# Patient Record
Sex: MALE | Race: WHITE | NOT HISPANIC OR LATINO | Employment: OTHER
[De-identification: names, ages, dates, MRNs, and addresses within clinical notes are randomized per-mention and may not be internally consistent; named-entity substitution may affect disease eponyms.]

---

## 2017-01-31 ENCOUNTER — PRIOR ORIGINAL RECORDS (OUTPATIENT)
Dept: OTHER | Age: 77
End: 2017-01-31

## 2017-05-01 LAB
ALKALINE PHOSPHATATE(ALK PHOS): 61 IU/L
ALT (SGPT): 30 U/L
AST (SGOT): 18 U/L
BUN: 23 MG/DL
CALCIUM: 9.3 MG/DL
CHLORIDE: 103 MEQ/L
CHOLESTEROL, TOTAL: 168 MG/DL
CREATININE, SERUM: 1.29 MG/DL
GLUCOSE: 229 MG/DL
GLUCOSE: 229 MG/DL
HDL CHOLESTEROL: 52 MG/DL
LDL CHOLESTEROL: 71 MG/DL
POTASSIUM, SERUM: 4.7 MEQ/L
SGOT (AST): 18 IU/L
SGPT (ALT): 30 IU/L
SODIUM: 138 MEQ/L
THYROID STIMULATING HORMONE: 1.1 MLU/L
TRIGLYCERIDES: 227 MG/DL

## 2017-05-16 ENCOUNTER — PRIOR ORIGINAL RECORDS (OUTPATIENT)
Dept: OTHER | Age: 77
End: 2017-05-16

## 2017-05-17 ENCOUNTER — PRIOR ORIGINAL RECORDS (OUTPATIENT)
Dept: OTHER | Age: 77
End: 2017-05-17

## 2017-05-17 ENCOUNTER — MYAURORA ACCOUNT LINK (OUTPATIENT)
Dept: OTHER | Age: 77
End: 2017-05-17

## 2017-05-18 ENCOUNTER — PRIOR ORIGINAL RECORDS (OUTPATIENT)
Dept: OTHER | Age: 77
End: 2017-05-18

## 2017-05-19 ENCOUNTER — HOSPITAL (OUTPATIENT)
Dept: OTHER | Age: 77
End: 2017-05-19
Attending: INTERNAL MEDICINE

## 2017-05-19 ENCOUNTER — PRIOR ORIGINAL RECORDS (OUTPATIENT)
Dept: OTHER | Age: 77
End: 2017-05-19

## 2017-05-19 LAB
ANALYZER ANC (IANC): NORMAL
ANION GAP SERPL CALC-SCNC: 10 MMOL/L (ref 10–20)
BUN SERPL-MCNC: 20 MG/DL (ref 6–20)
BUN/CREAT SERPL: 18 (ref 7–25)
CALCIUM SERPL-MCNC: 9.1 MG/DL (ref 8.4–10.2)
CHLORIDE: 106 MMOL/L (ref 98–107)
CO2 SERPL-SCNC: 26 MMOL/L (ref 21–32)
CREAT SERPL-MCNC: 1.1 MG/DL (ref 0.67–1.17)
ERYTHROCYTE [DISTWIDTH] IN BLOOD: 12.5 % (ref 11–15)
GLUCOSE SERPL-MCNC: 169 MG/DL (ref 65–99)
HEMATOCRIT: 42.3 % (ref 39–51)
HGB BLD-MCNC: 14.4 GM/DL (ref 13–17)
LENGTH OF FAST TIME PATIENT: ABNORMAL HR
MCH RBC QN AUTO: 31.2 PG (ref 26–34)
MCHC RBC AUTO-ENTMCNC: 34 GM/DL (ref 32–36.5)
MCV RBC AUTO: 91.8 FL (ref 78–100)
PLATELET # BLD: 217 THOUSAND/MCL (ref 140–450)
POTASSIUM SERPL-SCNC: 4.3 MMOL/L (ref 3.4–5.1)
RBC # BLD: 4.61 MILLION/MCL (ref 4.5–5.9)
SODIUM SERPL-SCNC: 138 MMOL/L (ref 135–145)
WBC # BLD: 5 THOUSAND/MCL (ref 4.2–11)

## 2017-05-22 ENCOUNTER — HOSPITAL (OUTPATIENT)
Dept: OTHER | Age: 77
End: 2017-05-22
Attending: INTERNAL MEDICINE

## 2017-05-22 LAB
BUN: 20 MG/DL
CALCIUM: 9.1 MG/DL
CHLORIDE: 106 MEQ/L
CREATININE, SERUM: 1.1 MG/DL
GLUCOSE BLDC GLUCOMTR-MCNC: 113 MG/DL (ref 65–99)
GLUCOSE BLDC GLUCOMTR-MCNC: 127 MG/DL (ref 65–99)
GLUCOSE BLDC GLUCOMTR-MCNC: 76 MG/DL (ref 65–99)
GLUCOSE: 169 MG/DL
HEMATOCRIT: 42.3 %
HEMOGLOBIN: 14.4 G/DL
PLATELETS: 217 K/UL
POTASSIUM, SERUM: 4.3 MEQ/L
RED BLOOD COUNT: 4.61 X 10-6/U
SODIUM: 138 MEQ/L
WHITE BLOOD COUNT: 5 X 10-3/U

## 2017-05-23 ENCOUNTER — DIAGNOSTIC TRANS (OUTPATIENT)
Dept: OTHER | Age: 77
End: 2017-05-23

## 2017-05-23 LAB — GLUCOSE BLDC GLUCOMTR-MCNC: 110 MG/DL (ref 65–99)

## 2017-06-02 ENCOUNTER — PRIOR ORIGINAL RECORDS (OUTPATIENT)
Dept: OTHER | Age: 77
End: 2017-06-02

## 2017-06-19 ENCOUNTER — PRIOR ORIGINAL RECORDS (OUTPATIENT)
Dept: OTHER | Age: 77
End: 2017-06-19

## 2017-06-22 ENCOUNTER — HOSPITAL (OUTPATIENT)
Dept: OTHER | Age: 77
End: 2017-06-22
Attending: INTERNAL MEDICINE

## 2017-06-22 ENCOUNTER — DIAGNOSTIC TRANS (OUTPATIENT)
Dept: OTHER | Age: 77
End: 2017-06-22

## 2017-06-22 LAB
GLUCOSE BLDC GLUCOMTR-MCNC: 101 MG/DL (ref 65–99)
GLUCOSE BLDC GLUCOMTR-MCNC: 107 MG/DL (ref 65–99)
GLUCOSE BLDC GLUCOMTR-MCNC: 129 MG/DL (ref 65–99)
GLUCOSE BLDC GLUCOMTR-MCNC: 143 MG/DL (ref 65–99)
GLUCOSE BLDC GLUCOMTR-MCNC: 53 MG/DL (ref 65–99)

## 2017-06-23 LAB — GLUCOSE BLDC GLUCOMTR-MCNC: 101 MG/DL (ref 65–99)

## 2017-06-26 ENCOUNTER — PRIOR ORIGINAL RECORDS (OUTPATIENT)
Dept: OTHER | Age: 77
End: 2017-06-26

## 2017-06-29 ENCOUNTER — PRIOR ORIGINAL RECORDS (OUTPATIENT)
Dept: OTHER | Age: 77
End: 2017-06-29

## 2017-07-03 ENCOUNTER — PRIOR ORIGINAL RECORDS (OUTPATIENT)
Dept: OTHER | Age: 77
End: 2017-07-03

## 2017-07-13 ENCOUNTER — PRIOR ORIGINAL RECORDS (OUTPATIENT)
Dept: OTHER | Age: 77
End: 2017-07-13

## 2017-07-14 PROBLEM — E55.9 VITAMIN D DEFICIENCY: Status: ACTIVE | Noted: 2017-07-14

## 2017-07-18 ENCOUNTER — HOSPITAL (OUTPATIENT)
Dept: OTHER | Age: 77
End: 2017-07-18
Attending: INTERNAL MEDICINE

## 2017-07-31 ENCOUNTER — PRIOR ORIGINAL RECORDS (OUTPATIENT)
Dept: OTHER | Age: 77
End: 2017-07-31

## 2017-08-01 ENCOUNTER — HOSPITAL (OUTPATIENT)
Dept: OTHER | Age: 77
End: 2017-08-01
Attending: INTERNAL MEDICINE

## 2017-09-01 ENCOUNTER — HOSPITAL (OUTPATIENT)
Dept: OTHER | Age: 77
End: 2017-09-01
Attending: INTERNAL MEDICINE

## 2017-09-20 ENCOUNTER — PRIOR ORIGINAL RECORDS (OUTPATIENT)
Dept: OTHER | Age: 77
End: 2017-09-20

## 2017-09-26 ENCOUNTER — MYAURORA ACCOUNT LINK (OUTPATIENT)
Dept: OTHER | Age: 77
End: 2017-09-26

## 2017-09-28 ENCOUNTER — PRIOR ORIGINAL RECORDS (OUTPATIENT)
Dept: OTHER | Age: 77
End: 2017-09-28

## 2017-10-01 ENCOUNTER — HOSPITAL (OUTPATIENT)
Dept: OTHER | Age: 77
End: 2017-10-01
Attending: INTERNAL MEDICINE

## 2017-11-01 ENCOUNTER — HOSPITAL (OUTPATIENT)
Dept: OTHER | Age: 77
End: 2017-11-01
Attending: INTERNAL MEDICINE

## 2017-11-14 PROBLEM — I25.10 CORONARY ARTERY DISEASE INVOLVING NATIVE CORONARY ARTERY OF NATIVE HEART WITHOUT ANGINA PECTORIS: Status: ACTIVE | Noted: 2017-11-14

## 2017-11-14 PROBLEM — I73.9 PERIPHERAL VASCULAR DISEASE OF EXTREMITY (HCC): Status: ACTIVE | Noted: 2017-11-14

## 2017-12-07 ENCOUNTER — PRIOR ORIGINAL RECORDS (OUTPATIENT)
Dept: OTHER | Age: 77
End: 2017-12-07

## 2017-12-07 ENCOUNTER — MYAURORA ACCOUNT LINK (OUTPATIENT)
Dept: OTHER | Age: 77
End: 2017-12-07

## 2018-01-31 ENCOUNTER — PRIOR ORIGINAL RECORDS (OUTPATIENT)
Dept: OTHER | Age: 78
End: 2018-01-31

## 2018-01-31 ENCOUNTER — HOSPITAL (OUTPATIENT)
Dept: OTHER | Age: 78
End: 2018-01-31
Attending: INTERNAL MEDICINE

## 2018-01-31 LAB
ALBUMIN SERPL-MCNC: 3.5 GM/DL (ref 3.6–5.1)
ALBUMIN/GLOB SERPL: 1 {RATIO} (ref 1–2.4)
ALP SERPL-CCNC: 48 UNIT/L (ref 45–117)
ALT SERPL-CCNC: 20 UNIT/L
ANALYZER ANC (IANC): ABNORMAL
ANION GAP SERPL CALC-SCNC: 13 MMOL/L (ref 10–20)
AST SERPL-CCNC: 16 UNIT/L
BILIRUB SERPL-MCNC: 0.4 MG/DL (ref 0.2–1)
BUN SERPL-MCNC: 20 MG/DL (ref 6–20)
BUN/CREAT SERPL: 16 (ref 7–25)
CALCIUM SERPL-MCNC: 8.7 MG/DL (ref 8.4–10.2)
CHLORIDE: 104 MMOL/L (ref 98–107)
CHOLEST SERPL-MCNC: 179 MG/DL
CHOLEST/HDLC SERPL: 3.8 {RATIO}
CK SERPL-CCNC: 36 UNIT/L (ref 39–308)
CO2 SERPL-SCNC: 28 MMOL/L (ref 21–32)
CREAT SERPL-MCNC: 1.23 MG/DL (ref 0.67–1.17)
ERYTHROCYTE [DISTWIDTH] IN BLOOD: 15.2 % (ref 11–15)
GLOBULIN SER-MCNC: 3.5 GM/DL (ref 2–4)
GLUCOSE SERPL-MCNC: 163 MG/DL (ref 65–99)
GLYCOHEMOGLOBIN: 8.3 % (ref 4.5–5.6)
HDLC SERPL-MCNC: 47 MG/DL
HEMATOCRIT: 42.1 % (ref 39–51)
HGB BLD-MCNC: 14.6 GM/DL (ref 13–17)
LDLC SERPL CALC-MCNC: 84 MG/DL
LENGTH OF FAST TIME PATIENT: 12 HR
LENGTH OF FAST TIME PATIENT: 12 HR
MCH RBC QN AUTO: 31.2 PG (ref 26–34)
MCHC RBC AUTO-ENTMCNC: 34.7 GM/DL (ref 32–36.5)
MCV RBC AUTO: 90 FL (ref 78–100)
NONHDLC SERPL-MCNC: 132 MG/DL
PLATELET # BLD: 219 THOUSAND/MCL (ref 140–450)
POTASSIUM SERPL-SCNC: 4.5 MMOL/L (ref 3.4–5.1)
PROT SERPL-MCNC: 7 GM/DL (ref 6.4–8.2)
RBC # BLD: 4.68 MILLION/MCL (ref 4.5–5.9)
SODIUM SERPL-SCNC: 140 MMOL/L (ref 135–145)
TRIGLYCERIDE (TRIGP): 238 MG/DL
WBC # BLD: 6.1 THOUSAND/MCL (ref 4.2–11)

## 2018-02-01 LAB
ALBUMIN: 3.5 G/DL
ALKALINE PHOSPHATATE(ALK PHOS): 48 IU/L
BILIRUBIN TOTAL: 0.4 MG/DL
BUN: 20 MG/DL
CALCIUM: 8.7 MG/DL
CHLORIDE: 104 MEQ/L
CHOLESTEROL, TOTAL: 179 MG/DL
CREATININE KINASE: 36 U/L
CREATININE, SERUM: 1.23 MG/DL
GLOBULIN: 3.5 G/DL
GLUCOSE: 163 MG/DL
GLYCOHEMOGLOBIN: 8.3 %
HDL CHOLESTEROL: 47 MG/DL
HEMATOCRIT: 42.1 %
HEMOGLOBIN: 14.6 G/DL
LDL CHOLESTEROL: 84 MG/DL
MCH: 31.2 PG
MCHC: 34.7 G/DL
MCV: 90 FL
PLATELETS: 219 K/UL
POTASSIUM, SERUM: 4.5 MEQ/L
PROTEIN, TOTAL: 7 G/DL
RED BLOOD COUNT: 4.68 X 10-6/U
SGOT (AST): 16 IU/L
SGPT (ALT): 20 IU/L
SODIUM: 140 MEQ/L
TRIGLYCERIDES: 238 MG/DL
WHITE BLOOD COUNT: 6.1 X 10-3/U

## 2018-02-05 ENCOUNTER — PRIOR ORIGINAL RECORDS (OUTPATIENT)
Dept: OTHER | Age: 78
End: 2018-02-05

## 2018-08-06 ENCOUNTER — PRIOR ORIGINAL RECORDS (OUTPATIENT)
Dept: OTHER | Age: 78
End: 2018-08-06

## 2018-10-08 PROCEDURE — 80053 COMPREHEN METABOLIC PANEL: CPT

## 2018-10-08 PROCEDURE — 85025 COMPLETE CBC W/AUTO DIFF WBC: CPT

## 2018-10-08 PROCEDURE — 80061 LIPID PANEL: CPT

## 2018-10-08 PROCEDURE — 83036 HEMOGLOBIN GLYCOSYLATED A1C: CPT

## 2018-10-09 ENCOUNTER — PRIOR ORIGINAL RECORDS (OUTPATIENT)
Dept: OTHER | Age: 78
End: 2018-10-09

## 2018-10-09 ENCOUNTER — LAB ENCOUNTER (OUTPATIENT)
Dept: LAB | Age: 78
End: 2018-10-09
Attending: INTERNAL MEDICINE
Payer: MEDICARE

## 2018-10-09 DIAGNOSIS — E78.2 MIXED HYPERLIPIDEMIA: ICD-10-CM

## 2018-10-09 DIAGNOSIS — R94.39 ABNORMAL STRESS TEST: ICD-10-CM

## 2018-10-09 DIAGNOSIS — I10 ESSENTIAL HYPERTENSION, BENIGN: ICD-10-CM

## 2018-10-09 DIAGNOSIS — E03.1 CHT (CONGENITAL HYPOTHYROIDISM): ICD-10-CM

## 2018-10-09 DIAGNOSIS — I73.9 PERIPHERAL VASCULAR DISEASE, UNSPECIFIED (HCC): ICD-10-CM

## 2018-10-09 DIAGNOSIS — I25.10 CORONARY ATHEROSCLEROSIS OF NATIVE CORONARY ARTERY: Primary | ICD-10-CM

## 2018-10-09 LAB
ALBUMIN: 3.6 G/DL
ALKALINE PHOSPHATATE(ALK PHOS): 53 IU/L
BILIRUBIN TOTAL: 0.5 MG/DL
BUN: 17 MG/DL
CALCIUM: 9 MG/DL
CHLORIDE: 106 MEQ/L
CREATININE, SERUM: 1.35 MG/DL
GLOBULIN: 3.5 G/DL
GLUCOSE: 190 MG/DL
HEMATOCRIT: 46.9 %
HEMOGLOBIN: 14.6 G/DL
MCH: 30.7 PG
MCHC: 31.1 G/DL
MCV: 98.7 FL
PLATELETS: 246 K/UL
POTASSIUM, SERUM: 4.9 MEQ/L
PROTEIN, TOTAL: 7.1 G/DL
RED BLOOD COUNT: 4.75 X 10-6/U
SGOT (AST): 16 IU/L
SGPT (ALT): 26 IU/L
SODIUM: 139 MEQ/L
WHITE BLOOD COUNT: 5.6 X 10-3/U

## 2018-10-10 ENCOUNTER — PRIOR ORIGINAL RECORDS (OUTPATIENT)
Dept: OTHER | Age: 78
End: 2018-10-10

## 2018-10-10 ENCOUNTER — MYAURORA ACCOUNT LINK (OUTPATIENT)
Dept: OTHER | Age: 78
End: 2018-10-10

## 2018-10-10 LAB
ALT (SGPT): 26 U/L
AST (SGOT): 16 U/L
CHOLESTEROL, TOTAL: 177 MG/DL
GLUCOSE: 190 MG/DL
HDL CHOLESTEROL: 42 MG/DL
HEMOGLOBIN A1C: 8.1 %
LDL CHOLESTEROL: 79 MG/DL
TRIGLYCERIDES: 278 MG/DL

## 2019-02-28 VITALS
WEIGHT: 315 LBS | HEART RATE: 64 BPM | BODY MASS INDEX: 46.65 KG/M2 | DIASTOLIC BLOOD PRESSURE: 80 MMHG | SYSTOLIC BLOOD PRESSURE: 138 MMHG | HEIGHT: 69 IN

## 2019-02-28 VITALS
HEART RATE: 64 BPM | DIASTOLIC BLOOD PRESSURE: 60 MMHG | WEIGHT: 206 LBS | BODY MASS INDEX: 30.42 KG/M2 | SYSTOLIC BLOOD PRESSURE: 130 MMHG

## 2019-02-28 VITALS
DIASTOLIC BLOOD PRESSURE: 72 MMHG | SYSTOLIC BLOOD PRESSURE: 134 MMHG | HEIGHT: 69 IN | BODY MASS INDEX: 31.1 KG/M2 | HEART RATE: 72 BPM | WEIGHT: 210 LBS

## 2019-02-28 VITALS
BODY MASS INDEX: 30.51 KG/M2 | HEART RATE: 68 BPM | SYSTOLIC BLOOD PRESSURE: 146 MMHG | WEIGHT: 206 LBS | DIASTOLIC BLOOD PRESSURE: 60 MMHG | HEIGHT: 69 IN

## 2019-02-28 VITALS
DIASTOLIC BLOOD PRESSURE: 62 MMHG | WEIGHT: 203 LBS | HEIGHT: 69 IN | HEART RATE: 68 BPM | SYSTOLIC BLOOD PRESSURE: 136 MMHG | BODY MASS INDEX: 30.07 KG/M2

## 2019-02-28 VITALS
HEIGHT: 69 IN | SYSTOLIC BLOOD PRESSURE: 138 MMHG | HEART RATE: 70 BPM | DIASTOLIC BLOOD PRESSURE: 58 MMHG | BODY MASS INDEX: 31.25 KG/M2 | RESPIRATION RATE: 16 BRPM | WEIGHT: 211 LBS

## 2019-03-01 VITALS
HEART RATE: 68 BPM | HEIGHT: 69 IN | SYSTOLIC BLOOD PRESSURE: 126 MMHG | DIASTOLIC BLOOD PRESSURE: 56 MMHG | RESPIRATION RATE: 16 BRPM | WEIGHT: 200 LBS | BODY MASS INDEX: 29.62 KG/M2

## 2019-03-01 VITALS
DIASTOLIC BLOOD PRESSURE: 62 MMHG | BODY MASS INDEX: 28.5 KG/M2 | WEIGHT: 193 LBS | SYSTOLIC BLOOD PRESSURE: 138 MMHG | HEART RATE: 70 BPM

## 2019-03-19 RX ORDER — LISINOPRIL 20 MG/1
1 TABLET ORAL DAILY
Status: ON HOLD | COMMUNITY
Start: 2018-01-31 | End: 2021-09-11 | Stop reason: HOSPADM

## 2019-03-19 RX ORDER — SIMVASTATIN 20 MG
1 TABLET ORAL AT BEDTIME
COMMUNITY
Start: 2018-01-31 | End: 2019-04-18 | Stop reason: ALTCHOICE

## 2019-03-19 RX ORDER — ASPIRIN 325 MG
1 TABLET ORAL DAILY
COMMUNITY
Start: 2017-05-01 | End: 2020-05-06 | Stop reason: CLARIF

## 2019-03-19 RX ORDER — CLOPIDOGREL BISULFATE 75 MG/1
1 TABLET ORAL DAILY
COMMUNITY
Start: 2018-01-31 | End: 2019-04-18 | Stop reason: SDUPTHER

## 2019-04-11 ENCOUNTER — ANCILLARY PROCEDURE (OUTPATIENT)
Dept: CARDIOLOGY | Age: 79
End: 2019-04-11
Attending: INTERNAL MEDICINE

## 2019-04-11 VITALS — BODY MASS INDEX: 30.51 KG/M2 | WEIGHT: 206 LBS | HEIGHT: 69 IN

## 2019-04-11 DIAGNOSIS — I25.10 CAD (CORONARY ARTERY DISEASE): ICD-10-CM

## 2019-04-11 LAB
HEART RATE RESERVE PREDICTED: 34.04 BPM
LV EF: 50 %
PEAK HR ACHIEVED: 93 BPM
RESTING HR ACHIEVED: 67 BPM
STRESS BASELINE BP: NORMAL MMHG
STRESS PERCENT HR: 66 %
STRESS POST ESTIMATED WORKLOAD: 4 METS
STRESS POST EXERCISE DUR MIN: 3 MIN
STRESS POST EXERCISE DUR SEC: 31 SEC
STRESS POST PEAK BP: NORMAL MMHG
STRESS TARGET HR: 141 BPM

## 2019-04-11 PROCEDURE — 78452 HT MUSCLE IMAGE SPECT MULT: CPT | Performed by: INTERNAL MEDICINE

## 2019-04-11 PROCEDURE — A9502 TC99M TETROFOSMIN: HCPCS | Performed by: INTERNAL MEDICINE

## 2019-04-11 PROCEDURE — 93015 CV STRESS TEST SUPVJ I&R: CPT | Performed by: INTERNAL MEDICINE

## 2019-04-11 RX ORDER — REGADENOSON 0.08 MG/ML
0.4 INJECTION, SOLUTION INTRAVENOUS ONCE
Status: COMPLETED | OUTPATIENT
Start: 2019-04-11 | End: 2019-04-11

## 2019-04-11 RX ADMIN — REGADENOSON 0.4 MG: 0.08 INJECTION, SOLUTION INTRAVENOUS at 09:15

## 2019-04-11 ASSESSMENT — EXERCISE STRESS TEST
PEAK_BP: 148/70
STAGE_CATEGORIES: RECOVERY 1
GRADE: 10
PEAK_RPP: 15840
PEAK_HR: 93
MPH: 1.9
STAGE_CATEGORIES: RECOVERY 2
PEAK_RPP: 13024
STAGE_CATEGORIES: RECOVERY 0
STAGE_CATEGORIES: RECOVERY 3
PEAK_HR: 93
STAGE_CATEGORIES: 1
PEAK_RPP: 10720
PEAK_BP: 160/62
PEAK_BP: 174/60
PEAK_HR: 67
PEAK_BP: 178/60
PEAK_BP: 178/60
PEAK_HR: 88
GRADE: 11
PEAK_RPP: 16554
STAGE_CATEGORIES: 2
STAGE_CATEGORIES: RESTING
PEAK_HR: 103
PEAK_BP: 180/60
PEAK_RPP: 16554
STOPPAGE_REASON: LEG FATIGUE
PEAK_BP: 150/60
MPH: 1.7
PEAK_HR: 88
PEAK_RPP: 17922

## 2019-04-15 ENCOUNTER — TELEPHONE (OUTPATIENT)
Dept: CARDIOLOGY | Age: 79
End: 2019-04-15

## 2019-04-15 ENCOUNTER — HOSPITAL (OUTPATIENT)
Dept: OTHER | Age: 79
End: 2019-04-15
Attending: INTERNAL MEDICINE

## 2019-04-15 PROBLEM — I10 HYPERTENSION, BENIGN: Status: ACTIVE | Noted: 2017-05-16

## 2019-04-15 PROBLEM — I73.9 PERIPHERAL VASCULAR DISEASE OF EXTREMITY (CMD): Status: ACTIVE | Noted: 2017-11-14

## 2019-04-15 PROBLEM — E55.9 VITAMIN D DEFICIENCY: Status: ACTIVE | Noted: 2017-07-14

## 2019-04-15 PROBLEM — E78.2 HYPERLIPIDEMIA, MIXED: Status: ACTIVE | Noted: 2017-05-16

## 2019-04-15 PROBLEM — I25.10 CAD (CORONARY ARTERY DISEASE), NATIVE CORONARY ARTERY: Status: ACTIVE | Noted: 2017-06-02

## 2019-04-15 PROBLEM — I25.10 CORONARY ARTERY DISEASE INVOLVING NATIVE CORONARY ARTERY OF NATIVE HEART WITHOUT ANGINA PECTORIS: Status: ACTIVE | Noted: 2017-11-14

## 2019-04-15 LAB
ALBUMIN SERPL-MCNC: 3.5 G/DL (ref 3.6–5.1)
ALBUMIN SERPL-MCNC: 3.5 GM/DL (ref 3.6–5.1)
ALBUMIN/GLOB SERPL: 1.1 {RATIO} (ref 1–2.4)
ALBUMIN/GLOB SERPL: 1.1 {RATIO} (ref 1–2.4)
ALP SERPL-CCNC: 60 UNIT/L (ref 45–117)
ALP SERPL-CCNC: 60 UNITS/L (ref 45–117)
ALT SERPL-CCNC: 17 UNIT/L
ALT SERPL-CCNC: 17 UNITS/L
ANALYZER ANC (IANC): ABNORMAL
ANALYZER ANC (IANC): ABNORMAL
ANION GAP SERPL CALC-SCNC: 15 MMOL/L (ref 10–20)
ANION GAP SERPL CALC-SCNC: 15 MMOL/L (ref 10–20)
AST SERPL-CCNC: 17 UNIT/L
AST SERPL-CCNC: 17 UNITS/L
BILIRUB SERPL-MCNC: 0.4 MG/DL (ref 0.2–1)
BILIRUB SERPL-MCNC: 0.4 MG/DL (ref 0.2–1)
BUN SERPL-MCNC: 18 MG/DL (ref 6–20)
BUN SERPL-MCNC: 18 MG/DL (ref 6–20)
BUN/CREAT SERPL: 14 (ref 7–25)
BUN/CREAT SERPL: 14 (ref 7–25)
CALCIUM SERPL-MCNC: 9 MG/DL (ref 8.4–10.2)
CALCIUM SERPL-MCNC: 9 MG/DL (ref 8.4–10.2)
CHLORIDE SERPL-SCNC: 104 MMOL/L (ref 98–107)
CHLORIDE: 104 MMOL/L (ref 98–107)
CHOLEST SERPL-MCNC: 211 MG/DL
CHOLEST SERPL-MCNC: 211 MG/DL
CHOLEST SERPL-MCNC: ABNORMAL MG/DL
CHOLEST/HDLC SERPL: 4.6 {RATIO}
CHOLEST/HDLC SERPL: 4.6 {RATIO}
CO2 SERPL-SCNC: 25 MMOL/L (ref 21–32)
CO2 SERPL-SCNC: 25 MMOL/L (ref 21–32)
CREAT SERPL-MCNC: 1.25 MG/DL (ref 0.67–1.17)
CREAT SERPL-MCNC: 1.25 MG/DL (ref 0.67–1.17)
ERYTHROCYTE [DISTWIDTH] IN BLOOD: 15.7 % (ref 11–15)
ERYTHROCYTE [DISTWIDTH] IN BLOOD: 15.7 % (ref 11–15)
GLOBULIN SER-MCNC: 3.3 G/DL (ref 2–4)
GLOBULIN SER-MCNC: 3.3 GM/DL (ref 2–4)
GLUCOSE SERPL-MCNC: 134 MG/DL (ref 65–99)
GLUCOSE SERPL-MCNC: 134 MG/DL (ref 65–99)
GLYCOHEMOGLOBIN: 8.2 % (ref 4.5–5.6)
HBA1C MFR BLD: 10.0           24 %
HBA1C MFR BLD: 6.0            126 %
HBA1C MFR BLD: 6.5            14 %
HBA1C MFR BLD: 7.0            154 %
HBA1C MFR BLD: 7.5            169 %
HBA1C MFR BLD: 8.0            183 %
HBA1C MFR BLD: 8.2 % (ref 4.5–5.6)
HBA1C MFR BLD: 8.5            197 %
HBA1C MFR BLD: 9.0            212 %
HBA1C MFR BLD: 9.5            226 %
HBA1C MFR BLD: ABNORMAL %
HCT VFR BLD CALC: 40.4 % (ref 39–51)
HDLC SERPL-MCNC: 46 MG/DL
HDLC SERPL-MCNC: 46 MG/DL
HDLC SERPL-MCNC: ABNORMAL MG/DL
HEMATOCRIT: 40.4 % (ref 39–51)
HGB BLD-MCNC: 12.5 G/DL (ref 13–17)
HGB BLD-MCNC: 12.5 GM/DL (ref 13–17)
LDLC SERPL CALC-MCNC: 123 MG/DL
LDLC SERPL CALC-MCNC: 123 MG/DL
LDLC SERPL CALC-MCNC: ABNORMAL MG/DL
LENGTH OF FAST TIME PATIENT: 12 HR
LENGTH OF FAST TIME PATIENT: 12 HR
LENGTH OF FAST TIME PATIENT: 12 HRS
LENGTH OF FAST TIME PATIENT: 12 HRS
MCH RBC QN AUTO: 25.7 PG (ref 26–34)
MCH RBC QN AUTO: 25.7 PG (ref 26–34)
MCHC RBC AUTO-ENTMCNC: 30.9 G/DL (ref 32–36.5)
MCHC RBC AUTO-ENTMCNC: 30.9 GM/DL (ref 32–36.5)
MCV RBC AUTO: 83.1 FL (ref 78–100)
MCV RBC AUTO: 83.1 FL (ref 78–100)
NONHDLC SERPL-MCNC: 165 MG/DL
NONHDLC SERPL-MCNC: 165 MG/DL
NONHDLC SERPL-MCNC: ABNORMAL MG/DL
NRBC (NRBCRE): 0 /100 WBC
NRBC (NRBCRE): 0 /100 WBC
PLATELET # BLD: 265 K/MCL (ref 140–450)
PLATELET # BLD: 265 THOUSAND/MCL (ref 140–450)
POTASSIUM SERPL-SCNC: 4.6 MMOL/L (ref 3.4–5.1)
POTASSIUM SERPL-SCNC: 4.6 MMOL/L (ref 3.4–5.1)
PROT SERPL-MCNC: 6.8 G/DL (ref 6.4–8.2)
PROT SERPL-MCNC: 6.8 GM/DL (ref 6.4–8.2)
RBC # BLD: 4.86 MIL/MCL (ref 4.5–5.9)
RBC # BLD: 4.86 MILLION/MCL (ref 4.5–5.9)
SODIUM SERPL-SCNC: 139 MMOL/L (ref 135–145)
SODIUM SERPL-SCNC: 139 MMOL/L (ref 135–145)
TRIGL SERPL-MCNC: 212 MG/DL
TRIGL SERPL-MCNC: ABNORMAL MG/DL
TRIGLYCERIDE (TRIGP): 212 MG/DL
WBC # BLD: 6.1 K/MCL (ref 4.2–11)
WBC # BLD: 6.1 THOUSAND/MCL (ref 4.2–11)

## 2019-04-18 ENCOUNTER — OFFICE VISIT (OUTPATIENT)
Dept: CARDIOLOGY | Age: 79
End: 2019-04-18

## 2019-04-18 VITALS
DIASTOLIC BLOOD PRESSURE: 58 MMHG | SYSTOLIC BLOOD PRESSURE: 150 MMHG | HEIGHT: 69 IN | BODY MASS INDEX: 31.1 KG/M2 | HEART RATE: 68 BPM | WEIGHT: 210 LBS | RESPIRATION RATE: 16 BRPM

## 2019-04-18 DIAGNOSIS — I10 HYPERTENSION, BENIGN: ICD-10-CM

## 2019-04-18 DIAGNOSIS — I73.9 PERIPHERAL VASCULAR DISEASE OF EXTREMITY (CMD): ICD-10-CM

## 2019-04-18 DIAGNOSIS — E78.2 HYPERLIPIDEMIA, MIXED: ICD-10-CM

## 2019-04-18 DIAGNOSIS — E55.9 VITAMIN D DEFICIENCY: ICD-10-CM

## 2019-04-18 DIAGNOSIS — I25.10 CORONARY ARTERY DISEASE INVOLVING NATIVE CORONARY ARTERY OF NATIVE HEART WITHOUT ANGINA PECTORIS: Primary | ICD-10-CM

## 2019-04-18 PROCEDURE — 3078F DIAST BP <80 MM HG: CPT | Performed by: INTERNAL MEDICINE

## 2019-04-18 PROCEDURE — 99214 OFFICE O/P EST MOD 30 MIN: CPT | Performed by: INTERNAL MEDICINE

## 2019-04-18 PROCEDURE — 3077F SYST BP >= 140 MM HG: CPT | Performed by: INTERNAL MEDICINE

## 2019-04-18 RX ORDER — INSULIN ASPART 100 [IU]/ML
INJECTION, SOLUTION INTRAVENOUS; SUBCUTANEOUS
COMMUNITY
End: 2021-09-04

## 2019-04-18 RX ORDER — INSULIN LISPRO 100 [IU]/ML
10 INJECTION, SOLUTION INTRAVENOUS; SUBCUTANEOUS
Status: ON HOLD | COMMUNITY
Start: 2018-04-11 | End: 2021-09-11 | Stop reason: SDUPTHER

## 2019-04-18 RX ORDER — ATORVASTATIN CALCIUM 40 MG/1
40 TABLET, FILM COATED ORAL DAILY
Qty: 90 TABLET | Refills: 1 | Status: SHIPPED | OUTPATIENT
Start: 2019-04-18 | End: 2019-04-19 | Stop reason: SDUPTHER

## 2019-04-18 RX ORDER — CLOPIDOGREL BISULFATE 75 MG/1
75 TABLET ORAL DAILY
Qty: 90 TABLET | Refills: 3 | Status: SHIPPED | OUTPATIENT
Start: 2019-04-18 | End: 2019-04-19 | Stop reason: SDUPTHER

## 2019-04-18 RX ORDER — LANCETS
EACH MISCELLANEOUS
COMMUNITY
Start: 2011-07-07

## 2019-04-18 SDOH — HEALTH STABILITY: PHYSICAL HEALTH: ON AVERAGE, HOW MANY DAYS PER WEEK DO YOU ENGAGE IN MODERATE TO STRENUOUS EXERCISE (LIKE A BRISK WALK)?: 0 DAYS

## 2019-04-18 SDOH — HEALTH STABILITY: PHYSICAL HEALTH: ON AVERAGE, HOW MANY MINUTES DO YOU ENGAGE IN EXERCISE AT THIS LEVEL?: 0 MIN

## 2019-04-18 ASSESSMENT — ENCOUNTER SYMPTOMS
FEVER: 0
CHILLS: 0
BRUISES/BLEEDS EASILY: 0
WEIGHT GAIN: 0
SUSPICIOUS LESIONS: 0
WEIGHT LOSS: 0
ALLERGIC/IMMUNOLOGIC COMMENTS: NO NEW FOOD ALLERGIES
HEMATOCHEZIA: 0
HEMOPTYSIS: 0
COUGH: 0

## 2019-04-19 ENCOUNTER — TELEPHONE (OUTPATIENT)
Dept: CARDIOLOGY | Age: 79
End: 2019-04-19

## 2019-04-19 RX ORDER — ATORVASTATIN CALCIUM 40 MG/1
40 TABLET, FILM COATED ORAL DAILY
Qty: 90 TABLET | Refills: 1 | Status: SHIPPED | OUTPATIENT
Start: 2019-04-19

## 2019-04-19 RX ORDER — CLOPIDOGREL BISULFATE 75 MG/1
75 TABLET ORAL DAILY
Qty: 90 TABLET | Refills: 3 | Status: SHIPPED | OUTPATIENT
Start: 2019-04-19 | End: 2020-05-06 | Stop reason: SDUPTHER

## 2019-04-23 RX ORDER — CLOPIDOGREL BISULFATE 75 MG/1
75 TABLET ORAL DAILY
Qty: 90 TABLET | Refills: 3 | OUTPATIENT
Start: 2019-04-23

## 2019-10-18 ENCOUNTER — LAB SERVICES (OUTPATIENT)
Dept: LAB | Age: 79
End: 2019-10-18

## 2019-10-18 ENCOUNTER — TELEPHONE (OUTPATIENT)
Dept: CARDIOLOGY | Age: 79
End: 2019-10-18

## 2019-10-18 DIAGNOSIS — I10 HYPERTENSION, BENIGN: ICD-10-CM

## 2019-10-18 DIAGNOSIS — I25.10 CORONARY ARTERY DISEASE INVOLVING NATIVE CORONARY ARTERY OF NATIVE HEART WITHOUT ANGINA PECTORIS: ICD-10-CM

## 2019-10-18 DIAGNOSIS — E55.9 VITAMIN D DEFICIENCY: ICD-10-CM

## 2019-10-18 DIAGNOSIS — I73.9 PERIPHERAL VASCULAR DISEASE OF EXTREMITY (CMD): ICD-10-CM

## 2019-10-18 DIAGNOSIS — E78.2 HYPERLIPIDEMIA, MIXED: ICD-10-CM

## 2019-10-18 LAB
ALBUMIN SERPL-MCNC: 3.5 G/DL (ref 3.6–5.1)
ALBUMIN/GLOB SERPL: 1.2 {RATIO} (ref 1–2.4)
ALP SERPL-CCNC: 53 UNITS/L (ref 45–117)
ALT SERPL-CCNC: 20 UNITS/L
ANION GAP SERPL CALC-SCNC: 10 MMOL/L (ref 10–20)
AST SERPL-CCNC: 20 UNITS/L
BASOPHILS # BLD AUTO: 0 K/MCL (ref 0–0.3)
BASOPHILS NFR BLD AUTO: 1 %
BILIRUB SERPL-MCNC: 0.4 MG/DL (ref 0.2–1)
BUN SERPL-MCNC: 22 MG/DL (ref 6–20)
BUN/CREAT SERPL: 16 (ref 7–25)
CALCIUM SERPL-MCNC: 9.3 MG/DL (ref 8.4–10.2)
CHLORIDE SERPL-SCNC: 109 MMOL/L (ref 98–107)
CHOLEST SERPL-MCNC: 126 MG/DL
CHOLEST/HDLC SERPL: 2.9 {RATIO}
CO2 SERPL-SCNC: 26 MMOL/L (ref 21–32)
CREAT SERPL-MCNC: 1.38 MG/DL (ref 0.67–1.17)
DIFFERENTIAL METHOD BLD: ABNORMAL
EOSINOPHIL # BLD AUTO: 0.2 K/MCL (ref 0.1–0.5)
EOSINOPHIL NFR SPEC: 3 %
ERYTHROCYTE [DISTWIDTH] IN BLOOD: 14.1 % (ref 11–15)
FASTING STATUS PATIENT QL REPORTED: 12 HRS
GLOBULIN SER-MCNC: 3 G/DL (ref 2–4)
GLUCOSE SERPL-MCNC: 115 MG/DL (ref 65–99)
HBA1C MFR BLD: 7.2 % (ref 4.5–5.6)
HCT VFR BLD CALC: 34.8 % (ref 39–51)
HDLC SERPL-MCNC: 44 MG/DL
HGB BLD-MCNC: 10.8 G/DL (ref 13–17)
IMM GRANULOCYTES # BLD AUTO: 0 K/MCL (ref 0–0.2)
IMM GRANULOCYTES NFR BLD: 1 %
LDLC SERPL-MCNC: 55 MG/DL
LENGTH OF FAST TIME PATIENT: 12 HRS
LYMPHOCYTES # BLD MANUAL: 1.4 K/MCL (ref 1–4)
LYMPHOCYTES NFR BLD MANUAL: 23 %
MCH RBC QN AUTO: 26.7 PG (ref 26–34)
MCHC RBC AUTO-ENTMCNC: 31 G/DL (ref 32–36.5)
MCV RBC AUTO: 86.1 FL (ref 78–100)
MONOCYTES # BLD MANUAL: 0.6 K/MCL (ref 0.3–0.9)
MONOCYTES NFR BLD MANUAL: 10 %
NEUTROPHILS # BLD: 3.7 K/MCL (ref 1.8–7.7)
NEUTROPHILS NFR BLD AUTO: 62 %
NONHDLC SERPL-MCNC: 82 MG/DL
NRBC BLD MANUAL-RTO: 0 /100 WBC
PLATELET # BLD: 279 K/MCL (ref 140–450)
POTASSIUM SERPL-SCNC: 5.1 MMOL/L (ref 3.4–5.1)
PROT SERPL-MCNC: 6.5 G/DL (ref 6.4–8.2)
RBC # BLD: 4.04 MIL/MCL (ref 4.5–5.9)
SODIUM SERPL-SCNC: 140 MMOL/L (ref 135–145)
TRIGL SERPL-MCNC: 135 MG/DL
WBC # BLD: 5.9 K/MCL (ref 4.2–11)

## 2019-10-18 PROCEDURE — 85025 COMPLETE CBC W/AUTO DIFF WBC: CPT | Performed by: INTERNAL MEDICINE

## 2019-10-18 PROCEDURE — 80061 LIPID PANEL: CPT | Performed by: INTERNAL MEDICINE

## 2019-10-18 PROCEDURE — 80053 COMPREHEN METABOLIC PANEL: CPT | Performed by: INTERNAL MEDICINE

## 2019-10-18 PROCEDURE — 83036 HEMOGLOBIN GLYCOSYLATED A1C: CPT | Performed by: INTERNAL MEDICINE

## 2019-10-18 PROCEDURE — 36415 COLL VENOUS BLD VENIPUNCTURE: CPT | Performed by: INTERNAL MEDICINE

## 2019-10-31 ENCOUNTER — OFFICE VISIT (OUTPATIENT)
Dept: CARDIOLOGY | Age: 79
End: 2019-10-31

## 2019-10-31 VITALS
HEIGHT: 69 IN | SYSTOLIC BLOOD PRESSURE: 138 MMHG | BODY MASS INDEX: 29.77 KG/M2 | RESPIRATION RATE: 16 BRPM | WEIGHT: 201 LBS | DIASTOLIC BLOOD PRESSURE: 58 MMHG | HEART RATE: 64 BPM

## 2019-10-31 DIAGNOSIS — E78.2 HYPERLIPIDEMIA, MIXED: ICD-10-CM

## 2019-10-31 DIAGNOSIS — I10 HYPERTENSION, BENIGN: ICD-10-CM

## 2019-10-31 DIAGNOSIS — E55.9 VITAMIN D DEFICIENCY: ICD-10-CM

## 2019-10-31 DIAGNOSIS — I73.9 PERIPHERAL VASCULAR DISEASE OF EXTREMITY (CMD): ICD-10-CM

## 2019-10-31 DIAGNOSIS — I25.10 CORONARY ARTERY DISEASE INVOLVING NATIVE CORONARY ARTERY OF NATIVE HEART WITHOUT ANGINA PECTORIS: Primary | ICD-10-CM

## 2019-10-31 PROCEDURE — 3075F SYST BP GE 130 - 139MM HG: CPT | Performed by: INTERNAL MEDICINE

## 2019-10-31 PROCEDURE — 3078F DIAST BP <80 MM HG: CPT | Performed by: INTERNAL MEDICINE

## 2019-10-31 PROCEDURE — 99214 OFFICE O/P EST MOD 30 MIN: CPT | Performed by: INTERNAL MEDICINE

## 2019-10-31 SDOH — HEALTH STABILITY: PHYSICAL HEALTH: ON AVERAGE, HOW MANY DAYS PER WEEK DO YOU ENGAGE IN MODERATE TO STRENUOUS EXERCISE (LIKE A BRISK WALK)?: 0 DAYS

## 2019-10-31 SDOH — HEALTH STABILITY: PHYSICAL HEALTH: ON AVERAGE, HOW MANY MINUTES DO YOU ENGAGE IN EXERCISE AT THIS LEVEL?: 0 MIN

## 2019-10-31 ASSESSMENT — ENCOUNTER SYMPTOMS
SUSPICIOUS LESIONS: 0
PSYCHIATRIC NEGATIVE: 1
HEMATOCHEZIA: 0
LIGHT-HEADEDNESS: 0
BRUISES/BLEEDS EASILY: 0
WEIGHT GAIN: 0
CHILLS: 0
ABDOMINAL PAIN: 0
WEIGHT LOSS: 0
SHORTNESS OF BREATH: 0
COUGH: 0
HEMOPTYSIS: 0
ENDOCRINE NEGATIVE: 1
SYNCOPE: 0
DIZZINESS: 0
FEVER: 0

## 2019-10-31 ASSESSMENT — PATIENT HEALTH QUESTIONNAIRE - PHQ9
SUM OF ALL RESPONSES TO PHQ9 QUESTIONS 1 AND 2: 0
2. FEELING DOWN, DEPRESSED OR HOPELESS: NOT AT ALL
1. LITTLE INTEREST OR PLEASURE IN DOING THINGS: NOT AT ALL
SUM OF ALL RESPONSES TO PHQ9 QUESTIONS 1 AND 2: 0

## 2020-01-07 PROBLEM — D64.9 ANEMIA, UNSPECIFIED TYPE: Status: ACTIVE | Noted: 2020-01-07

## 2020-04-28 PROBLEM — G45.9 TIA (TRANSIENT ISCHEMIC ATTACK): Status: ACTIVE | Noted: 2020-04-28

## 2020-05-06 ENCOUNTER — OFFICE VISIT (OUTPATIENT)
Dept: CARDIOLOGY | Age: 80
End: 2020-05-06

## 2020-05-06 VITALS
HEART RATE: 68 BPM | SYSTOLIC BLOOD PRESSURE: 111 MMHG | BODY MASS INDEX: 29.68 KG/M2 | WEIGHT: 201 LBS | DIASTOLIC BLOOD PRESSURE: 48 MMHG

## 2020-05-06 DIAGNOSIS — E78.2 HYPERLIPIDEMIA, MIXED: ICD-10-CM

## 2020-05-06 DIAGNOSIS — E55.9 VITAMIN D DEFICIENCY: ICD-10-CM

## 2020-05-06 DIAGNOSIS — G45.9 TIA (TRANSIENT ISCHEMIC ATTACK): ICD-10-CM

## 2020-05-06 DIAGNOSIS — I73.9 PERIPHERAL VASCULAR DISEASE OF EXTREMITY (CMD): ICD-10-CM

## 2020-05-06 DIAGNOSIS — I25.10 CORONARY ARTERY DISEASE INVOLVING NATIVE CORONARY ARTERY OF NATIVE HEART WITHOUT ANGINA PECTORIS: Primary | ICD-10-CM

## 2020-05-06 DIAGNOSIS — I10 HYPERTENSION, BENIGN: ICD-10-CM

## 2020-05-06 PROCEDURE — 99442 TELEPHONE E&M BY PHYSICIAN EST PT NOT ORIG PREV 7 DAYS 11-20 MIN: CPT | Performed by: INTERNAL MEDICINE

## 2020-05-06 RX ORDER — CLOPIDOGREL BISULFATE 75 MG/1
75 TABLET ORAL DAILY
Qty: 90 TABLET | Refills: 3 | Status: SHIPPED | OUTPATIENT
Start: 2020-05-06 | End: 2021-11-26 | Stop reason: ALTCHOICE

## 2020-05-06 ASSESSMENT — ENCOUNTER SYMPTOMS
ABDOMINAL PAIN: 0
SHORTNESS OF BREATH: 0
WEIGHT GAIN: 0
LIGHT-HEADEDNESS: 0
ENDOCRINE NEGATIVE: 1
DIZZINESS: 0
BRUISES/BLEEDS EASILY: 0
WEIGHT LOSS: 0
HEMATOCHEZIA: 0
PSYCHIATRIC NEGATIVE: 1
HEMOPTYSIS: 0
FEVER: 0
SYNCOPE: 0
SUSPICIOUS LESIONS: 0
COUGH: 0
CHILLS: 0

## 2020-05-07 PROBLEM — I25.10 CAD (CORONARY ARTERY DISEASE), NATIVE CORONARY ARTERY: Status: ACTIVE | Noted: 2017-06-02

## 2020-05-07 PROBLEM — G45.9 TIA (TRANSIENT ISCHEMIC ATTACK): Status: ACTIVE | Noted: 2020-04-28

## 2020-09-10 PROBLEM — I10 ESSENTIAL HYPERTENSION: Status: ACTIVE | Noted: 2020-09-10

## 2020-11-03 ENCOUNTER — LAB SERVICES (OUTPATIENT)
Dept: LAB | Age: 80
End: 2020-11-03

## 2020-11-03 DIAGNOSIS — I25.10 CORONARY ARTERY DISEASE INVOLVING NATIVE CORONARY ARTERY OF NATIVE HEART WITHOUT ANGINA PECTORIS: ICD-10-CM

## 2020-11-03 DIAGNOSIS — E55.9 VITAMIN D DEFICIENCY: ICD-10-CM

## 2020-11-03 DIAGNOSIS — E78.2 HYPERLIPIDEMIA, MIXED: ICD-10-CM

## 2020-11-03 DIAGNOSIS — I73.9 PERIPHERAL VASCULAR DISEASE OF EXTREMITY (CMD): ICD-10-CM

## 2020-11-03 DIAGNOSIS — I10 HYPERTENSION, BENIGN: ICD-10-CM

## 2020-11-03 DIAGNOSIS — G45.9 TIA (TRANSIENT ISCHEMIC ATTACK): ICD-10-CM

## 2020-11-03 LAB
ALBUMIN SERPL-MCNC: 3.4 G/DL (ref 3.6–5.1)
ALBUMIN/GLOB SERPL: 1.1 {RATIO} (ref 1–2.4)
ALP SERPL-CCNC: 59 UNITS/L (ref 45–117)
ALT SERPL-CCNC: 23 UNITS/L
ANION GAP SERPL CALC-SCNC: 10 MMOL/L (ref 10–20)
AST SERPL-CCNC: 16 UNITS/L
BASOPHILS # BLD: 0 K/MCL (ref 0–0.3)
BASOPHILS NFR BLD: 1 %
BILIRUB SERPL-MCNC: 0.6 MG/DL (ref 0.2–1)
BUN SERPL-MCNC: 22 MG/DL (ref 6–20)
BUN/CREAT SERPL: 15 (ref 7–25)
CALCIUM SERPL-MCNC: 8.9 MG/DL (ref 8.4–10.2)
CHLORIDE SERPL-SCNC: 104 MMOL/L (ref 98–107)
CHOLEST SERPL-MCNC: 173 MG/DL
CHOLEST/HDLC SERPL: 3.6 {RATIO}
CO2 SERPL-SCNC: 29 MMOL/L (ref 21–32)
CREAT SERPL-MCNC: 1.46 MG/DL (ref 0.67–1.17)
DIFFERENTIAL METHOD BLD: NORMAL
EOSINOPHIL # BLD: 0.1 K/MCL (ref 0.1–0.5)
EOSINOPHIL NFR BLD: 2 %
ERYTHROCYTE [DISTWIDTH] IN BLOOD: 13.5 % (ref 11–15)
GLOBULIN SER-MCNC: 3.2 G/DL (ref 2–4)
GLUCOSE SERPL-MCNC: 143 MG/DL (ref 65–99)
HBA1C MFR BLD: 8 % (ref 4.5–5.6)
HCT VFR BLD CALC: 46.7 % (ref 39–51)
HDLC SERPL-MCNC: 48 MG/DL
HGB BLD-MCNC: 15 G/DL (ref 13–17)
IMM GRANULOCYTES # BLD AUTO: 0 K/MCL (ref 0–0.2)
IMM GRANULOCYTES NFR BLD: 1 %
LDLC SERPL CALC-MCNC: 79 MG/DL
LENGTH OF FAST TIME PATIENT: 12 HRS
LENGTH OF FAST TIME PATIENT: 12 HRS
LYMPHOCYTES # BLD: 1.1 K/MCL (ref 1–4)
LYMPHOCYTES NFR BLD: 19 %
MCH RBC QN AUTO: 29.8 PG (ref 26–34)
MCHC RBC AUTO-ENTMCNC: 32.1 G/DL (ref 32–36.5)
MCV RBC AUTO: 92.8 FL (ref 78–100)
MONOCYTES # BLD: 0.7 K/MCL (ref 0.3–0.9)
MONOCYTES NFR BLD: 11 %
NEUTROPHILS # BLD: 3.9 K/MCL (ref 1.8–7.7)
NEUTROPHILS NFR BLD: 66 %
NONHDLC SERPL-MCNC: 125 MG/DL
NRBC BLD MANUAL-RTO: 0 /100 WBC
PLATELET # BLD: 239 K/MCL (ref 140–450)
POTASSIUM SERPL-SCNC: 5.1 MMOL/L (ref 3.4–5.1)
PROT SERPL-MCNC: 6.6 G/DL (ref 6.4–8.2)
RBC # BLD: 5.03 MIL/MCL (ref 4.5–5.9)
SODIUM SERPL-SCNC: 138 MMOL/L (ref 135–145)
TRIGL SERPL-MCNC: 228 MG/DL
WBC # BLD: 5.8 K/MCL (ref 4.2–11)

## 2020-11-03 PROCEDURE — 80061 LIPID PANEL: CPT | Performed by: INTERNAL MEDICINE

## 2020-11-03 PROCEDURE — 83036 HEMOGLOBIN GLYCOSYLATED A1C: CPT | Performed by: INTERNAL MEDICINE

## 2020-11-03 PROCEDURE — 36415 COLL VENOUS BLD VENIPUNCTURE: CPT | Performed by: INTERNAL MEDICINE

## 2020-11-03 PROCEDURE — 80053 COMPREHEN METABOLIC PANEL: CPT | Performed by: INTERNAL MEDICINE

## 2020-11-03 PROCEDURE — 85025 COMPLETE CBC W/AUTO DIFF WBC: CPT | Performed by: INTERNAL MEDICINE

## 2020-11-05 ENCOUNTER — OFFICE VISIT (OUTPATIENT)
Dept: CARDIOLOGY | Age: 80
End: 2020-11-05

## 2020-11-05 VITALS
HEART RATE: 68 BPM | SYSTOLIC BLOOD PRESSURE: 138 MMHG | HEIGHT: 69 IN | BODY MASS INDEX: 29.33 KG/M2 | WEIGHT: 198 LBS | DIASTOLIC BLOOD PRESSURE: 68 MMHG

## 2020-11-05 DIAGNOSIS — I73.9 PERIPHERAL VASCULAR DISEASE OF EXTREMITY (CMD): ICD-10-CM

## 2020-11-05 DIAGNOSIS — G45.9 TIA (TRANSIENT ISCHEMIC ATTACK): Primary | ICD-10-CM

## 2020-11-05 DIAGNOSIS — I10 HYPERTENSION, BENIGN: ICD-10-CM

## 2020-11-05 DIAGNOSIS — I25.10 CORONARY ARTERY DISEASE INVOLVING NATIVE CORONARY ARTERY OF NATIVE HEART WITHOUT ANGINA PECTORIS: ICD-10-CM

## 2020-11-05 DIAGNOSIS — E78.2 HYPERLIPIDEMIA, MIXED: ICD-10-CM

## 2020-11-05 DIAGNOSIS — E55.9 VITAMIN D DEFICIENCY: ICD-10-CM

## 2020-11-05 PROCEDURE — 3078F DIAST BP <80 MM HG: CPT | Performed by: INTERNAL MEDICINE

## 2020-11-05 PROCEDURE — 3075F SYST BP GE 130 - 139MM HG: CPT | Performed by: INTERNAL MEDICINE

## 2020-11-05 PROCEDURE — 99214 OFFICE O/P EST MOD 30 MIN: CPT | Performed by: INTERNAL MEDICINE

## 2020-11-05 SDOH — HEALTH STABILITY: PHYSICAL HEALTH: ON AVERAGE, HOW MANY DAYS PER WEEK DO YOU ENGAGE IN MODERATE TO STRENUOUS EXERCISE (LIKE A BRISK WALK)?: 0 DAYS

## 2020-11-05 SDOH — HEALTH STABILITY: PHYSICAL HEALTH: ON AVERAGE, HOW MANY MINUTES DO YOU ENGAGE IN EXERCISE AT THIS LEVEL?: 0 MIN

## 2020-11-05 ASSESSMENT — ENCOUNTER SYMPTOMS
DIZZINESS: 0
BRUISES/BLEEDS EASILY: 0
ABDOMINAL PAIN: 0
SUSPICIOUS LESIONS: 0
HEMATOCHEZIA: 0
WEIGHT LOSS: 0
WEIGHT GAIN: 0
ENDOCRINE NEGATIVE: 1
PSYCHIATRIC NEGATIVE: 1
LIGHT-HEADEDNESS: 0
HEMOPTYSIS: 0
SHORTNESS OF BREATH: 0
SYNCOPE: 0
CHILLS: 0
FEVER: 0
COUGH: 0

## 2020-11-05 ASSESSMENT — PATIENT HEALTH QUESTIONNAIRE - PHQ9
1. LITTLE INTEREST OR PLEASURE IN DOING THINGS: NOT AT ALL
CLINICAL INTERPRETATION OF PHQ2 SCORE: NO FURTHER SCREENING NEEDED
SUM OF ALL RESPONSES TO PHQ9 QUESTIONS 1 AND 2: 0
SUM OF ALL RESPONSES TO PHQ9 QUESTIONS 1 AND 2: 0
CLINICAL INTERPRETATION OF PHQ9 SCORE: NO FURTHER SCREENING NEEDED
2. FEELING DOWN, DEPRESSED OR HOPELESS: NOT AT ALL

## 2021-02-10 ENCOUNTER — TELEPHONE (OUTPATIENT)
Dept: SCHEDULING | Age: 81
End: 2021-02-10

## 2021-02-23 ENCOUNTER — IMMUNIZATION (OUTPATIENT)
Dept: LAB | Age: 81
End: 2021-02-23

## 2021-02-23 DIAGNOSIS — Z23 NEED FOR VACCINATION: Primary | ICD-10-CM

## 2021-02-23 PROCEDURE — 0011A COVID-19 MODERNA VACCINE: CPT | Performed by: NURSE PRACTITIONER

## 2021-02-23 PROCEDURE — 91301 COVID-19 MODERNA VACCINE: CPT | Performed by: NURSE PRACTITIONER

## 2021-03-23 ENCOUNTER — APPOINTMENT (OUTPATIENT)
Dept: LAB | Age: 81
End: 2021-03-23
Attending: NURSE PRACTITIONER

## 2021-03-26 ENCOUNTER — IMMUNIZATION (OUTPATIENT)
Dept: LAB | Age: 81
End: 2021-03-26
Attending: NURSE PRACTITIONER

## 2021-03-26 DIAGNOSIS — Z23 NEED FOR VACCINATION: Primary | ICD-10-CM

## 2021-03-26 PROCEDURE — 0012A COVID-19 MODERNA VACCINE: CPT

## 2021-03-26 PROCEDURE — 91301 COVID-19 MODERNA VACCINE: CPT

## 2021-05-11 ENCOUNTER — LAB SERVICES (OUTPATIENT)
Dept: LAB | Age: 81
End: 2021-05-11

## 2021-05-11 DIAGNOSIS — I73.9 PERIPHERAL VASCULAR DISEASE OF EXTREMITY (CMD): ICD-10-CM

## 2021-05-11 DIAGNOSIS — G45.9 TIA (TRANSIENT ISCHEMIC ATTACK): ICD-10-CM

## 2021-05-11 DIAGNOSIS — I10 HYPERTENSION, BENIGN: ICD-10-CM

## 2021-05-11 DIAGNOSIS — E78.2 HYPERLIPIDEMIA, MIXED: ICD-10-CM

## 2021-05-11 DIAGNOSIS — I25.10 CORONARY ARTERY DISEASE INVOLVING NATIVE CORONARY ARTERY OF NATIVE HEART WITHOUT ANGINA PECTORIS: ICD-10-CM

## 2021-05-11 DIAGNOSIS — E55.9 VITAMIN D DEFICIENCY: ICD-10-CM

## 2021-05-11 LAB
ALBUMIN SERPL-MCNC: 3.5 G/DL (ref 3.6–5.1)
ALBUMIN/GLOB SERPL: 1 {RATIO} (ref 1–2.4)
ALP SERPL-CCNC: 60 UNITS/L (ref 45–117)
ALT SERPL-CCNC: 19 UNITS/L
ANION GAP SERPL CALC-SCNC: 5 MMOL/L (ref 10–20)
AST SERPL-CCNC: 12 UNITS/L
BASOPHILS # BLD: 0 K/MCL (ref 0–0.3)
BASOPHILS NFR BLD: 1 %
BILIRUB SERPL-MCNC: 0.6 MG/DL (ref 0.2–1)
BUN SERPL-MCNC: 24 MG/DL (ref 6–20)
BUN/CREAT SERPL: 17 (ref 7–25)
CALCIUM SERPL-MCNC: 9.2 MG/DL (ref 8.4–10.2)
CHLORIDE SERPL-SCNC: 108 MMOL/L (ref 98–107)
CHOLEST SERPL-MCNC: 163 MG/DL
CHOLEST/HDLC SERPL: 3.2 {RATIO}
CO2 SERPL-SCNC: 29 MMOL/L (ref 21–32)
CREAT SERPL-MCNC: 1.41 MG/DL (ref 0.67–1.17)
DEPRECATED RDW RBC: 44.2 FL (ref 39–50)
EOSINOPHIL # BLD: 0.1 K/MCL (ref 0–0.5)
EOSINOPHIL NFR BLD: 2 %
ERYTHROCYTE [DISTWIDTH] IN BLOOD: 12.8 % (ref 11–15)
FASTING DURATION TIME PATIENT: 12 HOURS
FASTING DURATION TIME PATIENT: 12 HOURS
GFR SERPLBLD BASED ON 1.73 SQ M-ARVRAT: 46 ML/MIN/1.73M2
GLOBULIN SER-MCNC: 3.5 G/DL (ref 2–4)
GLUCOSE SERPL-MCNC: 183 MG/DL (ref 65–99)
HBA1C MFR BLD: 8.7 % (ref 4.5–5.6)
HCT VFR BLD CALC: 44.1 % (ref 39–51)
HDLC SERPL-MCNC: 51 MG/DL
HGB BLD-MCNC: 14.5 G/DL (ref 13–17)
IMM GRANULOCYTES # BLD AUTO: 0 K/MCL (ref 0–0.2)
IMM GRANULOCYTES # BLD: 1 %
LDLC SERPL CALC-MCNC: 70 MG/DL
LYMPHOCYTES # BLD: 1.1 K/MCL (ref 1–4)
LYMPHOCYTES NFR BLD: 18 %
MCH RBC QN AUTO: 31 PG (ref 26–34)
MCHC RBC AUTO-ENTMCNC: 32.9 G/DL (ref 32–36.5)
MCV RBC AUTO: 94.4 FL (ref 78–100)
MONOCYTES # BLD: 0.6 K/MCL (ref 0.3–0.9)
MONOCYTES NFR BLD: 10 %
NEUTROPHILS # BLD: 4.1 K/MCL (ref 1.8–7.7)
NEUTROPHILS NFR BLD: 68 %
NONHDLC SERPL-MCNC: 112 MG/DL
NRBC BLD MANUAL-RTO: 0 /100 WBC
PLATELET # BLD AUTO: 258 K/MCL (ref 140–450)
POTASSIUM SERPL-SCNC: 4.4 MMOL/L (ref 3.4–5.1)
PROT SERPL-MCNC: 7 G/DL (ref 6.4–8.2)
RAINBOW EXTRA TUBES HOLD SPECIMEN: NORMAL
RBC # BLD: 4.67 MIL/MCL (ref 4.5–5.9)
SODIUM SERPL-SCNC: 138 MMOL/L (ref 135–145)
TRIGL SERPL-MCNC: 210 MG/DL
WBC # BLD: 5.9 K/MCL (ref 4.2–11)

## 2021-05-11 PROCEDURE — 36415 COLL VENOUS BLD VENIPUNCTURE: CPT | Performed by: INTERNAL MEDICINE

## 2021-05-11 PROCEDURE — 80061 LIPID PANEL: CPT | Performed by: INTERNAL MEDICINE

## 2021-05-11 PROCEDURE — 83036 HEMOGLOBIN GLYCOSYLATED A1C: CPT | Performed by: INTERNAL MEDICINE

## 2021-05-11 PROCEDURE — 80053 COMPREHEN METABOLIC PANEL: CPT | Performed by: INTERNAL MEDICINE

## 2021-05-11 PROCEDURE — 85025 COMPLETE CBC W/AUTO DIFF WBC: CPT | Performed by: INTERNAL MEDICINE

## 2021-05-13 ENCOUNTER — OFFICE VISIT (OUTPATIENT)
Dept: CARDIOLOGY | Age: 81
End: 2021-05-13

## 2021-05-13 VITALS
BODY MASS INDEX: 28.97 KG/M2 | WEIGHT: 195.6 LBS | DIASTOLIC BLOOD PRESSURE: 66 MMHG | HEART RATE: 75 BPM | HEIGHT: 69 IN | SYSTOLIC BLOOD PRESSURE: 128 MMHG

## 2021-05-13 DIAGNOSIS — G45.9 TIA (TRANSIENT ISCHEMIC ATTACK): Primary | ICD-10-CM

## 2021-05-13 DIAGNOSIS — I10 HYPERTENSION, BENIGN: ICD-10-CM

## 2021-05-13 DIAGNOSIS — I73.9 PERIPHERAL VASCULAR DISEASE OF EXTREMITY (CMD): ICD-10-CM

## 2021-05-13 DIAGNOSIS — I25.10 CORONARY ARTERY DISEASE INVOLVING NATIVE CORONARY ARTERY OF NATIVE HEART WITHOUT ANGINA PECTORIS: ICD-10-CM

## 2021-05-13 DIAGNOSIS — E55.9 VITAMIN D DEFICIENCY: ICD-10-CM

## 2021-05-13 DIAGNOSIS — E78.2 HYPERLIPIDEMIA, MIXED: ICD-10-CM

## 2021-05-13 PROCEDURE — 99214 OFFICE O/P EST MOD 30 MIN: CPT | Performed by: INTERNAL MEDICINE

## 2021-05-13 PROCEDURE — 3078F DIAST BP <80 MM HG: CPT | Performed by: INTERNAL MEDICINE

## 2021-05-13 PROCEDURE — 3074F SYST BP LT 130 MM HG: CPT | Performed by: INTERNAL MEDICINE

## 2021-05-13 ASSESSMENT — PATIENT HEALTH QUESTIONNAIRE - PHQ9
1. LITTLE INTEREST OR PLEASURE IN DOING THINGS: NOT AT ALL
SUM OF ALL RESPONSES TO PHQ9 QUESTIONS 1 AND 2: 0
CLINICAL INTERPRETATION OF PHQ9 SCORE: NO FURTHER SCREENING NEEDED
SUM OF ALL RESPONSES TO PHQ9 QUESTIONS 1 AND 2: 0
CLINICAL INTERPRETATION OF PHQ2 SCORE: NO FURTHER SCREENING NEEDED
2. FEELING DOWN, DEPRESSED OR HOPELESS: NOT AT ALL

## 2021-09-04 ENCOUNTER — APPOINTMENT (OUTPATIENT)
Dept: CT IMAGING | Age: 81
DRG: 025 | End: 2021-09-04
Attending: EMERGENCY MEDICINE

## 2021-09-04 ENCOUNTER — APPOINTMENT (OUTPATIENT)
Dept: MRI IMAGING | Age: 81
DRG: 025 | End: 2021-09-04
Attending: EMERGENCY MEDICINE

## 2021-09-04 ENCOUNTER — APPOINTMENT (OUTPATIENT)
Dept: CT IMAGING | Age: 81
DRG: 025 | End: 2021-09-04
Attending: PHYSICIAN ASSISTANT

## 2021-09-04 ENCOUNTER — APPOINTMENT (OUTPATIENT)
Dept: GENERAL RADIOLOGY | Age: 81
DRG: 025 | End: 2021-09-04
Attending: EMERGENCY MEDICINE

## 2021-09-04 ENCOUNTER — HOSPITAL ENCOUNTER (INPATIENT)
Age: 81
LOS: 10 days | Discharge: HOME HEALTH | DRG: 025 | End: 2021-09-14
Attending: EMERGENCY MEDICINE | Admitting: HOSPITALIST

## 2021-09-04 DIAGNOSIS — S06.5XAA SDH (SUBDURAL HEMATOMA) (CMD): ICD-10-CM

## 2021-09-04 DIAGNOSIS — I73.9 PERIPHERAL VASCULAR DISEASE OF EXTREMITY (CMD): ICD-10-CM

## 2021-09-04 DIAGNOSIS — Z95.818 PRESENCE OF CARDIAC DEVICE: ICD-10-CM

## 2021-09-04 DIAGNOSIS — I25.10 CORONARY ARTERY DISEASE INVOLVING NATIVE CORONARY ARTERY OF NATIVE HEART WITHOUT ANGINA PECTORIS: ICD-10-CM

## 2021-09-04 DIAGNOSIS — G45.9 TIA (TRANSIENT ISCHEMIC ATTACK): Primary | ICD-10-CM

## 2021-09-04 LAB
ALBUMIN SERPL-MCNC: 3.6 G/DL (ref 3.6–5.1)
ALBUMIN/GLOB SERPL: 1.1 {RATIO} (ref 1–2.4)
ALP SERPL-CCNC: 59 UNITS/L (ref 45–117)
ALT SERPL-CCNC: 21 UNITS/L
ANION GAP SERPL CALC-SCNC: 7 MMOL/L (ref 10–20)
APTT PPP: 24 SEC (ref 22–30)
AST SERPL-CCNC: 13 UNITS/L
BASOPHILS # BLD: 0 K/MCL (ref 0–0.3)
BASOPHILS NFR BLD: 1 %
BILIRUB SERPL-MCNC: 0.5 MG/DL (ref 0.2–1)
BUN SERPL-MCNC: 44 MG/DL (ref 6–20)
BUN/CREAT SERPL: 23 (ref 7–25)
CALCIUM SERPL-MCNC: 9 MG/DL (ref 8.4–10.2)
CHLORIDE SERPL-SCNC: 110 MMOL/L (ref 98–107)
CLOSURE TME BLD-IMP: NORMAL
CLOSURE TME COLL+EPINEP BLD: 122 SEC (ref 70–160)
CO2 SERPL-SCNC: 25 MMOL/L (ref 21–32)
CREAT SERPL-MCNC: 1.9 MG/DL (ref 0.67–1.17)
DEPRECATED RDW RBC: 44.8 FL (ref 39–50)
EOSINOPHIL # BLD: 0.1 K/MCL (ref 0–0.5)
EOSINOPHIL NFR BLD: 1 %
ERYTHROCYTE [DISTWIDTH] IN BLOOD: 12.9 % (ref 11–15)
FASTING DURATION TIME PATIENT: ABNORMAL H
GFR SERPLBLD BASED ON 1.73 SQ M-ARVRAT: 32 ML/MIN
GLOBULIN SER-MCNC: 3.2 G/DL (ref 2–4)
GLUCOSE BLDC GLUCOMTR-MCNC: 111 MG/DL (ref 70–99)
GLUCOSE SERPL-MCNC: 140 MG/DL (ref 65–99)
HBA1C MFR BLD: 9.2 % (ref 4.5–5.6)
HCT VFR BLD CALC: 40 % (ref 39–51)
HGB BLD-MCNC: 13.3 G/DL (ref 13–17)
IMM GRANULOCYTES # BLD AUTO: 0 K/MCL (ref 0–0.2)
IMM GRANULOCYTES # BLD: 1 %
INR PPP: 1.1
LYMPHOCYTES # BLD: 1.5 K/MCL (ref 1–4)
LYMPHOCYTES NFR BLD: 18 %
MCH RBC QN AUTO: 31.6 PG (ref 26–34)
MCHC RBC AUTO-ENTMCNC: 33.3 G/DL (ref 32–36.5)
MCV RBC AUTO: 95 FL (ref 78–100)
MONOCYTES # BLD: 0.7 K/MCL (ref 0.3–0.9)
MONOCYTES NFR BLD: 9 %
NEUTROPHILS # BLD: 5.7 K/MCL (ref 1.8–7.7)
NEUTROPHILS NFR BLD: 70 %
NRBC BLD MANUAL-RTO: 0 /100 WBC
PLATELET # BLD AUTO: 241 K/MCL (ref 140–450)
POTASSIUM SERPL-SCNC: 4.3 MMOL/L (ref 3.4–5.1)
PROT SERPL-MCNC: 6.8 G/DL (ref 6.4–8.2)
PROTHROMBIN TIME: 11.2 SEC (ref 9.7–11.8)
RBC # BLD: 4.21 MIL/MCL (ref 4.5–5.9)
SARS-COV-2 RNA RESP QL NAA+PROBE: NOT DETECTED
SERVICE CMNT-IMP: NORMAL
SERVICE CMNT-IMP: NORMAL
SODIUM SERPL-SCNC: 138 MMOL/L (ref 135–145)
TROPONIN I SERPL HS-MCNC: <0.02 NG/ML
TSH SERPL-ACNC: 1.92 MCUNITS/ML (ref 0.35–5)
WBC # BLD: 8.1 K/MCL (ref 4.2–11)

## 2021-09-04 PROCEDURE — 96374 THER/PROPH/DIAG INJ IV PUSH: CPT

## 2021-09-04 PROCEDURE — G1004 CDSM NDSC: HCPCS

## 2021-09-04 PROCEDURE — 84484 ASSAY OF TROPONIN QUANT: CPT | Performed by: EMERGENCY MEDICINE

## 2021-09-04 PROCEDURE — 36415 COLL VENOUS BLD VENIPUNCTURE: CPT | Performed by: PHYSICIAN ASSISTANT

## 2021-09-04 PROCEDURE — 10004651 HB RX, NO CHARGE ITEM: Performed by: SURGERY

## 2021-09-04 PROCEDURE — 99285 EMERGENCY DEPT VISIT HI MDM: CPT

## 2021-09-04 PROCEDURE — 10002800 HB RX 250 W HCPCS: Performed by: INTERNAL MEDICINE

## 2021-09-04 PROCEDURE — 10002807 HB RX 258: Performed by: SURGERY

## 2021-09-04 PROCEDURE — 10000008 HB ROOM CHARGE ICU OR CCU

## 2021-09-04 PROCEDURE — 70450 CT HEAD/BRAIN W/O DYE: CPT

## 2021-09-04 PROCEDURE — 85610 PROTHROMBIN TIME: CPT | Performed by: EMERGENCY MEDICINE

## 2021-09-04 PROCEDURE — 80053 COMPREHEN METABOLIC PANEL: CPT | Performed by: EMERGENCY MEDICINE

## 2021-09-04 PROCEDURE — 85025 COMPLETE CBC W/AUTO DIFF WBC: CPT | Performed by: EMERGENCY MEDICINE

## 2021-09-04 PROCEDURE — 85576 BLOOD PLATELET AGGREGATION: CPT | Performed by: PHYSICIAN ASSISTANT

## 2021-09-04 PROCEDURE — 87635 SARS-COV-2 COVID-19 AMP PRB: CPT | Performed by: EMERGENCY MEDICINE

## 2021-09-04 PROCEDURE — 93005 ELECTROCARDIOGRAM TRACING: CPT | Performed by: EMERGENCY MEDICINE

## 2021-09-04 PROCEDURE — 10002803 HB RX 637: Performed by: INTERNAL MEDICINE

## 2021-09-04 PROCEDURE — 70496 CT ANGIOGRAPHY HEAD: CPT

## 2021-09-04 PROCEDURE — 10002805 HB CONTRAST AGENT: Performed by: EMERGENCY MEDICINE

## 2021-09-04 PROCEDURE — 70551 MRI BRAIN STEM W/O DYE: CPT

## 2021-09-04 PROCEDURE — 99221 1ST HOSP IP/OBS SF/LOW 40: CPT | Performed by: PHYSICIAN ASSISTANT

## 2021-09-04 PROCEDURE — 84443 ASSAY THYROID STIM HORMONE: CPT | Performed by: EMERGENCY MEDICINE

## 2021-09-04 PROCEDURE — 83036 HEMOGLOBIN GLYCOSYLATED A1C: CPT | Performed by: SURGERY

## 2021-09-04 PROCEDURE — 10002800 HB RX 250 W HCPCS: Performed by: PHYSICIAN ASSISTANT

## 2021-09-04 PROCEDURE — 99291 CRITICAL CARE FIRST HOUR: CPT | Performed by: SURGERY

## 2021-09-04 PROCEDURE — 71045 X-RAY EXAM CHEST 1 VIEW: CPT

## 2021-09-04 PROCEDURE — 85730 THROMBOPLASTIN TIME PARTIAL: CPT | Performed by: EMERGENCY MEDICINE

## 2021-09-04 RX ORDER — 0.9 % SODIUM CHLORIDE 0.9 %
2 VIAL (ML) INJECTION EVERY 12 HOURS SCHEDULED
Status: DISCONTINUED | OUTPATIENT
Start: 2021-09-04 | End: 2021-09-14 | Stop reason: HOSPADM

## 2021-09-04 RX ORDER — NICOTINE POLACRILEX 4 MG
15 LOZENGE BUCCAL PRN
Status: DISCONTINUED | OUTPATIENT
Start: 2021-09-04 | End: 2021-09-14 | Stop reason: HOSPADM

## 2021-09-04 RX ORDER — DEXTROSE MONOHYDRATE 25 G/50ML
25 INJECTION, SOLUTION INTRAVENOUS PRN
Status: DISCONTINUED | OUTPATIENT
Start: 2021-09-04 | End: 2021-09-14 | Stop reason: HOSPADM

## 2021-09-04 RX ORDER — ONDANSETRON 2 MG/ML
4 INJECTION INTRAMUSCULAR; INTRAVENOUS EVERY 12 HOURS PRN
Status: DISCONTINUED | OUTPATIENT
Start: 2021-09-04 | End: 2021-09-14 | Stop reason: HOSPADM

## 2021-09-04 RX ORDER — DOPAMINE HYDROCHLORIDE 160 MG/100ML
5 INJECTION, SOLUTION INTRAVENOUS CONTINUOUS
Status: DISCONTINUED | OUTPATIENT
Start: 2021-09-04 | End: 2021-09-07 | Stop reason: CLARIF

## 2021-09-04 RX ORDER — SODIUM CHLORIDE 9 MG/ML
INJECTION, SOLUTION INTRAVENOUS CONTINUOUS PRN
Status: DISCONTINUED | OUTPATIENT
Start: 2021-09-04 | End: 2021-09-05

## 2021-09-04 RX ORDER — HYDRALAZINE HYDROCHLORIDE 20 MG/ML
10 INJECTION INTRAMUSCULAR; INTRAVENOUS EVERY 6 HOURS PRN
Status: DISCONTINUED | OUTPATIENT
Start: 2021-09-04 | End: 2021-09-14 | Stop reason: HOSPADM

## 2021-09-04 RX ORDER — DEXTROSE MONOHYDRATE 25 G/50ML
12.5 INJECTION, SOLUTION INTRAVENOUS PRN
Status: DISCONTINUED | OUTPATIENT
Start: 2021-09-04 | End: 2021-09-14 | Stop reason: HOSPADM

## 2021-09-04 RX ORDER — HUMAN INSULIN 100 [IU]/ML
INJECTION, SOLUTION SUBCUTANEOUS EVERY 6 HOURS SCHEDULED
Status: DISCONTINUED | OUTPATIENT
Start: 2021-09-04 | End: 2021-09-05

## 2021-09-04 RX ORDER — NICOTINE POLACRILEX 4 MG
30 LOZENGE BUCCAL PRN
Status: DISCONTINUED | OUTPATIENT
Start: 2021-09-04 | End: 2021-09-14 | Stop reason: HOSPADM

## 2021-09-04 RX ORDER — SODIUM CHLORIDE 9 MG/ML
INJECTION, SOLUTION INTRAVENOUS CONTINUOUS PRN
Status: DISCONTINUED | OUTPATIENT
Start: 2021-09-04 | End: 2021-09-14 | Stop reason: HOSPADM

## 2021-09-04 RX ORDER — LEVETIRACETAM 500 MG/5ML
500 INJECTION, SOLUTION, CONCENTRATE INTRAVENOUS EVERY 12 HOURS SCHEDULED
Status: DISCONTINUED | OUTPATIENT
Start: 2021-09-04 | End: 2021-09-05

## 2021-09-04 RX ORDER — SODIUM CHLORIDE 9 MG/ML
INJECTION, SOLUTION INTRAVENOUS CONTINUOUS
Status: DISCONTINUED | OUTPATIENT
Start: 2021-09-04 | End: 2021-09-05

## 2021-09-04 RX ORDER — ONDANSETRON 4 MG/1
4 TABLET, ORALLY DISINTEGRATING ORAL EVERY 12 HOURS PRN
Status: DISCONTINUED | OUTPATIENT
Start: 2021-09-04 | End: 2021-09-14 | Stop reason: HOSPADM

## 2021-09-04 RX ORDER — AMLODIPINE BESYLATE 5 MG/1
5 TABLET ORAL AT BEDTIME
Status: DISCONTINUED | OUTPATIENT
Start: 2021-09-04 | End: 2021-09-05

## 2021-09-04 RX ADMIN — DOPAMINE HYDROCHLORIDE 5 MCG/KG/MIN: 160 INJECTION, SOLUTION INTRAVENOUS at 22:41

## 2021-09-04 RX ADMIN — LEVETIRACETAM 500 MG: 100 INJECTION, SOLUTION INTRAVENOUS at 18:02

## 2021-09-04 RX ADMIN — IOHEXOL 100 ML: 350 INJECTION, SOLUTION INTRAVENOUS at 17:26

## 2021-09-04 RX ADMIN — HYDRALAZINE HYDROCHLORIDE 10 MG: 20 INJECTION INTRAMUSCULAR; INTRAVENOUS at 21:48

## 2021-09-04 RX ADMIN — SODIUM CHLORIDE: 9 INJECTION, SOLUTION INTRAVENOUS at 20:27

## 2021-09-04 RX ADMIN — SODIUM CHLORIDE: 9 INJECTION, SOLUTION INTRAVENOUS at 22:03

## 2021-09-04 RX ADMIN — SODIUM CHLORIDE, PRESERVATIVE FREE 2 ML: 5 INJECTION INTRAVENOUS at 21:14

## 2021-09-04 RX ADMIN — AMLODIPINE BESYLATE 5 MG: 5 TABLET ORAL at 22:35

## 2021-09-04 ASSESSMENT — ENCOUNTER SYMPTOMS
SORE THROAT: 0
DIZZINESS: 0
ABDOMINAL PAIN: 0
WEAKNESS: 1
CONFUSION: 0
ADENOPATHY: 0
CHILLS: 0
SHORTNESS OF BREATH: 0
HEADACHES: 0
VOMITING: 0
SEIZURES: 0
FEVER: 0
FACIAL ASYMMETRY: 1
LIGHT-HEADEDNESS: 1
TREMORS: 0
NUMBNESS: 0
BACK PAIN: 0
NAUSEA: 0
DIARRHEA: 0
COUGH: 0
SPEECH DIFFICULTY: 0

## 2021-09-04 ASSESSMENT — ACTIVITIES OF DAILY LIVING (ADL)
ADL_SHORT_OF_BREATH: NO
ADL_BEFORE_ADMISSION: INDEPENDENT
CHRONIC_PAIN_PRESENT: NO
RECENT_DECLINE_ADL: NO
ADL_SCORE: 12

## 2021-09-04 ASSESSMENT — LIFESTYLE VARIABLES
HOW OFTEN DO YOU HAVE A DRINK CONTAINING ALCOHOL: 2 TO 3 TIMES PER WEEK
ALCOHOL_USE_STATUS: NO OR LOW RISK WITH VALIDATED TOOL
HOW MANY STANDARD DRINKS CONTAINING ALCOHOL DO YOU HAVE ON A TYPICAL DAY: 3 OR 4
AUDIT-C TOTAL SCORE: 4
HOW OFTEN DO YOU HAVE 6 OR MORE DRINKS ON ONE OCCASION: NEVER

## 2021-09-04 ASSESSMENT — ORIENTATION MEMORY CONCENTRATION TEST (OMCT)
OMCT SCORE: 0
WHAT YEAR IS IT NOW (MUST BE EXACT): CORRECT
SAY THE MONTHS IN REVERSE ORDER STARTING WITH LAST MONTH: CORRECT
REPEAT THE NAME AND ADDRESS I ASKED YOU TO REMEMBER: CORRECT
WHAT TIME IS IT (NO WATCH OR CLOCK): CORRECT
COUNT BACKWARDS FROM 20 TO 1: CORRECT
WHAT MONTH IS IT NOW: CORRECT

## 2021-09-04 ASSESSMENT — COLUMBIA-SUICIDE SEVERITY RATING SCALE - C-SSRS
1. WITHIN THE PAST MONTH, HAVE YOU WISHED YOU WERE DEAD OR WISHED YOU COULD GO TO SLEEP AND NOT WAKE UP?: NO
6. HAVE YOU EVER DONE ANYTHING, STARTED TO DO ANYTHING, OR PREPARED TO DO ANYTHING TO END YOUR LIFE?: NO
2. HAVE YOU ACTUALLY HAD ANY THOUGHTS OF KILLING YOURSELF?: NO
IS THE PATIENT ABLE TO COMPLETE C-SSRS: YES

## 2021-09-04 ASSESSMENT — PATIENT HEALTH QUESTIONNAIRE - PHQ9
2. FEELING DOWN, DEPRESSED OR HOPELESS: NOT AT ALL
CLINICAL INTERPRETATION OF PHQ9 SCORE: NO FURTHER SCREENING NEEDED
SUM OF ALL RESPONSES TO PHQ9 QUESTIONS 1 AND 2: 0
CLINICAL INTERPRETATION OF PHQ2 SCORE: NO FURTHER SCREENING NEEDED
IS PATIENT ABLE TO COMPLETE PHQ2 OR PHQ9: YES
1. LITTLE INTEREST OR PLEASURE IN DOING THINGS: NOT AT ALL
SUM OF ALL RESPONSES TO PHQ9 QUESTIONS 1 AND 2: 0

## 2021-09-04 ASSESSMENT — COGNITIVE AND FUNCTIONAL STATUS - GENERAL
DO YOU HAVE DIFFICULTY DRESSING OR BATHING: NO
DO YOU HAVE SERIOUS DIFFICULTY WALKING OR CLIMBING STAIRS: NO
BECAUSE OF A PHYSICAL, MENTAL, OR EMOTIONAL CONDITION, DO YOU HAVE DIFFICULTY DOING ERRANDS ALONE: NO
ARE YOU DEAF OR DO YOU HAVE SERIOUS DIFFICULTY  HEARING: NO
BECAUSE OF A PHYSICAL, MENTAL, OR EMOTIONAL CONDITION, DO YOU HAVE SERIOUS DIFFICULTY CONCENTRATING, REMEMBERING OR MAKING DECISIONS: NO
ARE YOU BLIND OR DO YOU HAVE SERIOUS DIFFICULTY SEEING, EVEN WHEN WEARING GLASSES: NO

## 2021-09-04 ASSESSMENT — MOVEMENT AND STRENGTH ASSESSMENTS
HEAD_NECK: FULL RANGE OF MOTION
FACE_JAW: FACE SYMMETRICAL
ALL_EXTREMITIES: EQUAL STRENGTH/TONE/MOVEMENT
ALL_EXTREMITIES: EQUAL STRENGTH/TONE/MOVEMENT

## 2021-09-04 ASSESSMENT — PAIN SCALES - GENERAL
PAINLEVEL_OUTOF10: 0
PAINLEVEL_OUTOF10: 0

## 2021-09-05 ENCOUNTER — APPOINTMENT (OUTPATIENT)
Dept: CT IMAGING | Age: 81
DRG: 025 | End: 2021-09-05
Attending: PHYSICIAN ASSISTANT

## 2021-09-05 ENCOUNTER — APPOINTMENT (OUTPATIENT)
Dept: CARDIOLOGY | Age: 81
DRG: 025 | End: 2021-09-05
Attending: INTERNAL MEDICINE

## 2021-09-05 ENCOUNTER — APPOINTMENT (OUTPATIENT)
Dept: GENERAL RADIOLOGY | Age: 81
DRG: 025 | End: 2021-09-05

## 2021-09-05 LAB
ANION GAP SERPL CALC-SCNC: 11 MMOL/L (ref 10–20)
ATRIAL RATE (BPM): 300
BASOPHILS # BLD: 0.1 K/MCL (ref 0–0.3)
BASOPHILS NFR BLD: 0 %
BUN SERPL-MCNC: 42 MG/DL (ref 6–20)
BUN/CREAT SERPL: 26 (ref 7–25)
CALCIUM SERPL-MCNC: 8.8 MG/DL (ref 8.4–10.2)
CHLORIDE SERPL-SCNC: 112 MMOL/L (ref 98–107)
CO2 SERPL-SCNC: 19 MMOL/L (ref 21–32)
CREAT SERPL-MCNC: 1.62 MG/DL (ref 0.67–1.17)
DEPRECATED RDW RBC: 44 FL (ref 39–50)
EOSINOPHIL # BLD: 0 K/MCL (ref 0–0.5)
EOSINOPHIL NFR BLD: 0 %
ERYTHROCYTE [DISTWIDTH] IN BLOOD: 12.8 % (ref 11–15)
FASTING DURATION TIME PATIENT: ABNORMAL H
GFR SERPLBLD BASED ON 1.73 SQ M-ARVRAT: 39 ML/MIN
GLUCOSE BLDC GLUCOMTR-MCNC: 176 MG/DL (ref 70–99)
GLUCOSE BLDC GLUCOMTR-MCNC: 193 MG/DL (ref 70–99)
GLUCOSE BLDC GLUCOMTR-MCNC: 198 MG/DL (ref 70–99)
GLUCOSE BLDC GLUCOMTR-MCNC: 221 MG/DL (ref 70–99)
GLUCOSE BLDC GLUCOMTR-MCNC: 251 MG/DL (ref 70–99)
GLUCOSE SERPL-MCNC: 189 MG/DL (ref 65–99)
HCT VFR BLD CALC: 42.4 % (ref 39–51)
HGB BLD-MCNC: 14 G/DL (ref 13–17)
IMM GRANULOCYTES # BLD AUTO: 0.1 K/MCL (ref 0–0.2)
IMM GRANULOCYTES # BLD: 1 %
LYMPHOCYTES # BLD: 0.8 K/MCL (ref 1–4)
LYMPHOCYTES NFR BLD: 5 %
MCH RBC QN AUTO: 31.3 PG (ref 26–34)
MCHC RBC AUTO-ENTMCNC: 33 G/DL (ref 32–36.5)
MCV RBC AUTO: 94.6 FL (ref 78–100)
MONOCYTES # BLD: 1.3 K/MCL (ref 0.3–0.9)
MONOCYTES NFR BLD: 8 %
NEUTROPHILS # BLD: 14.3 K/MCL (ref 1.8–7.7)
NEUTROPHILS NFR BLD: 86 %
NRBC BLD MANUAL-RTO: 0 /100 WBC
PLATELET # BLD AUTO: 226 K/MCL (ref 140–450)
POTASSIUM SERPL-SCNC: 4.5 MMOL/L (ref 3.4–5.1)
QRS-INTERVAL (MSEC): 156
QT-INTERVAL (MSEC): 494
QTC: 446
R AXIS (DEGREES): -48
RAINBOW EXTRA TUBES HOLD SPECIMEN: NORMAL
RAINBOW EXTRA TUBES HOLD SPECIMEN: NORMAL
RBC # BLD: 4.48 MIL/MCL (ref 4.5–5.9)
REPORT TEXT: NORMAL
SODIUM SERPL-SCNC: 137 MMOL/L (ref 135–145)
T AXIS (DEGREES): 50
VENTRICULAR RATE EKG/MIN (BPM): 49
WBC # BLD: 16.6 K/MCL (ref 4.2–11)

## 2021-09-05 PROCEDURE — 10002803 HB RX 637

## 2021-09-05 PROCEDURE — 70450 CT HEAD/BRAIN W/O DYE: CPT

## 2021-09-05 PROCEDURE — 99223 1ST HOSP IP/OBS HIGH 75: CPT | Performed by: INTERNAL MEDICINE

## 2021-09-05 PROCEDURE — 85025 COMPLETE CBC W/AUTO DIFF WBC: CPT | Performed by: SURGERY

## 2021-09-05 PROCEDURE — 10002803 HB RX 637: Performed by: INTERNAL MEDICINE

## 2021-09-05 PROCEDURE — 10002800 HB RX 250 W HCPCS

## 2021-09-05 PROCEDURE — 80048 BASIC METABOLIC PNL TOTAL CA: CPT | Performed by: SURGERY

## 2021-09-05 PROCEDURE — 93306 TTE W/DOPPLER COMPLETE: CPT

## 2021-09-05 PROCEDURE — 36415 COLL VENOUS BLD VENIPUNCTURE: CPT | Performed by: SURGERY

## 2021-09-05 PROCEDURE — G1004 CDSM NDSC: HCPCS

## 2021-09-05 PROCEDURE — 99291 CRITICAL CARE FIRST HOUR: CPT

## 2021-09-05 PROCEDURE — 10002800 HB RX 250 W HCPCS: Performed by: SURGERY

## 2021-09-05 PROCEDURE — 10004651 HB RX, NO CHARGE ITEM: Performed by: SURGERY

## 2021-09-05 PROCEDURE — 10002800 HB RX 250 W HCPCS: Performed by: INTERNAL MEDICINE

## 2021-09-05 PROCEDURE — 10000008 HB ROOM CHARGE ICU OR CCU

## 2021-09-05 PROCEDURE — 93306 TTE W/DOPPLER COMPLETE: CPT | Performed by: INTERNAL MEDICINE

## 2021-09-05 PROCEDURE — 10004281 HB COUNTER-STAFF TIME PER 15 MIN

## 2021-09-05 PROCEDURE — 10002016 HB COUNTER INCENTIVE SPIROMETRY

## 2021-09-05 PROCEDURE — 71045 X-RAY EXAM CHEST 1 VIEW: CPT

## 2021-09-05 PROCEDURE — 10002807 HB RX 258: Performed by: SURGERY

## 2021-09-05 PROCEDURE — 99233 SBSQ HOSP IP/OBS HIGH 50: CPT | Performed by: NURSE PRACTITIONER

## 2021-09-05 RX ORDER — HUMAN INSULIN 100 [IU]/ML
INJECTION, SOLUTION SUBCUTANEOUS
Status: DISCONTINUED | OUTPATIENT
Start: 2021-09-05 | End: 2021-09-05

## 2021-09-05 RX ORDER — LEVETIRACETAM 500 MG/1
500 TABLET ORAL EVERY 12 HOURS SCHEDULED
Status: COMPLETED | OUTPATIENT
Start: 2021-09-05 | End: 2021-09-11

## 2021-09-05 RX ORDER — AMLODIPINE BESYLATE 5 MG/1
5 TABLET ORAL DAILY
Status: DISCONTINUED | OUTPATIENT
Start: 2021-09-05 | End: 2021-09-10

## 2021-09-05 RX ADMIN — DOPAMINE HYDROCHLORIDE 5 MCG/KG/MIN: 160 INJECTION, SOLUTION INTRAVENOUS at 12:55

## 2021-09-05 RX ADMIN — SODIUM CHLORIDE, PRESERVATIVE FREE 2 ML: 5 INJECTION INTRAVENOUS at 09:15

## 2021-09-05 RX ADMIN — SODIUM CHLORIDE, PRESERVATIVE FREE 2 ML: 5 INJECTION INTRAVENOUS at 20:16

## 2021-09-05 RX ADMIN — SODIUM CHLORIDE: 9 INJECTION, SOLUTION INTRAVENOUS at 06:41

## 2021-09-05 RX ADMIN — INSULIN HUMAN 2 UNITS: 100 INJECTION, SOLUTION PARENTERAL at 01:54

## 2021-09-05 RX ADMIN — INSULIN LISPRO 4 UNITS: 100 INJECTION, SOLUTION INTRAVENOUS; SUBCUTANEOUS at 17:37

## 2021-09-05 RX ADMIN — INSULIN LISPRO 6 UNITS: 100 INJECTION, SOLUTION INTRAVENOUS; SUBCUTANEOUS at 20:15

## 2021-09-05 RX ADMIN — AMLODIPINE BESYLATE 5 MG: 5 TABLET ORAL at 09:14

## 2021-09-05 RX ADMIN — LEVETIRACETAM 500 MG: 500 TABLET ORAL at 09:14

## 2021-09-05 RX ADMIN — LEVETIRACETAM 500 MG: 500 TABLET ORAL at 20:15

## 2021-09-05 RX ADMIN — ONDANSETRON 4 MG: 2 INJECTION INTRAMUSCULAR; INTRAVENOUS at 01:42

## 2021-09-05 RX ADMIN — INSULIN LISPRO 2 UNITS: 100 INJECTION, SOLUTION INTRAVENOUS; SUBCUTANEOUS at 12:48

## 2021-09-05 ASSESSMENT — PAIN SCALES - GENERAL
PAINLEVEL_OUTOF10: 0

## 2021-09-06 LAB
ANION GAP SERPL CALC-SCNC: 8 MMOL/L (ref 10–20)
APPEARANCE UR: CLEAR
BACTERIA #/AREA URNS HPF: ABNORMAL /HPF
BILIRUB UR QL STRIP: NEGATIVE
BUN SERPL-MCNC: 39 MG/DL (ref 6–20)
BUN/CREAT SERPL: 25 (ref 7–25)
CALCIUM SERPL-MCNC: 8.4 MG/DL (ref 8.4–10.2)
CHLORIDE SERPL-SCNC: 111 MMOL/L (ref 98–107)
CO2 SERPL-SCNC: 23 MMOL/L (ref 21–32)
COLOR UR: YELLOW
CREAT SERPL-MCNC: 1.55 MG/DL (ref 0.67–1.17)
DEPRECATED RDW RBC: 43.8 FL (ref 39–50)
ERYTHROCYTE [DISTWIDTH] IN BLOOD: 12.7 % (ref 11–15)
FASTING DURATION TIME PATIENT: ABNORMAL H
GFR SERPLBLD BASED ON 1.73 SQ M-ARVRAT: 41 ML/MIN
GLUCOSE BLDC GLUCOMTR-MCNC: 174 MG/DL (ref 70–99)
GLUCOSE BLDC GLUCOMTR-MCNC: 203 MG/DL (ref 70–99)
GLUCOSE BLDC GLUCOMTR-MCNC: 213 MG/DL (ref 70–99)
GLUCOSE BLDC GLUCOMTR-MCNC: 215 MG/DL (ref 70–99)
GLUCOSE BLDC GLUCOMTR-MCNC: 218 MG/DL (ref 70–99)
GLUCOSE SERPL-MCNC: 197 MG/DL (ref 65–99)
GLUCOSE UR STRIP-MCNC: ABNORMAL MG/DL
HCT VFR BLD CALC: 36.2 % (ref 39–51)
HGB BLD-MCNC: 12.2 G/DL (ref 13–17)
HGB UR QL STRIP: ABNORMAL
HYALINE CASTS #/AREA URNS LPF: ABNORMAL /LPF
KETONES UR STRIP-MCNC: NEGATIVE MG/DL
LEUKOCYTE ESTERASE UR QL STRIP: NEGATIVE
MAGNESIUM SERPL-MCNC: 2.1 MG/DL (ref 1.7–2.4)
MCH RBC QN AUTO: 31.7 PG (ref 26–34)
MCHC RBC AUTO-ENTMCNC: 33.7 G/DL (ref 32–36.5)
MCV RBC AUTO: 94 FL (ref 78–100)
MUCOUS THREADS URNS QL MICRO: PRESENT
NITRITE UR QL STRIP: NEGATIVE
NRBC BLD MANUAL-RTO: 0 /100 WBC
PH UR STRIP: 5 [PH] (ref 5–7)
PHOSPHATE SERPL-MCNC: 2.9 MG/DL (ref 2.4–4.7)
PLATELET # BLD AUTO: 222 K/MCL (ref 140–450)
POTASSIUM SERPL-SCNC: 4.2 MMOL/L (ref 3.4–5.1)
PROT UR STRIP-MCNC: 30 MG/DL
RBC # BLD: 3.85 MIL/MCL (ref 4.5–5.9)
RBC #/AREA URNS HPF: ABNORMAL /HPF
SODIUM SERPL-SCNC: 138 MMOL/L (ref 135–145)
SP GR UR STRIP: 1.02 (ref 1–1.03)
SQUAMOUS #/AREA URNS HPF: ABNORMAL /HPF
UROBILINOGEN UR STRIP-MCNC: 0.2 MG/DL
WBC # BLD: 11.4 K/MCL (ref 4.2–11)
WBC #/AREA URNS HPF: ABNORMAL /HPF

## 2021-09-06 PROCEDURE — 99233 SBSQ HOSP IP/OBS HIGH 50: CPT | Performed by: NURSE PRACTITIONER

## 2021-09-06 PROCEDURE — 99232 SBSQ HOSP IP/OBS MODERATE 35: CPT | Performed by: INTERNAL MEDICINE

## 2021-09-06 PROCEDURE — 99291 CRITICAL CARE FIRST HOUR: CPT | Performed by: SURGERY

## 2021-09-06 PROCEDURE — 84100 ASSAY OF PHOSPHORUS: CPT

## 2021-09-06 PROCEDURE — 10002803 HB RX 637

## 2021-09-06 PROCEDURE — 81001 URINALYSIS AUTO W/SCOPE: CPT

## 2021-09-06 PROCEDURE — 10002800 HB RX 250 W HCPCS: Performed by: HOSPITALIST

## 2021-09-06 PROCEDURE — 10002800 HB RX 250 W HCPCS: Performed by: INTERNAL MEDICINE

## 2021-09-06 PROCEDURE — 10004651 HB RX, NO CHARGE ITEM: Performed by: SURGERY

## 2021-09-06 PROCEDURE — 10002803 HB RX 637: Performed by: INTERNAL MEDICINE

## 2021-09-06 PROCEDURE — 10000008 HB ROOM CHARGE ICU OR CCU

## 2021-09-06 PROCEDURE — 83735 ASSAY OF MAGNESIUM: CPT

## 2021-09-06 PROCEDURE — 10002800 HB RX 250 W HCPCS

## 2021-09-06 PROCEDURE — 80048 BASIC METABOLIC PNL TOTAL CA: CPT

## 2021-09-06 PROCEDURE — 85027 COMPLETE CBC AUTOMATED: CPT

## 2021-09-06 PROCEDURE — 36415 COLL VENOUS BLD VENIPUNCTURE: CPT

## 2021-09-06 RX ORDER — INSULIN GLARGINE 100 [IU]/ML
10 INJECTION, SOLUTION SUBCUTANEOUS DAILY
Status: DISCONTINUED | OUTPATIENT
Start: 2021-09-06 | End: 2021-09-10

## 2021-09-06 RX ADMIN — SODIUM CHLORIDE, PRESERVATIVE FREE 2 ML: 5 INJECTION INTRAVENOUS at 20:17

## 2021-09-06 RX ADMIN — LEVETIRACETAM 500 MG: 500 TABLET ORAL at 20:16

## 2021-09-06 RX ADMIN — INSULIN LISPRO 4 UNITS: 100 INJECTION, SOLUTION INTRAVENOUS; SUBCUTANEOUS at 09:10

## 2021-09-06 RX ADMIN — INSULIN LISPRO 4 UNITS: 100 INJECTION, SOLUTION INTRAVENOUS; SUBCUTANEOUS at 16:56

## 2021-09-06 RX ADMIN — LEVETIRACETAM 500 MG: 500 TABLET ORAL at 09:08

## 2021-09-06 RX ADMIN — INSULIN LISPRO 2 UNITS: 100 INJECTION, SOLUTION INTRAVENOUS; SUBCUTANEOUS at 20:17

## 2021-09-06 RX ADMIN — DOPAMINE HYDROCHLORIDE 5 MCG/KG/MIN: 160 INJECTION, SOLUTION INTRAVENOUS at 04:55

## 2021-09-06 RX ADMIN — HYDRALAZINE HYDROCHLORIDE 10 MG: 20 INJECTION INTRAMUSCULAR; INTRAVENOUS at 10:06

## 2021-09-06 RX ADMIN — AMLODIPINE BESYLATE 5 MG: 5 TABLET ORAL at 09:08

## 2021-09-06 RX ADMIN — SODIUM CHLORIDE, PRESERVATIVE FREE 2 ML: 5 INJECTION INTRAVENOUS at 09:09

## 2021-09-06 RX ADMIN — DOPAMINE HYDROCHLORIDE 5 MCG/KG/MIN: 160 INJECTION, SOLUTION INTRAVENOUS at 20:18

## 2021-09-06 RX ADMIN — INSULIN GLARGINE 10 UNITS: 100 INJECTION, SOLUTION SUBCUTANEOUS at 16:55

## 2021-09-06 RX ADMIN — INSULIN LISPRO 4 UNITS: 100 INJECTION, SOLUTION INTRAVENOUS; SUBCUTANEOUS at 13:20

## 2021-09-06 ASSESSMENT — PAIN SCALES - GENERAL
PAINLEVEL_OUTOF10: 0

## 2021-09-07 LAB
ANION GAP SERPL CALC-SCNC: 6 MMOL/L (ref 10–20)
BUN SERPL-MCNC: 35 MG/DL (ref 6–20)
BUN/CREAT SERPL: 25 (ref 7–25)
CALCIUM SERPL-MCNC: 9.2 MG/DL (ref 8.4–10.2)
CHLORIDE SERPL-SCNC: 112 MMOL/L (ref 98–107)
CO2 SERPL-SCNC: 23 MMOL/L (ref 21–32)
CREAT SERPL-MCNC: 1.4 MG/DL (ref 0.67–1.17)
DEPRECATED RDW RBC: 44.4 FL (ref 39–50)
ERYTHROCYTE [DISTWIDTH] IN BLOOD: 12.9 % (ref 11–15)
FASTING DURATION TIME PATIENT: ABNORMAL H
GFR SERPLBLD BASED ON 1.73 SQ M-ARVRAT: 47 ML/MIN
GLUCOSE BLDC GLUCOMTR-MCNC: 144 MG/DL (ref 70–99)
GLUCOSE BLDC GLUCOMTR-MCNC: 155 MG/DL (ref 70–99)
GLUCOSE BLDC GLUCOMTR-MCNC: 191 MG/DL (ref 70–99)
GLUCOSE BLDC GLUCOMTR-MCNC: 199 MG/DL (ref 70–99)
GLUCOSE SERPL-MCNC: 178 MG/DL (ref 65–99)
HCT VFR BLD CALC: 37 % (ref 39–51)
HGB BLD-MCNC: 12.4 G/DL (ref 13–17)
MAGNESIUM SERPL-MCNC: 2.3 MG/DL (ref 1.7–2.4)
MCH RBC QN AUTO: 31.5 PG (ref 26–34)
MCHC RBC AUTO-ENTMCNC: 33.5 G/DL (ref 32–36.5)
MCV RBC AUTO: 93.9 FL (ref 78–100)
NRBC BLD MANUAL-RTO: 0 /100 WBC
PHOSPHATE SERPL-MCNC: 2.4 MG/DL (ref 2.4–4.7)
PLATELET # BLD AUTO: 203 K/MCL (ref 140–450)
POTASSIUM SERPL-SCNC: 4.4 MMOL/L (ref 3.4–5.1)
RBC # BLD: 3.94 MIL/MCL (ref 4.5–5.9)
SODIUM SERPL-SCNC: 137 MMOL/L (ref 135–145)
WBC # BLD: 11.5 K/MCL (ref 4.2–11)

## 2021-09-07 PROCEDURE — 10002801 HB RX 250 W/O HCPCS: Performed by: INTERNAL MEDICINE

## 2021-09-07 PROCEDURE — 80048 BASIC METABOLIC PNL TOTAL CA: CPT | Performed by: SURGERY

## 2021-09-07 PROCEDURE — 83735 ASSAY OF MAGNESIUM: CPT | Performed by: SURGERY

## 2021-09-07 PROCEDURE — 10002800 HB RX 250 W HCPCS: Performed by: INTERNAL MEDICINE

## 2021-09-07 PROCEDURE — 10002803 HB RX 637

## 2021-09-07 PROCEDURE — 10002803 HB RX 637: Performed by: HOSPITALIST

## 2021-09-07 PROCEDURE — 99233 SBSQ HOSP IP/OBS HIGH 50: CPT | Performed by: NURSE PRACTITIONER

## 2021-09-07 PROCEDURE — 10002800 HB RX 250 W HCPCS

## 2021-09-07 PROCEDURE — C1898 LEAD, PMKR, OTHER THAN TRANS: HCPCS | Performed by: INTERNAL MEDICINE

## 2021-09-07 PROCEDURE — 82962 GLUCOSE BLOOD TEST: CPT

## 2021-09-07 PROCEDURE — 10006023 HB SUPPLY 272: Performed by: INTERNAL MEDICINE

## 2021-09-07 PROCEDURE — 10004651 HB RX, NO CHARGE ITEM: Performed by: SURGERY

## 2021-09-07 PROCEDURE — 85027 COMPLETE CBC AUTOMATED: CPT | Performed by: SURGERY

## 2021-09-07 PROCEDURE — 99291 CRITICAL CARE FIRST HOUR: CPT

## 2021-09-07 PROCEDURE — C1894 INTRO/SHEATH, NON-LASER: HCPCS | Performed by: INTERNAL MEDICINE

## 2021-09-07 PROCEDURE — 0JH606Z INSERTION OF PACEMAKER, DUAL CHAMBER INTO CHEST SUBCUTANEOUS TISSUE AND FASCIA, OPEN APPROACH: ICD-10-PCS | Performed by: INTERNAL MEDICINE

## 2021-09-07 PROCEDURE — 02H63JZ INSERTION OF PACEMAKER LEAD INTO RIGHT ATRIUM, PERCUTANEOUS APPROACH: ICD-10-PCS | Performed by: INTERNAL MEDICINE

## 2021-09-07 PROCEDURE — 10002803 HB RX 637: Performed by: INTERNAL MEDICINE

## 2021-09-07 PROCEDURE — 02HK3JZ INSERTION OF PACEMAKER LEAD INTO RIGHT VENTRICLE, PERCUTANEOUS APPROACH: ICD-10-PCS | Performed by: INTERNAL MEDICINE

## 2021-09-07 PROCEDURE — 33208 INSRT HEART PM ATRIAL & VENT: CPT | Performed by: INTERNAL MEDICINE

## 2021-09-07 PROCEDURE — 10002807 HB RX 258: Performed by: SURGERY

## 2021-09-07 PROCEDURE — 10003585 HB ROOM CHARGE INTERMEDIATE CARE

## 2021-09-07 PROCEDURE — 84100 ASSAY OF PHOSPHORUS: CPT | Performed by: SURGERY

## 2021-09-07 PROCEDURE — C1785 PMKR, DUAL, RATE-RESP: HCPCS | Performed by: INTERNAL MEDICINE

## 2021-09-07 PROCEDURE — 10006025 HB SUPPLY 275: Performed by: INTERNAL MEDICINE

## 2021-09-07 PROCEDURE — B51N1ZZ FLUOROSCOPY OF LEFT UPPER EXTREMITY VEINS USING LOW OSMOLAR CONTRAST: ICD-10-PCS | Performed by: INTERNAL MEDICINE

## 2021-09-07 PROCEDURE — 36415 COLL VENOUS BLD VENIPUNCTURE: CPT | Performed by: SURGERY

## 2021-09-07 PROCEDURE — 99152 MOD SED SAME PHYS/QHP 5/>YRS: CPT | Performed by: INTERNAL MEDICINE

## 2021-09-07 PROCEDURE — 10002800 HB RX 250 W HCPCS: Performed by: HOSPITALIST

## 2021-09-07 PROCEDURE — 99153 MOD SED SAME PHYS/QHP EA: CPT | Performed by: INTERNAL MEDICINE

## 2021-09-07 DEVICE — LEAD 5076-58 MRI US RCMCRD
Type: IMPLANTABLE DEVICE | Site: VENTRICLE | Status: FUNCTIONAL
Brand: CAPSUREFIX NOVUS MRI™ SURESCAN®

## 2021-09-07 DEVICE — IPG W1DR01 AZURE XT DR MRI USA
Type: IMPLANTABLE DEVICE | Site: CHEST | Status: FUNCTIONAL
Brand: AZURE™ XT DR MRI SURESCAN™

## 2021-09-07 DEVICE — LEAD 5076-52 MRI US RCMCRD
Type: IMPLANTABLE DEVICE | Site: ATRIUM | Status: FUNCTIONAL
Brand: CAPSUREFIX NOVUS MRI™ SURESCAN®

## 2021-09-07 RX ORDER — LIDOCAINE HYDROCHLORIDE 20 MG/ML
INJECTION, SOLUTION EPIDURAL; INFILTRATION; INTRACAUDAL; PERINEURAL PRN
Status: DISCONTINUED | OUTPATIENT
Start: 2021-09-07 | End: 2021-09-07 | Stop reason: HOSPADM

## 2021-09-07 RX ORDER — CEFAZOLIN SODIUM 1 G/3ML
INJECTION, POWDER, FOR SOLUTION INTRAMUSCULAR; INTRAVENOUS PRN
Status: DISCONTINUED | OUTPATIENT
Start: 2021-09-07 | End: 2021-09-07 | Stop reason: HOSPADM

## 2021-09-07 RX ORDER — MIDAZOLAM HYDROCHLORIDE 1 MG/ML
INJECTION, SOLUTION INTRAMUSCULAR; INTRAVENOUS PRN
Status: DISCONTINUED | OUTPATIENT
Start: 2021-09-07 | End: 2021-09-07 | Stop reason: HOSPADM

## 2021-09-07 RX ADMIN — INSULIN GLARGINE 10 UNITS: 100 INJECTION, SOLUTION SUBCUTANEOUS at 08:02

## 2021-09-07 RX ADMIN — DIBASIC SODIUM PHOSPHATE, MONOBASIC POTASSIUM PHOSPHATE AND MONOBASIC SODIUM PHOSPHATE 250 MG: 852; 155; 130 TABLET ORAL at 08:01

## 2021-09-07 RX ADMIN — SODIUM CHLORIDE, PRESERVATIVE FREE 2 ML: 5 INJECTION INTRAVENOUS at 08:01

## 2021-09-07 RX ADMIN — LEVETIRACETAM 500 MG: 500 TABLET ORAL at 20:03

## 2021-09-07 RX ADMIN — INSULIN LISPRO 2 UNITS: 100 INJECTION, SOLUTION INTRAVENOUS; SUBCUTANEOUS at 20:02

## 2021-09-07 RX ADMIN — DIBASIC SODIUM PHOSPHATE, MONOBASIC POTASSIUM PHOSPHATE AND MONOBASIC SODIUM PHOSPHATE 250 MG: 852; 155; 130 TABLET ORAL at 12:57

## 2021-09-07 RX ADMIN — INSULIN LISPRO 2 UNITS: 100 INJECTION, SOLUTION INTRAVENOUS; SUBCUTANEOUS at 08:01

## 2021-09-07 RX ADMIN — LEVETIRACETAM 500 MG: 500 TABLET ORAL at 08:01

## 2021-09-07 RX ADMIN — SODIUM CHLORIDE, PRESERVATIVE FREE 2 ML: 5 INJECTION INTRAVENOUS at 20:04

## 2021-09-07 RX ADMIN — SODIUM CHLORIDE 25 ML: 0.9 INJECTION, SOLUTION INTRAVENOUS at 09:55

## 2021-09-07 RX ADMIN — AMLODIPINE BESYLATE 5 MG: 5 TABLET ORAL at 08:01

## 2021-09-07 RX ADMIN — INSULIN LISPRO 2 UNITS: 100 INJECTION, SOLUTION INTRAVENOUS; SUBCUTANEOUS at 16:31

## 2021-09-07 ASSESSMENT — PAIN SCALES - GENERAL
PAINLEVEL_OUTOF10: 0

## 2021-09-07 ASSESSMENT — COGNITIVE AND FUNCTIONAL STATUS - GENERAL
BECAUSE OF A PHYSICAL, MENTAL, OR EMOTIONAL CONDITION, DO YOU HAVE DIFFICULTY DOING ERRANDS ALONE: NO
DO YOU HAVE SERIOUS DIFFICULTY WALKING OR CLIMBING STAIRS: NO
DO YOU HAVE DIFFICULTY DRESSING OR BATHING: NO
BECAUSE OF A PHYSICAL, MENTAL, OR EMOTIONAL CONDITION, DO YOU HAVE SERIOUS DIFFICULTY CONCENTRATING, REMEMBERING OR MAKING DECISIONS: NO

## 2021-09-07 ASSESSMENT — LIFESTYLE VARIABLES: SMOKING_HISTORY: NO

## 2021-09-08 ENCOUNTER — HOSPITAL ENCOUNTER (INPATIENT)
Dept: CARDIOLOGY | Age: 81
Discharge: HOME | DRG: 025 | End: 2021-09-08
Attending: SURGERY

## 2021-09-08 ENCOUNTER — APPOINTMENT (OUTPATIENT)
Dept: GENERAL RADIOLOGY | Age: 81
DRG: 025 | End: 2021-09-08
Attending: INTERNAL MEDICINE

## 2021-09-08 DIAGNOSIS — R07.9 CHEST PAIN, UNSPECIFIED TYPE: Primary | ICD-10-CM

## 2021-09-08 DIAGNOSIS — R07.9 CHEST PAIN, UNSPECIFIED TYPE: ICD-10-CM

## 2021-09-08 LAB
GLUCOSE BLDC GLUCOMTR-MCNC: 129 MG/DL (ref 70–99)
GLUCOSE BLDC GLUCOMTR-MCNC: 139 MG/DL (ref 70–99)
GLUCOSE BLDC GLUCOMTR-MCNC: 142 MG/DL (ref 70–99)
GLUCOSE BLDC GLUCOMTR-MCNC: 206 MG/DL (ref 70–99)
PA ADP PRP-ACNC: 268 PRU (ref 194–418)
PHOSPHATE SERPL-MCNC: 3.2 MG/DL (ref 2.4–4.7)
RAINBOW EXTRA TUBES HOLD SPECIMEN: NORMAL
SARS-COV-2 RNA RESP QL NAA+PROBE: NOT DETECTED
SERVICE CMNT-IMP: NORMAL
SERVICE CMNT-IMP: NORMAL

## 2021-09-08 PROCEDURE — 85576 BLOOD PLATELET AGGREGATION: CPT | Performed by: NURSE PRACTITIONER

## 2021-09-08 PROCEDURE — 36415 COLL VENOUS BLD VENIPUNCTURE: CPT | Performed by: HOSPITALIST

## 2021-09-08 PROCEDURE — 10004651 HB RX, NO CHARGE ITEM: Performed by: NURSE PRACTITIONER

## 2021-09-08 PROCEDURE — 10002800 HB RX 250 W HCPCS: Performed by: HOSPITALIST

## 2021-09-08 PROCEDURE — 10004651 HB RX, NO CHARGE ITEM: Performed by: SURGERY

## 2021-09-08 PROCEDURE — 10002800 HB RX 250 W HCPCS: Performed by: INTERNAL MEDICINE

## 2021-09-08 PROCEDURE — 10002803 HB RX 637: Performed by: INTERNAL MEDICINE

## 2021-09-08 PROCEDURE — 87635 SARS-COV-2 COVID-19 AMP PRB: CPT | Performed by: NURSE PRACTITIONER

## 2021-09-08 PROCEDURE — 10002800 HB RX 250 W HCPCS

## 2021-09-08 PROCEDURE — 84100 ASSAY OF PHOSPHORUS: CPT | Performed by: HOSPITALIST

## 2021-09-08 PROCEDURE — 10002803 HB RX 637: Performed by: NURSE PRACTITIONER

## 2021-09-08 PROCEDURE — 93005 ELECTROCARDIOGRAM TRACING: CPT

## 2021-09-08 PROCEDURE — 10002803 HB RX 637

## 2021-09-08 PROCEDURE — 71046 X-RAY EXAM CHEST 2 VIEWS: CPT

## 2021-09-08 PROCEDURE — 10003585 HB ROOM CHARGE INTERMEDIATE CARE

## 2021-09-08 PROCEDURE — 99024 POSTOP FOLLOW-UP VISIT: CPT | Performed by: INTERNAL MEDICINE

## 2021-09-08 RX ORDER — ASPIRIN 325 MG
325 TABLET ORAL DAILY
Status: DISCONTINUED | OUTPATIENT
Start: 2021-09-08 | End: 2021-09-14 | Stop reason: HOSPADM

## 2021-09-08 RX ORDER — CLOPIDOGREL BISULFATE 75 MG/1
300 TABLET ORAL ONCE
Status: COMPLETED | OUTPATIENT
Start: 2021-09-08 | End: 2021-09-08

## 2021-09-08 RX ORDER — CLOPIDOGREL BISULFATE 75 MG/1
75 TABLET ORAL DAILY
Status: DISCONTINUED | OUTPATIENT
Start: 2021-09-09 | End: 2021-09-14 | Stop reason: HOSPADM

## 2021-09-08 RX ADMIN — CLOPIDOGREL BISULFATE 300 MG: 75 TABLET, FILM COATED ORAL at 10:19

## 2021-09-08 RX ADMIN — LEVETIRACETAM 500 MG: 500 TABLET ORAL at 10:19

## 2021-09-08 RX ADMIN — INSULIN LISPRO 4 UNITS: 100 INJECTION, SOLUTION INTRAVENOUS; SUBCUTANEOUS at 13:59

## 2021-09-08 RX ADMIN — ASPIRIN 325 MG ORAL TABLET 325 MG: 325 PILL ORAL at 10:19

## 2021-09-08 RX ADMIN — Medication 1000 MG: at 13:59

## 2021-09-08 RX ADMIN — LEVETIRACETAM 500 MG: 500 TABLET ORAL at 21:15

## 2021-09-08 RX ADMIN — INSULIN GLARGINE 10 UNITS: 100 INJECTION, SOLUTION SUBCUTANEOUS at 10:19

## 2021-09-08 RX ADMIN — SODIUM CHLORIDE, PRESERVATIVE FREE 2 ML: 5 INJECTION INTRAVENOUS at 21:17

## 2021-09-08 RX ADMIN — AMLODIPINE BESYLATE 5 MG: 5 TABLET ORAL at 10:19

## 2021-09-08 RX ADMIN — SODIUM CHLORIDE, PRESERVATIVE FREE 2 ML: 5 INJECTION INTRAVENOUS at 10:21

## 2021-09-08 RX ADMIN — Medication 1000 MG: at 21:15

## 2021-09-08 ASSESSMENT — PAIN SCALES - GENERAL
PAINLEVEL_OUTOF10: 0
PAINLEVEL_OUTOF10: 0

## 2021-09-09 ENCOUNTER — APPOINTMENT (OUTPATIENT)
Dept: CT IMAGING | Age: 81
DRG: 025 | End: 2021-09-09
Attending: NURSE PRACTITIONER

## 2021-09-09 ENCOUNTER — ANESTHESIA EVENT (OUTPATIENT)
Dept: INTERVENTIONAL RADIOLOGY/VASCULAR | Age: 81
DRG: 025 | End: 2021-09-09

## 2021-09-09 ENCOUNTER — APPOINTMENT (OUTPATIENT)
Dept: INTERVENTIONAL RADIOLOGY/VASCULAR | Age: 81
DRG: 025 | End: 2021-09-09
Attending: PHYSICIAN ASSISTANT

## 2021-09-09 ENCOUNTER — ANESTHESIA (OUTPATIENT)
Dept: INTERVENTIONAL RADIOLOGY/VASCULAR | Age: 81
DRG: 025 | End: 2021-09-09

## 2021-09-09 LAB
ANION GAP SERPL CALC-SCNC: 11 MMOL/L (ref 10–20)
ATRIAL RATE (BPM): 326
BASOPHILS # BLD: 0 K/MCL (ref 0–0.3)
BASOPHILS NFR BLD: 0 %
BUN SERPL-MCNC: 40 MG/DL (ref 6–20)
BUN/CREAT SERPL: 32 (ref 7–25)
CALCIUM SERPL-MCNC: 8.5 MG/DL (ref 8.4–10.2)
CHLORIDE SERPL-SCNC: 111 MMOL/L (ref 98–107)
CO2 SERPL-SCNC: 21 MMOL/L (ref 21–32)
CREAT SERPL-MCNC: 1.24 MG/DL (ref 0.67–1.17)
DEPRECATED RDW RBC: 45.7 FL (ref 39–50)
EOSINOPHIL # BLD: 0.3 K/MCL (ref 0–0.5)
EOSINOPHIL NFR BLD: 5 %
ERYTHROCYTE [DISTWIDTH] IN BLOOD: 13 % (ref 11–15)
FASTING DURATION TIME PATIENT: ABNORMAL H
GFR SERPLBLD BASED ON 1.73 SQ M-ARVRAT: 54 ML/MIN
GLUCOSE BLDC GLUCOMTR-MCNC: 122 MG/DL (ref 70–99)
GLUCOSE BLDC GLUCOMTR-MCNC: 183 MG/DL (ref 70–99)
GLUCOSE BLDC GLUCOMTR-MCNC: 202 MG/DL (ref 70–99)
GLUCOSE SERPL-MCNC: 122 MG/DL (ref 65–99)
HCT VFR BLD CALC: 34.5 % (ref 39–51)
HGB BLD-MCNC: 11.4 G/DL (ref 13–17)
IMM GRANULOCYTES # BLD AUTO: 0 K/MCL (ref 0–0.2)
IMM GRANULOCYTES # BLD: 1 %
LYMPHOCYTES # BLD: 0.7 K/MCL (ref 1–4)
LYMPHOCYTES NFR BLD: 13 %
MCH RBC QN AUTO: 31.6 PG (ref 26–34)
MCHC RBC AUTO-ENTMCNC: 33 G/DL (ref 32–36.5)
MCV RBC AUTO: 95.6 FL (ref 78–100)
MONOCYTES # BLD: 0.6 K/MCL (ref 0.3–0.9)
MONOCYTES NFR BLD: 11 %
NEUTROPHILS # BLD: 3.9 K/MCL (ref 1.8–7.7)
NEUTROPHILS NFR BLD: 70 %
NRBC BLD MANUAL-RTO: 0 /100 WBC
PLATELET # BLD AUTO: 189 K/MCL (ref 140–450)
POTASSIUM SERPL-SCNC: 3.6 MMOL/L (ref 3.4–5.1)
QRS-INTERVAL (MSEC): 190
QT-INTERVAL (MSEC): 516
QTC: 516
R AXIS (DEGREES): -65
RBC # BLD: 3.61 MIL/MCL (ref 4.5–5.9)
REPORT TEXT: NORMAL
SODIUM SERPL-SCNC: 139 MMOL/L (ref 135–145)
T AXIS (DEGREES): 107
VENTRICULAR RATE EKG/MIN (BPM): 60
WBC # BLD: 5.6 K/MCL (ref 4.2–11)

## 2021-09-09 PROCEDURE — 80048 BASIC METABOLIC PNL TOTAL CA: CPT | Performed by: HOSPITALIST

## 2021-09-09 PROCEDURE — 10002800 HB RX 250 W HCPCS: Performed by: NEUROLOGICAL SURGERY

## 2021-09-09 PROCEDURE — 10006023 HB SUPPLY 272

## 2021-09-09 PROCEDURE — 03HL3DZ INSERTION OF INTRALUMINAL DEVICE INTO LEFT INTERNAL CAROTID ARTERY, PERCUTANEOUS APPROACH: ICD-10-PCS | Performed by: NEUROLOGICAL SURGERY

## 2021-09-09 PROCEDURE — 10002803 HB RX 637: Performed by: INTERNAL MEDICINE

## 2021-09-09 PROCEDURE — G1004 CDSM NDSC: HCPCS

## 2021-09-09 PROCEDURE — B31R1ZZ FLUOROSCOPY OF INTRACRANIAL ARTERIES USING LOW OSMOLAR CONTRAST: ICD-10-PCS | Performed by: NEUROLOGICAL SURGERY

## 2021-09-09 PROCEDURE — 70450 CT HEAD/BRAIN W/O DYE: CPT

## 2021-09-09 PROCEDURE — 10004452 HB PACU ADDL 30 MINUTES

## 2021-09-09 PROCEDURE — 10002801 HB RX 250 W/O HCPCS

## 2021-09-09 PROCEDURE — C1894 INTRO/SHEATH, NON-LASER: HCPCS

## 2021-09-09 PROCEDURE — C1876 STENT, NON-COA/NON-COV W/DEL: HCPCS

## 2021-09-09 PROCEDURE — 10002807 HB RX 258: Performed by: NURSE PRACTITIONER

## 2021-09-09 PROCEDURE — 13000004 HB  ANESTHESIA  GENERAL OUTSIDE OR

## 2021-09-09 PROCEDURE — 10004651 HB RX, NO CHARGE ITEM: Performed by: SURGERY

## 2021-09-09 PROCEDURE — C1887 CATHETER, GUIDING: HCPCS

## 2021-09-09 PROCEDURE — 36226 PLACE CATH VERTEBRAL ART: CPT

## 2021-09-09 PROCEDURE — B3141ZZ FLUOROSCOPY OF LEFT COMMON CAROTID ARTERY USING LOW OSMOLAR CONTRAST: ICD-10-PCS | Performed by: NEUROLOGICAL SURGERY

## 2021-09-09 PROCEDURE — 10002805 HB CONTRAST AGENT: Performed by: PHYSICIAN ASSISTANT

## 2021-09-09 PROCEDURE — 36228 PLACE CATH INTRACRANIAL ART: CPT

## 2021-09-09 PROCEDURE — 99024 POSTOP FOLLOW-UP VISIT: CPT | Performed by: NURSE PRACTITIONER

## 2021-09-09 PROCEDURE — 037J3DZ DILATION OF LEFT COMMON CAROTID ARTERY WITH INTRALUMINAL DEVICE, PERCUTANEOUS APPROACH: ICD-10-PCS | Performed by: NEUROLOGICAL SURGERY

## 2021-09-09 PROCEDURE — 10002803 HB RX 637

## 2021-09-09 PROCEDURE — 10000008 HB ROOM CHARGE ICU OR CCU

## 2021-09-09 PROCEDURE — 10004451 HB PACU RECOVERY 1ST 30 MINUTES

## 2021-09-09 PROCEDURE — 85025 COMPLETE CBC W/AUTO DIFF WBC: CPT | Performed by: HOSPITALIST

## 2021-09-09 PROCEDURE — 61645 PERQ ART M-THROMBECT &/NFS: CPT

## 2021-09-09 PROCEDURE — 10002801 HB RX 250 W/O HCPCS: Performed by: NURSE PRACTITIONER

## 2021-09-09 PROCEDURE — 36415 COLL VENOUS BLD VENIPUNCTURE: CPT | Performed by: HOSPITALIST

## 2021-09-09 PROCEDURE — 10002803 HB RX 637: Performed by: HOSPITALIST

## 2021-09-09 PROCEDURE — 37215 TRANSCATH STENT CCA W/EPS: CPT

## 2021-09-09 PROCEDURE — 10002800 HB RX 250 W HCPCS

## 2021-09-09 PROCEDURE — 10002803 HB RX 637: Performed by: NURSE PRACTITIONER

## 2021-09-09 PROCEDURE — 10002807 HB RX 258

## 2021-09-09 PROCEDURE — 37215 TRANSCATH STENT CCA W/EPS: CPT | Performed by: NEUROLOGICAL SURGERY

## 2021-09-09 PROCEDURE — 36223 PLACE CATH CAROTID/INOM ART: CPT

## 2021-09-09 PROCEDURE — C1769 GUIDE WIRE: HCPCS

## 2021-09-09 PROCEDURE — 61645 PERQ ART M-THROMBECT &/NFS: CPT | Performed by: NEUROLOGICAL SURGERY

## 2021-09-09 PROCEDURE — 99291 CRITICAL CARE FIRST HOUR: CPT | Performed by: SURGERY

## 2021-09-09 PROCEDURE — 10004651 HB RX, NO CHARGE ITEM: Performed by: NURSE PRACTITIONER

## 2021-09-09 PROCEDURE — 99232 SBSQ HOSP IP/OBS MODERATE 35: CPT | Performed by: NURSE PRACTITIONER

## 2021-09-09 PROCEDURE — 10006027 HB SUPPLY 278

## 2021-09-09 PROCEDURE — 03CG3Z7 EXTIRPATION OF MATTER FROM INTRACRANIAL ARTERY USING STENT RETRIEVER, PERCUTANEOUS APPROACH: ICD-10-PCS | Performed by: NEUROLOGICAL SURGERY

## 2021-09-09 PROCEDURE — C1884 EMBOLIZATION PROTECT SYST: HCPCS

## 2021-09-09 PROCEDURE — C1757 CATH, THROMBECTOMY/EMBOLECT: HCPCS

## 2021-09-09 PROCEDURE — 10002801 HB RX 250 W/O HCPCS: Performed by: NEUROLOGICAL SURGERY

## 2021-09-09 RX ORDER — ROCURONIUM BROMIDE 10 MG/ML
INJECTION, SOLUTION INTRAVENOUS PRN
Status: DISCONTINUED | OUTPATIENT
Start: 2021-09-09 | End: 2021-09-09

## 2021-09-09 RX ORDER — POTASSIUM CHLORIDE 20 MEQ/1
40 TABLET, EXTENDED RELEASE ORAL ONCE
Status: COMPLETED | OUTPATIENT
Start: 2021-09-09 | End: 2021-09-09

## 2021-09-09 RX ORDER — IODIXANOL 320 MG/ML
200 INJECTION, SOLUTION INTRAVASCULAR ONCE
Status: COMPLETED | OUTPATIENT
Start: 2021-09-09 | End: 2021-09-09

## 2021-09-09 RX ORDER — HEPARIN SODIUM 1000 [USP'U]/ML
INJECTION, SOLUTION INTRAVENOUS; SUBCUTANEOUS PRN
Status: DISCONTINUED | OUTPATIENT
Start: 2021-09-09 | End: 2021-09-09

## 2021-09-09 RX ORDER — SODIUM CHLORIDE 9 MG/ML
INJECTION, SOLUTION INTRAVENOUS CONTINUOUS
Status: DISCONTINUED | OUTPATIENT
Start: 2021-09-09 | End: 2021-09-12

## 2021-09-09 RX ORDER — EPTIFIBATIDE 2 MG/ML
180 INJECTION, SOLUTION INTRAVENOUS ONCE
Status: COMPLETED | OUTPATIENT
Start: 2021-09-09 | End: 2021-09-09

## 2021-09-09 RX ORDER — LIDOCAINE HYDROCHLORIDE 20 MG/ML
INJECTION, SOLUTION INFILTRATION; PERINEURAL PRN
Status: DISCONTINUED | OUTPATIENT
Start: 2021-09-09 | End: 2021-09-09

## 2021-09-09 RX ORDER — VERAPAMIL HYDROCHLORIDE 2.5 MG/ML
INJECTION, SOLUTION INTRAVENOUS PRN
Status: COMPLETED | OUTPATIENT
Start: 2021-09-09 | End: 2021-09-09

## 2021-09-09 RX ORDER — DEXAMETHASONE SODIUM PHOSPHATE 4 MG/ML
INJECTION, SOLUTION INTRA-ARTICULAR; INTRALESIONAL; INTRAMUSCULAR; INTRAVENOUS; SOFT TISSUE PRN
Status: DISCONTINUED | OUTPATIENT
Start: 2021-09-09 | End: 2021-09-09

## 2021-09-09 RX ORDER — ONDANSETRON 2 MG/ML
INJECTION INTRAMUSCULAR; INTRAVENOUS PRN
Status: DISCONTINUED | OUTPATIENT
Start: 2021-09-09 | End: 2021-09-09

## 2021-09-09 RX ORDER — NITROGLYCERIN 5 MG/ML
INJECTION, SOLUTION INTRAVENOUS PRN
Status: COMPLETED | OUTPATIENT
Start: 2021-09-09 | End: 2021-09-09

## 2021-09-09 RX ORDER — PROPOFOL 10 MG/ML
INJECTION, EMULSION INTRAVENOUS PRN
Status: DISCONTINUED | OUTPATIENT
Start: 2021-09-09 | End: 2021-09-09

## 2021-09-09 RX ORDER — SODIUM CHLORIDE, SODIUM LACTATE, POTASSIUM CHLORIDE, CALCIUM CHLORIDE 600; 310; 30; 20 MG/100ML; MG/100ML; MG/100ML; MG/100ML
INJECTION, SOLUTION INTRAVENOUS CONTINUOUS PRN
Status: DISCONTINUED | OUTPATIENT
Start: 2021-09-09 | End: 2021-09-09

## 2021-09-09 RX ORDER — ONDANSETRON 2 MG/ML
4 INJECTION INTRAMUSCULAR; INTRAVENOUS 2 TIMES DAILY PRN
Status: DISCONTINUED | OUTPATIENT
Start: 2021-09-09 | End: 2021-09-09 | Stop reason: HOSPADM

## 2021-09-09 RX ORDER — HYDRALAZINE HYDROCHLORIDE 20 MG/ML
5 INJECTION INTRAMUSCULAR; INTRAVENOUS EVERY 10 MIN PRN
Status: DISCONTINUED | OUTPATIENT
Start: 2021-09-09 | End: 2021-09-09 | Stop reason: HOSPADM

## 2021-09-09 RX ORDER — NICARDIPINE HYDROCHLORIDE 2.5 MG/ML
INJECTION INTRAVENOUS PRN
Status: DISCONTINUED | OUTPATIENT
Start: 2021-09-09 | End: 2021-09-09

## 2021-09-09 RX ADMIN — PROPOFOL 100 MG: 10 INJECTION, EMULSION INTRAVENOUS at 11:11

## 2021-09-09 RX ADMIN — HEPARIN SODIUM 2000 UNITS: 1000 INJECTION, SOLUTION INTRAVENOUS; SUBCUTANEOUS at 12:00

## 2021-09-09 RX ADMIN — SODIUM CHLORIDE, PRESERVATIVE FREE 2 ML: 5 INJECTION INTRAVENOUS at 08:25

## 2021-09-09 RX ADMIN — VERAPAMIL HYDROCHLORIDE 2.5 MG: 2.5 INJECTION INTRAVENOUS at 11:41

## 2021-09-09 RX ADMIN — POTASSIUM CHLORIDE 40 MEQ: 1500 TABLET, EXTENDED RELEASE ORAL at 08:22

## 2021-09-09 RX ADMIN — EPTIFIBATIDE 5 MG: 2 INJECTION INTRAVENOUS at 13:12

## 2021-09-09 RX ADMIN — NICARDIPINE HYDROCHLORIDE 0.25 MCG: 25 INJECTION INTRAVENOUS at 13:27

## 2021-09-09 RX ADMIN — AMLODIPINE BESYLATE 5 MG: 5 TABLET ORAL at 08:22

## 2021-09-09 RX ADMIN — NICARDIPINE HYDROCHLORIDE 5 MG/HR: 0.2 INJECTION, SOLUTION INTRAVENOUS at 13:40

## 2021-09-09 RX ADMIN — ASPIRIN 325 MG ORAL TABLET 325 MG: 325 PILL ORAL at 08:22

## 2021-09-09 RX ADMIN — LIDOCAINE HYDROCHLORIDE 2.5 ML: 20 INJECTION, SOLUTION INFILTRATION; PERINEURAL at 11:11

## 2021-09-09 RX ADMIN — DEXAMETHASONE SODIUM PHOSPHATE 4 MG: 4 INJECTION, SOLUTION INTRAMUSCULAR; INTRAVENOUS at 11:31

## 2021-09-09 RX ADMIN — PHENYLEPHRINE HYDROCHLORIDE 25 MCG/MIN: 10 INJECTION, SOLUTION INTRAMUSCULAR; INTRAVENOUS; SUBCUTANEOUS at 12:34

## 2021-09-09 RX ADMIN — SODIUM CHLORIDE: 0.9 INJECTION, SOLUTION INTRAVENOUS at 18:54

## 2021-09-09 RX ADMIN — INSULIN LISPRO 2 UNITS: 100 INJECTION, SOLUTION INTRAVENOUS; SUBCUTANEOUS at 20:52

## 2021-09-09 RX ADMIN — FENTANYL CITRATE 100 MCG: 50 INJECTION, SOLUTION INTRAMUSCULAR; INTRAVENOUS at 10:57

## 2021-09-09 RX ADMIN — SODIUM CHLORIDE, PRESERVATIVE FREE 2 ML: 5 INJECTION INTRAVENOUS at 20:53

## 2021-09-09 RX ADMIN — NITROGLYCERIN 200 MCG: 5 INJECTION, SOLUTION INTRAVENOUS at 11:41

## 2021-09-09 RX ADMIN — NICARDIPINE HYDROCHLORIDE 0.25 MCG: 25 INJECTION INTRAVENOUS at 13:22

## 2021-09-09 RX ADMIN — ONDANSETRON 4 MG: 2 INJECTION INTRAMUSCULAR; INTRAVENOUS at 13:10

## 2021-09-09 RX ADMIN — NICARDIPINE HYDROCHLORIDE 8 MG/HR: 0.2 INJECTION, SOLUTION INTRAVENOUS at 20:58

## 2021-09-09 RX ADMIN — LEVETIRACETAM 500 MG: 500 TABLET ORAL at 08:22

## 2021-09-09 RX ADMIN — SODIUM CHLORIDE, POTASSIUM CHLORIDE, SODIUM LACTATE AND CALCIUM CHLORIDE: 600; 310; 30; 20 INJECTION, SOLUTION INTRAVENOUS at 11:11

## 2021-09-09 RX ADMIN — HEPARIN SODIUM 5000 UNITS: 1000 INJECTION, SOLUTION INTRAVENOUS; SUBCUTANEOUS at 11:38

## 2021-09-09 RX ADMIN — INSULIN LISPRO 4 UNITS: 100 INJECTION, SOLUTION INTRAVENOUS; SUBCUTANEOUS at 18:57

## 2021-09-09 RX ADMIN — CLOPIDOGREL BISULFATE 75 MG: 75 TABLET, FILM COATED ORAL at 08:22

## 2021-09-09 RX ADMIN — LEVETIRACETAM 500 MG: 500 TABLET ORAL at 20:52

## 2021-09-09 RX ADMIN — ROCURONIUM BROMIDE 40 MG: 50 INJECTION, SOLUTION INTRAVENOUS at 11:11

## 2021-09-09 RX ADMIN — IODIXANOL 100 ML: 320 INJECTION, SOLUTION INTRAVASCULAR at 13:31

## 2021-09-09 ASSESSMENT — PAIN SCALES - GENERAL
PAINLEVEL_OUTOF10: 0

## 2021-09-10 ENCOUNTER — APPOINTMENT (OUTPATIENT)
Dept: CT IMAGING | Age: 81
DRG: 025 | End: 2021-09-10
Attending: NURSE PRACTITIONER

## 2021-09-10 LAB
ANION GAP SERPL CALC-SCNC: 12 MMOL/L (ref 10–20)
BASOPHILS # BLD: 0 K/MCL (ref 0–0.3)
BASOPHILS NFR BLD: 0 %
BUN SERPL-MCNC: 48 MG/DL (ref 6–20)
BUN/CREAT SERPL: 33 (ref 7–25)
CALCIUM SERPL-MCNC: 7.8 MG/DL (ref 8.4–10.2)
CHLORIDE SERPL-SCNC: 112 MMOL/L (ref 98–107)
CO2 SERPL-SCNC: 20 MMOL/L (ref 21–32)
CREAT SERPL-MCNC: 1.45 MG/DL (ref 0.67–1.17)
DEPRECATED RDW RBC: 44.3 FL (ref 39–50)
EOSINOPHIL # BLD: 0 K/MCL (ref 0–0.5)
EOSINOPHIL NFR BLD: 0 %
ERYTHROCYTE [DISTWIDTH] IN BLOOD: 12.8 % (ref 11–15)
FASTING DURATION TIME PATIENT: ABNORMAL H
GFR SERPLBLD BASED ON 1.73 SQ M-ARVRAT: 45 ML/MIN
GLUCOSE BLDC GLUCOMTR-MCNC: 144 MG/DL (ref 70–99)
GLUCOSE BLDC GLUCOMTR-MCNC: 188 MG/DL (ref 70–99)
GLUCOSE BLDC GLUCOMTR-MCNC: 210 MG/DL (ref 70–99)
GLUCOSE BLDC GLUCOMTR-MCNC: 262 MG/DL (ref 70–99)
GLUCOSE SERPL-MCNC: 220 MG/DL (ref 65–99)
HCT VFR BLD CALC: 31.4 % (ref 39–51)
HGB BLD-MCNC: 10.4 G/DL (ref 13–17)
IMM GRANULOCYTES # BLD AUTO: 0.1 K/MCL (ref 0–0.2)
IMM GRANULOCYTES # BLD: 1 %
LYMPHOCYTES # BLD: 0.3 K/MCL (ref 1–4)
LYMPHOCYTES NFR BLD: 5 %
MCH RBC QN AUTO: 31.4 PG (ref 26–34)
MCHC RBC AUTO-ENTMCNC: 33.1 G/DL (ref 32–36.5)
MCV RBC AUTO: 94.9 FL (ref 78–100)
MONOCYTES # BLD: 0.5 K/MCL (ref 0.3–0.9)
MONOCYTES NFR BLD: 8 %
NEUTROPHILS # BLD: 5.6 K/MCL (ref 1.8–7.7)
NEUTROPHILS NFR BLD: 86 %
NRBC BLD MANUAL-RTO: 0 /100 WBC
PA ADP PRP-ACNC: 251 PRU (ref 194–418)
PLATELET # BLD AUTO: 220 K/MCL (ref 140–450)
POTASSIUM SERPL-SCNC: 4.5 MMOL/L (ref 3.4–5.1)
RBC # BLD: 3.31 MIL/MCL (ref 4.5–5.9)
SODIUM SERPL-SCNC: 139 MMOL/L (ref 135–145)
WBC # BLD: 6.5 K/MCL (ref 4.2–11)

## 2021-09-10 PROCEDURE — 36415 COLL VENOUS BLD VENIPUNCTURE: CPT | Performed by: NURSE PRACTITIONER

## 2021-09-10 PROCEDURE — 80048 BASIC METABOLIC PNL TOTAL CA: CPT | Performed by: HOSPITALIST

## 2021-09-10 PROCEDURE — 99291 CRITICAL CARE FIRST HOUR: CPT

## 2021-09-10 PROCEDURE — 10002803 HB RX 637

## 2021-09-10 PROCEDURE — 10002803 HB RX 637: Performed by: NURSE PRACTITIONER

## 2021-09-10 PROCEDURE — 99232 SBSQ HOSP IP/OBS MODERATE 35: CPT | Performed by: NURSE PRACTITIONER

## 2021-09-10 PROCEDURE — 10004651 HB RX, NO CHARGE ITEM: Performed by: NURSE PRACTITIONER

## 2021-09-10 PROCEDURE — 10000008 HB ROOM CHARGE ICU OR CCU

## 2021-09-10 PROCEDURE — 85576 BLOOD PLATELET AGGREGATION: CPT | Performed by: NURSE PRACTITIONER

## 2021-09-10 PROCEDURE — 70450 CT HEAD/BRAIN W/O DYE: CPT

## 2021-09-10 PROCEDURE — 99233 SBSQ HOSP IP/OBS HIGH 50: CPT | Performed by: INTERNAL MEDICINE

## 2021-09-10 PROCEDURE — G1004 CDSM NDSC: HCPCS

## 2021-09-10 PROCEDURE — 10004281 HB COUNTER-STAFF TIME PER 15 MIN

## 2021-09-10 PROCEDURE — 10002803 HB RX 637: Performed by: INTERNAL MEDICINE

## 2021-09-10 PROCEDURE — 10004651 HB RX, NO CHARGE ITEM: Performed by: SURGERY

## 2021-09-10 PROCEDURE — 10002016 HB COUNTER INCENTIVE SPIROMETRY

## 2021-09-10 PROCEDURE — 10002800 HB RX 250 W HCPCS

## 2021-09-10 PROCEDURE — 10002807 HB RX 258: Performed by: NURSE PRACTITIONER

## 2021-09-10 PROCEDURE — 85025 COMPLETE CBC W/AUTO DIFF WBC: CPT | Performed by: HOSPITALIST

## 2021-09-10 PROCEDURE — 10002801 HB RX 250 W/O HCPCS: Performed by: NURSE PRACTITIONER

## 2021-09-10 RX ORDER — INSULIN GLARGINE 100 [IU]/ML
15 INJECTION, SOLUTION SUBCUTANEOUS DAILY
Status: DISCONTINUED | OUTPATIENT
Start: 2021-09-10 | End: 2021-09-14 | Stop reason: HOSPADM

## 2021-09-10 RX ORDER — AMLODIPINE BESYLATE 10 MG/1
10 TABLET ORAL DAILY
Status: DISCONTINUED | OUTPATIENT
Start: 2021-09-11 | End: 2021-09-14 | Stop reason: HOSPADM

## 2021-09-10 RX ADMIN — INSULIN LISPRO 4 UNITS: 100 INJECTION, SOLUTION INTRAVENOUS; SUBCUTANEOUS at 12:15

## 2021-09-10 RX ADMIN — SODIUM CHLORIDE, PRESERVATIVE FREE 2 ML: 5 INJECTION INTRAVENOUS at 08:56

## 2021-09-10 RX ADMIN — SODIUM CHLORIDE, PRESERVATIVE FREE 2 ML: 5 INJECTION INTRAVENOUS at 20:09

## 2021-09-10 RX ADMIN — INSULIN LISPRO 6 UNITS: 100 INJECTION, SOLUTION INTRAVENOUS; SUBCUTANEOUS at 08:50

## 2021-09-10 RX ADMIN — NICARDIPINE HYDROCHLORIDE 10 MG/HR: 0.2 INJECTION, SOLUTION INTRAVENOUS at 08:51

## 2021-09-10 RX ADMIN — INSULIN GLARGINE 15 UNITS: 100 INJECTION, SOLUTION SUBCUTANEOUS at 10:55

## 2021-09-10 RX ADMIN — AMLODIPINE BESYLATE 5 MG: 5 TABLET ORAL at 08:50

## 2021-09-10 RX ADMIN — CLOPIDOGREL BISULFATE 75 MG: 75 TABLET, FILM COATED ORAL at 08:50

## 2021-09-10 RX ADMIN — METOPROLOL TARTRATE 25 MG: 25 TABLET, FILM COATED ORAL at 20:07

## 2021-09-10 RX ADMIN — LEVETIRACETAM 500 MG: 500 TABLET ORAL at 08:50

## 2021-09-10 RX ADMIN — LEVETIRACETAM 500 MG: 500 TABLET ORAL at 20:07

## 2021-09-10 RX ADMIN — NICARDIPINE HYDROCHLORIDE 8 MG/HR: 0.2 INJECTION, SOLUTION INTRAVENOUS at 03:30

## 2021-09-10 RX ADMIN — SODIUM CHLORIDE: 0.9 INJECTION, SOLUTION INTRAVENOUS at 08:55

## 2021-09-10 RX ADMIN — INSULIN LISPRO 2 UNITS: 100 INJECTION, SOLUTION INTRAVENOUS; SUBCUTANEOUS at 16:55

## 2021-09-10 RX ADMIN — ASPIRIN 325 MG ORAL TABLET 325 MG: 325 PILL ORAL at 08:50

## 2021-09-10 RX ADMIN — METOPROLOL TARTRATE 25 MG: 25 TABLET, FILM COATED ORAL at 10:55

## 2021-09-10 ASSESSMENT — PAIN SCALES - GENERAL
PAINLEVEL_OUTOF10: 0
PAINLEVEL_OUTOF10: 0

## 2021-09-11 LAB
ANION GAP SERPL CALC-SCNC: 11 MMOL/L (ref 10–20)
BUN SERPL-MCNC: 50 MG/DL (ref 6–20)
BUN/CREAT SERPL: 35 (ref 7–25)
CALCIUM SERPL-MCNC: 8.1 MG/DL (ref 8.4–10.2)
CHLORIDE SERPL-SCNC: 111 MMOL/L (ref 98–107)
CO2 SERPL-SCNC: 22 MMOL/L (ref 21–32)
CREAT SERPL-MCNC: 1.43 MG/DL (ref 0.67–1.17)
DEPRECATED RDW RBC: 46.1 FL (ref 39–50)
ERYTHROCYTE [DISTWIDTH] IN BLOOD: 12.9 % (ref 11–15)
FASTING DURATION TIME PATIENT: ABNORMAL H
GFR SERPLBLD BASED ON 1.73 SQ M-ARVRAT: 46 ML/MIN
GLUCOSE BLDC GLUCOMTR-MCNC: 123 MG/DL (ref 70–99)
GLUCOSE BLDC GLUCOMTR-MCNC: 158 MG/DL (ref 70–99)
GLUCOSE BLDC GLUCOMTR-MCNC: 159 MG/DL (ref 70–99)
GLUCOSE BLDC GLUCOMTR-MCNC: 162 MG/DL (ref 70–99)
GLUCOSE SERPL-MCNC: 110 MG/DL (ref 65–99)
HCT VFR BLD CALC: 32.4 % (ref 39–51)
HGB BLD-MCNC: 10.7 G/DL (ref 13–17)
MAGNESIUM SERPL-MCNC: 2.5 MG/DL (ref 1.7–2.4)
MCH RBC QN AUTO: 32 PG (ref 26–34)
MCHC RBC AUTO-ENTMCNC: 33 G/DL (ref 32–36.5)
MCV RBC AUTO: 97 FL (ref 78–100)
NRBC BLD MANUAL-RTO: 0 /100 WBC
PHOSPHATE SERPL-MCNC: 3.5 MG/DL (ref 2.4–4.7)
PLATELET # BLD AUTO: 232 K/MCL (ref 140–450)
POTASSIUM SERPL-SCNC: 4.4 MMOL/L (ref 3.4–5.1)
RBC # BLD: 3.34 MIL/MCL (ref 4.5–5.9)
SODIUM SERPL-SCNC: 140 MMOL/L (ref 135–145)
WBC # BLD: 9.2 K/MCL (ref 4.2–11)

## 2021-09-11 PROCEDURE — 83735 ASSAY OF MAGNESIUM: CPT

## 2021-09-11 PROCEDURE — 10002803 HB RX 637

## 2021-09-11 PROCEDURE — 97162 PT EVAL MOD COMPLEX 30 MIN: CPT

## 2021-09-11 PROCEDURE — 10004651 HB RX, NO CHARGE ITEM: Performed by: SURGERY

## 2021-09-11 PROCEDURE — 36415 COLL VENOUS BLD VENIPUNCTURE: CPT

## 2021-09-11 PROCEDURE — 84100 ASSAY OF PHOSPHORUS: CPT

## 2021-09-11 PROCEDURE — 99232 SBSQ HOSP IP/OBS MODERATE 35: CPT | Performed by: PHYSICIAN ASSISTANT

## 2021-09-11 PROCEDURE — 97530 THERAPEUTIC ACTIVITIES: CPT

## 2021-09-11 PROCEDURE — 10004651 HB RX, NO CHARGE ITEM: Performed by: NURSE PRACTITIONER

## 2021-09-11 PROCEDURE — 99233 SBSQ HOSP IP/OBS HIGH 50: CPT | Performed by: INTERNAL MEDICINE

## 2021-09-11 PROCEDURE — 10002803 HB RX 637: Performed by: NURSE PRACTITIONER

## 2021-09-11 PROCEDURE — 97166 OT EVAL MOD COMPLEX 45 MIN: CPT

## 2021-09-11 PROCEDURE — 10002803 HB RX 637: Performed by: INTERNAL MEDICINE

## 2021-09-11 PROCEDURE — 85027 COMPLETE CBC AUTOMATED: CPT

## 2021-09-11 PROCEDURE — 80048 BASIC METABOLIC PNL TOTAL CA: CPT

## 2021-09-11 PROCEDURE — 10002800 HB RX 250 W HCPCS

## 2021-09-11 PROCEDURE — 99291 CRITICAL CARE FIRST HOUR: CPT

## 2021-09-11 PROCEDURE — 10006031 HB ROOM CHARGE TELEMETRY

## 2021-09-11 RX ORDER — INSULIN LISPRO 100 [IU]/ML
5 INJECTION, SOLUTION INTRAVENOUS; SUBCUTANEOUS
Qty: 10 ML | Refills: 12 | Status: SHIPPED | OUTPATIENT
Start: 2021-09-11 | End: 2022-11-16

## 2021-09-11 RX ORDER — AMLODIPINE BESYLATE 10 MG/1
10 TABLET ORAL DAILY
Qty: 30 TABLET | Refills: 3 | Status: SHIPPED | OUTPATIENT
Start: 2021-09-12 | End: 2022-01-10 | Stop reason: SDUPTHER

## 2021-09-11 RX ORDER — INSULIN GLARGINE 100 [IU]/ML
15 INJECTION, SOLUTION SUBCUTANEOUS DAILY
Qty: 15 ML | Refills: 12 | Status: SHIPPED | OUTPATIENT
Start: 2021-09-11

## 2021-09-11 RX ADMIN — CLOPIDOGREL BISULFATE 75 MG: 75 TABLET, FILM COATED ORAL at 08:20

## 2021-09-11 RX ADMIN — INSULIN LISPRO 2 UNITS: 100 INJECTION, SOLUTION INTRAVENOUS; SUBCUTANEOUS at 12:31

## 2021-09-11 RX ADMIN — INSULIN LISPRO 2 UNITS: 100 INJECTION, SOLUTION INTRAVENOUS; SUBCUTANEOUS at 20:41

## 2021-09-11 RX ADMIN — LEVETIRACETAM 500 MG: 500 TABLET ORAL at 08:20

## 2021-09-11 RX ADMIN — ASPIRIN 325 MG ORAL TABLET 325 MG: 325 PILL ORAL at 08:20

## 2021-09-11 RX ADMIN — METOPROLOL TARTRATE 25 MG: 25 TABLET, FILM COATED ORAL at 20:35

## 2021-09-11 RX ADMIN — INSULIN LISPRO 2 UNITS: 100 INJECTION, SOLUTION INTRAVENOUS; SUBCUTANEOUS at 18:02

## 2021-09-11 RX ADMIN — SODIUM CHLORIDE, PRESERVATIVE FREE 2 ML: 5 INJECTION INTRAVENOUS at 20:42

## 2021-09-11 RX ADMIN — INSULIN GLARGINE 15 UNITS: 100 INJECTION, SOLUTION SUBCUTANEOUS at 09:00

## 2021-09-11 RX ADMIN — AMLODIPINE BESYLATE 10 MG: 10 TABLET ORAL at 08:20

## 2021-09-11 RX ADMIN — SODIUM CHLORIDE, PRESERVATIVE FREE 2 ML: 5 INJECTION INTRAVENOUS at 08:21

## 2021-09-11 RX ADMIN — METOPROLOL TARTRATE 25 MG: 25 TABLET, FILM COATED ORAL at 08:20

## 2021-09-11 ASSESSMENT — PAIN SCALES - GENERAL
PAINLEVEL_OUTOF10: 0
PAINLEVEL_OUTOF10: 0

## 2021-09-11 ASSESSMENT — COGNITIVE AND FUNCTIONAL STATUS - GENERAL
DAILY_ACTIVITY_RAW_SCORE: 17
BASIC_MOBILITY_CONVERTED_SCORE: 39.67
HELP NEEDED DRESSING REGULAR UPPER BODY CLOTHING: A LITTLE
APPLIED_COGNITIVE_RAW_SCORE: 24
HELP NEEDED FOR BATHING: A LOT
HELP NEEDED FOR TOILETING: A LOT
APPLIED_COGNITIVE_CONVERTED_SCORE: 62.21
BASIC_MOBILITY_RAW_SCORE: 17
HELP NEEDED DRESSING REGULAR LOWER BODY CLOTHING: A LOT
DAILY_ACTIVITY_CONVERTED_SCORE: 37.26

## 2021-09-11 ASSESSMENT — ACTIVITIES OF DAILY LIVING (ADL)
PRIOR_ADL: INDEPENDENT
PRIOR_ADL_TOILETING: INDEPENDENT
EATING: INDEPENDENT
PRIOR_ADL_BATHING: INDEPENDENT
GROOMING: INDEPENDENT

## 2021-09-12 LAB
GLUCOSE BLDC GLUCOMTR-MCNC: 185 MG/DL (ref 70–99)
GLUCOSE BLDC GLUCOMTR-MCNC: 204 MG/DL (ref 70–99)
GLUCOSE BLDC GLUCOMTR-MCNC: 95 MG/DL (ref 70–99)
GLUCOSE BLDC GLUCOMTR-MCNC: 98 MG/DL (ref 70–99)

## 2021-09-12 PROCEDURE — 10002800 HB RX 250 W HCPCS

## 2021-09-12 PROCEDURE — 10004651 HB RX, NO CHARGE ITEM: Performed by: SURGERY

## 2021-09-12 PROCEDURE — 10006031 HB ROOM CHARGE TELEMETRY

## 2021-09-12 PROCEDURE — 10002803 HB RX 637: Performed by: NURSE PRACTITIONER

## 2021-09-12 PROCEDURE — 10002803 HB RX 637: Performed by: INTERNAL MEDICINE

## 2021-09-12 PROCEDURE — 10004651 HB RX, NO CHARGE ITEM: Performed by: NURSE PRACTITIONER

## 2021-09-12 RX ADMIN — METOPROLOL TARTRATE 25 MG: 25 TABLET, FILM COATED ORAL at 21:03

## 2021-09-12 RX ADMIN — CLOPIDOGREL BISULFATE 75 MG: 75 TABLET, FILM COATED ORAL at 08:18

## 2021-09-12 RX ADMIN — INSULIN GLARGINE 15 UNITS: 100 INJECTION, SOLUTION SUBCUTANEOUS at 08:18

## 2021-09-12 RX ADMIN — ASPIRIN 325 MG ORAL TABLET 325 MG: 325 PILL ORAL at 08:18

## 2021-09-12 RX ADMIN — SODIUM CHLORIDE, PRESERVATIVE FREE 2 ML: 5 INJECTION INTRAVENOUS at 21:03

## 2021-09-12 RX ADMIN — INSULIN LISPRO 4 UNITS: 100 INJECTION, SOLUTION INTRAVENOUS; SUBCUTANEOUS at 12:12

## 2021-09-12 RX ADMIN — SODIUM CHLORIDE, PRESERVATIVE FREE 2 ML: 5 INJECTION INTRAVENOUS at 08:19

## 2021-09-12 RX ADMIN — AMLODIPINE BESYLATE 10 MG: 10 TABLET ORAL at 08:18

## 2021-09-12 RX ADMIN — INSULIN LISPRO 2 UNITS: 100 INJECTION, SOLUTION INTRAVENOUS; SUBCUTANEOUS at 21:03

## 2021-09-12 RX ADMIN — METOPROLOL TARTRATE 25 MG: 25 TABLET, FILM COATED ORAL at 08:18

## 2021-09-12 ASSESSMENT — PAIN SCALES - GENERAL
PAINLEVEL_OUTOF10: 0

## 2021-09-13 ENCOUNTER — TELEPHONE (OUTPATIENT)
Dept: CARDIOLOGY | Age: 81
End: 2021-09-13

## 2021-09-13 LAB
GLUCOSE BLDC GLUCOMTR-MCNC: 111 MG/DL (ref 70–99)
GLUCOSE BLDC GLUCOMTR-MCNC: 152 MG/DL (ref 70–99)
GLUCOSE BLDC GLUCOMTR-MCNC: 171 MG/DL (ref 70–99)
GLUCOSE BLDC GLUCOMTR-MCNC: 259 MG/DL (ref 70–99)

## 2021-09-13 PROCEDURE — 10002800 HB RX 250 W HCPCS: Performed by: INTERNAL MEDICINE

## 2021-09-13 PROCEDURE — 10006031 HB ROOM CHARGE TELEMETRY

## 2021-09-13 PROCEDURE — 10004651 HB RX, NO CHARGE ITEM: Performed by: SURGERY

## 2021-09-13 PROCEDURE — 10002800 HB RX 250 W HCPCS

## 2021-09-13 PROCEDURE — 97116 GAIT TRAINING THERAPY: CPT

## 2021-09-13 PROCEDURE — 10004651 HB RX, NO CHARGE ITEM: Performed by: NURSE PRACTITIONER

## 2021-09-13 PROCEDURE — 10002803 HB RX 637: Performed by: INTERNAL MEDICINE

## 2021-09-13 PROCEDURE — 10002803 HB RX 637: Performed by: NURSE PRACTITIONER

## 2021-09-13 PROCEDURE — 97530 THERAPEUTIC ACTIVITIES: CPT

## 2021-09-13 RX ADMIN — CLOPIDOGREL BISULFATE 75 MG: 75 TABLET, FILM COATED ORAL at 08:46

## 2021-09-13 RX ADMIN — SODIUM CHLORIDE, PRESERVATIVE FREE 2 ML: 5 INJECTION INTRAVENOUS at 08:32

## 2021-09-13 RX ADMIN — METOPROLOL TARTRATE 25 MG: 25 TABLET, FILM COATED ORAL at 21:03

## 2021-09-13 RX ADMIN — ASPIRIN 325 MG ORAL TABLET 325 MG: 325 PILL ORAL at 08:45

## 2021-09-13 RX ADMIN — AMLODIPINE BESYLATE 10 MG: 10 TABLET ORAL at 08:45

## 2021-09-13 RX ADMIN — INSULIN LISPRO 2 UNITS: 100 INJECTION, SOLUTION INTRAVENOUS; SUBCUTANEOUS at 21:02

## 2021-09-13 RX ADMIN — METOPROLOL TARTRATE 25 MG: 25 TABLET, FILM COATED ORAL at 08:45

## 2021-09-13 RX ADMIN — INSULIN LISPRO 1 UNITS: 100 INJECTION, SOLUTION INTRAVENOUS; SUBCUTANEOUS at 14:34

## 2021-09-13 RX ADMIN — INSULIN GLARGINE 15 UNITS: 100 INJECTION, SOLUTION SUBCUTANEOUS at 08:45

## 2021-09-13 RX ADMIN — SODIUM CHLORIDE, PRESERVATIVE FREE 2 ML: 5 INJECTION INTRAVENOUS at 21:04

## 2021-09-13 ASSESSMENT — PAIN SCALES - GENERAL
PAINLEVEL_OUTOF10: 0
PAINLEVEL_OUTOF10: 0

## 2021-09-13 ASSESSMENT — ENCOUNTER SYMPTOMS: PAIN SEVERITY NOW: 0

## 2021-09-13 ASSESSMENT — COGNITIVE AND FUNCTIONAL STATUS - GENERAL
BASIC_MOBILITY_RAW_SCORE: 20
BASIC_MOBILITY_CONVERTED_SCORE: 43.99

## 2021-09-13 ASSESSMENT — PAIN SCALES - WONG BAKER: WONGBAKER_NUMERICALRESPONSE: 0

## 2021-09-14 VITALS
SYSTOLIC BLOOD PRESSURE: 142 MMHG | BODY MASS INDEX: 28.41 KG/M2 | HEIGHT: 70 IN | WEIGHT: 198.41 LBS | HEART RATE: 59 BPM | TEMPERATURE: 97.9 F | OXYGEN SATURATION: 94 % | DIASTOLIC BLOOD PRESSURE: 56 MMHG | RESPIRATION RATE: 16 BRPM

## 2021-09-14 LAB
GLUCOSE BLDC GLUCOMTR-MCNC: 133 MG/DL (ref 70–99)
GLUCOSE BLDC GLUCOMTR-MCNC: 187 MG/DL (ref 70–99)

## 2021-09-14 PROCEDURE — 10002800 HB RX 250 W HCPCS

## 2021-09-14 PROCEDURE — 10004651 HB RX, NO CHARGE ITEM: Performed by: SURGERY

## 2021-09-14 PROCEDURE — 97116 GAIT TRAINING THERAPY: CPT

## 2021-09-14 PROCEDURE — 97530 THERAPEUTIC ACTIVITIES: CPT

## 2021-09-14 PROCEDURE — 10004651 HB RX, NO CHARGE ITEM: Performed by: NURSE PRACTITIONER

## 2021-09-14 PROCEDURE — 10002803 HB RX 637: Performed by: INTERNAL MEDICINE

## 2021-09-14 PROCEDURE — 10002800 HB RX 250 W HCPCS: Performed by: INTERNAL MEDICINE

## 2021-09-14 PROCEDURE — 10002803 HB RX 637: Performed by: NURSE PRACTITIONER

## 2021-09-14 RX ADMIN — CLOPIDOGREL BISULFATE 75 MG: 75 TABLET, FILM COATED ORAL at 09:02

## 2021-09-14 RX ADMIN — SODIUM CHLORIDE, PRESERVATIVE FREE 2 ML: 5 INJECTION INTRAVENOUS at 09:04

## 2021-09-14 RX ADMIN — METOPROLOL TARTRATE 25 MG: 25 TABLET, FILM COATED ORAL at 09:02

## 2021-09-14 RX ADMIN — INSULIN GLARGINE 15 UNITS: 100 INJECTION, SOLUTION SUBCUTANEOUS at 09:02

## 2021-09-14 RX ADMIN — INSULIN LISPRO 1 UNITS: 100 INJECTION, SOLUTION INTRAVENOUS; SUBCUTANEOUS at 13:00

## 2021-09-14 RX ADMIN — ASPIRIN 325 MG ORAL TABLET 325 MG: 325 PILL ORAL at 09:02

## 2021-09-14 RX ADMIN — AMLODIPINE BESYLATE 10 MG: 10 TABLET ORAL at 09:02

## 2021-09-14 ASSESSMENT — ENCOUNTER SYMPTOMS: PAIN SEVERITY NOW: 0

## 2021-09-14 ASSESSMENT — PAIN SCALES - GENERAL
PAINLEVEL_OUTOF10: 0
PAINLEVEL_OUTOF10: 0

## 2021-09-14 ASSESSMENT — COGNITIVE AND FUNCTIONAL STATUS - GENERAL
BASIC_MOBILITY_RAW_SCORE: 20
BASIC_MOBILITY_CONVERTED_SCORE: 43.99

## 2021-09-17 ENCOUNTER — TELEPHONE (OUTPATIENT)
Dept: NEUROSURGERY | Age: 81
End: 2021-09-17

## 2021-09-20 ENCOUNTER — TELEPHONE (OUTPATIENT)
Dept: NEUROSURGERY | Age: 81
End: 2021-09-20

## 2021-09-21 ENCOUNTER — ANCILLARY PROCEDURE (OUTPATIENT)
Dept: CARDIOLOGY | Age: 81
End: 2021-09-21
Attending: INTERNAL MEDICINE

## 2021-09-21 VITALS — SYSTOLIC BLOOD PRESSURE: 114 MMHG | DIASTOLIC BLOOD PRESSURE: 62 MMHG

## 2021-09-21 DIAGNOSIS — Z95.0 PRESENCE OF CARDIAC PACEMAKER: Primary | ICD-10-CM

## 2021-09-21 PROCEDURE — 93280 PM DEVICE PROGR EVAL DUAL: CPT | Performed by: INTERNAL MEDICINE

## 2021-09-27 ENCOUNTER — OFFICE VISIT (OUTPATIENT)
Dept: CARDIOLOGY | Age: 81
End: 2021-09-27

## 2021-09-27 VITALS
HEART RATE: 82 BPM | DIASTOLIC BLOOD PRESSURE: 60 MMHG | RESPIRATION RATE: 20 BRPM | HEIGHT: 69 IN | BODY MASS INDEX: 27.7 KG/M2 | WEIGHT: 187 LBS | SYSTOLIC BLOOD PRESSURE: 120 MMHG

## 2021-09-27 DIAGNOSIS — E78.2 HYPERLIPIDEMIA, MIXED: ICD-10-CM

## 2021-09-27 DIAGNOSIS — G45.9 TIA (TRANSIENT ISCHEMIC ATTACK): ICD-10-CM

## 2021-09-27 DIAGNOSIS — I73.9 PERIPHERAL VASCULAR DISEASE OF EXTREMITY (CMD): Primary | ICD-10-CM

## 2021-09-27 DIAGNOSIS — E55.9 VITAMIN D DEFICIENCY: ICD-10-CM

## 2021-09-27 DIAGNOSIS — I10 HYPERTENSION, BENIGN: ICD-10-CM

## 2021-09-27 DIAGNOSIS — I25.10 CORONARY ARTERY DISEASE INVOLVING NATIVE CORONARY ARTERY OF NATIVE HEART WITHOUT ANGINA PECTORIS: ICD-10-CM

## 2021-09-27 PROCEDURE — 99215 OFFICE O/P EST HI 40 MIN: CPT | Performed by: INTERNAL MEDICINE

## 2021-09-27 ASSESSMENT — PATIENT HEALTH QUESTIONNAIRE - PHQ9
1. LITTLE INTEREST OR PLEASURE IN DOING THINGS: NOT AT ALL
2. FEELING DOWN, DEPRESSED OR HOPELESS: NOT AT ALL
CLINICAL INTERPRETATION OF PHQ2 SCORE: NO FURTHER SCREENING NEEDED
SUM OF ALL RESPONSES TO PHQ9 QUESTIONS 1 AND 2: 0
SUM OF ALL RESPONSES TO PHQ9 QUESTIONS 1 AND 2: 0
CLINICAL INTERPRETATION OF PHQ9 SCORE: NO FURTHER SCREENING NEEDED

## 2021-10-04 ENCOUNTER — IMAGING SERVICES (OUTPATIENT)
Dept: OTHER | Age: 81
End: 2021-10-04

## 2021-10-04 ENCOUNTER — V-VISIT (OUTPATIENT)
Dept: NEUROSURGERY | Age: 81
End: 2021-10-04

## 2021-10-04 DIAGNOSIS — I65.22 STENOSIS OF LEFT CAROTID ARTERY: Primary | ICD-10-CM

## 2021-10-04 PROCEDURE — 99214 OFFICE O/P EST MOD 30 MIN: CPT | Performed by: NURSE PRACTITIONER

## 2021-10-05 ENCOUNTER — TELEPHONE (OUTPATIENT)
Dept: NEUROSURGERY | Age: 81
End: 2021-10-05

## 2021-10-05 DIAGNOSIS — I65.22 STENOSIS OF LEFT CAROTID ARTERY: Primary | ICD-10-CM

## 2021-10-05 ASSESSMENT — ENCOUNTER SYMPTOMS
HEADACHES: 0
FEVER: 0
PHOTOPHOBIA: 0
FATIGUE: 1
BRUISES/BLEEDS EASILY: 0
DIZZINESS: 0

## 2021-10-06 PROBLEM — S06.5X9A SUBDURAL HEMATOMA (HCC): Status: ACTIVE | Noted: 2021-10-06

## 2021-10-06 PROBLEM — S06.5XAA SUBDURAL HEMATOMA (HCC): Status: ACTIVE | Noted: 2021-10-06

## 2021-10-20 DIAGNOSIS — S06.5XAA SDH (SUBDURAL HEMATOMA) (CMD): Primary | ICD-10-CM

## 2021-10-21 ENCOUNTER — TELEPHONE (OUTPATIENT)
Dept: SURGERY | Age: 81
End: 2021-10-21

## 2021-10-21 ENCOUNTER — HOSPITAL ENCOUNTER (OUTPATIENT)
Dept: CT IMAGING | Age: 81
Discharge: HOME | End: 2021-10-21
Attending: NURSE PRACTITIONER

## 2021-10-21 DIAGNOSIS — S06.5XAA SDH (SUBDURAL HEMATOMA) (CMD): ICD-10-CM

## 2021-10-21 PROCEDURE — G1004 CDSM NDSC: HCPCS

## 2021-10-21 PROCEDURE — 70450 CT HEAD/BRAIN W/O DYE: CPT

## 2021-10-22 ENCOUNTER — TELEPHONE (OUTPATIENT)
Dept: NEUROSURGERY | Age: 81
End: 2021-10-22

## 2021-10-22 ENCOUNTER — APPOINTMENT (OUTPATIENT)
Dept: CT IMAGING | Age: 81
End: 2021-10-22
Attending: NURSE PRACTITIONER

## 2021-11-09 ENCOUNTER — OFFICE VISIT (OUTPATIENT)
Dept: CARDIOLOGY | Age: 81
End: 2021-11-09

## 2021-11-09 VITALS
HEART RATE: 84 BPM | SYSTOLIC BLOOD PRESSURE: 120 MMHG | DIASTOLIC BLOOD PRESSURE: 66 MMHG | BODY MASS INDEX: 28.06 KG/M2 | WEIGHT: 190 LBS

## 2021-11-09 DIAGNOSIS — Z45.018 ADJUSTMENT AND MANAGEMENT OF CARDIAC PACEMAKER: ICD-10-CM

## 2021-11-09 DIAGNOSIS — I48.0 PAROXYSMAL ATRIAL FIBRILLATION (CMD): Primary | ICD-10-CM

## 2021-11-09 PROCEDURE — 3074F SYST BP LT 130 MM HG: CPT | Performed by: INTERNAL MEDICINE

## 2021-11-09 PROCEDURE — 3078F DIAST BP <80 MM HG: CPT | Performed by: INTERNAL MEDICINE

## 2021-11-09 PROCEDURE — 99215 OFFICE O/P EST HI 40 MIN: CPT | Performed by: INTERNAL MEDICINE

## 2021-11-15 SDOH — HEALTH STABILITY: PHYSICAL HEALTH: ON AVERAGE, HOW MANY DAYS PER WEEK DO YOU ENGAGE IN MODERATE TO STRENUOUS EXERCISE (LIKE A BRISK WALK)?: 3 DAYS

## 2021-11-15 SDOH — HEALTH STABILITY: PHYSICAL HEALTH: ON AVERAGE, HOW MANY MINUTES DO YOU ENGAGE IN EXERCISE AT THIS LEVEL?: 20 MIN

## 2021-11-15 ASSESSMENT — PATIENT HEALTH QUESTIONNAIRE - PHQ9
2. FEELING DOWN, DEPRESSED OR HOPELESS: NOT AT ALL
CLINICAL INTERPRETATION OF PHQ2 SCORE: NO FURTHER SCREENING NEEDED
SUM OF ALL RESPONSES TO PHQ9 QUESTIONS 1 AND 2: 0
1. LITTLE INTEREST OR PLEASURE IN DOING THINGS: NOT AT ALL
CLINICAL INTERPRETATION OF PHQ9 SCORE: NO FURTHER SCREENING NEEDED

## 2021-11-16 ENCOUNTER — OFFICE VISIT (OUTPATIENT)
Dept: CARDIOLOGY | Age: 81
End: 2021-11-16

## 2021-11-16 VITALS
DIASTOLIC BLOOD PRESSURE: 64 MMHG | BODY MASS INDEX: 28.2 KG/M2 | HEART RATE: 74 BPM | WEIGHT: 190.4 LBS | HEIGHT: 69 IN | SYSTOLIC BLOOD PRESSURE: 132 MMHG

## 2021-11-16 DIAGNOSIS — G45.9 TIA (TRANSIENT ISCHEMIC ATTACK): ICD-10-CM

## 2021-11-16 DIAGNOSIS — I73.9 PERIPHERAL VASCULAR DISEASE OF EXTREMITY (CMD): ICD-10-CM

## 2021-11-16 DIAGNOSIS — I10 HYPERTENSION, BENIGN: Primary | ICD-10-CM

## 2021-11-16 DIAGNOSIS — I25.10 CORONARY ARTERY DISEASE INVOLVING NATIVE CORONARY ARTERY OF NATIVE HEART WITHOUT ANGINA PECTORIS: ICD-10-CM

## 2021-11-16 DIAGNOSIS — E78.2 HYPERLIPIDEMIA, MIXED: ICD-10-CM

## 2021-11-16 DIAGNOSIS — E55.9 VITAMIN D DEFICIENCY: ICD-10-CM

## 2021-11-16 PROCEDURE — 99214 OFFICE O/P EST MOD 30 MIN: CPT | Performed by: INTERNAL MEDICINE

## 2021-11-16 PROCEDURE — 3075F SYST BP GE 130 - 139MM HG: CPT | Performed by: INTERNAL MEDICINE

## 2021-11-16 PROCEDURE — 3078F DIAST BP <80 MM HG: CPT | Performed by: INTERNAL MEDICINE

## 2021-11-18 ENCOUNTER — HOSPITAL ENCOUNTER (OUTPATIENT)
Dept: CT IMAGING | Age: 81
Discharge: HOME | End: 2021-11-18
Attending: NEUROLOGICAL SURGERY

## 2021-11-18 DIAGNOSIS — I65.22 STENOSIS OF LEFT CAROTID ARTERY: ICD-10-CM

## 2021-11-18 DIAGNOSIS — Z09 FOLLOW UP: ICD-10-CM

## 2021-11-18 PROCEDURE — G1004 CDSM NDSC: HCPCS

## 2021-11-18 PROCEDURE — 70450 CT HEAD/BRAIN W/O DYE: CPT

## 2021-11-23 PROBLEM — Z00.00 ANNUAL PHYSICAL EXAM: Status: ACTIVE | Noted: 2021-11-23

## 2021-11-24 ENCOUNTER — TELEPHONE (OUTPATIENT)
Dept: NEUROSURGERY | Age: 81
End: 2021-11-24

## 2021-12-15 PROCEDURE — 93294 REM INTERROG EVL PM/LDLS PM: CPT | Performed by: INTERNAL MEDICINE

## 2021-12-15 PROCEDURE — 93296 REM INTERROG EVL PM/IDS: CPT | Performed by: INTERNAL MEDICINE

## 2021-12-16 ENCOUNTER — ANCILLARY ORDERS (OUTPATIENT)
Dept: CARDIOLOGY | Age: 81
End: 2021-12-16

## 2021-12-16 ENCOUNTER — ANCILLARY PROCEDURE (OUTPATIENT)
Dept: CARDIOLOGY | Age: 81
End: 2021-12-16
Attending: INTERNAL MEDICINE

## 2021-12-16 ENCOUNTER — TELEPHONE (OUTPATIENT)
Dept: CARDIOLOGY | Age: 81
End: 2021-12-16

## 2021-12-16 DIAGNOSIS — Z95.0 CARDIAC PACEMAKER IN SITU: ICD-10-CM

## 2021-12-16 PROCEDURE — X1114 CARDIAC DEVICE HOME CHECK - REMOTE UNSCHEDULED: HCPCS | Performed by: INTERNAL MEDICINE

## 2021-12-29 ENCOUNTER — ANCILLARY PROCEDURE (OUTPATIENT)
Dept: CARDIOLOGY | Age: 81
End: 2021-12-29
Attending: INTERNAL MEDICINE

## 2021-12-29 VITALS — SYSTOLIC BLOOD PRESSURE: 130 MMHG | DIASTOLIC BLOOD PRESSURE: 62 MMHG | HEART RATE: 75 BPM

## 2021-12-29 DIAGNOSIS — Z95.0 PRESENCE OF CARDIAC PACEMAKER: ICD-10-CM

## 2021-12-29 PROCEDURE — 93280 PM DEVICE PROGR EVAL DUAL: CPT | Performed by: INTERNAL MEDICINE

## 2022-01-10 RX ORDER — AMLODIPINE BESYLATE 10 MG/1
10 TABLET ORAL DAILY
Qty: 90 TABLET | Refills: 1 | Status: SHIPPED | OUTPATIENT
Start: 2022-01-10

## 2022-01-19 ENCOUNTER — TELEPHONE (OUTPATIENT)
Dept: NEUROSURGERY | Age: 82
End: 2022-01-19

## 2022-03-11 ENCOUNTER — TELEPHONE (OUTPATIENT)
Dept: NEUROSURGERY | Age: 82
End: 2022-03-11

## 2022-03-11 ENCOUNTER — LAB SERVICES (OUTPATIENT)
Dept: LAB | Age: 82
End: 2022-03-11

## 2022-03-11 DIAGNOSIS — I65.22 OCCLUSION OF LEFT CAROTID ARTERY: Primary | ICD-10-CM

## 2022-03-11 DIAGNOSIS — I65.22 OCCLUSION OF LEFT CAROTID ARTERY: ICD-10-CM

## 2022-03-11 LAB
BUN SERPL-MCNC: 22 MG/DL (ref 6–20)
BUN/CREAT SERPL: 18 (ref 7–25)
CREAT SERPL-MCNC: 1.21 MG/DL (ref 0.67–1.17)
GFR SERPLBLD BASED ON 1.73 SQ M-ARVRAT: 55 ML/MIN

## 2022-03-11 PROCEDURE — 36415 COLL VENOUS BLD VENIPUNCTURE: CPT | Performed by: PSYCHIATRY & NEUROLOGY

## 2022-03-11 PROCEDURE — 84520 ASSAY OF UREA NITROGEN: CPT | Performed by: PSYCHIATRY & NEUROLOGY

## 2022-03-11 PROCEDURE — 82565 ASSAY OF CREATININE: CPT | Performed by: PSYCHIATRY & NEUROLOGY

## 2022-03-14 ENCOUNTER — TELEPHONIC VISIT (OUTPATIENT)
Dept: NEUROSURGERY | Age: 82
End: 2022-03-14

## 2022-03-14 ENCOUNTER — HOSPITAL ENCOUNTER (OUTPATIENT)
Dept: CT IMAGING | Age: 82
Discharge: HOME | End: 2022-03-14
Attending: NURSE PRACTITIONER

## 2022-03-14 DIAGNOSIS — I65.22 STENOSIS OF LEFT CAROTID ARTERY: ICD-10-CM

## 2022-03-14 DIAGNOSIS — I65.22 STENOSIS OF LEFT CAROTID ARTERY: Primary | ICD-10-CM

## 2022-03-14 PROCEDURE — 70498 CT ANGIOGRAPHY NECK: CPT

## 2022-03-14 PROCEDURE — 99213 OFFICE O/P EST LOW 20 MIN: CPT | Performed by: NURSE PRACTITIONER

## 2022-03-14 PROCEDURE — 70496 CT ANGIOGRAPHY HEAD: CPT

## 2022-03-14 PROCEDURE — G1004 CDSM NDSC: HCPCS

## 2022-03-14 PROCEDURE — 10002805 HB CONTRAST AGENT: Performed by: NURSE PRACTITIONER

## 2022-03-14 RX ADMIN — IOHEXOL 80 ML: 350 INJECTION, SOLUTION INTRAVENOUS at 10:40

## 2022-03-16 ASSESSMENT — ENCOUNTER SYMPTOMS
FATIGUE: 0
FEVER: 0
BRUISES/BLEEDS EASILY: 0
WEAKNESS: 0
HEADACHES: 0

## 2022-03-23 PROCEDURE — 93296 REM INTERROG EVL PM/IDS: CPT | Performed by: INTERNAL MEDICINE

## 2022-03-23 PROCEDURE — 93294 REM INTERROG EVL PM/LDLS PM: CPT | Performed by: INTERNAL MEDICINE

## 2022-03-24 ENCOUNTER — ANCILLARY PROCEDURE (OUTPATIENT)
Dept: CARDIOLOGY | Age: 82
End: 2022-03-24
Attending: INTERNAL MEDICINE

## 2022-03-24 DIAGNOSIS — Z95.0 CARDIAC PACEMAKER: ICD-10-CM

## 2022-03-24 RX ORDER — LISINOPRIL 20 MG/1
20 TABLET ORAL DAILY
COMMUNITY
Start: 2022-03-14

## 2022-03-28 ENCOUNTER — APPOINTMENT (OUTPATIENT)
Dept: CARDIOLOGY | Age: 82
End: 2022-03-28

## 2022-04-01 SDOH — HEALTH STABILITY: PHYSICAL HEALTH: ON AVERAGE, HOW MANY DAYS PER WEEK DO YOU ENGAGE IN MODERATE TO STRENUOUS EXERCISE (LIKE A BRISK WALK)?: 3 DAYS

## 2022-04-01 SDOH — HEALTH STABILITY: PHYSICAL HEALTH: ON AVERAGE, HOW MANY MINUTES DO YOU ENGAGE IN EXERCISE AT THIS LEVEL?: 60 MIN

## 2022-04-01 ASSESSMENT — PATIENT HEALTH QUESTIONNAIRE - PHQ9
1. LITTLE INTEREST OR PLEASURE IN DOING THINGS: NOT AT ALL
SUM OF ALL RESPONSES TO PHQ9 QUESTIONS 1 AND 2: 0
SUM OF ALL RESPONSES TO PHQ9 QUESTIONS 1 AND 2: 0
CLINICAL INTERPRETATION OF PHQ2 SCORE: NO FURTHER SCREENING NEEDED
2. FEELING DOWN, DEPRESSED OR HOPELESS: NOT AT ALL

## 2022-04-04 ENCOUNTER — OFFICE VISIT (OUTPATIENT)
Dept: CARDIOLOGY | Age: 82
End: 2022-04-04

## 2022-04-04 VITALS
BODY MASS INDEX: 28.29 KG/M2 | WEIGHT: 191 LBS | HEART RATE: 78 BPM | HEIGHT: 69 IN | DIASTOLIC BLOOD PRESSURE: 62 MMHG | SYSTOLIC BLOOD PRESSURE: 128 MMHG

## 2022-04-04 DIAGNOSIS — I10 HYPERTENSION, BENIGN: ICD-10-CM

## 2022-04-04 DIAGNOSIS — G45.9 TIA (TRANSIENT ISCHEMIC ATTACK): ICD-10-CM

## 2022-04-04 DIAGNOSIS — I73.9 PERIPHERAL VASCULAR DISEASE OF EXTREMITY (CMD): Primary | ICD-10-CM

## 2022-04-04 DIAGNOSIS — E55.9 VITAMIN D DEFICIENCY: ICD-10-CM

## 2022-04-04 DIAGNOSIS — I25.10 CORONARY ARTERY DISEASE INVOLVING NATIVE CORONARY ARTERY OF NATIVE HEART WITHOUT ANGINA PECTORIS: ICD-10-CM

## 2022-04-04 DIAGNOSIS — E78.2 HYPERLIPIDEMIA, MIXED: ICD-10-CM

## 2022-04-04 PROCEDURE — 3074F SYST BP LT 130 MM HG: CPT | Performed by: INTERNAL MEDICINE

## 2022-04-04 PROCEDURE — 99214 OFFICE O/P EST MOD 30 MIN: CPT | Performed by: INTERNAL MEDICINE

## 2022-04-04 PROCEDURE — 3078F DIAST BP <80 MM HG: CPT | Performed by: INTERNAL MEDICINE

## 2022-05-16 RX ORDER — APIXABAN 5 MG/1
TABLET, FILM COATED ORAL
Qty: 180 TABLET | Refills: 3 | Status: SHIPPED | OUTPATIENT
Start: 2022-05-16 | End: 2023-06-19

## 2022-06-28 PROCEDURE — 93294 REM INTERROG EVL PM/LDLS PM: CPT | Performed by: INTERNAL MEDICINE

## 2022-06-28 PROCEDURE — 93296 REM INTERROG EVL PM/IDS: CPT | Performed by: INTERNAL MEDICINE

## 2022-06-29 ENCOUNTER — ANCILLARY PROCEDURE (OUTPATIENT)
Dept: CARDIOLOGY | Age: 82
End: 2022-06-29
Attending: INTERNAL MEDICINE

## 2022-06-29 DIAGNOSIS — Z95.0 PACEMAKER: ICD-10-CM

## 2022-10-04 PROCEDURE — 93296 REM INTERROG EVL PM/IDS: CPT | Performed by: INTERNAL MEDICINE

## 2022-10-04 PROCEDURE — 93294 REM INTERROG EVL PM/LDLS PM: CPT | Performed by: INTERNAL MEDICINE

## 2022-10-05 ENCOUNTER — TELEPHONE (OUTPATIENT)
Dept: CARDIOLOGY | Age: 82
End: 2022-10-05

## 2022-10-05 ENCOUNTER — ANCILLARY PROCEDURE (OUTPATIENT)
Dept: CARDIOLOGY | Age: 82
End: 2022-10-05
Attending: INTERNAL MEDICINE

## 2022-10-05 DIAGNOSIS — Z95.0 CARDIAC PACEMAKER IN SITU: ICD-10-CM

## 2022-10-05 DIAGNOSIS — I25.10 CORONARY ARTERY DISEASE INVOLVING NATIVE CORONARY ARTERY OF NATIVE HEART WITHOUT ANGINA PECTORIS: Primary | ICD-10-CM

## 2022-10-05 DIAGNOSIS — I47.20 VT (VENTRICULAR TACHYCARDIA) (CMD): ICD-10-CM

## 2022-11-01 ENCOUNTER — ANCILLARY PROCEDURE (OUTPATIENT)
Dept: CARDIOLOGY | Age: 82
End: 2022-11-01
Attending: INTERNAL MEDICINE

## 2022-11-01 DIAGNOSIS — I47.20 VT (VENTRICULAR TACHYCARDIA) (CMD): ICD-10-CM

## 2022-11-01 DIAGNOSIS — I25.10 CORONARY ARTERY DISEASE INVOLVING NATIVE CORONARY ARTERY OF NATIVE HEART WITHOUT ANGINA PECTORIS: ICD-10-CM

## 2022-11-01 LAB
AORTIC VALVE AREA (AVA): 0.62
ASCENDING AORTA (AAD): 3
AV PEAK GRADIENT (AVPG): 10
AV PEAK VELOCITY (AVPV): 1.56
AV STENOSIS SEVERITY TEXT: NORMAL
AVI LVOT PEAK GRADIENT (LVOTMG): 1
E WAVE DECELARATION TIME (MDT): 9.64
INTERVENTRICULAR SEPTUM IN END DIASTOLE (IVSD): 2.04
LEFT INTERNAL DIMENSION IN SYSTOLE (LVSD): 0.9
LEFT VENTRICULAR INTERNAL DIMENSION IN DIASTOLE (LVDD): 3.5
LEFT VENTRICULAR POSTERIOR WALL IN END DIASTOLE (LVPW): 5
LV EF: NORMAL %
LVOT VTI (LVOTVTI): 1.46
MV E TISSUE VEL LAT (MELV): 0.79
MV E TISSUE VEL MED (MESV): 9.12
MV E WAVE VEL/E TISSUE VEL MED(MSR): 6.47
MV PEAK A VELOCITY (MVPAV): 296
MV PEAK E VELOCITY (MVPEV): 0.96
RV END SYSTOLIC LONGITUDINAL STRAIN FREE WALL (RVGS): 2
TRICUSPID VALVE ANNULAR PEAK VELOCITY (TVAPV): 28
TRICUSPID VALVE PEAK REGURGITATION VELOCITY (TRPV): 3.8
TV ESTIMATED RIGHT ARTERIAL PRESSURE (RAP): 13.2

## 2022-11-01 PROCEDURE — 93306 TTE W/DOPPLER COMPLETE: CPT | Performed by: INTERNAL MEDICINE

## 2022-11-15 ENCOUNTER — APPOINTMENT (OUTPATIENT)
Dept: CARDIOLOGY | Age: 82
End: 2022-11-15

## 2022-11-16 ENCOUNTER — OFFICE VISIT (OUTPATIENT)
Dept: CARDIOLOGY | Age: 82
End: 2022-11-16

## 2022-11-16 ENCOUNTER — ANCILLARY PROCEDURE (OUTPATIENT)
Dept: CARDIOLOGY | Age: 82
End: 2022-11-16
Attending: INTERNAL MEDICINE

## 2022-11-16 VITALS
DIASTOLIC BLOOD PRESSURE: 50 MMHG | SYSTOLIC BLOOD PRESSURE: 106 MMHG | BODY MASS INDEX: 27.23 KG/M2 | HEART RATE: 64 BPM | WEIGHT: 183.86 LBS | HEIGHT: 69 IN

## 2022-11-16 VITALS — SYSTOLIC BLOOD PRESSURE: 106 MMHG | DIASTOLIC BLOOD PRESSURE: 50 MMHG | HEART RATE: 64 BPM

## 2022-11-16 DIAGNOSIS — Z45.018 ADJUSTMENT AND MANAGEMENT OF CARDIAC PACEMAKER: Primary | ICD-10-CM

## 2022-11-16 DIAGNOSIS — Z95.0 PRESENCE OF CARDIAC PACEMAKER: ICD-10-CM

## 2022-11-16 PROCEDURE — 3074F SYST BP LT 130 MM HG: CPT | Performed by: INTERNAL MEDICINE

## 2022-11-16 PROCEDURE — 99215 OFFICE O/P EST HI 40 MIN: CPT | Performed by: INTERNAL MEDICINE

## 2022-11-16 PROCEDURE — 3078F DIAST BP <80 MM HG: CPT | Performed by: INTERNAL MEDICINE

## 2022-11-16 PROCEDURE — 93280 PM DEVICE PROGR EVAL DUAL: CPT | Performed by: INTERNAL MEDICINE

## 2022-11-16 ASSESSMENT — PATIENT HEALTH QUESTIONNAIRE - PHQ9
SUM OF ALL RESPONSES TO PHQ9 QUESTIONS 1 AND 2: 0
CLINICAL INTERPRETATION OF PHQ2 SCORE: NO FURTHER SCREENING NEEDED
2. FEELING DOWN, DEPRESSED OR HOPELESS: NOT AT ALL
SUM OF ALL RESPONSES TO PHQ9 QUESTIONS 1 AND 2: 0
1. LITTLE INTEREST OR PLEASURE IN DOING THINGS: NOT AT ALL

## 2023-01-10 PROCEDURE — 93294 REM INTERROG EVL PM/LDLS PM: CPT | Performed by: INTERNAL MEDICINE

## 2023-01-10 PROCEDURE — 93296 REM INTERROG EVL PM/IDS: CPT | Performed by: INTERNAL MEDICINE

## 2023-01-11 ENCOUNTER — ANCILLARY PROCEDURE (OUTPATIENT)
Dept: CARDIOLOGY | Age: 83
End: 2023-01-11
Attending: INTERNAL MEDICINE

## 2023-01-11 DIAGNOSIS — Z95.0 CARDIAC PACEMAKER: ICD-10-CM

## 2023-01-11 LAB
MDC_IDC_LEAD_IMPLANT_DT: NORMAL
MDC_IDC_LEAD_IMPLANT_DT: NORMAL
MDC_IDC_LEAD_LOCATION: NORMAL
MDC_IDC_LEAD_LOCATION: NORMAL
MDC_IDC_LEAD_LOCATION_DETAIL_1: NORMAL
MDC_IDC_LEAD_LOCATION_DETAIL_1: NORMAL
MDC_IDC_LEAD_MFG: NORMAL
MDC_IDC_LEAD_MFG: NORMAL
MDC_IDC_LEAD_MODEL: NORMAL
MDC_IDC_LEAD_MODEL: NORMAL
MDC_IDC_LEAD_POLARITY_TYPE: NORMAL
MDC_IDC_LEAD_POLARITY_TYPE: NORMAL
MDC_IDC_LEAD_SERIAL: NORMAL
MDC_IDC_LEAD_SERIAL: NORMAL
MDC_IDC_PG_IMPLANT_DTM: NORMAL
MDC_IDC_PG_MFG: NORMAL
MDC_IDC_PG_MODEL: NORMAL
MDC_IDC_PG_SERIAL: NORMAL
MDC_IDC_PG_TYPE: NORMAL
MDC_IDC_SESS_CLINIC_NAME: NORMAL
MDC_IDC_SESS_TYPE: NORMAL

## 2023-04-19 ENCOUNTER — ANCILLARY PROCEDURE (OUTPATIENT)
Dept: CARDIOLOGY | Age: 83
End: 2023-04-19
Attending: INTERNAL MEDICINE

## 2023-04-19 DIAGNOSIS — Z95.0 CARDIAC PACEMAKER: ICD-10-CM

## 2023-04-19 PROCEDURE — 93295 DEV INTERROG REMOTE 1/2/MLT: CPT | Performed by: INTERNAL MEDICINE

## 2023-04-19 PROCEDURE — 93296 REM INTERROG EVL PM/IDS: CPT | Performed by: INTERNAL MEDICINE

## 2023-06-19 RX ORDER — APIXABAN 5 MG/1
TABLET, FILM COATED ORAL
Qty: 180 TABLET | Refills: 1 | Status: SHIPPED | OUTPATIENT
Start: 2023-06-19

## 2023-07-26 ENCOUNTER — ANCILLARY PROCEDURE (OUTPATIENT)
Dept: CARDIOLOGY | Age: 83
End: 2023-07-26
Attending: INTERNAL MEDICINE

## 2023-07-26 ENCOUNTER — ANCILLARY ORDERS (OUTPATIENT)
Dept: CARDIOLOGY | Age: 83
End: 2023-07-26

## 2023-07-26 DIAGNOSIS — Z95.0 PACEMAKER: ICD-10-CM

## 2023-07-26 PROCEDURE — 93294 REM INTERROG EVL PM/LDLS PM: CPT | Performed by: INTERNAL MEDICINE

## 2023-07-26 PROCEDURE — 93296 REM INTERROG EVL PM/IDS: CPT | Performed by: INTERNAL MEDICINE

## 2023-10-02 ENCOUNTER — TELEPHONE (OUTPATIENT)
Dept: CARDIOLOGY | Age: 83
End: 2023-10-02

## 2023-10-05 ENCOUNTER — OFFICE VISIT (OUTPATIENT)
Dept: CARDIOLOGY | Age: 83
End: 2023-10-05

## 2023-10-05 VITALS
HEIGHT: 69 IN | WEIGHT: 178 LBS | SYSTOLIC BLOOD PRESSURE: 100 MMHG | BODY MASS INDEX: 26.36 KG/M2 | HEART RATE: 73 BPM | DIASTOLIC BLOOD PRESSURE: 62 MMHG | OXYGEN SATURATION: 98 %

## 2023-10-05 DIAGNOSIS — I73.9 PERIPHERAL ARTERIAL DISEASE (CMD): ICD-10-CM

## 2023-10-05 DIAGNOSIS — E55.9 VITAMIN D DEFICIENCY: ICD-10-CM

## 2023-10-05 DIAGNOSIS — I10 HYPERTENSION, BENIGN: Primary | ICD-10-CM

## 2023-10-05 DIAGNOSIS — G45.9 TIA (TRANSIENT ISCHEMIC ATTACK): ICD-10-CM

## 2023-10-05 DIAGNOSIS — E78.2 HYPERLIPIDEMIA, MIXED: ICD-10-CM

## 2023-10-05 DIAGNOSIS — I25.10 CORONARY ARTERY DISEASE INVOLVING NATIVE CORONARY ARTERY OF NATIVE HEART WITHOUT ANGINA PECTORIS: ICD-10-CM

## 2023-10-05 PROCEDURE — 3078F DIAST BP <80 MM HG: CPT | Performed by: INTERNAL MEDICINE

## 2023-10-05 PROCEDURE — 3074F SYST BP LT 130 MM HG: CPT | Performed by: INTERNAL MEDICINE

## 2023-10-05 PROCEDURE — 99215 OFFICE O/P EST HI 40 MIN: CPT | Performed by: INTERNAL MEDICINE

## 2023-10-05 SDOH — HEALTH STABILITY: MENTAL HEALTH: PHQ2 INTERPRETATION: NO FURTHER SCREENING NEEDED

## 2023-10-05 SDOH — HEALTH STABILITY: MENTAL HEALTH: DEPRESSION SCREENING SCORE: 0

## 2023-10-05 SDOH — HEALTH STABILITY: PHYSICAL HEALTH: ON AVERAGE, HOW MANY MINUTES DO YOU ENGAGE IN EXERCISE AT THIS LEVEL?: 0 MIN

## 2023-10-05 SDOH — HEALTH STABILITY: MENTAL HEALTH: LITTLE INTEREST OR PLEASURE IN ACTIVITY?: NOT AT ALL

## 2023-10-05 SDOH — HEALTH STABILITY: MENTAL HEALTH: FEELING DOWN, DEPRESSED OR HOPELESS?: NOT AT ALL

## 2023-10-05 SDOH — HEALTH STABILITY: PHYSICAL HEALTH: ON AVERAGE, HOW MANY DAYS PER WEEK DO YOU ENGAGE IN MODERATE TO STRENUOUS EXERCISE (LIKE A BRISK WALK)?: 0 DAYS

## 2023-10-05 ASSESSMENT — PATIENT HEALTH QUESTIONNAIRE - PHQ9: SUM OF ALL RESPONSES TO PHQ9 QUESTIONS 1 AND 2: 0

## 2023-11-01 ENCOUNTER — ANCILLARY PROCEDURE (OUTPATIENT)
Dept: CARDIOLOGY | Age: 83
End: 2023-11-01
Attending: INTERNAL MEDICINE

## 2023-11-01 ENCOUNTER — TELEPHONE (OUTPATIENT)
Dept: CARDIOLOGY | Age: 83
End: 2023-11-01

## 2023-11-01 DIAGNOSIS — Z95.0 CARDIAC PACEMAKER: ICD-10-CM

## 2023-11-08 ENCOUNTER — ANCILLARY PROCEDURE (OUTPATIENT)
Dept: CARDIOLOGY | Age: 83
End: 2023-11-08
Attending: INTERNAL MEDICINE

## 2023-11-08 DIAGNOSIS — G45.9 TIA (TRANSIENT ISCHEMIC ATTACK): ICD-10-CM

## 2023-11-08 DIAGNOSIS — E78.2 HYPERLIPIDEMIA, MIXED: ICD-10-CM

## 2023-11-08 DIAGNOSIS — I10 HYPERTENSION, BENIGN: ICD-10-CM

## 2023-11-08 DIAGNOSIS — E55.9 VITAMIN D DEFICIENCY: ICD-10-CM

## 2023-11-08 DIAGNOSIS — I25.10 CORONARY ARTERY DISEASE INVOLVING NATIVE CORONARY ARTERY OF NATIVE HEART WITHOUT ANGINA PECTORIS: ICD-10-CM

## 2023-11-08 DIAGNOSIS — I73.9 PERIPHERAL ARTERIAL DISEASE (CMD): ICD-10-CM

## 2023-11-08 PROCEDURE — 93925 LOWER EXTREMITY STUDY: CPT | Performed by: INTERNAL MEDICINE

## 2023-11-09 ENCOUNTER — TELEPHONE (OUTPATIENT)
Dept: CARDIOLOGY | Age: 83
End: 2023-11-09

## 2023-11-27 RX ORDER — APIXABAN 5 MG/1
TABLET, FILM COATED ORAL
Qty: 180 TABLET | Refills: 0 | Status: SHIPPED | OUTPATIENT
Start: 2023-11-27

## 2023-11-29 ENCOUNTER — OFFICE VISIT (OUTPATIENT)
Dept: CARDIOLOGY | Age: 83
End: 2023-11-29

## 2023-11-29 ENCOUNTER — APPOINTMENT (OUTPATIENT)
Dept: CARDIOLOGY | Age: 83
End: 2023-11-29
Attending: INTERNAL MEDICINE

## 2023-11-29 VITALS
HEART RATE: 74 BPM | DIASTOLIC BLOOD PRESSURE: 64 MMHG | OXYGEN SATURATION: 96 % | SYSTOLIC BLOOD PRESSURE: 102 MMHG | WEIGHT: 181.22 LBS | HEIGHT: 69 IN | BODY MASS INDEX: 26.84 KG/M2

## 2023-11-29 VITALS — HEART RATE: 62 BPM

## 2023-11-29 DIAGNOSIS — Z95.0 PRESENCE OF CARDIAC PACEMAKER: ICD-10-CM

## 2023-11-29 DIAGNOSIS — I48.0 PAROXYSMAL ATRIAL FIBRILLATION (CMD): Primary | ICD-10-CM

## 2023-11-29 DIAGNOSIS — Z45.018 ADJUSTMENT AND MANAGEMENT OF CARDIAC PACEMAKER: ICD-10-CM

## 2023-11-29 LAB
MDC_IDC_LEAD_IMPLANT_DT: NORMAL
MDC_IDC_LEAD_IMPLANT_DT: NORMAL
MDC_IDC_LEAD_LOCATION: NORMAL
MDC_IDC_LEAD_LOCATION: NORMAL
MDC_IDC_LEAD_LOCATION_DETAIL_1: NORMAL
MDC_IDC_LEAD_LOCATION_DETAIL_1: NORMAL
MDC_IDC_LEAD_MFG: NORMAL
MDC_IDC_LEAD_MFG: NORMAL
MDC_IDC_LEAD_MODEL: NORMAL
MDC_IDC_LEAD_MODEL: NORMAL
MDC_IDC_LEAD_POLARITY_TYPE: NORMAL
MDC_IDC_LEAD_POLARITY_TYPE: NORMAL
MDC_IDC_LEAD_SERIAL: NORMAL
MDC_IDC_LEAD_SERIAL: NORMAL
MDC_IDC_MSMT_BATTERY_DTM: NORMAL
MDC_IDC_MSMT_BATTERY_REMAINING_LONGEVITY: 112 MO
MDC_IDC_MSMT_BATTERY_RRT_TRIGGER: 2.62
MDC_IDC_MSMT_BATTERY_STATUS: NORMAL
MDC_IDC_MSMT_BATTERY_VOLTAGE: 3.01 V
MDC_IDC_MSMT_LEADCHNL_RA_IMPEDANCE_VALUE: 285 OHM
MDC_IDC_MSMT_LEADCHNL_RA_IMPEDANCE_VALUE: 437 OHM
MDC_IDC_MSMT_LEADCHNL_RA_PACING_THRESHOLD_AMPLITUDE: 0.88 V
MDC_IDC_MSMT_LEADCHNL_RA_PACING_THRESHOLD_AMPLITUDE: 1 V
MDC_IDC_MSMT_LEADCHNL_RA_PACING_THRESHOLD_PULSEWIDTH: 0.4 MS
MDC_IDC_MSMT_LEADCHNL_RA_PACING_THRESHOLD_PULSEWIDTH: 0.4 MS
MDC_IDC_MSMT_LEADCHNL_RA_SENSING_INTR_AMPL: 1.88 MV
MDC_IDC_MSMT_LEADCHNL_RA_SENSING_INTR_AMPL: 2.38 MV
MDC_IDC_MSMT_LEADCHNL_RV_IMPEDANCE_VALUE: 361 OHM
MDC_IDC_MSMT_LEADCHNL_RV_IMPEDANCE_VALUE: 418 OHM
MDC_IDC_MSMT_LEADCHNL_RV_PACING_THRESHOLD_AMPLITUDE: 0.5 V
MDC_IDC_MSMT_LEADCHNL_RV_PACING_THRESHOLD_AMPLITUDE: 0.5 V
MDC_IDC_MSMT_LEADCHNL_RV_PACING_THRESHOLD_PULSEWIDTH: 0.4 MS
MDC_IDC_MSMT_LEADCHNL_RV_PACING_THRESHOLD_PULSEWIDTH: 0.4 MS
MDC_IDC_MSMT_LEADCHNL_RV_SENSING_INTR_AMPL: 7 MV
MDC_IDC_MSMT_LEADCHNL_RV_SENSING_INTR_AMPL: 7 MV
MDC_IDC_PG_IMPLANT_DTM: NORMAL
MDC_IDC_PG_MFG: NORMAL
MDC_IDC_PG_MODEL: NORMAL
MDC_IDC_PG_SERIAL: NORMAL
MDC_IDC_PG_TYPE: NORMAL
MDC_IDC_SESS_CLINIC_NAME: NORMAL
MDC_IDC_SESS_DTM: NORMAL
MDC_IDC_SESS_TYPE: NORMAL
MDC_IDC_SET_BRADY_AT_MODE_SWITCH_RATE: 171 {BEATS}/MIN
MDC_IDC_SET_BRADY_HYSTRATE: NORMAL
MDC_IDC_SET_BRADY_LOWRATE: 60 {BEATS}/MIN
MDC_IDC_SET_BRADY_MAX_SENSOR_RATE: 120 {BEATS}/MIN
MDC_IDC_SET_BRADY_MAX_TRACKING_RATE: 120 {BEATS}/MIN
MDC_IDC_SET_BRADY_MODE: NORMAL
MDC_IDC_SET_BRADY_PAV_DELAY_LOW: 180 MS
MDC_IDC_SET_BRADY_SAV_DELAY_LOW: 150 MS
MDC_IDC_SET_LEADCHNL_RA_PACING_AMPLITUDE: 1.75 V
MDC_IDC_SET_LEADCHNL_RA_PACING_ANODE_ELECTRODE_1: NORMAL
MDC_IDC_SET_LEADCHNL_RA_PACING_ANODE_LOCATION_1: NORMAL
MDC_IDC_SET_LEADCHNL_RA_PACING_CAPTURE_MODE: NORMAL
MDC_IDC_SET_LEADCHNL_RA_PACING_CATHODE_ELECTRODE_1: NORMAL
MDC_IDC_SET_LEADCHNL_RA_PACING_CATHODE_LOCATION_1: NORMAL
MDC_IDC_SET_LEADCHNL_RA_PACING_POLARITY: NORMAL
MDC_IDC_SET_LEADCHNL_RA_PACING_PULSEWIDTH: 0.4 MS
MDC_IDC_SET_LEADCHNL_RA_SENSING_ANODE_ELECTRODE_1: NORMAL
MDC_IDC_SET_LEADCHNL_RA_SENSING_ANODE_LOCATION_1: NORMAL
MDC_IDC_SET_LEADCHNL_RA_SENSING_CATHODE_ELECTRODE_1: NORMAL
MDC_IDC_SET_LEADCHNL_RA_SENSING_CATHODE_LOCATION_1: NORMAL
MDC_IDC_SET_LEADCHNL_RA_SENSING_POLARITY: NORMAL
MDC_IDC_SET_LEADCHNL_RA_SENSING_SENSITIVITY: 0.3 MV
MDC_IDC_SET_LEADCHNL_RV_PACING_AMPLITUDE: 2 V
MDC_IDC_SET_LEADCHNL_RV_PACING_ANODE_ELECTRODE_1: NORMAL
MDC_IDC_SET_LEADCHNL_RV_PACING_ANODE_LOCATION_1: NORMAL
MDC_IDC_SET_LEADCHNL_RV_PACING_CAPTURE_MODE: NORMAL
MDC_IDC_SET_LEADCHNL_RV_PACING_CATHODE_ELECTRODE_1: NORMAL
MDC_IDC_SET_LEADCHNL_RV_PACING_CATHODE_LOCATION_1: NORMAL
MDC_IDC_SET_LEADCHNL_RV_PACING_POLARITY: NORMAL
MDC_IDC_SET_LEADCHNL_RV_PACING_PULSEWIDTH: 0.4 MS
MDC_IDC_SET_LEADCHNL_RV_SENSING_ANODE_ELECTRODE_1: NORMAL
MDC_IDC_SET_LEADCHNL_RV_SENSING_ANODE_LOCATION_1: NORMAL
MDC_IDC_SET_LEADCHNL_RV_SENSING_CATHODE_ELECTRODE_1: NORMAL
MDC_IDC_SET_LEADCHNL_RV_SENSING_CATHODE_LOCATION_1: NORMAL
MDC_IDC_SET_LEADCHNL_RV_SENSING_POLARITY: NORMAL
MDC_IDC_SET_LEADCHNL_RV_SENSING_SENSITIVITY: 0.9 MV
MDC_IDC_SET_ZONE_DETECTION_INTERVAL: 350 MS
MDC_IDC_SET_ZONE_DETECTION_INTERVAL: 400 MS
MDC_IDC_SET_ZONE_TYPE: NORMAL
MDC_IDC_SET_ZONE_VENDOR_TYPE: NORMAL
MDC_IDC_STAT_AT_BURDEN_PERCENT: 0 %
MDC_IDC_STAT_AT_DTM_END: NORMAL
MDC_IDC_STAT_AT_DTM_START: NORMAL
MDC_IDC_STAT_BRADY_AP_VP_PERCENT: 68.23 %
MDC_IDC_STAT_BRADY_AP_VS_PERCENT: 0.04 %
MDC_IDC_STAT_BRADY_AS_VP_PERCENT: 31.6 %
MDC_IDC_STAT_BRADY_AS_VS_PERCENT: 0.13 %
MDC_IDC_STAT_BRADY_DTM_END: NORMAL
MDC_IDC_STAT_BRADY_DTM_START: NORMAL
MDC_IDC_STAT_BRADY_RA_PERCENT_PACED: 68.29 %
MDC_IDC_STAT_BRADY_RV_PERCENT_PACED: 99.84 %
MDC_IDC_STAT_EPISODE_RECENT_COUNT: 0
MDC_IDC_STAT_EPISODE_RECENT_COUNT: 1
MDC_IDC_STAT_EPISODE_RECENT_COUNT_DTM_END: NORMAL
MDC_IDC_STAT_EPISODE_RECENT_COUNT_DTM_START: NORMAL
MDC_IDC_STAT_EPISODE_TOTAL_COUNT: 0
MDC_IDC_STAT_EPISODE_TOTAL_COUNT: 256
MDC_IDC_STAT_EPISODE_TOTAL_COUNT: 3
MDC_IDC_STAT_EPISODE_TOTAL_COUNT_DTM_END: NORMAL
MDC_IDC_STAT_EPISODE_TOTAL_COUNT_DTM_START: NORMAL
MDC_IDC_STAT_EPISODE_TYPE: NORMAL

## 2023-11-29 PROCEDURE — 3074F SYST BP LT 130 MM HG: CPT | Performed by: INTERNAL MEDICINE

## 2023-11-29 PROCEDURE — 93280 PM DEVICE PROGR EVAL DUAL: CPT | Performed by: INTERNAL MEDICINE

## 2023-11-29 PROCEDURE — 3078F DIAST BP <80 MM HG: CPT | Performed by: INTERNAL MEDICINE

## 2023-11-29 PROCEDURE — 99214 OFFICE O/P EST MOD 30 MIN: CPT | Performed by: INTERNAL MEDICINE

## 2023-12-14 ENCOUNTER — TELEPHONE (OUTPATIENT)
Dept: CARDIOLOGY | Age: 83
End: 2023-12-14

## 2023-12-14 ENCOUNTER — APPOINTMENT (OUTPATIENT)
Dept: CARDIOLOGY | Age: 83
End: 2023-12-14

## 2023-12-14 VITALS
DIASTOLIC BLOOD PRESSURE: 65 MMHG | HEART RATE: 80 BPM | BODY MASS INDEX: 26.82 KG/M2 | SYSTOLIC BLOOD PRESSURE: 130 MMHG | OXYGEN SATURATION: 97 % | WEIGHT: 181.11 LBS | HEIGHT: 69 IN

## 2023-12-14 SDOH — HEALTH STABILITY: PHYSICAL HEALTH: ON AVERAGE, HOW MANY DAYS PER WEEK DO YOU ENGAGE IN MODERATE TO STRENUOUS EXERCISE (LIKE A BRISK WALK)?: 0 DAYS

## 2023-12-14 SDOH — HEALTH STABILITY: PHYSICAL HEALTH: ON AVERAGE, HOW MANY MINUTES DO YOU ENGAGE IN EXERCISE AT THIS LEVEL?: 0 MIN

## 2023-12-14 ASSESSMENT — PATIENT HEALTH QUESTIONNAIRE - PHQ9
1. LITTLE INTEREST OR PLEASURE IN DOING THINGS: NOT AT ALL
IS THE PATIENT ABLE TO COGNITIVELY COMPLETE THE PHQ9: NO
CLINICAL INTERPRETATION OF PHQ2 SCORE: NO FURTHER SCREENING NEEDED
2. FEELING DOWN, DEPRESSED OR HOPELESS: NOT AT ALL
SUM OF ALL RESPONSES TO PHQ9 QUESTIONS 1 AND 2: 0
SUM OF ALL RESPONSES TO PHQ9 QUESTIONS 1 AND 2: 0

## 2023-12-28 ENCOUNTER — OFFICE VISIT (OUTPATIENT)
Dept: CARDIOLOGY | Age: 83
End: 2023-12-28

## 2023-12-28 VITALS
SYSTOLIC BLOOD PRESSURE: 126 MMHG | DIASTOLIC BLOOD PRESSURE: 66 MMHG | BODY MASS INDEX: 26.6 KG/M2 | WEIGHT: 179.56 LBS | HEART RATE: 82 BPM | HEIGHT: 69 IN

## 2023-12-28 DIAGNOSIS — R09.89 RIGHT CAROTID BRUIT: ICD-10-CM

## 2023-12-28 DIAGNOSIS — Z98.890 HISTORY OF LEFT COMMON CAROTID ARTERY STENT PLACEMENT: Primary | ICD-10-CM

## 2023-12-28 DIAGNOSIS — Z95.828 HISTORY OF LEFT COMMON CAROTID ARTERY STENT PLACEMENT: Primary | ICD-10-CM

## 2023-12-28 PROCEDURE — 99214 OFFICE O/P EST MOD 30 MIN: CPT | Performed by: INTERNAL MEDICINE

## 2023-12-28 PROCEDURE — 3078F DIAST BP <80 MM HG: CPT | Performed by: INTERNAL MEDICINE

## 2023-12-28 PROCEDURE — 3074F SYST BP LT 130 MM HG: CPT | Performed by: INTERNAL MEDICINE

## 2023-12-28 SDOH — HEALTH STABILITY: PHYSICAL HEALTH: ON AVERAGE, HOW MANY MINUTES DO YOU ENGAGE IN EXERCISE AT THIS LEVEL?: 0 MIN

## 2023-12-28 SDOH — HEALTH STABILITY: MENTAL HEALTH: DEPRESSION SCREENING SCORE: 0

## 2023-12-28 SDOH — HEALTH STABILITY: MENTAL HEALTH: PHQ2 INTERPRETATION: NO FURTHER SCREENING NEEDED

## 2023-12-28 SDOH — HEALTH STABILITY: PHYSICAL HEALTH: ON AVERAGE, HOW MANY DAYS PER WEEK DO YOU ENGAGE IN MODERATE TO STRENUOUS EXERCISE (LIKE A BRISK WALK)?: 0 DAYS

## 2023-12-28 SDOH — HEALTH STABILITY: MENTAL HEALTH: LITTLE INTEREST OR PLEASURE IN ACTIVITY?: NOT AT ALL

## 2023-12-28 SDOH — HEALTH STABILITY: MENTAL HEALTH: FEELING DOWN, DEPRESSED OR HOPELESS?: NOT AT ALL

## 2023-12-28 ASSESSMENT — PATIENT HEALTH QUESTIONNAIRE - PHQ9: SUM OF ALL RESPONSES TO PHQ9 QUESTIONS 1 AND 2: 0

## 2024-01-01 ENCOUNTER — EXTERNAL RECORD (OUTPATIENT)
Dept: HEALTH INFORMATION MANAGEMENT | Facility: OTHER | Age: 84
End: 2024-01-01

## 2024-01-01 ENCOUNTER — HOSPITAL ENCOUNTER (INPATIENT)
Age: 84
LOS: 5 days | DRG: 951 | End: 2024-10-09
Attending: INTERNAL MEDICINE | Admitting: INTERNAL MEDICINE

## 2024-01-01 ENCOUNTER — TELEPHONE (OUTPATIENT)
Dept: CARDIOLOGY | Age: 84
End: 2024-01-01

## 2024-01-01 VITALS
OXYGEN SATURATION: 80 % | HEART RATE: 51 BPM | RESPIRATION RATE: 3 BRPM | SYSTOLIC BLOOD PRESSURE: 69 MMHG | TEMPERATURE: 99 F | DIASTOLIC BLOOD PRESSURE: 29 MMHG

## 2024-01-01 PROCEDURE — 10004651 HB RX, NO CHARGE ITEM: Performed by: INTERNAL MEDICINE

## 2024-01-01 PROCEDURE — 10002800 HB RX 250 W HCPCS: Performed by: INTERNAL MEDICINE

## 2024-01-01 PROCEDURE — 10000002 HB ROOM CHARGE MED SURG

## 2024-01-01 PROCEDURE — 10002803 HB RX 637: Performed by: INTERNAL MEDICINE

## 2024-01-01 RX ORDER — ACETAMINOPHEN 650 MG/1
650 SUPPOSITORY RECTAL EVERY 4 HOURS PRN
Status: DISCONTINUED | OUTPATIENT
Start: 2024-01-01 | End: 2024-01-01 | Stop reason: HOSPADM

## 2024-01-01 RX ORDER — SCOLOPAMINE TRANSDERMAL SYSTEM 1 MG/1
1 PATCH, EXTENDED RELEASE TRANSDERMAL
Status: DISCONTINUED | OUTPATIENT
Start: 2024-01-01 | End: 2024-01-01 | Stop reason: HOSPADM

## 2024-01-01 RX ORDER — 0.9 % SODIUM CHLORIDE 0.9 %
2 VIAL (ML) INJECTION EVERY 12 HOURS SCHEDULED
Status: DISCONTINUED | OUTPATIENT
Start: 2024-01-01 | End: 2024-01-01 | Stop reason: HOSPADM

## 2024-01-01 RX ORDER — LORAZEPAM 2 MG/ML
0.5 INJECTION INTRAMUSCULAR EVERY 4 HOURS
Status: DISCONTINUED | OUTPATIENT
Start: 2024-01-01 | End: 2024-01-01 | Stop reason: HOSPADM

## 2024-01-01 RX ORDER — GLYCOPYRROLATE 0.2 MG/ML
0.4 INJECTION, SOLUTION INTRAMUSCULAR; INTRAVENOUS EVERY 6 HOURS PRN
Status: DISCONTINUED | OUTPATIENT
Start: 2024-01-01 | End: 2024-01-01 | Stop reason: HOSPADM

## 2024-01-01 RX ORDER — GLYCOPYRROLATE 0.2 MG/ML
0.4 INJECTION, SOLUTION INTRAMUSCULAR; INTRAVENOUS EVERY 6 HOURS SCHEDULED
Status: DISCONTINUED | OUTPATIENT
Start: 2024-01-01 | End: 2024-01-01 | Stop reason: HOSPADM

## 2024-01-01 RX ORDER — LORAZEPAM 2 MG/ML
0.5 INJECTION INTRAMUSCULAR
Status: DISCONTINUED | OUTPATIENT
Start: 2024-01-01 | End: 2024-01-01 | Stop reason: HOSPADM

## 2024-01-01 RX ORDER — BISACODYL 10 MG
10 SUPPOSITORY, RECTAL RECTAL DAILY PRN
Status: DISCONTINUED | OUTPATIENT
Start: 2024-01-01 | End: 2024-01-01 | Stop reason: HOSPADM

## 2024-01-01 RX ORDER — HALOPERIDOL 5 MG/ML
0.5 INJECTION INTRAMUSCULAR EVERY 4 HOURS PRN
Status: DISCONTINUED | OUTPATIENT
Start: 2024-01-01 | End: 2024-01-01 | Stop reason: HOSPADM

## 2024-01-01 RX ORDER — ONDANSETRON 2 MG/ML
4 INJECTION INTRAMUSCULAR; INTRAVENOUS EVERY 6 HOURS PRN
Status: DISCONTINUED | OUTPATIENT
Start: 2024-01-01 | End: 2024-01-01 | Stop reason: HOSPADM

## 2024-01-01 RX ORDER — CARBOXYMETHYLCELLULOSE SODIUM 5 MG/ML
1 SOLUTION/ DROPS OPHTHALMIC PRN
Status: DISCONTINUED | OUTPATIENT
Start: 2024-01-01 | End: 2024-01-01 | Stop reason: HOSPADM

## 2024-01-01 RX ADMIN — MORPHINE SULFATE 2 MG: 2 INJECTION, SOLUTION INTRAMUSCULAR; INTRAVENOUS at 14:33

## 2024-01-01 RX ADMIN — SODIUM CHLORIDE, PRESERVATIVE FREE 2 ML: 5 INJECTION INTRAVENOUS at 08:12

## 2024-01-01 RX ADMIN — MORPHINE SULFATE 2 MG: 2 INJECTION, SOLUTION INTRAMUSCULAR; INTRAVENOUS at 21:17

## 2024-01-01 RX ADMIN — GLYCOPYRROLATE 0.4 MG: 0.2 INJECTION, SOLUTION INTRAMUSCULAR; INTRAVENOUS at 23:32

## 2024-01-01 RX ADMIN — MORPHINE SULFATE 2 MG: 2 INJECTION, SOLUTION INTRAMUSCULAR; INTRAVENOUS at 10:09

## 2024-01-01 RX ADMIN — MORPHINE SULFATE 2 MG: 2 INJECTION, SOLUTION INTRAMUSCULAR; INTRAVENOUS at 15:46

## 2024-01-01 RX ADMIN — LORAZEPAM 0.5 MG: 2 INJECTION INTRAMUSCULAR; INTRAVENOUS at 22:45

## 2024-01-01 RX ADMIN — LORAZEPAM 0.5 MG: 2 INJECTION INTRAMUSCULAR; INTRAVENOUS at 13:00

## 2024-01-01 RX ADMIN — SODIUM CHLORIDE, PRESERVATIVE FREE 2 ML: 5 INJECTION INTRAVENOUS at 15:47

## 2024-01-01 RX ADMIN — SODIUM CHLORIDE, PRESERVATIVE FREE 2 ML: 5 INJECTION INTRAVENOUS at 20:19

## 2024-01-01 RX ADMIN — LORAZEPAM 0.5 MG: 2 INJECTION INTRAMUSCULAR; INTRAVENOUS at 18:44

## 2024-01-01 RX ADMIN — GLYCOPYRROLATE 0.4 MG: 0.2 INJECTION, SOLUTION INTRAMUSCULAR; INTRAVENOUS at 14:16

## 2024-01-01 RX ADMIN — LORAZEPAM 0.5 MG: 2 INJECTION INTRAMUSCULAR; INTRAVENOUS at 10:43

## 2024-01-01 RX ADMIN — MORPHINE SULFATE 2 MG: 2 INJECTION, SOLUTION INTRAMUSCULAR; INTRAVENOUS at 17:49

## 2024-01-01 RX ADMIN — MORPHINE SULFATE 2 MG: 2 INJECTION, SOLUTION INTRAMUSCULAR; INTRAVENOUS at 14:16

## 2024-01-01 RX ADMIN — LORAZEPAM 0.5 MG: 2 INJECTION INTRAMUSCULAR; INTRAVENOUS at 14:32

## 2024-01-01 RX ADMIN — LORAZEPAM 0.5 MG: 2 INJECTION INTRAMUSCULAR; INTRAVENOUS at 02:01

## 2024-01-01 RX ADMIN — LORAZEPAM 0.5 MG: 2 INJECTION INTRAMUSCULAR; INTRAVENOUS at 03:07

## 2024-01-01 RX ADMIN — MORPHINE SULFATE 2 MG: 2 INJECTION, SOLUTION INTRAMUSCULAR; INTRAVENOUS at 02:01

## 2024-01-01 RX ADMIN — MORPHINE SULFATE 2 MG: 2 INJECTION, SOLUTION INTRAMUSCULAR; INTRAVENOUS at 13:40

## 2024-01-01 RX ADMIN — LORAZEPAM 0.5 MG: 2 INJECTION INTRAMUSCULAR; INTRAVENOUS at 06:31

## 2024-01-01 RX ADMIN — LORAZEPAM 0.5 MG: 2 INJECTION INTRAMUSCULAR; INTRAVENOUS at 11:46

## 2024-01-01 RX ADMIN — LORAZEPAM 0.5 MG: 2 INJECTION INTRAMUSCULAR; INTRAVENOUS at 15:45

## 2024-01-01 RX ADMIN — SODIUM CHLORIDE, PRESERVATIVE FREE 2 ML: 5 INJECTION INTRAVENOUS at 08:20

## 2024-01-01 RX ADMIN — SODIUM CHLORIDE, PRESERVATIVE FREE 2 ML: 5 INJECTION INTRAVENOUS at 21:00

## 2024-01-01 RX ADMIN — LORAZEPAM 0.5 MG: 2 INJECTION INTRAMUSCULAR; INTRAVENOUS at 23:08

## 2024-01-01 RX ADMIN — LORAZEPAM 0.5 MG: 2 INJECTION INTRAMUSCULAR; INTRAVENOUS at 06:12

## 2024-01-01 RX ADMIN — LORAZEPAM 0.5 MG: 2 INJECTION INTRAMUSCULAR; INTRAVENOUS at 08:18

## 2024-01-01 RX ADMIN — LORAZEPAM 0.5 MG: 2 INJECTION INTRAMUSCULAR; INTRAVENOUS at 06:10

## 2024-01-01 RX ADMIN — MORPHINE SULFATE 2 MG: 2 INJECTION, SOLUTION INTRAMUSCULAR; INTRAVENOUS at 21:33

## 2024-01-01 RX ADMIN — LORAZEPAM 0.5 MG: 2 INJECTION INTRAMUSCULAR; INTRAVENOUS at 02:24

## 2024-01-01 RX ADMIN — LORAZEPAM 0.5 MG: 2 INJECTION INTRAMUSCULAR; INTRAVENOUS at 02:30

## 2024-01-01 RX ADMIN — LORAZEPAM 0.5 MG: 2 INJECTION INTRAMUSCULAR; INTRAVENOUS at 15:43

## 2024-01-01 RX ADMIN — MORPHINE SULFATE 2 MG: 2 INJECTION, SOLUTION INTRAMUSCULAR; INTRAVENOUS at 21:26

## 2024-01-01 RX ADMIN — MORPHINE SULFATE 2 MG: 2 INJECTION, SOLUTION INTRAMUSCULAR; INTRAVENOUS at 09:06

## 2024-01-01 RX ADMIN — LORAZEPAM 0.5 MG: 2 INJECTION INTRAMUSCULAR; INTRAVENOUS at 15:11

## 2024-01-01 RX ADMIN — GLYCOPYRROLATE 0.4 MG: 0.2 INJECTION, SOLUTION INTRAMUSCULAR; INTRAVENOUS at 23:29

## 2024-01-01 RX ADMIN — LORAZEPAM 0.5 MG: 2 INJECTION INTRAMUSCULAR; INTRAVENOUS at 17:45

## 2024-01-01 RX ADMIN — SODIUM CHLORIDE, PRESERVATIVE FREE 2 ML: 5 INJECTION INTRAVENOUS at 10:49

## 2024-01-01 RX ADMIN — MORPHINE SULFATE 2 MG: 2 INJECTION, SOLUTION INTRAMUSCULAR; INTRAVENOUS at 04:15

## 2024-01-01 RX ADMIN — SCOPOLAMINE 1 PATCH: 1 SYSTEM TRANSDERMAL at 12:47

## 2024-01-01 RX ADMIN — SODIUM CHLORIDE, PRESERVATIVE FREE 2 ML: 5 INJECTION INTRAVENOUS at 10:09

## 2024-01-01 RX ADMIN — MORPHINE SULFATE 2 MG: 2 INJECTION, SOLUTION INTRAMUSCULAR; INTRAVENOUS at 05:54

## 2024-01-01 RX ADMIN — SODIUM CHLORIDE, PRESERVATIVE FREE 2 ML: 5 INJECTION INTRAVENOUS at 20:50

## 2024-01-01 RX ADMIN — LORAZEPAM 0.5 MG: 2 INJECTION INTRAMUSCULAR; INTRAVENOUS at 23:33

## 2024-01-01 RX ADMIN — GLYCOPYRROLATE 0.4 MG: 0.2 INJECTION, SOLUTION INTRAMUSCULAR; INTRAVENOUS at 05:31

## 2024-01-01 RX ADMIN — LORAZEPAM 0.5 MG: 2 INJECTION INTRAMUSCULAR; INTRAVENOUS at 22:44

## 2024-01-01 RX ADMIN — LORAZEPAM 0.5 MG: 2 INJECTION INTRAMUSCULAR; INTRAVENOUS at 18:28

## 2024-01-01 RX ADMIN — MORPHINE SULFATE 2 MG: 2 INJECTION, SOLUTION INTRAMUSCULAR; INTRAVENOUS at 05:31

## 2024-01-01 RX ADMIN — MORPHINE SULFATE 2 MG: 2 INJECTION, SOLUTION INTRAMUSCULAR; INTRAVENOUS at 20:07

## 2024-01-01 RX ADMIN — SODIUM CHLORIDE, PRESERVATIVE FREE 2 ML: 5 INJECTION INTRAVENOUS at 09:04

## 2024-01-01 RX ADMIN — MORPHINE SULFATE 2 MG: 2 INJECTION, SOLUTION INTRAMUSCULAR; INTRAVENOUS at 08:12

## 2024-01-01 RX ADMIN — MORPHINE SULFATE 2 MG: 2 INJECTION, SOLUTION INTRAMUSCULAR; INTRAVENOUS at 01:32

## 2024-01-01 RX ADMIN — LORAZEPAM 0.5 MG: 2 INJECTION INTRAMUSCULAR; INTRAVENOUS at 18:52

## 2024-01-01 RX ADMIN — GLYCOPYRROLATE 0.4 MG: 0.2 INJECTION, SOLUTION INTRAMUSCULAR; INTRAVENOUS at 11:47

## 2024-01-01 RX ADMIN — MORPHINE SULFATE 2 MG: 2 INJECTION, SOLUTION INTRAMUSCULAR; INTRAVENOUS at 09:04

## 2024-01-01 RX ADMIN — MORPHINE SULFATE 2 MG: 2 INJECTION, SOLUTION INTRAMUSCULAR; INTRAVENOUS at 02:24

## 2024-01-01 RX ADMIN — LORAZEPAM 0.5 MG: 2 INJECTION INTRAMUSCULAR; INTRAVENOUS at 10:48

## 2024-01-01 RX ADMIN — MORPHINE SULFATE 2 MG: 2 INJECTION, SOLUTION INTRAMUSCULAR; INTRAVENOUS at 01:49

## 2024-01-01 RX ADMIN — LORAZEPAM 0.5 MG: 2 INJECTION INTRAMUSCULAR; INTRAVENOUS at 06:07

## 2024-01-01 RX ADMIN — LORAZEPAM 0.5 MG: 2 INJECTION INTRAMUSCULAR; INTRAVENOUS at 02:59

## 2024-01-01 RX ADMIN — MORPHINE SULFATE 2 MG: 2 INJECTION, SOLUTION INTRAMUSCULAR; INTRAVENOUS at 20:49

## 2024-01-01 RX ADMIN — LORAZEPAM 0.5 MG: 2 INJECTION INTRAMUSCULAR; INTRAVENOUS at 17:26

## 2024-01-01 RX ADMIN — MORPHINE SULFATE 2 MG: 2 INJECTION, SOLUTION INTRAMUSCULAR; INTRAVENOUS at 17:33

## 2024-01-01 RX ADMIN — LORAZEPAM 0.5 MG: 2 INJECTION INTRAMUSCULAR; INTRAVENOUS at 20:07

## 2024-01-01 RX ADMIN — MORPHINE SULFATE 2 MG: 2 INJECTION, SOLUTION INTRAMUSCULAR; INTRAVENOUS at 03:07

## 2024-01-01 RX ADMIN — MORPHINE SULFATE 2 MG: 2 INJECTION, SOLUTION INTRAMUSCULAR; INTRAVENOUS at 10:48

## 2024-01-01 RX ADMIN — LORAZEPAM 0.5 MG: 2 INJECTION INTRAMUSCULAR; INTRAVENOUS at 00:10

## 2024-01-01 RX ADMIN — SODIUM CHLORIDE, PRESERVATIVE FREE 2 ML: 5 INJECTION INTRAVENOUS at 20:17

## 2024-01-01 RX ADMIN — MORPHINE SULFATE 2 MG: 2 INJECTION, SOLUTION INTRAMUSCULAR; INTRAVENOUS at 17:26

## 2024-01-01 RX ADMIN — GLYCOPYRROLATE 0.4 MG: 0.2 INJECTION, SOLUTION INTRAMUSCULAR; INTRAVENOUS at 05:54

## 2024-01-01 RX ADMIN — GLYCOPYRROLATE 0.4 MG: 0.2 INJECTION, SOLUTION INTRAMUSCULAR; INTRAVENOUS at 17:33

## 2024-01-01 RX ADMIN — SODIUM CHLORIDE, PRESERVATIVE FREE 2 ML: 5 INJECTION INTRAVENOUS at 21:05

## 2024-01-01 RX ADMIN — GLYCOPYRROLATE 0.4 MG: 0.2 INJECTION, SOLUTION INTRAMUSCULAR; INTRAVENOUS at 17:49

## 2024-01-01 ASSESSMENT — PAIN SCALES - PAIN ASSESSMENT IN ADVANCED DEMENTIA (PAINAD)
TOTALSCORE: 1
BREATHING: OCCASIONAL LABORED BREATHING, SHORT PERIOD OF HYPERVENTILATION
BODYLANGUAGE: RELAXED
TOTALSCORE: 1
TOTALSCORE: 1
TOTALSCORE: 0
FACIALEXPRESSION: SMILING OR INEXPRESSIVE
CONSOLABILITY: NO NEED TO CONSOLE
BODYLANGUAGE: RELAXED
BODYLANGUAGE: TENSE, DISTRESSED, FIDGETING
BODYLANGUAGE: RELAXED
BREATHING: NORMAL
TOTALSCORE: 1
BODYLANGUAGE: RELAXED
FACIALEXPRESSION: SMILING OR INEXPRESSIVE
TOTALSCORE: 1
TOTALSCORE: 1
FACIALEXPRESSION: SMILING OR INEXPRESSIVE
BODYLANGUAGE: RELAXED
BREATHING: NORMAL
FACIALEXPRESSION: SMILING OR INEXPRESSIVE
TOTALSCORE: 0
CONSOLABILITY: UNABLE TO CONSOLE, DISTRACT OR REASSURE
BREATHING: NORMAL
BODYLANGUAGE: TENSE, DISTRESSED, FIDGETING
FACIALEXPRESSION: SMILING OR INEXPRESSIVE
BODYLANGUAGE: RELAXED
FACIALEXPRESSION: SAD. FRIGHTENED. FROWNING
FACIALEXPRESSION: SMILING OR INEXPRESSIVE
CONSOLABILITY: NO NEED TO CONSOLE
BREATHING: OCCASIONAL LABORED BREATHING, SHORT PERIOD OF HYPERVENTILATION
BREATHING: OCCASIONAL LABORED BREATHING, SHORT PERIOD OF HYPERVENTILATION
FACIALEXPRESSION: SMILING OR INEXPRESSIVE
CONSOLABILITY: NO NEED TO CONSOLE
TOTALSCORE: 6
BODYLANGUAGE: RELAXED
CONSOLABILITY: NO NEED TO CONSOLE
NEGVOCALIZATION: OCCASIONAL MOAN OR GROAN, LOW LEVELS OF SPEECH WITH A NEGATIVE OR DISAPPROVING QUALITY
CONSOLABILITY: NO NEED TO CONSOLE
FACIALEXPRESSION: SMILING OR INEXPRESSIVE
BREATHING: NORMAL
BREATHING: OCCASIONAL LABORED BREATHING, SHORT PERIOD OF HYPERVENTILATION
BREATHING: OCCASIONAL LABORED BREATHING, SHORT PERIOD OF HYPERVENTILATION
CONSOLABILITY: NO NEED TO CONSOLE
CONSOLABILITY: NO NEED TO CONSOLE
BREATHING: OCCASIONAL LABORED BREATHING, SHORT PERIOD OF HYPERVENTILATION
BODYLANGUAGE: RELAXED
BODYLANGUAGE: RELAXED
TOTALSCORE: 0
CONSOLABILITY: NO NEED TO CONSOLE
TOTALSCORE: 1
FACIALEXPRESSION: SMILING OR INEXPRESSIVE
CONSOLABILITY: NO NEED TO CONSOLE
BREATHING: OCCASIONAL LABORED BREATHING, SHORT PERIOD OF HYPERVENTILATION
CONSOLABILITY: NO NEED TO CONSOLE
BODYLANGUAGE: RELAXED
BREATHING: NORMAL
BODYLANGUAGE: RELAXED
CONSOLABILITY: NO NEED TO CONSOLE
TOTALSCORE: 1
BREATHING: NORMAL
BODYLANGUAGE: RELAXED
BREATHING: OCCASIONAL LABORED BREATHING, SHORT PERIOD OF HYPERVENTILATION
CONSOLABILITY: NO NEED TO CONSOLE
FACIALEXPRESSION: SMILING OR INEXPRESSIVE
FACIALEXPRESSION: SMILING OR INEXPRESSIVE
CONSOLABILITY: NO NEED TO CONSOLE

## 2024-01-01 ASSESSMENT — PAIN SCALES - GENERAL: PAINLEVEL_OUTOF10: 0

## 2024-01-02 ENCOUNTER — TELEPHONE (OUTPATIENT)
Dept: NEUROSURGERY | Age: 84
End: 2024-01-02

## 2024-01-16 ENCOUNTER — APPOINTMENT (OUTPATIENT)
Dept: CARDIOLOGY | Age: 84
End: 2024-01-16
Attending: INTERNAL MEDICINE

## 2024-01-16 DIAGNOSIS — Z98.890 HISTORY OF LEFT COMMON CAROTID ARTERY STENT PLACEMENT: ICD-10-CM

## 2024-01-16 DIAGNOSIS — R09.89 RIGHT CAROTID BRUIT: ICD-10-CM

## 2024-01-16 DIAGNOSIS — Z95.828 HISTORY OF LEFT COMMON CAROTID ARTERY STENT PLACEMENT: ICD-10-CM

## 2024-01-16 PROCEDURE — 93880 EXTRACRANIAL BILAT STUDY: CPT | Performed by: INTERNAL MEDICINE

## 2024-01-25 ENCOUNTER — OFFICE VISIT (OUTPATIENT)
Dept: CARDIOLOGY | Age: 84
End: 2024-01-25

## 2024-01-25 VITALS
BODY MASS INDEX: 26.69 KG/M2 | HEART RATE: 87 BPM | SYSTOLIC BLOOD PRESSURE: 111 MMHG | HEIGHT: 69 IN | WEIGHT: 180.23 LBS | DIASTOLIC BLOOD PRESSURE: 77 MMHG

## 2024-01-25 DIAGNOSIS — I73.9 PERIPHERAL VASCULAR DISEASE OF EXTREMITY (CMD): Primary | ICD-10-CM

## 2024-01-29 RX ORDER — APIXABAN 5 MG/1
TABLET, FILM COATED ORAL
Qty: 180 TABLET | Refills: 3 | Status: SHIPPED | OUTPATIENT
Start: 2024-01-29

## 2024-02-07 ENCOUNTER — ANCILLARY ORDERS (OUTPATIENT)
Dept: CARDIOLOGY | Age: 84
End: 2024-02-07

## 2024-02-07 ENCOUNTER — ANCILLARY PROCEDURE (OUTPATIENT)
Dept: CARDIOLOGY | Age: 84
End: 2024-02-07
Attending: INTERNAL MEDICINE

## 2024-02-07 DIAGNOSIS — Z95.0 PRESENCE OF CARDIAC PACEMAKER: Primary | ICD-10-CM

## 2024-02-07 DIAGNOSIS — Z95.0 PRESENCE OF CARDIAC PACEMAKER: ICD-10-CM

## 2024-02-07 PROCEDURE — 93294 REM INTERROG EVL PM/LDLS PM: CPT | Performed by: INTERNAL MEDICINE

## 2024-02-07 PROCEDURE — 93296 REM INTERROG EVL PM/IDS: CPT | Performed by: INTERNAL MEDICINE

## 2024-02-26 ENCOUNTER — APPOINTMENT (OUTPATIENT)
Dept: NEUROSURGERY | Age: 84
End: 2024-02-26

## 2024-02-26 VITALS
SYSTOLIC BLOOD PRESSURE: 135 MMHG | HEIGHT: 69 IN | DIASTOLIC BLOOD PRESSURE: 55 MMHG | HEART RATE: 80 BPM | TEMPERATURE: 98.3 F | WEIGHT: 180 LBS | BODY MASS INDEX: 26.66 KG/M2

## 2024-02-26 DIAGNOSIS — I65.22 STENOSIS OF LEFT CAROTID ARTERY: Primary | ICD-10-CM

## 2024-02-26 PROCEDURE — 99212 OFFICE O/P EST SF 10 MIN: CPT | Performed by: NURSE PRACTITIONER

## 2024-02-26 PROCEDURE — 3075F SYST BP GE 130 - 139MM HG: CPT | Performed by: NURSE PRACTITIONER

## 2024-02-26 PROCEDURE — 3078F DIAST BP <80 MM HG: CPT | Performed by: NURSE PRACTITIONER

## 2024-03-23 ENCOUNTER — HOSPITAL ENCOUNTER (INPATIENT)
Age: 84
DRG: 871 | End: 2024-03-23
Attending: STUDENT IN AN ORGANIZED HEALTH CARE EDUCATION/TRAINING PROGRAM | Admitting: INTERNAL MEDICINE

## 2024-03-23 ENCOUNTER — APPOINTMENT (OUTPATIENT)
Dept: CT IMAGING | Age: 84
DRG: 871 | End: 2024-03-23
Attending: STUDENT IN AN ORGANIZED HEALTH CARE EDUCATION/TRAINING PROGRAM

## 2024-03-23 ENCOUNTER — APPOINTMENT (OUTPATIENT)
Dept: ULTRASOUND IMAGING | Age: 84
DRG: 871 | End: 2024-03-23
Attending: INTERNAL MEDICINE

## 2024-03-23 ENCOUNTER — APPOINTMENT (OUTPATIENT)
Dept: ULTRASOUND IMAGING | Age: 84
DRG: 871 | End: 2024-03-23
Attending: STUDENT IN AN ORGANIZED HEALTH CARE EDUCATION/TRAINING PROGRAM

## 2024-03-23 DIAGNOSIS — E87.20 LACTIC ACIDOSIS: ICD-10-CM

## 2024-03-23 DIAGNOSIS — R57.8 HEMORRHAGIC SHOCK (CMD): Primary | ICD-10-CM

## 2024-03-23 DIAGNOSIS — N17.9 ACUTE KIDNEY INJURY (CMD): ICD-10-CM

## 2024-03-23 DIAGNOSIS — I50.20 HFREF (HEART FAILURE WITH REDUCED EJECTION FRACTION) (CMD): ICD-10-CM

## 2024-03-23 DIAGNOSIS — R31.0 GROSS HEMATURIA: ICD-10-CM

## 2024-03-23 LAB
ABO + RH BLD: NORMAL
ALBUMIN SERPL-MCNC: 3 G/DL (ref 3.6–5.1)
ALBUMIN/GLOB SERPL: 1 {RATIO} (ref 1–2.4)
ALP SERPL-CCNC: 42 UNITS/L (ref 45–117)
ALT SERPL-CCNC: 14 UNITS/L
ANION GAP SERPL CALC-SCNC: 22 MMOL/L (ref 7–19)
APPEARANCE UR: ABNORMAL
APTT PPP: 24 SEC (ref 22–32)
AST SERPL-CCNC: 17 UNITS/L
ATRIAL RATE (BPM): 104
B-OH-BUTYR SERPL-SCNC: 0.9 MMOL/L (ref 0–0.3)
BACTERIA #/AREA URNS HPF: ABNORMAL /HPF
BACTERIA BLD CULT: NORMAL
BACTERIA BLD CULT: NORMAL
BASE EXCESS / DEFICIT, VENOUS - RESPIRATORY: -11 MMOL/L (ref -2–2)
BASOPHILS # BLD: 0 K/MCL (ref 0–0.3)
BASOPHILS # BLD: 0 K/MCL (ref 0–0.3)
BASOPHILS NFR BLD: 0 %
BASOPHILS NFR BLD: 0 %
BDY SITE: ABNORMAL
BILIRUB SERPL-MCNC: 0.5 MG/DL (ref 0.2–1)
BILIRUB UR QL STRIP: NEGATIVE
BLD GP AB SCN SERPL QL GEL: NEGATIVE
BLOOD EXPIRATION DATE: NORMAL
BODY TEMPERATURE: 37 DEGREES C
BUN SERPL-MCNC: 66 MG/DL (ref 6–20)
BUN/CREAT SERPL: 27 (ref 7–25)
CALCIUM SERPL-MCNC: 8.7 MG/DL (ref 8.4–10.2)
CHLORIDE SERPL-SCNC: 111 MMOL/L (ref 97–110)
CK SERPL-CCNC: 115 UNITS/L (ref 39–308)
CO2 SERPL-SCNC: 12 MMOL/L (ref 21–32)
COLOR UR: ABNORMAL
CREAT SERPL-MCNC: 2.45 MG/DL (ref 0.67–1.17)
CROSSMATCH RESULT: NORMAL
CROSSMATCH RESULT: NORMAL
DEPRECATED RDW RBC: 47.2 FL (ref 39–50)
DEPRECATED RDW RBC: 50.5 FL (ref 39–50)
DISPENSE STATUS: NORMAL
EGFRCR SERPLBLD CKD-EPI 2021: 25 ML/MIN/{1.73_M2}
EOSINOPHIL # BLD: 0 K/MCL (ref 0–0.5)
EOSINOPHIL # BLD: 0 K/MCL (ref 0–0.5)
EOSINOPHIL NFR BLD: 0 %
EOSINOPHIL NFR BLD: 0 %
ERYTHROCYTE [DISTWIDTH] IN BLOOD: 14.8 % (ref 11–15)
ERYTHROCYTE [DISTWIDTH] IN BLOOD: 15.8 % (ref 11–15)
FASTING DURATION TIME PATIENT: ABNORMAL H
GLOBULIN SER-MCNC: 2.9 G/DL (ref 2–4)
GLUCOSE BLDC GLUCOMTR-MCNC: 219 MG/DL (ref 70–99)
GLUCOSE BLDC GLUCOMTR-MCNC: 220 MG/DL (ref 70–99)
GLUCOSE BLDC GLUCOMTR-MCNC: 316 MG/DL (ref 70–99)
GLUCOSE SERPL-MCNC: 213 MG/DL (ref 70–99)
GLUCOSE UR STRIP-MCNC: NEGATIVE MG/DL
HCO3 BLDV-SCNC: 16.4 MMOL/L (ref 22–28)
HCT VFR BLD CALC: 20.6 % (ref 39–51)
HCT VFR BLD CALC: 33.9 % (ref 39–51)
HCT VFR BLD CALC: 34.2 % (ref 39–51)
HGB BLD-MCNC: 10.2 G/DL (ref 13–17)
HGB BLD-MCNC: 10.9 G/DL (ref 13–17)
HGB BLD-MCNC: 6 G/DL (ref 13–17)
HGB UR QL STRIP: ABNORMAL
HYALINE CASTS #/AREA URNS LPF: ABNORMAL /LPF
IMM GRANULOCYTES # BLD AUTO: 0 K/MCL (ref 0–0.2)
IMM GRANULOCYTES # BLD AUTO: 0.1 K/MCL (ref 0–0.2)
IMM GRANULOCYTES # BLD: 0 %
IMM GRANULOCYTES # BLD: 1 %
INR PPP: 1.2
ISBT BLOOD TYPE: 5100
ISSUE DATE/TIME: NORMAL
KETONES UR STRIP-MCNC: NEGATIVE MG/DL
LACTATE BLDV-SCNC: 3.8 MMOL/L (ref 0–2)
LACTATE BLDV-SCNC: 4.1 MMOL/L (ref 0–2)
LACTATE BLDV-SCNC: 6.3 MMOL/L (ref 0–2)
LEUKOCYTE ESTERASE UR QL STRIP: ABNORMAL
LYMPHOCYTES # BLD: 0.6 K/MCL (ref 1–4)
LYMPHOCYTES # BLD: 0.9 K/MCL (ref 1–4)
LYMPHOCYTES NFR BLD: 10 %
LYMPHOCYTES NFR BLD: 6 %
MCH RBC QN AUTO: 25 PG (ref 26–34)
MCH RBC QN AUTO: 26.8 PG (ref 26–34)
MCHC RBC AUTO-ENTMCNC: 29.1 G/DL (ref 32–36.5)
MCHC RBC AUTO-ENTMCNC: 30.4 G/DL (ref 32–36.5)
MCV RBC AUTO: 85.8 FL (ref 78–100)
MCV RBC AUTO: 88.3 FL (ref 78–100)
MONOCYTES # BLD: 0.6 K/MCL (ref 0.3–0.9)
MONOCYTES # BLD: 0.7 K/MCL (ref 0.3–0.9)
MONOCYTES NFR BLD: 6 %
MONOCYTES NFR BLD: 7 %
MRSA DNA SPEC QL NAA+PROBE: NOT DETECTED
NEUTROPHILS # BLD: 10.1 K/MCL (ref 1.8–7.7)
NEUTROPHILS # BLD: 7.2 K/MCL (ref 1.8–7.7)
NEUTROPHILS NFR BLD: 82 %
NEUTROPHILS NFR BLD: 88 %
NITRITE UR QL STRIP: NEGATIVE
NRBC BLD MANUAL-RTO: 0 /100 WBC
NRBC BLD MANUAL-RTO: 0 /100 WBC
P AXIS (DEGREES): 87
PCO2 BLDV: 40 MM HG (ref 41–54)
PH BLDV: 7.22 UNITS (ref 7.35–7.45)
PH UR STRIP: 5 [PH] (ref 5–7)
PLATELET # BLD AUTO: 320 K/MCL (ref 140–450)
PLATELET # BLD AUTO: 347 K/MCL (ref 140–450)
PO2 BLDV: 20 MM HG (ref 35–42)
POTASSIUM SERPL-SCNC: 4.9 MMOL/L (ref 3.4–5.1)
PR-INTERVAL (MSEC): 194
PROCALCITONIN SERPL IA-MCNC: <0.05 NG/ML
PRODUCT CODE: NORMAL
PRODUCT DESCRIPTION: NORMAL
PRODUCT ID: NORMAL
PROT SERPL-MCNC: 5.9 G/DL (ref 6.4–8.2)
PROT UR STRIP-MCNC: 100 MG/DL
PROTHROMBIN TIME: 12.3 SEC (ref 9.7–11.8)
QRS-INTERVAL (MSEC): 196
QT-INTERVAL (MSEC): 466
QTC: 613
R AXIS (DEGREES): -56
RAINBOW EXTRA TUBES HOLD SPECIMEN: NORMAL
RBC # BLD: 2.4 MIL/MCL (ref 4.5–5.9)
RBC # BLD: 3.84 MIL/MCL (ref 4.5–5.9)
RBC #/AREA URNS HPF: >100 /HPF
REPORT TEXT: NORMAL
SAO2 DF BLDV: 21 % (ref 60–80)
SODIUM SERPL-SCNC: 140 MMOL/L (ref 135–145)
SP GR UR STRIP: 1.01 (ref 1–1.03)
SQUAMOUS #/AREA URNS HPF: ABNORMAL /HPF
T AXIS (DEGREES): 133
TYPE AND SCREEN EXPIRATION DATE: NORMAL
UNIT BLOOD TYPE: NORMAL
UNIT NUMBER: NORMAL
UROBILINOGEN UR STRIP-MCNC: 0.2 MG/DL
VENTRICULAR RATE EKG/MIN (BPM): 104
WBC # BLD: 11.5 K/MCL (ref 4.2–11)
WBC # BLD: 8.7 K/MCL (ref 4.2–11)
WBC #/AREA URNS HPF: ABNORMAL /HPF

## 2024-03-23 PROCEDURE — 82010 KETONE BODYS QUAN: CPT | Performed by: INTERNAL MEDICINE

## 2024-03-23 PROCEDURE — 93010 ELECTROCARDIOGRAM REPORT: CPT | Performed by: INTERNAL MEDICINE

## 2024-03-23 PROCEDURE — 83605 ASSAY OF LACTIC ACID: CPT | Performed by: INTERNAL MEDICINE

## 2024-03-23 PROCEDURE — 85025 COMPLETE CBC W/AUTO DIFF WBC: CPT | Performed by: STUDENT IN AN ORGANIZED HEALTH CARE EDUCATION/TRAINING PROGRAM

## 2024-03-23 PROCEDURE — 99291 CRITICAL CARE FIRST HOUR: CPT

## 2024-03-23 PROCEDURE — 80053 COMPREHEN METABOLIC PANEL: CPT | Performed by: STUDENT IN AN ORGANIZED HEALTH CARE EDUCATION/TRAINING PROGRAM

## 2024-03-23 PROCEDURE — 74176 CT ABD & PELVIS W/O CONTRAST: CPT

## 2024-03-23 PROCEDURE — 96372 THER/PROPH/DIAG INJ SC/IM: CPT | Performed by: INTERNAL MEDICINE

## 2024-03-23 PROCEDURE — 86923 COMPATIBILITY TEST ELECTRIC: CPT

## 2024-03-23 PROCEDURE — 87899 AGENT NOS ASSAY W/OPTIC: CPT | Performed by: INTERNAL MEDICINE

## 2024-03-23 PROCEDURE — 36415 COLL VENOUS BLD VENIPUNCTURE: CPT | Performed by: INTERNAL MEDICINE

## 2024-03-23 PROCEDURE — 96365 THER/PROPH/DIAG IV INF INIT: CPT

## 2024-03-23 PROCEDURE — 87086 URINE CULTURE/COLONY COUNT: CPT | Performed by: STUDENT IN AN ORGANIZED HEALTH CARE EDUCATION/TRAINING PROGRAM

## 2024-03-23 PROCEDURE — 93005 ELECTROCARDIOGRAM TRACING: CPT | Performed by: STUDENT IN AN ORGANIZED HEALTH CARE EDUCATION/TRAINING PROGRAM

## 2024-03-23 PROCEDURE — 87040 BLOOD CULTURE FOR BACTERIA: CPT | Performed by: STUDENT IN AN ORGANIZED HEALTH CARE EDUCATION/TRAINING PROGRAM

## 2024-03-23 PROCEDURE — 82550 ASSAY OF CK (CPK): CPT | Performed by: INTERNAL MEDICINE

## 2024-03-23 PROCEDURE — 10002800 HB RX 250 W HCPCS: Performed by: INTERNAL MEDICINE

## 2024-03-23 PROCEDURE — 93925 LOWER EXTREMITY STUDY: CPT

## 2024-03-23 PROCEDURE — 10002800 HB RX 250 W HCPCS: Performed by: STUDENT IN AN ORGANIZED HEALTH CARE EDUCATION/TRAINING PROGRAM

## 2024-03-23 PROCEDURE — 10002807 HB RX 258: Performed by: INTERNAL MEDICINE

## 2024-03-23 PROCEDURE — P9016 RBC LEUKOCYTES REDUCED: HCPCS

## 2024-03-23 PROCEDURE — 10004180 HB COUNTER-TRANSPORT

## 2024-03-23 PROCEDURE — 10002800 HB RX 250 W HCPCS

## 2024-03-23 PROCEDURE — 10002807 HB RX 258: Performed by: STUDENT IN AN ORGANIZED HEALTH CARE EDUCATION/TRAINING PROGRAM

## 2024-03-23 PROCEDURE — 84443 ASSAY THYROID STIM HORMONE: CPT | Performed by: INTERNAL MEDICINE

## 2024-03-23 PROCEDURE — 86850 RBC ANTIBODY SCREEN: CPT | Performed by: STUDENT IN AN ORGANIZED HEALTH CARE EDUCATION/TRAINING PROGRAM

## 2024-03-23 PROCEDURE — 82962 GLUCOSE BLOOD TEST: CPT

## 2024-03-23 PROCEDURE — 85610 PROTHROMBIN TIME: CPT | Performed by: STUDENT IN AN ORGANIZED HEALTH CARE EDUCATION/TRAINING PROGRAM

## 2024-03-23 PROCEDURE — 71250 CT THORAX DX C-: CPT

## 2024-03-23 PROCEDURE — 10000008 HB ROOM CHARGE ICU OR CCU

## 2024-03-23 PROCEDURE — 85014 HEMATOCRIT: CPT | Performed by: INTERNAL MEDICINE

## 2024-03-23 PROCEDURE — 96375 TX/PRO/DX INJ NEW DRUG ADDON: CPT

## 2024-03-23 PROCEDURE — 36430 TRANSFUSION BLD/BLD COMPNT: CPT

## 2024-03-23 PROCEDURE — 10002801 HB RX 250 W/O HCPCS: Performed by: STUDENT IN AN ORGANIZED HEALTH CARE EDUCATION/TRAINING PROGRAM

## 2024-03-23 PROCEDURE — 87641 MR-STAPH DNA AMP PROBE: CPT | Performed by: INTERNAL MEDICINE

## 2024-03-23 PROCEDURE — 93971 EXTREMITY STUDY: CPT

## 2024-03-23 PROCEDURE — 82803 BLOOD GASES ANY COMBINATION: CPT

## 2024-03-23 PROCEDURE — 51700 IRRIGATION OF BLADDER: CPT

## 2024-03-23 PROCEDURE — 85730 THROMBOPLASTIN TIME PARTIAL: CPT | Performed by: STUDENT IN AN ORGANIZED HEALTH CARE EDUCATION/TRAINING PROGRAM

## 2024-03-23 PROCEDURE — 84145 PROCALCITONIN (PCT): CPT | Performed by: INTERNAL MEDICINE

## 2024-03-23 PROCEDURE — 81001 URINALYSIS AUTO W/SCOPE: CPT | Performed by: STUDENT IN AN ORGANIZED HEALTH CARE EDUCATION/TRAINING PROGRAM

## 2024-03-23 RX ORDER — DEXTROSE MONOHYDRATE 25 G/50ML
25 INJECTION, SOLUTION INTRAVENOUS PRN
Status: DISCONTINUED | OUTPATIENT
Start: 2024-03-23 | End: 2024-03-29 | Stop reason: HOSPADM

## 2024-03-23 RX ORDER — CALCIUM GLUCONATE 20 MG/ML
1 INJECTION, SOLUTION INTRAVENOUS ONCE
Status: COMPLETED | OUTPATIENT
Start: 2024-03-23 | End: 2024-03-23

## 2024-03-23 RX ORDER — INSULIN GLARGINE 100 [IU]/ML
10 INJECTION, SOLUTION SUBCUTANEOUS DAILY
Status: DISCONTINUED | OUTPATIENT
Start: 2024-03-23 | End: 2024-03-23

## 2024-03-23 RX ORDER — FUROSEMIDE 10 MG/ML
40 INJECTION INTRAMUSCULAR; INTRAVENOUS ONCE
Status: COMPLETED | OUTPATIENT
Start: 2024-03-23 | End: 2024-03-23

## 2024-03-23 RX ORDER — INSULIN GLARGINE 100 [IU]/ML
15 INJECTION, SOLUTION SUBCUTANEOUS DAILY
Status: DISCONTINUED | OUTPATIENT
Start: 2024-03-24 | End: 2024-03-27

## 2024-03-23 RX ORDER — NICOTINE POLACRILEX 4 MG
30 LOZENGE BUCCAL PRN
Status: DISCONTINUED | OUTPATIENT
Start: 2024-03-23 | End: 2024-03-29 | Stop reason: HOSPADM

## 2024-03-23 RX ORDER — CIPROFLOXACIN 500 MG/1
500 TABLET, FILM COATED ORAL 2 TIMES DAILY
Status: ON HOLD | COMMUNITY
End: 2024-03-28 | Stop reason: HOSPADM

## 2024-03-23 RX ORDER — DEXTROSE MONOHYDRATE 25 G/50ML
12.5 INJECTION, SOLUTION INTRAVENOUS PRN
Status: DISCONTINUED | OUTPATIENT
Start: 2024-03-23 | End: 2024-03-29 | Stop reason: HOSPADM

## 2024-03-23 RX ORDER — LIDOCAINE HYDROCHLORIDE 20 MG/ML
10 JELLY TOPICAL
Status: DISCONTINUED | OUTPATIENT
Start: 2024-03-23 | End: 2024-03-29 | Stop reason: HOSPADM

## 2024-03-23 RX ORDER — SODIUM CHLORIDE 9 MG/ML
INJECTION, SOLUTION INTRAVENOUS CONTINUOUS PRN
Status: COMPLETED | OUTPATIENT
Start: 2024-03-23 | End: 2024-03-26

## 2024-03-23 RX ORDER — NOREPINEPHRINE BITARTRATE/D5W 8 MG/250ML
0-30 PLASTIC BAG, INJECTION (ML) INTRAVENOUS CONTINUOUS
Status: DISCONTINUED | OUTPATIENT
Start: 2024-03-23 | End: 2024-03-24

## 2024-03-23 RX ORDER — FUROSEMIDE 10 MG/ML
20 INJECTION INTRAMUSCULAR; INTRAVENOUS ONCE
Status: COMPLETED | OUTPATIENT
Start: 2024-03-23 | End: 2024-03-23

## 2024-03-23 RX ORDER — NICOTINE POLACRILEX 4 MG
15 LOZENGE BUCCAL PRN
Status: DISCONTINUED | OUTPATIENT
Start: 2024-03-23 | End: 2024-03-29 | Stop reason: HOSPADM

## 2024-03-23 RX ORDER — SODIUM CHLORIDE 9 MG/ML
INJECTION, SOLUTION INTRAVENOUS CONTINUOUS PRN
Status: DISCONTINUED | OUTPATIENT
Start: 2024-03-23 | End: 2024-03-29 | Stop reason: HOSPADM

## 2024-03-23 RX ADMIN — FUROSEMIDE 40 MG: 10 INJECTION, SOLUTION INTRAMUSCULAR; INTRAVENOUS at 17:51

## 2024-03-23 RX ADMIN — INSULIN GLARGINE 10 UNITS: 100 INJECTION, SOLUTION SUBCUTANEOUS at 16:15

## 2024-03-23 RX ADMIN — PIPERACILLIN SODIUM AND TAZOBACTAM SODIUM 3.38 G: 3; .375 INJECTION, POWDER, FOR SOLUTION INTRAVENOUS at 22:39

## 2024-03-23 RX ADMIN — PIPERACILLIN AND TAZOBACTAM 3.38 G: 3; .375 INJECTION, POWDER, FOR SOLUTION INTRAVENOUS at 15:40

## 2024-03-23 RX ADMIN — FUROSEMIDE 10 MG/HR: 10 INJECTION, SOLUTION INTRAMUSCULAR; INTRAVENOUS at 15:45

## 2024-03-23 RX ADMIN — FUROSEMIDE 20 MG: 10 INJECTION, SOLUTION INTRAMUSCULAR; INTRAVENOUS at 14:14

## 2024-03-23 RX ADMIN — MORPHINE SULFATE 4 MG: 2 INJECTION, SOLUTION INTRAMUSCULAR; INTRAVENOUS at 12:54

## 2024-03-23 RX ADMIN — SODIUM CHLORIDE 500 ML: 0.9 INJECTION, SOLUTION INTRAVENOUS at 11:45

## 2024-03-23 RX ADMIN — INSULIN LISPRO 4 UNITS: 100 INJECTION, SOLUTION INTRAVENOUS; SUBCUTANEOUS at 18:49

## 2024-03-23 RX ADMIN — DESMOPRESSIN ACETATE 24 MCG: 4 SOLUTION INTRAVENOUS at 14:14

## 2024-03-23 RX ADMIN — CALCIUM GLUCONATE 1 G: 20 INJECTION, SOLUTION INTRAVENOUS at 14:17

## 2024-03-23 RX ADMIN — INSULIN LISPRO 3 UNITS: 100 INJECTION, SOLUTION INTRAVENOUS; SUBCUTANEOUS at 23:06

## 2024-03-23 SDOH — HEALTH STABILITY: PHYSICAL HEALTH: DO YOU HAVE DIFFICULTY DRESSING OR BATHING?: NO

## 2024-03-23 SDOH — ECONOMIC STABILITY: INCOME INSECURITY: IN THE PAST 12 MONTHS, HAS THE ELECTRIC, GAS, OIL, OR WATER COMPANY THREATENED TO SHUT OFF SERVICE IN YOUR HOME?: NO

## 2024-03-23 SDOH — ECONOMIC STABILITY: FOOD INSECURITY: WITHIN THE PAST 12 MONTHS, THE FOOD YOU BOUGHT JUST DIDN'T LAST AND YOU DIDN'T HAVE MONEY TO GET MORE.: NEVER TRUE

## 2024-03-23 SDOH — SOCIAL STABILITY: SOCIAL NETWORK: SUPPORT SYSTEMS: CHILDREN;FAMILY MEMBERS;SPOUSE

## 2024-03-23 SDOH — HEALTH STABILITY: GENERAL: BECAUSE OF A PHYSICAL, MENTAL, OR EMOTIONAL CONDITION, DO YOU HAVE DIFFICULTY DOING ERRANDS ALONE?: NO

## 2024-03-23 SDOH — ECONOMIC STABILITY: HOUSING INSECURITY: WHAT IS YOUR LIVING SITUATION TODAY?: I HAVE A STEADY PLACE TO LIVE

## 2024-03-23 SDOH — SOCIAL STABILITY: SOCIAL INSECURITY: HOW OFTEN DOES ANYONE, INCLUDING FAMILY AND FRIENDS, PHYSICALLY HURT YOU?: NEVER

## 2024-03-23 SDOH — ECONOMIC STABILITY: HOUSING INSECURITY: WHAT IS YOUR LIVING SITUATION TODAY?: SPOUSE

## 2024-03-23 SDOH — SOCIAL STABILITY: SOCIAL INSECURITY: HOW OFTEN DOES ANYONE, INCLUDING FAMILY AND FRIENDS, THREATEN YOU WITH HARM?: NEVER

## 2024-03-23 SDOH — HEALTH STABILITY: PHYSICAL HEALTH: DO YOU HAVE SERIOUS DIFFICULTY WALKING OR CLIMBING STAIRS?: NO

## 2024-03-23 SDOH — HEALTH STABILITY: GENERAL
BECAUSE OF A PHYSICAL, MENTAL, OR EMOTIONAL CONDITION, DO YOU HAVE SERIOUS DIFFICULTY CONCENTRATING, REMEMBERING OR MAKING DECISIONS?: NO

## 2024-03-23 SDOH — SOCIAL STABILITY: SOCIAL INSECURITY: HOW OFTEN DOES ANYONE, INCLUDING FAMILY AND FRIENDS, SCREAM OR CURSE AT YOU?: NEVER

## 2024-03-23 SDOH — ECONOMIC STABILITY: HOUSING INSECURITY: WHAT IS YOUR LIVING SITUATION TODAY?: HOUSE

## 2024-03-23 SDOH — ECONOMIC STABILITY: GENERAL: WOULD YOU LIKE HELP WITH ANY OF THE FOLLOWING NEEDS?: I DON'T WANT HELP WITH ANY OF THESE

## 2024-03-23 SDOH — SOCIAL STABILITY: SOCIAL NETWORK
HOW OFTEN DO YOU SEE OR TALK TO PEOPLE THAT YOU CARE ABOUT AND FEEL CLOSE TO? (FOR EXAMPLE: TALKING TO FRIENDS ON THE PHONE, VISITING FRIENDS OR FAMILY, GOING TO CHURCH OR CLUB MEETINGS): LESS THAN ONCE A WEEK

## 2024-03-23 SDOH — ECONOMIC STABILITY: HOUSING INSECURITY: DO YOU HAVE PROBLEMS WITH ANY OF THE FOLLOWING?: NONE OF THE ABOVE

## 2024-03-23 SDOH — ECONOMIC STABILITY: TRANSPORTATION INSECURITY
IN THE PAST 12 MONTHS, HAS LACK OF RELIABLE TRANSPORTATION KEPT YOU FROM MEDICAL APPOINTMENTS, MEETINGS, WORK OR FROM GETTING THINGS NEEDED FOR DAILY LIVING?: NO

## 2024-03-23 SDOH — SOCIAL STABILITY: SOCIAL INSECURITY: HOW OFTEN DOES ANYONE, INCLUDING FAMILY AND FRIENDS, INSULT OR TALK DOWN TO YOU?: NEVER

## 2024-03-23 SDOH — ECONOMIC STABILITY: GENERAL

## 2024-03-23 ASSESSMENT — PAIN SCALES - GENERAL
PAINLEVEL_OUTOF10: 4
PAINLEVEL_OUTOF10: 0
PAINLEVEL_OUTOF10: 0

## 2024-03-23 ASSESSMENT — ENCOUNTER SYMPTOMS
ABDOMINAL PAIN: 0
FEVER: 0
COUGH: 0
FATIGUE: 1
BACK PAIN: 0
VOMITING: 0
NUMBNESS: 0
NAUSEA: 0
WEAKNESS: 1
PHOTOPHOBIA: 0
WOUND: 0
VOICE CHANGE: 0
SHORTNESS OF BREATH: 0
DIZZINESS: 0
NERVOUS/ANXIOUS: 0
LIGHT-HEADEDNESS: 1
CHILLS: 0
CONFUSION: 0

## 2024-03-23 ASSESSMENT — PATIENT HEALTH QUESTIONNAIRE - PHQ9
CLINICAL INTERPRETATION OF PHQ2 SCORE: NO FURTHER SCREENING NEEDED
SUM OF ALL RESPONSES TO PHQ9 QUESTIONS 1 AND 2: 0
1. LITTLE INTEREST OR PLEASURE IN DOING THINGS: NOT AT ALL
2. FEELING DOWN, DEPRESSED OR HOPELESS: NOT AT ALL
IS PATIENT ABLE TO COMPLETE PHQ2 OR PHQ9: YES
SUM OF ALL RESPONSES TO PHQ9 QUESTIONS 1 AND 2: 0

## 2024-03-23 ASSESSMENT — MOVEMENT AND STRENGTH ASSESSMENTS
LUE_ASSESSMENT: NORMAL/COMPLETE ROM AGAINST GRAVITY WITH FULL RESISTANCE
RLE_ASSESSMENT: NORMAL/COMPLETE ROM AGAINST GRAVITY WITH FULL RESISTANCE
RUE_ASSESSMENT: NORMAL/COMPLETE ROM AGAINST GRAVITY WITH FULL RESISTANCE
RUE_ASSESSMENT: NORMAL/COMPLETE ROM AGAINST GRAVITY WITH FULL RESISTANCE
LUE_ASSESSMENT: NORMAL/COMPLETE ROM AGAINST GRAVITY WITH FULL RESISTANCE
LLE_ASSESSMENT: NORMAL/COMPLETE ROM AGAINST GRAVITY WITH FULL RESISTANCE
LLE_ASSESSMENT: NORMAL/COMPLETE ROM AGAINST GRAVITY WITH FULL RESISTANCE
RLE_ASSESSMENT: NORMAL/COMPLETE ROM AGAINST GRAVITY WITH FULL RESISTANCE

## 2024-03-23 ASSESSMENT — LIFESTYLE VARIABLES
ALCOHOL_USE_STATUS: NO OR LOW RISK WITH VALIDATED TOOL
HOW OFTEN DO YOU HAVE A DRINK CONTAINING ALCOHOL: 4 OR MORE TIMES PER WEEK
AUDIT-C TOTAL SCORE: 5
HOW MANY STANDARD DRINKS CONTAINING ALCOHOL DO YOU HAVE ON A TYPICAL DAY: 3 OR 4
HOW OFTEN DO YOU HAVE 6 OR MORE DRINKS ON ONE OCCASION: NEVER

## 2024-03-23 ASSESSMENT — COLUMBIA-SUICIDE SEVERITY RATING SCALE - C-SSRS
2. HAVE YOU ACTUALLY HAD ANY THOUGHTS OF KILLING YOURSELF?: NO
6. HAVE YOU EVER DONE ANYTHING, STARTED TO DO ANYTHING, OR PREPARED TO DO ANYTHING TO END YOUR LIFE?: NO
IS THE PATIENT ABLE TO COMPLETE C-SSRS: YES
1. WITHIN THE PAST MONTH, HAVE YOU WISHED YOU WERE DEAD OR WISHED YOU COULD GO TO SLEEP AND NOT WAKE UP?: NO

## 2024-03-23 ASSESSMENT — ACTIVITIES OF DAILY LIVING (ADL)
RECENT_DECLINE_ADL: NO
ADL_SCORE: 12
ADL_BEFORE_ADMISSION: INDEPENDENT
ADL_SHORT_OF_BREATH: NO

## 2024-03-24 ENCOUNTER — APPOINTMENT (OUTPATIENT)
Dept: CARDIOLOGY | Age: 84
DRG: 871 | End: 2024-03-24
Attending: INTERNAL MEDICINE

## 2024-03-24 ENCOUNTER — APPOINTMENT (OUTPATIENT)
Dept: GENERAL RADIOLOGY | Age: 84
DRG: 871 | End: 2024-03-24
Attending: INTERNAL MEDICINE

## 2024-03-24 VITALS
RESPIRATION RATE: 17 BRPM | SYSTOLIC BLOOD PRESSURE: 111 MMHG | OXYGEN SATURATION: 98 % | TEMPERATURE: 96.8 F | WEIGHT: 172.18 LBS | HEIGHT: 69 IN | DIASTOLIC BLOOD PRESSURE: 47 MMHG | HEART RATE: 80 BPM | BODY MASS INDEX: 25.5 KG/M2

## 2024-03-24 LAB
ALBUMIN SERPL-MCNC: 2.9 G/DL (ref 3.6–5.1)
ALBUMIN/GLOB SERPL: 1 {RATIO} (ref 1–2.4)
ALP SERPL-CCNC: 42 UNITS/L (ref 45–117)
ALT SERPL-CCNC: 22 UNITS/L
ANION GAP SERPL CALC-SCNC: 11 MMOL/L (ref 7–19)
AORTIC VALVE AREA (AVA): 0.96
ASCENDING AORTA (AAD): 8
AST SERPL-CCNC: 118 UNITS/L
AV STENOSIS SEVERITY TEXT: NORMAL
AVI LVOT PEAK GRADIENT (LVOTMG): 0.9
BACTERIA UR CULT: NORMAL
BILIRUB SERPL-MCNC: 0.9 MG/DL (ref 0.2–1)
BUN SERPL-MCNC: 69 MG/DL (ref 6–20)
BUN/CREAT SERPL: 30 (ref 7–25)
CALCIUM SERPL-MCNC: 8.4 MG/DL (ref 8.4–10.2)
CHLORIDE SERPL-SCNC: 111 MMOL/L (ref 97–110)
CO2 SERPL-SCNC: 23 MMOL/L (ref 21–32)
CREAT SERPL-MCNC: 2.31 MG/DL (ref 0.67–1.17)
DEPRECATED RDW RBC: 46 FL (ref 39–50)
EGFRCR SERPLBLD CKD-EPI 2021: 27 ML/MIN/{1.73_M2}
ERYTHROCYTE [DISTWIDTH] IN BLOOD: 15.1 % (ref 11–15)
FASTING DURATION TIME PATIENT: ABNORMAL H
GLOBULIN SER-MCNC: 3 G/DL (ref 2–4)
GLUCOSE BLDC GLUCOMTR-MCNC: 155 MG/DL (ref 70–99)
GLUCOSE BLDC GLUCOMTR-MCNC: 209 MG/DL (ref 70–99)
GLUCOSE BLDC GLUCOMTR-MCNC: 216 MG/DL (ref 70–99)
GLUCOSE SERPL-MCNC: 257 MG/DL (ref 70–99)
HCT VFR BLD CALC: 30.8 % (ref 39–51)
HGB BLD-MCNC: 10 G/DL (ref 13–17)
L PNEUMO1 AG UR QL IA.RAPID: NORMAL
LACTATE BLDV-SCNC: 2 MMOL/L (ref 0–2)
LEFT INTERNAL DIMENSION IN SYSTOLE (LVSD): 0.9
LEFT VENTRICULAR INTERNAL DIMENSION IN DIASTOLE (LVDD): 4.2
LEFT VENTRICULAR POSTERIOR WALL IN END DIASTOLE (LVPW): 5.1
LV EF: NORMAL %
LVOT 2D (LVOTD): 20.7
LVOT VTI (LVOTVTI): 0.77
MAGNESIUM SERPL-MCNC: 2.6 MG/DL (ref 1.7–2.4)
MCH RBC QN AUTO: 26.8 PG (ref 26–34)
MCHC RBC AUTO-ENTMCNC: 32.5 G/DL (ref 32–36.5)
MCV RBC AUTO: 82.6 FL (ref 78–100)
MV PEAK A VELOCITY (MVPAV): 219
MV PEAK E VELOCITY (MVPEV): 0.88
NRBC BLD MANUAL-RTO: 0 /100 WBC
PHOSPHATE SERPL-MCNC: 4.6 MG/DL (ref 2.4–4.7)
PLATELET # BLD AUTO: 266 K/MCL (ref 140–450)
POTASSIUM SERPL-SCNC: 3.7 MMOL/L (ref 3.4–5.1)
PROT SERPL-MCNC: 5.9 G/DL (ref 6.4–8.2)
RBC # BLD: 3.73 MIL/MCL (ref 4.5–5.9)
RV END SYSTOLIC LONGITUDINAL STRAIN FREE WALL (RVGS): 2.4
S PNEUM AG UR QL IA.RAPID: NORMAL
SODIUM SERPL-SCNC: 141 MMOL/L (ref 135–145)
TRICUSPID VALVE PEAK REGURGITATION VELOCITY (TRPV): 3.2
WBC # BLD: 16.1 K/MCL (ref 4.2–11)

## 2024-03-24 PROCEDURE — 10002805 HB CONTRAST AGENT: Performed by: INTERNAL MEDICINE

## 2024-03-24 PROCEDURE — 97162 PT EVAL MOD COMPLEX 30 MIN: CPT

## 2024-03-24 PROCEDURE — 10002801 HB RX 250 W/O HCPCS: Performed by: INTERNAL MEDICINE

## 2024-03-24 PROCEDURE — 10002803 HB RX 637: Performed by: HOSPITALIST

## 2024-03-24 PROCEDURE — 10003585 HB ROOM CHARGE INTERMEDIATE CARE

## 2024-03-24 PROCEDURE — 10002807 HB RX 258: Performed by: INTERNAL MEDICINE

## 2024-03-24 PROCEDURE — 84100 ASSAY OF PHOSPHORUS: CPT | Performed by: INTERNAL MEDICINE

## 2024-03-24 PROCEDURE — 36415 COLL VENOUS BLD VENIPUNCTURE: CPT | Performed by: INTERNAL MEDICINE

## 2024-03-24 PROCEDURE — 80053 COMPREHEN METABOLIC PANEL: CPT | Performed by: INTERNAL MEDICINE

## 2024-03-24 PROCEDURE — 93325 DOPPLER ECHO COLOR FLOW MAPG: CPT | Performed by: INTERNAL MEDICINE

## 2024-03-24 PROCEDURE — 10002800 HB RX 250 W HCPCS: Performed by: INTERNAL MEDICINE

## 2024-03-24 PROCEDURE — 93325 DOPPLER ECHO COLOR FLOW MAPG: CPT

## 2024-03-24 PROCEDURE — 83605 ASSAY OF LACTIC ACID: CPT | Performed by: INTERNAL MEDICINE

## 2024-03-24 PROCEDURE — 85027 COMPLETE CBC AUTOMATED: CPT | Performed by: INTERNAL MEDICINE

## 2024-03-24 PROCEDURE — 97535 SELF CARE MNGMENT TRAINING: CPT

## 2024-03-24 PROCEDURE — 10002803 HB RX 637: Performed by: INTERNAL MEDICINE

## 2024-03-24 PROCEDURE — 96372 THER/PROPH/DIAG INJ SC/IM: CPT | Performed by: INTERNAL MEDICINE

## 2024-03-24 PROCEDURE — 93308 TTE F-UP OR LMTD: CPT | Performed by: INTERNAL MEDICINE

## 2024-03-24 PROCEDURE — 71045 X-RAY EXAM CHEST 1 VIEW: CPT

## 2024-03-24 PROCEDURE — 97530 THERAPEUTIC ACTIVITIES: CPT

## 2024-03-24 PROCEDURE — 93321 DOPPLER ECHO F-UP/LMTD STD: CPT | Performed by: INTERNAL MEDICINE

## 2024-03-24 PROCEDURE — 10002801 HB RX 250 W/O HCPCS

## 2024-03-24 PROCEDURE — 83735 ASSAY OF MAGNESIUM: CPT | Performed by: INTERNAL MEDICINE

## 2024-03-24 PROCEDURE — 97166 OT EVAL MOD COMPLEX 45 MIN: CPT

## 2024-03-24 RX ORDER — TRAMADOL HYDROCHLORIDE 50 MG/1
25 TABLET ORAL EVERY 6 HOURS PRN
Status: DISCONTINUED | OUTPATIENT
Start: 2024-03-24 | End: 2024-03-29 | Stop reason: HOSPADM

## 2024-03-24 RX ORDER — ATORVASTATIN CALCIUM 40 MG/1
40 TABLET, FILM COATED ORAL DAILY
Status: DISCONTINUED | OUTPATIENT
Start: 2024-03-24 | End: 2024-03-24

## 2024-03-24 RX ORDER — MIDODRINE HYDROCHLORIDE 5 MG/1
5 TABLET ORAL EVERY 8 HOURS SCHEDULED
Status: DISCONTINUED | OUTPATIENT
Start: 2024-03-24 | End: 2024-03-27

## 2024-03-24 RX ORDER — LANOLIN ALCOHOL/MO/W.PET/CERES
6 CREAM (GRAM) TOPICAL NIGHTLY
Status: DISCONTINUED | OUTPATIENT
Start: 2024-03-24 | End: 2024-03-29 | Stop reason: HOSPADM

## 2024-03-24 RX ORDER — TRAZODONE HYDROCHLORIDE 100 MG/1
50 TABLET ORAL NIGHTLY PRN
Status: DISCONTINUED | OUTPATIENT
Start: 2024-03-24 | End: 2024-03-29 | Stop reason: HOSPADM

## 2024-03-24 RX ORDER — LIDOCAINE HYDROCHLORIDE 20 MG/ML
10 JELLY TOPICAL ONCE
Status: COMPLETED | OUTPATIENT
Start: 2024-03-24 | End: 2024-03-24

## 2024-03-24 RX ORDER — DEXMEDETOMIDINE HYDROCHLORIDE 4 UG/ML
.2-1 INJECTION, SOLUTION INTRAVENOUS CONTINUOUS
Status: DISCONTINUED | OUTPATIENT
Start: 2024-03-24 | End: 2024-03-24

## 2024-03-24 RX ORDER — ACETAMINOPHEN 325 MG/1
650 TABLET ORAL EVERY 6 HOURS PRN
Status: DISCONTINUED | OUTPATIENT
Start: 2024-03-24 | End: 2024-03-29 | Stop reason: HOSPADM

## 2024-03-24 RX ORDER — LIDOCAINE HYDROCHLORIDE 20 MG/ML
5 JELLY TOPICAL 3 TIMES DAILY PRN
Status: DISCONTINUED | OUTPATIENT
Start: 2024-03-24 | End: 2024-03-29 | Stop reason: HOSPADM

## 2024-03-24 RX ORDER — ATORVASTATIN CALCIUM 40 MG/1
40 TABLET, FILM COATED ORAL NIGHTLY
Status: DISCONTINUED | OUTPATIENT
Start: 2024-03-24 | End: 2024-03-29 | Stop reason: HOSPADM

## 2024-03-24 RX ORDER — ATORVASTATIN CALCIUM 40 MG/1
40 TABLET, FILM COATED ORAL DAILY
Status: DISCONTINUED | OUTPATIENT
Start: 2024-03-25 | End: 2024-03-24

## 2024-03-24 RX ORDER — QUETIAPINE FUMARATE 25 MG/1
25 TABLET, FILM COATED ORAL NIGHTLY PRN
Status: DISCONTINUED | OUTPATIENT
Start: 2024-03-24 | End: 2024-03-29 | Stop reason: HOSPADM

## 2024-03-24 RX ORDER — DEXMEDETOMIDINE HYDROCHLORIDE 4 UG/ML
INJECTION, SOLUTION INTRAVENOUS
Status: COMPLETED
Start: 2024-03-24 | End: 2024-03-24

## 2024-03-24 RX ADMIN — DEXMEDETOMIDINE HYDROCHLORIDE 0.4 MCG/KG/HR: 400 INJECTION INTRAVENOUS at 02:05

## 2024-03-24 RX ADMIN — INSULIN LISPRO 4 UNITS: 100 INJECTION, SOLUTION INTRAVENOUS; SUBCUTANEOUS at 10:54

## 2024-03-24 RX ADMIN — PIPERACILLIN SODIUM AND TAZOBACTAM SODIUM 3.38 G: 3; .375 INJECTION, POWDER, FOR SOLUTION INTRAVENOUS at 05:30

## 2024-03-24 RX ADMIN — INSULIN LISPRO 4 UNITS: 100 INJECTION, SOLUTION INTRAVENOUS; SUBCUTANEOUS at 17:11

## 2024-03-24 RX ADMIN — ATORVASTATIN CALCIUM 40 MG: 40 TABLET, FILM COATED ORAL at 20:12

## 2024-03-24 RX ADMIN — DEXMEDETOMIDINE HYDROCHLORIDE 0.4 MCG/KG/HR: 4 INJECTION, SOLUTION INTRAVENOUS at 02:05

## 2024-03-24 RX ADMIN — PIPERACILLIN SODIUM AND TAZOBACTAM SODIUM 3.38 G: 3; .375 INJECTION, POWDER, FOR SOLUTION INTRAVENOUS at 13:19

## 2024-03-24 RX ADMIN — PERFLUTREN 2 ML: 6.52 INJECTION, SUSPENSION INTRAVENOUS at 11:46

## 2024-03-24 RX ADMIN — LIDOCAINE HYDROCHLORIDE 10 ML: 20 JELLY TOPICAL at 00:33

## 2024-03-24 RX ADMIN — MIDODRINE HYDROCHLORIDE 5 MG: 5 TABLET ORAL at 21:49

## 2024-03-24 RX ADMIN — TRAZODONE HYDROCHLORIDE 50 MG: 50 TABLET ORAL at 20:12

## 2024-03-24 RX ADMIN — INSULIN GLARGINE 15 UNITS: 100 INJECTION, SOLUTION SUBCUTANEOUS at 10:09

## 2024-03-24 RX ADMIN — MIDODRINE HYDROCHLORIDE 5 MG: 5 TABLET ORAL at 13:16

## 2024-03-24 RX ADMIN — Medication 6 MG: at 20:12

## 2024-03-24 RX ADMIN — TRAMADOL HYDROCHLORIDE 25 MG: 50 TABLET, COATED ORAL at 21:51

## 2024-03-24 RX ADMIN — FUROSEMIDE 10 MG/HR: 10 INJECTION, SOLUTION INTRAMUSCULAR; INTRAVENOUS at 10:11

## 2024-03-24 RX ADMIN — PIPERACILLIN SODIUM AND TAZOBACTAM SODIUM 3.38 G: 3; .375 INJECTION, POWDER, FOR SOLUTION INTRAVENOUS at 21:47

## 2024-03-24 ASSESSMENT — ORIENTATION MEMORY CONCENTRATION TEST (OMCT)
SAY THE MONTHS IN REVERSE ORDER STARTING WITH LAST MONTH: 1 ERROR
WHAT YEAR IS IT NOW (MUST BE EXACT): CORRECT
WHAT MONTH IS IT NOW: CORRECT
REPEAT THE NAME AND ADDRESS I ASKED YOU TO REMEMBER: 2 ERRORS
OMCT SCORE: 9
WHAT TIME IS IT (NO WATCH OR CLOCK): INCORRECT
COUNT BACKWARDS FROM 20 TO 1: CORRECT
OMCT INTERPRETATION: 7-10: MILD COGNITIVE IMPAIRMENT

## 2024-03-24 ASSESSMENT — COGNITIVE AND FUNCTIONAL STATUS - GENERAL
DAILY_ACTIVITY_CONVERTED_SCORE: 38.66
BASIC_MOBILITY_RAW_SCORE: 16
BASIC_MOBILITY_CONVERTED_SCORE: 38.32
DAILY_ACTIVITY_RAW_SCORE: 18
HELP NEEDED FOR BATHING: A LOT
HELP NEEDED DRESSING REGULAR LOWER BODY CLOTHING: A LOT
HELP NEEDED FOR TOILETING: A LOT

## 2024-03-24 ASSESSMENT — MOVEMENT AND STRENGTH ASSESSMENTS
LUE_ASSESSMENT: NORMAL/COMPLETE ROM AGAINST GRAVITY WITH FULL RESISTANCE
LUE_ASSESSMENT: NORMAL/COMPLETE ROM AGAINST GRAVITY WITH FULL RESISTANCE
RLE_ASSESSMENT: NORMAL/COMPLETE ROM AGAINST GRAVITY WITH FULL RESISTANCE
LUE_ASSESSMENT: NORMAL/COMPLETE ROM AGAINST GRAVITY WITH FULL RESISTANCE
RUE_ASSESSMENT: NORMAL/COMPLETE ROM AGAINST GRAVITY WITH FULL RESISTANCE
RUE_ASSESSMENT: NORMAL/COMPLETE ROM AGAINST GRAVITY WITH FULL RESISTANCE
LLE_ASSESSMENT: NORMAL/COMPLETE ROM AGAINST GRAVITY WITH FULL RESISTANCE
LLE_ASSESSMENT: NORMAL/COMPLETE ROM AGAINST GRAVITY WITH FULL RESISTANCE
RLE_ASSESSMENT: NORMAL/COMPLETE ROM AGAINST GRAVITY WITH FULL RESISTANCE
RLE_ASSESSMENT: NORMAL/COMPLETE ROM AGAINST GRAVITY WITH FULL RESISTANCE
RUE_ASSESSMENT: NORMAL/COMPLETE ROM AGAINST GRAVITY WITH FULL RESISTANCE
LLE_ASSESSMENT: NORMAL/COMPLETE ROM AGAINST GRAVITY WITH FULL RESISTANCE

## 2024-03-24 ASSESSMENT — PAIN SCALES - GENERAL
PAINLEVEL_OUTOF10: 0
PAINLEVEL_OUTOF10: 2
PAINLEVEL_OUTOF10: 0
PAINLEVEL_OUTOF10: 0
PAINLEVEL_OUTOF10: 2

## 2024-03-24 ASSESSMENT — ACTIVITIES OF DAILY LIVING (ADL)
PRIOR_ADL: INDEPENDENT
GROOMING: INDEPENDENT
PRIOR_ADL_BATHING: MODIFIED INDEPENDENT
PRIOR_ADL_TOILETING: INDEPENDENT
HOME_MANAGEMENT_TIME_ENTRY: 8
EATING: INDEPENDENT

## 2024-03-25 ENCOUNTER — APPOINTMENT (OUTPATIENT)
Dept: ULTRASOUND IMAGING | Age: 84
DRG: 871 | End: 2024-03-25
Attending: INTERNAL MEDICINE

## 2024-03-25 LAB
ALBUMIN SERPL-MCNC: 2.6 G/DL (ref 3.6–5.1)
ALBUMIN/GLOB SERPL: 0.8 {RATIO} (ref 1–2.4)
ALP SERPL-CCNC: 42 UNITS/L (ref 45–117)
ALT SERPL-CCNC: 22 UNITS/L
ANION GAP SERPL CALC-SCNC: 10 MMOL/L (ref 7–19)
ANION GAP SERPL CALC-SCNC: 14 MMOL/L (ref 7–19)
APPEARANCE UR: ABNORMAL
AST SERPL-CCNC: 101 UNITS/L
BACTERIA #/AREA URNS HPF: ABNORMAL /HPF
BILIRUB SERPL-MCNC: 0.9 MG/DL (ref 0.2–1)
BILIRUB UR QL STRIP: NEGATIVE
BUN SERPL-MCNC: 70 MG/DL (ref 6–20)
BUN SERPL-MCNC: 76 MG/DL (ref 6–20)
BUN/CREAT SERPL: 27 (ref 7–25)
BUN/CREAT SERPL: 28 (ref 7–25)
CALCIUM SERPL-MCNC: 8.5 MG/DL (ref 8.4–10.2)
CALCIUM SERPL-MCNC: 8.9 MG/DL (ref 8.4–10.2)
CHLORIDE SERPL-SCNC: 104 MMOL/L (ref 97–110)
CHLORIDE SERPL-SCNC: 109 MMOL/L (ref 97–110)
CO2 SERPL-SCNC: 20 MMOL/L (ref 21–32)
CO2 SERPL-SCNC: 27 MMOL/L (ref 21–32)
COLOR UR: ABNORMAL
CREAT SERPL-MCNC: 2.6 MG/DL (ref 0.67–1.17)
CREAT SERPL-MCNC: 2.74 MG/DL (ref 0.67–1.17)
CREAT UR-MCNC: 61.5 MG/DL
CREAT UR-MCNC: 62.6 MG/DL
DEPRECATED RDW RBC: 47.8 FL (ref 39–50)
EGFRCR SERPLBLD CKD-EPI 2021: 22 ML/MIN/{1.73_M2}
EGFRCR SERPLBLD CKD-EPI 2021: 24 ML/MIN/{1.73_M2}
ERYTHROCYTE [DISTWIDTH] IN BLOOD: 15.5 % (ref 11–15)
FASTING DURATION TIME PATIENT: ABNORMAL H
FASTING DURATION TIME PATIENT: ABNORMAL H
GLOBULIN SER-MCNC: 3.3 G/DL (ref 2–4)
GLUCOSE BLDC GLUCOMTR-MCNC: 167 MG/DL (ref 70–99)
GLUCOSE BLDC GLUCOMTR-MCNC: 172 MG/DL (ref 70–99)
GLUCOSE BLDC GLUCOMTR-MCNC: 195 MG/DL (ref 70–99)
GLUCOSE BLDC GLUCOMTR-MCNC: 284 MG/DL (ref 70–99)
GLUCOSE SERPL-MCNC: 171 MG/DL (ref 70–99)
GLUCOSE SERPL-MCNC: 175 MG/DL (ref 70–99)
GLUCOSE UR STRIP-MCNC: NEGATIVE MG/DL
HCT VFR BLD CALC: 32.1 % (ref 39–51)
HGB BLD-MCNC: 10.4 G/DL (ref 13–17)
HGB UR QL STRIP: ABNORMAL
HYALINE CASTS #/AREA URNS LPF: ABNORMAL /LPF
KETONES UR STRIP-MCNC: NEGATIVE MG/DL
LEUKOCYTE ESTERASE UR QL STRIP: ABNORMAL
MAGNESIUM SERPL-MCNC: 2.5 MG/DL (ref 1.7–2.4)
MCH RBC QN AUTO: 27.4 PG (ref 26–34)
MCHC RBC AUTO-ENTMCNC: 32.4 G/DL (ref 32–36.5)
MCV RBC AUTO: 84.5 FL (ref 78–100)
NITRITE UR QL STRIP: NEGATIVE
NRBC BLD MANUAL-RTO: 0 /100 WBC
NT-PROBNP SERPL-MCNC: ABNORMAL PG/ML
PH UR STRIP: 5 [PH] (ref 5–7)
PHOSPHATE SERPL-MCNC: 5.2 MG/DL (ref 2.4–4.7)
PLATELET # BLD AUTO: 255 K/MCL (ref 140–450)
POTASSIUM SERPL-SCNC: 3.2 MMOL/L (ref 3.4–5.1)
POTASSIUM SERPL-SCNC: 3.7 MMOL/L (ref 3.4–5.1)
PROCALCITONIN SERPL IA-MCNC: 0.26 NG/ML
PROT SERPL-MCNC: 5.9 G/DL (ref 6.4–8.2)
PROT UR STRIP-MCNC: 30 MG/DL
PROT UR-MCNC: 74 MG/DL
PROT/CREAT UR: 1203 MGPR/GCR
RBC # BLD: 3.8 MIL/MCL (ref 4.5–5.9)
RBC #/AREA URNS HPF: >100 /HPF
SODIUM SERPL-SCNC: 138 MMOL/L (ref 135–145)
SODIUM SERPL-SCNC: 139 MMOL/L (ref 135–145)
SODIUM UR-SCNC: 60 MMOL/L
SP GR UR STRIP: 1.01 (ref 1–1.03)
SQUAMOUS #/AREA URNS HPF: ABNORMAL /HPF
TSH SERPL-ACNC: 1.44 MCUNITS/ML (ref 0.35–5)
UROBILINOGEN UR STRIP-MCNC: 0.2 MG/DL
WBC # BLD: 15.6 K/MCL (ref 4.2–11)
WBC #/AREA URNS HPF: >100 /HPF

## 2024-03-25 PROCEDURE — 10002803 HB RX 637: Performed by: INTERNAL MEDICINE

## 2024-03-25 PROCEDURE — 85027 COMPLETE CBC AUTOMATED: CPT | Performed by: INTERNAL MEDICINE

## 2024-03-25 PROCEDURE — 83516 IMMUNOASSAY NONANTIBODY: CPT | Performed by: INTERNAL MEDICINE

## 2024-03-25 PROCEDURE — 86038 ANTINUCLEAR ANTIBODIES: CPT | Performed by: INTERNAL MEDICINE

## 2024-03-25 PROCEDURE — 96372 THER/PROPH/DIAG INJ SC/IM: CPT | Performed by: INTERNAL MEDICINE

## 2024-03-25 PROCEDURE — 80053 COMPREHEN METABOLIC PANEL: CPT | Performed by: INTERNAL MEDICINE

## 2024-03-25 PROCEDURE — 80048 BASIC METABOLIC PNL TOTAL CA: CPT | Performed by: INTERNAL MEDICINE

## 2024-03-25 PROCEDURE — 99223 1ST HOSP IP/OBS HIGH 75: CPT | Performed by: INTERNAL MEDICINE

## 2024-03-25 PROCEDURE — 36415 COLL VENOUS BLD VENIPUNCTURE: CPT | Performed by: INTERNAL MEDICINE

## 2024-03-25 PROCEDURE — 10004651 HB RX, NO CHARGE ITEM: Performed by: HOSPITALIST

## 2024-03-25 PROCEDURE — 82595 ASSAY OF CRYOGLOBULIN: CPT | Performed by: INTERNAL MEDICINE

## 2024-03-25 PROCEDURE — 83880 ASSAY OF NATRIURETIC PEPTIDE: CPT | Performed by: INTERNAL MEDICINE

## 2024-03-25 PROCEDURE — 10002800 HB RX 250 W HCPCS: Performed by: INTERNAL MEDICINE

## 2024-03-25 PROCEDURE — 10002807 HB RX 258: Performed by: INTERNAL MEDICINE

## 2024-03-25 PROCEDURE — 97535 SELF CARE MNGMENT TRAINING: CPT

## 2024-03-25 PROCEDURE — 86036 ANCA SCREEN EACH ANTIBODY: CPT | Performed by: INTERNAL MEDICINE

## 2024-03-25 PROCEDURE — 10002801 HB RX 250 W/O HCPCS: Performed by: HOSPITALIST

## 2024-03-25 PROCEDURE — 97530 THERAPEUTIC ACTIVITIES: CPT

## 2024-03-25 PROCEDURE — 10002803 HB RX 637: Performed by: HOSPITALIST

## 2024-03-25 PROCEDURE — 84100 ASSAY OF PHOSPHORUS: CPT | Performed by: INTERNAL MEDICINE

## 2024-03-25 PROCEDURE — 84145 PROCALCITONIN (PCT): CPT | Performed by: INTERNAL MEDICINE

## 2024-03-25 PROCEDURE — 86160 COMPLEMENT ANTIGEN: CPT | Performed by: INTERNAL MEDICINE

## 2024-03-25 PROCEDURE — 81001 URINALYSIS AUTO W/SCOPE: CPT | Performed by: INTERNAL MEDICINE

## 2024-03-25 PROCEDURE — 76775 US EXAM ABDO BACK WALL LIM: CPT

## 2024-03-25 PROCEDURE — 10006031 HB ROOM CHARGE TELEMETRY

## 2024-03-25 PROCEDURE — 82570 ASSAY OF URINE CREATININE: CPT | Performed by: INTERNAL MEDICINE

## 2024-03-25 PROCEDURE — 83735 ASSAY OF MAGNESIUM: CPT | Performed by: INTERNAL MEDICINE

## 2024-03-25 PROCEDURE — 84300 ASSAY OF URINE SODIUM: CPT | Performed by: INTERNAL MEDICINE

## 2024-03-25 RX ORDER — POTASSIUM CHLORIDE 20 MEQ/1
20 TABLET, EXTENDED RELEASE ORAL ONCE
Status: COMPLETED | OUTPATIENT
Start: 2024-03-25 | End: 2024-03-25

## 2024-03-25 RX ADMIN — INSULIN LISPRO 2 UNITS: 100 INJECTION, SOLUTION INTRAVENOUS; SUBCUTANEOUS at 08:59

## 2024-03-25 RX ADMIN — LIDOCAINE HYDROCHLORIDE 5 ML: 20 JELLY TOPICAL at 09:00

## 2024-03-25 RX ADMIN — MIDODRINE HYDROCHLORIDE 5 MG: 5 TABLET ORAL at 06:09

## 2024-03-25 RX ADMIN — Medication 6 MG: at 21:53

## 2024-03-25 RX ADMIN — MIDODRINE HYDROCHLORIDE 5 MG: 5 TABLET ORAL at 15:49

## 2024-03-25 RX ADMIN — POTASSIUM CHLORIDE 20 MEQ: 1500 TABLET, EXTENDED RELEASE ORAL at 21:53

## 2024-03-25 RX ADMIN — INSULIN LISPRO 6 UNITS: 100 INJECTION, SOLUTION INTRAVENOUS; SUBCUTANEOUS at 12:10

## 2024-03-25 RX ADMIN — PIPERACILLIN SODIUM AND TAZOBACTAM SODIUM 3.38 G: 3; .375 INJECTION, POWDER, FOR SOLUTION INTRAVENOUS at 15:48

## 2024-03-25 RX ADMIN — ATORVASTATIN CALCIUM 40 MG: 40 TABLET, FILM COATED ORAL at 21:53

## 2024-03-25 RX ADMIN — ACETAMINOPHEN 650 MG: 325 TABLET ORAL at 06:09

## 2024-03-25 RX ADMIN — INSULIN LISPRO 2 UNITS: 100 INJECTION, SOLUTION INTRAVENOUS; SUBCUTANEOUS at 18:06

## 2024-03-25 RX ADMIN — PIPERACILLIN SODIUM AND TAZOBACTAM SODIUM 3.38 G: 3; .375 INJECTION, POWDER, FOR SOLUTION INTRAVENOUS at 06:11

## 2024-03-25 RX ADMIN — PIPERACILLIN SODIUM AND TAZOBACTAM SODIUM 3.38 G: 3; .375 INJECTION, POWDER, FOR SOLUTION INTRAVENOUS at 21:58

## 2024-03-25 RX ADMIN — INSULIN GLARGINE 15 UNITS: 100 INJECTION, SOLUTION SUBCUTANEOUS at 08:59

## 2024-03-25 RX ADMIN — FUROSEMIDE 5 MG/HR: 10 INJECTION, SOLUTION INTRAMUSCULAR; INTRAVENOUS at 17:26

## 2024-03-25 RX ADMIN — MIDODRINE HYDROCHLORIDE 5 MG: 5 TABLET ORAL at 21:53

## 2024-03-25 ASSESSMENT — COGNITIVE AND FUNCTIONAL STATUS - GENERAL
DAILY_ACTIVITY_CONVERTED_SCORE: 44.27
HELP NEEDED FOR BATHING: A LITTLE
HELP NEEDED DRESSING REGULAR LOWER BODY CLOTHING: A LITTLE
DAILY_ACTIVITY_RAW_SCORE: 21
HELP NEEDED FOR TOILETING: A LITTLE

## 2024-03-25 ASSESSMENT — PAIN SCALES - GENERAL
PAINLEVEL_OUTOF10: 0
PAINLEVEL_OUTOF10: 3
PAINLEVEL_OUTOF10: 1
PAINLEVEL_OUTOF10: 0
PAINLEVEL_OUTOF10: 1
PAINLEVEL_OUTOF10: 0
PAINLEVEL_OUTOF10: 1
PAINLEVEL_OUTOF10: 0

## 2024-03-25 ASSESSMENT — ACTIVITIES OF DAILY LIVING (ADL): HOME_MANAGEMENT_TIME_ENTRY: 14

## 2024-03-26 LAB
ALBUMIN SERPL-MCNC: 2.7 G/DL (ref 3.6–5.1)
ALBUMIN/GLOB SERPL: 0.7 {RATIO} (ref 1–2.4)
ALP SERPL-CCNC: 45 UNITS/L (ref 45–117)
ALT SERPL-CCNC: 24 UNITS/L
ANA SER QL IA: NEGATIVE
ANION GAP SERPL CALC-SCNC: 11 MMOL/L (ref 7–19)
AST SERPL-CCNC: 64 UNITS/L
BILIRUB SERPL-MCNC: 1 MG/DL (ref 0.2–1)
BUN SERPL-MCNC: 68 MG/DL (ref 6–20)
BUN/CREAT SERPL: 29 (ref 7–25)
C3 SERPL-MCNC: 103 MG/DL (ref 90–180)
C4 SERPL-MCNC: 22.4 MG/DL (ref 10–40)
CALCIUM SERPL-MCNC: 8.6 MG/DL (ref 8.4–10.2)
CHLORIDE SERPL-SCNC: 106 MMOL/L (ref 97–110)
CO2 SERPL-SCNC: 26 MMOL/L (ref 21–32)
CREAT SERPL-MCNC: 2.35 MG/DL (ref 0.67–1.17)
DEPRECATED RDW RBC: 46.6 FL (ref 39–50)
EGFRCR SERPLBLD CKD-EPI 2021: 27 ML/MIN/{1.73_M2}
ERYTHROCYTE [DISTWIDTH] IN BLOOD: 15.5 % (ref 11–15)
FASTING DURATION TIME PATIENT: ABNORMAL H
GLOBULIN SER-MCNC: 3.7 G/DL (ref 2–4)
GLUCOSE BLDC GLUCOMTR-MCNC: 126 MG/DL (ref 70–99)
GLUCOSE BLDC GLUCOMTR-MCNC: 155 MG/DL (ref 70–99)
GLUCOSE BLDC GLUCOMTR-MCNC: 172 MG/DL (ref 70–99)
GLUCOSE BLDC GLUCOMTR-MCNC: 241 MG/DL (ref 70–99)
GLUCOSE SERPL-MCNC: 164 MG/DL (ref 70–99)
HCT VFR BLD CALC: 32.7 % (ref 39–51)
HGB BLD-MCNC: 10.7 G/DL (ref 13–17)
MAGNESIUM SERPL-MCNC: 2.5 MG/DL (ref 1.7–2.4)
MCH RBC QN AUTO: 26.8 PG (ref 26–34)
MCHC RBC AUTO-ENTMCNC: 32.7 G/DL (ref 32–36.5)
MCV RBC AUTO: 82 FL (ref 78–100)
MYELOPEROXIDASE AB SER-ACNC: <0.2 AI
NRBC BLD MANUAL-RTO: 0 /100 WBC
PHOSPHATE SERPL-MCNC: 3.7 MG/DL (ref 2.4–4.7)
PLATELET # BLD AUTO: 310 K/MCL (ref 140–450)
POTASSIUM SERPL-SCNC: 2.7 MMOL/L (ref 3.4–5.1)
POTASSIUM SERPL-SCNC: 3 MMOL/L (ref 3.4–5.1)
PROT SERPL-MCNC: 6.4 G/DL (ref 6.4–8.2)
PROTEINASE3 AB SER-ACNC: <0.2 AI
RAINBOW EXTRA TUBES HOLD SPECIMEN: NORMAL
RBC # BLD: 3.99 MIL/MCL (ref 4.5–5.9)
SODIUM SERPL-SCNC: 140 MMOL/L (ref 135–145)
UUN UR-MCNC: 328 MG/DL
WBC # BLD: 12.1 K/MCL (ref 4.2–11)

## 2024-03-26 PROCEDURE — 84132 ASSAY OF SERUM POTASSIUM: CPT | Performed by: HOSPITALIST

## 2024-03-26 PROCEDURE — 10002807 HB RX 258: Performed by: STUDENT IN AN ORGANIZED HEALTH CARE EDUCATION/TRAINING PROGRAM

## 2024-03-26 PROCEDURE — 99233 SBSQ HOSP IP/OBS HIGH 50: CPT | Performed by: INTERNAL MEDICINE

## 2024-03-26 PROCEDURE — 84540 ASSAY OF URINE/UREA-N: CPT | Performed by: INTERNAL MEDICINE

## 2024-03-26 PROCEDURE — 80053 COMPREHEN METABOLIC PANEL: CPT | Performed by: INTERNAL MEDICINE

## 2024-03-26 PROCEDURE — 10002803 HB RX 637: Performed by: HOSPITALIST

## 2024-03-26 PROCEDURE — 96372 THER/PROPH/DIAG INJ SC/IM: CPT | Performed by: INTERNAL MEDICINE

## 2024-03-26 PROCEDURE — 10002800 HB RX 250 W HCPCS: Performed by: INTERNAL MEDICINE

## 2024-03-26 PROCEDURE — 83735 ASSAY OF MAGNESIUM: CPT | Performed by: INTERNAL MEDICINE

## 2024-03-26 PROCEDURE — 10002800 HB RX 250 W HCPCS: Performed by: HOSPITALIST

## 2024-03-26 PROCEDURE — 36415 COLL VENOUS BLD VENIPUNCTURE: CPT | Performed by: INTERNAL MEDICINE

## 2024-03-26 PROCEDURE — 10002807 HB RX 258: Performed by: INTERNAL MEDICINE

## 2024-03-26 PROCEDURE — 10006031 HB ROOM CHARGE TELEMETRY

## 2024-03-26 PROCEDURE — 10002803 HB RX 637: Performed by: INTERNAL MEDICINE

## 2024-03-26 PROCEDURE — 84100 ASSAY OF PHOSPHORUS: CPT | Performed by: INTERNAL MEDICINE

## 2024-03-26 PROCEDURE — 97530 THERAPEUTIC ACTIVITIES: CPT

## 2024-03-26 PROCEDURE — 85027 COMPLETE CBC AUTOMATED: CPT | Performed by: INTERNAL MEDICINE

## 2024-03-26 RX ORDER — POTASSIUM CHLORIDE 14.9 MG/ML
20 INJECTION INTRAVENOUS ONCE
Status: COMPLETED | OUTPATIENT
Start: 2024-03-26 | End: 2024-03-26

## 2024-03-26 RX ORDER — FUROSEMIDE 10 MG/ML
40 INJECTION INTRAMUSCULAR; INTRAVENOUS
Status: DISCONTINUED | OUTPATIENT
Start: 2024-03-26 | End: 2024-03-27

## 2024-03-26 RX ORDER — POTASSIUM CHLORIDE 14.9 MG/ML
20 INJECTION INTRAVENOUS ONCE
Status: DISCONTINUED | OUTPATIENT
Start: 2024-03-26 | End: 2024-03-26

## 2024-03-26 RX ORDER — POTASSIUM CHLORIDE 20 MEQ/1
40 TABLET, EXTENDED RELEASE ORAL ONCE
Status: COMPLETED | OUTPATIENT
Start: 2024-03-26 | End: 2024-03-26

## 2024-03-26 RX ADMIN — MIDODRINE HYDROCHLORIDE 5 MG: 5 TABLET ORAL at 05:09

## 2024-03-26 RX ADMIN — INSULIN LISPRO 2 UNITS: 100 INJECTION, SOLUTION INTRAVENOUS; SUBCUTANEOUS at 13:50

## 2024-03-26 RX ADMIN — INSULIN LISPRO 2 UNITS: 100 INJECTION, SOLUTION INTRAVENOUS; SUBCUTANEOUS at 08:49

## 2024-03-26 RX ADMIN — Medication 6 MG: at 20:27

## 2024-03-26 RX ADMIN — POTASSIUM CHLORIDE 20 MEQ: 14.9 INJECTION, SOLUTION INTRAVENOUS at 09:17

## 2024-03-26 RX ADMIN — PIPERACILLIN SODIUM AND TAZOBACTAM SODIUM 3.38 G: 3; .375 INJECTION, POWDER, FOR SOLUTION INTRAVENOUS at 21:28

## 2024-03-26 RX ADMIN — MIDODRINE HYDROCHLORIDE 5 MG: 5 TABLET ORAL at 21:21

## 2024-03-26 RX ADMIN — SODIUM CHLORIDE: 9 INJECTION, SOLUTION INTRAVENOUS at 21:28

## 2024-03-26 RX ADMIN — INSULIN LISPRO 4 UNITS: 100 INJECTION, SOLUTION INTRAVENOUS; SUBCUTANEOUS at 17:29

## 2024-03-26 RX ADMIN — ATORVASTATIN CALCIUM 40 MG: 40 TABLET, FILM COATED ORAL at 20:27

## 2024-03-26 RX ADMIN — PIPERACILLIN SODIUM AND TAZOBACTAM SODIUM 3.38 G: 3; .375 INJECTION, POWDER, FOR SOLUTION INTRAVENOUS at 05:12

## 2024-03-26 RX ADMIN — PIPERACILLIN SODIUM AND TAZOBACTAM SODIUM 3.38 G: 3; .375 INJECTION, POWDER, FOR SOLUTION INTRAVENOUS at 13:49

## 2024-03-26 RX ADMIN — FUROSEMIDE 40 MG: 10 INJECTION, SOLUTION INTRAMUSCULAR; INTRAVENOUS at 17:29

## 2024-03-26 RX ADMIN — MIDODRINE HYDROCHLORIDE 5 MG: 5 TABLET ORAL at 13:49

## 2024-03-26 RX ADMIN — POTASSIUM CHLORIDE 20 MEQ: 14.9 INJECTION, SOLUTION INTRAVENOUS at 13:44

## 2024-03-26 RX ADMIN — FUROSEMIDE 40 MG: 10 INJECTION, SOLUTION INTRAMUSCULAR; INTRAVENOUS at 11:27

## 2024-03-26 RX ADMIN — INSULIN GLARGINE 15 UNITS: 100 INJECTION, SOLUTION SUBCUTANEOUS at 08:50

## 2024-03-26 RX ADMIN — POTASSIUM CHLORIDE 40 MEQ: 1500 TABLET, EXTENDED RELEASE ORAL at 20:27

## 2024-03-26 ASSESSMENT — COGNITIVE AND FUNCTIONAL STATUS - GENERAL
BASIC_MOBILITY_CONVERTED_SCORE: 41.05
BASIC_MOBILITY_RAW_SCORE: 18

## 2024-03-26 ASSESSMENT — PAIN SCALES - GENERAL
PAINLEVEL_OUTOF10: 0

## 2024-03-27 LAB
ALBUMIN SERPL-MCNC: 2.5 G/DL (ref 3.6–5.1)
ALBUMIN/GLOB SERPL: 0.7 {RATIO} (ref 1–2.4)
ALP SERPL-CCNC: 43 UNITS/L (ref 45–117)
ALT SERPL-CCNC: 20 UNITS/L
ANION GAP SERPL CALC-SCNC: 10 MMOL/L (ref 7–19)
AST SERPL-CCNC: 42 UNITS/L
BILIRUB SERPL-MCNC: 1 MG/DL (ref 0.2–1)
BUN SERPL-MCNC: 62 MG/DL (ref 6–20)
BUN/CREAT SERPL: 30 (ref 7–25)
CALCIUM SERPL-MCNC: 8.5 MG/DL (ref 8.4–10.2)
CHLORIDE SERPL-SCNC: 106 MMOL/L (ref 97–110)
CO2 SERPL-SCNC: 28 MMOL/L (ref 21–32)
CREAT SERPL-MCNC: 2.06 MG/DL (ref 0.67–1.17)
DEPRECATED RDW RBC: 46.8 FL (ref 39–50)
EGFRCR SERPLBLD CKD-EPI 2021: 31 ML/MIN/{1.73_M2}
ERYTHROCYTE [DISTWIDTH] IN BLOOD: 15.6 % (ref 11–15)
FASTING DURATION TIME PATIENT: ABNORMAL H
GLOBULIN SER-MCNC: 3.6 G/DL (ref 2–4)
GLUCOSE BLDC GLUCOMTR-MCNC: 122 MG/DL (ref 70–99)
GLUCOSE BLDC GLUCOMTR-MCNC: 134 MG/DL (ref 70–99)
GLUCOSE BLDC GLUCOMTR-MCNC: 150 MG/DL (ref 70–99)
GLUCOSE BLDC GLUCOMTR-MCNC: 211 MG/DL (ref 70–99)
GLUCOSE SERPL-MCNC: 131 MG/DL (ref 70–99)
HCT VFR BLD CALC: 32.2 % (ref 39–51)
HGB BLD-MCNC: 10.4 G/DL (ref 13–17)
MAGNESIUM SERPL-MCNC: 2.4 MG/DL (ref 1.7–2.4)
MCH RBC QN AUTO: 26.9 PG (ref 26–34)
MCHC RBC AUTO-ENTMCNC: 32.3 G/DL (ref 32–36.5)
MCV RBC AUTO: 83.2 FL (ref 78–100)
NRBC BLD MANUAL-RTO: 0 /100 WBC
NT-PROBNP SERPL-MCNC: ABNORMAL PG/ML
PHOSPHATE SERPL-MCNC: 3.5 MG/DL (ref 2.4–4.7)
PLATELET # BLD AUTO: 281 K/MCL (ref 140–450)
POTASSIUM SERPL-SCNC: 3.1 MMOL/L (ref 3.4–5.1)
PROT SERPL-MCNC: 6.1 G/DL (ref 6.4–8.2)
RBC # BLD: 3.87 MIL/MCL (ref 4.5–5.9)
SODIUM SERPL-SCNC: 141 MMOL/L (ref 135–145)
WBC # BLD: 10.7 K/MCL (ref 4.2–11)

## 2024-03-27 PROCEDURE — 96372 THER/PROPH/DIAG INJ SC/IM: CPT | Performed by: INTERNAL MEDICINE

## 2024-03-27 PROCEDURE — 84100 ASSAY OF PHOSPHORUS: CPT | Performed by: INTERNAL MEDICINE

## 2024-03-27 PROCEDURE — 85027 COMPLETE CBC AUTOMATED: CPT | Performed by: INTERNAL MEDICINE

## 2024-03-27 PROCEDURE — 10002803 HB RX 637: Performed by: INTERNAL MEDICINE

## 2024-03-27 PROCEDURE — 99233 SBSQ HOSP IP/OBS HIGH 50: CPT | Performed by: INTERNAL MEDICINE

## 2024-03-27 PROCEDURE — 10002800 HB RX 250 W HCPCS: Performed by: INTERNAL MEDICINE

## 2024-03-27 PROCEDURE — 10006031 HB ROOM CHARGE TELEMETRY

## 2024-03-27 PROCEDURE — 97530 THERAPEUTIC ACTIVITIES: CPT

## 2024-03-27 PROCEDURE — 96372 THER/PROPH/DIAG INJ SC/IM: CPT | Performed by: HOSPITALIST

## 2024-03-27 PROCEDURE — 10002800 HB RX 250 W HCPCS: Performed by: HOSPITALIST

## 2024-03-27 PROCEDURE — 97535 SELF CARE MNGMENT TRAINING: CPT

## 2024-03-27 PROCEDURE — 36415 COLL VENOUS BLD VENIPUNCTURE: CPT | Performed by: INTERNAL MEDICINE

## 2024-03-27 PROCEDURE — 83735 ASSAY OF MAGNESIUM: CPT | Performed by: INTERNAL MEDICINE

## 2024-03-27 PROCEDURE — 80053 COMPREHEN METABOLIC PANEL: CPT | Performed by: INTERNAL MEDICINE

## 2024-03-27 PROCEDURE — 10002807 HB RX 258: Performed by: INTERNAL MEDICINE

## 2024-03-27 PROCEDURE — 97116 GAIT TRAINING THERAPY: CPT

## 2024-03-27 PROCEDURE — 83880 ASSAY OF NATRIURETIC PEPTIDE: CPT | Performed by: INTERNAL MEDICINE

## 2024-03-27 PROCEDURE — 10002803 HB RX 637: Performed by: HOSPITALIST

## 2024-03-27 RX ORDER — INSULIN GLARGINE 100 [IU]/ML
5 INJECTION, SOLUTION SUBCUTANEOUS ONCE
Status: COMPLETED | OUTPATIENT
Start: 2024-03-27 | End: 2024-03-27

## 2024-03-27 RX ORDER — METOPROLOL SUCCINATE 25 MG/1
25 TABLET, EXTENDED RELEASE ORAL DAILY
Status: DISCONTINUED | OUTPATIENT
Start: 2024-03-27 | End: 2024-03-29 | Stop reason: HOSPADM

## 2024-03-27 RX ORDER — POTASSIUM CHLORIDE 20 MEQ/1
40 TABLET, EXTENDED RELEASE ORAL
Status: DISCONTINUED | OUTPATIENT
Start: 2024-03-28 | End: 2024-03-27

## 2024-03-27 RX ORDER — INSULIN GLARGINE 100 [IU]/ML
20 INJECTION, SOLUTION SUBCUTANEOUS DAILY
Status: DISCONTINUED | OUTPATIENT
Start: 2024-03-28 | End: 2024-03-29 | Stop reason: HOSPADM

## 2024-03-27 RX ORDER — FUROSEMIDE 10 MG/ML
40 INJECTION INTRAMUSCULAR; INTRAVENOUS DAILY
Status: DISCONTINUED | OUTPATIENT
Start: 2024-03-28 | End: 2024-03-28

## 2024-03-27 RX ORDER — POTASSIUM CHLORIDE 20 MEQ/1
40 TABLET, EXTENDED RELEASE ORAL ONCE
Status: COMPLETED | OUTPATIENT
Start: 2024-03-27 | End: 2024-03-27

## 2024-03-27 RX ADMIN — PIPERACILLIN SODIUM AND TAZOBACTAM SODIUM 3.38 G: 3; .375 INJECTION, POWDER, FOR SOLUTION INTRAVENOUS at 13:13

## 2024-03-27 RX ADMIN — INSULIN GLARGINE 15 UNITS: 100 INJECTION, SOLUTION SUBCUTANEOUS at 08:19

## 2024-03-27 RX ADMIN — FUROSEMIDE 40 MG: 10 INJECTION, SOLUTION INTRAMUSCULAR; INTRAVENOUS at 08:19

## 2024-03-27 RX ADMIN — INSULIN GLARGINE 5 UNITS: 100 INJECTION, SOLUTION SUBCUTANEOUS at 13:09

## 2024-03-27 RX ADMIN — PIPERACILLIN SODIUM AND TAZOBACTAM SODIUM 3.38 G: 3; .375 INJECTION, POWDER, FOR SOLUTION INTRAVENOUS at 21:58

## 2024-03-27 RX ADMIN — MIDODRINE HYDROCHLORIDE 5 MG: 5 TABLET ORAL at 05:58

## 2024-03-27 RX ADMIN — METOPROLOL SUCCINATE 25 MG: 25 TABLET, EXTENDED RELEASE ORAL at 13:09

## 2024-03-27 RX ADMIN — INSULIN LISPRO 4 UNITS: 100 INJECTION, SOLUTION INTRAVENOUS; SUBCUTANEOUS at 13:09

## 2024-03-27 RX ADMIN — PIPERACILLIN SODIUM AND TAZOBACTAM SODIUM 3.38 G: 3; .375 INJECTION, POWDER, FOR SOLUTION INTRAVENOUS at 06:05

## 2024-03-27 RX ADMIN — Medication 6 MG: at 21:55

## 2024-03-27 RX ADMIN — POTASSIUM CHLORIDE 40 MEQ: 1500 TABLET, EXTENDED RELEASE ORAL at 19:46

## 2024-03-27 RX ADMIN — ATORVASTATIN CALCIUM 40 MG: 40 TABLET, FILM COATED ORAL at 21:55

## 2024-03-27 ASSESSMENT — COGNITIVE AND FUNCTIONAL STATUS - GENERAL
BASIC_MOBILITY_CONVERTED_SCORE: 41.05
BASIC_MOBILITY_RAW_SCORE: 18
DAILY_ACTIVITY_CONVERTED_SCORE: 57.54
DAILY_ACTIVITY_RAW_SCORE: 24

## 2024-03-27 ASSESSMENT — PAIN SCALES - GENERAL
PAINLEVEL_OUTOF10: 0
PAINLEVEL_OUTOF10: 0

## 2024-03-27 ASSESSMENT — ACTIVITIES OF DAILY LIVING (ADL): HOME_MANAGEMENT_TIME_ENTRY: 20

## 2024-03-28 LAB
ALBUMIN SERPL-MCNC: 2.7 G/DL (ref 3.6–5.1)
ALBUMIN/GLOB SERPL: 0.7 {RATIO} (ref 1–2.4)
ALP SERPL-CCNC: 48 UNITS/L (ref 45–117)
ALT SERPL-CCNC: 21 UNITS/L
ANCA AB PATTERN SER IF-IMP: NORMAL
ANCA IGG TITR SER IF: NORMAL {TITER}
ANION GAP SERPL CALC-SCNC: 9 MMOL/L (ref 7–19)
AST SERPL-CCNC: 32 UNITS/L
BACTERIA BLD CULT: NORMAL
BACTERIA BLD CULT: NORMAL
BILIRUB SERPL-MCNC: 0.9 MG/DL (ref 0.2–1)
BUN SERPL-MCNC: 60 MG/DL (ref 6–20)
BUN/CREAT SERPL: 33 (ref 7–25)
CALCIUM SERPL-MCNC: 9 MG/DL (ref 8.4–10.2)
CHLORIDE SERPL-SCNC: 105 MMOL/L (ref 97–110)
CO2 SERPL-SCNC: 30 MMOL/L (ref 21–32)
CREAT SERPL-MCNC: 1.8 MG/DL (ref 0.67–1.17)
DEPRECATED RDW RBC: 46.2 FL (ref 39–50)
EGFRCR SERPLBLD CKD-EPI 2021: 37 ML/MIN/{1.73_M2}
ERYTHROCYTE [DISTWIDTH] IN BLOOD: 15.2 % (ref 11–15)
FASTING DURATION TIME PATIENT: ABNORMAL H
GLOBULIN SER-MCNC: 3.8 G/DL (ref 2–4)
GLUCOSE BLDC GLUCOMTR-MCNC: 115 MG/DL (ref 70–99)
GLUCOSE BLDC GLUCOMTR-MCNC: 138 MG/DL (ref 70–99)
GLUCOSE BLDC GLUCOMTR-MCNC: 190 MG/DL (ref 70–99)
GLUCOSE BLDC GLUCOMTR-MCNC: 193 MG/DL (ref 70–99)
GLUCOSE SERPL-MCNC: 120 MG/DL (ref 70–99)
HCT VFR BLD CALC: 35.3 % (ref 39–51)
HGB BLD-MCNC: 11 G/DL (ref 13–17)
MAGNESIUM SERPL-MCNC: 2.6 MG/DL (ref 1.7–2.4)
MCH RBC QN AUTO: 26.2 PG (ref 26–34)
MCHC RBC AUTO-ENTMCNC: 31.2 G/DL (ref 32–36.5)
MCV RBC AUTO: 84 FL (ref 78–100)
NRBC BLD MANUAL-RTO: 0 /100 WBC
PHOSPHATE SERPL-MCNC: 3.4 MG/DL (ref 2.4–4.7)
PLATELET # BLD AUTO: 310 K/MCL (ref 140–450)
POTASSIUM SERPL-SCNC: 3.2 MMOL/L (ref 3.4–5.1)
POTASSIUM SERPL-SCNC: 3.4 MMOL/L (ref 3.4–5.1)
POTASSIUM SERPL-SCNC: 3.9 MMOL/L (ref 3.4–5.1)
PROT SERPL-MCNC: 6.5 G/DL (ref 6.4–8.2)
RBC # BLD: 4.2 MIL/MCL (ref 4.5–5.9)
SODIUM SERPL-SCNC: 141 MMOL/L (ref 135–145)
WBC # BLD: 9.3 K/MCL (ref 4.2–11)

## 2024-03-28 PROCEDURE — 96372 THER/PROPH/DIAG INJ SC/IM: CPT | Performed by: HOSPITALIST

## 2024-03-28 PROCEDURE — 36415 COLL VENOUS BLD VENIPUNCTURE: CPT | Performed by: HOSPITALIST

## 2024-03-28 PROCEDURE — 84100 ASSAY OF PHOSPHORUS: CPT | Performed by: INTERNAL MEDICINE

## 2024-03-28 PROCEDURE — 10002807 HB RX 258: Performed by: INTERNAL MEDICINE

## 2024-03-28 PROCEDURE — 10002803 HB RX 637: Performed by: INTERNAL MEDICINE

## 2024-03-28 PROCEDURE — 80053 COMPREHEN METABOLIC PANEL: CPT | Performed by: HOSPITALIST

## 2024-03-28 PROCEDURE — 10002800 HB RX 250 W HCPCS: Performed by: HOSPITALIST

## 2024-03-28 PROCEDURE — 87493 C DIFF AMPLIFIED PROBE: CPT | Performed by: HOSPITALIST

## 2024-03-28 PROCEDURE — 10002800 HB RX 250 W HCPCS: Performed by: INTERNAL MEDICINE

## 2024-03-28 PROCEDURE — 96372 THER/PROPH/DIAG INJ SC/IM: CPT | Performed by: INTERNAL MEDICINE

## 2024-03-28 PROCEDURE — 84132 ASSAY OF SERUM POTASSIUM: CPT | Performed by: HOSPITALIST

## 2024-03-28 PROCEDURE — 83735 ASSAY OF MAGNESIUM: CPT | Performed by: INTERNAL MEDICINE

## 2024-03-28 PROCEDURE — 99233 SBSQ HOSP IP/OBS HIGH 50: CPT | Performed by: INTERNAL MEDICINE

## 2024-03-28 PROCEDURE — 10002803 HB RX 637: Performed by: HOSPITALIST

## 2024-03-28 PROCEDURE — 10006031 HB ROOM CHARGE TELEMETRY

## 2024-03-28 PROCEDURE — 85027 COMPLETE CBC AUTOMATED: CPT | Performed by: HOSPITALIST

## 2024-03-28 RX ORDER — FUROSEMIDE 20 MG/1
20 TABLET ORAL DAILY
Status: DISCONTINUED | OUTPATIENT
Start: 2024-03-28 | End: 2024-03-29 | Stop reason: HOSPADM

## 2024-03-28 RX ORDER — HYDRALAZINE HYDROCHLORIDE 10 MG/1
10 TABLET, FILM COATED ORAL 3 TIMES DAILY
Status: DISCONTINUED | OUTPATIENT
Start: 2024-03-28 | End: 2024-03-29 | Stop reason: HOSPADM

## 2024-03-28 RX ORDER — CEFUROXIME AXETIL 250 MG/1
500 TABLET ORAL EVERY 12 HOURS SCHEDULED
Status: DISCONTINUED | OUTPATIENT
Start: 2024-03-28 | End: 2024-03-29 | Stop reason: HOSPADM

## 2024-03-28 RX ORDER — METOPROLOL SUCCINATE 25 MG/1
25 TABLET, EXTENDED RELEASE ORAL DAILY
Qty: 90 TABLET | Refills: 0 | Status: SHIPPED | OUTPATIENT
Start: 2024-03-29

## 2024-03-28 RX ORDER — INSULIN GLARGINE 100 [IU]/ML
20 INJECTION, SOLUTION SUBCUTANEOUS DAILY
Status: SHIPPED | COMMUNITY
Start: 2024-03-28

## 2024-03-28 RX ORDER — ISOSORBIDE DINITRATE 10 MG/1
10 TABLET ORAL
Status: DISCONTINUED | OUTPATIENT
Start: 2024-03-28 | End: 2024-03-29

## 2024-03-28 RX ORDER — POTASSIUM CHLORIDE 20 MEQ/1
40 TABLET, EXTENDED RELEASE ORAL ONCE
Status: COMPLETED | OUTPATIENT
Start: 2024-03-28 | End: 2024-03-28

## 2024-03-28 RX ORDER — FUROSEMIDE 20 MG/1
20 TABLET ORAL DAILY
Qty: 90 TABLET | Refills: 0 | Status: SHIPPED | OUTPATIENT
Start: 2024-03-29

## 2024-03-28 RX ADMIN — PIPERACILLIN SODIUM AND TAZOBACTAM SODIUM 3.38 G: 3; .375 INJECTION, POWDER, FOR SOLUTION INTRAVENOUS at 05:05

## 2024-03-28 RX ADMIN — METOPROLOL SUCCINATE 25 MG: 25 TABLET, EXTENDED RELEASE ORAL at 10:06

## 2024-03-28 RX ADMIN — INSULIN GLARGINE 20 UNITS: 100 INJECTION, SOLUTION SUBCUTANEOUS at 10:06

## 2024-03-28 RX ADMIN — CEFUROXIME AXETIL 500 MG: 250 TABLET ORAL at 21:20

## 2024-03-28 RX ADMIN — INSULIN LISPRO 2 UNITS: 100 INJECTION, SOLUTION INTRAVENOUS; SUBCUTANEOUS at 12:56

## 2024-03-28 RX ADMIN — PIPERACILLIN SODIUM AND TAZOBACTAM SODIUM 3.38 G: 3; .375 INJECTION, POWDER, FOR SOLUTION INTRAVENOUS at 13:02

## 2024-03-28 RX ADMIN — FUROSEMIDE 20 MG: 20 TABLET ORAL at 12:56

## 2024-03-28 RX ADMIN — ATORVASTATIN CALCIUM 40 MG: 40 TABLET, FILM COATED ORAL at 21:20

## 2024-03-28 RX ADMIN — QUETIAPINE FUMARATE 25 MG: 25 TABLET, FILM COATED ORAL at 21:20

## 2024-03-28 RX ADMIN — Medication 6 MG: at 21:20

## 2024-03-28 RX ADMIN — HYDRALAZINE HYDROCHLORIDE 10 MG: 10 TABLET, FILM COATED ORAL at 21:20

## 2024-03-28 RX ADMIN — POTASSIUM CHLORIDE 40 MEQ: 1500 TABLET, EXTENDED RELEASE ORAL at 17:40

## 2024-03-28 RX ADMIN — POTASSIUM CHLORIDE 40 MEQ: 1500 TABLET, EXTENDED RELEASE ORAL at 10:06

## 2024-03-28 ASSESSMENT — PAIN SCALES - GENERAL
PAINLEVEL_OUTOF10: 0
PAINLEVEL_OUTOF10: 0

## 2024-03-29 VITALS
HEIGHT: 69 IN | BODY MASS INDEX: 23.64 KG/M2 | OXYGEN SATURATION: 93 % | DIASTOLIC BLOOD PRESSURE: 57 MMHG | WEIGHT: 159.61 LBS | RESPIRATION RATE: 16 BRPM | TEMPERATURE: 97.2 F | SYSTOLIC BLOOD PRESSURE: 128 MMHG | HEART RATE: 64 BPM

## 2024-03-29 LAB
ALBUMIN SERPL-MCNC: 2.5 G/DL (ref 3.6–5.1)
ALBUMIN/GLOB SERPL: 0.7 {RATIO} (ref 1–2.4)
ALP SERPL-CCNC: 48 UNITS/L (ref 45–117)
ALT SERPL-CCNC: 20 UNITS/L
ANION GAP SERPL CALC-SCNC: 10 MMOL/L (ref 7–19)
AST SERPL-CCNC: 27 UNITS/L
BILIRUB SERPL-MCNC: 0.7 MG/DL (ref 0.2–1)
BUN SERPL-MCNC: 56 MG/DL (ref 6–20)
BUN/CREAT SERPL: 34 (ref 7–25)
C DIFF TOX B TCDB STL QL NAA+PROBE: NOT DETECTED
CALCIUM SERPL-MCNC: 8.9 MG/DL (ref 8.4–10.2)
CHLORIDE SERPL-SCNC: 106 MMOL/L (ref 97–110)
CO2 SERPL-SCNC: 27 MMOL/L (ref 21–32)
CREAT SERPL-MCNC: 1.63 MG/DL (ref 0.67–1.17)
DEPRECATED RDW RBC: 46.3 FL (ref 39–50)
EGFRCR SERPLBLD CKD-EPI 2021: 41 ML/MIN/{1.73_M2}
ERYTHROCYTE [DISTWIDTH] IN BLOOD: 15.2 % (ref 11–15)
FASTING DURATION TIME PATIENT: ABNORMAL H
GLOBULIN SER-MCNC: 3.6 G/DL (ref 2–4)
GLUCOSE BLDC GLUCOMTR-MCNC: 107 MG/DL (ref 70–99)
GLUCOSE BLDC GLUCOMTR-MCNC: 224 MG/DL (ref 70–99)
GLUCOSE SERPL-MCNC: 117 MG/DL (ref 70–99)
HCT VFR BLD CALC: 32.9 % (ref 39–51)
HGB BLD-MCNC: 10.3 G/DL (ref 13–17)
MAGNESIUM SERPL-MCNC: 2.5 MG/DL (ref 1.7–2.4)
MCH RBC QN AUTO: 26.5 PG (ref 26–34)
MCHC RBC AUTO-ENTMCNC: 31.3 G/DL (ref 32–36.5)
MCV RBC AUTO: 84.6 FL (ref 78–100)
NRBC BLD MANUAL-RTO: 0 /100 WBC
PHOSPHATE SERPL-MCNC: 3.5 MG/DL (ref 2.4–4.7)
PLATELET # BLD AUTO: 260 K/MCL (ref 140–450)
POTASSIUM SERPL-SCNC: 4.1 MMOL/L (ref 3.4–5.1)
PROT SERPL-MCNC: 6.1 G/DL (ref 6.4–8.2)
RBC # BLD: 3.89 MIL/MCL (ref 4.5–5.9)
SODIUM SERPL-SCNC: 139 MMOL/L (ref 135–145)
WBC # BLD: 9.6 K/MCL (ref 4.2–11)

## 2024-03-29 PROCEDURE — 96372 THER/PROPH/DIAG INJ SC/IM: CPT | Performed by: INTERNAL MEDICINE

## 2024-03-29 PROCEDURE — 96372 THER/PROPH/DIAG INJ SC/IM: CPT | Performed by: HOSPITALIST

## 2024-03-29 PROCEDURE — 10002803 HB RX 637: Performed by: INTERNAL MEDICINE

## 2024-03-29 PROCEDURE — 10002800 HB RX 250 W HCPCS: Performed by: HOSPITALIST

## 2024-03-29 PROCEDURE — 36415 COLL VENOUS BLD VENIPUNCTURE: CPT | Performed by: HOSPITALIST

## 2024-03-29 PROCEDURE — 85027 COMPLETE CBC AUTOMATED: CPT | Performed by: HOSPITALIST

## 2024-03-29 PROCEDURE — 10002803 HB RX 637: Performed by: HOSPITALIST

## 2024-03-29 PROCEDURE — 83735 ASSAY OF MAGNESIUM: CPT | Performed by: INTERNAL MEDICINE

## 2024-03-29 PROCEDURE — 99233 SBSQ HOSP IP/OBS HIGH 50: CPT | Performed by: INTERNAL MEDICINE

## 2024-03-29 PROCEDURE — 84100 ASSAY OF PHOSPHORUS: CPT | Performed by: INTERNAL MEDICINE

## 2024-03-29 PROCEDURE — 10002800 HB RX 250 W HCPCS: Performed by: INTERNAL MEDICINE

## 2024-03-29 PROCEDURE — 80053 COMPREHEN METABOLIC PANEL: CPT | Performed by: HOSPITALIST

## 2024-03-29 RX ORDER — CEFUROXIME AXETIL 500 MG/1
500 TABLET ORAL EVERY 12 HOURS SCHEDULED
Qty: 6 TABLET | Refills: 0 | Status: SHIPPED | OUTPATIENT
Start: 2024-03-29 | End: 2024-04-01

## 2024-03-29 RX ORDER — ISOSORBIDE DINITRATE 10 MG/1
5 TABLET ORAL
Status: DISCONTINUED | OUTPATIENT
Start: 2024-03-29 | End: 2024-03-29 | Stop reason: HOSPADM

## 2024-03-29 RX ORDER — ISOSORBIDE DINITRATE 5 MG/1
5 TABLET ORAL
Qty: 90 TABLET | Refills: 0 | Status: SHIPPED | OUTPATIENT
Start: 2024-03-29

## 2024-03-29 RX ORDER — HYDRALAZINE HYDROCHLORIDE 10 MG/1
10 TABLET, FILM COATED ORAL 3 TIMES DAILY
Qty: 90 TABLET | Refills: 0 | Status: SHIPPED | OUTPATIENT
Start: 2024-03-29

## 2024-03-29 RX ADMIN — HYDRALAZINE HYDROCHLORIDE 10 MG: 10 TABLET, FILM COATED ORAL at 13:00

## 2024-03-29 RX ADMIN — ISOSORBIDE DINITRATE 5 MG: 10 TABLET ORAL at 12:13

## 2024-03-29 RX ADMIN — APIXABAN 5 MG: 5 TABLET, FILM COATED ORAL at 12:59

## 2024-03-29 RX ADMIN — INSULIN GLARGINE 20 UNITS: 100 INJECTION, SOLUTION SUBCUTANEOUS at 08:47

## 2024-03-29 RX ADMIN — CEFUROXIME AXETIL 500 MG: 250 TABLET ORAL at 08:47

## 2024-03-29 RX ADMIN — HYDRALAZINE HYDROCHLORIDE 10 MG: 10 TABLET, FILM COATED ORAL at 08:47

## 2024-03-29 RX ADMIN — METOPROLOL SUCCINATE 25 MG: 25 TABLET, EXTENDED RELEASE ORAL at 08:47

## 2024-03-29 RX ADMIN — FUROSEMIDE 20 MG: 20 TABLET ORAL at 08:47

## 2024-03-29 RX ADMIN — INSULIN LISPRO 4 UNITS: 100 INJECTION, SOLUTION INTRAVENOUS; SUBCUTANEOUS at 12:13

## 2024-03-29 ASSESSMENT — MOVEMENT AND STRENGTH ASSESSMENTS
LLE_ASSESSMENT: NORMAL/COMPLETE ROM AGAINST GRAVITY WITH FULL RESISTANCE
LUE_ASSESSMENT: NORMAL/COMPLETE ROM AGAINST GRAVITY WITH FULL RESISTANCE
RUE_ASSESSMENT: NORMAL/COMPLETE ROM AGAINST GRAVITY WITH FULL RESISTANCE
RLE_ASSESSMENT: NORMAL/COMPLETE ROM AGAINST GRAVITY WITH FULL RESISTANCE

## 2024-03-29 ASSESSMENT — PAIN SCALES - GENERAL: PAINLEVEL_OUTOF10: 0

## 2024-03-31 ENCOUNTER — TELEPHONE (OUTPATIENT)
Dept: CARE COORDINATION | Age: 84
End: 2024-03-31

## 2024-04-02 DIAGNOSIS — I50.9 CONGESTIVE HEART FAILURE, UNSPECIFIED HF CHRONICITY, UNSPECIFIED HEART FAILURE TYPE (CMD): Primary | ICD-10-CM

## 2024-04-02 DIAGNOSIS — I50.9 CONGESTIVE HEART FAILURE, UNSPECIFIED HF CHRONICITY, UNSPECIFIED HEART FAILURE TYPE  (CMD): Primary | ICD-10-CM

## 2024-04-03 ENCOUNTER — OFFICE VISIT (OUTPATIENT)
Dept: CARDIOLOGY | Age: 84
End: 2024-04-03
Attending: HOSPITALIST

## 2024-04-03 ENCOUNTER — LAB SERVICES (OUTPATIENT)
Dept: LAB | Age: 84
End: 2024-04-03

## 2024-04-03 ENCOUNTER — APPOINTMENT (OUTPATIENT)
Dept: CARDIOLOGY | Age: 84
End: 2024-04-03

## 2024-04-03 VITALS
HEIGHT: 69 IN | OXYGEN SATURATION: 99 % | SYSTOLIC BLOOD PRESSURE: 133 MMHG | BODY MASS INDEX: 24.9 KG/M2 | HEART RATE: 79 BPM | DIASTOLIC BLOOD PRESSURE: 69 MMHG | WEIGHT: 168.1 LBS

## 2024-04-03 DIAGNOSIS — D64.9 ANEMIA, UNSPECIFIED TYPE: ICD-10-CM

## 2024-04-03 DIAGNOSIS — I25.10 CORONARY ARTERY DISEASE INVOLVING NATIVE CORONARY ARTERY OF NATIVE HEART WITHOUT ANGINA PECTORIS: ICD-10-CM

## 2024-04-03 DIAGNOSIS — I10 HYPERTENSION, BENIGN: ICD-10-CM

## 2024-04-03 DIAGNOSIS — I50.9 CONGESTIVE HEART FAILURE, UNSPECIFIED HF CHRONICITY, UNSPECIFIED HEART FAILURE TYPE  (CMD): ICD-10-CM

## 2024-04-03 DIAGNOSIS — I73.9 PERIPHERAL ARTERIAL DISEASE (CMD): ICD-10-CM

## 2024-04-03 DIAGNOSIS — R31.9 HEMATURIA, UNSPECIFIED TYPE: ICD-10-CM

## 2024-04-03 DIAGNOSIS — E78.2 HYPERLIPIDEMIA, MIXED: ICD-10-CM

## 2024-04-03 DIAGNOSIS — I50.21 ACUTE SYSTOLIC CONGESTIVE HEART FAILURE (CMD): ICD-10-CM

## 2024-04-03 DIAGNOSIS — E55.9 VITAMIN D DEFICIENCY: ICD-10-CM

## 2024-04-03 DIAGNOSIS — G45.9 TIA (TRANSIENT ISCHEMIC ATTACK): ICD-10-CM

## 2024-04-03 DIAGNOSIS — Z09 HOSPITAL DISCHARGE FOLLOW-UP: Primary | ICD-10-CM

## 2024-04-03 LAB
25(OH)D3+25(OH)D2 SERPL-MCNC: 16.2 NG/ML (ref 30–100)
ALBUMIN SERPL-MCNC: 2.6 G/DL (ref 3.6–5.1)
ALBUMIN/GLOB SERPL: 0.7 {RATIO} (ref 1–2.4)
ALP SERPL-CCNC: 68 UNITS/L (ref 45–117)
ALT SERPL-CCNC: 21 UNITS/L
ANION GAP SERPL CALC-SCNC: 9 MMOL/L (ref 7–19)
AST SERPL-CCNC: 19 UNITS/L
BASOPHILS # BLD: 0.1 K/MCL (ref 0–0.3)
BASOPHILS NFR BLD: 1 %
BILIRUB SERPL-MCNC: 0.4 MG/DL (ref 0.2–1)
BUN SERPL-MCNC: 28 MG/DL (ref 6–20)
BUN/CREAT SERPL: 18 (ref 7–25)
CALCIUM SERPL-MCNC: 9.1 MG/DL (ref 8.4–10.2)
CHLORIDE SERPL-SCNC: 107 MMOL/L (ref 97–110)
CHOLEST SERPL-MCNC: 123 MG/DL
CHOLEST/HDLC SERPL: 3.2 {RATIO}
CO2 SERPL-SCNC: 27 MMOL/L (ref 21–32)
CREAT SERPL-MCNC: 1.54 MG/DL (ref 0.67–1.17)
CRYOGLOB SER QL: NOT DETECTED
DEPRECATED RDW RBC: 46.6 FL (ref 39–50)
EGFRCR SERPLBLD CKD-EPI 2021: 44 ML/MIN/{1.73_M2}
EOSINOPHIL # BLD: 0.2 K/MCL (ref 0–0.5)
EOSINOPHIL NFR BLD: 2 %
ERYTHROCYTE [DISTWIDTH] IN BLOOD: 14.9 % (ref 11–15)
FASTING DURATION TIME PATIENT: ABNORMAL H
GLOBULIN SER-MCNC: 3.6 G/DL (ref 2–4)
GLUCOSE SERPL-MCNC: 213 MG/DL (ref 70–99)
HBA1C MFR BLD: 7.3 % (ref 4.5–5.6)
HCT VFR BLD CALC: 34.4 % (ref 39–51)
HDLC SERPL-MCNC: 39 MG/DL
HGB BLD-MCNC: 10.5 G/DL (ref 13–17)
IMM GRANULOCYTES # BLD AUTO: 0 K/MCL (ref 0–0.2)
IMM GRANULOCYTES # BLD: 1 %
LDLC SERPL CALC-MCNC: 43 MG/DL
LYMPHOCYTES # BLD: 1 K/MCL (ref 1–4)
LYMPHOCYTES NFR BLD: 13 %
MCH RBC QN AUTO: 26.1 PG (ref 26–34)
MCHC RBC AUTO-ENTMCNC: 30.5 G/DL (ref 32–36.5)
MCV RBC AUTO: 85.6 FL (ref 78–100)
MONOCYTES # BLD: 0.6 K/MCL (ref 0.3–0.9)
MONOCYTES NFR BLD: 8 %
NEUTROPHILS # BLD: 5.8 K/MCL (ref 1.8–7.7)
NEUTROPHILS NFR BLD: 75 %
NONHDLC SERPL-MCNC: 84 MG/DL
NRBC BLD MANUAL-RTO: 0 /100 WBC
NT-PROBNP SERPL-MCNC: 3597 PG/ML
PLATELET # BLD AUTO: 293 K/MCL (ref 140–450)
POTASSIUM SERPL-SCNC: 4.2 MMOL/L (ref 3.4–5.1)
PROT SERPL-MCNC: 6.2 G/DL (ref 6.4–8.2)
RBC # BLD: 4.02 MIL/MCL (ref 4.5–5.9)
SODIUM SERPL-SCNC: 139 MMOL/L (ref 135–145)
TRIGL SERPL-MCNC: 206 MG/DL
WBC # BLD: 7.7 K/MCL (ref 4.2–11)

## 2024-04-03 PROCEDURE — 83036 HEMOGLOBIN GLYCOSYLATED A1C: CPT | Performed by: CLINICAL MEDICAL LABORATORY

## 2024-04-03 PROCEDURE — 80053 COMPREHEN METABOLIC PANEL: CPT | Performed by: INTERNAL MEDICINE

## 2024-04-03 PROCEDURE — 36415 COLL VENOUS BLD VENIPUNCTURE: CPT | Performed by: INTERNAL MEDICINE

## 2024-04-03 PROCEDURE — 85025 COMPLETE CBC W/AUTO DIFF WBC: CPT | Performed by: INTERNAL MEDICINE

## 2024-04-03 PROCEDURE — 80061 LIPID PANEL: CPT | Performed by: INTERNAL MEDICINE

## 2024-04-03 PROCEDURE — 82306 VITAMIN D 25 HYDROXY: CPT | Performed by: CLINICAL MEDICAL LABORATORY

## 2024-04-03 PROCEDURE — 83880 ASSAY OF NATRIURETIC PEPTIDE: CPT | Performed by: INTERNAL MEDICINE

## 2024-04-03 ASSESSMENT — PATIENT HEALTH QUESTIONNAIRE - PHQ9
1. LITTLE INTEREST OR PLEASURE IN DOING THINGS: NOT AT ALL
2. FEELING DOWN, DEPRESSED OR HOPELESS: NOT AT ALL
SUM OF ALL RESPONSES TO PHQ9 QUESTIONS 1 AND 2: 0
SUM OF ALL RESPONSES TO PHQ9 QUESTIONS 1 AND 2: 0
CLINICAL INTERPRETATION OF PHQ2 SCORE: NO FURTHER SCREENING NEEDED

## 2024-04-06 PROBLEM — I50.21 ACUTE SYSTOLIC CONGESTIVE HEART FAILURE  (CMD): Status: ACTIVE | Noted: 2024-04-06

## 2024-04-06 PROBLEM — I48.0 PAROXYSMAL ATRIAL FIBRILLATION  (CMD): Status: ACTIVE | Noted: 2024-04-06

## 2024-04-07 ENCOUNTER — TELEPHONE (OUTPATIENT)
Dept: CARE COORDINATION | Age: 84
End: 2024-04-07

## 2024-04-08 ENCOUNTER — CASE MANAGEMENT (OUTPATIENT)
Dept: CARE COORDINATION | Age: 84
End: 2024-04-08

## 2024-04-10 ENCOUNTER — OFFICE VISIT (OUTPATIENT)
Dept: CARDIOLOGY | Age: 84
End: 2024-04-10
Attending: NURSE PRACTITIONER

## 2024-04-10 VITALS
HEART RATE: 72 BPM | SYSTOLIC BLOOD PRESSURE: 112 MMHG | BODY MASS INDEX: 25.56 KG/M2 | WEIGHT: 173.06 LBS | DIASTOLIC BLOOD PRESSURE: 56 MMHG

## 2024-04-10 DIAGNOSIS — G45.9 TIA (TRANSIENT ISCHEMIC ATTACK): ICD-10-CM

## 2024-04-10 DIAGNOSIS — I73.9 PERIPHERAL VASCULAR DISEASE OF EXTREMITY (CMD): ICD-10-CM

## 2024-04-10 DIAGNOSIS — I25.10 CORONARY ARTERY DISEASE INVOLVING NATIVE CORONARY ARTERY OF NATIVE HEART WITHOUT ANGINA PECTORIS: ICD-10-CM

## 2024-04-10 DIAGNOSIS — I48.0 PAROXYSMAL ATRIAL FIBRILLATION (CMD): ICD-10-CM

## 2024-04-10 DIAGNOSIS — E78.2 HYPERLIPIDEMIA, MIXED: ICD-10-CM

## 2024-04-10 DIAGNOSIS — I10 HYPERTENSION, BENIGN: ICD-10-CM

## 2024-04-10 DIAGNOSIS — I50.21 ACUTE SYSTOLIC CONGESTIVE HEART FAILURE (CMD): Primary | ICD-10-CM

## 2024-04-10 LAB
ANION GAP SERPL CALC-SCNC: 10 MMOL/L (ref 7–19)
BUN SERPL-MCNC: 27 MG/DL (ref 6–20)
BUN/CREAT SERPL: 19 (ref 7–25)
CALCIUM SERPL-MCNC: 8.5 MG/DL (ref 8.4–10.2)
CHLORIDE SERPL-SCNC: 106 MMOL/L (ref 97–110)
CO2 SERPL-SCNC: 24 MMOL/L (ref 21–32)
CREAT SERPL-MCNC: 1.4 MG/DL (ref 0.67–1.17)
EGFRCR SERPLBLD CKD-EPI 2021: 50 ML/MIN/{1.73_M2}
FASTING DURATION TIME PATIENT: ABNORMAL H
GLUCOSE SERPL-MCNC: 218 MG/DL (ref 70–99)
NT-PROBNP SERPL-MCNC: 3220 PG/ML
POTASSIUM SERPL-SCNC: 4.1 MMOL/L (ref 3.4–5.1)
SODIUM SERPL-SCNC: 136 MMOL/L (ref 135–145)

## 2024-04-10 PROCEDURE — 83880 ASSAY OF NATRIURETIC PEPTIDE: CPT | Performed by: NURSE PRACTITIONER

## 2024-04-10 PROCEDURE — 80048 BASIC METABOLIC PNL TOTAL CA: CPT | Performed by: NURSE PRACTITIONER

## 2024-04-10 PROCEDURE — 36415 COLL VENOUS BLD VENIPUNCTURE: CPT | Performed by: NURSE PRACTITIONER

## 2024-04-10 PROCEDURE — 99211 OFF/OP EST MAY X REQ PHY/QHP: CPT

## 2024-04-10 RX ORDER — CEPHALEXIN 500 MG/1
CAPSULE ORAL 2 TIMES DAILY
COMMUNITY
Start: 2024-04-03

## 2024-04-14 ENCOUNTER — TELEPHONE (OUTPATIENT)
Dept: CARE COORDINATION | Age: 84
End: 2024-04-14

## 2024-04-17 ENCOUNTER — APPOINTMENT (OUTPATIENT)
Dept: CARDIOLOGY | Age: 84
End: 2024-04-17

## 2024-04-17 VITALS
BODY MASS INDEX: 25.76 KG/M2 | DIASTOLIC BLOOD PRESSURE: 47 MMHG | HEART RATE: 68 BPM | OXYGEN SATURATION: 99 % | SYSTOLIC BLOOD PRESSURE: 86 MMHG | HEIGHT: 69 IN | WEIGHT: 173.94 LBS

## 2024-04-17 DIAGNOSIS — I25.10 CORONARY ARTERY DISEASE INVOLVING NATIVE CORONARY ARTERY OF NATIVE HEART WITHOUT ANGINA PECTORIS: ICD-10-CM

## 2024-04-17 DIAGNOSIS — I10 HYPERTENSION, BENIGN: ICD-10-CM

## 2024-04-17 DIAGNOSIS — I50.23 ACUTE ON CHRONIC SYSTOLIC CONGESTIVE HEART FAILURE (CMD): ICD-10-CM

## 2024-04-17 DIAGNOSIS — E78.2 HYPERLIPIDEMIA, MIXED: Primary | ICD-10-CM

## 2024-04-17 DIAGNOSIS — I48.0 PAROXYSMAL ATRIAL FIBRILLATION (CMD): ICD-10-CM

## 2024-04-17 SDOH — HEALTH STABILITY: MENTAL HEALTH: FEELING DOWN, DEPRESSED OR HOPELESS?: NOT AT ALL

## 2024-04-17 SDOH — HEALTH STABILITY: PHYSICAL HEALTH: ON AVERAGE, HOW MANY DAYS PER WEEK DO YOU ENGAGE IN MODERATE TO STRENUOUS EXERCISE (LIKE A BRISK WALK)?: 0 DAYS

## 2024-04-17 SDOH — HEALTH STABILITY: MENTAL HEALTH: DEPRESSION SCREENING SCORE: 0

## 2024-04-17 SDOH — HEALTH STABILITY: MENTAL HEALTH: LITTLE INTEREST OR PLEASURE IN ACTIVITY?: NOT AT ALL

## 2024-04-17 SDOH — HEALTH STABILITY: MENTAL HEALTH: PHQ2 INTERPRETATION: NO FURTHER SCREENING NEEDED

## 2024-04-17 SDOH — HEALTH STABILITY: PHYSICAL HEALTH: ON AVERAGE, HOW MANY MINUTES DO YOU ENGAGE IN EXERCISE AT THIS LEVEL?: 0 MIN

## 2024-04-17 ASSESSMENT — PATIENT HEALTH QUESTIONNAIRE - PHQ9: SUM OF ALL RESPONSES TO PHQ9 QUESTIONS 1 AND 2: 0

## 2024-04-21 ENCOUNTER — TELEPHONE (OUTPATIENT)
Dept: CARE COORDINATION | Age: 84
End: 2024-04-21

## 2024-04-25 ENCOUNTER — APPOINTMENT (OUTPATIENT)
Dept: CARDIOLOGY | Age: 84
End: 2024-04-25
Attending: INTERNAL MEDICINE

## 2024-04-25 ENCOUNTER — ANCILLARY PROCEDURE (OUTPATIENT)
Dept: CARDIOLOGY | Age: 84
End: 2024-04-25
Attending: INTERNAL MEDICINE

## 2024-04-25 VITALS — WEIGHT: 173 LBS | BODY MASS INDEX: 25.62 KG/M2 | HEIGHT: 69 IN

## 2024-04-25 DIAGNOSIS — I25.10 CORONARY ARTERY DISEASE INVOLVING NATIVE CORONARY ARTERY OF NATIVE HEART WITHOUT ANGINA PECTORIS: ICD-10-CM

## 2024-04-25 DIAGNOSIS — I10 HYPERTENSION, BENIGN: ICD-10-CM

## 2024-04-25 DIAGNOSIS — I48.0 PAROXYSMAL ATRIAL FIBRILLATION (CMD): ICD-10-CM

## 2024-04-25 DIAGNOSIS — E78.2 HYPERLIPIDEMIA, MIXED: ICD-10-CM

## 2024-04-25 RX ORDER — REGADENOSON 0.08 MG/ML
0.4 INJECTION, SOLUTION INTRAVENOUS ONCE
Status: COMPLETED | OUTPATIENT
Start: 2024-04-25 | End: 2024-04-25

## 2024-04-25 RX ADMIN — REGADENOSON 0.4 MG: 0.08 INJECTION, SOLUTION INTRAVENOUS at 11:10

## 2024-04-25 ASSESSMENT — EXERCISE STRESS TEST
PEAK_RPP: 11376
COMMENTS: REGADENOSON INJECTED
PEAK_RPP: 10220
STOPPAGE_REASON: PROTOCOL COMPLETE
PEAK_HR: 72
PEAK_HR: 73
PEAK_HR: 76
STAGE_CATEGORIES: RECOVERY 2
PEAK_BP: 140/60
PEAK_RPP: 11096
PEAK_HR: 76
STAGE_CATEGORIES: RECOVERY 1
PEAK_BP: 146/70
PEAK_RPP: 10944
PEAK_BP: 158/60
STAGE_CATEGORIES: 1
PEAK_BP: 144/70
PEAK_HR: 76
PEAK_BP: 146/70
STAGE_CATEGORIES: RESTING
STAGE_CATEGORIES: RECOVERY 0
PEAK_RPP: 11096

## 2024-04-26 LAB
HEART RATE RESERVE PREDICTED: 44.12 BPM
LV EF: 35 %
RESTING HR ACHIEVED: 72 BPM
STRESS BASELINE BP: NORMAL MMHG
STRESS PEAK HR: 76 BPM
STRESS PERCENT HR: 56 %
STRESS POST EXERCISE DUR MIN: 1 MIN
STRESS POST PEAK BP: NORMAL MMHG
STRESS TARGET HR: 136 BPM

## 2024-04-28 ENCOUNTER — TELEPHONE (OUTPATIENT)
Dept: CARE COORDINATION | Age: 84
End: 2024-04-28

## 2024-05-03 ENCOUNTER — OFFICE VISIT (OUTPATIENT)
Dept: CARDIOLOGY | Age: 84
End: 2024-05-03

## 2024-05-03 VITALS
RESPIRATION RATE: 18 BRPM | HEART RATE: 82 BPM | WEIGHT: 170.97 LBS | DIASTOLIC BLOOD PRESSURE: 48 MMHG | BODY MASS INDEX: 25.32 KG/M2 | SYSTOLIC BLOOD PRESSURE: 101 MMHG | HEIGHT: 69 IN

## 2024-05-03 DIAGNOSIS — I50.22 CHRONIC HFREF (HEART FAILURE WITH REDUCED EJECTION FRACTION)  (CMD): Primary | ICD-10-CM

## 2024-05-03 PROCEDURE — 99215 OFFICE O/P EST HI 40 MIN: CPT | Performed by: INTERNAL MEDICINE

## 2024-05-03 PROCEDURE — 3078F DIAST BP <80 MM HG: CPT | Performed by: INTERNAL MEDICINE

## 2024-05-03 PROCEDURE — 3074F SYST BP LT 130 MM HG: CPT | Performed by: INTERNAL MEDICINE

## 2024-05-03 SDOH — HEALTH STABILITY: PHYSICAL HEALTH: ON AVERAGE, HOW MANY MINUTES DO YOU ENGAGE IN EXERCISE AT THIS LEVEL?: 0 MIN

## 2024-05-03 SDOH — HEALTH STABILITY: PHYSICAL HEALTH: ON AVERAGE, HOW MANY DAYS PER WEEK DO YOU ENGAGE IN MODERATE TO STRENUOUS EXERCISE (LIKE A BRISK WALK)?: 0 DAYS

## 2024-05-03 ASSESSMENT — ENCOUNTER SYMPTOMS
INSOMNIA: 0
DIZZINESS: 0
WEIGHT GAIN: 0
LIGHT-HEADEDNESS: 0
WEIGHT LOSS: 0
HEADACHES: 0
DEPRESSION: 0
BLOATING: 0
FOCAL WEAKNESS: 0
SLEEP DISTURBANCES DUE TO BREATHING: 0
WEAKNESS: 0
ABDOMINAL PAIN: 0
SHORTNESS OF BREATH: 0

## 2024-05-03 ASSESSMENT — PATIENT HEALTH QUESTIONNAIRE - PHQ9
SUM OF ALL RESPONSES TO PHQ9 QUESTIONS 1 AND 2: 0
SUM OF ALL RESPONSES TO PHQ9 QUESTIONS 1 AND 2: 0
1. LITTLE INTEREST OR PLEASURE IN DOING THINGS: NOT AT ALL
CLINICAL INTERPRETATION OF PHQ2 SCORE: NO FURTHER SCREENING NEEDED
2. FEELING DOWN, DEPRESSED OR HOPELESS: NOT AT ALL

## 2024-05-17 ENCOUNTER — TELEPHONE (OUTPATIENT)
Dept: CARDIOLOGY | Age: 84
End: 2024-05-17

## 2024-05-17 ENCOUNTER — ANCILLARY PROCEDURE (OUTPATIENT)
Dept: CARDIOLOGY | Age: 84
End: 2024-05-17
Attending: INTERNAL MEDICINE

## 2024-05-17 ENCOUNTER — HOSPITAL ENCOUNTER (INPATIENT)
Age: 84
End: 2024-05-17
Attending: UROLOGY | Admitting: UROLOGY

## 2024-05-17 DIAGNOSIS — Z95.0 PRESENCE OF CARDIAC PACEMAKER: ICD-10-CM

## 2024-05-24 ENCOUNTER — TELEPHONE (OUTPATIENT)
Dept: CARDIOLOGY | Age: 84
End: 2024-05-24

## 2024-05-29 ASSESSMENT — NEW YORK HEART ASSOCIATION (NYHA) CLASSIFICATION: NYHA FUNCTIONAL CLASS: II

## 2024-05-30 ENCOUNTER — HOSPITAL ENCOUNTER (INPATIENT)
Age: 84
Discharge: HOME | DRG: 988 | End: 2024-05-30
Attending: STUDENT IN AN ORGANIZED HEALTH CARE EDUCATION/TRAINING PROGRAM | Admitting: HOSPITALIST

## 2024-05-30 DIAGNOSIS — D64.9 ANEMIA, UNSPECIFIED TYPE: Primary | ICD-10-CM

## 2024-05-30 DIAGNOSIS — Z86.03 H/O TRANSURETHRAL RESECTION OF BLADDER TUMOR (TURBT): ICD-10-CM

## 2024-05-30 DIAGNOSIS — Z98.890 H/O TRANSURETHRAL RESECTION OF BLADDER TUMOR (TURBT): ICD-10-CM

## 2024-05-30 LAB
ABO + RH BLD: NORMAL
ALBUMIN SERPL-MCNC: 3 G/DL (ref 3.6–5.1)
ALBUMIN/GLOB SERPL: 1 {RATIO} (ref 1–2.4)
ALP SERPL-CCNC: 61 UNITS/L (ref 45–117)
ALT SERPL-CCNC: 35 UNITS/L
ANION GAP SERPL CALC-SCNC: 12 MMOL/L (ref 7–19)
APPEARANCE UR: ABNORMAL
APTT PPP: 23 SEC (ref 22–32)
AST SERPL-CCNC: 25 UNITS/L
ATRIAL RATE (BPM): 67
BACTERIA #/AREA URNS HPF: ABNORMAL /HPF
BASOPHILS # BLD: 0 K/MCL (ref 0–0.3)
BASOPHILS NFR BLD: 1 %
BILIRUB SERPL-MCNC: 0.5 MG/DL (ref 0.2–1)
BILIRUB UR QL STRIP: NEGATIVE
BLD GP AB SCN SERPL QL GEL: NEGATIVE
BLOOD EXPIRATION DATE: NORMAL
BUN SERPL-MCNC: 22 MG/DL (ref 6–20)
BUN/CREAT SERPL: 19 (ref 7–25)
CALCIUM SERPL-MCNC: 8.9 MG/DL (ref 8.4–10.2)
CHLORIDE SERPL-SCNC: 107 MMOL/L (ref 97–110)
CO2 SERPL-SCNC: 25 MMOL/L (ref 21–32)
COLOR UR: ABNORMAL
CREAT SERPL-MCNC: 1.16 MG/DL (ref 0.67–1.17)
CROSSMATCH RESULT: NORMAL
DEPRECATED RDW RBC: 46.3 FL (ref 39–50)
DISPENSE STATUS: NORMAL
EGFRCR SERPLBLD CKD-EPI 2021: 62 ML/MIN/{1.73_M2}
EOSINOPHIL # BLD: 0.1 K/MCL (ref 0–0.5)
EOSINOPHIL NFR BLD: 2 %
ERYTHROCYTE [DISTWIDTH] IN BLOOD: 16.3 % (ref 11–15)
FASTING DURATION TIME PATIENT: ABNORMAL H
GLOBULIN SER-MCNC: 3.1 G/DL (ref 2–4)
GLUCOSE BLDC GLUCOMTR-MCNC: 141 MG/DL (ref 70–99)
GLUCOSE BLDC GLUCOMTR-MCNC: 182 MG/DL (ref 70–99)
GLUCOSE SERPL-MCNC: 187 MG/DL (ref 70–99)
GLUCOSE UR STRIP-MCNC: 100 MG/DL
HCT VFR BLD CALC: 27.5 % (ref 39–51)
HGB BLD-MCNC: 8.1 G/DL (ref 13–17)
HGB UR QL STRIP: ABNORMAL
HYALINE CASTS #/AREA URNS LPF: ABNORMAL /LPF
IMM GRANULOCYTES # BLD AUTO: 0 K/MCL (ref 0–0.2)
IMM GRANULOCYTES # BLD: 1 %
INR PPP: 1
ISBT BLOOD TYPE: 5100
ISSUE DATE/TIME: NORMAL
KETONES UR STRIP-MCNC: NEGATIVE MG/DL
LEUKOCYTE ESTERASE UR QL STRIP: ABNORMAL
LYMPHOCYTES # BLD: 0.9 K/MCL (ref 1–4)
LYMPHOCYTES NFR BLD: 18 %
MCH RBC QN AUTO: 23.1 PG (ref 26–34)
MCHC RBC AUTO-ENTMCNC: 29.5 G/DL (ref 32–36.5)
MCV RBC AUTO: 78.6 FL (ref 78–100)
MONOCYTES # BLD: 0.6 K/MCL (ref 0.3–0.9)
MONOCYTES NFR BLD: 12 %
NEUTROPHILS # BLD: 3.5 K/MCL (ref 1.8–7.7)
NEUTROPHILS NFR BLD: 66 %
NITRITE UR QL STRIP: NEGATIVE
NRBC BLD MANUAL-RTO: 0 /100 WBC
P AXIS (DEGREES): 79
PH UR STRIP: 6.5 [PH] (ref 5–7)
PLATELET # BLD AUTO: 290 K/MCL (ref 140–450)
POTASSIUM SERPL-SCNC: 4.6 MMOL/L (ref 3.4–5.1)
PR-INTERVAL (MSEC): 202
PRODUCT CODE: NORMAL
PRODUCT DESCRIPTION: NORMAL
PRODUCT ID: NORMAL
PROT SERPL-MCNC: 6.1 G/DL (ref 6.4–8.2)
PROT UR STRIP-MCNC: 300 MG/DL
PROTHROMBIN TIME: 11.1 SEC (ref 9.7–11.8)
QRS-INTERVAL (MSEC): 186
QT-INTERVAL (MSEC): 480
QTC: 507
R AXIS (DEGREES): -65
RAINBOW EXTRA TUBES HOLD SPECIMEN: NORMAL
RBC # BLD: 3.5 MIL/MCL (ref 4.5–5.9)
RBC #/AREA URNS HPF: >100 /HPF
REPORT TEXT: NORMAL
SODIUM SERPL-SCNC: 139 MMOL/L (ref 135–145)
SP GR UR STRIP: 1.01 (ref 1–1.03)
SQUAMOUS #/AREA URNS HPF: ABNORMAL /HPF
T AXIS (DEGREES): 119
TYPE AND SCREEN EXPIRATION DATE: NORMAL
UNIT BLOOD TYPE: NORMAL
UNIT NUMBER: NORMAL
UROBILINOGEN UR STRIP-MCNC: 0.2 MG/DL
VENTRICULAR RATE EKG/MIN (BPM): 67
WBC # BLD: 5.2 K/MCL (ref 4.2–11)
WBC #/AREA URNS HPF: ABNORMAL /HPF

## 2024-05-30 PROCEDURE — 99223 1ST HOSP IP/OBS HIGH 75: CPT | Performed by: INTERNAL MEDICINE

## 2024-05-30 PROCEDURE — 86923 COMPATIBILITY TEST ELECTRIC: CPT

## 2024-05-30 PROCEDURE — 10006031 HB ROOM CHARGE TELEMETRY

## 2024-05-30 PROCEDURE — 80053 COMPREHEN METABOLIC PANEL: CPT | Performed by: STUDENT IN AN ORGANIZED HEALTH CARE EDUCATION/TRAINING PROGRAM

## 2024-05-30 PROCEDURE — 10002800 HB RX 250 W HCPCS: Performed by: HOSPITALIST

## 2024-05-30 PROCEDURE — 93005 ELECTROCARDIOGRAM TRACING: CPT | Performed by: STUDENT IN AN ORGANIZED HEALTH CARE EDUCATION/TRAINING PROGRAM

## 2024-05-30 PROCEDURE — P9016 RBC LEUKOCYTES REDUCED: HCPCS

## 2024-05-30 PROCEDURE — 81001 URINALYSIS AUTO W/SCOPE: CPT | Performed by: STUDENT IN AN ORGANIZED HEALTH CARE EDUCATION/TRAINING PROGRAM

## 2024-05-30 PROCEDURE — 10002803 HB RX 637: Performed by: HOSPITALIST

## 2024-05-30 PROCEDURE — 96372 THER/PROPH/DIAG INJ SC/IM: CPT | Performed by: HOSPITALIST

## 2024-05-30 PROCEDURE — 86900 BLOOD TYPING SEROLOGIC ABO: CPT | Performed by: STUDENT IN AN ORGANIZED HEALTH CARE EDUCATION/TRAINING PROGRAM

## 2024-05-30 PROCEDURE — 85610 PROTHROMBIN TIME: CPT | Performed by: STUDENT IN AN ORGANIZED HEALTH CARE EDUCATION/TRAINING PROGRAM

## 2024-05-30 PROCEDURE — 85025 COMPLETE CBC W/AUTO DIFF WBC: CPT | Performed by: STUDENT IN AN ORGANIZED HEALTH CARE EDUCATION/TRAINING PROGRAM

## 2024-05-30 PROCEDURE — 36415 COLL VENOUS BLD VENIPUNCTURE: CPT

## 2024-05-30 PROCEDURE — 87086 URINE CULTURE/COLONY COUNT: CPT | Performed by: STUDENT IN AN ORGANIZED HEALTH CARE EDUCATION/TRAINING PROGRAM

## 2024-05-30 PROCEDURE — 10004651 HB RX, NO CHARGE ITEM: Performed by: HOSPITALIST

## 2024-05-30 PROCEDURE — 85730 THROMBOPLASTIN TIME PARTIAL: CPT | Performed by: STUDENT IN AN ORGANIZED HEALTH CARE EDUCATION/TRAINING PROGRAM

## 2024-05-30 PROCEDURE — 99285 EMERGENCY DEPT VISIT HI MDM: CPT

## 2024-05-30 RX ORDER — NICOTINE POLACRILEX 4 MG
15 LOZENGE BUCCAL PRN
Status: DISCONTINUED | OUTPATIENT
Start: 2024-05-30 | End: 2024-06-02 | Stop reason: HOSPADM

## 2024-05-30 RX ORDER — INSULIN GLARGINE 100 [IU]/ML
20 INJECTION, SOLUTION SUBCUTANEOUS DAILY
Status: DISCONTINUED | OUTPATIENT
Start: 2024-05-31 | End: 2024-05-30

## 2024-05-30 RX ORDER — DEXTROSE MONOHYDRATE 25 G/50ML
12.5 INJECTION, SOLUTION INTRAVENOUS PRN
Status: DISCONTINUED | OUTPATIENT
Start: 2024-05-30 | End: 2024-06-02 | Stop reason: HOSPADM

## 2024-05-30 RX ORDER — POLYETHYLENE GLYCOL 3350 17 G/17G
17 POWDER, FOR SOLUTION ORAL DAILY PRN
Status: DISCONTINUED | OUTPATIENT
Start: 2024-05-30 | End: 2024-06-02 | Stop reason: HOSPADM

## 2024-05-30 RX ORDER — 0.9 % SODIUM CHLORIDE 0.9 %
2 VIAL (ML) INJECTION EVERY 12 HOURS SCHEDULED
Status: DISCONTINUED | OUTPATIENT
Start: 2024-05-30 | End: 2024-06-02 | Stop reason: HOSPADM

## 2024-05-30 RX ORDER — DEXTROSE MONOHYDRATE 25 G/50ML
25 INJECTION, SOLUTION INTRAVENOUS PRN
Status: DISCONTINUED | OUTPATIENT
Start: 2024-05-30 | End: 2024-06-02 | Stop reason: HOSPADM

## 2024-05-30 RX ORDER — LANOLIN ALCOHOL/MO/W.PET/CERES
3 CREAM (GRAM) TOPICAL NIGHTLY PRN
Status: DISCONTINUED | OUTPATIENT
Start: 2024-05-30 | End: 2024-06-02 | Stop reason: HOSPADM

## 2024-05-30 RX ORDER — HYDRALAZINE HYDROCHLORIDE 10 MG/1
10 TABLET, FILM COATED ORAL 3 TIMES DAILY
Status: DISCONTINUED | OUTPATIENT
Start: 2024-05-30 | End: 2024-05-30

## 2024-05-30 RX ORDER — INSULIN GLARGINE 100 [IU]/ML
10 INJECTION, SOLUTION SUBCUTANEOUS 2 TIMES DAILY
Status: DISCONTINUED | OUTPATIENT
Start: 2024-05-30 | End: 2024-06-02 | Stop reason: HOSPADM

## 2024-05-30 RX ORDER — ONDANSETRON 2 MG/ML
4 INJECTION INTRAMUSCULAR; INTRAVENOUS EVERY 6 HOURS PRN
Status: DISCONTINUED | OUTPATIENT
Start: 2024-05-30 | End: 2024-06-02 | Stop reason: HOSPADM

## 2024-05-30 RX ORDER — ACETAMINOPHEN 650 MG/1
650 SUPPOSITORY RECTAL EVERY 4 HOURS PRN
Status: DISCONTINUED | OUTPATIENT
Start: 2024-05-30 | End: 2024-06-02

## 2024-05-30 RX ORDER — FUROSEMIDE 20 MG/1
20 TABLET ORAL DAILY
Status: DISCONTINUED | OUTPATIENT
Start: 2024-05-31 | End: 2024-05-30

## 2024-05-30 RX ORDER — ATORVASTATIN CALCIUM 40 MG/1
40 TABLET, FILM COATED ORAL DAILY
Status: DISCONTINUED | OUTPATIENT
Start: 2024-05-31 | End: 2024-06-02 | Stop reason: HOSPADM

## 2024-05-30 RX ORDER — METOPROLOL SUCCINATE 50 MG/1
25 TABLET, EXTENDED RELEASE ORAL DAILY
Status: DISCONTINUED | OUTPATIENT
Start: 2024-05-31 | End: 2024-05-30

## 2024-05-30 RX ORDER — NICOTINE POLACRILEX 4 MG
30 LOZENGE BUCCAL PRN
Status: DISCONTINUED | OUTPATIENT
Start: 2024-05-30 | End: 2024-06-02 | Stop reason: HOSPADM

## 2024-05-30 RX ORDER — ACETAMINOPHEN 325 MG/1
650 TABLET ORAL EVERY 4 HOURS PRN
Status: DISCONTINUED | OUTPATIENT
Start: 2024-05-30 | End: 2024-06-02

## 2024-05-30 RX ORDER — ISOSORBIDE DINITRATE 5 MG/1
5 TABLET ORAL
Status: DISCONTINUED | OUTPATIENT
Start: 2024-05-30 | End: 2024-05-30

## 2024-05-30 RX ORDER — SODIUM CHLORIDE 9 MG/ML
INJECTION, SOLUTION INTRAVENOUS CONTINUOUS PRN
Status: DISCONTINUED | OUTPATIENT
Start: 2024-05-30 | End: 2024-06-02 | Stop reason: HOSPADM

## 2024-05-30 RX ADMIN — INSULIN GLARGINE 10 UNITS: 100 INJECTION, SOLUTION SUBCUTANEOUS at 22:18

## 2024-05-30 RX ADMIN — SODIUM CHLORIDE, PRESERVATIVE FREE 2 ML: 5 INJECTION INTRAVENOUS at 20:20

## 2024-05-30 RX ADMIN — METOPROLOL TARTRATE 25 MG: 25 TABLET, FILM COATED ORAL at 20:19

## 2024-05-30 RX ADMIN — INSULIN LISPRO 2 UNITS: 100 INJECTION, SOLUTION INTRAVENOUS; SUBCUTANEOUS at 18:18

## 2024-05-30 SDOH — SOCIAL STABILITY: SOCIAL NETWORK: SUPPORT SYSTEMS: FAMILY MEMBERS

## 2024-05-30 SDOH — ECONOMIC STABILITY: HOUSING INSECURITY: WHAT IS YOUR LIVING SITUATION TODAY?: I HAVE A STEADY PLACE TO LIVE

## 2024-05-30 SDOH — ECONOMIC STABILITY: GENERAL

## 2024-05-30 SDOH — ECONOMIC STABILITY: INCOME INSECURITY: IN THE PAST 12 MONTHS, HAS THE ELECTRIC, GAS, OIL, OR WATER COMPANY THREATENED TO SHUT OFF SERVICE IN YOUR HOME?: NO

## 2024-05-30 SDOH — ECONOMIC STABILITY: HOUSING INSECURITY: DO YOU HAVE PROBLEMS WITH ANY OF THE FOLLOWING?: NONE OF THE ABOVE

## 2024-05-30 SDOH — ECONOMIC STABILITY: FOOD INSECURITY: WITHIN THE PAST 12 MONTHS, THE FOOD YOU BOUGHT JUST DIDN'T LAST AND YOU DIDN'T HAVE MONEY TO GET MORE.: NEVER TRUE

## 2024-05-30 SDOH — ECONOMIC STABILITY: HOUSING INSECURITY: WHAT IS YOUR LIVING SITUATION TODAY?: SPOUSE

## 2024-05-30 SDOH — ECONOMIC STABILITY: HOUSING INSECURITY: WHAT IS YOUR LIVING SITUATION TODAY?: HOUSE

## 2024-05-30 SDOH — SOCIAL STABILITY: SOCIAL NETWORK
HOW OFTEN DO YOU SEE OR TALK TO PEOPLE THAT YOU CARE ABOUT AND FEEL CLOSE TO? (FOR EXAMPLE: TALKING TO FRIENDS ON THE PHONE, VISITING FRIENDS OR FAMILY, GOING TO CHURCH OR CLUB MEETINGS): 1 OR 2 TIMES A WEEK

## 2024-05-30 SDOH — ECONOMIC STABILITY: GENERAL: WOULD YOU LIKE HELP WITH ANY OF THE FOLLOWING NEEDS?: I DON'T WANT HELP WITH ANY OF THESE

## 2024-05-30 SDOH — HEALTH STABILITY: GENERAL: BECAUSE OF A PHYSICAL, MENTAL, OR EMOTIONAL CONDITION, DO YOU HAVE DIFFICULTY DOING ERRANDS ALONE?: NO

## 2024-05-30 SDOH — HEALTH STABILITY: PHYSICAL HEALTH: DO YOU HAVE DIFFICULTY DRESSING OR BATHING?: NO

## 2024-05-30 SDOH — HEALTH STABILITY: PHYSICAL HEALTH: DO YOU HAVE SERIOUS DIFFICULTY WALKING OR CLIMBING STAIRS?: NO

## 2024-05-30 ASSESSMENT — ENCOUNTER SYMPTOMS
VOMITING: 0
NUMBNESS: 0
PHOTOPHOBIA: 0
RHINORRHEA: 0
PSYCHIATRIC NEGATIVE: 1
WEAKNESS: 0
DIZZINESS: 0
SINUS PRESSURE: 0
CHILLS: 0
HEMOPTYSIS: 0
ADENOPATHY: 0
COUGH: 0
WHEEZING: 0
WEIGHT LOSS: 0
NAUSEA: 0
SUSPICIOUS LESIONS: 0
HALLUCINATIONS: 0
SORE THROAT: 0
HEADACHES: 0
BRUISES/BLEEDS EASILY: 0
DIARRHEA: 0
FATIGUE: 0
ENDOCRINE NEGATIVE: 1
SYNCOPE: 0
ABDOMINAL PAIN: 0
HEMATOCHEZIA: 0
FEVER: 0
SHORTNESS OF BREATH: 0
LIGHT-HEADEDNESS: 0
WEIGHT GAIN: 0

## 2024-05-30 ASSESSMENT — ORIENTATION MEMORY CONCENTRATION TEST (OMCT)
WHAT YEAR IS IT NOW (MUST BE EXACT): CORRECT
WHAT MONTH IS IT NOW: CORRECT
OMCT INTERPRETATION: 0-6: NO SIGNIFICANT IMPAIRMENT
WHAT TIME IS IT (NO WATCH OR CLOCK): CORRECT
REPEAT THE NAME AND ADDRESS I ASKED YOU TO REMEMBER: CORRECT
OMCT SCORE: 0
COUNT BACKWARDS FROM 20 TO 1: CORRECT
SAY THE MONTHS IN REVERSE ORDER STARTING WITH LAST MONTH: CORRECT

## 2024-05-30 ASSESSMENT — PATIENT HEALTH QUESTIONNAIRE - PHQ9
SUM OF ALL RESPONSES TO PHQ9 QUESTIONS 1 AND 2: 1
IS PATIENT ABLE TO COMPLETE PHQ2 OR PHQ9: YES
CLINICAL INTERPRETATION OF PHQ2 SCORE: NO FURTHER SCREENING NEEDED
2. FEELING DOWN, DEPRESSED OR HOPELESS: NOT AT ALL
1. LITTLE INTEREST OR PLEASURE IN DOING THINGS: SEVERAL DAYS
SUM OF ALL RESPONSES TO PHQ9 QUESTIONS 1 AND 2: 1

## 2024-05-30 ASSESSMENT — COLUMBIA-SUICIDE SEVERITY RATING SCALE - C-SSRS
2. HAVE YOU ACTUALLY HAD ANY THOUGHTS OF KILLING YOURSELF?: NO
1. WITHIN THE PAST MONTH, HAVE YOU WISHED YOU WERE DEAD OR WISHED YOU COULD GO TO SLEEP AND NOT WAKE UP?: NO
6. HAVE YOU EVER DONE ANYTHING, STARTED TO DO ANYTHING, OR PREPARED TO DO ANYTHING TO END YOUR LIFE?: NO
IS THE PATIENT ABLE TO COMPLETE C-SSRS: YES

## 2024-05-30 ASSESSMENT — ACTIVITIES OF DAILY LIVING (ADL)
ADL_BEFORE_ADMISSION: INDEPENDENT
RECENT_DECLINE_ADL: NO
ADL_SHORT_OF_BREATH: NO
ADL_SCORE: 12

## 2024-05-30 ASSESSMENT — LIFESTYLE VARIABLES
HOW MANY STANDARD DRINKS CONTAINING ALCOHOL DO YOU HAVE ON A TYPICAL DAY: 0,1 OR 2
ALCOHOL_USE_STATUS: UNHEALTHY DRINKING IDENTIFIED. AUDIT C: 3 OR MORE FOR WOMEN AND 4 OR MORE FOR MEN.
HOW OFTEN DO YOU HAVE A DRINK CONTAINING ALCOHOL: 4 OR MORE TIMES PER WEEK
HOW OFTEN DO YOU HAVE 6 OR MORE DRINKS ON ONE OCCASION: NEVER
AUDIT-C TOTAL SCORE: 4

## 2024-05-30 ASSESSMENT — PAIN SCALES - GENERAL
PAINLEVEL_OUTOF10: 0
PAINLEVEL_OUTOF10: 0

## 2024-05-31 ENCOUNTER — APPOINTMENT (OUTPATIENT)
Dept: GENERAL RADIOLOGY | Age: 84
DRG: 988 | End: 2024-05-31
Attending: UROLOGY

## 2024-05-31 ENCOUNTER — ANESTHESIA EVENT (OUTPATIENT)
Dept: SURGERY | Age: 84
End: 2024-05-31

## 2024-05-31 ENCOUNTER — ANESTHESIA (OUTPATIENT)
Dept: SURGERY | Age: 84
End: 2024-05-31

## 2024-05-31 LAB
ANION GAP SERPL CALC-SCNC: 7 MMOL/L (ref 7–19)
BACTERIA UR CULT: NORMAL
BUN SERPL-MCNC: 23 MG/DL (ref 6–20)
BUN/CREAT SERPL: 21 (ref 7–25)
CALCIUM SERPL-MCNC: 8.9 MG/DL (ref 8.4–10.2)
CHLORIDE SERPL-SCNC: 108 MMOL/L (ref 97–110)
CO2 SERPL-SCNC: 27 MMOL/L (ref 21–32)
CREAT SERPL-MCNC: 1.1 MG/DL (ref 0.67–1.17)
DEPRECATED RDW RBC: 45.1 FL (ref 39–50)
EGFRCR SERPLBLD CKD-EPI 2021: 66 ML/MIN/{1.73_M2}
ERYTHROCYTE [DISTWIDTH] IN BLOOD: 15.9 % (ref 11–15)
FASTING DURATION TIME PATIENT: ABNORMAL H
GLUCOSE BLDC GLUCOMTR-MCNC: 116 MG/DL (ref 70–99)
GLUCOSE BLDC GLUCOMTR-MCNC: 125 MG/DL (ref 70–99)
GLUCOSE BLDC GLUCOMTR-MCNC: 131 MG/DL (ref 70–99)
GLUCOSE BLDC GLUCOMTR-MCNC: 132 MG/DL (ref 70–99)
GLUCOSE BLDC GLUCOMTR-MCNC: 133 MG/DL (ref 70–99)
GLUCOSE SERPL-MCNC: 142 MG/DL (ref 70–99)
HCT VFR BLD CALC: 29.4 % (ref 39–51)
HGB BLD-MCNC: 9 G/DL (ref 13–17)
MCH RBC QN AUTO: 23.8 PG (ref 26–34)
MCHC RBC AUTO-ENTMCNC: 30.6 G/DL (ref 32–36.5)
MCV RBC AUTO: 77.8 FL (ref 78–100)
NRBC BLD MANUAL-RTO: 0 /100 WBC
PLATELET # BLD AUTO: 272 K/MCL (ref 140–450)
POTASSIUM SERPL-SCNC: 4.3 MMOL/L (ref 3.4–5.1)
RBC # BLD: 3.78 MIL/MCL (ref 4.5–5.9)
SODIUM SERPL-SCNC: 138 MMOL/L (ref 135–145)
WBC # BLD: 6.6 K/MCL (ref 4.2–11)

## 2024-05-31 PROCEDURE — 80048 BASIC METABOLIC PNL TOTAL CA: CPT | Performed by: HOSPITALIST

## 2024-05-31 PROCEDURE — 96372 THER/PROPH/DIAG INJ SC/IM: CPT | Performed by: HOSPITALIST

## 2024-05-31 PROCEDURE — 10005281 FL INTRAOPERATIVE C ARM WITH REPORT

## 2024-05-31 PROCEDURE — 10002800 HB RX 250 W HCPCS: Performed by: HOSPITALIST

## 2024-05-31 PROCEDURE — 10002805 HB CONTRAST AGENT: Performed by: UROLOGY

## 2024-05-31 PROCEDURE — 13001086 HB INCENTIVE SPIROMETER W INSTRUCT

## 2024-05-31 PROCEDURE — 36415 COLL VENOUS BLD VENIPUNCTURE: CPT | Performed by: HOSPITALIST

## 2024-05-31 PROCEDURE — 10002803 HB RX 637: Performed by: INTERNAL MEDICINE

## 2024-05-31 PROCEDURE — 13000003 HB ANESTHESIA  GENERAL EA ADD MINUTE: Performed by: UROLOGY

## 2024-05-31 PROCEDURE — 85027 COMPLETE CBC AUTOMATED: CPT | Performed by: HOSPITALIST

## 2024-05-31 PROCEDURE — 10002803 HB RX 637: Performed by: HOSPITALIST

## 2024-05-31 PROCEDURE — 10004651 HB RX, NO CHARGE ITEM: Performed by: HOSPITALIST

## 2024-05-31 PROCEDURE — 13000002 HB ANESTHESIA  GENERAL  S/U + 1ST 15 MIN: Performed by: UROLOGY

## 2024-05-31 PROCEDURE — 0TBC8ZZ EXCISION OF BLADDER NECK, VIA NATURAL OR ARTIFICIAL OPENING ENDOSCOPIC: ICD-10-PCS | Performed by: UROLOGY

## 2024-05-31 PROCEDURE — 10002807 HB RX 258: Performed by: ANESTHESIOLOGY

## 2024-05-31 PROCEDURE — 10006023 HB SUPPLY 272: Performed by: UROLOGY

## 2024-05-31 PROCEDURE — 13000036 HB COMPLEX  CASE S/U + 1ST 15 MIN: Performed by: UROLOGY

## 2024-05-31 PROCEDURE — 71045 X-RAY EXAM CHEST 1 VIEW: CPT

## 2024-05-31 PROCEDURE — 10002016 HB COUNTER INCENTIVE SPIROMETRY

## 2024-05-31 PROCEDURE — 10004651 HB RX, NO CHARGE ITEM: Performed by: UROLOGY

## 2024-05-31 PROCEDURE — 10002800 HB RX 250 W HCPCS: Performed by: ANESTHESIOLOGY

## 2024-05-31 PROCEDURE — 10002801 HB RX 250 W/O HCPCS: Performed by: ANESTHESIOLOGY

## 2024-05-31 PROCEDURE — C1758 CATHETER, URETERAL: HCPCS | Performed by: UROLOGY

## 2024-05-31 PROCEDURE — 10004180 HB COUNTER-TRANSPORT

## 2024-05-31 PROCEDURE — 10002803 HB RX 637: Performed by: UROLOGY

## 2024-05-31 PROCEDURE — 10004452 HB PACU ADDL 30 MINUTES: Performed by: UROLOGY

## 2024-05-31 PROCEDURE — 99233 SBSQ HOSP IP/OBS HIGH 50: CPT | Performed by: NURSE PRACTITIONER

## 2024-05-31 PROCEDURE — 10004451 HB PACU RECOVERY 1ST 30 MINUTES: Performed by: UROLOGY

## 2024-05-31 PROCEDURE — BT141ZZ FLUOROSCOPY OF KIDNEYS, URETERS AND BLADDER USING LOW OSMOLAR CONTRAST: ICD-10-PCS | Performed by: UROLOGY

## 2024-05-31 PROCEDURE — 13000037 HB COMPLEX CASE EACH ADD MINUTE: Performed by: UROLOGY

## 2024-05-31 PROCEDURE — 10006031 HB ROOM CHARGE TELEMETRY

## 2024-05-31 PROCEDURE — 88307 TISSUE EXAM BY PATHOLOGIST: CPT | Performed by: UROLOGY

## 2024-05-31 RX ORDER — MIDAZOLAM HYDROCHLORIDE 1 MG/ML
INJECTION, SOLUTION INTRAMUSCULAR; INTRAVENOUS PRN
Status: DISCONTINUED | OUTPATIENT
Start: 2024-05-31 | End: 2024-05-31

## 2024-05-31 RX ORDER — FUROSEMIDE 20 MG/1
20 TABLET ORAL DAILY
Status: DISCONTINUED | OUTPATIENT
Start: 2024-05-31 | End: 2024-06-02 | Stop reason: HOSPADM

## 2024-05-31 RX ORDER — DEXTROSE MONOHYDRATE 25 G/50ML
25 INJECTION, SOLUTION INTRAVENOUS PRN
Status: DISCONTINUED | OUTPATIENT
Start: 2024-05-31 | End: 2024-05-31 | Stop reason: HOSPADM

## 2024-05-31 RX ORDER — SODIUM CHLORIDE, SODIUM LACTATE, POTASSIUM CHLORIDE, CALCIUM CHLORIDE 600; 310; 30; 20 MG/100ML; MG/100ML; MG/100ML; MG/100ML
INJECTION, SOLUTION INTRAVENOUS CONTINUOUS PRN
Status: DISCONTINUED | OUTPATIENT
Start: 2024-05-31 | End: 2024-05-31

## 2024-05-31 RX ORDER — PROPOFOL 10 MG/ML
INJECTION, EMULSION INTRAVENOUS PRN
Status: DISCONTINUED | OUTPATIENT
Start: 2024-05-31 | End: 2024-05-31

## 2024-05-31 RX ORDER — NICOTINE POLACRILEX 4 MG
30 LOZENGE BUCCAL
Status: DISCONTINUED | OUTPATIENT
Start: 2024-05-31 | End: 2024-05-31 | Stop reason: HOSPADM

## 2024-05-31 RX ORDER — HYDRALAZINE HYDROCHLORIDE 10 MG/1
10 TABLET, FILM COATED ORAL 3 TIMES DAILY
Status: DISCONTINUED | OUTPATIENT
Start: 2024-05-31 | End: 2024-06-02 | Stop reason: HOSPADM

## 2024-05-31 RX ORDER — MIDAZOLAM HYDROCHLORIDE 2 MG/2ML
INJECTION, SOLUTION INTRAMUSCULAR; INTRAVENOUS
Status: DISPENSED
Start: 2024-05-31 | End: 2024-06-01

## 2024-05-31 RX ORDER — HYDRALAZINE HYDROCHLORIDE 20 MG/ML
5 INJECTION INTRAMUSCULAR; INTRAVENOUS EVERY 10 MIN PRN
Status: DISCONTINUED | OUTPATIENT
Start: 2024-05-31 | End: 2024-05-31 | Stop reason: HOSPADM

## 2024-05-31 RX ORDER — MIDAZOLAM HYDROCHLORIDE 1 MG/ML
1 INJECTION, SOLUTION INTRAMUSCULAR; INTRAVENOUS ONCE
Status: COMPLETED | OUTPATIENT
Start: 2024-05-31 | End: 2024-05-31

## 2024-05-31 RX ORDER — ROCURONIUM BROMIDE 10 MG/ML
INJECTION, SOLUTION INTRAVENOUS PRN
Status: DISCONTINUED | OUTPATIENT
Start: 2024-05-31 | End: 2024-05-31

## 2024-05-31 RX ADMIN — SODIUM CHLORIDE, PRESERVATIVE FREE 2 ML: 5 INJECTION INTRAVENOUS at 08:41

## 2024-05-31 RX ADMIN — SODIUM CHLORIDE, POTASSIUM CHLORIDE, SODIUM LACTATE AND CALCIUM CHLORIDE: 600; 310; 30; 20 INJECTION, SOLUTION INTRAVENOUS at 12:47

## 2024-05-31 RX ADMIN — FENTANYL CITRATE 25 MCG: 50 INJECTION INTRAMUSCULAR; INTRAVENOUS at 14:22

## 2024-05-31 RX ADMIN — CEFAZOLIN SODIUM 2000 MG: 300 INJECTION, POWDER, LYOPHILIZED, FOR SOLUTION INTRAVENOUS at 13:02

## 2024-05-31 RX ADMIN — INSULIN GLARGINE 10 UNITS: 100 INJECTION, SOLUTION SUBCUTANEOUS at 21:11

## 2024-05-31 RX ADMIN — ROCURONIUM BROMIDE 50 MG: 10 INJECTION INTRAVENOUS at 12:54

## 2024-05-31 RX ADMIN — FENTANYL CITRATE 25 MCG: 50 INJECTION INTRAMUSCULAR; INTRAVENOUS at 14:27

## 2024-05-31 RX ADMIN — ATORVASTATIN CALCIUM 40 MG: 40 TABLET, FILM COATED ORAL at 17:54

## 2024-05-31 RX ADMIN — SODIUM CHLORIDE, PRESERVATIVE FREE 2 ML: 5 INJECTION INTRAVENOUS at 20:22

## 2024-05-31 RX ADMIN — ACETAMINOPHEN 650 MG: 325 TABLET ORAL at 20:16

## 2024-05-31 RX ADMIN — MIDAZOLAM HYDROCHLORIDE 1 MG: 1 INJECTION, SOLUTION INTRAMUSCULAR; INTRAVENOUS at 14:35

## 2024-05-31 RX ADMIN — FENTANYL CITRATE 100 MCG: 50 INJECTION INTRAMUSCULAR; INTRAVENOUS at 12:53

## 2024-05-31 RX ADMIN — IOHEXOL 50 ML: 300 INJECTION, SOLUTION INTRAVENOUS at 13:58

## 2024-05-31 RX ADMIN — HYDRALAZINE HYDROCHLORIDE 10 MG: 10 TABLET, FILM COATED ORAL at 20:16

## 2024-05-31 RX ADMIN — PROPOFOL 100 MG: 10 INJECTION, EMULSION INTRAVENOUS at 12:54

## 2024-05-31 RX ADMIN — SUGAMMADEX 200 MG: 100 INJECTION, SOLUTION INTRAVENOUS at 14:01

## 2024-05-31 RX ADMIN — METOPROLOL TARTRATE 25 MG: 25 TABLET, FILM COATED ORAL at 20:16

## 2024-05-31 RX ADMIN — FUROSEMIDE 20 MG: 20 TABLET ORAL at 17:54

## 2024-05-31 RX ADMIN — FENTANYL CITRATE 25 MCG: 50 INJECTION INTRAMUSCULAR; INTRAVENOUS at 14:32

## 2024-05-31 RX ADMIN — MIDAZOLAM HYDROCHLORIDE 2 MG: 1 INJECTION, SOLUTION INTRAMUSCULAR; INTRAVENOUS at 12:53

## 2024-05-31 RX ADMIN — METOPROLOL TARTRATE 25 MG: 25 TABLET, FILM COATED ORAL at 12:26

## 2024-05-31 RX ADMIN — FENTANYL CITRATE 25 MCG: 50 INJECTION INTRAMUSCULAR; INTRAVENOUS at 14:37

## 2024-05-31 ASSESSMENT — PAIN SCALES - GENERAL
PAINLEVEL_OUTOF10: 2
PAINLEVEL_OUTOF10: 0

## 2024-06-01 VITALS
OXYGEN SATURATION: 98 % | TEMPERATURE: 98.1 F | HEART RATE: 75 BPM | WEIGHT: 173.28 LBS | BODY MASS INDEX: 25.67 KG/M2 | SYSTOLIC BLOOD PRESSURE: 137 MMHG | DIASTOLIC BLOOD PRESSURE: 59 MMHG | HEIGHT: 69 IN | RESPIRATION RATE: 16 BRPM

## 2024-06-01 LAB
ANION GAP SERPL CALC-SCNC: 7 MMOL/L (ref 7–19)
BUN SERPL-MCNC: 22 MG/DL (ref 6–20)
BUN/CREAT SERPL: 21 (ref 7–25)
CALCIUM SERPL-MCNC: 8.8 MG/DL (ref 8.4–10.2)
CHLORIDE SERPL-SCNC: 108 MMOL/L (ref 97–110)
CO2 SERPL-SCNC: 27 MMOL/L (ref 21–32)
CREAT SERPL-MCNC: 1.04 MG/DL (ref 0.67–1.17)
DEPRECATED RDW RBC: 46.2 FL (ref 39–50)
EGFRCR SERPLBLD CKD-EPI 2021: 71 ML/MIN/{1.73_M2}
ERYTHROCYTE [DISTWIDTH] IN BLOOD: 16.3 % (ref 11–15)
FASTING DURATION TIME PATIENT: ABNORMAL H
GLUCOSE BLDC GLUCOMTR-MCNC: 108 MG/DL (ref 70–99)
GLUCOSE BLDC GLUCOMTR-MCNC: 132 MG/DL (ref 70–99)
GLUCOSE BLDC GLUCOMTR-MCNC: 166 MG/DL (ref 70–99)
GLUCOSE BLDC GLUCOMTR-MCNC: 167 MG/DL (ref 70–99)
GLUCOSE SERPL-MCNC: 108 MG/DL (ref 70–99)
HCT VFR BLD CALC: 30.8 % (ref 39–51)
HGB BLD-MCNC: 9.2 G/DL (ref 13–17)
MCH RBC QN AUTO: 23.5 PG (ref 26–34)
MCHC RBC AUTO-ENTMCNC: 29.9 G/DL (ref 32–36.5)
MCV RBC AUTO: 78.8 FL (ref 78–100)
NRBC BLD MANUAL-RTO: 0 /100 WBC
PLATELET # BLD AUTO: 284 K/MCL (ref 140–450)
POTASSIUM SERPL-SCNC: 4 MMOL/L (ref 3.4–5.1)
RBC # BLD: 3.91 MIL/MCL (ref 4.5–5.9)
SODIUM SERPL-SCNC: 138 MMOL/L (ref 135–145)
WBC # BLD: 9.2 K/MCL (ref 4.2–11)

## 2024-06-01 PROCEDURE — 96372 THER/PROPH/DIAG INJ SC/IM: CPT | Performed by: HOSPITALIST

## 2024-06-01 PROCEDURE — 10002803 HB RX 637: Performed by: INTERNAL MEDICINE

## 2024-06-01 PROCEDURE — 85027 COMPLETE CBC AUTOMATED: CPT | Performed by: UROLOGY

## 2024-06-01 PROCEDURE — 36415 COLL VENOUS BLD VENIPUNCTURE: CPT | Performed by: UROLOGY

## 2024-06-01 PROCEDURE — 10002800 HB RX 250 W HCPCS: Performed by: UROLOGY

## 2024-06-01 PROCEDURE — 10002800 HB RX 250 W HCPCS: Performed by: HOSPITALIST

## 2024-06-01 PROCEDURE — 10004651 HB RX, NO CHARGE ITEM: Performed by: UROLOGY

## 2024-06-01 PROCEDURE — 10006031 HB ROOM CHARGE TELEMETRY

## 2024-06-01 PROCEDURE — 10002803 HB RX 637: Performed by: HOSPITALIST

## 2024-06-01 PROCEDURE — 80048 BASIC METABOLIC PNL TOTAL CA: CPT | Performed by: UROLOGY

## 2024-06-01 PROCEDURE — 10002803 HB RX 637: Performed by: UROLOGY

## 2024-06-01 PROCEDURE — 99233 SBSQ HOSP IP/OBS HIGH 50: CPT | Performed by: INTERNAL MEDICINE

## 2024-06-01 PROCEDURE — 96372 THER/PROPH/DIAG INJ SC/IM: CPT | Performed by: UROLOGY

## 2024-06-01 RX ORDER — TRAMADOL HYDROCHLORIDE 50 MG/1
50 TABLET ORAL ONCE
Status: COMPLETED | OUTPATIENT
Start: 2024-06-01 | End: 2024-06-01

## 2024-06-01 RX ORDER — POTASSIUM CHLORIDE 1.5 G/1.58G
40 POWDER, FOR SOLUTION ORAL ONCE
Status: COMPLETED | OUTPATIENT
Start: 2024-06-01 | End: 2024-06-01

## 2024-06-01 RX ADMIN — ACETAMINOPHEN 650 MG: 325 TABLET ORAL at 01:17

## 2024-06-01 RX ADMIN — INSULIN GLARGINE 10 UNITS: 100 INJECTION, SOLUTION SUBCUTANEOUS at 08:58

## 2024-06-01 RX ADMIN — INSULIN LISPRO 2 UNITS: 100 INJECTION, SOLUTION INTRAVENOUS; SUBCUTANEOUS at 13:15

## 2024-06-01 RX ADMIN — ACETAMINOPHEN 650 MG: 325 TABLET ORAL at 13:20

## 2024-06-01 RX ADMIN — TRAMADOL HYDROCHLORIDE 50 MG: 50 TABLET, COATED ORAL at 23:49

## 2024-06-01 RX ADMIN — SODIUM CHLORIDE, PRESERVATIVE FREE 2 ML: 5 INJECTION INTRAVENOUS at 09:00

## 2024-06-01 RX ADMIN — POTASSIUM CHLORIDE 40 MEQ: 1.5 POWDER, FOR SOLUTION ORAL at 21:26

## 2024-06-01 RX ADMIN — METOPROLOL TARTRATE 25 MG: 25 TABLET, FILM COATED ORAL at 21:26

## 2024-06-01 RX ADMIN — ACETAMINOPHEN 650 MG: 325 TABLET ORAL at 21:26

## 2024-06-01 RX ADMIN — INSULIN GLARGINE 10 UNITS: 100 INJECTION, SOLUTION SUBCUTANEOUS at 21:27

## 2024-06-01 RX ADMIN — HYDRALAZINE HYDROCHLORIDE 10 MG: 10 TABLET, FILM COATED ORAL at 13:20

## 2024-06-01 RX ADMIN — HYDRALAZINE HYDROCHLORIDE 10 MG: 10 TABLET, FILM COATED ORAL at 08:55

## 2024-06-01 RX ADMIN — FUROSEMIDE 20 MG: 20 TABLET ORAL at 08:55

## 2024-06-01 RX ADMIN — HYDRALAZINE HYDROCHLORIDE 10 MG: 10 TABLET, FILM COATED ORAL at 21:26

## 2024-06-01 RX ADMIN — METOPROLOL TARTRATE 25 MG: 25 TABLET, FILM COATED ORAL at 08:56

## 2024-06-01 RX ADMIN — ATORVASTATIN CALCIUM 40 MG: 40 TABLET, FILM COATED ORAL at 08:55

## 2024-06-01 RX ADMIN — SODIUM CHLORIDE, PRESERVATIVE FREE 2 ML: 5 INJECTION INTRAVENOUS at 21:27

## 2024-06-01 ASSESSMENT — PAIN SCALES - GENERAL
PAINLEVEL_OUTOF10: 3
PAINLEVEL_OUTOF10: 1
PAINLEVEL_OUTOF10: 8
PAINLEVEL_OUTOF10: 2
PAINLEVEL_OUTOF10: 0

## 2024-06-01 ASSESSMENT — PAIN SCALES - WONG BAKER
WONGBAKER_NUMERICALRESPONSE: 0
WONGBAKER_NUMERICALRESPONSE: 0

## 2024-06-02 VITALS
DIASTOLIC BLOOD PRESSURE: 66 MMHG | RESPIRATION RATE: 16 BRPM | WEIGHT: 174.6 LBS | HEART RATE: 67 BPM | HEIGHT: 69 IN | OXYGEN SATURATION: 100 % | SYSTOLIC BLOOD PRESSURE: 136 MMHG | BODY MASS INDEX: 25.86 KG/M2 | TEMPERATURE: 97.5 F

## 2024-06-02 LAB
ANION GAP SERPL CALC-SCNC: 9 MMOL/L (ref 7–19)
BASOPHILS # BLD: 0.1 K/MCL (ref 0–0.3)
BASOPHILS NFR BLD: 1 %
BUN SERPL-MCNC: 23 MG/DL (ref 6–20)
BUN/CREAT SERPL: 21 (ref 7–25)
CALCIUM SERPL-MCNC: 8.5 MG/DL (ref 8.4–10.2)
CHLORIDE SERPL-SCNC: 110 MMOL/L (ref 97–110)
CO2 SERPL-SCNC: 18 MMOL/L (ref 21–32)
CREAT SERPL-MCNC: 1.07 MG/DL (ref 0.67–1.17)
DEPRECATED RDW RBC: 50.1 FL (ref 39–50)
EGFRCR SERPLBLD CKD-EPI 2021: 68 ML/MIN/{1.73_M2}
EOSINOPHIL # BLD: 0.2 K/MCL (ref 0–0.5)
EOSINOPHIL NFR BLD: 2 %
ERYTHROCYTE [DISTWIDTH] IN BLOOD: 17 % (ref 11–15)
FASTING DURATION TIME PATIENT: ABNORMAL H
GLUCOSE BLDC GLUCOMTR-MCNC: 145 MG/DL (ref 70–99)
GLUCOSE BLDC GLUCOMTR-MCNC: 153 MG/DL (ref 70–99)
GLUCOSE SERPL-MCNC: 106 MG/DL (ref 70–99)
HCT VFR BLD CALC: 34.9 % (ref 39–51)
HGB BLD-MCNC: 10.1 G/DL (ref 13–17)
IMM GRANULOCYTES # BLD AUTO: 0.1 K/MCL (ref 0–0.2)
IMM GRANULOCYTES # BLD: 1 %
LYMPHOCYTES # BLD: 1.4 K/MCL (ref 1–4)
LYMPHOCYTES NFR BLD: 17 %
MCH RBC QN AUTO: 23.8 PG (ref 26–34)
MCHC RBC AUTO-ENTMCNC: 28.9 G/DL (ref 32–36.5)
MCV RBC AUTO: 82.1 FL (ref 78–100)
MONOCYTES # BLD: 1.1 K/MCL (ref 0.3–0.9)
MONOCYTES NFR BLD: 13 %
NEUTROPHILS # BLD: 5.5 K/MCL (ref 1.8–7.7)
NEUTROPHILS NFR BLD: 66 %
NRBC BLD MANUAL-RTO: 0 /100 WBC
PLATELET # BLD AUTO: 290 K/MCL (ref 140–450)
POTASSIUM SERPL-SCNC: 4.4 MMOL/L (ref 3.4–5.1)
RBC # BLD: 4.25 MIL/MCL (ref 4.5–5.9)
SODIUM SERPL-SCNC: 133 MMOL/L (ref 135–145)
WBC # BLD: 8.3 K/MCL (ref 4.2–11)

## 2024-06-02 PROCEDURE — 10002800 HB RX 250 W HCPCS: Performed by: HOSPITALIST

## 2024-06-02 PROCEDURE — 96372 THER/PROPH/DIAG INJ SC/IM: CPT | Performed by: UROLOGY

## 2024-06-02 PROCEDURE — 10004651 HB RX, NO CHARGE ITEM: Performed by: UROLOGY

## 2024-06-02 PROCEDURE — 10002800 HB RX 250 W HCPCS: Performed by: UROLOGY

## 2024-06-02 PROCEDURE — 80048 BASIC METABOLIC PNL TOTAL CA: CPT | Performed by: HOSPITALIST

## 2024-06-02 PROCEDURE — 36415 COLL VENOUS BLD VENIPUNCTURE: CPT | Performed by: HOSPITALIST

## 2024-06-02 PROCEDURE — 96372 THER/PROPH/DIAG INJ SC/IM: CPT | Performed by: HOSPITALIST

## 2024-06-02 PROCEDURE — 10004651 HB RX, NO CHARGE ITEM: Performed by: HOSPITALIST

## 2024-06-02 PROCEDURE — 10002803 HB RX 637: Performed by: UROLOGY

## 2024-06-02 PROCEDURE — 10002803 HB RX 637: Performed by: INTERNAL MEDICINE

## 2024-06-02 PROCEDURE — 10002803 HB RX 637: Performed by: HOSPITALIST

## 2024-06-02 PROCEDURE — 85025 COMPLETE CBC W/AUTO DIFF WBC: CPT | Performed by: HOSPITALIST

## 2024-06-02 RX ORDER — ACETAMINOPHEN 500 MG
1000 TABLET ORAL EVERY 8 HOURS SCHEDULED
Qty: 18 TABLET | Refills: 0 | Status: SHIPPED | OUTPATIENT
Start: 2024-06-02 | End: 2024-06-05

## 2024-06-02 RX ORDER — TRAMADOL HYDROCHLORIDE 50 MG/1
50 TABLET ORAL EVERY 6 HOURS PRN
Status: DISCONTINUED | OUTPATIENT
Start: 2024-06-02 | End: 2024-06-02 | Stop reason: HOSPADM

## 2024-06-02 RX ORDER — ASPIRIN 81 MG/1
81 TABLET ORAL DAILY
Status: SHIPPED | COMMUNITY
Start: 2024-06-05

## 2024-06-02 RX ORDER — ACETAMINOPHEN 500 MG
1000 TABLET ORAL EVERY 8 HOURS SCHEDULED
Status: DISCONTINUED | OUTPATIENT
Start: 2024-06-02 | End: 2024-06-02 | Stop reason: HOSPADM

## 2024-06-02 RX ORDER — TRAMADOL HYDROCHLORIDE 50 MG/1
50 TABLET ORAL EVERY 6 HOURS PRN
Qty: 7 TABLET | Refills: 0 | Status: SHIPPED | OUTPATIENT
Start: 2024-06-02

## 2024-06-02 RX ORDER — LIDOCAINE HYDROCHLORIDE 20 MG/ML
5 JELLY TOPICAL 3 TIMES DAILY PRN
Status: DISCONTINUED | OUTPATIENT
Start: 2024-06-02 | End: 2024-06-02 | Stop reason: HOSPADM

## 2024-06-02 RX ADMIN — ACETAMINOPHEN 1000 MG: 500 TABLET ORAL at 13:29

## 2024-06-02 RX ADMIN — POLYETHYLENE GLYCOL (3350) 17 G: 17 POWDER, FOR SOLUTION ORAL at 08:53

## 2024-06-02 RX ADMIN — SODIUM CHLORIDE, PRESERVATIVE FREE 2 ML: 5 INJECTION INTRAVENOUS at 08:55

## 2024-06-02 RX ADMIN — HYDRALAZINE HYDROCHLORIDE 10 MG: 10 TABLET, FILM COATED ORAL at 13:29

## 2024-06-02 RX ADMIN — ATORVASTATIN CALCIUM 40 MG: 40 TABLET, FILM COATED ORAL at 08:51

## 2024-06-02 RX ADMIN — FUROSEMIDE 20 MG: 20 TABLET ORAL at 08:52

## 2024-06-02 RX ADMIN — ACETAMINOPHEN 650 MG: 325 TABLET ORAL at 08:52

## 2024-06-02 RX ADMIN — HYDRALAZINE HYDROCHLORIDE 10 MG: 10 TABLET, FILM COATED ORAL at 08:51

## 2024-06-02 RX ADMIN — INSULIN GLARGINE 10 UNITS: 100 INJECTION, SOLUTION SUBCUTANEOUS at 08:54

## 2024-06-02 RX ADMIN — ACETAMINOPHEN 650 MG: 325 TABLET ORAL at 01:47

## 2024-06-02 RX ADMIN — METOPROLOL TARTRATE 25 MG: 25 TABLET, FILM COATED ORAL at 08:52

## 2024-06-02 RX ADMIN — INSULIN LISPRO 2 UNITS: 100 INJECTION, SOLUTION INTRAVENOUS; SUBCUTANEOUS at 18:15

## 2024-06-02 ASSESSMENT — PAIN SCALES - WONG BAKER: WONGBAKER_NUMERICALRESPONSE: 0

## 2024-06-02 ASSESSMENT — PAIN SCALES - GENERAL
PAINLEVEL_OUTOF10: 7
PAINLEVEL_OUTOF10: 2
PAINLEVEL_OUTOF10: 6

## 2024-06-04 ENCOUNTER — TELEPHONE (OUTPATIENT)
Dept: CARE COORDINATION | Age: 84
End: 2024-06-04

## 2024-06-05 ENCOUNTER — TELEPHONE (OUTPATIENT)
Dept: CARE COORDINATION | Age: 84
End: 2024-06-05

## 2024-06-07 LAB
ASR DISCLAIMER: NORMAL
CASE RPRT: NORMAL
CLINICAL INFO: NORMAL
PATH REPORT.FINAL DX SPEC: NORMAL
PATH REPORT.GROSS SPEC: NORMAL

## 2024-06-11 ENCOUNTER — TELEPHONE (OUTPATIENT)
Dept: CARE COORDINATION | Age: 84
End: 2024-06-11

## 2024-06-11 LAB
ASR DISCLAIMER: NORMAL
CASE RPRT: NORMAL
CLINICAL INFO: NORMAL
PATH REPORT ADDENDUM.SYNOPTIC DOC: NORMAL
PATH REPORT.FINAL DX SPEC: NORMAL
PATH REPORT.GROSS SPEC: NORMAL

## 2024-06-12 ENCOUNTER — TELEPHONE (OUTPATIENT)
Dept: CARE COORDINATION | Age: 84
End: 2024-06-12

## 2024-06-18 ENCOUNTER — TELEPHONE (OUTPATIENT)
Dept: CARE COORDINATION | Age: 84
End: 2024-06-18

## 2024-06-18 DIAGNOSIS — C68.9: Primary | ICD-10-CM

## 2024-06-19 ENCOUNTER — HOSPITAL ENCOUNTER (OUTPATIENT)
Dept: CT IMAGING | Age: 84
Discharge: HOME | End: 2024-06-19
Attending: UROLOGY

## 2024-06-19 ENCOUNTER — TELEPHONE (OUTPATIENT)
Dept: CARE COORDINATION | Age: 84
End: 2024-06-19

## 2024-06-19 DIAGNOSIS — C68.9: ICD-10-CM

## 2024-06-19 PROCEDURE — 10002805 HB CONTRAST AGENT: Performed by: UROLOGY

## 2024-06-19 PROCEDURE — 71260 CT THORAX DX C+: CPT

## 2024-06-19 RX ADMIN — IOHEXOL 75 ML: 350 INJECTION, SOLUTION INTRAVENOUS at 07:58

## 2024-06-25 ENCOUNTER — TELEPHONE (OUTPATIENT)
Dept: CARE COORDINATION | Age: 84
End: 2024-06-25

## 2024-06-25 ENCOUNTER — CASE MANAGEMENT (OUTPATIENT)
Dept: CARE COORDINATION | Age: 84
End: 2024-06-25

## 2024-06-28 ENCOUNTER — CASE MANAGEMENT (OUTPATIENT)
Dept: CARE COORDINATION | Age: 84
End: 2024-06-28

## 2024-07-02 ENCOUNTER — TELEPHONE (OUTPATIENT)
Dept: CARE COORDINATION | Age: 84
End: 2024-07-02

## 2024-07-12 RX ORDER — INSULIN ASPART 100 [IU]/ML
INJECTION, SOLUTION INTRAVENOUS; SUBCUTANEOUS
COMMUNITY
Start: 2022-11-16 | End: 2024-08-13

## 2024-07-12 RX ORDER — HYDRALAZINE HYDROCHLORIDE 10 MG/1
1 TABLET, FILM COATED ORAL 3 TIMES DAILY
COMMUNITY
Start: 2024-03-29

## 2024-07-12 RX ORDER — ASPIRIN 81 MG/1
81 TABLET ORAL DAILY
COMMUNITY
End: 2024-08-13

## 2024-07-12 RX ORDER — TRAMADOL HYDROCHLORIDE 50 MG/1
50 TABLET ORAL EVERY 6 HOURS PRN
COMMUNITY
End: 2024-08-13

## 2024-07-12 RX ORDER — FUROSEMIDE 20 MG/1
20 TABLET ORAL DAILY
COMMUNITY
Start: 2024-03-29 | End: 2024-08-13

## 2024-07-18 ENCOUNTER — TELEPHONE (OUTPATIENT)
Dept: CARDIOLOGY | Age: 84
End: 2024-07-18

## 2024-07-22 ENCOUNTER — LAB ENCOUNTER (OUTPATIENT)
Dept: LAB | Facility: HOSPITAL | Age: 84
End: 2024-07-22
Attending: UROLOGY
Payer: MEDICARE

## 2024-07-22 ENCOUNTER — OFFICE VISIT (OUTPATIENT)
Dept: WOUND CARE | Facility: HOSPITAL | Age: 84
End: 2024-07-22

## 2024-07-22 VITALS
WEIGHT: 166 LBS | HEIGHT: 69 IN | TEMPERATURE: 98 F | SYSTOLIC BLOOD PRESSURE: 129 MMHG | HEART RATE: 90 BPM | OXYGEN SATURATION: 95 % | RESPIRATION RATE: 18 BRPM | BODY MASS INDEX: 24.59 KG/M2 | DIASTOLIC BLOOD PRESSURE: 63 MMHG

## 2024-07-22 DIAGNOSIS — Z71.89 OSTOMY NURSE CONSULTATION: Primary | ICD-10-CM

## 2024-07-22 DIAGNOSIS — Z01.818 PREOP TESTING: ICD-10-CM

## 2024-07-22 LAB
ANTIBODY SCREEN: NEGATIVE
RH BLOOD TYPE: POSITIVE

## 2024-07-22 PROCEDURE — 86901 BLOOD TYPING SEROLOGIC RH(D): CPT

## 2024-07-22 PROCEDURE — 86850 RBC ANTIBODY SCREEN: CPT

## 2024-07-22 PROCEDURE — 36415 COLL VENOUS BLD VENIPUNCTURE: CPT

## 2024-07-22 PROCEDURE — 99213 OFFICE O/P EST LOW 20 MIN: CPT

## 2024-07-22 PROCEDURE — 86900 BLOOD TYPING SEROLOGIC ABO: CPT

## 2024-07-22 NOTE — PROGRESS NOTES
Patient seen today for ostomy marking. Plan for Xi robotic assisted laparoscopic radical cystoprostatectomy, bilateral pelvic lymph node dissection ileal conduit diversion  surgery with Dr. Jeong on 7/25. Patient assessed in laying, sitting, and standing positions for optimal marking site. Stoma marking made in RUQ abdomen as lower quadrants were below patient's line of sight. Marking was made within rectus muscle avoiding belt line, away from umbilicus and creases/folds. Patient visualized stoma marking. Marking made with black pen and secured with Tegaderm. Extra Tegaderm given to patient. Discussed general ostomy care and expectations for post-op inpatient ostomy teaching; all questions answered to best of my ability.

## 2024-07-25 ENCOUNTER — ANESTHESIA EVENT (OUTPATIENT)
Dept: SURGERY | Facility: HOSPITAL | Age: 84
End: 2024-07-25
Payer: MEDICARE

## 2024-07-25 ENCOUNTER — HOSPITAL ENCOUNTER (INPATIENT)
Facility: HOSPITAL | Age: 84
LOS: 19 days | Discharge: NURSING, REHAB OR LONG-TERM CARE | End: 2024-08-13
Attending: UROLOGY | Admitting: UROLOGY
Payer: MEDICARE

## 2024-07-25 ENCOUNTER — ANESTHESIA (OUTPATIENT)
Dept: SURGERY | Facility: HOSPITAL | Age: 84
End: 2024-07-25
Payer: MEDICARE

## 2024-07-25 ENCOUNTER — HOSPITAL ENCOUNTER (INPATIENT)
Facility: HOSPITAL | Age: 84
Discharge: NURSING, REHAB OR LONG-TERM CARE | End: 2024-07-25
Attending: UROLOGY | Admitting: UROLOGY
Payer: MEDICARE

## 2024-07-25 DIAGNOSIS — Z01.818 PREOP TESTING: Primary | ICD-10-CM

## 2024-07-25 PROBLEM — C67.9 BLADDER CANCER (HCC): Status: ACTIVE | Noted: 2024-07-25

## 2024-07-25 LAB
GLUCOSE BLDC GLUCOMTR-MCNC: 112 MG/DL (ref 70–99)
GLUCOSE BLDC GLUCOMTR-MCNC: 114 MG/DL (ref 70–99)
GLUCOSE BLDC GLUCOMTR-MCNC: 132 MG/DL (ref 70–99)
GLUCOSE BLDC GLUCOMTR-MCNC: 135 MG/DL (ref 70–99)
GLUCOSE BLDC GLUCOMTR-MCNC: 88 MG/DL (ref 70–99)
RH BLOOD TYPE: POSITIVE

## 2024-07-25 PROCEDURE — 07BC4ZZ EXCISION OF PELVIS LYMPHATIC, PERCUTANEOUS ENDOSCOPIC APPROACH: ICD-10-PCS | Performed by: UROLOGY

## 2024-07-25 PROCEDURE — 0TTB4ZZ RESECTION OF BLADDER, PERCUTANEOUS ENDOSCOPIC APPROACH: ICD-10-PCS | Performed by: UROLOGY

## 2024-07-25 PROCEDURE — 8E0W4CZ ROBOTIC ASSISTED PROCEDURE OF TRUNK REGION, PERCUTANEOUS ENDOSCOPIC APPROACH: ICD-10-PCS | Performed by: UROLOGY

## 2024-07-25 PROCEDURE — 0VT04ZZ RESECTION OF PROSTATE, PERCUTANEOUS ENDOSCOPIC APPROACH: ICD-10-PCS | Performed by: UROLOGY

## 2024-07-25 PROCEDURE — 0T184ZC BYPASS BILATERAL URETERS TO ILEOCUTANEOUS, PERCUTANEOUS ENDOSCOPIC APPROACH: ICD-10-PCS | Performed by: UROLOGY

## 2024-07-25 RX ORDER — ATORVASTATIN CALCIUM 40 MG/1
40 TABLET, FILM COATED ORAL DAILY
Status: DISCONTINUED | OUTPATIENT
Start: 2024-07-26 | End: 2024-08-13

## 2024-07-25 RX ORDER — ONDANSETRON 2 MG/ML
INJECTION INTRAMUSCULAR; INTRAVENOUS AS NEEDED
Status: DISCONTINUED | OUTPATIENT
Start: 2024-07-25 | End: 2024-07-25 | Stop reason: SURG

## 2024-07-25 RX ORDER — ACETAMINOPHEN 500 MG
1000 TABLET ORAL ONCE
Status: DISCONTINUED | OUTPATIENT
Start: 2024-07-25 | End: 2024-07-25

## 2024-07-25 RX ORDER — HYDROMORPHONE HYDROCHLORIDE 1 MG/ML
0.6 INJECTION, SOLUTION INTRAMUSCULAR; INTRAVENOUS; SUBCUTANEOUS EVERY 5 MIN PRN
Status: DISCONTINUED | OUTPATIENT
Start: 2024-07-25 | End: 2024-07-25 | Stop reason: HOSPADM

## 2024-07-25 RX ORDER — INSULIN DEGLUDEC 100 U/ML
15 INJECTION, SOLUTION SUBCUTANEOUS DAILY
Status: DISCONTINUED | OUTPATIENT
Start: 2024-07-26 | End: 2024-07-30

## 2024-07-25 RX ORDER — MORPHINE SULFATE 4 MG/ML
4 INJECTION, SOLUTION INTRAMUSCULAR; INTRAVENOUS EVERY 10 MIN PRN
Status: DISCONTINUED | OUTPATIENT
Start: 2024-07-25 | End: 2024-07-25 | Stop reason: HOSPADM

## 2024-07-25 RX ORDER — SENNOSIDES 8.6 MG
17.2 TABLET ORAL NIGHTLY
Status: DISCONTINUED | OUTPATIENT
Start: 2024-07-25 | End: 2024-07-29

## 2024-07-25 RX ORDER — NICOTINE POLACRILEX 4 MG
15 LOZENGE BUCCAL
Status: DISCONTINUED | OUTPATIENT
Start: 2024-07-25 | End: 2024-08-13

## 2024-07-25 RX ORDER — MORPHINE SULFATE 10 MG/ML
6 INJECTION, SOLUTION INTRAMUSCULAR; INTRAVENOUS EVERY 10 MIN PRN
Status: DISCONTINUED | OUTPATIENT
Start: 2024-07-25 | End: 2024-07-25 | Stop reason: HOSPADM

## 2024-07-25 RX ORDER — ONDANSETRON 2 MG/ML
4 INJECTION INTRAMUSCULAR; INTRAVENOUS EVERY 6 HOURS PRN
Status: DISCONTINUED | OUTPATIENT
Start: 2024-07-25 | End: 2024-08-13

## 2024-07-25 RX ORDER — NALOXONE HYDROCHLORIDE 0.4 MG/ML
80 INJECTION, SOLUTION INTRAMUSCULAR; INTRAVENOUS; SUBCUTANEOUS AS NEEDED
Status: DISCONTINUED | OUTPATIENT
Start: 2024-07-25 | End: 2024-07-25 | Stop reason: HOSPADM

## 2024-07-25 RX ORDER — METOCLOPRAMIDE HYDROCHLORIDE 5 MG/ML
10 INJECTION INTRAMUSCULAR; INTRAVENOUS EVERY 8 HOURS PRN
Status: DISCONTINUED | OUTPATIENT
Start: 2024-07-25 | End: 2024-07-25 | Stop reason: HOSPADM

## 2024-07-25 RX ORDER — DOCUSATE SODIUM 100 MG/1
100 CAPSULE, LIQUID FILLED ORAL 2 TIMES DAILY
Status: DISCONTINUED | OUTPATIENT
Start: 2024-07-25 | End: 2024-07-29

## 2024-07-25 RX ORDER — SODIUM CHLORIDE, SODIUM LACTATE, POTASSIUM CHLORIDE, CALCIUM CHLORIDE 600; 310; 30; 20 MG/100ML; MG/100ML; MG/100ML; MG/100ML
INJECTION, SOLUTION INTRAVENOUS CONTINUOUS
Status: DISCONTINUED | OUTPATIENT
Start: 2024-07-25 | End: 2024-07-25

## 2024-07-25 RX ORDER — NICOTINE POLACRILEX 4 MG
30 LOZENGE BUCCAL
Status: DISCONTINUED | OUTPATIENT
Start: 2024-07-25 | End: 2024-07-25 | Stop reason: HOSPADM

## 2024-07-25 RX ORDER — BUPIVACAINE HYDROCHLORIDE 5 MG/ML
INJECTION, SOLUTION EPIDURAL; INTRACAUDAL AS NEEDED
Status: DISCONTINUED | OUTPATIENT
Start: 2024-07-25 | End: 2024-07-25 | Stop reason: HOSPADM

## 2024-07-25 RX ORDER — SODIUM CHLORIDE 9 MG/ML
INJECTION, SOLUTION INTRAVENOUS CONTINUOUS
Status: DISCONTINUED | OUTPATIENT
Start: 2024-07-25 | End: 2024-07-29

## 2024-07-25 RX ORDER — HEPARIN SODIUM 5000 [USP'U]/ML
5000 INJECTION, SOLUTION INTRAVENOUS; SUBCUTANEOUS ONCE
Status: COMPLETED | OUTPATIENT
Start: 2024-07-25 | End: 2024-07-25

## 2024-07-25 RX ORDER — HYDRALAZINE HYDROCHLORIDE 10 MG/1
10 TABLET, FILM COATED ORAL 3 TIMES DAILY
Status: DISCONTINUED | OUTPATIENT
Start: 2024-07-25 | End: 2024-08-13

## 2024-07-25 RX ORDER — FAMOTIDINE 20 MG/1
20 TABLET, FILM COATED ORAL 2 TIMES DAILY
Status: DISCONTINUED | OUTPATIENT
Start: 2024-07-25 | End: 2024-08-06

## 2024-07-25 RX ORDER — MORPHINE SULFATE 4 MG/ML
2 INJECTION, SOLUTION INTRAMUSCULAR; INTRAVENOUS EVERY 10 MIN PRN
Status: DISCONTINUED | OUTPATIENT
Start: 2024-07-25 | End: 2024-07-25 | Stop reason: HOSPADM

## 2024-07-25 RX ORDER — SODIUM CHLORIDE 9 MG/ML
INJECTION, SOLUTION INTRAVENOUS CONTINUOUS PRN
Status: DISCONTINUED | OUTPATIENT
Start: 2024-07-25 | End: 2024-07-25 | Stop reason: SURG

## 2024-07-25 RX ORDER — HYDROMORPHONE HYDROCHLORIDE 1 MG/ML
0.4 INJECTION, SOLUTION INTRAMUSCULAR; INTRAVENOUS; SUBCUTANEOUS EVERY 5 MIN PRN
Status: DISCONTINUED | OUTPATIENT
Start: 2024-07-25 | End: 2024-07-25 | Stop reason: HOSPADM

## 2024-07-25 RX ORDER — ROCURONIUM BROMIDE 10 MG/ML
INJECTION, SOLUTION INTRAVENOUS AS NEEDED
Status: DISCONTINUED | OUTPATIENT
Start: 2024-07-25 | End: 2024-07-25 | Stop reason: SURG

## 2024-07-25 RX ORDER — METOPROLOL TARTRATE 1 MG/ML
2.5 INJECTION, SOLUTION INTRAVENOUS ONCE
Status: DISCONTINUED | OUTPATIENT
Start: 2024-07-25 | End: 2024-07-25 | Stop reason: HOSPADM

## 2024-07-25 RX ORDER — HYDROMORPHONE HYDROCHLORIDE 1 MG/ML
0.4 INJECTION, SOLUTION INTRAMUSCULAR; INTRAVENOUS; SUBCUTANEOUS EVERY 2 HOUR PRN
Status: DISCONTINUED | OUTPATIENT
Start: 2024-07-25 | End: 2024-07-29

## 2024-07-25 RX ORDER — HYDROMORPHONE HYDROCHLORIDE 1 MG/ML
0.2 INJECTION, SOLUTION INTRAMUSCULAR; INTRAVENOUS; SUBCUTANEOUS EVERY 5 MIN PRN
Status: DISCONTINUED | OUTPATIENT
Start: 2024-07-25 | End: 2024-07-25 | Stop reason: HOSPADM

## 2024-07-25 RX ORDER — OXYCODONE HYDROCHLORIDE 5 MG/1
2.5 TABLET ORAL EVERY 4 HOURS PRN
Status: DISCONTINUED | OUTPATIENT
Start: 2024-07-25 | End: 2024-07-29

## 2024-07-25 RX ORDER — SODIUM CHLORIDE, SODIUM LACTATE, POTASSIUM CHLORIDE, CALCIUM CHLORIDE 600; 310; 30; 20 MG/100ML; MG/100ML; MG/100ML; MG/100ML
INJECTION, SOLUTION INTRAVENOUS CONTINUOUS
Status: DISCONTINUED | OUTPATIENT
Start: 2024-07-25 | End: 2024-07-25 | Stop reason: HOSPADM

## 2024-07-25 RX ORDER — DEXTROSE MONOHYDRATE 25 G/50ML
50 INJECTION, SOLUTION INTRAVENOUS
Status: DISCONTINUED | OUTPATIENT
Start: 2024-07-25 | End: 2024-08-13

## 2024-07-25 RX ORDER — METOCLOPRAMIDE HYDROCHLORIDE 5 MG/ML
10 INJECTION INTRAMUSCULAR; INTRAVENOUS EVERY 8 HOURS PRN
Status: DISCONTINUED | OUTPATIENT
Start: 2024-07-25 | End: 2024-07-27

## 2024-07-25 RX ORDER — DEXTROSE MONOHYDRATE 25 G/50ML
50 INJECTION, SOLUTION INTRAVENOUS
Status: DISCONTINUED | OUTPATIENT
Start: 2024-07-25 | End: 2024-07-25 | Stop reason: HOSPADM

## 2024-07-25 RX ORDER — HYDROMORPHONE HYDROCHLORIDE 1 MG/ML
0.2 INJECTION, SOLUTION INTRAMUSCULAR; INTRAVENOUS; SUBCUTANEOUS EVERY 2 HOUR PRN
Status: DISCONTINUED | OUTPATIENT
Start: 2024-07-25 | End: 2024-07-29

## 2024-07-25 RX ORDER — ONDANSETRON 2 MG/ML
4 INJECTION INTRAMUSCULAR; INTRAVENOUS EVERY 6 HOURS PRN
Status: DISCONTINUED | OUTPATIENT
Start: 2024-07-25 | End: 2024-07-25 | Stop reason: HOSPADM

## 2024-07-25 RX ORDER — LIDOCAINE HYDROCHLORIDE 10 MG/ML
INJECTION, SOLUTION EPIDURAL; INFILTRATION; INTRACAUDAL; PERINEURAL AS NEEDED
Status: DISCONTINUED | OUTPATIENT
Start: 2024-07-25 | End: 2024-07-25 | Stop reason: SURG

## 2024-07-25 RX ORDER — ENOXAPARIN SODIUM 100 MG/ML
40 INJECTION SUBCUTANEOUS DAILY
Status: DISCONTINUED | OUTPATIENT
Start: 2024-07-26 | End: 2024-07-29

## 2024-07-25 RX ORDER — ACETAMINOPHEN 325 MG/1
650 TABLET ORAL
Status: DISCONTINUED | OUTPATIENT
Start: 2024-07-25 | End: 2024-07-31

## 2024-07-25 RX ORDER — NICOTINE POLACRILEX 4 MG
30 LOZENGE BUCCAL
Status: DISCONTINUED | OUTPATIENT
Start: 2024-07-25 | End: 2024-08-13

## 2024-07-25 RX ORDER — FAMOTIDINE 10 MG/ML
20 INJECTION, SOLUTION INTRAVENOUS 2 TIMES DAILY
Status: DISCONTINUED | OUTPATIENT
Start: 2024-07-25 | End: 2024-07-29

## 2024-07-25 RX ORDER — NICOTINE POLACRILEX 4 MG
15 LOZENGE BUCCAL
Status: DISCONTINUED | OUTPATIENT
Start: 2024-07-25 | End: 2024-07-25 | Stop reason: HOSPADM

## 2024-07-25 RX ORDER — OXYCODONE HYDROCHLORIDE 5 MG/1
5 TABLET ORAL EVERY 4 HOURS PRN
Status: DISCONTINUED | OUTPATIENT
Start: 2024-07-25 | End: 2024-07-29

## 2024-07-25 RX ADMIN — ROCURONIUM BROMIDE 10 MG: 10 INJECTION, SOLUTION INTRAVENOUS at 14:55:00

## 2024-07-25 RX ADMIN — ROCURONIUM BROMIDE 10 MG: 10 INJECTION, SOLUTION INTRAVENOUS at 14:14:00

## 2024-07-25 RX ADMIN — LIDOCAINE HYDROCHLORIDE 50 MG: 10 INJECTION, SOLUTION EPIDURAL; INFILTRATION; INTRACAUDAL; PERINEURAL at 12:50:00

## 2024-07-25 RX ADMIN — SODIUM CHLORIDE, SODIUM LACTATE, POTASSIUM CHLORIDE, CALCIUM CHLORIDE: 600; 310; 30; 20 INJECTION, SOLUTION INTRAVENOUS at 12:45:00

## 2024-07-25 RX ADMIN — SODIUM CHLORIDE: 9 INJECTION, SOLUTION INTRAVENOUS at 13:00:00

## 2024-07-25 RX ADMIN — ONDANSETRON 4 MG: 2 INJECTION INTRAMUSCULAR; INTRAVENOUS at 17:23:00

## 2024-07-25 RX ADMIN — SODIUM CHLORIDE, SODIUM LACTATE, POTASSIUM CHLORIDE, CALCIUM CHLORIDE: 600; 310; 30; 20 INJECTION, SOLUTION INTRAVENOUS at 10:35:00

## 2024-07-25 RX ADMIN — ROCURONIUM BROMIDE 50 MG: 10 INJECTION, SOLUTION INTRAVENOUS at 12:50:00

## 2024-07-25 RX ADMIN — ROCURONIUM BROMIDE 15 MG: 10 INJECTION, SOLUTION INTRAVENOUS at 15:42:00

## 2024-07-25 NOTE — ANESTHESIA PROCEDURE NOTES
Airway  Date/Time: 7/25/2024 12:52 PM    General Information and Staff    Patient location during procedure: OR  Anesthesiologist: Sergei Olmedo MD  Resident/CRNA: Mei Moya CRNA  Performed: CRNA   Performed by: Mei Moya CRNA  Authorized by: Sergei Olmedo MD      Indications and Patient Condition  Indications for airway management: airway protection and anesthesia  Spontaneous Ventilation: absent  Sedation level: deep  Preoxygenated: yes  Patient position: sniffing  Mask difficulty assessment: 1 - vent by mask    Final Airway Details  Final airway type: endotracheal airway      Successful airway: ETT  Cuffed: yes   Successful intubation technique: Video laryngoscopy  Facilitating devices/methods: intubating stylet  Endotracheal tube insertion site: oral  Blade type: Cazares.  Blade size: #4  ETT size (mm): 7.5    Cormack-Lehane Classification: grade I - full view of glottis  Placement verified by: capnometry   Measured from: lips  ETT to lips (cm): 22  Number of attempts at approach: 1  Ventilation between attempts: none  Number of other approaches attempted: 0

## 2024-07-25 NOTE — H&P
Pre-op Diagnosis: Bladder cancer    The above referenced H&P by Dr Herrera from 7/17/24  was reviewed by Mauricio Jeong MD on 7/25/2024, the patient was examined and no significant changes have occurred in the patient's condition since the H&P was performed.  I discussed with the patient and/or legal representative the potential benefits, risks and side effects of this procedure; the likelihood of the patient achieving goals; and potential problems that might occur during recuperation.  I discussed reasonable alternatives to the procedure, including risks, benefits and side effects related to the alternatives and risks related to not receiving this procedure.  We will proceed with procedure as planned.    DEEPTHI Jeong MD  7/25/2024

## 2024-07-25 NOTE — ANESTHESIA POSTPROCEDURE EVALUATION
Patient: Roc Butcher    Procedure Summary       Date: 07/25/24 Room / Location: [facility] MAIN OR  / [facility] MAIN OR    Anesthesia Start: 1243 Anesthesia Stop:     Procedure: Xi robotic assisted laparoscopic radical cystoprostatectomy, bilateral pelvic lymph node dissection ileal conduit diversion (Abdomen) Diagnosis: (Bladder cancer)    Surgeons: Mauricio Jeong MD Anesthesiologist: Schuyler Woodruff MD    Anesthesia Type: general ASA Status: 3            Anesthesia Type: general    Vitals Value Taken Time   /62 07/25/24 1814   Temp 98 07/25/24 1816   Pulse 66 07/25/24 1815   Resp 16 07/25/24 1814   SpO2 99 % 07/25/24 1815   Vitals shown include unfiled device data.    [facility] AN Post Evaluation:   Patient Evaluated in PACU  Patient Participation: complete - patient participated  Level of Consciousness: awake  Pain Management: adequate  Airway Patency:patent  Dental exam unchanged from preop  Yes    Cardiovascular Status: acceptable  Respiratory Status: acceptable  Postoperative Hydration acceptable      SCHUYLER WOODRUFF MD  7/25/2024 6:16 PM

## 2024-07-25 NOTE — ANESTHESIA PREPROCEDURE EVALUATION
Anesthesia PreOp Note    HPI:     Roc Butcher is a 84 year old male who presents for preoperative consultation requested by: Mauricio Jeong MD    Date of Surgery: 7/25/2024    Procedure(s):  Xi robotic assisted laparoscopic radical cystoprostatectomy, bilateral pelvic lymph node dissection ileal conduit diversion  Indication: Bladder cancer    Relevant Problems   No relevant active problems       NPO:  Last Liquid Consumption Date: 07/25/24  Last Liquid Consumption Time: 0830  Last Solid Consumption Date: 07/23/24     Last Liquid Consumption Date: 07/25/24          History Review:  Patient Active Problem List    Diagnosis Date Noted    Annual physical exam 11/23/2021    Subdural hematoma (HCC) 10/06/2021    Essential hypertension 09/10/2020    TIA (transient ischemic attack) 04/28/2020    Anemia, unspecified type 01/07/2020    Peripheral vascular disease of extremity (HCC) 11/14/2017    Vitamin D deficiency 07/14/2017    CAD (coronary artery disease), native coronary artery 06/02/2017    Uncontrolled type 2 diabetes mellitus with stage 3 chronic kidney disease, with long-term current use of insulin 09/27/2016    Uncontrolled type 2 diabetes mellitus with hyperglycemia (HCC) 12/23/2015    Long term current use of insulin (HCC) 05/25/2015    Hyperlipidemia, unspecified hyperlipidemia type 06/13/2011    Hypertension, benign 06/13/2011       Past Medical History:    Arrhythmia    Cancer (HCC)    bladder    Cataract    High blood pressure    High cholesterol    History of blood transfusion    HYPERLIPIDEMIA    HYPERTENSION    Other and unspecified hyperlipidemia    STROKE    Type II or unspecified type diabetes mellitus without mention of complication, not stated as uncontrolled    Unspecified essential hypertension    Visual impairment       Past Surgical History:   Procedure Laterality Date    Cardiac pacemaker placement      Other surgical history      bladder tumor removal    Tonsillectomy          Medications Prior to Admission   Medication Sig Dispense Refill Last Dose    furosemide 20 MG Oral Tab Take 1 tablet (20 mg total) by mouth daily.   7/24/2024    hydrALAZINE 10 MG Oral Tab Take 1 tablet (10 mg total) by mouth 3 (three) times daily.   7/24/2024    insulin aspart (NOVOLOG FLEXPEN) 100 Units/mL Subcutaneous Solution Pen-injector Take 10 units with breakfast  and 10 units lunch   7/25/2024 at 0815    aspirin 81 MG Oral Tab EC Take 1 tablet (81 mg total) by mouth daily.   7/18/2024    traMADol 50 MG Oral Tab Take 1 tablet (50 mg total) by mouth every 6 (six) hours as needed for Pain.   Past Month    ATORVASTATIN 40 MG Oral Tab TAKE 1 TABLET BY MOUTH IN  THE EVENING (Patient taking differently: every morning.) 90 tablet 1 7/24/2024    ELIQUIS 5 MG Oral Tab 2 (two) times daily.   7/22/2024    LANTUS SOLOSTAR 100 UNIT/ML Subcutaneous Solution Pen-injector Take 15 units daily 30 mL 5 7/22/2024    Insulin Lispro, 1 Unit Dial, (HUMALOG KWIKPEN) 100 UNIT/ML Subcutaneous Solution Pen-injector Inject 5 Units into the skin As Directed. Take 5 units with each meal 30 mL 1 7/24/2024    metoprolol tartrate 25 MG Oral Tab TAKE ONE tablet daily (Patient taking differently: 2 (two) times daily.) 90 tablet 3 7/24/2024    Glucose Blood (ACCU-CHEK MICK PLUS) In Vitro Strip USE TWICE DAILY 200 strip 3     ACCU-CHEK MICK In Vitro Strip Test glucose three times daily. 300 strip 3     Insulin Pen Needle (BD PEN NEEDLE MINI U/F) 31G X 5 MM Does not apply Misc AS DIRECTED  each 3     ACCU-CHEK MULTICLIX LANCETS Does not apply Misc test blood sugar QID as directed 360 Each 3      Current Facility-Administered Medications Ordered in Epic   Medication Dose Route Frequency Provider Last Rate Last Admin    lactated ringers infusion   Intravenous Continuous Mauricio Jeong MD   New Bag at 07/25/24 1035    acetaminophen (Tylenol Extra Strength) tab 1,000 mg  1,000 mg Oral Once Mauricio Jeong MD        metoprolol  tartrate (Lopressor) tab 25 mg  25 mg Oral Once PRN Mauricio Jeogn MD        ceFAZolin (Ancef) 2g in 10mL IV syringe premix  2 g Intravenous Once Mauricio Jeong MD         No current Epic-ordered outpatient medications on file.       Allergies   Allergen Reactions    Metformin Hcl OTHER (SEE COMMENTS)      Oral,   severe dry mouth       Family History   Problem Relation Age of Onset    Heart Disorder Father     Other Father         HIP FX    Heart Disorder Mother     Other Mother         CVA AGE 80     Social History     Socioeconomic History    Marital status:    Tobacco Use    Smoking status: Former     Types: Cigarettes    Smokeless tobacco: Never    Tobacco comments:     2010   Vaping Use    Vaping status: Never Used   Substance and Sexual Activity    Alcohol use: Yes     Comment: HEAVY PREVIOUS NOW SOCIAL    Drug use: No       Available pre-op labs reviewed.     Lab Results   Component Value Date    PGLU 114 (H) 07/25/2024          Vital Signs:  Body mass index is 24.66 kg/m².   height is 1.753 m (5' 9\") and weight is 75.8 kg (167 lb). His oral temperature is 98.3 °F (36.8 °C). His blood pressure is 143/61 and his pulse is 80. His respiration is 18 and oxygen saturation is 98%.   Vitals:    07/12/24 1728 07/25/24 1051   BP:  143/61   Pulse:  80   Resp:  18   Temp:  98.3 °F (36.8 °C)   TempSrc:  Oral   SpO2:  98%   Weight: 78.9 kg (174 lb) 75.8 kg (167 lb)   Height: 1.753 m (5' 9\") 1.753 m (5' 9\")        Anesthesia Evaluation     Patient summary reviewed and Nursing notes reviewed    Airway   Mallampati: II  Dental      Pulmonary - negative ROS and normal exam   Cardiovascular - normal exam  Exercise tolerance: good    NYHA Classification: I    Neuro/Psych - negative ROS     GI/Hepatic/Renal - negative ROS     Endo/Other - negative ROS   Abdominal                  Anesthesia Plan:   ASA:  3  Plan:   General  Monitors and Lines:   BIS, Additonal IV and A-line  Airway:  ETT  Post-op Pain Management:  IV analgesics      I have informed Roc Butcher and/or legal guardian or family member of the nature of the anesthetic plan, benefits, risks including possible dental damage if relevant, major complications, and any alternative forms of anesthetic management.   All of the patient's questions were answered to the best of my ability. The patient desires the anesthetic management as planned.  SCHUYLER WOODRUFF MD  7/25/2024 11:41 AM  Present on Admission:  **None**

## 2024-07-26 PROBLEM — Z01.818 PREOP TESTING: Status: ACTIVE | Noted: 2021-11-23

## 2024-07-26 LAB
ANION GAP SERPL CALC-SCNC: 7 MMOL/L (ref 0–18)
BASOPHILS # BLD AUTO: 0.03 X10(3) UL (ref 0–0.2)
BASOPHILS NFR BLD AUTO: 0.2 %
BUN BLD-MCNC: 19 MG/DL (ref 9–23)
BUN/CREAT SERPL: 14.7 (ref 10–20)
CALCIUM BLD-MCNC: 8.4 MG/DL (ref 8.7–10.4)
CHLORIDE SERPL-SCNC: 110 MMOL/L (ref 98–112)
CO2 SERPL-SCNC: 22 MMOL/L (ref 21–32)
CREAT BLD-MCNC: 1.29 MG/DL
DEPRECATED RDW RBC AUTO: 73.6 FL (ref 35.1–46.3)
EGFRCR SERPLBLD CKD-EPI 2021: 55 ML/MIN/1.73M2 (ref 60–?)
EOSINOPHIL # BLD AUTO: 0 X10(3) UL (ref 0–0.7)
EOSINOPHIL NFR BLD AUTO: 0 %
ERYTHROCYTE [DISTWIDTH] IN BLOOD BY AUTOMATED COUNT: 23.6 % (ref 11–15)
EST. AVERAGE GLUCOSE BLD GHB EST-MCNC: 143 MG/DL (ref 68–126)
GLUCOSE BLD-MCNC: 149 MG/DL (ref 70–99)
GLUCOSE BLDC GLUCOMTR-MCNC: 141 MG/DL (ref 70–99)
GLUCOSE BLDC GLUCOMTR-MCNC: 151 MG/DL (ref 70–99)
GLUCOSE BLDC GLUCOMTR-MCNC: 195 MG/DL (ref 70–99)
GLUCOSE BLDC GLUCOMTR-MCNC: 208 MG/DL (ref 70–99)
HBA1C MFR BLD: 6.6 % (ref ?–5.7)
HCT VFR BLD AUTO: 34.8 %
HGB BLD-MCNC: 10.8 G/DL
IMM GRANULOCYTES # BLD AUTO: 0.04 X10(3) UL (ref 0–1)
IMM GRANULOCYTES NFR BLD: 0.3 %
LYMPHOCYTES # BLD AUTO: 0.55 X10(3) UL (ref 1–4)
LYMPHOCYTES NFR BLD AUTO: 4.3 %
MCH RBC QN AUTO: 26.7 PG (ref 26–34)
MCHC RBC AUTO-ENTMCNC: 31 G/DL (ref 31–37)
MCV RBC AUTO: 86.1 FL
MONOCYTES # BLD AUTO: 0.96 X10(3) UL (ref 0.1–1)
MONOCYTES NFR BLD AUTO: 7.5 %
NEUTROPHILS # BLD AUTO: 11.17 X10 (3) UL (ref 1.5–7.7)
NEUTROPHILS # BLD AUTO: 11.17 X10(3) UL (ref 1.5–7.7)
NEUTROPHILS NFR BLD AUTO: 87.7 %
OSMOLALITY SERPL CALC.SUM OF ELEC: 293 MOSM/KG (ref 275–295)
PLATELET # BLD AUTO: 246 10(3)UL (ref 150–450)
PLATELET MORPHOLOGY: NORMAL
POTASSIUM SERPL-SCNC: 4.1 MMOL/L (ref 3.5–5.1)
RBC # BLD AUTO: 4.04 X10(6)UL
SODIUM SERPL-SCNC: 139 MMOL/L (ref 136–145)
WBC # BLD AUTO: 12.8 X10(3) UL (ref 4–11)

## 2024-07-26 NOTE — PROGRESS NOTES
[facility]  part of [facility]     [facility] Hospitalist Progress Note     PCP: Mitch Herrera MD    CC: Follow up       Assessment/Plan:     Roc Butcher is a 84 year old male with PMH sig for type 2 diabetes, CAD status post PCI to left circumflex, PAD, pacemaker, hypertension, paroxysmal A-fib on Eliquis baseline, and chronic systolic heart failure with a EF of 35% hyperlipidemia, prior CVA, and bladder cancer who presented for laparoscopic cystoprostatectomy with ileal conduit diversion.       Bladder cancer  -laparoscopic cystoprostatectomy with ileal conduit diversion on 7/25/2024  -As needed IV and p.o. pain meds for postop pain control  - Monitor expected acute blood loss anemia, labs in the morning.  Hgb 1.8  -Follow-up pathology  - Diet per surgery     CAD status post PCI to left circumflex, PAD, pacemaker, hypertension, paroxysmal A-fib on Eliquis baseline, and chronic systolic heart failure with a EF of 35% hyperlipidemia  -Cleared by cardiology at [facility] for surgery.  Not on ACE ARB due to renal function and hypotension.  -Continue home beta-blocker and hydralazine.  -Will need to resume Eliquis as soon as possible.  Urology messaged     Type 2 diabetes  - Continue home long-acting daily, sliding scale and titrate up as needed  -Dose reduced long-acting to 15, home dose is 20, typically takes 10 units with meals currently on high-dose sliding scale.  Blood sugars right now controlled, titrate up as needed.     FEN: IV fluids per protocol, lites in a.m., diet per surgery     PPx: Per surgery, holding home Eliquis, resume when okay with surgery     Dispo: Full code, pending course    Questions/concerns were discussed with patient and/or family by bedside.    Note: This chart was prepared using voice recognition software and may contain unintended word substitution errors.     Dw family at bedside     Thank You,  Annika Cadet M.D.  [facility] Hospitalist  Answering Service:  [phone]        Subjective     No new complaints, sore,  No CP, SOB, or N/V.      Objective     OBJECTIVE:  Temp:  [97.4 °F (36.3 °C)-98.9 °F (37.2 °C)] 97.7 °F (36.5 °C)  Pulse:  [67-79] 71  Resp:  [13-18] 18  BP: (113-159)/(47-71) 114/47  SpO2:  [93 %-100 %] 93 %    Intake/Output:    Intake/Output Summary (Last 24 hours) at 7/26/2024 1538  Last data filed at 7/26/2024 1338  Gross per 24 hour   Intake 233.43 ml   Output 655 ml   Net -421.57 ml       Last 3 Weights   07/25/24 1051 167 lb (75.8 kg)   07/12/24 1728 174 lb (78.9 kg)   07/22/24 1108 166 lb (75.3 kg)   03/21/22 0921 188 lb (85.3 kg)       Exam  Gen: No acute distress, alert and oriented x3  Neck Supple, no JVD  Pulm: Lungs clear, normal respiratory effort, No wheezing or crackles  CV: Heart with regular rate and rhythm, No murmurs, rubs, gallops  Abd: Abdomen soft, tender as expected, hypoactive bowel sounds, dark urine noted in bag  MSK:  no clubbing, no cyanosis.  No Lower extremity edema  Skin: no rashes or lesions, well perfused  Psych: mood stable, cooperative  Neuro: no focal deficits    Medications      atorvastatin  40 mg Oral Daily    hydrALAZINE  10 mg Oral TID    insulin degludec  15 Units Subcutaneous Daily    insulin aspart  1-11 Units Subcutaneous TID CC    sennosides  17.2 mg Oral Nightly    docusate sodium  100 mg Oral BID    enoxaparin  40 mg Subcutaneous Daily    acetaminophen  650 mg Oral Q4H While awake    famotidine  20 mg Oral BID    Or    famotidine  20 mg Intravenous BID      sodium chloride 83 mL/hr at 07/25/24 1938       glucose **OR** glucose **OR** glucose-vitamin C **OR** dextrose **OR** glucose **OR** glucose **OR** glucose-vitamin C    ondansetron    metoclopramide    oxyCODONE **OR** oxyCODONE    HYDROmorphone **OR** HYDROmorphone    Data Review:       Labs:     Recent Labs   Lab 07/26/24 0605   WBC 12.8*   HGB 10.8*   MCV 86.1   .0       Recent Labs   Lab 07/26/24 0605      K 4.1      CO2 22.0    BUN 19   CREATSERUM 1.29   CA 8.4*   *       No results for input(s): \"ALT\", \"AST\", \"ALB\", \"AMYLASE\", \"LIPASE\", \"LDH\" in the last 168 hours.    Invalid input(s): \"ALPHOS\", \"TBIL\", \"DBIL\", \"TPROT\"    Recent Labs   Lab 07/25/24  1623 07/25/24  1821 07/25/24 2007 07/26/24  0936 07/26/24  1412   PGLU 112* 135* 132* 151* 208*       No results for input(s): \"TROP\" in the last 168 hours.    Imaging:  No results found.

## 2024-07-26 NOTE — DISCHARGE INSTRUCTIONS
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  Differential considerations for parotid gland masses are broad and include both benign and malignant primary neoplasms as well as metastatic disease. Suggest nonemergent otolaryngology evaluation of this finding with strong consideration of histologic sampling for definitive characterization     -recommend outpt ENT appt, primary can help facilitate

## 2024-07-26 NOTE — PLAN OF CARE
Patient received from PACU. Axo x2-3. Pain manged with Dilaudid. IVF, antibiotics infused. On 2L of O2 overnight, RA this morning. L KATERINA to bulb suction. R drain to a nath bag- blood output. Safety precautions in place.

## 2024-07-26 NOTE — PLAN OF CARE
Patient is alert and oriented to elf and place only. May be experiencing post op delirium vs dementia? Scheduled tylenol given for pain management. Ileal conduit draining dark red urine to nath bag. Low urine output, urology notified and NS bolus ordered.  KATERINA drain to bulb suction with bloody output. Tolerating clear liquid diet with ACHS accuchecks. IV fluids infusing. Up to chair with walker and one assist. Bed and chair alarm engaged. Safety precautions in place.     Problem: Patient Centered Care  Goal: Patient preferences are identified and integrated in the patient's plan of care  Description: Interventions:  - What would you like us to know as we care for you?   - Provide timely, complete, and accurate information to patient/family  - Incorporate patient and family knowledge, values, beliefs, and cultural backgrounds into the planning and delivery of care  - Encourage patient/family to participate in care and decision-making at the level they choose  - Honor patient and family perspectives and choices  Outcome: Progressing     Problem: Patient/Family Goals  Goal: Patient/Family Long Term Goal  Description: Patient's Long Term Goal:     Interventions:  -   - See additional Care Plan goals for specific interventions  Outcome: Progressing  Goal: Patient/Family Short Term Goal  Description: Patient's Short Term Goal:     Interventions:   -   - See additional Care Plan goals for specific interventions  Outcome: Progressing     Problem: Impaired Cognition  Goal: Patient will exhibit improved attention, thought processing and/or memory  Description: Interventions:    Outcome: Not Progressing     Problem: PAIN - ADULT  Goal: Verbalizes/displays adequate comfort level or patient's stated pain goal  Description: INTERVENTIONS:  - Encourage pt to monitor pain and request assistance  - Assess pain using appropriate pain scale  - Administer analgesics based on type and severity of pain and evaluate response  - Implement  non-pharmacological measures as appropriate and evaluate response  - Consider cultural and social influences on pain and pain management  - Manage/alleviate anxiety  - Utilize distraction and/or relaxation techniques  - Monitor for opioid side effects  - Notify MD/LIP if interventions unsuccessful or patient reports new pain  - Anticipate increased pain with activity and pre-medicate as appropriate  Outcome: Progressing     Problem: SAFETY ADULT - FALL  Goal: Free from fall injury  Description: INTERVENTIONS:  - Assess pt frequently for physical needs  - Identify cognitive and physical deficits and behaviors that affect risk of falls.  - [place]ell fall precautions as indicated by assessment.  - Educate pt/family on patient safety including physical limitations  - Instruct pt to call for assistance with activity based on assessment  - Modify environment to reduce risk of injury  - Provide assistive devices as appropriate  - Consider OT/PT consult to assist with strengthening/mobility  - Encourage toileting schedule  Outcome: Progressing     Problem: DISCHARGE PLANNING  Goal: Discharge to home or other facility with appropriate resources  Description: INTERVENTIONS:  - Identify barriers to discharge w/pt and caregiver  - Include patient/family/discharge partner in discharge planning  - Arrange for needed discharge resources and transportation as appropriate  - Identify discharge learning needs (meds, wound care, etc)  - Arrange for interpreters to assist at discharge as needed  - Consider post-discharge preferences of patient/family/discharge partner  - Complete POLST form as appropriate  - Assess patient's ability to be responsible for managing their own health  - Refer to Case Management Department for coordinating discharge planning if the patient needs post-hospital services based on physician/LIP order or complex needs related to functional status, cognitive ability or social support system  Outcome:  Progressing

## 2024-07-26 NOTE — H&P
[facility]  part of [facility]     [facility] Hospitalist H&P     CC: bladder cancer     PCP: Mitch Herrera MD      Assessment and Plan     Roc Butcher is a 84 year old male with PMH sig for type 2 diabetes, CAD status post PCI to left circumflex, PAD, pacemaker, hypertension, paroxysmal A-fib on Eliquis baseline, and chronic systolic heart failure with a EF of 35% hyperlipidemia, prior CVA, and bladder cancer who presented for laparoscopic cystoprostatectomy with ileal conduit diversion.      Bladder cancer  -laparoscopic cystoprostatectomy with ileal conduit diversion on 7/25/2024  -As needed IV and p.o. pain meds for postop pain control  - Monitor expected acute blood loss anemia, labs in the morning  -Follow-up pathology  - Diet per surgery    CAD status post PCI to left circumflex, PAD, pacemaker, hypertension, paroxysmal A-fib on Eliquis baseline, and chronic systolic heart failure with a EF of 35% hyperlipidemia  -Cleared by cardiology at [facility] for surgery.  Not on ACE ARB due to renal function and hypotension.  Continue home beta-blocker and hydralazine.  -Will need to resume Eliquis as soon as possible.    Type 2 diabetes  - Continue home long-acting daily, sliding scale and titrate up as needed    FEN: IV fluids per protocol, lites in a.m., diet per surgery    PPx: Per surgery, holding home Eliquis, resume when okay with surgery    Dispo: Full code, pending course      Outpatient records reviewed confirming patient's medical history and medications.     Further recommendations pending patient's clinical course.  [facility] hospitalist to continue to follow patient while in house    Patient and/or patient's family given opportunity to ask questions and note understanding and agreeing with therapeutic plan as outlined    Note: This chart was prepared using voice recognition software and may contain unintended word substitution errors.       Thank You,  Annika Cadet MD  [facility]  Hospitalist    Answering Service number: [phone]    [facility]     Roc Butcher is a 84 year old male with PMH sig for type 2 diabetes, CAD status post PCI to left circumflex, PAD, pacemaker, hypertension, paroxysmal A-fib on Eliquis baseline, and chronic systolic heart failure with a EF of 35% hyperlipidemia, prior CVA, and bladder cancer who presented for laparoscopic cystoprostatectomy with ileal conduit diversion.      Postoperatively pain is controlled with IV pain meds.  Denies any nausea.  No chest pain or shortness of breath.  Feels a little sleepy.  Otherwise doing well.    Review of Systems  12 point systems reviewed, please see HPI for pertinent positives, otherwise negative    History     PMH  Past Medical History:    Arrhythmia    Cancer (HCC)    bladder    Cataract    High blood pressure    High cholesterol    History of blood transfusion    HYPERLIPIDEMIA    HYPERTENSION    Other and unspecified hyperlipidemia    STROKE    Type II or unspecified type diabetes mellitus without mention of complication, not stated as uncontrolled    Unspecified essential hypertension    Visual impairment        PSH  Past Surgical History:   Procedure Laterality Date    Cardiac pacemaker placement      Other surgical history      bladder tumor removal    Tonsillectomy          ALL:  Allergies   Allergen Reactions    Metformin Hcl OTHER (SEE COMMENTS)      Oral,   severe dry mouth        Home Medications:  Outpatient Medications Marked as Taking for the 7/25/24 encounter (Hospital Encounter)   Medication Sig Dispense Refill    furosemide 20 MG Oral Tab Take 1 tablet (20 mg total) by mouth daily.      hydrALAZINE 10 MG Oral Tab Take 1 tablet (10 mg total) by mouth 3 (three) times daily.      insulin aspart (NOVOLOG FLEXPEN) 100 Units/mL Subcutaneous Solution Pen-injector Take 10 units with breakfast  and 10 units lunch      aspirin 81 MG Oral Tab EC Take 1 tablet (81 mg total) by mouth daily.      traMADol 50 MG Oral  Tab Take 1 tablet (50 mg total) by mouth every 6 (six) hours as needed for Pain.      ATORVASTATIN 40 MG Oral Tab TAKE 1 TABLET BY MOUTH IN  THE EVENING (Patient taking differently: every morning.) 90 tablet 1    ELIQUIS 5 MG Oral Tab 2 (two) times daily.      LANTUS SOLOSTAR 100 UNIT/ML Subcutaneous Solution Pen-injector Take 15 units daily 30 mL 5    Insulin Lispro, 1 Unit Dial, (HUMALOG KWIKPEN) 100 UNIT/ML Subcutaneous Solution Pen-injector Inject 5 Units into the skin As Directed. Take 5 units with each meal 30 mL 1    metoprolol tartrate 25 MG Oral Tab TAKE ONE tablet daily (Patient taking differently: 2 (two) times daily.) 90 tablet 3       Soc Hx  Social History     Tobacco Use    Smoking status: Former     Types: Cigarettes    Smokeless tobacco: Never    Tobacco comments:     2010   Substance Use Topics    Alcohol use: Yes     Comment: HEAVY PREVIOUS NOW SOCIAL        Fam Hx  Family History   Problem Relation Age of Onset    Heart Disorder Father     Other Father         HIP FX    Heart Disorder Mother     Other Mother         CVA AGE 80           Objective     Temp: 98.9 °F (37.2 °C)  Pulse: 67  Resp: 14  BP: 156/57    Exam  Gen: No acute distress, alert and oriented x3  Neck Supple, no JVD  Pulm: Lungs clear, normal respiratory effort, No wheezing or crackles  CV: Heart with regular rate and rhythm, No murmurs, rubs, gallops  Abd: Abdomen soft, directed postop, hypoactive bowel sounds, ileal conduit  MSK:  no clubbing, no cyanosis.  No Lower extremity edema  Skin: no rashes or lesions, well perfused  Psych: mood stable, cooperative  Neuro: No focal deficits    Diagnostic Data:    CBC/Chem  No results for input(s): \"WBC\", \"HGB\", \"MCV\", \"PLT\", \"BAND\", \"INR\" in the last 168 hours.    Invalid input(s): \"LYM#\", \"MONO#\", \"BASOS#\", \"EOSIN#\"    No results for input(s): \"NA\", \"K\", \"CL\", \"CO2\", \"BUN\", \"CREATSERUM\", \"GLU\", \"CA\", \"CAION\", \"MG\", \"PHOS\" in the last 168 hours.    No results for input(s): \"ALT\", \"AST\",  \"ALB\", \"AMYLASE\", \"LIPASE\", \"LDH\" in the last 168 hours.    Invalid input(s): \"ALPHOS\", \"TBIL\", \"DBIL\", \"TPROT\"    No results for input(s): \"TROP\" in the last 168 hours.        Radiology: No results found.

## 2024-07-26 NOTE — OPERATIVE REPORT
[facility]                  Operative Note     Roc Butcher Patient Status:  Inpatient    1940 MRN [id number]   Location [facility] 4W/SW/SE Attending Mauricio Jeong MD   Hosp Day # 0 PCP Mitch Herrera MD     DATE: 2024  SURGEON, PRIMARY: Mauricio Jeong M.D.    ASSISTANT:  Blanka Bee    PREOPERATIVE DIAGNOSIS: High grade urothelial cancer of bladder.  POSTOPERATIVE DIAGNOSIS: High grade urothelial cancer of bladder.    OPERATION:  Robotic-assisted laparoscopic radical cystoprostatectomy, bilateral pelvic lymphadenectomy, and intracorporeal urinary intestinal diversion (ileal conduit)    ESTIMATED BLOOD LOSS:  150 mL.  DRAINS:  Pelvic Mateo-Islas x 1, Bilateral 6 Fr feeding tubes as ureteral stents  COMPLICATIONS: None  ANESTHESIA:  General.  SPECIMEN: bladder, prostate, pelvic lymph nodes     OPERATIVE INDICATIONS:  The patient is an 84 year old male who was referred for evaluation and management of large volume (70% of bladder), high grade urothelial cancer of bladder, which could not be cleared via an endoscopic approach.  The natural history of this disease was explained to the patient at length, as well as the treatment options.  He elected to undergo a robotic-assisted laparoscopic radical cystectomy and intestinal urinary diversion after thorough discussion of the rationale, risks, benefits, alternatives, and personnel.  The patient is admitted for surgery at this time.    OPERATIVE FINDINGS:  Pelvic lymph nodes were negative to inspection bilaterally.  A standard template dissection was performed with removal of the obturator, hypogastric and external iliac lymph nodes; the proximal limit of the dissection was the bifurcation of the common iliac artery, the lateral limit was the pelvic sidewall and external iliac vein, and the distal limit was the node of Billingsley and Salvador's ligament.  The ureteral and urethral margins were negative for  malignancy by frozen section analysis.  There were no intraoperative complications.  Examination of the specimen upon removal revealed that there was good soft tissue coverage and no areas suspicious for positive surgical margins.    OPERATIVE PROCEDURE: After informed consent was obtained, the patient was taken to the operating room. A time-out was performed where the patient and procedure were identified in the presence of Nursing, Anesthesia, and surgical staff. After the placement of lines and monitors, general anesthesia was induced. Prophylactic antibiotics were administered. Sequential compressive devices were placed on both lower extremities. The patient was placed in the supine position. He was prepped and draped in a standard sterile fashion. A 16 Fr Lu catheter was inserted in the bladder.    Through a supraumbilical incision, a Veress needle was inserted into the peritoneal cavity and a pneumoperitoneum was established. Through this incision, a 8-mm trocar was placed and the laparoscope was introduced. Inspection of the abdominal cavity after trocar placement revealed no evidence of any injury. Three 8-mm ports and a 12-mm assistant port were then placed in the abdomen in the standard configuration under direct vision. The patient was then placed in 16 degrees Trendelenburg position and the robot was docked.    The ureters were identified as they coursed over the common iliac vessels. They were dissected into the pelvis and any visible vessels were controlled with the bipolar cautery before division. At the ureterovesical junction, Hem-o-lock clips were placed proximally and distally and the ureters were divided. Distal margins were sent for frozen section the results of which were negative. The vas deferens were divided proximally with the Hem-o-lock clips.  The peritoneum was divided over the posterior Seminal Vesicles (SVs) and the tips of SVs were exposed; blunt and sharp dissection was then  performed to free the SVs completed and small vessels were controlled with bipolar cautery.  A plane between the rectum and SVs was ultimately created and blunt dissection was carried distally between the prostate and rectum towards the apex of prostate.  The lateral neurovascular bundles were preserved. The peritoneal incision was then extended superiorly and laterally. The peritoneum lateral to the medial umbilical ligaments was incised bilaterally and the space lateral to the bladder and prostate was created through blunt dissection. The endopelvic fascia was incised bilaterally and the adherent fibers of the levator ani muscles were dissected off the lateral surface of the prostate and urethra.  At this point, both the bladder and prostate vascular pedicles were identified and divided with the Vessel Sealer.  The bilateral neurovascular bundles were dissected away sharply from the posterior prostate gland.  The urachus was controlled with the bipolar cautery and divided and subsequently the space of Retzius was developed through blunt and sharp dissection. The fatty tissue was dissected off the symphysis pubis and the anterior surface of the bladder.  The puboprostatic ligaments were incised at their insertion into symphysis pubis. A backbleeding stitch of 3-0 V-lock was then placed distally through the dorsal venous complex.  The dorsal venous complex was divided with monopolar cautery down to level of the urethra. The urethra was then divided with the cold scissors sharply.  This freed the specimen completely and was placed in an EndoCatch bag for removal at the end of the procedure. The pelvis was then thoroughly irrigated with saline and hemostasis within the pelvis was confirmed.    We then performed our pelvic lymph node dissection. According to the above-mentioned landmarks, at the distal limits of dissection, Hem-o- mi clips were placed across the lymphatic vessels to control any lymphatic leak. Care  was taken to preserve the obturator artery, nerve, and vein during this dissection. At the proximal limit of dissection, monopolar cautery was used to control the lymphatic vessels. Both the right and left lymph node packages were then placed in an EndoCatch bag for removal at the end of the procedure.  The pelvis was then thoroughly irrigated. Hemostasis from the pelvis was confirmed.     The left ureter was then brought into the right iliac fossa beneath the mesentery of the sigmoid colon.     ILEOCONDUIT  The mesentery between the ileocolic and terminal branches of the superior mesenteric artery was divided and this was divided with the use of Vessel Sealer device.  The bowel was divided with the 60 mm stapling device.  At the proximal limit of the 15 cm ileal limb, the the mesentery was divided between the vascular arcades and the bowel was divided with a stapler.  We then performed a small bowel anastomosis in a side-to-side configuration with the combination of manual suturing using 3-0 V-lock and stapling device.  The  crotch of the anastomosis was reinforced with a 3-0 Silk stitch.  Upon completion, there was noted to be good vascularity and patency of the anastomosis.  The staple line of the stomal end of the conduit was then excised. Ureterointestinal anastomosis was performed into the proximal end of the ileal diversion.  The was performed using interrupted sutures of 4-0 vicryl.  Before completion of the anastomosis, 6 Fr feeding tubes (as ureteral stents) were then advanced into the renal pelvis bilaterally and fixed to the mucosa of the diversion using 3-0 chromic stitches.    At the previously marked stoma site, a disc of skin was excised and the subcutaneous fat was divided down to the rectus fascia where a stellate incision was made in the anterior and posterior sheath.  The conduit and stents were brought out through the stoma site and the stoma was sutured to the skin with interrupted sutures of  2-0 Vicryl (to create the jose luis bud configuration), followed by 3-0 vicryl sutures between the soma mucosa edge and skin.     A Mateo-Islas drain was then placed in the pelvis through one of the existing left 8 mm ports and affixed to the skin with drain stitches.  The midline incision was extended and specimen within the Endocatch bag removed.  We then closed the fascial incision using a interrupted sutures of 0-Vicryl.  The skin and subcutaneous tissues were thoroughly irrigated and the skin was reapproximated using a subcuticular stitch of 4-0 Monocryl.  A Dermabond was applied to the incision.      The patient was then lightened from anesthesia and transferred to recovery room in stable condition having tolerated procedure well without incident or complication.     I was present throughout the entire procedure and performed all critical steps.    DISPOSITION: To recovery room in stable condition    Mauricio Jeong MD  [facility]  7/25/2024

## 2024-07-26 NOTE — CM/SW NOTE
Pt is POD 1 of laparoscopic cystoprostatectomy with ileal conduit diversion.     A&O 2-3, RA, nath with drains x2    CM req DSC send tent [facility] ref, f2f done for RN PT OT    1130  No fam at bedside,CM called and lvm for spouse to initiate dc planning, await call back.     1200  CM was notified by [facility] liaison that pt has a pre admit plan for [facility] from MD office.    CM reserved in aidin and sent f2f    1500  CM rec'd call back from son, wife was on speaker phone.     Per son pt is A&O altho he doesn't ask him specific details like date/time/year however, pt is able to hold a conversation.    CM confirmed address and PCP.   Pt has 1 DEIDRE, pt is able to stay on first flr of home altho his bedroom is upstairs.    Wife sts they \"have all the DME we need\" ie cane, walker, toilet    CM informed them both of need for conduit teaching prior to dc, son requested wound RN call him to set up appt time on Monday. CM notified Bryn of above.    CM req RN set up appt time for any additional teaching needs.    / to remain available for support and/or discharge planning as needs become fully known.    Plan  Home w/ fam and [facility]    Tatyana Rajan RN    Ext [phone]

## 2024-07-26 NOTE — PROGRESS NOTES
07/26/24 1500   Urostomy Ileal conduit RLQ   Placement Date: 07/25/24   Inserted by: dr lares  Urostomy Type: Ileal conduit  Location: RLQ  Appliance Size: 2 1/4   Stomal Appliance 2 piece;Clean;Dry;Intact   Stomal Assessment Clean;Budding;Moist;Red   Peristomal Assessment LUIS E   Wound Follow Up   Follow up needed Yes     Pt seen with his wife and son in room for urostomy education. Pt has dementia and appears to be largely forgetful, unable to focus. I discussed with the family who would be caring for the ostomy at home, wife stated she would, son is supportive. The appliance, leg bag, nath bag all discussed. I went over when to empty and change the appliance, assessing color of stoma/when to call MD, what the stents are and the importance of keeping them in place. We have set up a time for Monday at 11:00 for changing the appliance and furthering education. I requested that the family read the educational folder left at bedside.

## 2024-07-26 NOTE — HOME CARE LIAISON
Patient is currently pending with [facility] for RN services for after his planned surgery.

## 2024-07-26 NOTE — PLAN OF CARE
Patient's pain managed with IV dilaudid. Denies of n/v. R ileostomy draining to a nath- blood output. Tolerating clears, low appetite. IVF, IV antibiotics infused. L KATERINA draining to bulb suction. Safety precautions in place.     Problem: Patient Centered Care  Goal: Patient preferences are identified and integrated in the patient's plan of care  Description: Interventions:  - What would you like us to know as we care for you? From home with wife  - Provide timely, complete, and accurate information to patient/family  - Incorporate patient and family knowledge, values, beliefs, and cultural backgrounds into the planning and delivery of care  - Encourage patient/family to participate in care and decision-making at the level they choose  - Honor patient and family perspectives and choices  Outcome: Progressing

## 2024-07-26 NOTE — WOUND PROGRESS NOTE
Attempted to see pt and his wife for ostomy teaching. Pt stated his wife \"went down for coffee\". Will re attempt as schedule permits.

## 2024-07-26 NOTE — CM/SW NOTE
Department  notified of request for HHC, aidin referrals started. Assigned CM/SW to follow up with pt/family on further discharge planning.     Pilar Esposito, DSC

## 2024-07-26 NOTE — PROGRESS NOTES
07/26/24 0850   Urostomy Ileal conduit RLQ   Placement Date: 07/25/24   Inserted by: dr lares  Urostomy Type: Ileal conduit  Location: RLQ  Appliance Size: 2 1/4   Stomal Appliance 2 piece;Clean;Dry;Intact   Stomal Assessment Moist;Budding;Red   Peristomal Assessment LUIS E   Dressing Change Due 07/29/24   Output (mL)   (attached to nath)   Wound Follow Up   Follow up needed Yes       WOUND CARE NOTE    History:  Past Medical History:    Arrhythmia    Cancer (HCC)    bladder    Cataract    High blood pressure    High cholesterol    History of blood transfusion    HYPERLIPIDEMIA    HYPERTENSION    Other and unspecified hyperlipidemia    STROKE    Type II or unspecified type diabetes mellitus without mention of complication, not stated as uncontrolled    Unspecified essential hypertension    Visual impairment     Past Surgical History:   Procedure Laterality Date    Cardiac pacemaker placement      Other surgical history      bladder tumor removal    Tonsillectomy        Social History     Socioeconomic History    Marital status:    Tobacco Use    Smoking status: Former     Types: Cigarettes    Smokeless tobacco: Never    Tobacco comments:     2010   Vaping Use    Vaping status: Never Used   Substance and Sexual Activity    Alcohol use: Yes     Comment: HEAVY PREVIOUS NOW SOCIAL    Drug use: No     Social Determinants of Health     Financial Resource Strain: Low Risk  (5/30/2024)    Received from [facility]    Financial Resource Strain     In the past year, have you or any family members you live with been unable to get any of the following when it was really needed? Check all that apply.: None   Food Insecurity: No Food Insecurity (7/25/2024)    Food Insecurity     Food Insecurity: Never true   Transportation Needs: No Transportation Needs (7/25/2024)    Transportation Needs     Lack of Transportation: No   Physical Activity: High Risk (5/3/2024)    Received from [facility]     Exercise Vital Sign     On average, how many days per week do you engage in moderate to strenuous exercise (like a brisk walk)?: 0 days     On average, how many minutes do you engage in exercise at this level?: 0 min   Social Connections: Medium Risk (5/30/2024)    Received from [facility]    Social Connections     How often do you see or talk to people that you care about and feel close to? (For example: talking to friends on the phone, visiting friends or family, going to Bahai or club meetings): 1 or 2 times a week   Housing Stability: Low Risk  (7/25/2024)    Housing Stability     Housing Instability: No       PLAN   Recommendations:  Dr. Chacon currently on consult  Dietary consult for recommendations for nutrition to optimize wound healing  Turn schedules  Heels elevated using pillows, heel wedge or heel boots to offload heels  Ostomy:  Change flange every 5-7 days and prn  Appliance 2 pc urostomy appliance connected to nath  Teaching: Ostomy folder given  Patient or wife not present taught ostomy care. Response to teaching: pt is confused  Recommendations: Appliance as stated above  Patient may benefit from additional resources.  Would recommend home care to follow up with patient at home    Discharge Recommendations:   May benefit from home health for continued ostomy support  OBJECTIVE   MD Consult new  Reason for Consult new ileal conduit      ASSESSMENT   Sandro Score:  Sandro Scale Score: 18    Chart Reviewed: yes    Pt s/p surgery w Dr. Jeong 7/25. The urostomy appliance is c/d/I, connected to a nath collecting blood tinged urine. Stoma and stents were visualized through bag. No family at the bedside, pt is arousable but confused. An ileal conduit educational folder was left at the bedside. Requested bedside RN to advise when family comes in to begin teaching ostomy care.    Ostomy:  Stoma location: RUQ  Stoma type: ileal   Stoma color: red  Stoma condition: normal  Stoma  diameter:   Ostomy output: urine  Stoma height: budding  Peristomal skin: olena  Pouching system:two piece  Able to care for stoma: No       Allergies: Metformin hcl    Labs:   Lab Results   Component Value Date    WBC 12.8 (H) 07/26/2024    HGB 10.8 (L) 07/26/2024    HCT 34.8 (L) 07/26/2024    .0 07/26/2024    CREATSERUM 1.29 07/26/2024    BUN 19 07/26/2024     07/26/2024    K 4.1 07/26/2024     07/26/2024    CO2 22.0 07/26/2024     (H) 07/26/2024    CA 8.4 (L) 07/26/2024    ALB 4.0 03/17/2022    ALKPHO 75 03/17/2022    BILT 0.63 03/17/2022    TP 6.1 (L) 03/17/2022    AST 17 03/17/2022    ALT 14 03/17/2022     No results found for: \"PREALBUMIN\"      Time Spent 15 Minutes.    Roselia Castellon RN  [facility]  [facility]  [phone]

## 2024-07-27 LAB
ANION GAP SERPL CALC-SCNC: 4 MMOL/L (ref 0–18)
BASOPHILS # BLD AUTO: 0.03 X10(3) UL (ref 0–0.2)
BASOPHILS NFR BLD AUTO: 0.4 %
BUN BLD-MCNC: 17 MG/DL (ref 9–23)
BUN/CREAT SERPL: 15 (ref 10–20)
CALCIUM BLD-MCNC: 8.4 MG/DL (ref 8.7–10.4)
CHLORIDE SERPL-SCNC: 111 MMOL/L (ref 98–112)
CO2 SERPL-SCNC: 24 MMOL/L (ref 21–32)
CREAT BLD-MCNC: 1.13 MG/DL
DEPRECATED RDW RBC AUTO: 74 FL (ref 35.1–46.3)
EGFRCR SERPLBLD CKD-EPI 2021: 64 ML/MIN/1.73M2 (ref 60–?)
EOSINOPHIL # BLD AUTO: 0.04 X10(3) UL (ref 0–0.7)
EOSINOPHIL NFR BLD AUTO: 0.5 %
ERYTHROCYTE [DISTWIDTH] IN BLOOD BY AUTOMATED COUNT: 23.8 % (ref 11–15)
GLUCOSE BLD-MCNC: 113 MG/DL (ref 70–99)
GLUCOSE BLDC GLUCOMTR-MCNC: 105 MG/DL (ref 70–99)
GLUCOSE BLDC GLUCOMTR-MCNC: 108 MG/DL (ref 70–99)
GLUCOSE BLDC GLUCOMTR-MCNC: 117 MG/DL (ref 70–99)
GLUCOSE BLDC GLUCOMTR-MCNC: 82 MG/DL (ref 70–99)
HCT VFR BLD AUTO: 33.9 %
HGB BLD-MCNC: 10.6 G/DL
IMM GRANULOCYTES # BLD AUTO: 0.02 X10(3) UL (ref 0–1)
IMM GRANULOCYTES NFR BLD: 0.2 %
LYMPHOCYTES # BLD AUTO: 0.7 X10(3) UL (ref 1–4)
LYMPHOCYTES NFR BLD AUTO: 8.2 %
MCH RBC QN AUTO: 27.5 PG (ref 26–34)
MCHC RBC AUTO-ENTMCNC: 31.3 G/DL (ref 31–37)
MCV RBC AUTO: 87.8 FL
MONOCYTES # BLD AUTO: 0.76 X10(3) UL (ref 0.1–1)
MONOCYTES NFR BLD AUTO: 8.9 %
NEUTROPHILS # BLD AUTO: 6.97 X10 (3) UL (ref 1.5–7.7)
NEUTROPHILS # BLD AUTO: 6.97 X10(3) UL (ref 1.5–7.7)
NEUTROPHILS NFR BLD AUTO: 81.8 %
OSMOLALITY SERPL CALC.SUM OF ELEC: 290 MOSM/KG (ref 275–295)
PLATELET # BLD AUTO: 213 10(3)UL (ref 150–450)
POTASSIUM SERPL-SCNC: 4.1 MMOL/L (ref 3.5–5.1)
RBC # BLD AUTO: 3.86 X10(6)UL
SODIUM SERPL-SCNC: 139 MMOL/L (ref 136–145)
WBC # BLD AUTO: 8.5 X10(3) UL (ref 4–11)

## 2024-07-27 RX ORDER — QUETIAPINE FUMARATE 25 MG/1
25 TABLET, FILM COATED ORAL EVERY 8 HOURS PRN
Status: DISCONTINUED | OUTPATIENT
Start: 2024-07-27 | End: 2024-08-01

## 2024-07-27 NOTE — PLAN OF CARE
Patient alert x2-3, can be confused at times. Seroquel ordered PRN due to patient continually attempting to get out of bed and \"go to the airport.\" Denies pain or nausea. 1L O2 HS. Ileal conduit intact. IVF continued. KATERINA drain intact. Tolerating diet. Accuchecks ACHS. Voiding via ileal conduit. No BM over night. Safety precautions in place. Call light within reach.    Problem: Patient Centered Care  Goal: Patient preferences are identified and integrated in the patient's plan of care  Description: Interventions:  - What would you like us to know as we care for you?   - Provide timely, complete, and accurate information to patient/family  - Incorporate patient and family knowledge, values, beliefs, and cultural backgrounds into the planning and delivery of care  - Encourage patient/family to participate in care and decision-making at the level they choose  - Honor patient and family perspectives and choices  Outcome: Progressing     Problem: Impaired Cognition  Goal: Patient will exhibit improved attention, thought processing and/or memory  Description: Interventions:  Outcome: Progressing     Problem: PAIN - ADULT  Goal: Verbalizes/displays adequate comfort level or patient's stated pain goal  Description: INTERVENTIONS:  - Encourage pt to monitor pain and request assistance  - Assess pain using appropriate pain scale  - Administer analgesics based on type and severity of pain and evaluate response  - Implement non-pharmacological measures as appropriate and evaluate response  - Consider cultural and social influences on pain and pain management  - Manage/alleviate anxiety  - Utilize distraction and/or relaxation techniques  - Monitor for opioid side effects  - Notify MD/LIP if interventions unsuccessful or patient reports new pain  - Anticipate increased pain with activity and pre-medicate as appropriate  Outcome: Progressing     Problem: SAFETY ADULT - FALL  Goal: Free from fall injury  Description:  INTERVENTIONS:  - Assess pt frequently for physical needs  - Identify cognitive and physical deficits and behaviors that affect risk of falls.  - [place] fall precautions as indicated by assessment.  - Educate pt/family on patient safety including physical limitations  - Instruct pt to call for assistance with activity based on assessment  - Modify environment to reduce risk of injury  - Provide assistive devices as appropriate  - Consider OT/PT consult to assist with strengthening/mobility  - Encourage toileting schedule  Outcome: Progressing     Problem: DISCHARGE PLANNING  Goal: Discharge to home or other facility with appropriate resources  Description: INTERVENTIONS:  - Identify barriers to discharge w/pt and caregiver  - Include patient/family/discharge partner in discharge planning  - Arrange for needed discharge resources and transportation as appropriate  - Identify discharge learning needs (meds, wound care, etc)  - Arrange for interpreters to assist at discharge as needed  - Consider post-discharge preferences of patient/family/discharge partner  - Complete POLST form as appropriate  - Assess patient's ability to be responsible for managing their own health  - Refer to Case Management Department for coordinating discharge planning if the patient needs post-hospital services based on physician/LIP order or complex needs related to functional status, cognitive ability or social support system  Outcome: Progressing

## 2024-07-27 NOTE — PROGRESS NOTES
[facility]   Progress Note  Roc Butcher Patient Status:  Inpatient    1940 MRN [id number]   Location [facility] 4W/SW/SE Attending Mauricio Jeong MD   Hosp Day # 2 PCP Mitch Herrera MD     Subjective:  Patient in bed, family at bedside  He is pleasantly confused. Denies pain. No N/V. No fevers/chills.   KATERINA drain: 425 ml / 24 hours     Objective:  Blood pressure 129/49, pulse 82, temperature 97.5 °F (36.4 °C), temperature source Oral, resp. rate 18, height 5' 9\" (1.753 m), weight 167 lb (75.8 kg), SpO2 100%.  General appearance: alert and cooperative, confused   Lungs: Unlabored respirations  Abdomen: Soft, non-tender, appropriately distended, bowel sounds present, incisions clean/dry/intact, stoma pink   Extremities: no edema, no calf pain  : urine clear/yellow    Lab Results   Component Value Date    WBC 8.5 2024    HGB 10.6 2024    HCT 33.9 2024    .0 2024    CREATSERUM 1.13 2024    BUN 17 2024     2024    K 4.1 2024     2024    CO2 24.0 2024     2024    CA 8.4 2024    PGLU 105 2024       Intake/Output Summary (Last 24 hours) at 2024 1420  Last data filed at 2024 0950  Gross per 24 hour   Intake 383 ml   Output 725 ml   Net -342 ml       Assessment:  Bladder ca - POD2, s/p robotic assisted lap radical cystoprostatectomy, plnd and ileal conduit diversion     Plan:  Progressing well, except confusion (which not unusual for age and type of surgery)              Advance diet as tolerated             PT evaluation             Continue supportive care     Mauricio Farrar MD  [facility]  [phone]

## 2024-07-27 NOTE — PLAN OF CARE
Roc is alert but drowsy at times. Easily arousable. Confused/disoriented this morning trying to get out of bed/disoriented and unsteady. Zyprexa given x1 IM. Family notified and came to bedside. Calmer throughout shift but remains confused. Sitter ordered, at bedside for safety. US IV inserted - IVF infusing. Vitals stable on room air. Diet advanced to low fiber, tolerating. Accuchecks stable. Refusing meals but will eat a little with encouragement. Denies pain. Ileal conduit in place with adequate output - braulio/tea colored. KATERINA drain in place with serosanguinous output. Lap sites intact. Bed low, locked, alarm and all precautions in place. Family aware of plan of care.   Problem: Patient Centered Care  Goal: Patient preferences are identified and integrated in the patient's plan of care  Description: Interventions:  - What would you like us to know as we care for you?   - Provide timely, complete, and accurate information to patient/family  - Incorporate patient and family knowledge, values, beliefs, and cultural backgrounds into the planning and delivery of care  - Encourage patient/family to participate in care and decision-making at the level they choose  - Honor patient and family perspectives and choices  Outcome: Progressing     Problem: Patient/Family Goals  Goal: Patient/Family Long Term Goal  Description: Patient's Long Term Goal:     Interventions:  -   - See additional Care Plan goals for specific interventions  Outcome: Progressing  Goal: Patient/Family Short Term Goal  Description: Patient's Short Term Goal:     Interventions:   -   - See additional Care Plan goals for specific interventions  Outcome: Progressing     Problem: Impaired Cognition  Goal: Patient will exhibit improved attention, thought processing and/or memory  Description: Interventions:    Outcome: Progressing     Problem: PAIN - ADULT  Goal: Verbalizes/displays adequate comfort level or patient's stated pain goal  Description:  INTERVENTIONS:  - Encourage pt to monitor pain and request assistance  - Assess pain using appropriate pain scale  - Administer analgesics based on type and severity of pain and evaluate response  - Implement non-pharmacological measures as appropriate and evaluate response  - Consider cultural and social influences on pain and pain management  - Manage/alleviate anxiety  - Utilize distraction and/or relaxation techniques  - Monitor for opioid side effects  - Notify MD/LIP if interventions unsuccessful or patient reports new pain  - Anticipate increased pain with activity and pre-medicate as appropriate  Outcome: Progressing     Problem: SAFETY ADULT - FALL  Goal: Free from fall injury  Description: INTERVENTIONS:  - Assess pt frequently for physical needs  - Identify cognitive and physical deficits and behaviors that affect risk of falls.  - [place] fall precautions as indicated by assessment.  - Educate pt/family on patient safety including physical limitations  - Instruct pt to call for assistance with activity based on assessment  - Modify environment to reduce risk of injury  - Provide assistive devices as appropriate  - Consider OT/PT consult to assist with strengthening/mobility  - Encourage toileting schedule  Outcome: Progressing     Problem: DISCHARGE PLANNING  Goal: Discharge to home or other facility with appropriate resources  Description: INTERVENTIONS:  - Identify barriers to discharge w/pt and caregiver  - Include patient/family/discharge partner in discharge planning  - Arrange for needed discharge resources and transportation as appropriate  - Identify discharge learning needs (meds, wound care, etc)  - Arrange for interpreters to assist at discharge as needed  - Consider post-discharge preferences of patient/family/discharge partner  - Complete POLST form as appropriate  - Assess patient's ability to be responsible for managing their own health  - Refer to Case Management Department for  coordinating discharge planning if the patient needs post-hospital services based on physician/LIP order or complex needs related to functional status, cognitive ability or social support system  Outcome: Progressing

## 2024-07-27 NOTE — PROGRESS NOTES
[facility]  part of [facility]     [facility] Hospitalist Progress Note     PCP: Mitch Herrera MD    CC: Follow up       Assessment/Plan:     Roc Butcher is a 84 year old male with PMH sig for type 2 diabetes, CAD status post PCI to left circumflex, PAD, pacemaker, hypertension, paroxysmal A-fib on Eliquis baseline, and chronic systolic heart failure with a EF of 35% hyperlipidemia, prior CVA, and bladder cancer who presented for laparoscopic cystoprostatectomy with ileal conduit diversion.  Post op course with confusion and agitation.    Confusion, agitation  - concern for post op delirium  - possible underlying mild cognitive impairment vs. Mood disorder  - zyprexa PRN for agitation  - 1:1 sitter  - psych consult     Bladder cancer  -laparoscopic cystoprostatectomy with ileal conduit diversion on 7/25/2024  -As needed IV and p.o. pain meds for postop pain control  - Monitor expected acute blood loss anemia, labs in the morning.  Hgb 1.8  -Follow-up pathology  - Diet per surgery     CAD status post PCI to left circumflex, PAD, pacemaker, hypertension, paroxysmal A-fib on Eliquis baseline, and chronic systolic heart failure with a EF of 35% hyperlipidemia  -Cleared by cardiology at [facility] for surgery.  Not on ACE ARB due to renal function and hypotension.  -Continue home beta-blocker and hydralazine.  -Will need to resume Eliquis as soon as possible.  Urology messaged     Type 2 diabetes  - Continue home long-acting daily, sliding scale and titrate up as needed  -Dose reduced long-acting to 15, home dose is 20, typically takes 10 units with meals currently on high-dose sliding scale.  Blood sugars right now controlled, titrate up as needed.     FEN: IV fluids per protocol, lites in a.m., diet per surgery     PPx: Per surgery, holding home Eliquis, resume when okay with surgery     Dispo: Full code, pending course    Questions/concerns were discussed with patient and/or family by  bedside.    Dw family at bedside     MD HUBER [facility] Hospitalist  Answering Service: [phone]    Subjective     Very confused and agitated this AM. Son and spouse at bedside, states no history of dementia or psych issues but has had outbursts of anger at times. No ETOH abuse or drug abuse. Did have to be restrained after prior surgery in the past.       Objective     OBJECTIVE:  Temp:  [97.4 °F (36.3 °C)-97.5 °F (36.4 °C)] 97.5 °F (36.4 °C)  Pulse:  [64-91] 82  Resp:  [18] 18  BP: (116-135)/(48-62) 129/49  SpO2:  [94 %-100 %] 100 %    Intake/Output:    Intake/Output Summary (Last 24 hours) at 7/27/2024 1412  Last data filed at 7/27/2024 0950  Gross per 24 hour   Intake 383 ml   Output 725 ml   Net -342 ml       Last 3 Weights   07/25/24 1051 167 lb (75.8 kg)   07/12/24 1728 174 lb (78.9 kg)   07/22/24 1108 166 lb (75.3 kg)   03/21/22 0921 188 lb (85.3 kg)       Exam  GEN: elderly male NAD, awake, alert, confused, oriented to self only  HEENT: EOMI  Pulm: CTAB, no crackles or wheezes  CV: RRR, no murmurs  ABD: Soft, non-tender, non-distended, +BS  : nath in place  Neuro: Grossly normal, CN intact, sensory intact  Psych: Affect- agitated  SKIN: warm, dry  EXT: no edema    Medications      atorvastatin  40 mg Oral Daily    hydrALAZINE  10 mg Oral TID    insulin degludec  15 Units Subcutaneous Daily    insulin aspart  1-11 Units Subcutaneous TID CC    sennosides  17.2 mg Oral Nightly    docusate sodium  100 mg Oral BID    enoxaparin  40 mg Subcutaneous Daily    acetaminophen  650 mg Oral Q4H While awake    famotidine  20 mg Oral BID    Or    famotidine  20 mg Intravenous BID      sodium chloride 83 mL/hr at 07/25/24 1938       QUEtiapine    glucose **OR** glucose **OR** glucose-vitamin C **OR** dextrose **OR** glucose **OR** glucose **OR** glucose-vitamin C    ondansetron    oxyCODONE **OR** oxyCODONE    HYDROmorphone **OR** HYDROmorphone    Data Review:       Labs:     Recent Labs   Lab 07/26/24  0605  07/27/24  0944   WBC 12.8* 8.5   HGB 10.8* 10.6*   MCV 86.1 87.8   .0 213.0       Recent Labs   Lab 07/26/24  0605 07/27/24  0944    139   K 4.1 4.1    111   CO2 22.0 24.0   BUN 19 17   CREATSERUM 1.29 1.13   CA 8.4* 8.4*   * 113*       No results for input(s): \"ALT\", \"AST\", \"ALB\", \"AMYLASE\", \"LIPASE\", \"LDH\" in the last 168 hours.    Invalid input(s): \"ALPHOS\", \"TBIL\", \"DBIL\", \"TPROT\"    Recent Labs   Lab 07/26/24  1412 07/26/24  1802 07/26/24  2106 07/27/24  1021 07/27/24  1243   PGLU 208* 195* 141* 117* 105*       No results for input(s): \"TROP\" in the last 168 hours.    Imaging:  No results found.

## 2024-07-28 PROBLEM — F05 DELIRIUM DUE TO ANOTHER MEDICAL CONDITION: Status: ACTIVE | Noted: 2024-07-28

## 2024-07-28 LAB
ANION GAP SERPL CALC-SCNC: 7 MMOL/L (ref 0–18)
BASOPHILS # BLD AUTO: 0.01 X10(3) UL (ref 0–0.2)
BASOPHILS NFR BLD AUTO: 0.1 %
BUN BLD-MCNC: 15 MG/DL (ref 9–23)
BUN/CREAT SERPL: 15 (ref 10–20)
CALCIUM BLD-MCNC: 8 MG/DL (ref 8.7–10.4)
CHLORIDE SERPL-SCNC: 114 MMOL/L (ref 98–112)
CO2 SERPL-SCNC: 24 MMOL/L (ref 21–32)
CREAT BLD-MCNC: 1 MG/DL
DEPRECATED RDW RBC AUTO: 72.4 FL (ref 35.1–46.3)
EGFRCR SERPLBLD CKD-EPI 2021: 74 ML/MIN/1.73M2 (ref 60–?)
EOSINOPHIL # BLD AUTO: 0.11 X10(3) UL (ref 0–0.7)
EOSINOPHIL NFR BLD AUTO: 1.3 %
ERYTHROCYTE [DISTWIDTH] IN BLOOD BY AUTOMATED COUNT: 23.5 % (ref 11–15)
GLUCOSE BLD-MCNC: 80 MG/DL (ref 70–99)
GLUCOSE BLDC GLUCOMTR-MCNC: 100 MG/DL (ref 70–99)
GLUCOSE BLDC GLUCOMTR-MCNC: 103 MG/DL (ref 70–99)
GLUCOSE BLDC GLUCOMTR-MCNC: 78 MG/DL (ref 70–99)
GLUCOSE BLDC GLUCOMTR-MCNC: 85 MG/DL (ref 70–99)
HCT VFR BLD AUTO: 32.8 %
HGB BLD-MCNC: 10.5 G/DL
IMM GRANULOCYTES # BLD AUTO: 0.04 X10(3) UL (ref 0–1)
IMM GRANULOCYTES NFR BLD: 0.5 %
LYMPHOCYTES # BLD AUTO: 0.51 X10(3) UL (ref 1–4)
LYMPHOCYTES NFR BLD AUTO: 6 %
MCH RBC QN AUTO: 27.7 PG (ref 26–34)
MCHC RBC AUTO-ENTMCNC: 32 G/DL (ref 31–37)
MCV RBC AUTO: 86.5 FL
MONOCYTES # BLD AUTO: 0.76 X10(3) UL (ref 0.1–1)
MONOCYTES NFR BLD AUTO: 8.9 %
NEUTROPHILS # BLD AUTO: 7.12 X10 (3) UL (ref 1.5–7.7)
NEUTROPHILS # BLD AUTO: 7.12 X10(3) UL (ref 1.5–7.7)
NEUTROPHILS NFR BLD AUTO: 83.2 %
OSMOLALITY SERPL CALC.SUM OF ELEC: 300 MOSM/KG (ref 275–295)
PLATELET # BLD AUTO: 198 10(3)UL (ref 150–450)
POTASSIUM SERPL-SCNC: 3.6 MMOL/L (ref 3.5–5.1)
RBC # BLD AUTO: 3.79 X10(6)UL
SODIUM SERPL-SCNC: 145 MMOL/L (ref 136–145)
WBC # BLD AUTO: 8.6 X10(3) UL (ref 4–11)

## 2024-07-28 PROCEDURE — 90792 PSYCH DIAG EVAL W/MED SRVCS: CPT | Performed by: NURSE PRACTITIONER

## 2024-07-28 RX ORDER — OLANZAPINE 2.5 MG/1
2.5 TABLET, FILM COATED ORAL NIGHTLY
Status: DISCONTINUED | OUTPATIENT
Start: 2024-07-28 | End: 2024-07-30

## 2024-07-28 RX ORDER — HALOPERIDOL 5 MG/ML
0.5 INJECTION INTRAMUSCULAR EVERY 6 HOURS PRN
Status: DISCONTINUED | OUTPATIENT
Start: 2024-07-28 | End: 2024-07-30

## 2024-07-28 NOTE — PLAN OF CARE
Problem: PAIN - ADULT  Goal: Verbalizes/displays adequate comfort level or patient's stated pain goal  Description: INTERVENTIONS:  - Encourage pt to monitor pain and request assistance  - Assess pain using appropriate pain scale  - Administer analgesics based on type and severity of pain and evaluate response  - Implement non-pharmacological measures as appropriate and evaluate response  - Consider cultural and social influences on pain and pain management  - Manage/alleviate anxiety  - Utilize distraction and/or relaxation techniques  - Monitor for opioid side effects  - Notify MD/LIP if interventions unsuccessful or patient reports new pain  - Anticipate increased pain with activity and pre-medicate as appropriate  Outcome: Progressing     Problem: SAFETY ADULT - FALL  Goal: Free from fall injury  Description: INTERVENTIONS:  - Assess pt frequently for physical needs  - Identify cognitive and physical deficits and behaviors that affect risk of falls.  - [place] fall precautions as indicated by assessment.  - Educate pt/family on patient safety including physical limitations  - Instruct pt to call for assistance with activity based on assessment  - Modify environment to reduce risk of injury  - Provide assistive devices as appropriate  - Consider OT/PT consult to assist with strengthening/mobility  - Encourage toileting schedule  Outcome: Progressing     Problem: Impaired Cognition  Goal: Patient will exhibit improved attention, thought processing and/or memory  Description: Interventions:  Outcome: Not Progressing  Patient aggressive  and attempts to hit staff at start of shift; sitter at bedside.  Staff able to calm patient enough to take meds.  Patient slept most of night without incident.  Alex drain and ileal conduit in place with moderate output. VSS, IVF infusing, no tele. Safety precautions maintained. Call light within reach.

## 2024-07-28 NOTE — PROGRESS NOTES
[facility]  part of [facility]     [facility] Hospitalist Progress Note     PCP: Mitch Herrera MD    CC: Follow up       Assessment/Plan:     Roc Butcher is a 84 year old male with PMH sig for type 2 diabetes, CAD status post PCI to left circumflex, PAD, pacemaker, hypertension, paroxysmal A-fib on Eliquis baseline, and chronic systolic heart failure with a EF of 35% hyperlipidemia, prior CVA, and bladder cancer who presented for laparoscopic cystoprostatectomy with ileal conduit diversion.  Post op course with confusion and agitation.    Confusion, agitation  - concern for post op delirium  - possible underlying mild cognitive impairment vs. Mood disorder  - zyprexa PRN for agitation  - 1:1 sitter  - psych consult, recommending zyprexa 2.5 mg nightly, seroquel 25 mg nightly PRN, haldol 0.5 mg Q6hr PRN     Bladder cancer  -laparoscopic cystoprostatectomy with ileal conduit diversion on 7/25/2024  -As needed IV and p.o. pain meds for postop pain control  - Monitor expected acute blood loss anemia, labs in the morning.  Hgb 1.8  -Follow-up pathology  - Diet per surgery     CAD status post PCI to left circumflex, PAD, pacemaker, hypertension, paroxysmal A-fib on Eliquis baseline, and chronic systolic heart failure with a EF of 35% hyperlipidemia  -Cleared by cardiology at [facility] for surgery.  Not on ACE ARB due to renal function and hypotension.  -Continue home beta-blocker and hydralazine.  -Will need to resume Eliquis as soon as possible.  Urology messaged     Type 2 diabetes with low normal BG  - Continue home long-acting daily, sliding scale and titrate up as needed  - home dose 20 units, has been on 15 units daily but poor PO intake and borderline low BG, hold insulin for now, reorder when eating more    FEN: IV fluids per protocol, lites in a.m., diet per surgery     PPx: Per surgery, holding home Eliquis, resume when okay with surgery     Dispo: Full code, pending  course    Questions/concerns were discussed with patient and/or family by bedside.    Dw family at bedside     Anusha Fitch MD  [facility] Hospitalist  Answering Service: [phone]    Subjective     Still confused, not as agitated or aggressive. Eating more today. Guessed he was in the hospital. Thought his wife was his mother. BG low this AM 70s    Objective     OBJECTIVE:  Temp:  [97.7 °F (36.5 °C)-98.1 °F (36.7 °C)] 98.1 °F (36.7 °C)  Pulse:  [63-75] 67  Resp:  [18-20] 18  BP: (109-138)/(43-80) 138/50  SpO2:  [96 %-100 %] 98 %    Intake/Output:    Intake/Output Summary (Last 24 hours) at 7/28/2024 1318  Last data filed at 7/28/2024 1106  Gross per 24 hour   Intake --   Output 1090 ml   Net -1090 ml       Last 3 Weights   07/25/24 1051 167 lb (75.8 kg)   07/12/24 1728 174 lb (78.9 kg)   07/22/24 1108 166 lb (75.3 kg)   03/21/22 0921 188 lb (85.3 kg)       Exam  GEN: elderly male NAD, awake, alert, confused, oriented to self and place  HEENT: EOMI  Pulm: CTAB, no crackles or wheezes  CV: RRR, no murmurs  ABD: Soft, non-tender, non-distended, +BS, ileostomy  Psych: Affect- agitated  SKIN: warm, dry  EXT: no edema    Medications      OLANZapine  2.5 mg Oral Nightly    atorvastatin  40 mg Oral Daily    hydrALAZINE  10 mg Oral TID    [Held by provider] insulin degludec  15 Units Subcutaneous Daily    insulin aspart  1-11 Units Subcutaneous TID CC    sennosides  17.2 mg Oral Nightly    docusate sodium  100 mg Oral BID    enoxaparin  40 mg Subcutaneous Daily    acetaminophen  650 mg Oral Q4H While awake    famotidine  20 mg Oral BID    Or    famotidine  20 mg Intravenous BID      sodium chloride 83 mL/hr at 07/28/24 0528       haloperidol lactate    QUEtiapine    glucose **OR** glucose **OR** glucose-vitamin C **OR** dextrose **OR** glucose **OR** glucose **OR** glucose-vitamin C    ondansetron    oxyCODONE **OR** oxyCODONE    HYDROmorphone **OR** HYDROmorphone    Data Review:       Labs:     Recent Labs   Lab  07/26/24  0605 07/27/24  0944 07/28/24  0649   WBC 12.8* 8.5 8.6   HGB 10.8* 10.6* 10.5*   MCV 86.1 87.8 86.5   .0 213.0 198.0       Recent Labs   Lab 07/26/24  0605 07/27/24  0944 07/28/24  0649    139 145   K 4.1 4.1 3.6    111 114*   CO2 22.0 24.0 24.0   BUN 19 17 15   CREATSERUM 1.29 1.13 1.00   CA 8.4* 8.4* 8.0*   * 113* 80       No results for input(s): \"ALT\", \"AST\", \"ALB\", \"AMYLASE\", \"LIPASE\", \"LDH\" in the last 168 hours.    Invalid input(s): \"ALPHOS\", \"TBIL\", \"DBIL\", \"TPROT\"    Recent Labs   Lab 07/27/24  1243 07/27/24  1727 07/27/24  2116 07/28/24  0924 07/28/24  1233   PGLU 105* 82 108* 78 85       No results for input(s): \"TROP\" in the last 168 hours.    Imaging:  No results found.

## 2024-07-28 NOTE — PROGRESS NOTES
[facility]   Progress Note  Roc Butcher Patient Status:  Inpatient    1940 MRN [id number]   Location [facility] 4W/SW/SE Attending Mauricio Jeong MD   Hosp Day # 3 PCP Mitch Herrera MD     Subjective:  Patient remains pleasantly confused, but no pain, no N/V, no SOB/CP.   KATERINA drain 280 ml / 24 hours     Objective:  Blood pressure 138/50, pulse 67, temperature 98.1 °F (36.7 °C), temperature source Axillary, resp. rate 18, height 5' 9\" (1.753 m), weight 167 lb (75.8 kg), SpO2 98%.  General appearance: no acute distress, confused   Lungs: Unlabored respirations  Abdomen: Soft, non-tender, not distended, stoma pink and healthy   Extremities: no edema, no calf pain  : urine concentrated, yellow     Lab Results   Component Value Date    WBC 8.6 2024    HGB 10.5 2024    HCT 32.8 2024    .0 2024    CREATSERUM 1.00 2024    BUN 15 2024     2024    K 3.6 2024     2024    CO2 24.0 2024    GLU 80 2024    CA 8.0 2024    PGLU 85 2024       Intake/Output Summary (Last 24 hours) at 2024 1347  Last data filed at 2024 1106  Gross per 24 hour   Intake --   Output 840 ml   Net -840 ml       Assessment:  Bladder ca - POD3, s/p robotic assisted lap radical cystoprostatectomy, plnd and ileal conduit diversion      Plan:  Progressing well, except confusion (not unusual for age and type of surgery)              Advance diet as tolerated             PT evaluation, Wound care evaluation              Continue supportive care     Mauricio Farrar MD  [facility]  [phone]

## 2024-07-28 NOTE — PLAN OF CARE
Roc has been drowsy today. Easily arousable. Confused/disoriented. Psych consult - see MAR and note for medication changes. Sitter at bedside for safety. IVF infusing. Vitals stable on room air. Diet advanced to low fiber, tolerating. Low appetite, will eat a little will much encouragement. Accuchecks stable. Mild - moderate pain, scheduled tylenol. Ileal conduit in place with tea colored output. KATERINA drain in place with serosanguinous output. Lap sites intact. Bed low, locked, alarm and all precautions in place. Family aware of plan of care.   Problem: Patient Centered Care  Goal: Patient preferences are identified and integrated in the patient's plan of care  Description: Interventions:  - What would you like us to know as we care for you?   - Provide timely, complete, and accurate information to patient/family  - Incorporate patient and family knowledge, values, beliefs, and cultural backgrounds into the planning and delivery of care  - Encourage patient/family to participate in care and decision-making at the level they choose  - Honor patient and family perspectives and choices  Outcome: Progressing     Problem: Patient/Family Goals  Goal: Patient/Family Long Term Goal  Description: Patient's Long Term Goal:     Interventions:  -   - See additional Care Plan goals for specific interventions  Outcome: Progressing  Goal: Patient/Family Short Term Goal  Description: Patient's Short Term Goal:     Interventions:   -   - See additional Care Plan goals for specific interventions  Outcome: Progressing     Problem: PAIN - ADULT  Goal: Verbalizes/displays adequate comfort level or patient's stated pain goal  Description: INTERVENTIONS:  - Encourage pt to monitor pain and request assistance  - Assess pain using appropriate pain scale  - Administer analgesics based on type and severity of pain and evaluate response  - Implement non-pharmacological measures as appropriate and evaluate response  - Consider cultural and  social influences on pain and pain management  - Manage/alleviate anxiety  - Utilize distraction and/or relaxation techniques  - Monitor for opioid side effects  - Notify MD/LIP if interventions unsuccessful or patient reports new pain  - Anticipate increased pain with activity and pre-medicate as appropriate  Outcome: Progressing     Problem: SAFETY ADULT - FALL  Goal: Free from fall injury  Description: INTERVENTIONS:  - Assess pt frequently for physical needs  - Identify cognitive and physical deficits and behaviors that affect risk of falls.  - [place] fall precautions as indicated by assessment.  - Educate pt/family on patient safety including physical limitations  - Instruct pt to call for assistance with activity based on assessment  - Modify environment to reduce risk of injury  - Provide assistive devices as appropriate  - Consider OT/PT consult to assist with strengthening/mobility  - Encourage toileting schedule  Outcome: Progressing     Problem: DISCHARGE PLANNING  Goal: Discharge to home or other facility with appropriate resources  Description: INTERVENTIONS:  - Identify barriers to discharge w/pt and caregiver  - Include patient/family/discharge partner in discharge planning  - Arrange for needed discharge resources and transportation as appropriate  - Identify discharge learning needs (meds, wound care, etc)  - Arrange for interpreters to assist at discharge as needed  - Consider post-discharge preferences of patient/family/discharge partner  - Complete POLST form as appropriate  - Assess patient's ability to be responsible for managing their own health  - Refer to Case Management Department for coordinating discharge planning if the patient needs post-hospital services based on physician/LIP order or complex needs related to functional status, cognitive ability or social support system  Outcome: Progressing     Problem: Impaired Cognition  Goal: Patient will exhibit improved attention, thought  processing and/or memory  Description: Interventions:    Outcome: Not Progressing

## 2024-07-28 NOTE — PHYSICAL THERAPY NOTE
PT evaluation order received, chart review completed. RN cleared pt to participate in PT eval this afternoon, noting pt has had some delirium and agitation throughout the day but seems to be re-directable. RN present at start of session, attempting to awaken patient but unable to get patient to remain awake enough to safely participate in OOB activities. RN now requesting hold until tomorrow, family in agreement.

## 2024-07-28 NOTE — CONSULTS
[facility]  part of [facility]    Report of Consultation    Roc Butcher Patient Status:  Inpatient    1940 MRN [id number]   Location [facility] 4W/SW/SE Attending Mauricio Jeong MD   Hosp Day # 2 PCP Mitch Herrera MD     Date of Admission:  2024  Date of Consult:  24   Reason for Consultation:   Patient presented with increased confusion, restlessness, agitation, Mauricio Avalos MD requested psychiatric consult for evaluation and advice.    Consult Duration     The patient seen for initial psychiatric consult evaluation.   Record reviewed, communication with attending, communication with RN and patient seen face to face evaluation.    History of Present Illness:   Patient is a 84 year old ,  male with past medical history of diabetes, CAD, pacemaker, hypertension, paroxymal a-fib, hyperlipidemia, CVA who was admitted to the hospital for laparoscopic cystoprostatectomy with ileal conduit diversion. The patient has been demonstrating increased confusion, restlessness.Patient indicated for psych consult for evaluation and advise.    The patient received the following psychotropic medications: zyprexa 5 mg IM and seroquel 25 mg PRN    Labs and imaging reviewed: Glucose 113, , sodium 139,     The patient seen today laying in hospital bed. Patient presents restless and anxious. RN is in room attempting to give patient medications the patient otherwise refusing.    Patient with his eyes closed throughout.  He is demonstrating confused and disorganized thought process with response to internal stimuli.    Speech is mumbled and incomprehensible. He otherwise notes that he is  and has two children.    Patient is not able to cooperate with interview to due confusion, restlessness, agitation and response to internal stimuli.     The patient has been demonstrating delirium episode with alternation in mood and cognition with episodes of   increased confusion, restlessness, agitation and response to internal stimuli.     Communicating with patients wife, patient lives at home. He is independent. He cuts the grass and drives. No history of delirium or psychiatric treatment.     Past Psychiatric/Medication History:  1. Prior diagnoses: none reported  2. Past psychiatric inpatient: none reported  3. Past outpatient history: none reported  4. Past suicide history: none reported  5. Medication history: none reported    Social History:   Patient has been  for 64 years. He lives at home with his wife. He has two adult children.  Retired 24 years ago. He worked for [employer]     No alcohol, tobacco, cannabis or illicit     Family History:  None reported  Medical History:   Past Medical History  Past Medical History:    Arrhythmia    Cancer (HCC)    bladder    Cataract    High blood pressure    High cholesterol    History of blood transfusion    HYPERLIPIDEMIA    HYPERTENSION    Other and unspecified hyperlipidemia    STROKE    Type II or unspecified type diabetes mellitus without mention of complication, not stated as uncontrolled    Unspecified essential hypertension    Visual impairment       Past Surgical History  Past Surgical History:   Procedure Laterality Date    Cardiac pacemaker placement      Other surgical history      bladder tumor removal    Tonsillectomy         Family History  Family History   Problem Relation Age of Onset    Heart Disorder Father     Other Father         HIP FX    Heart Disorder Mother     Other Mother         CVA AGE 80       Social History  Social History     Socioeconomic History    Marital status:    Tobacco Use    Smoking status: Former     Types: Cigarettes    Smokeless tobacco: Never    Tobacco comments:     2010   Vaping Use    Vaping status: Never Used   Substance and Sexual Activity    Alcohol use: Yes     Comment: HEAVY PREVIOUS NOW SOCIAL    Drug use: No     Social Determinants of Health      Financial Resource Strain: Low Risk  (5/30/2024)    Received from [facility]    Financial Resource Strain     In the past year, have you or any family members you live with been unable to get any of the following when it was really needed? Check all that apply.: None   Food Insecurity: No Food Insecurity (7/25/2024)    Food Insecurity     Food Insecurity: Never true   Transportation Needs: No Transportation Needs (7/25/2024)    Transportation Needs     Lack of Transportation: No   Physical Activity: High Risk (5/3/2024)    Received from [facility]    Exercise Vital Sign     On average, how many days per week do you engage in moderate to strenuous exercise (like a brisk walk)?: 0 days     On average, how many minutes do you engage in exercise at this level?: 0 min   Social Connections: Medium Risk (5/30/2024)    Received from [facility]    Social Connections     How often do you see or talk to people that you care about and feel close to? (For example: talking to friends on the phone, visiting friends or family, going to Buddhism or club meetings): 1 or 2 times a week   Housing Stability: Low Risk  (7/25/2024)    Housing Stability     Housing Instability: No           Current Medications:  Current Facility-Administered Medications   Medication Dose Route Frequency    QUEtiapine (SEROquel) tab 25 mg  25 mg Oral Q8H PRN    atorvastatin (Lipitor) tab 40 mg  40 mg Oral Daily    hydrALAZINE (Apresoline) tab 10 mg  10 mg Oral TID    insulin degludec (Tresiba) 100 units/mL flextouch 15 Units  15 Units Subcutaneous Daily    glucose (Dex4) 15 GM/59ML oral liquid 15 g  15 g Oral Q15 Min PRN    Or    glucose (Glutose) 40% oral gel 15 g  15 g Oral Q15 Min PRN    Or    glucose-vitamin C (Dex-4) chewable tab 4 tablet  4 tablet Oral Q15 Min PRN    Or    dextrose 50% injection 50 mL  50 mL Intravenous Q15 Min PRN    Or    glucose (Dex4) 15 GM/59ML oral liquid 30 g  30 g Oral Q15 Min PRN    Or     glucose (Glutose) 40% oral gel 30 g  30 g Oral Q15 Min PRN    Or    glucose-vitamin C (Dex-4) chewable tab 8 tablet  8 tablet Oral Q15 Min PRN    insulin aspart (NovoLOG) 100 Units/mL FlexPen 1-11 Units  1-11 Units Subcutaneous TID CC    sennosides (Senokot) tab 17.2 mg  17.2 mg Oral Nightly    docusate sodium (Colace) cap 100 mg  100 mg Oral BID    ondansetron (Zofran) 4 MG/2ML injection 4 mg  4 mg Intravenous Q6H PRN    sodium chloride 0.9% infusion   Intravenous Continuous    enoxaparin (Lovenox) 40 MG/0.4ML SUBQ injection 40 mg  40 mg Subcutaneous Daily    acetaminophen (Tylenol) tab 650 mg  650 mg Oral Q4H While awake    oxyCODONE immediate release tab 2.5 mg  2.5 mg Oral Q4H PRN    Or    oxyCODONE immediate release tab 5 mg  5 mg Oral Q4H PRN    HYDROmorphone (Dilaudid) 1 MG/ML injection 0.2 mg  0.2 mg Intravenous Q2H PRN    Or    HYDROmorphone (Dilaudid) 1 MG/ML injection 0.4 mg  0.4 mg Intravenous Q2H PRN    famotidine (Pepcid) tab 20 mg  20 mg Oral BID    Or    famotidine (Pepcid) 20 mg/2mL injection 20 mg  20 mg Intravenous BID     Medications Prior to Admission   Medication Sig    furosemide 20 MG Oral Tab Take 1 tablet (20 mg total) by mouth daily.    hydrALAZINE 10 MG Oral Tab Take 1 tablet (10 mg total) by mouth 3 (three) times daily.    insulin aspart (NOVOLOG FLEXPEN) 100 Units/mL Subcutaneous Solution Pen-injector Take 10 units with breakfast  and 10 units lunch    aspirin 81 MG Oral Tab EC Take 1 tablet (81 mg total) by mouth daily.    traMADol 50 MG Oral Tab Take 1 tablet (50 mg total) by mouth every 6 (six) hours as needed for Pain.    ATORVASTATIN 40 MG Oral Tab TAKE 1 TABLET BY MOUTH IN  THE EVENING (Patient taking differently: every morning.)    ELIQUIS 5 MG Oral Tab 2 (two) times daily.    LANTUS SOLOSTAR 100 UNIT/ML Subcutaneous Solution Pen-injector Take 15 units daily    Insulin Lispro, 1 Unit Dial, (HUMALOG KWIKPEN) 100 UNIT/ML Subcutaneous Solution Pen-injector Inject 5 Units into the  skin As Directed. Take 5 units with each meal    metoprolol tartrate 25 MG Oral Tab TAKE ONE tablet daily (Patient taking differently: 2 (two) times daily.)    Glucose Blood (ACCU-CHEK MICK PLUS) In Vitro Strip USE TWICE DAILY    ACCU-CHEK MICK In Vitro Strip Test glucose three times daily.    Insulin Pen Needle (BD PEN NEEDLE MINI U/F) 31G X 5 MM Does not apply Misc AS DIRECTED QID    ACCU-CHEK MULTICLIX LANCETS Does not apply Misc test blood sugar QID as directed       Allergies  Allergies   Allergen Reactions    Metformin Hcl OTHER (SEE COMMENTS)      Oral,   severe dry mouth       Review of Systems:   As by Admitting/Attending    Results:   Laboratory Data:  Lab Results   Component Value Date    WBC 8.5 07/27/2024    HGB 10.6 (L) 07/27/2024    HCT 33.9 (L) 07/27/2024    .0 07/27/2024    CREATSERUM 1.13 07/27/2024    BUN 17 07/27/2024     07/27/2024    K 4.1 07/27/2024     07/27/2024    CO2 24.0 07/27/2024     (H) 07/27/2024    CA 8.4 (L) 07/27/2024    ALB 4.0 03/17/2022    ALKPHO 75 03/17/2022    TP 6.1 (L) 03/17/2022    AST 17 03/17/2022    ALT 14 03/17/2022    TSH 1.520 03/17/2022    PSA 0.5 02/22/2012    B12 380 01/13/2020         Imaging:  No results found.    Vital Signs:   Blood pressure 132/43, pulse 63, temperature 98 °F (36.7 °C), temperature source Axillary, resp. rate 20, height 5' 9\" (1.753 m), weight 75.8 kg (167 lb), SpO2 100%.    Mental Status Exam:   Appearance: Stated age male, in hospital gown, laying down in hospital bed.  Psychomotor: Patient has been demonstrating restlessness and agitation.  Orientation: Alert and oriented to person. Patient confused  Gait: Not evaluated.  Attitude/Coorperation: limited cooperation due to confusion, restlessness, agitation  Behavior: episodes of restlessness and agitation.  Speech: mumbled, incomprehensible at times.  Mood: anxious  Affect: restricted  Thought process: Confused, disorganized  Thought content: No reports of   suicidal or homicidal ideation.  Perceptions: Patient has been demonstrating response to internal stimuli.  Concentration: Grossly impaired  Memory: Grossly impaired  Intellect: Average.  Judgment and Insight: Questionable.     Impression:     Delirium due to another medical condition    Preop testing    Bladder cancer (HCC)    Patient is a 84 year old ,  male with past medical history of diabetes, CAD, pacemaker, hypertension, paroxymal a-fib, hyperlipidemia, CVA who was admitted to the hospital for laparoscopic cystoprostatectomy with ileal conduit diversion. The patient has been demonstrating increased confusion, restlessness    The patient has been demonstrating delirium episode with alternation in mood and cognition with episodes of  increased confusion, restlessness, agitation and response to internal stimuli.     Discussed risk and benefit, acknowledging the current symptom and severity.  At this point, I would recommend the following approach:     Focus on safety  Focus on education and support.  Focus on insight orientation helping the patient understand diagnosis and treatment plan.  Start zyprexa 2.5 mg nightly  Continue Seroquel 25 mg PRN   Utilize haldol 0.5 mg IV q 6 hours PRN   Processed with patient at length, the initiation of the above psychotropic medications I advised the patient of the risks, benefits, alternatives and potential side effects. The patient consents to administration of the medications and understands the right to refuse medications at any time. The patient verbalized understanding.   Coordinate plan with team    Orders This Visit:  Orders Placed This Encounter   Procedures    Hemoglobin A1C    Basic Metabolic Panel (8)    CBC With Differential With Platelet    RBC Morphology Scan    Type and screen    ABORH Confirmation    Specimen to Pathology Tissue       Meds This Visit:  Requested Prescriptions      No prescriptions requested or ordered in this encounter        Jennifer Matt, APRN  7/28/2024    Note to Patient: The 21st Century Cures Act makes medical notes like these available to patients in the interest of transparency. However, be advised this is a medical document. It is intended as peer to peer communication. It is written in medical language and may contain abbreviations or verbiage that are unfamiliar. It may appear blunt or direct. Medical documents are intended to carry relevant information, facts as evident, and the clinical opinion of the practitioner. This note may have been transcribed using a voice dictation system. Voice recognition errors may occur. This should not be taken to alter the content or meaning of this note.

## 2024-07-29 LAB
ANION GAP SERPL CALC-SCNC: 14 MMOL/L (ref 0–18)
BASOPHILS # BLD AUTO: 0.01 X10(3) UL (ref 0–0.2)
BASOPHILS NFR BLD AUTO: 0.2 %
BUN BLD-MCNC: 19 MG/DL (ref 9–23)
BUN/CREAT SERPL: 17.3 (ref 10–20)
CALCIUM BLD-MCNC: 7.8 MG/DL (ref 8.7–10.4)
CHLORIDE SERPL-SCNC: 115 MMOL/L (ref 98–112)
CO2 SERPL-SCNC: 16 MMOL/L (ref 21–32)
CREAT BLD-MCNC: 1.1 MG/DL
DEPRECATED RDW RBC AUTO: 75.7 FL (ref 35.1–46.3)
EGFRCR SERPLBLD CKD-EPI 2021: 66 ML/MIN/1.73M2 (ref 60–?)
EOSINOPHIL # BLD AUTO: 0 X10(3) UL (ref 0–0.7)
EOSINOPHIL NFR BLD AUTO: 0 %
ERYTHROCYTE [DISTWIDTH] IN BLOOD BY AUTOMATED COUNT: 23.3 % (ref 11–15)
GLUCOSE BLD-MCNC: 188 MG/DL (ref 70–99)
GLUCOSE BLDC GLUCOMTR-MCNC: 171 MG/DL (ref 70–99)
GLUCOSE BLDC GLUCOMTR-MCNC: 178 MG/DL (ref 70–99)
GLUCOSE BLDC GLUCOMTR-MCNC: 237 MG/DL (ref 70–99)
HCT VFR BLD AUTO: 37.7 %
HGB BLD-MCNC: 11.3 G/DL
IMM GRANULOCYTES # BLD AUTO: 0.02 X10(3) UL (ref 0–1)
IMM GRANULOCYTES NFR BLD: 0.3 %
LYMPHOCYTES # BLD AUTO: 0.32 X10(3) UL (ref 1–4)
LYMPHOCYTES NFR BLD AUTO: 5.3 %
MCH RBC QN AUTO: 27.1 PG (ref 26–34)
MCHC RBC AUTO-ENTMCNC: 30 G/DL (ref 31–37)
MCV RBC AUTO: 90.4 FL
MONOCYTES # BLD AUTO: 0.48 X10(3) UL (ref 0.1–1)
MONOCYTES NFR BLD AUTO: 7.9 %
NEUTROPHILS # BLD AUTO: 5.25 X10 (3) UL (ref 1.5–7.7)
NEUTROPHILS # BLD AUTO: 5.25 X10(3) UL (ref 1.5–7.7)
NEUTROPHILS NFR BLD AUTO: 86.3 %
OSMOLALITY SERPL CALC.SUM OF ELEC: 307 MOSM/KG (ref 275–295)
PLATELET # BLD AUTO: 256 10(3)UL (ref 150–450)
POTASSIUM SERPL-SCNC: 4.3 MMOL/L (ref 3.5–5.1)
RBC # BLD AUTO: 4.17 X10(6)UL
SODIUM SERPL-SCNC: 145 MMOL/L (ref 136–145)
WBC # BLD AUTO: 6.1 X10(3) UL (ref 4–11)

## 2024-07-29 PROCEDURE — 99232 SBSQ HOSP IP/OBS MODERATE 35: CPT | Performed by: NURSE PRACTITIONER

## 2024-07-29 RX ORDER — ISOSORBIDE DINITRATE 5 MG/1
5 TABLET ORAL 3 TIMES DAILY
Status: DISCONTINUED | OUTPATIENT
Start: 2024-07-29 | End: 2024-08-13

## 2024-07-29 RX ORDER — DOCUSATE SODIUM 100 MG/1
100 CAPSULE, LIQUID FILLED ORAL 2 TIMES DAILY PRN
Status: DISCONTINUED | OUTPATIENT
Start: 2024-07-29 | End: 2024-08-13

## 2024-07-29 RX ORDER — ISOSORBIDE DINITRATE 5 MG/1
1 TABLET ORAL 3 TIMES DAILY
COMMUNITY
Start: 2024-04-01

## 2024-07-29 NOTE — PROGRESS NOTES
WOUND CARE NOTE    History:  Past Medical History:    Arrhythmia    Cancer (HCC)    bladder    Cataract    High blood pressure    High cholesterol    History of blood transfusion    HYPERLIPIDEMIA    HYPERTENSION    Other and unspecified hyperlipidemia    STROKE    Type II or unspecified type diabetes mellitus without mention of complication, not stated as uncontrolled    Unspecified essential hypertension    Visual impairment     Past Surgical History:   Procedure Laterality Date    Cardiac pacemaker placement      Other surgical history      bladder tumor removal    Tonsillectomy              PLAN   Recommendations:  Dr. Jeong is following the pt.  Turn schedules  Heels elevated using pillows, to offload heels  To prevent sliding: decrease head of bed and elevate foot of bed as medical condition tolerates  Glucose control to help promote wound healing    Wound(s)  Location: abdomen  Per MD       Ostomy:  Change flange every 5-7 days and prn  Appliance 2 1/4 inch ileal conduit appliance, skin prep  Teaching: Ostomy folder given  Patient & Spouse continued teaching ileal conduit ostomy care. Response to teaching: fair--for the spouse, pt. not all    Discharge Recommendations:  The pt. is home with his spouse, the spouse stated \"he cannot care for this, I will have to try\" They will need assistance at home.         OBJECTIVE   Follow Up Continue ileal conduit teaching       ASSESSMENT   Sandro Score:  Sandro Scale Score: 17    Chart Reviewed: yes    Wound(s):  The pt. is resting in bed, his spouse is at the bedside for teaching, per the spouse she is unsure if she can do it. The pt. is not listening to education. The spouse listened and watched the appliance change, being careful with the stents, pouch emptying and connecting to the leg bag and nath pouch. Appliance changed, all of the spouses questions were answered. She will come again tomorrow for teaching and change the appliance. Spoke with the nurse.      Ostomy:  Stoma location: RLQ  Stoma type: ileal conduit  Stoma color: red, moist, 2 stents in place  Stoma condition: normal  Stoma diameter: 1 inch  Ostomy output: urine  Stoma height: budding  Peristomal skin: intact  Pouching system:two piece 2 1/4 inch, skin prep  Able to care for stoma: No       Allergies: Metformin hcl    Labs:   Lab Results   Component Value Date    WBC 6.1 07/29/2024    HGB 11.3 (L) 07/29/2024    HCT 37.7 (L) 07/29/2024    .0 07/29/2024    CREATSERUM 1.10 07/29/2024    BUN 19 07/29/2024     07/29/2024    K 4.3 07/29/2024     (H) 07/29/2024    CO2 16.0 (L) 07/29/2024     (H) 07/29/2024    CA 7.8 (L) 07/29/2024    ALB 4.0 03/17/2022    ALKPHO 75 03/17/2022    BILT 0.63 03/17/2022    TP 6.1 (L) 03/17/2022    AST 17 03/17/2022    ALT 14 03/17/2022     No results found for: \"PREALBUMIN\"      Time Spent 1 Hour.    Shira Dominguez RN  [facility]  [facility]  [phone]     07/29/24 1100   Wound 07/25/24 Abdomen Anterior;Mid   Date First Assessed/Time First Assessed: 07/25/24 1323   Primary Wound Type: Incision  Location: Abdomen  Wound Location Orientation: Anterior;Mid  Wound Description (Comments): PORT SITES X3, MEDIAL INCISION X1   Dressing Status Clean;Dry;Intact   Urostomy Ileal conduit RLQ   Placement Date: 07/25/24   Inserted by: Dr Jeong  Urostomy Type: Ileal conduit  Location: RLQ  Stoma Size (cm): 1 cm  Appliance Size: 1 3/4   Stomal Appliance 2 piece;Intact   Stomal Assessment Pink;Red;Moist;Budding  (1 inch, 2 stents intact)   Peristomal Assessment Intact;Pink   Treatment Appliance change;Bag change;Site care   Dressing Change Due 08/05/24   Output (mL) 20 mL   Wound Follow Up   Follow up needed Yes

## 2024-07-29 NOTE — PROGRESS NOTES
[facility] AND CARE   Progress Note  -  Roc Butcher Patient Status:  Inpatient    1940 MRN [id number]   Location [facility] 4W/SW/SE Attending Ziggy Mitchell, DO   Hosp Day # 4 PCP Mitch Herrera MD     PCP: Mitch Herrera MD      SEE ATTENDING NOTE AT BOTTOM OF PAGE    Is this a shared or split note between Advanced Practice Provider and Physician? Yes    Assessment and Plan:  Roc Butcher is a 84 year old male with PMH sig for type 2 diabetes, CAD status post PCI to left circumflex, PAD, pacemaker, hypertension, paroxysmal A-fib on Eliquis baseline, and chronic systolic heart failure with a EF of 35% hyperlipidemia, prior CVA, and bladder cancer who presented for laparoscopic cystoprostatectomy with ileal conduit diversion.  Post op course with complicated by delirium, see below for details      Delirium   -zypexa nightly  -prn seroquel and zyprexa  -sitter, consider removal tomorrow   -as per psych      Bladder cancer S/P lap cystoprostatectomy with ileal conduit diversion on 2024  -pain meds-scheduled tylenol  -bowel regimen changed to prn with liquid stool   -drain removal today  -urostomy teaching   -as per urology, appt with urology      CAD status post PCI to L Cfx  PAD  S/P pacemaker  Hypertension,  PAF  Chronic HFrEF -35%   HL  -Not on ACE ARB due to renal function and hypotension.   -Continue hydralazine  -resume eliquis if ok with urology   -resume lopressor 25 mg bid and imdur  -holding home ASA, lasix   -follows with [facility]      -DM Type II  -HgbA1C 6.6  -home regimen: lantus 15 units nightly plus novolog   -SSI prn  -accuchecks  -ADA diet     GOC  -full code per chart  -POA wife     MA/ACO [place]  -ER Visits : 0  -Admissions : 3  -Re- Entry: no   -Consults: urology and psych   -Discharge Needs: TBD  -Appointments: [ ] PCP Mitch Herrera MD     FEN  -lytes in am  -diet-LFD    Prophy  -SCD  -resume eliquis if ok with Dr Qureshi, message sent  to him    Dispo  -pending clinical coarse  PCP: Mitch Herrera MD      Concerns regarding plan of care were discussed with patient. Patient agrees with plan as detailed above. Discussed plan of care with Dr. Mitchell     Note: This chart was prepared using voice recognition software and may contain unintended word substitution errors.        Cookie Hugo RN, NP   [facility] and Care Hospitalist Team  Contact via Perfect Serve and Bubble (Check Availability)  7/29/2024    SUBJECTIVE:   Pt calm in bed. Per sitter no issues. Patient tells me he had a bowel movement. Plans for drain removal today. PT and OT to see today. Wife was apparently here earlier today       OBJECTIVE:   Blood pressure 141/53, pulse 75, temperature 98.6 °F (37 °C), temperature source Oral, resp. rate 20, height 5' 9\" (1.753 m), weight 167 lb (75.8 kg), SpO2 98%.    GENERAL: no apparent distress  NEURO: A/A   RESP: non labored, CTA  CARDIO: Regular, no murmur  ABD: soft, NT, ND, BS+  : ileal conduit, liquid stool  EXTREMITIES: no edema    DIAGNOSTIC DATA:   Labs:     Recent Labs   Lab 07/26/24  0605 07/27/24  0944 07/28/24  0649 07/29/24  0634   WBC 12.8* 8.5 8.6 6.1   HGB 10.8* 10.6* 10.5* 11.3*   MCV 86.1 87.8 86.5 90.4   .0 213.0 198.0 256.0       Recent Labs   Lab 07/26/24  0605 07/27/24  0944 07/28/24  0649 07/29/24  0634    139 145 145   K 4.1 4.1 3.6 4.3    111 114* 115*   CO2 22.0 24.0 24.0 16.0*   BUN 19 17 15 19   CREATSERUM 1.29 1.13 1.00 1.10   CA 8.4* 8.4* 8.0* 7.8*   * 113* 80 188*       No results for input(s): \"ALT\", \"AST\", \"ALB\", \"AMYLASE\", \"LIPASE\", \"LDH\" in the last 168 hours.    Invalid input(s): \"ALPHOS\", \"TBIL\", \"DBIL\", \"TPROT\"    Recent Labs   Lab 07/28/24  0924 07/28/24  1233 07/28/24  1718 07/28/24 2053 07/29/24  0952   PGLU 78 85 100* 103* 171*       No results for input(s): \"TROP\" in the last 168 hours.        MEDICATIONS       OLANZapine  2.5 mg Oral Nightly    atorvastatin  40 mg Oral  Daily    hydrALAZINE  10 mg Oral TID    [Held by provider] insulin degludec  15 Units Subcutaneous Daily    insulin aspart  1-11 Units Subcutaneous TID CC    sennosides  17.2 mg Oral Nightly    docusate sodium  100 mg Oral BID    enoxaparin  40 mg Subcutaneous Daily    acetaminophen  650 mg Oral Q4H While awake    famotidine  20 mg Oral BID    Or    famotidine  20 mg Intravenous BID         haloperidol lactate    QUEtiapine    glucose **OR** glucose **OR** glucose-vitamin C **OR** dextrose **OR** glucose **OR** glucose **OR** glucose-vitamin C    ondansetron    oxyCODONE **OR** oxyCODONE    HYDROmorphone **OR** HYDROmorphone    Cookie Hugo, JOAN      IMAGING     No results found.      SEE ATTENDING NOTE BELOW:       Patient seen and examined. Agree with documentation as above in  NP note     Subjective: meeting patient for first time, prior notes reviewed. Able to communicate and speak to me. Decent eye contact. Does not appear to be restless during my exam. Was eating breakfast. States he slept better.          Vitals:     07/29/24 1243   BP: 146/47   Pulse: 66   Resp: 16   Temp: 98 °F (36.7 °C)   Gen: no distress  Lungs: Clear  Heart: RRR  Abdomen: KATERINA drain , soft   Legs: no edema      A/P     Laparoscopic cystoprostatectomy with ileal conduit diversion on 7/25/24  -post op monitoring  -complicated by severe delirium   -mobilize and ambulate  -drain removal per urology      Delirium  -appreciate psychiatry consult  -symptoms do seem improved   -on low dose zyprexa, and seroquel PRN     Continue discharge planning     Ziggy Ivy Hospitalist

## 2024-07-29 NOTE — PROGRESS NOTES
Patient seen and examined. Agree with documentation as above in  NP note    Subjective: meeting patient for first time, prior notes reviewed. Able to communicate and speak to me. Decent eye contact. Does not appear to be restless during my exam. Was eating breakfast. States he slept better.     Vitals:    07/29/24 1243   BP: 146/47   Pulse: 66   Resp: 16   Temp: 98 °F (36.7 °C)   Gen: no distress  Lungs: Clear  Heart: RRR  Abdomen: KATERINA drain , soft   Legs: no edema     A/P    Laparoscopic cystoprostatectomy with ileal conduit diversion on 7/25/24  -post op monitoring  -complicated by severe delirium   -mobilize and ambulate  -drain removal per urology     Delirium  -appreciate psychiatry consult  -symptoms do seem improved   -on low dose zyprexa, and seroquel PRN    Continue discharge planning    Assusan Ivy Hospitalist

## 2024-07-29 NOTE — PLAN OF CARE
Problem: PAIN - ADULT  Goal: Verbalizes/displays adequate comfort level or patient's stated pain goal  Description: INTERVENTIONS:  - Encourage pt to monitor pain and request assistance  - Assess pain using appropriate pain scale  - Administer analgesics based on type and severity of pain and evaluate response  - Implement non-pharmacological measures as appropriate and evaluate response  - Consider cultural and social influences on pain and pain management  - Manage/alleviate anxiety  - Utilize distraction and/or relaxation techniques  - Monitor for opioid side effects  - Notify MD/LIP if interventions unsuccessful or patient reports new pain  - Anticipate increased pain with activity and pre-medicate as appropriate  Outcome: Progressing     Problem: SAFETY ADULT - FALL  Goal: Free from fall injury  Description: INTERVENTIONS:  - Assess pt frequently for physical needs  - Identify cognitive and physical deficits and behaviors that affect risk of falls.  - [place] fall precautions as indicated by assessment.  - Educate pt/family on patient safety including physical limitations  - Instruct pt to call for assistance with activity based on assessment  - Modify environment to reduce risk of injury  - Provide assistive devices as appropriate  - Consider OT/PT consult to assist with strengthening/mobility  - Encourage toileting schedule  Outcome: Progressing     Problem: Impaired Cognition  Goal: Patient will exhibit improved attention, thought processing and/or memory  Description: Interventions:  Outcome: Not Progressing  Patient aggressive  and attempts to hit staff at start of shift; sitter at bedside.  This RN able to calm patient enough to take meds.  Patient slept most of night without incident, but did wake up again  swinging his arms  aggressively and yelling out; PRN haldol given.   KATERINA drain in place with moderate output; low output from ileal conduit.  Patient has poor PO intake. VSS on room air, IVF  infusing, no tele. Safety precautions maintained. Call light within reach.

## 2024-07-29 NOTE — PROGRESS NOTES
Patient is a 84 year old ,  male with past medical history of diabetes, CAD, pacemaker, hypertension, paroxymal a-fib, hyperlipidemia, CVA who was admitted to the hospital for laparoscopic cystoprostatectomy with ileal conduit diversion. The patient has been demonstrating increased confusion, restlessness.Patient indicated for psych consult for evaluation and advise.  Consult Duration   The patient seen for over 50-minute, follow-up evaluation, over 50% counseling and coordinating care addressing  confusion, restlessness.  Record reviewed, communication with attending, communication with RN and patient seen face to face evaluation.    History of Present Illness:     Communicating with the team, the patient continues to demonstrate alternation in his mood and cognition with episodes of increased anxiety and restlessness. The patient received PRN haldol overnight.     The patient received the following psychotropic medications: Zyprexa 2.5 mg IM, haldol 0.5 mg IV PRN    Labs and imaging reviewed: glucose 188, sodium 145, BUN 19, creatinine 1.10    The patient seen today laying in hospital bed.    The patient is seen in the presence of his wife and sitter.    He is not demonstrating any restlessness or agitation. No use of restraints.    The patient is alert and oriented to person, place, date. He is able to identify his wife in the room. The patient continues yo demonstrate some alternation in his cognition and thought process otherwise there is improvement today.    He notes that he enjoys mowing the lawn at home. He notes that he is ready to go.     He notes that he slept well last night and had toast this morning.    The patient has been demonstrating delirium episode with alternation in mood and cognition with episodes of  increased confusion, restlessness, agitation and response to internal stimuli. The patient otherwise demonstrating improvement this morning.    Communicating with patients wife,  patient lives at home. He is independent. He cuts the grass and drives. No history of delirium or psychiatric treatment.     Past Psychiatric/Medication History:  1. Prior diagnoses: none reported  2. Past psychiatric inpatient: none reported  3. Past outpatient history: none reported  4. Past suicide history: none reported  5. Medication history: none reported    Social History:   Patient has been  for 64 years. He lives at home with his wife. He has two adult children.  Retired 24 years ago. He worked for [employer]     No alcohol, tobacco, cannabis or illicit     Family History:  None reported  Medical History:   Past Medical History  Past Medical History:    Arrhythmia    Cancer (HCC)    bladder    Cataract    High blood pressure    High cholesterol    History of blood transfusion    HYPERLIPIDEMIA    HYPERTENSION    Other and unspecified hyperlipidemia    STROKE    Type II or unspecified type diabetes mellitus without mention of complication, not stated as uncontrolled    Unspecified essential hypertension    Visual impairment       Past Surgical History  Past Surgical History:   Procedure Laterality Date    Cardiac pacemaker placement      Other surgical history      bladder tumor removal    Tonsillectomy         Family History  Family History   Problem Relation Age of Onset    Heart Disorder Father     Other Father         HIP FX    Heart Disorder Mother     Other Mother         CVA AGE 80       Social History  Social History     Socioeconomic History    Marital status:    Tobacco Use    Smoking status: Former     Types: Cigarettes    Smokeless tobacco: Never    Tobacco comments:     2010   Vaping Use    Vaping status: Never Used   Substance and Sexual Activity    Alcohol use: Yes     Comment: HEAVY PREVIOUS NOW SOCIAL    Drug use: No     Social Determinants of Health     Financial Resource Strain: Low Risk  (5/30/2024)    Received from [facility]    Financial Resource Strain      In the past year, have you or any family members you live with been unable to get any of the following when it was really needed? Check all that apply.: None   Food Insecurity: No Food Insecurity (7/25/2024)    Food Insecurity     Food Insecurity: Never true   Transportation Needs: No Transportation Needs (7/25/2024)    Transportation Needs     Lack of Transportation: No   Physical Activity: High Risk (5/3/2024)    Received from [facility]    Exercise Vital Sign     On average, how many days per week do you engage in moderate to strenuous exercise (like a brisk walk)?: 0 days     On average, how many minutes do you engage in exercise at this level?: 0 min   Social Connections: Medium Risk (5/30/2024)    Received from [facility]    Social Connections     How often do you see or talk to people that you care about and feel close to? (For example: talking to friends on the phone, visiting friends or family, going to [employer] or club meetings): 1 or 2 times a week   Housing Stability: Low Risk  (7/25/2024)    Housing Stability     Housing Instability: No           Current Medications:  Current Facility-Administered Medications   Medication Dose Route Frequency    OLANZapine (ZyPREXA) tab 2.5 mg  2.5 mg Oral Nightly    haloperidol lactate (Haldol) 5 MG/ML injection 0.5 mg  0.5 mg Intravenous Q6H PRN    QUEtiapine (SEROquel) tab 25 mg  25 mg Oral Q8H PRN    atorvastatin (Lipitor) tab 40 mg  40 mg Oral Daily    hydrALAZINE (Apresoline) tab 10 mg  10 mg Oral TID    [Held by provider] insulin degludec (Tresiba) 100 units/mL flextouch 15 Units  15 Units Subcutaneous Daily    glucose (Dex4) 15 GM/59ML oral liquid 15 g  15 g Oral Q15 Min PRN    Or    glucose (Glutose) 40% oral gel 15 g  15 g Oral Q15 Min PRN    Or    glucose-vitamin C (Dex-4) chewable tab 4 tablet  4 tablet Oral Q15 Min PRN    Or    dextrose 50% injection 50 mL  50 mL Intravenous Q15 Min PRN    Or    glucose (Dex4) 15 GM/59ML oral  liquid 30 g  30 g Oral Q15 Min PRN    Or    glucose (Glutose) 40% oral gel 30 g  30 g Oral Q15 Min PRN    Or    glucose-vitamin C (Dex-4) chewable tab 8 tablet  8 tablet Oral Q15 Min PRN    insulin aspart (NovoLOG) 100 Units/mL FlexPen 1-11 Units  1-11 Units Subcutaneous TID CC    sennosides (Senokot) tab 17.2 mg  17.2 mg Oral Nightly    docusate sodium (Colace) cap 100 mg  100 mg Oral BID    ondansetron (Zofran) 4 MG/2ML injection 4 mg  4 mg Intravenous Q6H PRN    enoxaparin (Lovenox) 40 MG/0.4ML SUBQ injection 40 mg  40 mg Subcutaneous Daily    acetaminophen (Tylenol) tab 650 mg  650 mg Oral Q4H While awake    oxyCODONE immediate release tab 2.5 mg  2.5 mg Oral Q4H PRN    Or    oxyCODONE immediate release tab 5 mg  5 mg Oral Q4H PRN    HYDROmorphone (Dilaudid) 1 MG/ML injection 0.2 mg  0.2 mg Intravenous Q2H PRN    Or    HYDROmorphone (Dilaudid) 1 MG/ML injection 0.4 mg  0.4 mg Intravenous Q2H PRN    famotidine (Pepcid) tab 20 mg  20 mg Oral BID    Or    famotidine (Pepcid) 20 mg/2mL injection 20 mg  20 mg Intravenous BID     Medications Prior to Admission   Medication Sig    furosemide 20 MG Oral Tab Take 1 tablet (20 mg total) by mouth daily.    hydrALAZINE 10 MG Oral Tab Take 1 tablet (10 mg total) by mouth 3 (three) times daily.    insulin aspart (NOVOLOG FLEXPEN) 100 Units/mL Subcutaneous Solution Pen-injector Take 10 units with breakfast  and 10 units lunch    aspirin 81 MG Oral Tab EC Take 1 tablet (81 mg total) by mouth daily.    traMADol 50 MG Oral Tab Take 1 tablet (50 mg total) by mouth every 6 (six) hours as needed for Pain.    ATORVASTATIN 40 MG Oral Tab TAKE 1 TABLET BY MOUTH IN  THE EVENING (Patient taking differently: every morning.)    ELIQUIS 5 MG Oral Tab 2 (two) times daily.    LANTUS SOLOSTAR 100 UNIT/ML Subcutaneous Solution Pen-injector Take 15 units daily    Insulin Lispro, 1 Unit Dial, (HUMALOG KWIKPEN) 100 UNIT/ML Subcutaneous Solution Pen-injector Inject 5 Units into the skin As  Directed. Take 5 units with each meal    metoprolol tartrate 25 MG Oral Tab TAKE ONE tablet daily (Patient taking differently: 2 (two) times daily.)    Glucose Blood (ACCU-CHEK MICK PLUS) In Vitro Strip USE TWICE DAILY    ACCU-CHEK MICK In Vitro Strip Test glucose three times daily.    Insulin Pen Needle (BD PEN NEEDLE MINI U/F) 31G X 5 MM Does not apply Misc AS DIRECTED QID    ACCU-CHEK MULTICLIX LANCETS Does not apply Misc test blood sugar QID as directed       Allergies  Allergies   Allergen Reactions    Metformin Hcl OTHER (SEE COMMENTS)      Oral,   severe dry mouth       Review of Systems:   As by Admitting/Attending    Results:   Laboratory Data:  Lab Results   Component Value Date    WBC 6.1 07/29/2024    HGB 11.3 (L) 07/29/2024    HCT 37.7 (L) 07/29/2024    .0 07/29/2024    CREATSERUM 1.10 07/29/2024    BUN 19 07/29/2024     07/29/2024    K 4.3 07/29/2024     (H) 07/29/2024    CO2 16.0 (L) 07/29/2024     (H) 07/29/2024    CA 7.8 (L) 07/29/2024    ALB 4.0 03/17/2022    ALKPHO 75 03/17/2022    TP 6.1 (L) 03/17/2022    AST 17 03/17/2022    ALT 14 03/17/2022    TSH 1.520 03/17/2022    PSA 0.5 02/22/2012    B12 380 01/13/2020         Imaging:  No results found.    Vital Signs:   Blood pressure 141/53, pulse 75, temperature 98.6 °F (37 °C), temperature source Oral, resp. rate 20, height 5' 9\" (1.753 m), weight 75.8 kg (167 lb), SpO2 98%.    Mental Status Exam:   Appearance: Stated age male, in hospital gown, laying down in hospital bed.  Psychomotor: Patient has been demonstrating restlessness and agitation. Some improvement this morning.  Orientation: Alert and oriented to person, place, date. Patient demonstrating improvement.  Gait: Not evaluated.  Attitude/Coorperation: patient makes effort to cooperate  Behavior: episodes of restlessness and agitation.  Speech: soft, slow  Mood: anxious  Affect: restricted  Thought process: Confused, otherwise improving  Thought content: No  reports of  suicidal or homicidal ideation.  Perceptions: Patient has been demonstrating response to internal stimuli.  Concentration: improving  Memory: improving  Intellect: Average.  Judgment and Insight: Questionable.     Impression:     Delirium due to another medical condition    Preop testing    Bladder cancer (HCC)    Patient is a 84 year old ,  male with past medical history of diabetes, CAD, pacemaker, hypertension, paroxymal a-fib, hyperlipidemia, CVA who was admitted to the hospital for laparoscopic cystoprostatectomy with ileal conduit diversion. The patient has been demonstrating increased confusion, restlessness    The patient has been demonstrating delirium episode with alternation in mood and cognition with episodes of  increased confusion, restlessness, agitation and response to internal stimuli.     7/29/2024: Patient continues to demonstrate alternation in his mood and cognition. He was restless and agitated overnight requiring use of haldol. The patient otherwise demonstrating improvement in his cognition with no restlessness or agitation this morning.    Discussed risk and benefit, acknowledging the current symptom and severity.  At this point, I would recommend the following approach:     Focus on safety  Focus on education and support.  Focus on insight orientation helping the patient understand diagnosis and treatment plan.  Continue zyprexa 2.5 mg nightly  Continue Seroquel 25 mg PRN   Utilize haldol 0.5 mg IV q 6 hours PRN   Processed with patient at length, the initiation of the above psychotropic medications I advised the patient of the risks, benefits, alternatives and potential side effects. The patient consents to administration of the medications and understands the right to refuse medications at any time. The patient verbalized understanding.   Coordinate plan with team    Orders This Visit:  Orders Placed This Encounter   Procedures    Hemoglobin A1C    Basic  Metabolic Panel (8)    CBC With Differential With Platelet    RBC Morphology Scan    Type and screen    ABORH Confirmation    Specimen to Pathology Tissue       Meds This Visit:  Requested Prescriptions      No prescriptions requested or ordered in this encounter       EVERETT Arroyo  7/29/2024    Note to Patient: The 21st Century Cures Act makes medical notes like these available to patients in the interest of transparency. However, be advised this is a medical document. It is intended as peer to peer communication. It is written in medical language and may contain abbreviations or verbiage that are unfamiliar. It may appear blunt or direct. Medical documents are intended to carry relevant information, facts as evident, and the clinical opinion of the practitioner. This note may have been transcribed using a voice dictation system. Voice recognition errors may occur. This should not be taken to alter the content or meaning of this note.

## 2024-07-29 NOTE — PLAN OF CARE
Pt a/o 3, confused @ times with inappropriate speech. KATERINA drain removed per MD order, pt tolerated well. Discussed POC, offered emotional support, family @ BS, call light and belongings with in reach, assessment ongoing.   Problem: Patient Centered Care  Goal: Patient preferences are identified and integrated in the patient's plan of care  Description: Interventions:  - What would you like us to know as we care for you?   - Provide timely, complete, and accurate information to patient/family  - Incorporate patient and family knowledge, values, beliefs, and cultural backgrounds into the planning and delivery of care  - Encourage patient/family to participate in care and decision-making at the level they choose  - Honor patient and family perspectives and choices  Outcome: Progressing     Problem: Patient/Family Goals  Goal: Patient/Family Long Term Goal  Description: Patient's Long Term Goal:     Interventions:  -   - See additional Care Plan goals for specific interventions  Outcome: Progressing  Goal: Patient/Family Short Term Goal  Description: Patient's Short Term Goal:   Interventions:   -   - See additional Care Plan goals for specific interventions  Outcome: Progressing     Problem: Impaired Cognition  Goal: Patient will exhibit improved attention, thought processing and/or memory  Description: Interventions:    Outcome: Progressing     Problem: PAIN - ADULT  Goal: Verbalizes/displays adequate comfort level or patient's stated pain goal  Description: INTERVENTIONS:  - Encourage pt to monitor pain and request assistance  - Assess pain using appropriate pain scale  - Administer analgesics based on type and severity of pain and evaluate response  - Implement non-pharmacological measures as appropriate and evaluate response  - Consider cultural and social influences on pain and pain management  - Manage/alleviate anxiety  - Utilize distraction and/or relaxation techniques  - Monitor for opioid side effects  -  Notify MD/LIP if interventions unsuccessful or patient reports new pain  - Anticipate increased pain with activity and pre-medicate as appropriate  Outcome: Progressing     Problem: SAFETY ADULT - FALL  Goal: Free from fall injury  Description: INTERVENTIONS:  - Assess pt frequently for physical needs  - Identify cognitive and physical deficits and behaviors that affect risk of falls.  - [place] fall precautions as indicated by assessment.  - Educate pt/family on patient safety including physical limitations  - Instruct pt to call for assistance with activity based on assessment  - Modify environment to reduce risk of injury  - Provide assistive devices as appropriate  - Consider OT/PT consult to assist with strengthening/mobility  - Encourage toileting schedule  Outcome: Progressing     Problem: DISCHARGE PLANNING  Goal: Discharge to home or other facility with appropriate resources  Description: INTERVENTIONS:  - Identify barriers to discharge w/pt and caregiver  - Include patient/family/discharge partner in discharge planning  - Arrange for needed discharge resources and transportation as appropriate  - Identify discharge learning needs (meds, wound care, etc)  - Arrange for interpreters to assist at discharge as needed  - Consider post-discharge preferences of patient/family/discharge partner  - Complete POLST form as appropriate  - Assess patient's ability to be responsible for managing their own health  - Refer to Case Management Department for coordinating discharge planning if the patient needs post-hospital services based on physician/LIP order or complex needs related to functional status, cognitive ability or social support system  Outcome: Progressing

## 2024-07-29 NOTE — PROGRESS NOTES
[facility]  part of [facility]     Progress Note    Roc Butcher Patient Status:  Inpatient    1940 MRN [id number]   Location [facility] 4W/SW/SE Attending Ziggy Mitchell, DO   Hosp Day # 4 PCP Mitch Herrera MD     Subjective:  Roc Butcher is a(n) 84 year old male.    Current  complaints: confused    Medications:  Current Facility-Administered Medications   Medication Dose Route Frequency    OLANZapine (ZyPREXA) tab 2.5 mg  2.5 mg Oral Nightly    haloperidol lactate (Haldol) 5 MG/ML injection 0.5 mg  0.5 mg Intravenous Q6H PRN    QUEtiapine (SEROquel) tab 25 mg  25 mg Oral Q8H PRN    atorvastatin (Lipitor) tab 40 mg  40 mg Oral Daily    hydrALAZINE (Apresoline) tab 10 mg  10 mg Oral TID    [Held by provider] insulin degludec (Tresiba) 100 units/mL flextouch 15 Units  15 Units Subcutaneous Daily    glucose (Dex4) 15 GM/59ML oral liquid 15 g  15 g Oral Q15 Min PRN    Or    glucose (Glutose) 40% oral gel 15 g  15 g Oral Q15 Min PRN    Or    glucose-vitamin C (Dex-4) chewable tab 4 tablet  4 tablet Oral Q15 Min PRN    Or    dextrose 50% injection 50 mL  50 mL Intravenous Q15 Min PRN    Or    glucose (Dex4) 15 GM/59ML oral liquid 30 g  30 g Oral Q15 Min PRN    Or    glucose (Glutose) 40% oral gel 30 g  30 g Oral Q15 Min PRN    Or    glucose-vitamin C (Dex-4) chewable tab 8 tablet  8 tablet Oral Q15 Min PRN    insulin aspart (NovoLOG) 100 Units/mL FlexPen 1-11 Units  1-11 Units Subcutaneous TID CC    sennosides (Senokot) tab 17.2 mg  17.2 mg Oral Nightly    docusate sodium (Colace) cap 100 mg  100 mg Oral BID    ondansetron (Zofran) 4 MG/2ML injection 4 mg  4 mg Intravenous Q6H PRN    enoxaparin (Lovenox) 40 MG/0.4ML SUBQ injection 40 mg  40 mg Subcutaneous Daily    acetaminophen (Tylenol) tab 650 mg  650 mg Oral Q4H While awake    oxyCODONE immediate release tab 2.5 mg  2.5 mg Oral Q4H PRN    Or    oxyCODONE immediate release tab 5 mg  5 mg Oral Q4H PRN     HYDROmorphone (Dilaudid) 1 MG/ML injection 0.2 mg  0.2 mg Intravenous Q2H PRN    Or    HYDROmorphone (Dilaudid) 1 MG/ML injection 0.4 mg  0.4 mg Intravenous Q2H PRN    famotidine (Pepcid) tab 20 mg  20 mg Oral BID    Or    famotidine (Pepcid) 20 mg/2mL injection 20 mg  20 mg Intravenous BID     Objective:  /84 (BP Location: Right arm)   Pulse 66   Temp 98.6 °F (37 °C) (Oral)   Resp 20   Ht 5' 9\" (1.753 m)   Wt 167 lb (75.8 kg)   SpO2 100%   BMI 24.66 kg/m²     Intake/Output Summary (Last 24 hours) at 7/29/2024 0956  Last data filed at 7/29/2024 0646  Gross per 24 hour   Intake 2772.98 ml   Output 1040 ml   Net 1732.98 ml       General Appearance: Alert, cooperative,-disoriented  Head: Normocephalic, without obvious abnormality, atraumatic  Lungs: Clear to auscultation bilaterally, respirations unlabored  Abdomen: Soft, non-tender, bowel sounds active all four quadrants, no masses,  no organomegaly-incisions intact  Genitalia: male without lesions, discharge or tenderness  Urine: Clear(w blood) per urostomy            Lab Results   Component Value Date    WBC 6.1 07/29/2024    HGB 11.3 07/29/2024    HCT 37.7 07/29/2024    .0 07/29/2024    CREATSERUM 1.10 07/29/2024    BUN 19 07/29/2024     07/29/2024    K 4.3 07/29/2024     07/29/2024    CO2 16.0 07/29/2024     07/29/2024    CA 7.8 07/29/2024          Assessment:  S.P. Radical Cystectomy w IC    Plan:  -ambulate  -diet  -will have drains removed    Raudel Vega MD  7/29/2024  9:56 AM

## 2024-07-29 NOTE — PROGRESS NOTES
PT PM attempt, Agitation confusion with ongoing diarrhea and limited ability to participate with PT eval. Pt has a 1 on 1 sitter. Pt will continue to follow and progress as pt tolerance and medical progress allows.

## 2024-07-29 NOTE — OCCUPATIONAL THERAPY NOTE
OT order received, chart reviewed. Per conversation with CM, patient remains confused with 1:1 sitter and is not appropriate for OT evaluation today. Will check in tomorrow and f/u when appropriate.    Antelmo Palacios  [facility]  OT Student  ___________________________________________    I provided clinical instruction and supervision for the duration of this session and agree with the above documentation.    Viridiana Moreau OTR/L  [facility]  #[phone]

## 2024-07-30 LAB
ANION GAP SERPL CALC-SCNC: 8 MMOL/L (ref 0–18)
BASOPHILS # BLD AUTO: 0.01 X10(3) UL (ref 0–0.2)
BASOPHILS NFR BLD AUTO: 0.2 %
BUN BLD-MCNC: 26 MG/DL (ref 9–23)
BUN/CREAT SERPL: 22 (ref 10–20)
CALCIUM BLD-MCNC: 7.8 MG/DL (ref 8.7–10.4)
CHLORIDE SERPL-SCNC: 116 MMOL/L (ref 98–112)
CO2 SERPL-SCNC: 20 MMOL/L (ref 21–32)
CREAT BLD-MCNC: 1.18 MG/DL
DEPRECATED RDW RBC AUTO: 70.9 FL (ref 35.1–46.3)
EGFRCR SERPLBLD CKD-EPI 2021: 61 ML/MIN/1.73M2 (ref 60–?)
EOSINOPHIL # BLD AUTO: 0.01 X10(3) UL (ref 0–0.7)
EOSINOPHIL NFR BLD AUTO: 0.2 %
ERYTHROCYTE [DISTWIDTH] IN BLOOD BY AUTOMATED COUNT: 23.4 % (ref 11–15)
GLUCOSE BLD-MCNC: 257 MG/DL (ref 70–99)
GLUCOSE BLDC GLUCOMTR-MCNC: 177 MG/DL (ref 70–99)
GLUCOSE BLDC GLUCOMTR-MCNC: 185 MG/DL (ref 70–99)
GLUCOSE BLDC GLUCOMTR-MCNC: 197 MG/DL (ref 70–99)
GLUCOSE BLDC GLUCOMTR-MCNC: 200 MG/DL (ref 70–99)
HCT VFR BLD AUTO: 31.6 %
HGB BLD-MCNC: 10 G/DL
IMM GRANULOCYTES # BLD AUTO: 0.01 X10(3) UL (ref 0–1)
IMM GRANULOCYTES NFR BLD: 0.2 %
LYMPHOCYTES # BLD AUTO: 0.23 X10(3) UL (ref 1–4)
LYMPHOCYTES NFR BLD AUTO: 5 %
MCH RBC QN AUTO: 27.1 PG (ref 26–34)
MCHC RBC AUTO-ENTMCNC: 31.6 G/DL (ref 31–37)
MCV RBC AUTO: 85.6 FL
MONOCYTES # BLD AUTO: 0.66 X10(3) UL (ref 0.1–1)
MONOCYTES NFR BLD AUTO: 14.2 %
NEUTROPHILS # BLD AUTO: 3.72 X10 (3) UL (ref 1.5–7.7)
NEUTROPHILS # BLD AUTO: 3.72 X10(3) UL (ref 1.5–7.7)
NEUTROPHILS NFR BLD AUTO: 80.2 %
OSMOLALITY SERPL CALC.SUM OF ELEC: 312 MOSM/KG (ref 275–295)
PLATELET # BLD AUTO: 260 10(3)UL (ref 150–450)
POTASSIUM SERPL-SCNC: 4.1 MMOL/L (ref 3.5–5.1)
RBC # BLD AUTO: 3.69 X10(6)UL
SODIUM SERPL-SCNC: 144 MMOL/L (ref 136–145)
WBC # BLD AUTO: 4.6 X10(3) UL (ref 4–11)

## 2024-07-30 PROCEDURE — 99232 SBSQ HOSP IP/OBS MODERATE 35: CPT | Performed by: NURSE PRACTITIONER

## 2024-07-30 RX ORDER — OLANZAPINE 5 MG/1
5 TABLET ORAL NIGHTLY
Status: DISCONTINUED | OUTPATIENT
Start: 2024-07-30 | End: 2024-08-01

## 2024-07-30 NOTE — PLAN OF CARE
Patient drowsy but easily arousable. Garbled speech. More alert and speech less garbled later in the day. Oriented to self. Family at bedside. Eliquis for DVT prophylaxis. PT/OT to got patient to chair, up in chair most of day. Max assist, lift from chair to bed. Tolerating diet with poor appetite. ACHS. Having difficulty with fine motor skills (playing cards, having trouble grabbing at individual cards with bilateral hands). Ileal conduit dressing changed with wound care to teach spouse. Plan for [facility], options presented. Call light within reach, fall precautions in place, patient calling appropriately.     Problem: Patient Centered Care  Goal: Patient preferences are identified and integrated in the patient's plan of care  Description: Interventions:  - What would you like us to know as we care for you?   - Provide timely, complete, and accurate information to patient/family  - Incorporate patient and family knowledge, values, beliefs, and cultural backgrounds into the planning and delivery of care  - Encourage patient/family to participate in care and decision-making at the level they choose  - Honor patient and family perspectives and choices  Outcome: Not Progressing     Problem: PAIN - ADULT  Goal: Verbalizes/displays adequate comfort level or patient's stated pain goal  Description: INTERVENTIONS:  - Encourage pt to monitor pain and request assistance  - Assess pain using appropriate pain scale  - Administer analgesics based on type and severity of pain and evaluate response  - Implement non-pharmacological measures as appropriate and evaluate response  - Consider cultural and social influences on pain and pain management  - Manage/alleviate anxiety  - Utilize distraction and/or relaxation techniques  - Monitor for opioid side effects  - Notify MD/LIP if interventions unsuccessful or patient reports new pain  - Anticipate increased pain with activity and pre-medicate as appropriate  Outcome: Not Progressing      Problem: SAFETY ADULT - FALL  Goal: Free from fall injury  Description: INTERVENTIONS:  - Assess pt frequently for physical needs  - Identify cognitive and physical deficits and behaviors that affect risk of falls.  - [place] fall precautions as indicated by assessment.  - Educate pt/family on patient safety including physical limitations  - Instruct pt to call for assistance with activity based on assessment  - Modify environment to reduce risk of injury  - Provide assistive devices as appropriate  - Consider OT/PT consult to assist with strengthening/mobility  - Encourage toileting schedule  Outcome: Not Progressing     Problem: DISCHARGE PLANNING  Goal: Discharge to home or other facility with appropriate resources  Description: INTERVENTIONS:  - Identify barriers to discharge w/pt and caregiver  - Include patient/family/discharge partner in discharge planning  - Arrange for needed discharge resources and transportation as appropriate  - Identify discharge learning needs (meds, wound care, etc)  - Arrange for interpreters to assist at discharge as needed  - Consider post-discharge preferences of patient/family/discharge partner  - Complete POLST form as appropriate  - Assess patient's ability to be responsible for managing their own health  - Refer to Case Management Department for coordinating discharge planning if the patient needs post-hospital services based on physician/LIP order or complex needs related to functional status, cognitive ability or social support system  Outcome: Not Progressing

## 2024-07-30 NOTE — OCCUPATIONAL THERAPY NOTE
OCCUPATIONAL THERAPY EVALUATION - INPATIENT     Room Number: 454/454-A  Evaluation Date: 7/30/2024  Type of Evaluation: Initial  Presenting Problem: Bladder cancer S/P lap cystoprostatectomy with ileal conduit diversion on 7/25/2024    Physician Order: IP Consult to Occupational Therapy  Reason for Therapy: ADL/IADL Dysfunction and Discharge Planning    OCCUPATIONAL THERAPY ASSESSMENT   Patient is a 84 year old male admitted 7/25/2024 for Bladder cancer S/P lap cystoprostatectomy with ileal conduit diversion on 7/25/2024.  Prior to admission, patient's baseline is per spouse \"independent and gardening\".  Patient is currently functioning below baseline with toileting, bathing, upper body dressing, lower body dressing, bed mobility, transfers, static sitting balance, dynamic sitting balance, static standing balance, dynamic standing balance, maintaining seated position, functional standing tolerance, energy conservation strategies, and aerobic capacity.  Patient is requiring up to Max A - Dep for ADLs; Max ax1-2 for functional mobility and transfers  as a result of the following impairments: cognition, safety, activity tolerance, balance, mobility, endurance, deconditioning. Occupational Therapy will continue to follow for duration of hospitalization.    Patient will benefit from continued skilled OT Services to promote return to prior level of function and safety with continuous assistance and gradual rehabilitative therapy    PLAN  OT Treatment Plan: Balance activities;Energy conservation/work simplification techniques;ADL training;Functional transfer training;Endurance training;Patient/Family education;Patient/Family training;Compensatory technique education  OT Device Recommendations: TBD    OCCUPATIONAL THERAPY MEDICAL/SOCIAL HISTORY   Problem List   Active Problems:    Preop testing    Bladder cancer (HCC)    Delirium due to another medical condition    HOME SITUATION  Type of Home: House  Lives With:  Spouse      SUBJECTIVE  Im ok     OCCUPATIONAL THERAPY EXAMINATION   OBJECTIVE  Precautions: Bed/chair alarm  Fall Risk: High fall risk    WEIGHT BEARING RESTRICTION     PAIN ASSESSMENT  Ratin  Location: Patient did not report any pain  Management Techniques: Activity promotion    COGNITION  Overall Cognitive Status:  Impaired  Arousal/Alertness:  delayed responses to stimuli and inconsistent responses to stimuli  Attention Span:  difficulty attending to directions  Orientation Level:  oriented to person  Memory:  decreased recall of recent events and decreased short term memory  Following Commands:  follows one step commands with increased time and follows one step commands with repetition  Initiation: cues to initiate tasks and hand over hand to initiate tasks  Motor Planning: impaired    RANGE OF MOTION   Upper extremity PROM is within functional limits - could not follow consistent commands for ROM    ACTIVITIES OF DAILY LIVING ASSESSMENT  AM-PAC ‘6-Clicks’ Inpatient Daily Activity Short Form  How much help from another person does the patient currently need…  -   Putting on and taking off regular lower body clothing?: Total  -   Bathing (including washing, rinsing, drying)?: A Lot  -   Toileting, which includes using toilet, bedpan or urinal? : Total  -   Putting on and taking off regular upper body clothing?: A Lot  -   Taking care of personal grooming such as brushing teeth?: A Little  -   Eating meals?: A Little    AM-PAC Score:  Score: 12  Approx Degree of Impairment: 66.57%  Standardized Score (AM-PAC Scale): 30.6  CMS Modifier (G-Code): CL    FUNCTIONAL TRANSFER ASSESSMENT  Sit to Stand: Edge of Bed  Edge of Bed: Maximum Assist  Comment: Assist x2 for SPT to bedside; tolerated standing at chair x1 minute with Max A   BED MOBILITY  Rolling: Maximum Assist  Supine to Sit : Maximum Assist  Sit to Supine (OT): Maximum Assist  Scooting: Max A    BALANCE ASSESSMENT  Static Sitting: Moderate Assist  Static  Standing: Maximum Assist  Comment: Mod- Max A for sustaining balance; L lateral and posterior lean; multiple LOB with correction by OT.     FUNCTIONAL ADL ASSESSMENT  Eating: Minimal Assist  Grooming Seated: Minimal Assist  Bathing Seated: Maximum Assist  UB Dressing Seated: Minimal Assist  LB Dressing Seated: Maximum Assist  Toileting Seated: Dependent  Patient not interested in engagement of ADLs- based on clinical observation.        Skilled Therapy Provided: Transfer training, activity promotion and pacing, reorientation; x2 person assist for bed mobiltiy; assist to sustain balance; multiple LOB noted l lateral and posteriorly; completed SPT with MAx A X2; tolerated static standing at chair; provided assist for toilet hygiene. Left up in chair with all needs in reach; stable at exit. Continue skilled occupational therapy while IP to maximize patient function and increase patient participation, safety, and independence with basic ADL and everyday activities.       EDUCATION PROVIDED  Patient: Role of Occupational Therapy; Plan of Care; Discharge Recommendations  Patient's Response to Education: Demonstrates Disinterest    The patient's Approx Degree of Impairment: 66.57% has been calculated based on documentation in the [facility] '6 clicks' Inpatient Daily Activity Short Form.  Research supports that patients with this level of impairment may benefit from GR.  Final disposition will be made by interdisciplinary medical team.    Patient End of Session: Up in chair;Needs met;Call light within reach;RN aware of session/findings;All patient questions and concerns addressed;Alarm set;Family present    OT Goals  Patient self-stated goal is: no goals stated      Patient will complete dressing with Min A  Comment:     Patient will complete toilet transfer with Min A  Comment:     Patient will complete self care task at sink level with Min A    Comment:     Comment:         Goals  on: 8/15  Frequency:3-5x week      Patient Evaluation Complexity Level:   Occupational Profile/Medical History MODERATE - Expanded review of history including review of medical or therapy record   Specific performance deficits impacting engagement in ADL/IADL MODERATE  3 - 5 performance deficits   Client Assessment/Performance Deficits MODERATE - Comorbidities and min to mod modifications of tasks    Clinical Decision Making MODERATE - Analysis of occupational profile, detailed assessments, several treatment options    Overall Complexity MODERATE     OT Session Time: 23 minutes  Self-Care Home Management: 8 minutes  Therapeutic Activity: 15 minutes

## 2024-07-30 NOTE — CM/SW NOTE
JASEN was notified by nsg that PT OT worked w/ pt and he is now in need of rehab, wife is agreeable to plan     CM req PT OT notes asap, DSC to send ref and start ins auth.     CM will do PASRR, CLRC and obtain choice when list is available.     Sitter removed at this time, will need 24 hrs w/o sitter, choice and ins auth to go to [facility].     1330  CM met w/ pt/wife at bedside and son Nick by phone. Informed of potential dc tomorrow and Anticipated therapy need: Gradual Rehabilitative Therapy. [facility] list will be avail by 3:30pm today. Wife and son are agreeable, pt is confused unable to participate in discussion.    CM informed that choice will be needed by tm if medically cleared and ins auth approved in order to proceed.    Wife became very anxious stating she can't make  choice overnight. Son stated \"if we don't have a choice by tomorrow we have the right to appeal and that will get us another day or 2 to make a choice\".    CM confirmed the right to appeal if medically cleared for dc and they do not agree he is medically ready. Appeal is not intended to delay dc date.    CM informed RN and Cookie APN of above intent to appeal if [facility] choice is not secured by dc date.    7/31 1100  Ins auth approved pend PAC.    Per Dr Mitchell pt is not clear for dc today. Still with delirium.    CM called son with upd.    Son sts they are touring facilities today, req ref be sent to [place]. CM added to ref and extended deadline.    CM informed son that it will be better to send ref to preferred SNFs and tour only the facilities that respond that they can accept pt. Son vu.     Son req to speak with MD and Psych, CM sent secure chat to notify    8/1 0900  Called son Nick for choice, he asked CM to discuss w/ pts wife and his brother who will be at bedside after 1200. Nick feels they are leaning toward [place] or [facility]    1400  Met with wife, no son at bedside. New [facility] list given. No choice at this  time.    Auth approved thru  [id number]  CM notified MD/RN that auth will  today. If not dc cleared, Pt will need new PT OT notes & new auth started on Monday    1415  CM spoke with son re choice. They do not have a final decision. They want to wait until closer to dc in the event some of the facilities that declined due to no occupancy may then have an open bed.    Wife asking about a [facility] in [place]...does not know the name.    CM sent ref to several facilities in [place] and req son to provide name of preferred SNFs for f/u.    CM explained ins auth exp at 12mn on 8/3. We can proceed if med clear and facility from current list is selected. If not then will need new auth on Monday.  Son teo.    CM updated Brian rep with above     1400  Clinically not ready for additional dc planning due to medical needs.    Ref upd and deadline extended.    Will need choice,  PT OT and ins auth closer to when dc ready    Plan  [facility] pending choice and auth    / to remain available for support and/or discharge planning.     Tatyana Rajan, EVARISTO    Ext [phone]

## 2024-07-30 NOTE — PROGRESS NOTES
[facility] AND CARE   Progress Note  -  Roc Butcher Patient Status:  Inpatient    1940 MRN [id number]   Location [facility] 4W/SW/SE Attending Ziggy Mitchell, DO   Hosp Day # 5 PCP Mitch Herrera MD     PCP: Mitch Herrera MD      SEE ATTENDING NOTE AT BOTTOM OF PAGE    Is this a shared or split note between Advanced Practice Provider and Physician? Yes    Assessment and Plan:  Roc Butcher is a 84 year old male with PMH sig for type 2 diabetes, CAD status post PCI to left circumflex, PAD, pacemaker, hypertension, paroxysmal A-fib on Eliquis baseline, and chronic systolic heart failure with a EF of 35% hyperlipidemia, prior CVA, and bladder cancer who presented for laparoscopic cystoprostatectomy with ileal conduit diversion.  Post op course with complicated by delirium, see below for details      Delirium   -zypexa nightly, dose increased  -prn seroquel and zyprexa  -sitter discontinued   -as per psych      Bladder cancer S/P lap cystoprostatectomy with ileal conduit diversion on 2024  -pain meds-scheduled tylenol  -bowel regimen prn  -urostomy teaching   -as per urology, appt with urology      CAD status post PCI to L Cfx  PAD  S/P pacemaker  Hypertension,  PAF  Chronic HFrEF -35%   HL  -Not on ACE ARB due to renal function and hypotension.   -Continue hydralazin, eliquis, hydralazine, lopressor and imdur  -holding home ASA for now  -holding lasix with decreased po   -follows with [facility]      -DM Type II  -HgbA1C 6.6  -home regimen: lantus 15 units nightly plus novolog   -SSI prn  -accuchecks  -ADA diet     GOC  -full code per chart  -POA wife     MA/ACO [place]  -ER Visits : 0  -Admissions : 3  -Re- Entry: no   -Consults: urology and psych   -Discharge Needs: TBD  -Appointments: [ ] PCP Mitch Herrera MD     FEN  -lytes in am  -diet-LFD    Prophy  -SCD  -eliquis     Dispo  -pending clinical coarse  PCP: Mitch Herrera MD      Concerns regarding plan  of care were discussed with patient. Patient agrees with plan as detailed above. Discussed plan of care with Dr. Mitchell     Note: This chart was prepared using voice recognition software and may contain unintended word substitution errors.        Cookie Hugo RN, NP   Critical access hospital[facility] and Care Hospitalist Team  Contact via [employer] (Check Availability)    SUBJECTIVE:   Pt calm in bed. Thinks he is in [place]. Not sure why he is in the hospital. Wife at bedside. Per wife pt is improved but still not his normal self, she states \"he tends to yell like his father did\". She also states pt does not each much as he was told by his endocrinologist to not each much with DM. Wife wants to get washed up. Sitter still at BS, pt needed 1 dose of haldol and 1 dose of seroquel last night. PT and OT has not yet worked with the patient      OBJECTIVE:   Blood pressure 128/53, pulse 75, temperature 97.7 °F (36.5 °C), temperature source Axillary, resp. rate 18, height 5' 9\" (1.753 m), weight 167 lb (75.8 kg), SpO2 98%.    GENERAL: no apparent distress  NEURO: A/A Ox1  RESP: non labored, CTA  CARDIO: Regular, no murmur  ABD: soft, NT, ND, BS+  : ileal conduit  EXTREMITIES: no edema    DIAGNOSTIC DATA:   Labs:     Recent Labs   Lab 07/26/24  0605 07/27/24  0944 07/28/24  0649 07/29/24  0634 07/30/24  0626   WBC 12.8* 8.5 8.6 6.1 4.6   HGB 10.8* 10.6* 10.5* 11.3* 10.0*   MCV 86.1 87.8 86.5 90.4 85.6   .0 213.0 198.0 256.0 260.0       Recent Labs   Lab 07/26/24  0605 07/27/24  0944 07/28/24  0649 07/29/24  0634 07/30/24  0626    139 145 145 144   K 4.1 4.1 3.6 4.3 4.1    111 114* 115* 116*   CO2 22.0 24.0 24.0 16.0* 20.0*   BUN 19 17 15 19 26*   CREATSERUM 1.29 1.13 1.00 1.10 1.18   CA 8.4* 8.4* 8.0* 7.8* 7.8*   * 113* 80 188* 257*       No results for input(s): \"ALT\", \"AST\", \"ALB\", \"AMYLASE\", \"LIPASE\", \"LDH\" in the last 168 hours.    Invalid input(s): \"ALPHOS\", \"TBIL\", \"DBIL\", \"TPROT\"    Recent Labs    Lab 07/28/24 2053 07/29/24  0952 07/29/24  1734 07/29/24  2034 07/30/24  1002   PGLU 103* 171* 178* 237* 197*       No results for input(s): \"TROP\" in the last 168 hours.        MEDICATIONS       OLANZapine  5 mg Oral Nightly    isosorbide dinitrate  5 mg Oral TID    metoprolol tartrate  25 mg Oral 2x Daily(Beta Blocker)    apixaban  5 mg Oral BID    atorvastatin  40 mg Oral Daily    hydrALAZINE  10 mg Oral TID    [Held by provider] insulin degludec  15 Units Subcutaneous Daily    insulin aspart  1-11 Units Subcutaneous TID CC    acetaminophen  650 mg Oral Q4H While awake    famotidine  20 mg Oral BID         docusate sodium    haloperidol lactate    QUEtiapine    glucose **OR** glucose **OR** glucose-vitamin C **OR** dextrose **OR** glucose **OR** glucose **OR** glucose-vitamin C    ondansetron    Cookie Hugo, JOAN      IMAGING     No results found.      SEE ATTENDING NOTE BELOW:     Patient seen and examined. Agree with documentation as above in  NP note     Subjective: appears to be less confused today. Did eat breakfast. He is not agitated or restless.        Vitals:    07/30/24 1002   BP: 128/53   Pulse: 75   Resp: 18   Temp:    Gen: no distress  Lungs: Clear  Heart: RRR  Abdomen: KATERINA drain , soft   Legs: no edema      A/P     Laparoscopic cystoprostatectomy with ileal conduit diversion on 7/25/24  -post op monitoring  -complicated by severe delirium   -mobilize and ambulate  -drain removal per urology, drain removed on 7/29     Delirium  -appreciate psychiatry consult  -symptoms do seem improved   -on zyprexa, and seroquel PRN     Continue discharge planning     Shirar Paula [facility] Hospitalist

## 2024-07-30 NOTE — CM/SW NOTE
Submitted clinical via [email or web address] portal.  [employer] Case/Auth ID is [id number].  Final insurance authorization is pending at this time.      SW/CM assigned to the case will continue to follow auth status.      Pilar Esposito, DSC        Addendum:     OT note sent via portal, Brian Weiss.    PT/OT sent via portal    er

## 2024-07-30 NOTE — PROGRESS NOTES
07/30/24 1100   Wound 07/25/24 Abdomen Anterior;Mid   Date First Assessed/Time First Assessed: 07/25/24 1323   Primary Wound Type: Incision  Location: Abdomen  Wound Location Orientation: Anterior;Mid  Wound Description (Comments): PORT SITES X 3, MEDIAL INCISION X 1   Dressing Status Clean;Dry;Intact   Urostomy Ileal conduit RLQ   Placement Date: 07/25/24   Inserted by: Dr Jeong  Urostomy Type: Ileal conduit  Location: RLQ  Stoma Size (cm): 1 cm  Appliance Size: 1 3/4   Stomal Appliance 2 piece;Intact  (pt's spouse to change the appliance)   Stomal Assessment Moist;Edema;Budding;Red  (2 stents in place)   Peristomal Assessment Intact;Pink   Treatment Appliance change;Bag change;Site care  (changed by spouse Pat w/ assist)   Dressing Change Due 08/06/24   Output (mL) 10 mL  (tan yellow, mucous)   Wound Follow Up   Follow up needed Yes     Ostomy Care Services  Follow up to continue teaching the pt's spouse Pat ileal conduit care, pouch emptying and appliance change. The pt. was unable to assist his spouse, he could not follow commands and had his eyes closed a lot. Dr. Garibay was into see the pt. The pt's spouse removed the nath pouch and removed the appliance, she was careful with the stents that are intact and draining. With assist she cleansed the skin, applied the skin prep. measured the stoma and she needed assist molding out the flange, she snapped the appliance together. , She needed assist applying the appliance with the stents. She did well for her first time. She has no questions and I recommended while he is here she practice with me again. She noted \" he's going to rehab\". I still encouraged the teaching for when he gets home. PT is at the bedside, he pt. needed 2+ assist up to the chair. Spoke with the nurse. I will continue to ask Pat his spouse to come in for additional teaching.

## 2024-07-30 NOTE — PROGRESS NOTES
[facility]  Urology Progress Note    Roc Butcher Patient Status:  Inpatient    1940 MRN [id number]   Location [facility] 4W/SW/SE Attending Ziggy Mitchell, DO   Hosp Day # 5 PCP Mitch Herrera MD     Subjective:  Roc Butcher is a(n) 84 year old male.    Hospital course to date: POD #5 robotic assisted radical cystectomy, PLND, and ileal conduit    Current complaints: The patient states he has no pain. He is not really eating.    Objective:  General appearance: alert, cooperative, and no distress  Blood pressure 128/53, pulse 75, temperature 97.7 °F (36.5 °C), temperature source Axillary, resp. rate 18, height 5' 9\" (1.753 m), weight 167 lb (75.8 kg), SpO2 98%.  Lungs: Respirations non labored.  Abdomen: Soft, mildly distended. Incisions clean and dry.  : Stoma pink and viable. Urine yellow.  Lab Results   Component Value Date    WBC 4.6 2024    HGB 10.0 2024    HCT 31.6 2024    .0 2024    CREATSERUM 1.18 2024    BUN 26 2024     2024    K 4.1 2024     2024    CO2 20.0 2024     2024    CA 7.8 2024    PGLU 197 2024     Assessment:  Bladder cancer POD #5 robotic assisted radical cystectomy, PLND, and ileal conduit    Plan:  He is doing reasonably well. He will need to be eating more before he is ready for discharge.     Nathan Garibay MD  2024  11:16 AM

## 2024-07-30 NOTE — PROGRESS NOTES
Patient is a 84 year old ,  male with past medical history of diabetes, CAD, pacemaker, hypertension, paroxymal a-fib, hyperlipidemia, CVA who was admitted to the hospital for laparoscopic cystoprostatectomy with ileal conduit diversion. The patient has been demonstrating increased confusion, restlessness.Patient indicated for psych consult for evaluation and advise.  Consult Duration   The patient seen for over 50-minute, follow-up evaluation, over 50% counseling and coordinating care addressing  confusion, restlessness.  Record reviewed, communication with attending, communication with RN and patient seen face to face evaluation.    History of Present Illness:     Communicating with the team, the patient continues to demonstrate alternation in his mood and cognition with episodes of increased anxiety and restlessness.     The patient received the following psychotropic medications: Zyprexa 2.5 mg    The patient seen today laying in hospital bed. The patient presents anxious and restless. No use of restraints.     The patient is alert and oriented to person, he notes that it is  June, 2024. Patient is confused this morning and demonstrating some response to internal stimuli.     The patient has been demonstrating delirium episode with alternation in mood and cognition with episodes of  increased confusion, restlessness, agitation and response to internal stimuli.     Past Psychiatric/Medication History:  1. Prior diagnoses: none reported  2. Past psychiatric inpatient: none reported  3. Past outpatient history: none reported  4. Past suicide history: none reported  5. Medication history: none reported    Social History:   Patient has been  for 64 years. He lives at home with his wife. He has two adult children.  Retired 24 years ago. He worked for [employer]     No alcohol, tobacco, cannabis or illicit     Family History:  None reported  Medical History:   Past Medical History  Past  Medical History:    Arrhythmia    Cancer (HCC)    bladder    Cataract    High blood pressure    High cholesterol    History of blood transfusion    HYPERLIPIDEMIA    HYPERTENSION    Other and unspecified hyperlipidemia    STROKE    Type II or unspecified type diabetes mellitus without mention of complication, not stated as uncontrolled    Unspecified essential hypertension    Visual impairment       Past Surgical History  Past Surgical History:   Procedure Laterality Date    Cardiac pacemaker placement      Other surgical history      bladder tumor removal    Tonsillectomy         Family History  Family History   Problem Relation Age of Onset    Heart Disorder Father     Other Father         HIP FX    Heart Disorder Mother     Other Mother         CVA AGE 80       Social History  Social History     Socioeconomic History    Marital status:    Tobacco Use    Smoking status: Former     Types: Cigarettes    Smokeless tobacco: Never    Tobacco comments:     2010   Vaping Use    Vaping status: Never Used   Substance and Sexual Activity    Alcohol use: Yes     Comment: HEAVY PREVIOUS NOW SOCIAL    Drug use: No     Social Determinants of Health     Financial Resource Strain: Low Risk  (5/30/2024)    Received from [facility]    Financial Resource Strain     In the past year, have you or any family members you live with been unable to get any of the following when it was really needed? Check all that apply.: None   Food Insecurity: No Food Insecurity (7/25/2024)    Food Insecurity     Food Insecurity: Never true   Transportation Needs: No Transportation Needs (7/25/2024)    Transportation Needs     Lack of Transportation: No   Physical Activity: High Risk (5/3/2024)    Received from [facility]    Exercise Vital Sign     On average, how many days per week do you engage in moderate to strenuous exercise (like a brisk walk)?: 0 days     On average, how many minutes do you engage in exercise at this  level?: 0 min   Social Connections: Medium Risk (5/30/2024)    Received from [facility]    Social Connections     How often do you see or talk to people that you care about and feel close to? (For example: talking to friends on the phone, visiting friends or family, going to Buddhism or club meetings): 1 or 2 times a week   Housing Stability: Low Risk  (7/25/2024)    Housing Stability     Housing Instability: No           Current Medications:  Current Facility-Administered Medications   Medication Dose Route Frequency    isosorbide dinitrate (Isordil Titradose) tab 5 mg  5 mg Oral TID    metoprolol tartrate (Lopressor) tab 25 mg  25 mg Oral 2x Daily(Beta Blocker)    docusate sodium (Colace) cap 100 mg  100 mg Oral BID PRN    apixaban (Eliquis) tab 5 mg  5 mg Oral BID    OLANZapine (ZyPREXA) tab 2.5 mg  2.5 mg Oral Nightly    haloperidol lactate (Haldol) 5 MG/ML injection 0.5 mg  0.5 mg Intravenous Q6H PRN    QUEtiapine (SEROquel) tab 25 mg  25 mg Oral Q8H PRN    atorvastatin (Lipitor) tab 40 mg  40 mg Oral Daily    hydrALAZINE (Apresoline) tab 10 mg  10 mg Oral TID    [Held by provider] insulin degludec (Tresiba) 100 units/mL flextouch 15 Units  15 Units Subcutaneous Daily    glucose (Dex4) 15 GM/59ML oral liquid 15 g  15 g Oral Q15 Min PRN    Or    glucose (Glutose) 40% oral gel 15 g  15 g Oral Q15 Min PRN    Or    glucose-vitamin C (Dex-4) chewable tab 4 tablet  4 tablet Oral Q15 Min PRN    Or    dextrose 50% injection 50 mL  50 mL Intravenous Q15 Min PRN    Or    glucose (Dex4) 15 GM/59ML oral liquid 30 g  30 g Oral Q15 Min PRN    Or    glucose (Glutose) 40% oral gel 30 g  30 g Oral Q15 Min PRN    Or    glucose-vitamin C (Dex-4) chewable tab 8 tablet  8 tablet Oral Q15 Min PRN    insulin aspart (NovoLOG) 100 Units/mL FlexPen 1-11 Units  1-11 Units Subcutaneous TID CC    ondansetron (Zofran) 4 MG/2ML injection 4 mg  4 mg Intravenous Q6H PRN    acetaminophen (Tylenol) tab 650 mg  650 mg Oral Q4H While  awake    famotidine (Pepcid) tab 20 mg  20 mg Oral BID     Medications Prior to Admission   Medication Sig    isosorbide dinitrate 5 MG Oral Tab Take 1 tablet (5 mg total) by mouth 3 (three) times daily.    furosemide 20 MG Oral Tab Take 1 tablet (20 mg total) by mouth daily.    hydrALAZINE 10 MG Oral Tab Take 1 tablet (10 mg total) by mouth 3 (three) times daily.    insulin aspart (NOVOLOG FLEXPEN) 100 Units/mL Subcutaneous Solution Pen-injector Take 10 units with breakfast  and 10 units lunch    aspirin 81 MG Oral Tab EC Take 1 tablet (81 mg total) by mouth daily.    traMADol 50 MG Oral Tab Take 1 tablet (50 mg total) by mouth every 6 (six) hours as needed for Pain.    ATORVASTATIN 40 MG Oral Tab TAKE 1 TABLET BY MOUTH IN  THE EVENING (Patient taking differently: every morning.)    ELIQUIS 5 MG Oral Tab 2 (two) times daily.    LANTUS SOLOSTAR 100 UNIT/ML Subcutaneous Solution Pen-injector Take 15 units daily    Insulin Lispro, 1 Unit Dial, (HUMALOG KWIKPEN) 100 UNIT/ML Subcutaneous Solution Pen-injector Inject 5 Units into the skin As Directed. Take 5 units with each meal    metoprolol tartrate 25 MG Oral Tab TAKE ONE tablet daily (Patient taking differently: 2 (two) times daily.)    Glucose Blood (ACCU-CHEK MICK PLUS) In Vitro Strip USE TWICE DAILY    ACCU-CHEK MICK In Vitro Strip Test glucose three times daily.    Insulin Pen Needle (BD PEN NEEDLE MINI U/F) 31G X 5 MM Does not apply Misc AS DIRECTED QID    ACCU-CHEK MULTICLIX LANCETS Does not apply Misc test blood sugar QID as directed       Allergies  Allergies   Allergen Reactions    Metformin Hcl OTHER (SEE COMMENTS)      Oral,   severe dry mouth       Review of Systems:   As by Admitting/Attending    Results:   Laboratory Data:  Lab Results   Component Value Date    WBC 4.6 07/30/2024    HGB 10.0 (L) 07/30/2024    HCT 31.6 (L) 07/30/2024    .0 07/30/2024    CREATSERUM 1.18 07/30/2024    BUN 26 (H) 07/30/2024     07/30/2024    K 4.1 07/30/2024      (H) 07/30/2024    CO2 20.0 (L) 07/30/2024     (H) 07/30/2024    CA 7.8 (L) 07/30/2024    ALB 4.0 03/17/2022    ALKPHO 75 03/17/2022    TP 6.1 (L) 03/17/2022    AST 17 03/17/2022    ALT 14 03/17/2022    TSH 1.520 03/17/2022    PSA 0.5 02/22/2012    B12 380 01/13/2020         Imaging:  No results found.    Vital Signs:   Blood pressure 126/51, pulse 90, temperature 97.7 °F (36.5 °C), temperature source Axillary, resp. rate 18, height 5' 9\" (1.753 m), weight 75.8 kg (167 lb), SpO2 98%.    Mental Status Exam:   Appearance: Stated age male, in hospital gown, laying down in hospital bed.  Psychomotor: Patient has been demonstrating restlessness and agitation. Some improvement this morning.  Orientation: Alert and oriented to person, place, date. Patient demonstrating improvement.  Gait: Not evaluated.  Attitude/Coorperation: patient makes effort to cooperate  Behavior: episodes of restlessness and agitation.  Speech: soft, slow  Mood: anxious  Affect: restricted  Thought process: Confused, otherwise improving  Thought content: No reports of  suicidal or homicidal ideation.  Perceptions: Patient has been demonstrating response to internal stimuli.  Concentration: improving  Memory: improving  Intellect: Average.  Judgment and Insight: Questionable.     Impression:     Delirium due to another medical condition    Preop testing    Bladder cancer (HCC)    Patient is a 84 year old ,  male with past medical history of diabetes, CAD, pacemaker, hypertension, paroxymal a-fib, hyperlipidemia, CVA who was admitted to the hospital for laparoscopic cystoprostatectomy with ileal conduit diversion. The patient has been demonstrating increased confusion, restlessness    The patient has been demonstrating delirium episode with alternation in mood and cognition with episodes of  increased confusion, restlessness, agitation and response to internal stimuli.     7/29/2024: Patient continues to demonstrate  alternation in his mood and cognition. He was restless and agitated overnight requiring use of haldol. The patient otherwise demonstrating improvement in his cognition with no restlessness or agitation this morning.    7/30/2024: patient continues to demonstrate confusion and restlessness. No recent use of restraints. No recent use of PRN haldol.     Discussed risk and benefit, acknowledging the current symptom and severity.  At this point, I would recommend the following approach:     Focus on safety  Focus on education and support.  Focus on insight orientation helping the patient understand diagnosis and treatment plan.  Increase zyprexa to 5 mg nightly  Continue Seroquel 25 mg PRN   Utilize haldol 0.5 mg IV q 6 hours PRN   Processed with patient at length, the initiation of the above psychotropic medications I advised the patient of the risks, benefits, alternatives and potential side effects. The patient consents to administration of the medications and understands the right to refuse medications at any time. The patient verbalized understanding.   Coordinate plan with team    Orders This Visit:  Orders Placed This Encounter   Procedures    Hemoglobin A1C    RBC Morphology Scan    Basic Metabolic Panel (8)    CBC With Differential With Platelet    Type and screen    ABORH Confirmation    Specimen to Pathology Tissue       Meds This Visit:  Requested Prescriptions      No prescriptions requested or ordered in this encounter       EVERETT Arroyo  7/30/2024    Note to Patient: The 21st Century Cures Act makes medical notes like these available to patients in the interest of transparency. However, be advised this is a medical document. It is intended as peer to peer communication. It is written in medical language and may contain abbreviations or verbiage that are unfamiliar. It may appear blunt or direct. Medical documents are intended to carry relevant information, facts as evident, and the clinical opinion of  the practitioner. This note may have been transcribed using a voice dictation system. Voice recognition errors may occur. This should not be taken to alter the content or meaning of this note.

## 2024-07-30 NOTE — PLAN OF CARE
Patient is alert to self only and confused. Reoriented as needed. Room air. Vital signs stable. Ileal conduit in place and draining to nath bag. Old LLQ KATERINA site with gauze and tape. Incisions clean/dry/intact. Pain managed with scheduled tylenol. LFS diet. Safety precautions in place. Sitter at bedside continued.     Problem: Patient Centered Care  Goal: Patient preferences are identified and integrated in the patient's plan of care  Description: Interventions:  - Provide timely, complete, and accurate information to patient/family  - Incorporate patient and family knowledge, values, beliefs, and cultural backgrounds into the planning and delivery of care  - Encourage patient/family to participate in care and decision-making at the level they choose  - Honor patient and family perspectives and choices  Outcome: Progressing     Problem: Impaired Cognition  Goal: Patient will exhibit improved attention, thought processing and/or memory  Description: Interventions:  - Minimize distractions in the room when full attention is required  - Allow additional time for processing after asking questions or providing instructions  Outcome: Progressing     Problem: PAIN - ADULT  Goal: Verbalizes/displays adequate comfort level or patient's stated pain goal  Description: INTERVENTIONS:  - Encourage pt to monitor pain and request assistance  - Assess pain using appropriate pain scale  - Administer analgesics based on type and severity of pain and evaluate response  - Implement non-pharmacological measures as appropriate and evaluate response  - Consider cultural and social influences on pain and pain management  - Manage/alleviate anxiety  - Utilize distraction and/or relaxation techniques  - Monitor for opioid side effects  - Notify MD/LIP if interventions unsuccessful or patient reports new pain  - Anticipate increased pain with activity and pre-medicate as appropriate  Outcome: Progressing     Problem: SAFETY ADULT -  FALL  Goal: Free from fall injury  Description: INTERVENTIONS:  - Assess pt frequently for physical needs  - Identify cognitive and physical deficits and behaviors that affect risk of falls.  - [place] fall precautions as indicated by assessment.  - Educate pt/family on patient safety including physical limitations  - Instruct pt to call for assistance with activity based on assessment  - Modify environment to reduce risk of injury  - Provide assistive devices as appropriate  - Consider OT/PT consult to assist with strengthening/mobility  - Encourage toileting schedule  Outcome: Progressing     Problem: DISCHARGE PLANNING  Goal: Discharge to home or other facility with appropriate resources  Description: INTERVENTIONS:  - Identify barriers to discharge w/pt and caregiver  - Include patient/family/discharge partner in discharge planning  - Arrange for needed discharge resources and transportation as appropriate  - Identify discharge learning needs (meds, wound care, etc)  - Arrange for interpreters to assist at discharge as needed  - Consider post-discharge preferences of patient/family/discharge partner  - Complete POLST form as appropriate  - Assess patient's ability to be responsible for managing their own health  - Refer to Case Management Department for coordinating discharge planning if the patient needs post-hospital services based on physician/LIP order or complex needs related to functional status, cognitive ability or social support system  Outcome: Progressing

## 2024-07-30 NOTE — PHYSICAL THERAPY NOTE
PHYSICAL THERAPY EVALUATION - INPATIENT     Room Number: 454/454-A  Evaluation Date: 7/30/2024  Type of Evaluation: Initial   Physician Order: PT Eval and Treat    Presenting Problem: Bladder CA, (s/p laposcopitc prostatectomy)  Co-Morbidities : CAD, DM, bladder CA  Reason for Therapy: Mobility Dysfunction and Discharge Planning    PHYSICAL THERAPY ASSESSMENT   Patient is a 84 year old male admitted 7/25/2024 for bladder CA, laparoscopic cystoprostatectomy with ileal conduit diversion.  Prior to admission, patient's baseline is lives with spouse indep with all mobility and ADL's most recently at SNF with assist for all mobility and ADL's.  Patient is currently functioning below baseline with bed mobility, transfers, gait, stair negotiation, maintaining seated position, standing prolonged periods, and performing household tasks.  Patient is requiring moderate assist as a result of the following impairments: decreased functional strength, decreased endurance/aerobic capacity, pain, impaired sitting / standing balance, impaired coordination, impaired motor planning, decreased muscular endurance, difficulty maintaining precautions, cognitive deficits (A/O x 1 confused intermittent agitation), and medical status.  Physical Therapy will continue to follow for duration of hospitalization.    Patient will benefit from continued skilled PT Services to promote return to prior level of function and safety with continuous assistance and gradual rehabilitative therapy .    PLAN  PT Treatment Plan: Bed mobility;Body mechanics;Endurance;Energy conservation;Patient education;Gait training;Range of motion;Strengthening;Transfer training;Balance training  Rehab Potential : Fair  Frequency (Obs): 3-5x/week    PHYSICAL THERAPY MEDICAL/SOCIAL HISTORY   History related to current admission: Roc Butcher is a 84 year old male with PMH sig for type 2 diabetes, CAD status post PCI to left circumflex, PAD, pacemaker, hypertension,  paroxysmal A-fib on Eliquis baseline, and chronic systolic heart failure with a EF of 35% hyperlipidemia, prior CVA, and bladder cancer who presented for laparoscopic cystoprostatectomy with ileal conduit diversion. Post op course with complicated by delirium, see below for details      Problem List  Active Problems:    Preop testing    Bladder cancer (HCC)    Delirium due to another medical condition      HOME SITUATION  Home Situation  Type of Home: House  Lives With: Spouse  Patient Owned Equipment: Rolling walker     Prior Level of [place]: Lives with supportive spouse intermittent assist with RW. Most recently in SNF with assist for all mobility and ADL's increased confusion.     SUBJECTIVE  I am ok in the chair.     PHYSICAL THERAPY EXAMINATION   OBJECTIVE  Precautions: Abdominal protective strategies;Bed/chair alarm;Drain(s)  Fall Risk: High fall risk    WEIGHT BEARING RESTRICTION  Weight Bearing Restriction: None                PAIN ASSESSMENT  Rating: Unable to rate  Location: [facility]  Management Techniques: Activity promotion;Body mechanics;Breathing techniques;Relaxation;Repositioning    COGNITION  Overall Cognitive Status:  Impaired  A/O x 1 confused distractible limited ability to follow single task verbal cues  RANGE OF MOTION AND STRENGTH ASSESSMENT  Upper extremity ROM and strength are within functional limits   Lower extremity ROM is within functional limits   Lower extremity strength is within functional limits 3-/5    BALANCE  Static Sitting: Fair -  Dynamic Sitting: Poor +  Static Standing: Poor +  Dynamic Standing: Poor    ACTIVITY TOLERANCE  Pulse: 77  Heart Rate Source: Monitor  Resp: 18  BP: 128/53  BP Location: Right arm  BP Method: Automatic  Patient Position: Sitting    O2 WALK  Oxygen Therapy  SPO2% Ambulation on Room Air: 98    AM-PAC '6-Clicks' INPATIENT SHORT FORM - BASIC MOBILITY  How much difficulty does the patient currently have...  Patient Difficulty: Turning over in bed  (including adjusting bedclothes, sheets and blankets)?: A Lot   Patient Difficulty: Sitting down on and standing up from a chair with arms (e.g., wheelchair, bedside commode, etc.): A Lot   Patient Difficulty: Moving from lying on back to sitting on the side of the bed?: A Lot   How much help from another person does the patient currently need...   Help from Another: Moving to and from a bed to a chair (including a wheelchair)?: A Lot   Help from Another: Need to walk in hospital room?: A Lot   Help from Another: Climbing 3-5 steps with a railing?: Total     AM-PAC Score:  Raw Score: 11   Approx Degree of Impairment: 72.57%   Standardized Score (AM-PAC Scale): 33.86   CMS Modifier (G-Code): CL    FUNCTIONAL ABILITY STATUS  Functional Mobility/Gait Assessment  Gait Assistance: Moderate assistance  Distance (ft): 3  Assistive Device:  (SPT to chair)  Rolling: moderate assist  Supine to Sit: moderate assist  Sit to Supine: moderate assist  Sit to Stand: moderate assist    Exercise/Education Provided:  Energy conservation    Skilled Therapy Provided: Pt ed with bed mobility and transfers with mod a staff assist x 2 provided for optimal pt and staff safety. Pt presents as confused and distractible with poor safety awareness and judgement. Pt assisted to a chair for improved socialization and orientation with spouse present to encourage communication. Pt presents with an elevated fall risk >90% on [place] balance scale. Spouse appears overwhelmed regarding the patients current functional deficits and needs unable to care for him at his current level of care in the home. IP rehab gradual rehab is recommended to regain his maximal PLOF and safety. Pt's interest and willingness to participate are primary limiting factors and psych is following up to assist with behavioral deficits. PMH is significant for bladder CA progressive in nature and palliative and comfort care measures should also be considered to reduce burden of care  and to emphasize quality of life.     The patient's Approx Degree of Impairment: 72.57% has been calculated based on documentation in the [facility] '6 clicks' Inpatient Basic Mobility Short Form.  Research supports that patients with this level of impairment may benefit from IP Rehab with PT, OT.  Final disposition will be made by interdisciplinary medical team.    Patient End of Session: With  staff;Up in chair;Needs met;Call light within reach;RN aware of session/findings;All patient questions and concerns addressed;Alarm set;Family present    CURRENT GOALS  Goals to be met by: 8/20/2024  Patient Goal Patient's self-stated goal is: Return home    Goal #1 Patient is able to demonstrate supine - sit EOB @ level: minimum assistance     Goal #1   Current Status    Goal #2 Patient is able to demonstrate transfers Sit to/from Stand at assistance level: minimum assistance with walker - rolling     Goal #2  Current Status    Goal #3 Patient is able to ambulate 30 feet with assist device: walker - rolling at assistance level: minimum assistance   Goal #3   Current Status    Goal #4    Goal #4   Current Status    Goal #5 Patient to demonstrate independence with home activity/exercise instructions provided to patient in preparation for discharge.   Goal #5   Current Status    Goal #6    Goal #6  Current Status      Patient Evaluation Complexity Level:  History Low - no personal factors and/or co-morbidities   Examination of body systems Low -  addressing 1-2 elements   Clinical Presentation Low- Stable   Clinical Decision Making  Low Complexity     Gait Training: 10 minutes

## 2024-07-31 ENCOUNTER — APPOINTMENT (OUTPATIENT)
Dept: CT IMAGING | Facility: HOSPITAL | Age: 84
End: 2024-07-31
Attending: NURSE PRACTITIONER
Payer: MEDICARE

## 2024-07-31 LAB
ANION GAP SERPL CALC-SCNC: 7 MMOL/L (ref 0–18)
BASOPHILS # BLD AUTO: 0.02 X10(3) UL (ref 0–0.2)
BASOPHILS NFR BLD AUTO: 0.4 %
BUN BLD-MCNC: 25 MG/DL (ref 9–23)
BUN/CREAT SERPL: 19.2 (ref 10–20)
CALCIUM BLD-MCNC: 8 MG/DL (ref 8.7–10.4)
CHLORIDE SERPL-SCNC: 118 MMOL/L (ref 98–112)
CO2 SERPL-SCNC: 20 MMOL/L (ref 21–32)
CREAT BLD-MCNC: 1.3 MG/DL
DEPRECATED RDW RBC AUTO: 72.4 FL (ref 35.1–46.3)
EGFRCR SERPLBLD CKD-EPI 2021: 54 ML/MIN/1.73M2 (ref 60–?)
EOSINOPHIL # BLD AUTO: 0.2 X10(3) UL (ref 0–0.7)
EOSINOPHIL NFR BLD AUTO: 4.2 %
ERYTHROCYTE [DISTWIDTH] IN BLOOD BY AUTOMATED COUNT: 23.3 % (ref 11–15)
GLUCOSE BLD-MCNC: 139 MG/DL (ref 70–99)
GLUCOSE BLDC GLUCOMTR-MCNC: 145 MG/DL (ref 70–99)
GLUCOSE BLDC GLUCOMTR-MCNC: 153 MG/DL (ref 70–99)
GLUCOSE BLDC GLUCOMTR-MCNC: 158 MG/DL (ref 70–99)
HCT VFR BLD AUTO: 33.3 %
HGB BLD-MCNC: 10.5 G/DL
IMM GRANULOCYTES # BLD AUTO: 0.03 X10(3) UL (ref 0–1)
IMM GRANULOCYTES NFR BLD: 0.6 %
LYMPHOCYTES # BLD AUTO: 0.52 X10(3) UL (ref 1–4)
LYMPHOCYTES NFR BLD AUTO: 10.9 %
MCH RBC QN AUTO: 27.5 PG (ref 26–34)
MCHC RBC AUTO-ENTMCNC: 31.5 G/DL (ref 31–37)
MCV RBC AUTO: 87.2 FL
MONOCYTES # BLD AUTO: 0.85 X10(3) UL (ref 0.1–1)
MONOCYTES NFR BLD AUTO: 17.8 %
NEUTROPHILS # BLD AUTO: 3.16 X10 (3) UL (ref 1.5–7.7)
NEUTROPHILS # BLD AUTO: 3.16 X10(3) UL (ref 1.5–7.7)
NEUTROPHILS NFR BLD AUTO: 66.1 %
OSMOLALITY SERPL CALC.SUM OF ELEC: 307 MOSM/KG (ref 275–295)
PLATELET # BLD AUTO: 265 10(3)UL (ref 150–450)
POTASSIUM SERPL-SCNC: 4.3 MMOL/L (ref 3.5–5.1)
RBC # BLD AUTO: 3.82 X10(6)UL
SODIUM SERPL-SCNC: 145 MMOL/L (ref 136–145)
WBC # BLD AUTO: 4.8 X10(3) UL (ref 4–11)

## 2024-07-31 PROCEDURE — 70450 CT HEAD/BRAIN W/O DYE: CPT | Performed by: NURSE PRACTITIONER

## 2024-07-31 RX ORDER — ACETAMINOPHEN 325 MG/1
650 TABLET ORAL EVERY 8 HOURS PRN
Status: DISCONTINUED | OUTPATIENT
Start: 2024-07-31 | End: 2024-08-12

## 2024-07-31 NOTE — PROGRESS NOTES
[facility] AND CARE   Progress Note  -  Roc Butcher Patient Status:  Inpatient    1940 MRN [id number]   Location [facility] 4W/SW/SE Attending Ziggy Mitchell, DO   Hosp Day # 6 PCP Mitch Herrera MD     PCP: Mitch Herrera MD      SEE ATTENDING NOTE AT BOTTOM OF PAGE    Is this a shared or split note between Advanced Practice Provider and Physician? Yes    Assessment and Plan:  Roc Butcher is a 84 year old male with PMH sig for type 2 diabetes, CAD status post PCI to left circumflex, PAD, pacemaker, hypertension, paroxysmal A-fib on Eliquis baseline, and chronic systolic heart failure with a EF of 35% hyperlipidemia, prior CVA, and bladder cancer who presented for laparoscopic cystoprostatectomy with ileal conduit diversion.  Post op course with complicated by delirium, seen by psych.  Incidental finding on CT of the head was a right parotid mass measuring 1.6 cm with broad differential and radiology recommended ENT evaluation with strong consideration for histological  sampling for definitive characterization.  Plans for subacute rehab at discharge once cognitive state improved see below for details      Delirium   -No evidence to suggest stroke based on clinical exam, CT of the head without acute process but noted previous stroke  -zypexa nightly, dose increased  -prn seroquel   -as per psych     Right Parotid Mass  -noted on CT head   -Partially imaged ovoid 1.6 cm low-attenuation mass in the right parotid gland.   -outpt ENT eval for histological sampling to r/o neoplasm     Bladder cancer S/P lap cystoprostatectomy with ileal conduit diversion on 2024  -pain meds-change to prn tylenol  -bowel regimen prn  -urostomy teaching   -as per urology, appt with urology      CAD status post PCI to L Cfx  PAD  S/P pacemaker  Hypertension,  PAF  Chronic HFrEF -35%   HL  Previous CVA  -CT head with old infarcts- noted on previous imaging  -Not on ACE ARB due to renal function  and hypotension.   -Continue hydralazin, eliquis, hydralazine, lopressor and imdur  -holding home ASA for now  -holding lasix with decreased po   -follows with [facility]      -DM Type II  -HgbA1C 6.6  -home regimen: lantus 15 units nightly plus novolog   -SSI prn  -accuchecks  -ADA diet     GOC  -full code per chart  -POA wife     MA/ACO [place]  -ER Visits 2024: 0  -Admissions 2024: 3  -Re- Entry: no   -Consults: urology and psych   -Discharge Needs: QUENTIN  -Appointments: follow up PCP Mitch Herrera MD after discharge from rehab      [facility] in am  -diet-LFD    Prophy  -SCD  -eliquis     Dispo  -pending clinical coarse  -7/31 Update given to Nick via phone.  He would like to meet with Dr. Machado and psych today  PCP: Mitch Herrera MD      Concerns regarding plan of care were discussed with patient. Patient agrees with plan as detailed above. Discussed plan of care with Dr. Mitchell     Note: This chart was prepared using voice recognition software and may contain unintended word substitution errors.        Cookie Hugo RN, NP   [facility] Hospitalist Team  Contact via Perfect Serve and Bubble (Check Availability)    SUBJECTIVE:   No family at bedside.  Patient calm in bed.  He is speaking to me more clearly but I am unable to understand his flow of conversation.  He is on Zyprexa.  His last dose of Seroquel was yesterday afternoon at 1:25 PM      OBJECTIVE:   Blood pressure 107/41, pulse 79, temperature 97.6 °F (36.4 °C), temperature source Axillary, resp. rate 20, height 5' 9\" (1.753 m), weight 167 lb (75.8 kg), SpO2 98%.    GENERAL: no apparent distress  NEURO: A/A   RESP: non labored, CTA  CARDIO: Regular, no murmur  ABD: soft, NT, ND, BS+  : ileal conduit  EXTREMITIES: no edema    DIAGNOSTIC DATA:   Labs:     Recent Labs   Lab 07/27/24  0944 07/28/24  0649 07/29/24  0634 07/30/24  0626 07/31/24  0539   WBC 8.5 8.6 6.1 4.6 4.8   HGB 10.6* 10.5* 11.3* 10.0* 10.5*   MCV 87.8 86.5 90.4  85.6 87.2   .0 198.0 256.0 260.0 265.0       Recent Labs   Lab 07/27/24  0944 07/28/24  0649 07/29/24  0634 07/30/24  0626 07/31/24  0508    145 145 144 145   K 4.1 3.6 4.3 4.1 4.3    114* 115* 116* 118*   CO2 24.0 24.0 16.0* 20.0* 20.0*   BUN 17 15 19 26* 25*   CREATSERUM 1.13 1.00 1.10 1.18 1.30   CA 8.4* 8.0* 7.8* 7.8* 8.0*   * 80 188* 257* 139*       No results for input(s): \"ALT\", \"AST\", \"ALB\", \"AMYLASE\", \"LIPASE\", \"LDH\" in the last 168 hours.    Invalid input(s): \"ALPHOS\", \"TBIL\", \"DBIL\", \"TPROT\"    Recent Labs   Lab 07/30/24  1002 07/30/24  1213 07/30/24  1616 07/30/24  2109 07/31/24  0807   PGLU 197* 177* 200* 185* 153*       No results for input(s): \"TROP\" in the last 168 hours.        MEDICATIONS       OLANZapine  5 mg Oral Nightly    isosorbide dinitrate  5 mg Oral TID    metoprolol tartrate  25 mg Oral 2x Daily(Beta Blocker)    apixaban  5 mg Oral BID    atorvastatin  40 mg Oral Daily    hydrALAZINE  10 mg Oral TID    insulin aspart  1-11 Units Subcutaneous TID CC    acetaminophen  650 mg Oral Q4H While awake    famotidine  20 mg Oral BID         docusate sodium    QUEtiapine    glucose **OR** glucose **OR** glucose-vitamin C **OR** dextrose **OR** glucose **OR** glucose **OR** glucose-vitamin C    ondansetron    Cookie Hugo NP      IMAGING     CT BRAIN OR HEAD (CPT=70450)    Result Date: 7/31/2024  CONCLUSION:  1. No acute intracranial process by noncontrast CT technique. If symptoms or suspicion for acute ischemia persist, consider followup brain MR. 2. 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  There is also a chronic-appearing lacunar infarct in the right cerebellum. 3. Intracranial atherosclerosis. 4. 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  Differential considerations for parotid gland masses are broad and include both benign and malignant primary neoplasms as well as metastatic disease.  Suggest  nonemergent otolaryngology evaluation of this finding with strong consideration of histologic sampling for definitive characterization.    elm-remote  Dictated by (CST): Maulik Craig MD on 7/31/2024 at 8:56 AM     Finalized by (CST): Maulik Craig MD on 7/31/2024 at 9:00 AM             SEE ATTENDING NOTE BELOW:     Patient seen and examined. Agree with documentation as above in  NP note     Subjective: appears confused.        Vitals:    07/31/24 0655   BP: 107/41   Pulse:    Resp:    Temp:    Gen: no distress  Lungs: Clear  Heart: RRR  Abdomen: KATERINA drain , soft   Legs: no edema      A/P     Laparoscopic cystoprostatectomy with ileal conduit diversion on 7/25/24  -post op monitoring  -complicated by severe delirium   -mobilize and ambulate  -drain removal per urology, drain removed on 7/29     Delirium  -appreciate psychiatry consult  -worse today, need to continue to monitor for improvement in delirium   -on zyprexa, and seroquel PRN  -discussed with family in detail normal course of delirium and risks of prolonged hospitalization due to this condition      Needs improvement in delirium before discharge to SNF, will monitor day to day.   Discussed with wife and son at bedside, they were appreciative of visit.      Ziggy Ivy Hospitalist

## 2024-07-31 NOTE — CONGREGATE LIVING REVIEW
[facility] Living Authorization    The [facility] Review Committee has reviewed this case and the patient IS APPROVED for discharge to a facility for Short Term Skilled once the following procedure is followed:     - The physician discharge instructions (contained within the TONIE note for SNF) must inlcude the below appropriate and approved COVID instructions to the facility    For questions regarding CLRC approval process, please contact the CM assigned to the case.  For questions regarding RN discharge workflow, please contact the unit Clinical Leader.

## 2024-07-31 NOTE — PROGRESS NOTES
07/31/24 1000   Urostomy Ileal conduit RLQ   Placement Date: 07/25/24   Inserted by: Dr Jeong  Urostomy Type: Ileal conduit  Location: RLQ  Stoma Size (cm): 1 cm  Appliance Size: 1 3/4   Stomal Appliance 2 piece;Intact   Stomal Assessment Budding;Pink;Red  (2 stents intact)   Peristomal Assessment LUIS E   Dressing Change Due 08/06/24     Ostomy Care  Following up on the pt. he is resting in bed, ileal conduit appliance intact. No family here at this time. Pt. is not teachable due to confusion. I will continue to follow and teach the family when here, the pt's spouse changed the appliance yesterday with some assist. I gave her written step by step instructions , supply order numbers and some supplies for home and reviewed these with her yesterday. Spoke with the nurse. If Pat the pt.'s spouse visits today I will ask her to change the appliance again today for practice.

## 2024-07-31 NOTE — PROGRESS NOTES
[facility] Urology   Progress Note  Roc Butcher Patient Status:  Inpatient    1940 MRN [id number]   Location [facility] 4W/SW/SE Attending Ziggy Mitchell, DO   Hosp Day # 6 PCP Mitch Herrera MD     Subjective:  Remains confused; no issues over night     Objective:  Blood pressure 152/79, pulse 79, temperature 97.6 °F (36.4 °C), temperature source Oral, resp. rate 20, height 5' 9\" (1.753 m), weight 167 lb (75.8 kg), SpO2 98%.  General appearance: confused   Lungs: Unlabored respirations  Abdomen: Soft, non-tender, not distended, stoma pink and healthy   Extremities: no edema, no calf pain  : urine clear     Lab Results   Component Value Date    WBC 4.8 2024    HGB 10.5 2024    HCT 33.3 2024    .0 2024    CREATSERUM 1.30 2024    BUN 25 2024     2024    K 4.3 2024     2024    CO2 20.0 2024     2024    CA 8.0 2024    PGLU 185 2024       Intake/Output Summary (Last 24 hours) at 2024 0634  Last data filed at 2024 0000  Gross per 24 hour   Intake 200 ml   Output 210 ml   Net -10 ml       Assessment:    Bladder ca - POD 6, s/p robotic assisted lap radical cystoprostatectomy, plnd and ileal conduit diversion   Post operative delirium     Plan:    Progressing well, except confusion/delirium (etiology likely multifactorial)   Advance diet as tolerated  PT/OT following, Wound care / stoma teaching   Continue supportive care   Will need to address dietary needs if continues with poor PO intake     Mauricio Farrar MD  [facility]  [phone]  6:34 AM

## 2024-07-31 NOTE — PLAN OF CARE
Pt awake, orientated x 2, impulsive, able to follow simple commands, slurred speech, confused. Pt with CT completed, pt  back in room. Discussed POC, offered emotional support, instructed pt to use call light for assistance, call light and belongings with in reach, bed alarm on, assessment ongoing. Family @ BS.   Problem: Patient Centered Care  Goal: Patient preferences are identified and integrated in the patient's plan of care  Description: Interventions:  - What would you like us to know as we care for you? From home with wife  - Provide timely, complete, and accurate information to patient/family  - Incorporate patient and family knowledge, values, beliefs, and cultural backgrounds into the planning and delivery of care  - Encourage patient/family to participate in care and decision-making at the level they choose  - Honor patient and family perspectives and choices  Outcome: Progressing     Problem: Patient/Family Goals  Goal: Patient/Family Long Term Goal  Description: Patient's Long Term Goal:     Interventions:  -   - See additional Care Plan goals for specific interventions  Outcome: Progressing  Goal: Patient/Family Short Term Goal  Description: Patient's Short Term Goal:     Interventions:   -   - See additional Care Plan goals for specific interventions  Outcome: Progressing     Problem: Impaired Cognition  Goal: Patient will exhibit improved attention, thought processing and/or memory  Description: Interventions:    Outcome: Progressing     Problem: PAIN - ADULT  Goal: Verbalizes/displays adequate comfort level or patient's stated pain goal  Description: INTERVENTIONS:  - Encourage pt to monitor pain and request assistance  - Assess pain using appropriate pain scale  - Administer analgesics based on type and severity of pain and evaluate response  - Implement non-pharmacological measures as appropriate and evaluate response  - Consider cultural and social influences on pain and pain management  -  Manage/alleviate anxiety  - Utilize distraction and/or relaxation techniques  - Monitor for opioid side effects  - Notify MD/LIP if interventions unsuccessful or patient reports new pain  - Anticipate increased pain with activity and pre-medicate as appropriate  Outcome: Progressing     Problem: SAFETY ADULT - FALL  Goal: Free from fall injury  Description: INTERVENTIONS:  - Assess pt frequently for physical needs  - Identify cognitive and physical deficits and behaviors that affect risk of falls.  - Martin fall precautions as indicated by assessment.  - Educate pt/family on patient safety including physical limitations  - Instruct pt to call for assistance with activity based on assessment  - Modify environment to reduce risk of injury  - Provide assistive devices as appropriate  - Consider OT/PT consult to assist with strengthening/mobility  - Encourage toileting schedule  Outcome: Progressing     Problem: DISCHARGE PLANNING  Goal: Discharge to home or other facility with appropriate resources  Description: INTERVENTIONS:  - Identify barriers to discharge w/pt and caregiver  - Include patient/family/discharge partner in discharge planning  - Arrange for needed discharge resources and transportation as appropriate  - Identify discharge learning needs (meds, wound care, etc)  - Arrange for interpreters to assist at discharge as needed  - Consider post-discharge preferences of patient/family/discharge partner  - Complete POLST form as appropriate  - Assess patient's ability to be responsible for managing their own health  - Refer to Case Management Department for coordinating discharge planning if the patient needs post-hospital services based on physician/LIP order or complex needs related to functional status, cognitive ability or social support system  Outcome: Progressing     Problem: RISK FOR INFECTION - ADULT  Goal: Absence of fever/infection during anticipated neutropenic period  Description:  INTERVENTIONS  - Monitor WBC  - Administer growth factors as ordered  - Implement neutropenic guidelines  Outcome: Progressing     Problem: CARDIOVASCULAR - ADULT  Goal: Maintains optimal cardiac output and hemodynamic stability  Description: INTERVENTIONS:  - Monitor vital signs, rhythm, and trends  - Monitor for bleeding, hypotension and signs of decreased cardiac output  - Evaluate effectiveness of vasoactive medications to optimize hemodynamic stability  - Monitor arterial and/or venous puncture sites for bleeding and/or hematoma  - Assess quality of pulses, skin color and temperature  - Assess for signs of decreased coronary artery perfusion - ex. Angina  - Evaluate fluid balance, assess for edema, trend weights  Outcome: Progressing  Goal: Absence of cardiac arrhythmias or at baseline  Description: INTERVENTIONS:  - Continuous cardiac monitoring, monitor vital signs, obtain 12 lead EKG if indicated  - Evaluate effectiveness of antiarrhythmic and heart rate control medications as ordered  - Initiate emergency measures for life threatening arrhythmias  - Monitor electrolytes and administer replacement therapy as ordered  Outcome: Progressing     Problem: GASTROINTESTINAL - ADULT  Goal: Minimal or absence of nausea and vomiting  Description: INTERVENTIONS:  - Maintain adequate hydration with IV or PO as ordered and tolerated  - Nasogastric tube to low intermittent suction as ordered  - Evaluate effectiveness of ordered antiemetic medications  - Provide nonpharmacologic comfort measures as appropriate  - Advance diet as tolerated, if ordered  - Obtain nutritional consult as needed  - Evaluate fluid balance  Outcome: Progressing  Goal: Maintains or returns to baseline bowel function  Description: INTERVENTIONS:  - Assess bowel function  - Maintain adequate hydration with IV or PO as ordered and tolerated  - Evaluate effectiveness of GI medications  - Encourage mobilization and activity  - Obtain nutritional consult  as needed  - Establish a toileting routine/schedule  - Consider collaborating with pharmacy to review patient's medication profile  Outcome: Progressing     Problem: GENITOURINARY - ADULT  Goal: Absence of urinary retention  Description: INTERVENTIONS:  - Assess patient’s ability to void and empty bladder  - Monitor intake/output and perform bladder scan as needed  - Follow urinary retention protocol/standard of care  - Consider collaborating with pharmacy to review patient's medication profile  - Implement strategies to promote bladder emptying  Outcome: Progressing     Problem: METABOLIC/FLUID AND ELECTROLYTES - ADULT  Goal: Electrolytes maintained within normal limits  Description: INTERVENTIONS:  - Monitor labs and rhythm and assess patient for signs and symptoms of electrolyte imbalances  - Administer electrolyte replacement as ordered  - Monitor response to electrolyte replacements, including rhythm and repeat lab results as appropriate  - Fluid restriction as ordered  - Instruct patient on fluid and nutrition restrictions as appropriate  Outcome: Progressing     Problem: SKIN/TISSUE INTEGRITY - ADULT  Goal: Skin integrity remains intact  Description: INTERVENTIONS  - Assess and document risk factors for pressure ulcer development  - Assess and document skin integrity  - Monitor for areas of redness and/or skin breakdown  - Initiate interventions, skin care algorithm/standards of care as needed  Outcome: Progressing  Goal: Incision(s), wounds(s) or drain site(s) healing without S/S of infection  Description: INTERVENTIONS:  - Assess and document risk factors for pressure ulcer development  - Assess and document skin integrity  - Assess and document dressing/incision, wound bed, drain sites and surrounding tissue  - Implement wound care per orders  - Initiate isolation precautions as appropriate  - Initiate Pressure Ulcer prevention bundle as indicated  Outcome: Progressing     Problem: HEMATOLOGIC -  ADULT  Goal: Maintains hematologic stability  Description: INTERVENTIONS  - Assess for signs and symptoms of bleeding or hemorrhage  - Monitor labs and vital signs for trends  - Administer supportive blood products/factors, fluids and medications as ordered and appropriate  - Administer supportive blood products/factors as ordered and appropriate  Outcome: Progressing  Goal: Free from bleeding injury  Description: (Example usage: patient with low platelets)  INTERVENTIONS:  - Avoid intramuscular injections, enemas and rectal medication administration  - Ensure safe mobilization of patient  - Hold pressure on venipuncture sites to achieve adequate hemostasis  - Assess for signs and symptoms of internal bleeding  - Monitor lab trends  - Patient is to report abnormal signs of bleeding to staff  - Avoid use of toothpicks and dental floss  - Use electric shaver for shaving  - Use soft bristle tooth brush  - Limit straining and forceful nose blowing  Outcome: Progressing     Problem: MUSCULOSKELETAL - ADULT  Goal: Return mobility to safest level of function  Description: INTERVENTIONS:  - Assess patient stability and activity tolerance for standing, transferring and ambulating w/ or w/o assistive devices  - Assist with transfers and ambulation using safe patient handling equipment as needed  - Ensure adequate protection for wounds/incisions during mobilization  - Obtain PT/OT consults as needed  - Advance activity as appropriate  - Communicate ordered activity level and limitations with patient/family  Outcome: Progressing  Goal: Maintain proper alignment of affected body part  Description: INTERVENTIONS:  - Support and protect limb and body alignment per provider's orders  - Instruct and reinforce with patient and family use of appropriate assistive device and precautions (e.g. spinal or hip dislocation precautions)  Outcome: Progressing     Problem: NEUROLOGICAL - ADULT  Goal: Achieves stable or improved neurological  status  Description: INTERVENTIONS  - Assess for and report changes in neurological status  - Initiate measures to prevent increased intracranial pressure  - Maintain blood pressure and fluid volume within ordered parameters to optimize cerebral perfusion and minimize risk of hemorrhage  - Monitor temperature, glucose, and sodium. Initiate appropriate interventions as ordered  Outcome: Progressing     Problem: Impaired Communication  Goal: Patient will achieve maximal communication potential  Description: Interventions:    Outcome: Progressing     Problem: Safety Risk - Non-Violent Restraints  Goal: Patient will remain free from self-harm  Description: INTERVENTIONS:  - Apply the least restrictive restraint to prevent harm  - Notify patient and family of reasons restraints applied  - Assess for any contributing factors to confusion (electrolyte disturbances, delirium, medications)  - Discontinue any unnecessary medical devices as soon as possible  - Assess the patient's physical comfort, circulation, skin condition, hydration, nutrition and elimination needs   - Reorient and redirection as needed  - Assess for the need to continue restraints  Outcome: Progressing

## 2024-07-31 NOTE — PROGRESS NOTES
07/31/24 1310   Urostomy Ileal conduit RLQ   Placement Date: 07/25/24   Inserted by: Dr Jeong  Urostomy Type: Ileal conduit  Location: RLQ  Stoma Size (cm): 1 cm  Appliance Size: 1 3/4   Stomal Appliance 2 piece;Intact  (pt's spouse changed for teaching)   Stomal Assessment Moist;Edema;Pink;Red;Budding  (1 inch stoma)   Peristomal Assessment Intact;Pink   Treatment Appliance change;Bag change;Site care   Dressing Change Due 08/07/24   Output (mL) 25 mL   Wound Follow Up   Follow up needed Yes     Ostomy care  Received a call from the pt's nurse that the spouse and son are here, I scheduled teaching at 11 am, with her. I came down to continue ostomy education with his spouse Pat. The pt. is unable to care for himself or the ileal conduit. The spouse changed the appliance, his son left the room. She is better today with less reminders of the steps of care. The appliance is on and intact 2 1/4 inch 2 piece and skin prep. All questions answered. The plan is for the pt. to discharge to a rehab facility. Spoke with the nurse.

## 2024-07-31 NOTE — PLAN OF CARE
RN called [employer] and was informed that pt has a MRI compatiable pacemaker, with surescan process and 1.5T-3T scan. [employer]-[id number] serial # [id number]

## 2024-07-31 NOTE — CM/SW NOTE
Department  notified care team of Brian approval, see below.    Assigned SW/CM to follow up with patient/family on discharge plan.       7/31-- [facility] ID [id number], approved 7/31 to 8/2.    Pilar Esposito, DSC

## 2024-07-31 NOTE — PLAN OF CARE
Roc is drowsy and oriented x1, confused, impulsive, video monitoring, bed alarm in place, can be agitated and irritable at times. Speech is garbled and a bit slurred. V paced with pacemaker. On Eliquis, on RA. Crushed pills with apple sauce or thin liquid of choice. ACHS blood glucose monitoring overnight. Incontinent. Has ileoconduit in place to nath bag, monitoring I&Os. Max assist, repos as breanne in bed. Hygiene care provided prn. Incisions in place, dermabond and open to air. Pain managed with scheduled Tylenol. IVF TKO. Plan for [facility] pending choice, plan pending course, safety measures in place, call light within reach.     0645: This RN and PCT came into patient room in the morning to reposition and give patient morning meds. Patient was even more confused, restless, agitated, and combative. Patient was non-compliant, yelling at stuff, and trying to get out of bed. Patient was on all four limbs in bed and rolling around. This RN notified charged nurses for assistance. This RN page MD regarding situation. This RN for patient safety initiated posey restraints and order is in for safety of patient. CT of brain ordered by MD. Endorsed care to day shift RN. Safety measures in place.     Problem: Patient Centered Care  Goal: Patient preferences are identified and integrated in the patient's plan of care  Description: Interventions:  - What would you like us to know as we care for you? From home with wife  - Provide timely, complete, and accurate information to patient/family  - Incorporate patient and family knowledge, values, beliefs, and cultural backgrounds into the planning and delivery of care  - Encourage patient/family to participate in care and decision-making at the level they choose  - Honor patient and family perspectives and choices  Outcome: Progressing     Problem: Patient/Family Goals  Goal: Patient/Family Long Term Goal  Description: Patient's Long Term Goal:     Interventions:  -   - See  additional Care Plan goals for specific interventions  Outcome: Progressing  Goal: Patient/Family Short Term Goal  Description: Patient's Short Term Goal:     Interventions:   -   - See additional Care Plan goals for specific interventions  Outcome: Progressing     Problem: Impaired Cognition  Goal: Patient will exhibit improved attention, thought processing and/or memory  Description: Interventions:  - Minimize distractions in the room when full attention is required  Outcome: Progressing     Problem: PAIN - ADULT  Goal: Verbalizes/displays adequate comfort level or patient's stated pain goal  Description: INTERVENTIONS:  - Encourage pt to monitor pain and request assistance  - Assess pain using appropriate pain scale  - Administer analgesics based on type and severity of pain and evaluate response  - Implement non-pharmacological measures as appropriate and evaluate response  - Consider cultural and social influences on pain and pain management  - Manage/alleviate anxiety  - Utilize distraction and/or relaxation techniques  - Monitor for opioid side effects  - Notify MD/LIP if interventions unsuccessful or patient reports new pain  - Anticipate increased pain with activity and pre-medicate as appropriate  Outcome: Progressing     Problem: SAFETY ADULT - FALL  Goal: Free from fall injury  Description: INTERVENTIONS:  - Assess pt frequently for physical needs  - Identify cognitive and physical deficits and behaviors that affect risk of falls.  - Wilson fall precautions as indicated by assessment.  - Educate pt/family on patient safety including physical limitations  - Instruct pt to call for assistance with activity based on assessment  - Modify environment to reduce risk of injury  - Provide assistive devices as appropriate  - Consider OT/PT consult to assist with strengthening/mobility  - Encourage toileting schedule  Outcome: Progressing     Problem: DISCHARGE PLANNING  Goal: Discharge to home or other  facility with appropriate resources  Description: INTERVENTIONS:  - Identify barriers to discharge w/pt and caregiver  - Include patient/family/discharge partner in discharge planning  - Arrange for needed discharge resources and transportation as appropriate  - Identify discharge learning needs (meds, wound care, etc)  - Arrange for interpreters to assist at discharge as needed  - Consider post-discharge preferences of patient/family/discharge partner  - Complete POLST form as appropriate  - Assess patient's ability to be responsible for managing their own health  - Refer to Case Management Department for coordinating discharge planning if the patient needs post-hospital services based on physician/LIP order or complex needs related to functional status, cognitive ability or social support system  Outcome: Progressing     Problem: RISK FOR INFECTION - ADULT  Goal: Absence of fever/infection during anticipated neutropenic period  Description: INTERVENTIONS  - Monitor WBC  - Administer growth factors as ordered  - Implement neutropenic guidelines  Outcome: Progressing     Problem: CARDIOVASCULAR - ADULT  Goal: Maintains optimal cardiac output and hemodynamic stability  Description: INTERVENTIONS:  - Monitor vital signs, rhythm, and trends  - Monitor for bleeding, hypotension and signs of decreased cardiac output  - Evaluate effectiveness of vasoactive medications to optimize hemodynamic stability  - Monitor arterial and/or venous puncture sites for bleeding and/or hematoma  - Assess quality of pulses, skin color and temperature  - Assess for signs of decreased coronary artery perfusion - ex. Angina  - Evaluate fluid balance, assess for edema, trend weights  Outcome: Progressing  Goal: Absence of cardiac arrhythmias or at baseline  Description: INTERVENTIONS:  - Continuous cardiac monitoring, monitor vital signs, obtain 12 lead EKG if indicated  - Evaluate effectiveness of antiarrhythmic and heart rate control  medications as ordered  - Initiate emergency measures for life threatening arrhythmias  - Monitor electrolytes and administer replacement therapy as ordered  Outcome: Progressing     Problem: GASTROINTESTINAL - ADULT  Goal: Minimal or absence of nausea and vomiting  Description: INTERVENTIONS:  - Maintain adequate hydration with IV or PO as ordered and tolerated  - Nasogastric tube to low intermittent suction as ordered  - Evaluate effectiveness of ordered antiemetic medications  - Provide nonpharmacologic comfort measures as appropriate  - Advance diet as tolerated, if ordered  - Obtain nutritional consult as needed  - Evaluate fluid balance  Outcome: Progressing  Goal: Maintains or returns to baseline bowel function  Description: INTERVENTIONS:  - Assess bowel function  - Maintain adequate hydration with IV or PO as ordered and tolerated  - Evaluate effectiveness of GI medications  - Encourage mobilization and activity  - Obtain nutritional consult as needed  - Establish a toileting routine/schedule  - Consider collaborating with pharmacy to review patient's medication profile  Outcome: Progressing     Problem: GENITOURINARY - ADULT  Goal: Absence of urinary retention  Description: INTERVENTIONS:  - Assess patient’s ability to void and empty bladder  - Monitor intake/output and perform bladder scan as needed  - Follow urinary retention protocol/standard of care  - Consider collaborating with pharmacy to review patient's medication profile  - Implement strategies to promote bladder emptying  Outcome: Progressing     Problem: METABOLIC/FLUID AND ELECTROLYTES - ADULT  Goal: Electrolytes maintained within normal limits  Description: INTERVENTIONS:  - Monitor labs and rhythm and assess patient for signs and symptoms of electrolyte imbalances  - Administer electrolyte replacement as ordered  - Monitor response to electrolyte replacements, including rhythm and repeat lab results as appropriate  - Fluid restriction as  ordered  - Instruct patient on fluid and nutrition restrictions as appropriate  Outcome: Progressing     Problem: SKIN/TISSUE INTEGRITY - ADULT  Goal: Skin integrity remains intact  Description: INTERVENTIONS  - Assess and document risk factors for pressure ulcer development  - Assess and document skin integrity  - Monitor for areas of redness and/or skin breakdown  - Initiate interventions, skin care algorithm/standards of care as needed  Outcome: Progressing  Goal: Incision(s), wounds(s) or drain site(s) healing without S/S of infection  Description: INTERVENTIONS:  - Assess and document risk factors for pressure ulcer development  - Assess and document skin integrity  - Assess and document dressing/incision, wound bed, drain sites and surrounding tissue  - Implement wound care per orders  - Initiate isolation precautions as appropriate  - Initiate Pressure Ulcer prevention bundle as indicated  Outcome: Progressing     Problem: HEMATOLOGIC - ADULT  Goal: Maintains hematologic stability  Description: INTERVENTIONS  - Assess for signs and symptoms of bleeding or hemorrhage  - Monitor labs and vital signs for trends  - Administer supportive blood products/factors, fluids and medications as ordered and appropriate  - Administer supportive blood products/factors as ordered and appropriate  Outcome: Progressing  Goal: Free from bleeding injury  Description: (Example usage: patient with low platelets)  INTERVENTIONS:  - Avoid intramuscular injections, enemas and rectal medication administration  - Ensure safe mobilization of patient  - Hold pressure on venipuncture sites to achieve adequate hemostasis  - Assess for signs and symptoms of internal bleeding  - Monitor lab trends  - Patient is to report abnormal signs of bleeding to staff  - Avoid use of toothpicks and dental floss  - Use electric shaver for shaving  - Use soft bristle tooth brush  - Limit straining and forceful nose blowing  Outcome: Progressing      Problem: MUSCULOSKELETAL - ADULT  Goal: Return mobility to safest level of function  Description: INTERVENTIONS:  - Assess patient stability and activity tolerance for standing, transferring and ambulating w/ or w/o assistive devices  - Assist with transfers and ambulation using safe patient handling equipment as needed  - Ensure adequate protection for wounds/incisions during mobilization  - Obtain PT/OT consults as needed  - Advance activity as appropriate  - Communicate ordered activity level and limitations with patient/family  Outcome: Progressing  Goal: Maintain proper alignment of affected body part  Description: INTERVENTIONS:  - Support and protect limb and body alignment per provider's orders  - Instruct and reinforce with patient and family use of appropriate assistive device and precautions (e.g. spinal or hip dislocation precautions)  Outcome: Progressing     Problem: NEUROLOGICAL - ADULT  Goal: Achieves stable or improved neurological status  Description: INTERVENTIONS  - Assess for and report changes in neurological status  - Initiate measures to prevent increased intracranial pressure  - Maintain blood pressure and fluid volume within ordered parameters to optimize cerebral perfusion and minimize risk of hemorrhage  - Monitor temperature, glucose, and sodium. Initiate appropriate interventions as ordered  Outcome: Progressing     Problem: Impaired Communication  Goal: Patient will achieve maximal communication potential  Description: Interventions:  - Encourage use of alternative/augmentative communication to express basic wants and needs (use of communication/letter board/or smartphone/tablet/handwriting)  Outcome: Progressing

## 2024-08-01 PROBLEM — F39 EPISODIC MOOD DISORDER (HCC): Status: ACTIVE | Noted: 2024-08-01

## 2024-08-01 LAB
ANION GAP SERPL CALC-SCNC: 7 MMOL/L (ref 0–18)
BASOPHILS # BLD AUTO: 0.02 X10(3) UL (ref 0–0.2)
BASOPHILS NFR BLD AUTO: 0.3 %
BUN BLD-MCNC: 41 MG/DL (ref 9–23)
BUN/CREAT SERPL: 26.8 (ref 10–20)
CALCIUM BLD-MCNC: 8.3 MG/DL (ref 8.7–10.4)
CHLORIDE SERPL-SCNC: 118 MMOL/L (ref 98–112)
CO2 SERPL-SCNC: 22 MMOL/L (ref 21–32)
CREAT BLD-MCNC: 1.53 MG/DL
DEPRECATED RDW RBC AUTO: 74.1 FL (ref 35.1–46.3)
EGFRCR SERPLBLD CKD-EPI 2021: 45 ML/MIN/1.73M2 (ref 60–?)
EOSINOPHIL # BLD AUTO: 0.09 X10(3) UL (ref 0–0.7)
EOSINOPHIL NFR BLD AUTO: 1.5 %
ERYTHROCYTE [DISTWIDTH] IN BLOOD BY AUTOMATED COUNT: 23.6 % (ref 11–15)
GLUCOSE BLD-MCNC: 148 MG/DL (ref 70–99)
GLUCOSE BLDC GLUCOMTR-MCNC: 134 MG/DL (ref 70–99)
GLUCOSE BLDC GLUCOMTR-MCNC: 172 MG/DL (ref 70–99)
GLUCOSE BLDC GLUCOMTR-MCNC: 193 MG/DL (ref 70–99)
GLUCOSE BLDC GLUCOMTR-MCNC: 205 MG/DL (ref 70–99)
HCT VFR BLD AUTO: 34 %
HGB BLD-MCNC: 10.3 G/DL
IMM GRANULOCYTES # BLD AUTO: 0.05 X10(3) UL (ref 0–1)
IMM GRANULOCYTES NFR BLD: 0.8 %
LYMPHOCYTES # BLD AUTO: 0.53 X10(3) UL (ref 1–4)
LYMPHOCYTES NFR BLD AUTO: 8.6 %
MCH RBC QN AUTO: 26.8 PG (ref 26–34)
MCHC RBC AUTO-ENTMCNC: 30.3 G/DL (ref 31–37)
MCV RBC AUTO: 88.3 FL
MONOCYTES # BLD AUTO: 0.8 X10(3) UL (ref 0.1–1)
MONOCYTES NFR BLD AUTO: 12.9 %
NEUTROPHILS # BLD AUTO: 4.69 X10 (3) UL (ref 1.5–7.7)
NEUTROPHILS # BLD AUTO: 4.69 X10(3) UL (ref 1.5–7.7)
NEUTROPHILS NFR BLD AUTO: 75.9 %
OSMOLALITY SERPL CALC.SUM OF ELEC: 317 MOSM/KG (ref 275–295)
PLATELET # BLD AUTO: 282 10(3)UL (ref 150–450)
POTASSIUM SERPL-SCNC: 4.1 MMOL/L (ref 3.5–5.1)
RBC # BLD AUTO: 3.85 X10(6)UL
SODIUM SERPL-SCNC: 147 MMOL/L (ref 136–145)
WBC # BLD AUTO: 6.2 X10(3) UL (ref 4–11)

## 2024-08-01 PROCEDURE — 99233 SBSQ HOSP IP/OBS HIGH 50: CPT | Performed by: OTHER

## 2024-08-01 RX ORDER — TEMAZEPAM 15 MG/1
15 CAPSULE ORAL NIGHTLY PRN
Status: DISCONTINUED | OUTPATIENT
Start: 2024-08-01 | End: 2024-08-06

## 2024-08-01 RX ORDER — RISPERIDONE 0.5 MG/1
0.5 TABLET ORAL EVERY EVENING
Status: DISCONTINUED | OUTPATIENT
Start: 2024-08-01 | End: 2024-08-02

## 2024-08-01 RX ORDER — SODIUM CHLORIDE 450 MG/100ML
INJECTION, SOLUTION INTRAVENOUS CONTINUOUS
Status: ACTIVE | OUTPATIENT
Start: 2024-08-01 | End: 2024-08-01

## 2024-08-01 NOTE — PROGRESS NOTES
[facility] Hospitalist Progress Note     CC: Hospital Follow up    PCP: Mitch Herrera MD       Assessment/Plan:   Roc Butcher is a 84 year old male with PMH sig for type 2 diabetes, CAD status post PCI to left circumflex, PAD, pacemaker, hypertension, paroxysmal A-fib on Eliquis baseline, and chronic systolic heart failure with a EF of 35% hyperlipidemia, prior CVA, and bladder cancer who presented for laparoscopic cystoprostatectomy with ileal conduit diversion.  Post op course with complicated by delirium, seen by psych.  Incidental finding on CT of the head was a right parotid mass measuring 1.6 cm with broad differential and radiology recommended ENT evaluation with strong consideration for histological  sampling for definitive characterization.  Plans for subacute rehab at discharge once cognitive state improved see below for details      Delirium, severe   -zypexa 5mg HS      Right Parotid Mass  -noted on CT head   -Partially imaged ovoid 1.6 cm low-attenuation mass in the right parotid gland.   -outpt ENT eval for histological sampling to r/o neoplasm     Bladder cancer S/P lap cystoprostatectomy with ileal conduit diversion on 7/25/2024  -pain meds-change to prn tylenol  -bowel regimen prn  -urostomy teaching   -appt with urology 8/12     CAD status post PCI to L Cfx  PAD  S/P pacemaker  Hypertension,  PAF  Chronic HFrEF -35%   HL  Previous CVA  -CT head with old infarcts- noted on previous imaging  -Not on ACE ARB due to renal function and hypotension.   -Continue hydralazin, eliquis, hydralazine, lopressor and imdur  -holding home ASA for now  -holding lasix with decreased po   -follows with [facility]     Mild ALBERT  Hypernatremia  -suspect hypovolemic and not taking in much free water   -start IV fluids with half normal saline today given mild ALBERT and hypernatremia     -DM Type II  -HgbA1C 6.6  -home regimen: lantus 15 units nightly plus novolog   -SSI prn  -accuchecks  -ADA  diet     Diet: low fiber soft  DVT prophylaxis: eliquis  Code status: FULL    Asrar Paula Ivy [facility] Hospitalist      Subjective:     Sitting in chair. Appears improved from yesterday but still confused. Not as restless.     OBJECTIVE:    Blood pressure 135/50, pulse 70, temperature 98.7 °F (37.1 °C), temperature source Axillary, resp. rate 16, height 5' 9\" (1.753 m), weight 167 lb (75.8 kg), SpO2 100%.    Temp:  [98.2 °F (36.8 °C)-98.8 °F (37.1 °C)] 98.7 °F (37.1 °C)  Pulse:  [66-83] 70  Resp:  [16-20] 16  BP: (124-141)/(50-54) 135/50  SpO2:  [97 %-100 %] 100 %      Intake/Output:    Intake/Output Summary (Last 24 hours) at 8/1/2024 1352  Last data filed at 8/1/2024 1043  Gross per 24 hour   Intake 180 ml   Output 400 ml   Net -220 ml       Last 3 Weights   07/25/24 1051 167 lb (75.8 kg)   07/12/24 1728 174 lb (78.9 kg)   07/22/24 1108 166 lb (75.3 kg)   03/21/22 0921 188 lb (85.3 kg)       /50 (BP Location: Right arm)   Pulse 70   Temp 98.7 °F (37.1 °C) (Axillary)   Resp 16   Ht 5' 9\" (1.753 m)   Wt 167 lb (75.8 kg)   SpO2 100%   BMI 24.66 kg/m²   General: Alert, no acute distress  Lungs: clear to ausculation bilaterally  Heart: Regular rate and rhythm  Abdomen: soft, non tender  Extremities: No edema    Data Review:       Labs:     Recent Labs   Lab 07/30/24  0626 07/31/24  0539 08/01/24  0939   RBC 3.69* 3.82 3.85   HGB 10.0* 10.5* 10.3*   HCT 31.6* 33.3* 34.0*   MCV 85.6 87.2 88.3   MCH 27.1 27.5 26.8   MCHC 31.6 31.5 30.3*   RDW 23.4* 23.3* 23.6*   NEPRELIM 3.72 3.16 4.69   WBC 4.6 4.8 6.2   .0 265.0 282.0         Recent Labs   Lab 07/30/24  0626 07/31/24  0508 08/01/24  0939   * 139* 148*   BUN 26* 25* 41*   CREATSERUM 1.18 1.30 1.53*   EGFRCR 61 54* 45*   CA 7.8* 8.0* 8.3*    145 147*   K 4.1 4.3 4.1   * 118* 118*   CO2 20.0* 20.0* 22.0       No results for input(s): \"ALT\", \"AST\", \"ALB\", \"AMYLASE\", \"LIPASE\", \"LDH\" in the last 168 hours.    Invalid  input(s): \"ALPHOS\", \"TBIL\", \"DBIL\", \"TPROT\"      Imaging:  CT BRAIN OR HEAD (CPT=70450)    Result Date: 7/31/2024  CONCLUSION:  1. No acute intracranial process by noncontrast CT technique. If symptoms or suspicion for acute ischemia persist, consider followup brain MR. 2. 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  There is also a chronic-appearing lacunar infarct in the right cerebellum. 3. Intracranial atherosclerosis. 4. 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  Differential considerations for parotid gland masses are broad and include both benign and malignant primary neoplasms as well as metastatic disease.  Suggest nonemergent otolaryngology evaluation of this finding with strong consideration of histologic sampling for definitive characterization.    elm-remote  Dictated by (CST): Maulik Craig MD on 7/31/2024 at 8:56 AM     Finalized by (CST): Maulik Craig MD on 7/31/2024 at 9:00 AM             Meds:      melatonin  5 mg Oral Nightly    OLANZapine  5 mg Oral Nightly    isosorbide dinitrate  5 mg Oral TID    metoprolol tartrate  25 mg Oral 2x Daily(Beta Blocker)    apixaban  5 mg Oral BID    atorvastatin  40 mg Oral Daily    hydrALAZINE  10 mg Oral TID    insulin aspart  1-11 Units Subcutaneous TID CC    famotidine  20 mg Oral BID      sodium chloride 100 mL/hr at 08/01/24 1233       acetaminophen    docusate sodium    glucose **OR** glucose **OR** glucose-vitamin C **OR** dextrose **OR** glucose **OR** glucose **OR** glucose-vitamin C    ondansetron

## 2024-08-01 NOTE — OCCUPATIONAL THERAPY NOTE
OCCUPATIONAL THERAPY TREATMENT NOTE - INPATIENT        Room Number: 454/454-A     Presenting Problem: Bladder cancer S/P lap cystoprostatectomy with ileal conduit diversion on 2024    Problem List  Active Problems:    Preop testing    Bladder cancer (HCC)    Delirium due to another medical condition      OCCUPATIONAL THERAPY ASSESSMENT   Patient demonstrates fair progress this session, goals remain in progress. Patient oriented to self and unable to be reoriented to place despite repetition.    Patient is requiring up to max/total assist for ADLs as a result of the following impairments: decreased functional strength, decreased functional reach, decreased endurance, pain, impaired balance, decreased muscular endurance, cognitive deficits, decreased insight to deficits, and decreased safety awareness.    Patient continues to function below baseline with  ADLs and fx mobility/transfers .  Next session anticipate patient to progress transfers, static standing balance, and functional standing tolerance.  Occupational Therapy will continue to follow patient for duration of hospitalization.    Patient continues to benefit from continued skilled OT services: to promote return to prior level of function and safety with continuous assistance and gradual rehabilitative therapy.     PLAN  OT Treatment Plan: Balance activities;Energy conservation/work simplification techniques;ADL training;Functional transfer training;Endurance training;Patient/Family education;Patient/Family training;Compensatory technique education  OT Device Recommendations: TBD    SUBJECTIVE  \"It's clearing up.\"     OBJECTIVE  Precautions: Bed/chair alarm    WEIGHT BEARING RESTRICTION  None    PAIN ASSESSMENT  Ratin  Location: Patient did not report any pain  Management Techniques: Activity promotion      ACTIVITIES OF DAILY LIVING ASSESSMENT  AM-PAC ‘6-Clicks’ Inpatient Daily Activity Short Form  How much help from another person does the patient  currently need…  -   Putting on and taking off regular lower body clothing?: A Lot  -   Bathing (including washing, rinsing, drying)?: A Lot  -   Toileting, which includes using toilet, bedpan or urinal? : Total  -   Putting on and taking off regular upper body clothing?: A Lot  -   Taking care of personal grooming such as brushing teeth?: A Lot  -   Eating meals?: A Lot    AM-PAC Score:  Score: 11  Approx Degree of Impairment: 70.42%  Standardized Score (AM-PAC Scale): 29.04  CMS Modifier (G-Code): CL    BED MOBILITY  Supine to Sit: Mod assist  Comments:  Benefited from increased time and simple commands for sequencing task. SBA for static sitting balance at EOB.     FUNCTIONAL TRANSFER ASSESSMENT  Sit to Stand from EOB: Mod assist   Chair Transfer: Min assist for controlled descent    FUNCTIONAL MOBILITY  Steps from EOB to Chair using RW: Mod assist x2  Comments:  Patient tremulous and unsteady throughout.    FUNCTIONAL ADL ASSESSMENT  LB Dressing: max assist  Toileting: total assist    EDUCATION PROVIDED  Patient: Plan of Care; Role of Occupational Therapy; Discharge Recommendations; Functional Transfer Techniques; Fall Prevention; Posture/Positioning; Proper Body Mechanics  Patient's Response to Education: Verbalized Understanding; Requires Further Education    The patient's Approx Degree of Impairment: 70.42% has been calculated based on documentation in the [facility] '6 clicks' Inpatient Daily Activity Short Form.  Research supports that patients with this level of impairment may benefit from rehab.  Final disposition will be made by interdisciplinary medical team.    Patient End of Session: Up in chair;Call light within reach;Needs met;RN aware of session/findings;All patient questions and concerns addressed;Alarm set    OT Goals:  Patient self-stated goal is: no goals stated      Patient will complete dressing with Min A  Comment: ongoing    Patient will complete toilet transfer with Min A  Comment: ongoing     Patient will complete self care task at sink level with Min A    Comment: ongoing         Goals  on: 8/15  Frequency: 3-5x week     OT Session Time  Therapeutic Activity: 10 minutes    KASEY Flores/L  [facility]  #[id number]

## 2024-08-01 NOTE — DIETARY NOTE
ADULT NUTRITION INITIAL ASSESSMENT    Pt is at moderate nutrition risk.  Pt does not meet malnutrition criteria.      RECOMMENDATIONS TO MD: See Nutrition Intervention for diet and ONS specifics     ADMITTING DIAGNOSIS:  Bladder cancer  Bladder cancer (HCC)  PERTINENT PAST MEDICAL HISTORY:   Past Medical History:    Arrhythmia    Cancer (HCC)    bladder    Cataract    High blood pressure    High cholesterol    History of blood transfusion    HYPERLIPIDEMIA    HYPERTENSION    Other and unspecified hyperlipidemia    STROKE    Type II or unspecified type diabetes mellitus without mention of complication, not stated as uncontrolled    Unspecified essential hypertension    Visual impairment     PATIENT STATUS:   08/01/24 INITIAL VISIT: Pt rescreened at nutritional risk r/t poor intake since admission. Pt presented to the hospital for laparoscopic cystoprostatectomy with ileal conduit diversion. Has extensive PMH as listed above. POD #7. Post-op course complicated by delirium. Plan for transfer to SNF once delirium improves. Pt sitting up in chair sleeping at time of visit. Roused for interview however pt not appropriate - mumbling/garbled speech. Noted poor dentition. Obtained further diet hx from son Nick via phone. Per son, pt's appetite/intake was good/normal PTA. Typically consumes 2 meals per day with an occasional evening snack. Self / spouse prepared breakfast. Pt and spouse eat out for a late lunch daily. Occasionally drinks chocolate Ensure. No recent wt changes.           FOOD/NUTRITION RELATED HISTORY:  Appetite:  Good appetite PTA however very poor since admission   Intake:  Minimal intake of meal trays  Intake Meeting Needs: No  Percent Meals Eaten (last 3 days)       Date/Time Percent Meals Eaten (%)    07/29/24 1100 50 %    07/30/24 1002 5 %    07/30/24 1300 10 %    07/31/24 1303 --     Percent Meals Eaten (%): pt refused at 07/31/24 1303    07/31/24 1705 0 %     Percent Meals Eaten (%): pt refused at  07/31/24 1705    08/01/24 0952 0 %        Food Allergies: No Known Food Allergies (NKFA)  Cultural/Ethnic/Nondenominational Preferences: Not Obtained    GASTROINTESTINAL: +BM small, soft/watery, brown x1 / 24 hrs and abdomen soft, rounded, hypoactive bowel sounds    MEDICATIONS: reviewed; Noted non-cardiac electrolyte replacement protocol ordered; noted IVF to stop this evening.     sodium chloride 100 mL/hr at 08/01/24 1233      melatonin  5 mg Oral Nightly    OLANZapine  5 mg Oral Nightly    isosorbide dinitrate  5 mg Oral TID    metoprolol tartrate  25 mg Oral 2x Daily(Beta Blocker)    apixaban  5 mg Oral BID    atorvastatin  40 mg Oral Daily    hydrALAZINE  10 mg Oral TID    insulin aspart  1-11 Units Subcutaneous TID CC    famotidine  20 mg Oral BID     LABS: reviewed; noted recent A1C 6.6 pm 7/26/24; hypernatremia and hyperchloremia; elevated BUN and creatinine with worsening eGFR;     Recent Labs     07/30/24  0626 07/31/24  0508 08/01/24  0939   * 139* 148*   BUN 26* 25* 41*   CREATSERUM 1.18 1.30 1.53*   CA 7.8* 8.0* 8.3*    145 147*   K 4.1 4.3 4.1   * 118* 118*   CO2 20.0* 20.0* 22.0   OSMOCALC 312* 307* 317*     NUTRITION RELATED PHYSICAL FINDINGS:  - Nutrition Focused Physical Exam (NFPE): mild depletion muscle mass temple region per visual exam.   - Fluid Accumulation: none  see RN documentation for details  - Skin Integrity: at risk and surgical wound(s) see RN documentation for details    ANTHROPOMETRICS:  HT: 175.3 cm (5' 9\")  WT: 75.8 kg (167 lb)   BMI: Body mass index is 24.66 kg/m².  BMI CLASSIFICATION: 19-24.9 kg/m2 - WNL  IBW: 160 lbs        104% IBW  Usual Body Wt: 171 lbs (per Care Everywhere 5/3/24)      98% UBW    WEIGHT HISTORY: Family denies recent wt loss.    Patient Weight(s) for the past 336 hrs:   Weight   07/25/24 1051 75.8 kg (167 lb)     Wt Readings from Last 10 Encounters:   07/25/24 75.8 kg (167 lb)   07/22/24 75.3 kg (166 lb)   03/21/22 85.3 kg (188 lb)   11/23/21  85.7 kg (189 lb)   10/06/21 84.4 kg (186 lb)   09/20/21 84.8 kg (187 lb)   08/25/21 87.1 kg (192 lb)   03/10/21 89.8 kg (198 lb)   09/10/20 89.8 kg (198 lb)   05/07/20 93 kg (205 lb)     NUTRITION DIAGNOSIS/PROBLEM:   Inadequate oral intake related to Decreased ability to consume sufficient energy in the setting of delirium as evidenced by refusing to eat / minimum intake of meal trays since admission (x7 days).     NUTRITION INTERVENTION:     NUTRITION PRESCRIPTION:   Estimated Nutrition needs: --dosing wt of 76 kg - wt taken on 7/25/24  Calories: 3804-3608 calories/day (25-30 calories per kg Dosing wt)  Protein:  g protein/day (1.2-1.5 g protein/kg Dosing wt)  Fluid Needs: 5628-9666 ml/day (1 ml/kcal)    - Diet:       Procedures    Low Fiber/Soft diet Low Fiber/Soft; Is Patient on Accuchecks? Yes      - Meals and snacks:  Added ONS  - Medical Food Supplements: RD added Glucerna (220 calories/ 10 g protein each) BID Chocolate and SF, Chocolate Mighty Shake (200 calories/ 7 g protein each) Daily. Rational/use of oral supplements discussed.  - Vitamin and mineral supplements: none  - Feeding assistance: meal set up and feed  - Nutrition education: not appropriate at this time   - Coordination of nutrition care: collaboration with other providers - discussed with RN on unit  - Discharge and transfer of nutrition care to new setting or provider: monitor plans - to SNF when delirium improves    MONITOR AND EVALUATE/NUTRITION GOALS:  - Food and Nutrient Intake:      Monitor: adequacy of PO intake, tolerance of PO intake, adequacy of supplement intake, and tolerance of supplement intake  - Food and Nutrient Administration:      Monitor: need for temporary nutrition support and goc/family wishes regarding nutrition  - Anthropometric Measurement:    Monitor weight  - Nutrition Goals:      PO and supplement greater than 75% of needs, labs within acceptable limits, promote healing, and maintain true wt within  5%    DIETITIAN FOLLOW UP: RD to follow and monitor nutrition status    Samantha Britt MS, RD, LDN, CNSC  [id number]

## 2024-08-01 NOTE — PROGRESS NOTES
08/01/24 1030   Urostomy Ileal conduit RLQ   Placement Date: 07/25/24   Inserted by: Dr Jeong  Urostomy Type: Ileal conduit  Location: RLQ  Stoma Size (cm): 1 cm  Appliance Size: 1 3/4   Stomal Appliance 2 piece;Intact   Stomal Assessment Moist;Red;Budding  (2 stents in place)   Peristomal Assessment LUIS E   Dressing Change Due 08/07/24   Output (mL)   (tan urine)   Wound Follow Up   Follow up needed Yes     Ostomy Care  Following up on the pt. no family here at this time. He is up in the chair, the nurses aide is assisting the pt. with eating, he is not swallowing and needs to be reminded to swallow. He was unable to hold his spoon and feed himself cereal. The ileal conduit appliance is intact, connected to the nath pouch. The pt. will discharge to a rehab facility.

## 2024-08-01 NOTE — PROGRESS NOTES
[facility]   Progress Note  Roc Butcher Patient Status:  Inpatient    1940 MRN [id number]   Location [facility] 4W/SW/SE Attending Ziggy Mitchell, DO   Hosp Day # 7 PCP Mitch Herrera MD     Subjective:  Patient remains confused, no pain, no N/V    Objective:  Blood pressure 142/54, pulse 70, temperature 98.7 °F (37.1 °C), temperature source Axillary, resp. rate 18, height 5' 9\" (1.753 m), weight 167 lb (75.8 kg), SpO2 98%.  General appearance: alert, appears stated age and cooperative  Lungs: Unlabored respirations  Abdomen: Soft, non-tender, not distended, stoma pink and healthy  : urine yellow/concentrated     Lab Results   Component Value Date    WBC 6.2 2024    HGB 10.3 2024    HCT 34.0 2024    .0 2024    CREATSERUM 1.53 2024    BUN 41 2024     2024    K 4.1 2024     2024    CO2 22.0 2024     2024    CA 8.3 2024    PGLU 172 2024         Assessment:    Bladder ca - POD 7, s/p robotic assisted lap radical cystoprostatectomy, plnd and ileal conduit diversion   Post operative delirium      Plan:    Progressing well, except confusion/delirium (etiology likely multifactorial)   Advance diet as tolerated  PT/OT following, Wound care / stoma teaching   Continue supportive care   Will need to address dietary needs if continues with poor PO intake     Mauricio Farrar MD  [facility]  [phone]

## 2024-08-01 NOTE — SPIRITUAL CARE NOTE
Spiritual Care Visit Note    Patient Name: Roc Butcher Date of Spiritual Care Visit: 24   : 1940 Primary Dx: <principal problem not specified>       Referred By: Referral From:     Spiritual Care Taxonomy:    Intended Effects: Establish rapport and connectedness    Methods: Collaborate with care team member;Offer support    Interventions: Silent prayer    Visit Type/Summary:     - Spiritual Care: Consulted with RN prior to visit. Attempted visit. Patient is unavailable. Left a Spiritual Care contact information card.    Spiritual Care support can be requested via an Epic consult. For urgent/immediate needs, please contact the On Call  at: [place]: ext [phone]    VAISHALI Cosby   [id number]

## 2024-08-01 NOTE — PHYSICAL THERAPY NOTE
PHYSICAL THERAPY TREATMENT NOTE - INPATIENT     Room Number: 454/454-A       Presenting Problem: Bladder CA, (s/p laposcopitc prostatectomy)  Co-Morbidities : CAD, DM, bladder CA    Problem List  Active Problems:    Preop testing    Bladder cancer (HCC)    Delirium due to another medical condition      PHYSICAL THERAPY ASSESSMENT   Patient demonstrates fair progress this session, goals  remain in progress.      Patient is requiring up to moderate assist x 2 as a result of the following impairments: decreased functional strength, decreased endurance/aerobic capacity, impaired dynamic standing balance, decreased muscular endurance, cognitive deficits (decreased insight into deficits and safety awareness), and medical status.     Patient continues to function below baseline with bed mobility, transfers, and gait.  Next session anticipate patient to progress bed mobility, transfers, and gait.  Physical Therapy will continue to follow patient for duration of hospitalization.    Patient continues to benefit from continued skilled PT services: to promote return to prior level of function and safety with continuous assistance and gradual rehabilitative therapy .    PLAN  PT Treatment Plan: Bed mobility;Body mechanics;Endurance;Energy conservation;Patient education;Gait training;Range of motion;Strengthening;Transfer training;Balance training  Frequency (Obs): 3-5x/week    SUBJECTIVE  Agreeable to activity.     OBJECTIVE  Precautions: Bed/chair alarm    WEIGHT BEARING RESTRICTION  none    PAIN ASSESSMENT   Ratin  Location: [facility]  Management Techniques: Activity promotion;Body mechanics;Breathing techniques;Relaxation;Repositioning    BALANCE  Static Sitting: Fair +  Dynamic Sitting: Fair  Static Standing: Poor +  Dynamic Standing: Poor    AM-PAC '6-Clicks' INPATIENT SHORT FORM - BASIC MOBILITY  How much difficulty does the patient currently have...  Patient Difficulty: Turning over in bed (including adjusting  bedclothes, sheets and blankets)?: A Lot   Patient Difficulty: Sitting down on and standing up from a chair with arms (e.g., wheelchair, bedside commode, etc.): A Lot   Patient Difficulty: Moving from lying on back to sitting on the side of the bed?: A Lot   How much help from another person does the patient currently need...   Help from Another: Moving to and from a bed to a chair (including a wheelchair)?: A Lot   Help from Another: Need to walk in hospital room?: A Lot   Help from Another: Climbing 3-5 steps with a railing?: Total     AM-PAC Score:  Raw Score: 11   Approx Degree of Impairment: 72.57%   Standardized Score (AM-PAC Scale): 33.86   CMS Modifier (G-Code): CL    FUNCTIONAL ABILITY STATUS  Functional Mobility/Gait Assessment  Gait Assistance: Moderate assistance (x2)  Distance (ft): 3  Assistive Device: Rolling walker  Pattern: Shuffle (slow pace, unsteady, forward flexed posture)  Supine to Sit: moderate assist  Sit to Stand: moderate assist  Stand to Sit (controlled descent): Min A    Skilled Therapy Provided: Pt received resting in bed, agreeable to activity.. Introduced self and role. Alert and oriented to self only. Benefits from increased time and repeated simple commands for sequencing of tasks this date. Required mod A to transition supine>sit at EOB; VC given to pull self up/forward using bedrail which pt was able to demonstrate with mod A. SBA for static sitting balance at EOB.  Mod A for STS from EOB with RW. Mod A x 2 to take steps to chair with RW. Pt tremulous and unsteady but no LOB. Pt was left sitting in chair with needs within reach, alarm on, handoff to RN complete.     The patient's Approx Degree of Impairment: 72.57% has been calculated based on documentation in the [facility] '6 clicks' Inpatient Daily Activity Short Form.  Research supports that patients with this level of impairment may benefit from LTAC however anticipate benefit from rehab facility.  Final disposition will be made  by interdisciplinary medical team.    Patient End of Session: Up in chair;Needs met;Call light within reach;RN aware of session/findings;All patient questions and concerns addressed;Alarm set    CURRENT GOALS   Goals to be met by: 8/20/2024  Patient Goal Patient's self-stated goal is: Return home    Goal #1 Patient is able to demonstrate supine - sit EOB @ level: minimum assistance      Goal #1   Current Status  unmet    Goal #2 Patient is able to demonstrate transfers Sit to/from Stand at assistance level: minimum assistance with walker - rolling      Goal #2  Current Status  unmet   Goal #3 Patient is able to ambulate 30 feet with assist device: walker - rolling at assistance level: minimum assistance   Goal #3   Current Status  unmet   Goal #4     Goal #4   Current Status     Goal #5 Patient to demonstrate independence with home activity/exercise instructions provided to patient in preparation for discharge.   Goal #5   Current Status  unmet   Goal #6     Goal #6  Current Status        Therapeutic Activity: 15 minutes

## 2024-08-01 NOTE — PROGRESS NOTES
Patient is a 84 year old ,  male with past medical history of diabetes, CAD, pacemaker, hypertension, paroxymal a-fib, hyperlipidemia, CVA who was admitted to the hospital for laparoscopic cystoprostatectomy with ileal conduit diversion. The patient has been demonstrating increased confusion, restlessness.Patient indicated for psych consult for evaluation and advise.  Consult Duration     The patient seen for over 50-minute, follow-up evaluation, over 50% counseling and coordinating care addressing  confusion, restlessness.  Record reviewed, communication with attending, communication with RN and patient seen face to face evaluation.    History of Present Illness:     The patient has been seen by LUIZA Rodriguez, starting Zyprexa 5 mg nightly to help with sleep and for delirium.  Family concerned about the patient's sedation.    According to the team the patient continued demonstrating alternation in his mood and he indicated Seroquel 25 mg nightly additional to his Zyprexa to help him sleep.    The patient seen today sitting in his recliner.  Patient is noticeably tired and dozing off at time otherwise cooperative with effort to be attentive.  The patient had difficulty expressing his emotion otherwise reporting feeling okay.  Patient denied any auditory visual hallucination.  Patient reporting that he has been sleeping well.  Patient demonstrates some disorientation to the date otherwise he is aware that he is in the hospital for being \"sick\".      The patient continue demonstrating delirium episode with alternation in mood and cognition with episodes of  increased confusion, restlessness, agitation and response to internal stimuli.     Past Psychiatric/Medication History:  1. Prior diagnoses: none reported  2. Past psychiatric inpatient: none reported  3. Past outpatient history: none reported  4. Past suicide history: none reported  5. Medication history: none reported    Social History:   Patient has  been  for 64 years. He lives at home with his wife. He has two adult children.  Retired 24 years ago. He worked for [employer]     No alcohol, tobacco, cannabis or illicit     Family History:  None reported  Medical History:   Past Medical History  Past Medical History:    Arrhythmia    Cancer (HCC)    bladder    Cataract    High blood pressure    High cholesterol    History of blood transfusion    HYPERLIPIDEMIA    HYPERTENSION    Other and unspecified hyperlipidemia    STROKE    Type II or unspecified type diabetes mellitus without mention of complication, not stated as uncontrolled    Unspecified essential hypertension    Visual impairment       Past Surgical History  Past Surgical History:   Procedure Laterality Date    Cardiac pacemaker placement      Other surgical history      bladder tumor removal    Tonsillectomy         Family History  Family History   Problem Relation Age of Onset    Heart Disorder Father     Other Father         HIP FX    Heart Disorder Mother     Other Mother         CVA AGE 80       Social History  Social History     Socioeconomic History    Marital status:    Tobacco Use    Smoking status: Former     Types: Cigarettes    Smokeless tobacco: Never    Tobacco comments:     2010   Vaping Use    Vaping status: Never Used   Substance and Sexual Activity    Alcohol use: Yes     Comment: HEAVY PREVIOUS NOW SOCIAL    Drug use: No     Social Determinants of Health     Financial Resource Strain: Low Risk  (5/30/2024)    Received from [facility]    Financial Resource Strain     In the past year, have you or any family members you live with been unable to get any of the following when it was really needed? Check all that apply.: None   Food Insecurity: No Food Insecurity (7/25/2024)    Food Insecurity     Food Insecurity: Never true   Transportation Needs: No Transportation Needs (7/25/2024)    Transportation Needs     Lack of Transportation: No   Physical Activity:  High Risk (5/3/2024)    Received from [facility]    Exercise Vital Sign     On average, how many days per week do you engage in moderate to strenuous exercise (like a brisk walk)?: 0 days     On average, how many minutes do you engage in exercise at this level?: 0 min   Social Connections: Medium Risk (5/30/2024)    Received from [facility]    Social Connections     How often do you see or talk to people that you care about and feel close to? (For example: talking to friends on the phone, visiting friends or family, going to [employer] or club meetings): 1 or 2 times a week   Housing Stability: Low Risk  (7/25/2024)    Housing Stability     Housing Instability: No           Current Medications:  Current Facility-Administered Medications   Medication Dose Route Frequency    acetaminophen (Tylenol) tab 650 mg  650 mg Oral Q8H PRN    melatonin cap/tab 5 mg  5 mg Oral Nightly    OLANZapine (ZyPREXA) tab 5 mg  5 mg Oral Nightly    isosorbide dinitrate (Isordil Titradose) tab 5 mg  5 mg Oral TID    metoprolol tartrate (Lopressor) tab 25 mg  25 mg Oral 2x Daily(Beta Blocker)    docusate sodium (Colace) cap 100 mg  100 mg Oral BID PRN    apixaban (Eliquis) tab 5 mg  5 mg Oral BID    atorvastatin (Lipitor) tab 40 mg  40 mg Oral Daily    hydrALAZINE (Apresoline) tab 10 mg  10 mg Oral TID    glucose (Dex4) 15 GM/59ML oral liquid 15 g  15 g Oral Q15 Min PRN    Or    glucose (Glutose) 40% oral gel 15 g  15 g Oral Q15 Min PRN    Or    glucose-vitamin C (Dex-4) chewable tab 4 tablet  4 tablet Oral Q15 Min PRN    Or    dextrose 50% injection 50 mL  50 mL Intravenous Q15 Min PRN    Or    glucose (Dex4) 15 GM/59ML oral liquid 30 g  30 g Oral Q15 Min PRN    Or    glucose (Glutose) 40% oral gel 30 g  30 g Oral Q15 Min PRN    Or    glucose-vitamin C (Dex-4) chewable tab 8 tablet  8 tablet Oral Q15 Min PRN    insulin aspart (NovoLOG) 100 Units/mL FlexPen 1-11 Units  1-11 Units Subcutaneous TID CC    ondansetron  (Zofran) 4 MG/2ML injection 4 mg  4 mg Intravenous Q6H PRN    famotidine (Pepcid) tab 20 mg  20 mg Oral BID     Medications Prior to Admission   Medication Sig    isosorbide dinitrate 5 MG Oral Tab Take 1 tablet (5 mg total) by mouth 3 (three) times daily.    furosemide 20 MG Oral Tab Take 1 tablet (20 mg total) by mouth daily.    hydrALAZINE 10 MG Oral Tab Take 1 tablet (10 mg total) by mouth 3 (three) times daily.    insulin aspart (NOVOLOG FLEXPEN) 100 Units/mL Subcutaneous Solution Pen-injector Take 10 units with breakfast  and 10 units lunch    aspirin 81 MG Oral Tab EC Take 1 tablet (81 mg total) by mouth daily.    traMADol 50 MG Oral Tab Take 1 tablet (50 mg total) by mouth every 6 (six) hours as needed for Pain.    ATORVASTATIN 40 MG Oral Tab TAKE 1 TABLET BY MOUTH IN  THE EVENING (Patient taking differently: every morning.)    ELIQUIS 5 MG Oral Tab 2 (two) times daily.    LANTUS SOLOSTAR 100 UNIT/ML Subcutaneous Solution Pen-injector Take 15 units daily    Insulin Lispro, 1 Unit Dial, (HUMALOG KWIKPEN) 100 UNIT/ML Subcutaneous Solution Pen-injector Inject 5 Units into the skin As Directed. Take 5 units with each meal    metoprolol tartrate 25 MG Oral Tab TAKE ONE tablet daily (Patient taking differently: 2 (two) times daily.)    Glucose Blood (ACCU-CHEK MICK PLUS) In Vitro Strip USE TWICE DAILY    ACCU-CHEK MICK In Vitro Strip Test glucose three times daily.    Insulin Pen Needle (BD PEN NEEDLE MINI U/F) 31G X 5 MM Does not apply Misc AS DIRECTED QID    ACCU-CHEK MULTICLIX LANCETS Does not apply Misc test blood sugar QID as directed       Allergies  Allergies   Allergen Reactions    Metformin Hcl OTHER (SEE COMMENTS)      Oral,   severe dry mouth       Review of Systems:   As by Admitting/Attending    Results:   Laboratory Data:  Lab Results   Component Value Date    WBC 6.2 08/01/2024    HGB 10.3 (L) 08/01/2024    HCT 34.0 (L) 08/01/2024    .0 08/01/2024    CREATSERUM 1.30 07/31/2024    BUN 25 (H)  07/31/2024     07/31/2024    K 4.3 07/31/2024     (H) 07/31/2024    CO2 20.0 (L) 07/31/2024     (H) 07/31/2024    CA 8.0 (L) 07/31/2024    ALB 4.0 03/17/2022    ALKPHO 75 03/17/2022    TP 6.1 (L) 03/17/2022    AST 17 03/17/2022    ALT 14 03/17/2022    TSH 1.520 03/17/2022    PSA 0.5 02/22/2012    B12 380 01/13/2020         Imaging:  CT BRAIN OR HEAD (CPT=70450)    Result Date: 7/31/2024  CONCLUSION:  1. No acute intracranial process by noncontrast CT technique. If symptoms or suspicion for acute ischemia persist, consider followup brain MR. 2. 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  There is also a chronic-appearing lacunar infarct in the right cerebellum. 3. Intracranial atherosclerosis. 4. 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  Differential considerations for parotid gland masses are broad and include both benign and malignant primary neoplasms as well as metastatic disease.  Suggest nonemergent otolaryngology evaluation of this finding with strong consideration of histologic sampling for definitive characterization.    elm-remote  Dictated by (CST): Maulik Craig MD on 7/31/2024 at 8:56 AM     Finalized by (CST): Maulik Craig MD on 7/31/2024 at 9:00 AM           Vital Signs:   Blood pressure 141/51, pulse 73, temperature 98.8 °F (37.1 °C), temperature source Oral, resp. rate 18, height 69\", weight 75.8 kg (167 lb), SpO2 98%.    Mental Status Exam:   Appearance: Stated age male, in hospital gown, sitting in his recliner.  Psychomotor: Patient has been demonstrating some restlessness and agitation last night.  Pulm otherwise some improvement this morning.  Orientation: Alert and oriented to person and place but not to date. Patient demonstrating improvement.  Gait: Not evaluated.  Attitude/Coorperation: patient makes effort to cooperate  Behavior: Patient made an effort to be cooperative and attentive.  Speech: soft,  slow  Mood: Apathy with difficulty expressing emotion  Affect: restricted  Thought process: Confused, otherwise improving  Thought content: No reports of  suicidal or homicidal ideation.  Perceptions: Patient has been demonstrating response to internal stimuli.  Concentration: improving  Memory: improving  Intellect: Average.  Judgment and Insight: Questionable.     Impression:     Delirium due to another medical condition.  Episodic mood disorder.    Preop testing    Bladder cancer (HCC)    Patient is a 84 year old ,  male with past medical history of diabetes, CAD, pacemaker, hypertension, paroxymal a-fib, hyperlipidemia, CVA who was admitted to the hospital for laparoscopic cystoprostatectomy with ileal conduit diversion. The patient has been demonstrating increased confusion, restlessness    The patient has been demonstrating delirium episode with alternation in mood and cognition with episodes of  increased confusion, restlessness, agitation and response to internal stimuli.     7/29/2024: Patient continues to demonstrate alternation in his mood and cognition. He was restless and agitated overnight requiring use of haldol. The patient otherwise demonstrating improvement in his cognition with no restlessness or agitation this morning.    7/30/2024: patient continues to demonstrate confusion and restlessness. No recent use of restraints. No recent use of PRN haldol.     8/1/2024: The patient continue demonstrating some slow responses with alternation in the mood and sundowning.  After today to change his medication to less sedative side effect.    Discussed risk and benefit, acknowledging the current symptom and severity.  At this point, I would recommend the following approach:     Focus on safety  Focus on education and support.  Focus on insight orientation helping the patient understand diagnosis and treatment plan.  Discontinue Zyprexa.  Start risperidone 0.5 mg evening.  Continue melatonin 3  mg nightly.  Utilize temazepam 15 mg nightly as needed for insomnia.  Utilize haldol 0.5 mg IV q 6 hours PRN   Processed with patient at length, the initiation of the above psychotropic medications I advised the patient of the risks, benefits, alternatives and potential side effects. The patient consents to administration of the medications and understands the right to refuse medications at any time. The patient verbalized understanding.   Coordinate plan with team    Orders This Visit:  Orders Placed This Encounter   Procedures    Hemoglobin A1C    RBC Morphology Scan    Basic Metabolic Panel (8)    CBC With Differential With Platelet    Type and screen    ABORH Confirmation    Specimen to Pathology Tissue       Meds This Visit:  Requested Prescriptions      No prescriptions requested or ordered in this encounter       Inocente Rivera MD  8/1/2024    Note to Patient: The 21st Century Cures Act makes medical notes like these available to patients in the interest of transparency. However, be advised this is a medical document. It is intended as peer to peer communication. It is written in medical language and may contain abbreviations or verbiage that are unfamiliar. It may appear blunt or direct. Medical documents are intended to carry relevant information, facts as evident, and the clinical opinion of the practitioner. This note may have been transcribed using a voice dictation system. Voice recognition errors may occur. This should not be taken to alter the content or meaning of this note.

## 2024-08-01 NOTE — PLAN OF CARE
Roc was alert now drowsy and oriented x1. He at the start of the shift was even more confused, sundowning, impulsive, combative and agitated. This RN called patient's son, Nick, to assist with calming patient down d/t patient kept calling out for his son. To no avail and patient was still trying to get out of bed, non-compliant and not able to be redirected. This RN paged MD Dr. Chung on-call regarding situation, and Dr. Chung ordered melatonin, stated to give the melatonin, Zyprexa, and seroquel with night meds and eval, if still same status then will go to restraints and haldol if need for pt and staff safety. Patient was able to take pills crushed and mixed with ginger ale and slept, MD updated on status. MD stated to rely to next shift regarding possibly starting meds earlier around 6pm to help with confusion and behavior, will endorse to day shift about plan. Currently on video monitoring, bed alarm in place. Speech is garbled and a bit slurred. V paced with pacemaker. On Eliquis, on RA. ACHS blood glucose monitoring overnight. Incontinent. Has ileoconduit in place to nath bag, monitoring I&Os. Max assist, repos as breanne in bed. Hygiene care provided prn. Incisions in place, dermabond and open to air. Pain managed and no NV. Plan for [facility] pending choice, plan pending course, safety measures in place, call light within reach.      Problem: Patient Centered Care  Goal: Patient preferences are identified and integrated in the patient's plan of care  Description: Interventions:  - What would you like us to know as we care for you? From home with wife  - Provide timely, complete, and accurate information to patient/family  - Incorporate patient and family knowledge, values, beliefs, and cultural backgrounds into the planning and delivery of care  - Encourage patient/family to participate in care and decision-making at the level they choose  - Honor patient and family perspectives and choices  Outcome: Progressing      Problem: Patient/Family Goals  Goal: Patient/Family Long Term Goal  Description: Patient's Long Term Goal:     Interventions:  -   - See additional Care Plan goals for specific interventions  Outcome: Progressing  Goal: Patient/Family Short Term Goal  Description: Patient's Short Term Goal:     Interventions:   -   - See additional Care Plan goals for specific interventions  Outcome: Progressing     Problem: Impaired Cognition  Goal: Patient will exhibit improved attention, thought processing and/or memory  Description: Interventions:  - Minimize distractions in the room when full attention is required  Outcome: Progressing     Problem: PAIN - ADULT  Goal: Verbalizes/displays adequate comfort level or patient's stated pain goal  Description: INTERVENTIONS:  - Encourage pt to monitor pain and request assistance  - Assess pain using appropriate pain scale  - Administer analgesics based on type and severity of pain and evaluate response  - Implement non-pharmacological measures as appropriate and evaluate response  - Consider cultural and social influences on pain and pain management  - Manage/alleviate anxiety  - Utilize distraction and/or relaxation techniques  - Monitor for opioid side effects  - Notify MD/LIP if interventions unsuccessful or patient reports new pain  - Anticipate increased pain with activity and pre-medicate as appropriate  Outcome: Progressing     Problem: SAFETY ADULT - FALL  Goal: Free from fall injury  Description: INTERVENTIONS:  - Assess pt frequently for physical needs  - Identify cognitive and physical deficits and behaviors that affect risk of falls.  - Carter fall precautions as indicated by assessment.  - Educate pt/family on patient safety including physical limitations  - Instruct pt to call for assistance with activity based on assessment  - Modify environment to reduce risk of injury  - Provide assistive devices as appropriate  - Consider OT/PT consult to assist with  strengthening/mobility  - Encourage toileting schedule  Outcome: Progressing     Problem: DISCHARGE PLANNING  Goal: Discharge to home or other facility with appropriate resources  Description: INTERVENTIONS:  - Identify barriers to discharge w/pt and caregiver  - Include patient/family/discharge partner in discharge planning  - Arrange for needed discharge resources and transportation as appropriate  - Identify discharge learning needs (meds, wound care, etc)  - Arrange for interpreters to assist at discharge as needed  - Consider post-discharge preferences of patient/family/discharge partner  - Complete POLST form as appropriate  - Assess patient's ability to be responsible for managing their own health  - Refer to Case Management Department for coordinating discharge planning if the patient needs post-hospital services based on physician/LIP order or complex needs related to functional status, cognitive ability or social support system  Outcome: Progressing     Problem: RISK FOR INFECTION - ADULT  Goal: Absence of fever/infection during anticipated neutropenic period  Description: INTERVENTIONS  - Monitor WBC  - Administer growth factors as ordered  - Implement neutropenic guidelines  Outcome: Progressing     Problem: CARDIOVASCULAR - ADULT  Goal: Maintains optimal cardiac output and hemodynamic stability  Description: INTERVENTIONS:  - Monitor vital signs, rhythm, and trends  - Monitor for bleeding, hypotension and signs of decreased cardiac output  - Evaluate effectiveness of vasoactive medications to optimize hemodynamic stability  - Monitor arterial and/or venous puncture sites for bleeding and/or hematoma  - Assess quality of pulses, skin color and temperature  - Assess for signs of decreased coronary artery perfusion - ex. Angina  - Evaluate fluid balance, assess for edema, trend weights  Outcome: Progressing  Goal: Absence of cardiac arrhythmias or at baseline  Description: INTERVENTIONS:  - Continuous  cardiac monitoring, monitor vital signs, obtain 12 lead EKG if indicated  - Evaluate effectiveness of antiarrhythmic and heart rate control medications as ordered  - Initiate emergency measures for life threatening arrhythmias  - Monitor electrolytes and administer replacement therapy as ordered  Outcome: Progressing     Problem: GASTROINTESTINAL - ADULT  Goal: Minimal or absence of nausea and vomiting  Description: INTERVENTIONS:  - Maintain adequate hydration with IV or PO as ordered and tolerated  - Nasogastric tube to low intermittent suction as ordered  - Evaluate effectiveness of ordered antiemetic medications  - Provide nonpharmacologic comfort measures as appropriate  - Advance diet as tolerated, if ordered  - Obtain nutritional consult as needed  - Evaluate fluid balance  Outcome: Progressing  Goal: Maintains or returns to baseline bowel function  Description: INTERVENTIONS:  - Assess bowel function  - Maintain adequate hydration with IV or PO as ordered and tolerated  - Evaluate effectiveness of GI medications  - Encourage mobilization and activity  - Obtain nutritional consult as needed  - Establish a toileting routine/schedule  - Consider collaborating with pharmacy to review patient's medication profile  Outcome: Progressing     Problem: GENITOURINARY - ADULT  Goal: Absence of urinary retention  Description: INTERVENTIONS:  - Assess patient’s ability to void and empty bladder  - Monitor intake/output and perform bladder scan as needed  - Follow urinary retention protocol/standard of care  - Consider collaborating with pharmacy to review patient's medication profile  - Implement strategies to promote bladder emptying  Outcome: Progressing     Problem: METABOLIC/FLUID AND ELECTROLYTES - ADULT  Goal: Electrolytes maintained within normal limits  Description: INTERVENTIONS:  - Monitor labs and rhythm and assess patient for signs and symptoms of electrolyte imbalances  - Administer electrolyte replacement as  ordered  - Monitor response to electrolyte replacements, including rhythm and repeat lab results as appropriate  - Fluid restriction as ordered  - Instruct patient on fluid and nutrition restrictions as appropriate  Outcome: Progressing     Problem: SKIN/TISSUE INTEGRITY - ADULT  Goal: Skin integrity remains intact  Description: INTERVENTIONS  - Assess and document risk factors for pressure ulcer development  - Assess and document skin integrity  - Monitor for areas of redness and/or skin breakdown  - Initiate interventions, skin care algorithm/standards of care as needed  Outcome: Progressing  Goal: Incision(s), wounds(s) or drain site(s) healing without S/S of infection  Description: INTERVENTIONS:  - Assess and document risk factors for pressure ulcer development  - Assess and document skin integrity  - Assess and document dressing/incision, wound bed, drain sites and surrounding tissue  - Implement wound care per orders  - Initiate isolation precautions as appropriate  - Initiate Pressure Ulcer prevention bundle as indicated  Outcome: Progressing     Problem: HEMATOLOGIC - ADULT  Goal: Maintains hematologic stability  Description: INTERVENTIONS  - Assess for signs and symptoms of bleeding or hemorrhage  - Monitor labs and vital signs for trends  - Administer supportive blood products/factors, fluids and medications as ordered and appropriate  - Administer supportive blood products/factors as ordered and appropriate  Outcome: Progressing  Goal: Free from bleeding injury  Description: (Example usage: patient with low platelets)  INTERVENTIONS:  - Avoid intramuscular injections, enemas and rectal medication administration  - Ensure safe mobilization of patient  - Hold pressure on venipuncture sites to achieve adequate hemostasis  - Assess for signs and symptoms of internal bleeding  - Monitor lab trends  - Patient is to report abnormal signs of bleeding to staff  - Avoid use of toothpicks and dental floss  - Use  electric shaver for shaving  - Use soft bristle tooth brush  - Limit straining and forceful nose blowing  Outcome: Progressing     Problem: MUSCULOSKELETAL - ADULT  Goal: Return mobility to safest level of function  Description: INTERVENTIONS:  - Assess patient stability and activity tolerance for standing, transferring and ambulating w/ or w/o assistive devices  - Assist with transfers and ambulation using safe patient handling equipment as needed  - Ensure adequate protection for wounds/incisions during mobilization  - Obtain PT/OT consults as needed  - Advance activity as appropriate  - Communicate ordered activity level and limitations with patient/family  Outcome: Progressing  Goal: Maintain proper alignment of affected body part  Description: INTERVENTIONS:  - Support and protect limb and body alignment per provider's orders  - Instruct and reinforce with patient and family use of appropriate assistive device and precautions (e.g. spinal or hip dislocation precautions)  Outcome: Progressing     Problem: NEUROLOGICAL - ADULT  Goal: Achieves stable or improved neurological status  Description: INTERVENTIONS  - Assess for and report changes in neurological status  - Initiate measures to prevent increased intracranial pressure  - Maintain blood pressure and fluid volume within ordered parameters to optimize cerebral perfusion and minimize risk of hemorrhage  - Monitor temperature, glucose, and sodium. Initiate appropriate interventions as ordered  Outcome: Progressing     Problem: Impaired Communication  Goal: Patient will achieve maximal communication potential  Description: Interventions:  - Encourage use of alternative/augmentative communication to express basic wants and needs (use of communication/letter board/or smartphone/tablet/handwriting)  Outcome: Progressing     Problem: Safety Risk - Non-Violent Restraints  Goal: Patient will remain free from self-harm  Description: INTERVENTIONS:  - Apply the least  restrictive restraint to prevent harm  - Notify patient and family of reasons restraints applied  - Assess for any contributing factors to confusion (electrolyte disturbances, delirium, medications)  - Discontinue any unnecessary medical devices as soon as possible  - Assess the patient's physical comfort, circulation, skin condition, hydration, nutrition and elimination needs   - Reorient and redirection as needed  - Assess for the need to continue restraints  Outcome: Progressing     Problem: Delirium  Goal: Minimize duration of delirium  Description: Interventions:  - Encourage use of hearing aids, eye glasses  - Promote highest level of mobility daily  - Provide frequent reorientation  - Promote wakefulness i.e. lights on, blinds open  - Promote sleep, encourage patient's normal rest cycle i.e. lights off, TV off, minimize noise and interruptions  - Encourage family to assist in orientation and promotion of home routines  Outcome: Progressing

## 2024-08-01 NOTE — WOUND PROGRESS NOTE
Ostomy Care  Attempted to follow up, no family here, the pt. is not teachable. Spoke with the nurse and the pt's spouse was here already but has left. Will continue to follow up and teach as she is available.

## 2024-08-02 LAB
ANION GAP SERPL CALC-SCNC: 7 MMOL/L (ref 0–18)
BUN BLD-MCNC: 44 MG/DL (ref 9–23)
BUN/CREAT SERPL: 23.9 (ref 10–20)
CALCIUM BLD-MCNC: 8.1 MG/DL (ref 8.7–10.4)
CHLORIDE SERPL-SCNC: 117 MMOL/L (ref 98–112)
CO2 SERPL-SCNC: 21 MMOL/L (ref 21–32)
CREAT BLD-MCNC: 1.84 MG/DL
EGFRCR SERPLBLD CKD-EPI 2021: 36 ML/MIN/1.73M2 (ref 60–?)
GLUCOSE BLD-MCNC: 168 MG/DL (ref 70–99)
GLUCOSE BLDC GLUCOMTR-MCNC: 179 MG/DL (ref 70–99)
GLUCOSE BLDC GLUCOMTR-MCNC: 205 MG/DL (ref 70–99)
GLUCOSE BLDC GLUCOMTR-MCNC: 210 MG/DL (ref 70–99)
GLUCOSE BLDC GLUCOMTR-MCNC: 229 MG/DL (ref 70–99)
OSMOLALITY SERPL CALC.SUM OF ELEC: 315 MOSM/KG (ref 275–295)
POTASSIUM SERPL-SCNC: 3.9 MMOL/L (ref 3.5–5.1)
SODIUM SERPL-SCNC: 145 MMOL/L (ref 136–145)

## 2024-08-02 PROCEDURE — 99233 SBSQ HOSP IP/OBS HIGH 50: CPT | Performed by: OTHER

## 2024-08-02 RX ORDER — RISPERIDONE 0.25 MG/1
0.25 TABLET ORAL EVERY EVENING
Status: DISCONTINUED | OUTPATIENT
Start: 2024-08-03 | End: 2024-08-06

## 2024-08-02 RX ORDER — SODIUM CHLORIDE 450 MG/100ML
INJECTION, SOLUTION INTRAVENOUS CONTINUOUS
Status: DISCONTINUED | OUTPATIENT
Start: 2024-08-02 | End: 2024-08-03

## 2024-08-02 NOTE — PROGRESS NOTES
[facility]  part of Merg[facility] with [facility]     Progress Note    Roc Butcher Patient Status:  Inpatient    1940 MRN [id number]   Location [facility] 4W/SW/SE Attending Ziggy Mitchell, DO   Hosp Day # 8 PCP Mitch Herrera MD     Subjective:  Roc Butcher is a(n) 84 year old male.    Current  complaints: resting    Medications:  Current Facility-Administered Medications   Medication Dose Route Frequency    risperiDONE (RisperDAL) tab 0.5 mg  0.5 mg Oral QPM    temazepam (Restoril) cap 15 mg  15 mg Oral Nightly PRN    acetaminophen (Tylenol) tab 650 mg  650 mg Oral Q8H PRN    melatonin cap/tab 5 mg  5 mg Oral Nightly    isosorbide dinitrate (Isordil Titradose) tab 5 mg  5 mg Oral TID    metoprolol tartrate (Lopressor) tab 25 mg  25 mg Oral 2x Daily(Beta Blocker)    docusate sodium (Colace) cap 100 mg  100 mg Oral BID PRN    apixaban (Eliquis) tab 5 mg  5 mg Oral BID    atorvastatin (Lipitor) tab 40 mg  40 mg Oral Daily    hydrALAZINE (Apresoline) tab 10 mg  10 mg Oral TID    glucose (Dex4) 15 GM/59ML oral liquid 15 g  15 g Oral Q15 Min PRN    Or    glucose (Glutose) 40% oral gel 15 g  15 g Oral Q15 Min PRN    Or    glucose-vitamin C (Dex-4) chewable tab 4 tablet  4 tablet Oral Q15 Min PRN    Or    dextrose 50% injection 50 mL  50 mL Intravenous Q15 Min PRN    Or    glucose (Dex4) 15 GM/59ML oral liquid 30 g  30 g Oral Q15 Min PRN    Or    glucose (Glutose) 40% oral gel 30 g  30 g Oral Q15 Min PRN    Or    glucose-vitamin C (Dex-4) chewable tab 8 tablet  8 tablet Oral Q15 Min PRN    insulin aspart (NovoLOG) 100 Units/mL FlexPen 1-11 Units  1-11 Units Subcutaneous TID CC    ondansetron (Zofran) 4 MG/2ML injection 4 mg  4 mg Intravenous Q6H PRN    famotidine (Pepcid) tab 20 mg  20 mg Oral BID     Objective:  /49 (BP Location: Right arm)   Pulse 83   Temp 98 °F (36.7 °C) (Oral)   Resp 18   Ht 5' 9\" (1.753 m)   Wt 167 lb (75.8 kg)   SpO2 99%   BMI 24.66 kg/m²      Intake/Output Summary (Last 24 hours) at 8/2/2024 0654  Last data filed at 8/2/2024 0600  Gross per 24 hour   Intake 180 ml   Output 500 ml   Net -320 ml       General Appearance: Alert, cooperative, no distress, appears stated age  Head: Normocephalic, without obvious abnormality, atraumatic  Lungs: Clear to auscultation bilaterally, respirations unlabored  Abdomen: Soft, non-tender, bowel sounds active all four quadrants, no masses,  no organomegaly-incisions intact  Genitalia: male without lesions, discharge or tenderness  Urine: Clear per conduit site            Lab Results   Component Value Date    WBC 6.2 08/01/2024    HGB 10.3 08/01/2024    HCT 34.0 08/01/2024    .0 08/01/2024    CREATSERUM 1.53 08/01/2024    BUN 41 08/01/2024     08/01/2024    K 4.1 08/01/2024     08/01/2024    CO2 22.0 08/01/2024     08/01/2024    CA 8.3 08/01/2024          Assessment:  S.p. radical cystectomy w ileal conduit    Plan:  -observe  -tolerating diet    Raudel Vega MD  8/2/2024  6:54 AM

## 2024-08-02 NOTE — PROGRESS NOTES
08/02/24 0900   Urostomy Ileal conduit RLQ   Placement Date: 07/25/24   Inserted by: Dr Jeong  Urostomy Type: Ileal conduit  Location: RLQ  Stoma Size (cm): 1 cm  Appliance Size: 1 3/4   Stomal Appliance 2 piece;Intact   Stomal Assessment Moist;Pink;Red;Budding  (stents in place)   Peristomal Assessment LUIS E   Dressing Change Due 08/07/24   Wound Follow Up   Follow up needed Yes     Ostomy Care  Follow up on the pt. he is sitting up in bed, having some breakfast with the pct assisting him. 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 Pt. will discharge to a SNF. Spoke with the nurse.

## 2024-08-02 NOTE — PLAN OF CARE
No acute changes over night. Pt is alert to self. At the beginning of shift pt kept attempting to get out of bed stating he needed to go home. Pt is on RA. Ileal conduit in place. One BM over night. Pt tolerating low fiber soft diet. ACHS. IVF running as ordered. Video monitoring continued. Call light is within reach and safety measures are in place.    Problem: Impaired Cognition  Goal: Patient will exhibit improved attention, thought processing and/or memory  Description: Interventions:    Outcome: Progressing     Problem: PAIN - ADULT  Goal: Verbalizes/displays adequate comfort level or patient's stated pain goal  Description: INTERVENTIONS:  - Encourage pt to monitor pain and request assistance  - Assess pain using appropriate pain scale  - Administer analgesics based on type and severity of pain and evaluate response  - Implement non-pharmacological measures as appropriate and evaluate response  - Consider cultural and social influences on pain and pain management  - Manage/alleviate anxiety  - Utilize distraction and/or relaxation techniques  - Monitor for opioid side effects  - Notify MD/LIP if interventions unsuccessful or patient reports new pain  - Anticipate increased pain with activity and pre-medicate as appropriate  Outcome: Progressing     Problem: SAFETY ADULT - FALL  Goal: Free from fall injury  Description: INTERVENTIONS:  - Assess pt frequently for physical needs  - Identify cognitive and physical deficits and behaviors that affect risk of falls.  - [place] fall precautions as indicated by assessment.  - Educate pt/family on patient safety including physical limitations  - Instruct pt to call for assistance with activity based on assessment  - Modify environment to reduce risk of injury  - Provide assistive devices as appropriate  - Consider OT/PT consult to assist with strengthening/mobility  - Encourage toileting schedule  Outcome: Progressing     Problem: DISCHARGE PLANNING  Goal: Discharge  to home or other facility with appropriate resources  Description: INTERVENTIONS:  - Identify barriers to discharge w/pt and caregiver  - Include patient/family/discharge partner in discharge planning  - Arrange for needed discharge resources and transportation as appropriate  - Identify discharge learning needs (meds, wound care, etc)  - Arrange for interpreters to assist at discharge as needed  - Consider post-discharge preferences of patient/family/discharge partner  - Complete POLST form as appropriate  - Assess patient's ability to be responsible for managing their own health  - Refer to Case Management Department for coordinating discharge planning if the patient needs post-hospital services based on physician/LIP order or complex needs related to functional status, cognitive ability or social support system  Outcome: Progressing     Problem: RISK FOR INFECTION - ADULT  Goal: Absence of fever/infection during anticipated neutropenic period  Description: INTERVENTIONS  - Monitor WBC  - Administer growth factors as ordered  - Implement neutropenic guidelines  Outcome: Progressing     Problem: CARDIOVASCULAR - ADULT  Goal: Maintains optimal cardiac output and hemodynamic stability  Description: INTERVENTIONS:  - Monitor vital signs, rhythm, and trends  - Monitor for bleeding, hypotension and signs of decreased cardiac output  - Evaluate effectiveness of vasoactive medications to optimize hemodynamic stability  - Monitor arterial and/or venous puncture sites for bleeding and/or hematoma  - Assess quality of pulses, skin color and temperature  - Assess for signs of decreased coronary artery perfusion - ex. Angina  - Evaluate fluid balance, assess for edema, trend weights  Outcome: Progressing  Goal: Absence of cardiac arrhythmias or at baseline  Description: INTERVENTIONS:  - Continuous cardiac monitoring, monitor vital signs, obtain 12 lead EKG if indicated  - Evaluate effectiveness of antiarrhythmic and heart  rate control medications as ordered  - Initiate emergency measures for life threatening arrhythmias  - Monitor electrolytes and administer replacement therapy as ordered  Outcome: Progressing     Problem: GASTROINTESTINAL - ADULT  Goal: Minimal or absence of nausea and vomiting  Description: INTERVENTIONS:  - Maintain adequate hydration with IV or PO as ordered and tolerated  - Nasogastric tube to low intermittent suction as ordered  - Evaluate effectiveness of ordered antiemetic medications  - Provide nonpharmacologic comfort measures as appropriate  - Advance diet as tolerated, if ordered  - Obtain nutritional consult as needed  - Evaluate fluid balance  Outcome: Progressing  Goal: Maintains or returns to baseline bowel function  Description: INTERVENTIONS:  - Assess bowel function  - Maintain adequate hydration with IV or PO as ordered and tolerated  - Evaluate effectiveness of GI medications  - Encourage mobilization and activity  - Obtain nutritional consult as needed  - Establish a toileting routine/schedule  - Consider collaborating with pharmacy to review patient's medication profile  Outcome: Progressing     Problem: GENITOURINARY - ADULT  Goal: Absence of urinary retention  Description: INTERVENTIONS:  - Assess patient’s ability to void and empty bladder  - Monitor intake/output and perform bladder scan as needed  - Follow urinary retention protocol/standard of care  - Consider collaborating with pharmacy to review patient's medication profile  - Implement strategies to promote bladder emptying  Outcome: Progressing     Problem: METABOLIC/FLUID AND ELECTROLYTES - ADULT  Goal: Electrolytes maintained within normal limits  Description: INTERVENTIONS:  - Monitor labs and rhythm and assess patient for signs and symptoms of electrolyte imbalances  - Administer electrolyte replacement as ordered  - Monitor response to electrolyte replacements, including rhythm and repeat lab results as appropriate  - Fluid  restriction as ordered  - Instruct patient on fluid and nutrition restrictions as appropriate  Outcome: Progressing     Problem: SKIN/TISSUE INTEGRITY - ADULT  Goal: Skin integrity remains intact  Description: INTERVENTIONS  - Assess and document risk factors for pressure ulcer development  - Assess and document skin integrity  - Monitor for areas of redness and/or skin breakdown  - Initiate interventions, skin care algorithm/standards of care as needed  Outcome: Progressing  Goal: Incision(s), wounds(s) or drain site(s) healing without S/S of infection  Description: INTERVENTIONS:  - Assess and document risk factors for pressure ulcer development  - Assess and document skin integrity  - Assess and document dressing/incision, wound bed, drain sites and surrounding tissue  - Implement wound care per orders  - Initiate isolation precautions as appropriate  - Initiate Pressure Ulcer prevention bundle as indicated  Outcome: Progressing     Problem: HEMATOLOGIC - ADULT  Goal: Maintains hematologic stability  Description: INTERVENTIONS  - Assess for signs and symptoms of bleeding or hemorrhage  - Monitor labs and vital signs for trends  - Administer supportive blood products/factors, fluids and medications as ordered and appropriate  - Administer supportive blood products/factors as ordered and appropriate  Outcome: Progressing  Goal: Free from bleeding injury  Description: (Example usage: patient with low platelets)  INTERVENTIONS:  - Avoid intramuscular injections, enemas and rectal medication administration  - Ensure safe mobilization of patient  - Hold pressure on venipuncture sites to achieve adequate hemostasis  - Assess for signs and symptoms of internal bleeding  - Monitor lab trends  - Patient is to report abnormal signs of bleeding to staff  - Avoid use of toothpicks and dental floss  - Use electric shaver for shaving  - Use soft bristle tooth brush  - Limit straining and forceful nose blowing  Outcome:  Progressing     Problem: MUSCULOSKELETAL - ADULT  Goal: Return mobility to safest level of function  Description: INTERVENTIONS:  - Assess patient stability and activity tolerance for standing, transferring and ambulating w/ or w/o assistive devices  - Assist with transfers and ambulation using safe patient handling equipment as needed  - Ensure adequate protection for wounds/incisions during mobilization  - Obtain PT/OT consults as needed  - Advance activity as appropriate  - Communicate ordered activity level and limitations with patient/family  Outcome: Progressing  Goal: Maintain proper alignment of affected body part  Description: INTERVENTIONS:  - Support and protect limb and body alignment per provider's orders  - Instruct and reinforce with patient and family use of appropriate assistive device and precautions (e.g. spinal or hip dislocation precautions)  Outcome: Progressing     Problem: NEUROLOGICAL - ADULT  Goal: Achieves stable or improved neurological status  Description: INTERVENTIONS  - Assess for and report changes in neurological status  - Initiate measures to prevent increased intracranial pressure  - Maintain blood pressure and fluid volume within ordered parameters to optimize cerebral perfusion and minimize risk of hemorrhage  - Monitor temperature, glucose, and sodium. Initiate appropriate interventions as ordered  Outcome: Progressing     Problem: Impaired Communication  Goal: Patient will achieve maximal communication potential  Description: Interventions:    Outcome: Progressing     Problem: Safety Risk - Non-Violent Restraints  Goal: Patient will remain free from self-harm  Description: INTERVENTIONS:  - Apply the least restrictive restraint to prevent harm  - Notify patient and family of reasons restraints applied  - Assess for any contributing factors to confusion (electrolyte disturbances, delirium, medications)  - Discontinue any unnecessary medical devices as soon as possible  - Assess  the patient's physical comfort, circulation, skin condition, hydration, nutrition and elimination needs   - Reorient and redirection as needed  - Assess for the need to continue restraints  Outcome: Progressing     Problem: Delirium  Goal: Minimize duration of delirium  Description: Interventions:  - Encourage use of hearing aids, eye glasses  - Promote highest level of mobility daily  - Provide frequent reorientation  - Promote wakefulness i.e. lights on, blinds open  - Promote sleep, encourage patient's normal rest cycle i.e. lights off, TV off, minimize noise and interruptions  - Encourage family to assist in orientation and promotion of home routines  Outcome: Progressing

## 2024-08-03 LAB
ADENOVIRUS PCR:: NOT DETECTED
ANION GAP SERPL CALC-SCNC: 10 MMOL/L (ref 0–18)
B PARAPERT DNA SPEC QL NAA+PROBE: NOT DETECTED
B PERT DNA SPEC QL NAA+PROBE: NOT DETECTED
BASOPHILS # BLD AUTO: 0.04 X10(3) UL (ref 0–0.2)
BASOPHILS NFR BLD AUTO: 0.3 %
BUN BLD-MCNC: 48 MG/DL (ref 9–23)
BUN/CREAT SERPL: 22.4 (ref 10–20)
C PNEUM DNA SPEC QL NAA+PROBE: NOT DETECTED
CALCIUM BLD-MCNC: 8.3 MG/DL (ref 8.7–10.4)
CHLORIDE SERPL-SCNC: 114 MMOL/L (ref 98–112)
CO2 SERPL-SCNC: 17 MMOL/L (ref 21–32)
CORONAVIRUS 229E PCR:: NOT DETECTED
CORONAVIRUS HKU1 PCR:: NOT DETECTED
CORONAVIRUS NL63 PCR:: NOT DETECTED
CORONAVIRUS OC43 PCR:: NOT DETECTED
CREAT BLD-MCNC: 2.14 MG/DL
DEPRECATED RDW RBC AUTO: 72.5 FL (ref 35.1–46.3)
EGFRCR SERPLBLD CKD-EPI 2021: 30 ML/MIN/1.73M2 (ref 60–?)
EOSINOPHIL # BLD AUTO: 0.02 X10(3) UL (ref 0–0.7)
EOSINOPHIL NFR BLD AUTO: 0.1 %
ERYTHROCYTE [DISTWIDTH] IN BLOOD BY AUTOMATED COUNT: 23.5 % (ref 11–15)
FLUAV RNA SPEC QL NAA+PROBE: NOT DETECTED
FLUBV RNA SPEC QL NAA+PROBE: NOT DETECTED
GLUCOSE BLD-MCNC: 243 MG/DL (ref 70–99)
GLUCOSE BLDC GLUCOMTR-MCNC: 158 MG/DL (ref 70–99)
GLUCOSE BLDC GLUCOMTR-MCNC: 159 MG/DL (ref 70–99)
GLUCOSE BLDC GLUCOMTR-MCNC: 211 MG/DL (ref 70–99)
GLUCOSE BLDC GLUCOMTR-MCNC: 257 MG/DL (ref 70–99)
HCT VFR BLD AUTO: 36.2 %
HGB BLD-MCNC: 11.4 G/DL
IMM GRANULOCYTES # BLD AUTO: 0.22 X10(3) UL (ref 0–1)
IMM GRANULOCYTES NFR BLD: 1.6 %
LYMPHOCYTES # BLD AUTO: 0.46 X10(3) UL (ref 1–4)
LYMPHOCYTES NFR BLD AUTO: 3.3 %
MCH RBC QN AUTO: 27.1 PG (ref 26–34)
MCHC RBC AUTO-ENTMCNC: 31.5 G/DL (ref 31–37)
MCV RBC AUTO: 86.2 FL
METAPNEUMOVIRUS PCR:: NOT DETECTED
MONOCYTES # BLD AUTO: 1.22 X10(3) UL (ref 0.1–1)
MONOCYTES NFR BLD AUTO: 8.7 %
MYCOPLASMA PNEUMONIA PCR:: NOT DETECTED
NEUTROPHILS # BLD AUTO: 12.05 X10 (3) UL (ref 1.5–7.7)
NEUTROPHILS # BLD AUTO: 12.05 X10(3) UL (ref 1.5–7.7)
NEUTROPHILS NFR BLD AUTO: 86 %
OSMOLALITY SERPL CALC.SUM OF ELEC: 313 MOSM/KG (ref 275–295)
PARAINFLUENZA 1 PCR:: NOT DETECTED
PARAINFLUENZA 2 PCR:: NOT DETECTED
PARAINFLUENZA 3 PCR:: NOT DETECTED
PARAINFLUENZA 4 PCR:: NOT DETECTED
PLATELET # BLD AUTO: 288 10(3)UL (ref 150–450)
PLATELET MORPHOLOGY: NORMAL
POTASSIUM SERPL-SCNC: 4.3 MMOL/L (ref 3.5–5.1)
RBC # BLD AUTO: 4.2 X10(6)UL
RHINOVIRUS/ENTERO PCR:: NOT DETECTED
RSV RNA SPEC QL NAA+PROBE: NOT DETECTED
SARS-COV-2 RNA NPH QL NAA+NON-PROBE: NOT DETECTED
SODIUM SERPL-SCNC: 141 MMOL/L (ref 136–145)
WBC # BLD AUTO: 14 X10(3) UL (ref 4–11)

## 2024-08-03 RX ORDER — SODIUM CHLORIDE, SODIUM LACTATE, POTASSIUM CHLORIDE, CALCIUM CHLORIDE 600; 310; 30; 20 MG/100ML; MG/100ML; MG/100ML; MG/100ML
INJECTION, SOLUTION INTRAVENOUS CONTINUOUS
Status: DISCONTINUED | OUTPATIENT
Start: 2024-08-03 | End: 2024-08-05

## 2024-08-03 RX ORDER — ACETAMINOPHEN 10 MG/ML
1000 INJECTION, SOLUTION INTRAVENOUS EVERY 6 HOURS PRN
Status: DISCONTINUED | OUTPATIENT
Start: 2024-08-03 | End: 2024-08-05

## 2024-08-03 RX ORDER — SODIUM CHLORIDE 9 MG/ML
INJECTION, SOLUTION INTRAVENOUS CONTINUOUS
Status: DISCONTINUED | OUTPATIENT
Start: 2024-08-03 | End: 2024-08-03

## 2024-08-03 NOTE — PROGRESS NOTES
Patient alert and oriented to self, confused but able to carry on a normal conversation for the most part. Speech improved today with most speech being clear. Patient able to follow commands. Denies pain when asked. Able to tolerate some oral intake but with poor appetite. Accuchecks AC/HS with insulin sliding scale coverage. Ileal conduit in place, draining brown colored urine, MD aware. IVFs initiated. Due to patient sleeping majority of the day he did not sit in the chair and when offered stated he was going to bed. Bed locked and in lowest position, call light within reach, bed alarm and chair alarm utilized for added safety along with safety cam. Family at beside throughout the day.      Problem: Patient Centered Care  Goal: Patient preferences are identified and integrated in the patient's plan of care  Description: Interventions:  - What would you like us to know as we care for you? From home with wife  - Provide timely, complete, and accurate information to patient/family  - Incorporate patient and family knowledge, values, beliefs, and cultural backgrounds into the planning and delivery of care  - Encourage patient/family to participate in care and decision-making at the level they choose  - Honor patient and family perspectives and choices  Outcome: Progressing       Problem: Impaired Cognition  Goal: Patient will exhibit improved attention, thought processing and/or memory  Description: Interventions:     Outcome: Progressing     Problem: PAIN - ADULT  Goal: Verbalizes/displays adequate comfort level or patient's stated pain goal  Description: INTERVENTIONS:  - Encourage pt to monitor pain and request assistance  - Assess pain using appropriate pain scale  - Administer analgesics based on type and severity of pain and evaluate response  - Implement non-pharmacological measures as appropriate and evaluate response  - Consider cultural and social influences on pain and pain management  - Manage/alleviate  anxiety  - Utilize distraction and/or relaxation techniques  - Monitor for opioid side effects  - Notify MD/LIP if interventions unsuccessful or patient reports new pain  - Anticipate increased pain with activity and pre-medicate as appropriate  Outcome: Progressing     Problem: SAFETY ADULT - FALL  Goal: Free from fall injury  Description: INTERVENTIONS:  - Assess pt frequently for physical needs  - Identify cognitive and physical deficits and behaviors that affect risk of falls.  - [place] fall precautions as indicated by assessment.  - Educate pt/family on patient safety including physical limitations  - Instruct pt to call for assistance with activity based on assessment  - Modify environment to reduce risk of injury  - Provide assistive devices as appropriate  - Consider OT/PT consult to assist with strengthening/mobility  - Encourage toileting schedule  Outcome: Progressing     Problem: DISCHARGE PLANNING  Goal: Discharge to home or other facility with appropriate resources  Description: INTERVENTIONS:  - Identify barriers to discharge w/pt and caregiver  - Include patient/family/discharge partner in discharge planning  - Arrange for needed discharge resources and transportation as appropriate  - Identify discharge learning needs (meds, wound care, etc)  - Arrange for interpreters to assist at discharge as needed  - Consider post-discharge preferences of patient/family/discharge partner  - Complete POLST form as appropriate  - Assess patient's ability to be responsible for managing their own health  - Refer to Case Management Department for coordinating discharge planning if the patient needs post-hospital services based on physician/LIP order or complex needs related to functional status, cognitive ability or social support system  Outcome: Progressing     Problem: RISK FOR INFECTION - ADULT  Goal: Absence of fever/infection during anticipated neutropenic period  Description: INTERVENTIONS  - Monitor WBC  -  Administer growth factors as ordered  - Implement neutropenic guidelines  Outcome: Progressing     Problem: CARDIOVASCULAR - ADULT  Goal: Maintains optimal cardiac output and hemodynamic stability  Description: INTERVENTIONS:  - Monitor vital signs, rhythm, and trends  - Monitor for bleeding, hypotension and signs of decreased cardiac output  - Evaluate effectiveness of vasoactive medications to optimize hemodynamic stability  - Monitor arterial and/or venous puncture sites for bleeding and/or hematoma  - Assess quality of pulses, skin color and temperature  - Assess for signs of decreased coronary artery perfusion - ex. Angina  - Evaluate fluid balance, assess for edema, trend weights  Outcome: Progressing  Goal: Absence of cardiac arrhythmias or at baseline  Description: INTERVENTIONS:  - Continuous cardiac monitoring, monitor vital signs, obtain 12 lead EKG if indicated  - Evaluate effectiveness of antiarrhythmic and heart rate control medications as ordered  - Initiate emergency measures for life threatening arrhythmias  - Monitor electrolytes and administer replacement therapy as ordered  Outcome: Progressing     Problem: GASTROINTESTINAL - ADULT  Goal: Minimal or absence of nausea and vomiting  Description: INTERVENTIONS:  - Maintain adequate hydration with IV or PO as ordered and tolerated  - Nasogastric tube to low intermittent suction as ordered  - Evaluate effectiveness of ordered antiemetic medications  - Provide nonpharmacologic comfort measures as appropriate  - Advance diet as tolerated, if ordered  - Obtain nutritional consult as needed  - Evaluate fluid balance  Outcome: Progressing  Goal: Maintains or returns to baseline bowel function  Description: INTERVENTIONS:  - Assess bowel function  - Maintain adequate hydration with IV or PO as ordered and tolerated  - Evaluate effectiveness of GI medications  - Encourage mobilization and activity  - Obtain nutritional consult as needed  - Establish a  toileting routine/schedule  - Consider collaborating with pharmacy to review patient's medication profile  Outcome: Progressing     Problem: GENITOURINARY - ADULT  Goal: Absence of urinary retention  Description: INTERVENTIONS:  - Assess patient’s ability to void and empty bladder  - Monitor intake/output and perform bladder scan as needed  - Follow urinary retention protocol/standard of care  - Consider collaborating with pharmacy to review patient's medication profile  - Implement strategies to promote bladder emptying  Outcome: Progressing     Problem: METABOLIC/FLUID AND ELECTROLYTES - ADULT  Goal: Electrolytes maintained within normal limits  Description: INTERVENTIONS:  - Monitor labs and rhythm and assess patient for signs and symptoms of electrolyte imbalances  - Administer electrolyte replacement as ordered  - Monitor response to electrolyte replacements, including rhythm and repeat lab results as appropriate  - Fluid restriction as ordered  - Instruct patient on fluid and nutrition restrictions as appropriate  Outcome: Progressing     Problem: SKIN/TISSUE INTEGRITY - ADULT  Goal: Skin integrity remains intact  Description: INTERVENTIONS  - Assess and document risk factors for pressure ulcer development  - Assess and document skin integrity  - Monitor for areas of redness and/or skin breakdown  - Initiate interventions, skin care algorithm/standards of care as needed  Outcome: Progressing  Goal: Incision(s), wounds(s) or drain site(s) healing without S/S of infection  Description: INTERVENTIONS:  - Assess and document risk factors for pressure ulcer development  - Assess and document skin integrity  - Assess and document dressing/incision, wound bed, drain sites and surrounding tissue  - Implement wound care per orders  - Initiate isolation precautions as appropriate  - Initiate Pressure Ulcer prevention bundle as indicated  Outcome: Progressing     Problem: HEMATOLOGIC - ADULT  Goal: Maintains hematologic  stability  Description: INTERVENTIONS  - Assess for signs and symptoms of bleeding or hemorrhage  - Monitor labs and vital signs for trends  - Administer supportive blood products/factors, fluids and medications as ordered and appropriate  - Administer supportive blood products/factors as ordered and appropriate  Outcome: Progressing  Goal: Free from bleeding injury  Description: (Example usage: patient with low platelets)  INTERVENTIONS:  - Avoid intramuscular injections, enemas and rectal medication administration  - Ensure safe mobilization of patient  - Hold pressure on venipuncture sites to achieve adequate hemostasis  - Assess for signs and symptoms of internal bleeding  - Monitor lab trends  - Patient is to report abnormal signs of bleeding to staff  - Avoid use of toothpicks and dental floss  - Use electric shaver for shaving  - Use soft bristle tooth brush  - Limit straining and forceful nose blowing  Outcome: Progressing     Problem: MUSCULOSKELETAL - ADULT  Goal: Return mobility to safest level of function  Description: INTERVENTIONS:  - Assess patient stability and activity tolerance for standing, transferring and ambulating w/ or w/o assistive devices  - Assist with transfers and ambulation using safe patient handling equipment as needed  - Ensure adequate protection for wounds/incisions during mobilization  - Obtain PT/OT consults as needed  - Advance activity as appropriate  - Communicate ordered activity level and limitations with patient/family  Outcome: Progressing  Goal: Maintain proper alignment of affected body part  Description: INTERVENTIONS:  - Support and protect limb and body alignment per provider's orders  - Instruct and reinforce with patient and family use of appropriate assistive device and precautions (e.g. spinal or hip dislocation precautions)  Outcome: Progressing     Problem: NEUROLOGICAL - ADULT  Goal: Achieves stable or improved neurological status  Description: INTERVENTIONS  -  Assess for and report changes in neurological status  - Initiate measures to prevent increased intracranial pressure  - Maintain blood pressure and fluid volume within ordered parameters to optimize cerebral perfusion and minimize risk of hemorrhage  - Monitor temperature, glucose, and sodium. Initiate appropriate interventions as ordered  Outcome: Progressing     Problem: Impaired Communication  Goal: Patient will achieve maximal communication potential  Description: Interventions:     Outcome: Progressing     Problem: Safety Risk - Non-Violent Restraints  Goal: Patient will remain free from self-harm  Description: INTERVENTIONS:  - Apply the least restrictive restraint to prevent harm  - Notify patient and family of reasons restraints applied  - Assess for any contributing factors to confusion (electrolyte disturbances, delirium, medications)  - Discontinue any unnecessary medical devices as soon as possible  - Assess the patient's physical comfort, circulation, skin condition, hydration, nutrition and elimination needs   - Reorient and redirection as needed  - Assess for the need to continue restraints  Outcome: Progressing     Problem: Delirium  Goal: Minimize duration of delirium  Description: Interventions:  - Encourage use of hearing aids, eye glasses  - Promote highest level of mobility daily  - Provide frequent reorientation  - Promote wakefulness i.e. lights on, blinds open  - Promote sleep, encourage patient's normal rest cycle i.e. lights off, TV off, minimize noise and interruptions  - Encourage family to assist in orientation and promotion of home routines  Outcome: Progressing

## 2024-08-03 NOTE — PROGRESS NOTES
Patient is a 84 year old ,  male with past medical history of diabetes, CAD, pacemaker, hypertension, paroxymal a-fib, hyperlipidemia, CVA who was admitted to the hospital for laparoscopic cystoprostatectomy with ileal conduit diversion. The patient has been demonstrating increased confusion, restlessness.Patient indicated for psych consult for evaluation and advise.  Consult Duration     The patient seen for over 50-minute, follow-up evaluation, over 50% counseling and coordinating care addressing  confusion, restlessness.  Record reviewed, communication with attending, communication with RN and patient seen face to face evaluation.    History of Present Illness:     According to the team the patient has been demonstrating less sedation with no episodes of agitation nightly.    The patient today sitting in his recliner.  Patient pleasant and cooperative otherwise continued oozing of demonstrating some tiredness.  Patient reporting that he is feeling better.    The patient has not been demonstrating any agitation and denying any auditory or visual hallucination.  Patient reporting that he has not been sleeping well.  Otherwise no agitation or sundowning has been reported by the team.    The patient continue demonstrating mild her recent process with improvement.    Past Psychiatric/Medication History:  1. Prior diagnoses: none reported  2. Past psychiatric inpatient: none reported  3. Past outpatient history: none reported  4. Past suicide history: none reported  5. Medication history: none reported    Social History:   Patient has been  for 64 years. He lives at home with his wife. He has two adult children.  Retired 24 years ago. He worked for [employer]     No alcohol, tobacco, cannabis or illicit     Family History:  None reported  Medical History:   Past Medical History  Past Medical History:    Arrhythmia    Cancer (HCC)    bladder    Cataract    High blood pressure    High  cholesterol    History of blood transfusion    HYPERLIPIDEMIA    HYPERTENSION    Other and unspecified hyperlipidemia    STROKE    Type II or unspecified type diabetes mellitus without mention of complication, not stated as uncontrolled    Unspecified essential hypertension    Visual impairment       Past Surgical History  Past Surgical History:   Procedure Laterality Date    Cardiac pacemaker placement      Other surgical history      bladder tumor removal    Tonsillectomy         Family History  Family History   Problem Relation Age of Onset    Heart Disorder Father     Other Father         HIP FX    Heart Disorder Mother     Other Mother         CVA AGE 80       Social History  Social History     Socioeconomic History    Marital status:    Tobacco Use    Smoking status: Former     Types: Cigarettes    Smokeless tobacco: Never    Tobacco comments:     2010   Vaping Use    Vaping status: Never Used   Substance and Sexual Activity    Alcohol use: Yes     Comment: HEAVY PREVIOUS NOW SOCIAL    Drug use: No     Social Determinants of Health     Financial Resource Strain: Low Risk  (5/30/2024)    Received from [facility]    Financial Resource Strain     In the past year, have you or any family members you live with been unable to get any of the following when it was really needed? Check all that apply.: None   Food Insecurity: No Food Insecurity (7/25/2024)    Food Insecurity     Food Insecurity: Never true   Transportation Needs: No Transportation Needs (7/25/2024)    Transportation Needs     Lack of Transportation: No   Physical Activity: High Risk (5/3/2024)    Received from [facility]    Exercise Vital Sign     On average, how many days per week do you engage in moderate to strenuous exercise (like a brisk walk)?: 0 days     On average, how many minutes do you engage in exercise at this level?: 0 min   Social Connections: Medium Risk (5/30/2024)    Received from [facility]     Social Connections     How often do you see or talk to people that you care about and feel close to? (For example: talking to friends on the phone, visiting friends or family, going to Spiritism or club meetings): 1 or 2 times a week   Housing Stability: Low Risk  (7/25/2024)    Housing Stability     Housing Instability: No           Current Medications:  Current Facility-Administered Medications   Medication Dose Route Frequency    sodium chloride 0.45% infusion   Intravenous Continuous    [START ON 8/3/2024] risperiDONE (RisperDAL) tab 0.25 mg  0.25 mg Oral QPM    temazepam (Restoril) cap 15 mg  15 mg Oral Nightly PRN    acetaminophen (Tylenol) tab 650 mg  650 mg Oral Q8H PRN    melatonin cap/tab 5 mg  5 mg Oral Nightly    isosorbide dinitrate (Isordil Titradose) tab 5 mg  5 mg Oral TID    metoprolol tartrate (Lopressor) tab 25 mg  25 mg Oral 2x Daily(Beta Blocker)    docusate sodium (Colace) cap 100 mg  100 mg Oral BID PRN    apixaban (Eliquis) tab 5 mg  5 mg Oral BID    atorvastatin (Lipitor) tab 40 mg  40 mg Oral Daily    hydrALAZINE (Apresoline) tab 10 mg  10 mg Oral TID    glucose (Dex4) 15 GM/59ML oral liquid 15 g  15 g Oral Q15 Min PRN    Or    glucose (Glutose) 40% oral gel 15 g  15 g Oral Q15 Min PRN    Or    glucose-vitamin C (Dex-4) chewable tab 4 tablet  4 tablet Oral Q15 Min PRN    Or    dextrose 50% injection 50 mL  50 mL Intravenous Q15 Min PRN    Or    glucose (Dex4) 15 GM/59ML oral liquid 30 g  30 g Oral Q15 Min PRN    Or    glucose (Glutose) 40% oral gel 30 g  30 g Oral Q15 Min PRN    Or    glucose-vitamin C (Dex-4) chewable tab 8 tablet  8 tablet Oral Q15 Min PRN    insulin aspart (NovoLOG) 100 Units/mL FlexPen 1-11 Units  1-11 Units Subcutaneous TID CC    ondansetron (Zofran) 4 MG/2ML injection 4 mg  4 mg Intravenous Q6H PRN    famotidine (Pepcid) tab 20 mg  20 mg Oral BID     Medications Prior to Admission   Medication Sig    isosorbide dinitrate 5 MG Oral Tab Take 1 tablet (5 mg total) by  mouth 3 (three) times daily.    furosemide 20 MG Oral Tab Take 1 tablet (20 mg total) by mouth daily.    hydrALAZINE 10 MG Oral Tab Take 1 tablet (10 mg total) by mouth 3 (three) times daily.    insulin aspart (NOVOLOG FLEXPEN) 100 Units/mL Subcutaneous Solution Pen-injector Take 10 units with breakfast  and 10 units lunch    aspirin 81 MG Oral Tab EC Take 1 tablet (81 mg total) by mouth daily.    traMADol 50 MG Oral Tab Take 1 tablet (50 mg total) by mouth every 6 (six) hours as needed for Pain.    ATORVASTATIN 40 MG Oral Tab TAKE 1 TABLET BY MOUTH IN  THE EVENING (Patient taking differently: every morning.)    ELIQUIS 5 MG Oral Tab 2 (two) times daily.    LANTUS SOLOSTAR 100 UNIT/ML Subcutaneous Solution Pen-injector Take 15 units daily    Insulin Lispro, 1 Unit Dial, (HUMALOG KWIKPEN) 100 UNIT/ML Subcutaneous Solution Pen-injector Inject 5 Units into the skin As Directed. Take 5 units with each meal    metoprolol tartrate 25 MG Oral Tab TAKE ONE tablet daily (Patient taking differently: 2 (two) times daily.)    Glucose Blood (ACCU-CHEK MICK PLUS) In Vitro Strip USE TWICE DAILY    ACCU-CHEK MICK In Vitro Strip Test glucose three times daily.    Insulin Pen Needle (BD PEN NEEDLE MINI U/F) 31G X 5 MM Does not apply Misc AS DIRECTED QID    ACCU-CHEK MULTICLIX LANCETS Does not apply Misc test blood sugar QID as directed       Allergies  Allergies   Allergen Reactions    Metformin Hcl OTHER (SEE COMMENTS)      Oral,   severe dry mouth       Review of Systems:   As by Admitting/Attending    Results:   Laboratory Data:  Lab Results   Component Value Date    WBC 6.2 08/01/2024    HGB 10.3 (L) 08/01/2024    HCT 34.0 (L) 08/01/2024    .0 08/01/2024    CREATSERUM 1.84 (H) 08/02/2024    BUN 44 (H) 08/02/2024     08/02/2024    K 3.9 08/02/2024     (H) 08/02/2024    CO2 21.0 08/02/2024     (H) 08/02/2024    CA 8.1 (L) 08/02/2024    ALB 4.0 03/17/2022    ALKPHO 75 03/17/2022    TP 6.1 (L) 03/17/2022     AST 17 03/17/2022    ALT 14 03/17/2022    TSH 1.520 03/17/2022    PSA 0.5 02/22/2012    B12 380 01/13/2020         Imaging:  CT BRAIN OR HEAD (CPT=70450)    Result Date: 7/31/2024  CONCLUSION:  1. No acute intracranial process by noncontrast CT technique. If symptoms or suspicion for acute ischemia persist, consider followup brain MR. 2. 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  There is also a chronic-appearing lacunar infarct in the right cerebellum. 3. Intracranial atherosclerosis. 4. 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  Differential considerations for parotid gland masses are broad and include both benign and malignant primary neoplasms as well as metastatic disease.  Suggest nonemergent otolaryngology evaluation of this finding with strong consideration of histologic sampling for definitive characterization.    elm-remote  Dictated by (CST): Maulik Craig MD on 7/31/2024 at 8:56 AM     Finalized by (CST): Maulik Craig MD on 7/31/2024 at 9:00 AM           Vital Signs:   Blood pressure 138/51, pulse 80, temperature (!) 100.9 °F (38.3 °C), temperature source Axillary, resp. rate 18, height 69\", weight 75.8 kg (167 lb), SpO2 97%.    Mental Status Exam:   Appearance: Stated age male, in hospital gown, sitting in his recliner.  Psychomotor: Patient has been demonstrating some restlessness and agitation last night.  Pulm otherwise some improvement this morning.  Orientation: Alert and oriented to person and place but not to date. Patient demonstrating improvement.  Gait: Not evaluated.  Attitude/Coorperation: patient makes effort to cooperate  Behavior: Patient made an effort to be cooperative and attentive.  Speech: soft, slow  Mood: Apathy with difficulty expressing emotion  Affect: restricted  Thought process: Confused, otherwise improving  Thought content: No reports of  suicidal or homicidal ideation.  Perceptions: Patient has been  demonstrating response to internal stimuli.  Concentration: improving  Memory: improving  Intellect: Average.  Judgment and Insight: Questionable.     Impression:     Delirium due to another medical condition.  Episodic mood disorder.    Preop testing    Bladder cancer (HCC)    Patient is a 84 year old ,  male with past medical history of diabetes, CAD, pacemaker, hypertension, paroxymal a-fib, hyperlipidemia, CVA who was admitted to the hospital for laparoscopic cystoprostatectomy with ileal conduit diversion. The patient has been demonstrating increased confusion, restlessness    The patient has been demonstrating delirium episode with alternation in mood and cognition with episodes of  increased confusion, restlessness, agitation and response to internal stimuli.     7/29/2024: Patient continues to demonstrate alternation in his mood and cognition. He was restless and agitated overnight requiring use of haldol. The patient otherwise demonstrating improvement in his cognition with no restlessness or agitation this morning.    7/30/2024: patient continues to demonstrate confusion and restlessness. No recent use of restraints. No recent use of PRN haldol.     8/1/2024: The patient continue demonstrating some slow responses with alternation in the mood and sundowning.  After today to change his medication to less sedative side effect.    8/2/2024: The patient demonstrating improvement in his delirious process with no agitation nightly but continue mistreat exhaustion and tiredness daily.    Discussed risk and benefit, acknowledging the current symptom and severity.  At this point, I would recommend the following approach:     Focus on safety  Focus on education and support.  Focus on insight orientation helping the patient understand diagnosis and treatment plan.  Lower risperidone to 0.25 mg evening.  Continue melatonin 3 mg nightly.  Utilize temazepam 15 mg nightly as needed for insomnia.  Utilize  haldol 0.5 mg IV q 6 hours PRN   Coordinate plan with team    Orders This Visit:  Orders Placed This Encounter   Procedures    Hemoglobin A1C    RBC Morphology Scan    Basic Metabolic Panel (8)    CBC With Differential With Platelet    Basic Metabolic Panel (8)    Type and screen    ABORH Confirmation    Specimen to Pathology Tissue       Meds This Visit:  Requested Prescriptions      No prescriptions requested or ordered in this encounter       Inocente Rivera MD  8/2/2024    Note to Patient: The 21st Century Cures Act makes medical notes like these available to patients in the interest of transparency. However, be advised this is a medical document. It is intended as peer to peer communication. It is written in medical language and may contain abbreviations or verbiage that are unfamiliar. It may appear blunt or direct. Medical documents are intended to carry relevant information, facts as evident, and the clinical opinion of the practitioner. This note may have been transcribed using a voice dictation system. Voice recognition errors may occur. This should not be taken to alter the content or meaning of this note.

## 2024-08-03 NOTE — PLAN OF CARE
Patient alert and oriented to self, confusion continues to improve but still present. Speech clear today. Patient able to follow commands. Continues to be pleasant with staff and less restless. Easily reoriented when trying to get up and \"go home\".  Continues to deny pain when asked. Still tolerating oral intake and able to feed himself a little today, still with poor appetite; completed protein drinks. Accuchecks AC/HS with insulin sliding scale coverage. Ileal conduit in place, draining brown/dark braulio colored urine. Dressing to left lower abdomen lap sites changed due to drainage. Dr Cabrera and Dr Mitchell made aware of drainage. Per Dr Cabrera leave sites ZOILA. Patient IVFs changed due to bump in creatinine. Rocephin started for elevated white count. Plans for nephrology to see patient tomorrow. Blood cultures obtained prior to start of antibiotic. Patient up to the chair since 9 am. Bed locked and in lowest position, call light within reach, bed alarm and chair alarm utilized for added safety along with safety cam. Family at beside throughout the day.     Problem: Patient Centered Care  Goal: Patient preferences are identified and integrated in the patient's plan of care  Description: Interventions:  - What would you like us to know as we care for you? From home with wife  - Provide timely, complete, and accurate information to patient/family  - Incorporate patient and family knowledge, values, beliefs, and cultural backgrounds into the planning and delivery of care  - Encourage patient/family to participate in care and decision-making at the level they choose  - Honor patient and family perspectives and choices  8/3/2024 1751 by Jennifer Khan, RN  Outcome: Progressing  8/3/2024 1735 by Jennifer Khan, RN  Outcome: Progressing     8/3/2024 1735 by Jennifer Khan, RN  Outcome: Progressing     Problem: Impaired Cognition  Goal: Patient will exhibit improved attention, thought processing and/or  memory  Description: Interventions:    8/3/2024 1751 by Jennifer Khan, RN  Outcome: Progressing  8/3/2024 1735 by Jennifer Khan RN  Outcome: Progressing     Problem: PAIN - ADULT  Goal: Verbalizes/displays adequate comfort level or patient's stated pain goal  Description: INTERVENTIONS:  - Encourage pt to monitor pain and request assistance  - Assess pain using appropriate pain scale  - Administer analgesics based on type and severity of pain and evaluate response  - Implement non-pharmacological measures as appropriate and evaluate response  - Consider cultural and social influences on pain and pain management  - Manage/alleviate anxiety  - Utilize distraction and/or relaxation techniques  - Monitor for opioid side effects  - Notify MD/LIP if interventions unsuccessful or patient reports new pain  - Anticipate increased pain with activity and pre-medicate as appropriate  8/3/2024 1751 by Jennifer Khan RN  Outcome: Progressing  8/3/2024 1735 by Jennifer Khan RN  Outcome: Progressing     Problem: SAFETY ADULT - FALL  Goal: Free from fall injury  Description: INTERVENTIONS:  - Assess pt frequently for physical needs  - Identify cognitive and physical deficits and behaviors that affect risk of falls.  - [place] fall precautions as indicated by assessment.  - Educate pt/family on patient safety including physical limitations  - Instruct pt to call for assistance with activity based on assessment  - Modify environment to reduce risk of injury  - Provide assistive devices as appropriate  - Consider OT/PT consult to assist with strengthening/mobility  - Encourage toileting schedule  8/3/2024 1751 by Jennifer Khan, RN  Outcome: Progressing  8/3/2024 1735 by Jennifer Khan, RN  Outcome: Progressing     Problem: DISCHARGE PLANNING  Goal: Discharge to home or other facility with appropriate resources  Description: INTERVENTIONS:  - Identify barriers to discharge w/pt and caregiver  - Include  patient/family/discharge partner in discharge planning  - Arrange for needed discharge resources and transportation as appropriate  - Identify discharge learning needs (meds, wound care, etc)  - Arrange for interpreters to assist at discharge as needed  - Consider post-discharge preferences of patient/family/discharge partner  - Complete POLST form as appropriate  - Assess patient's ability to be responsible for managing their own health  - Refer to Case Management Department for coordinating discharge planning if the patient needs post-hospital services based on physician/LIP order or complex needs related to functional status, cognitive ability or social support system  8/3/2024 1751 by Jennifer Khan, RN  Outcome: Progressing  8/3/2024 1735 by Jennifer Khan, RN  Outcome: Progressing     Problem: RISK FOR INFECTION - ADULT  Goal: Absence of fever/infection during anticipated neutropenic period  Description: INTERVENTIONS  - Monitor WBC  - Administer growth factors as ordered  - Implement neutropenic guidelines  8/3/2024 1751 by Jennifer Khan, RN  Outcome: Progressing  8/3/2024 1735 by Jennifer Khan RN  Outcome: Progressing     Problem: CARDIOVASCULAR - ADULT  Goal: Maintains optimal cardiac output and hemodynamic stability  Description: INTERVENTIONS:  - Monitor vital signs, rhythm, and trends  - Monitor for bleeding, hypotension and signs of decreased cardiac output  - Evaluate effectiveness of vasoactive medications to optimize hemodynamic stability  - Monitor arterial and/or venous puncture sites for bleeding and/or hematoma  - Assess quality of pulses, skin color and temperature  - Assess for signs of decreased coronary artery perfusion - ex. Angina  - Evaluate fluid balance, assess for edema, trend weights  8/3/2024 1751 by Jennifer Khan, RN  Outcome: Progressing  8/3/2024 1735 by Jennifer Khan RN  Outcome: Progressing  Goal: Absence of cardiac arrhythmias or at baseline  Description:  INTERVENTIONS:  - Continuous cardiac monitoring, monitor vital signs, obtain 12 lead EKG if indicated  - Evaluate effectiveness of antiarrhythmic and heart rate control medications as ordered  - Initiate emergency measures for life threatening arrhythmias  - Monitor electrolytes and administer replacement therapy as ordered  8/3/2024 1751 by Jennifer Khan RN  Outcome: Progressing  8/3/2024 1735 by Jennifer Khan RN  Outcome: Progressing     Problem: GASTROINTESTINAL - ADULT  Goal: Minimal or absence of nausea and vomiting  Description: INTERVENTIONS:  - Maintain adequate hydration with IV or PO as ordered and tolerated  - Nasogastric tube to low intermittent suction as ordered  - Evaluate effectiveness of ordered antiemetic medications  - Provide nonpharmacologic comfort measures as appropriate  - Advance diet as tolerated, if ordered  - Obtain nutritional consult as needed  - Evaluate fluid balance  8/3/2024 1751 by Jennifer Khan RN  Outcome: Progressing  8/3/2024 1735 by Jennifer Khan RN  Outcome: Progressing  Goal: Maintains or returns to baseline bowel function  Description: INTERVENTIONS:  - Assess bowel function  - Maintain adequate hydration with IV or PO as ordered and tolerated  - Evaluate effectiveness of GI medications  - Encourage mobilization and activity  - Obtain nutritional consult as needed  - Establish a toileting routine/schedule  - Consider collaborating with pharmacy to review patient's medication profile  8/3/2024 1751 by Jennifer Khan RN  Outcome: Progressing  8/3/2024 1735 by Jennifer Khan RN  Outcome: Progressing     Problem: GENITOURINARY - ADULT  Goal: Absence of urinary retention  Description: INTERVENTIONS:  - Assess patient’s ability to void and empty bladder  - Monitor intake/output and perform bladder scan as needed  - Follow urinary retention protocol/standard of care  - Consider collaborating with pharmacy to review patient's medication profile  -  Implement strategies to promote bladder emptying  8/3/2024 1751 by Jennifer Khan RN  Outcome: Progressing  8/3/2024 1735 by Jennifer Khan RN  Outcome: Progressing     Problem: METABOLIC/FLUID AND ELECTROLYTES - ADULT  Goal: Electrolytes maintained within normal limits  Description: INTERVENTIONS:  - Monitor labs and rhythm and assess patient for signs and symptoms of electrolyte imbalances  - Administer electrolyte replacement as ordered  - Monitor response to electrolyte replacements, including rhythm and repeat lab results as appropriate  - Fluid restriction as ordered  - Instruct patient on fluid and nutrition restrictions as appropriate  8/3/2024 1751 by Jennifer Khan RN  Outcome: Progressing  8/3/2024 1735 by Jennifer Khan RN  Outcome: Progressing     Problem: SKIN/TISSUE INTEGRITY - ADULT  Goal: Skin integrity remains intact  Description: INTERVENTIONS  - Assess and document risk factors for pressure ulcer development  - Assess and document skin integrity  - Monitor for areas of redness and/or skin breakdown  - Initiate interventions, skin care algorithm/standards of care as needed  8/3/2024 1751 by Jennifer Khan RN  Outcome: Progressing  8/3/2024 1735 by Jennifer Khan RN  Outcome: Progressing  Goal: Incision(s), wounds(s) or drain site(s) healing without S/S of infection  Description: INTERVENTIONS:  - Assess and document risk factors for pressure ulcer development  - Assess and document skin integrity  - Assess and document dressing/incision, wound bed, drain sites and surrounding tissue  - Implement wound care per orders  - Initiate isolation precautions as appropriate  - Initiate Pressure Ulcer prevention bundle as indicated  8/3/2024 1751 by Jennifer Khan RN  Outcome: Progressing  8/3/2024 1735 by Jennifer Khan RN  Outcome: Progressing     Problem: HEMATOLOGIC - ADULT  Goal: Maintains hematologic stability  Description: INTERVENTIONS  - Assess for signs and symptoms  of bleeding or hemorrhage  - Monitor labs and vital signs for trends  - Administer supportive blood products/factors, fluids and medications as ordered and appropriate  - Administer supportive blood products/factors as ordered and appropriate  8/3/2024 1751 by Jennifer Khan RN  Outcome: Progressing  8/3/2024 1735 by Jennifer Khan, RN  Outcome: Progressing  Goal: Free from bleeding injury  Description: (Example usage: patient with low platelets)  INTERVENTIONS:  - Avoid intramuscular injections, enemas and rectal medication administration  - Ensure safe mobilization of patient  - Hold pressure on venipuncture sites to achieve adequate hemostasis  - Assess for signs and symptoms of internal bleeding  - Monitor lab trends  - Patient is to report abnormal signs of bleeding to staff  - Avoid use of toothpicks and dental floss  - Use electric shaver for shaving  - Use soft bristle tooth brush  - Limit straining and forceful nose blowing  8/3/2024 1751 by Jennifer Khan RN  Outcome: Progressing  8/3/2024 1735 by Jennifer Khan RN  Outcome: Progressing     Problem: MUSCULOSKELETAL - ADULT  Goal: Return mobility to safest level of function  Description: INTERVENTIONS:  - Assess patient stability and activity tolerance for standing, transferring and ambulating w/ or w/o assistive devices  - Assist with transfers and ambulation using safe patient handling equipment as needed  - Ensure adequate protection for wounds/incisions during mobilization  - Obtain PT/OT consults as needed  - Advance activity as appropriate  - Communicate ordered activity level and limitations with patient/family  8/3/2024 1751 by Jennifer Khan RN  Outcome: Progressing  8/3/2024 1735 by Jennifer Khan RN  Outcome: Progressing  Goal: Maintain proper alignment of affected body part  Description: INTERVENTIONS:  - Support and protect limb and body alignment per provider's orders  - Instruct and reinforce with patient and family use  of appropriate assistive device and precautions (e.g. spinal or hip dislocation precautions)  8/3/2024 1751 by Jennifer Khan RN  Outcome: Progressing  8/3/2024 1735 by Jennifer Khan RN  Outcome: Progressing     Problem: NEUROLOGICAL - ADULT  Goal: Achieves stable or improved neurological status  Description: INTERVENTIONS  - Assess for and report changes in neurological status  - Initiate measures to prevent increased intracranial pressure  - Maintain blood pressure and fluid volume within ordered parameters to optimize cerebral perfusion and minimize risk of hemorrhage  - Monitor temperature, glucose, and sodium. Initiate appropriate interventions as ordered  8/3/2024 1751 by Jennifer Khan RN  Outcome: Progressing  8/3/2024 1735 by Jennifer Khan RN  Outcome: Progressing     Problem: Impaired Communication  Goal: Patient will achieve maximal communication potential  Description: Interventions:      8/3/2024 1751 by Jennifer Khan RN  Outcome: Progressing  8/3/2024 1735 by Jennifer Khan RN  Outcome: Progressing     Problem: Safety Risk - Non-Violent Restraints  Goal: Patient will remain free from self-harm  Description: INTERVENTIONS:  - Apply the least restrictive restraint to prevent harm  - Notify patient and family of reasons restraints applied  - Assess for any contributing factors to confusion (electrolyte disturbances, delirium, medications)  - Discontinue any unnecessary medical devices as soon as possible  - Assess the patient's physical comfort, circulation, skin condition, hydration, nutrition and elimination needs   - Reorient and redirection as needed  - Assess for the need to continue restraints  8/3/2024 1751 by Jennifer Khan RN  Outcome: Progressing  8/3/2024 1735 by Jennifer Khan RN  Outcome: Progressing     Problem: Delirium  Goal: Minimize duration of delirium  Description: Interventions:  - Encourage use of hearing aids, eye glasses  - Promote highest level  of mobility daily  - Provide frequent reorientation  - Promote wakefulness i.e. lights on, blinds open  - Promote sleep, encourage patient's normal rest cycle i.e. lights off, TV off, minimize noise and interruptions  - Encourage family to assist in orientation and promotion of home routines  8/3/2024 1751 by Jennifer Khan, RN  Outcome: Progressing  8/3/2024 1735 by Jennifer Khan, RN  Outcome: Progressing

## 2024-08-03 NOTE — PLAN OF CARE
Patient alert and oriented to self, confused. Speech is garbled and incomprehensible. Patient able to fallow some commands. Denies pain when asked. Able to tolerate some oral intake but with poor appetite. Accuchecks AC/HS with insulin sliding scale coverage. Ileal conduit in place, draining brown colored urine, MD aware. IVFs initiated. Patient up to chair with PT/OT and able to stand/pivot from chair to bed with 2 person assist. Bed locked and in lowest position, call light within reach, bed alarm and chair alarm utilized for added safety along with safety cam. Family at beside throughout the day.     Problem: Patient Centered Care  Goal: Patient preferences are identified and integrated in the patient's plan of care  Description: Interventions:  - What would you like us to know as we care for you? From home with wife  - Provide timely, complete, and accurate information to patient/family  - Incorporate patient and family knowledge, values, beliefs, and cultural backgrounds into the planning and delivery of care  - Encourage patient/family to participate in care and decision-making at the level they choose  - Honor patient and family perspectives and choices  Outcome: Progressing       Problem: Impaired Cognition  Goal: Patient will exhibit improved attention, thought processing and/or memory  Description: Interventions:    Outcome: Progressing     Problem: PAIN - ADULT  Goal: Verbalizes/displays adequate comfort level or patient's stated pain goal  Description: INTERVENTIONS:  - Encourage pt to monitor pain and request assistance  - Assess pain using appropriate pain scale  - Administer analgesics based on type and severity of pain and evaluate response  - Implement non-pharmacological measures as appropriate and evaluate response  - Consider cultural and social influences on pain and pain management  - Manage/alleviate anxiety  - Utilize distraction and/or relaxation techniques  - Monitor for opioid side  effects  - Notify MD/LIP if interventions unsuccessful or patient reports new pain  - Anticipate increased pain with activity and pre-medicate as appropriate  Outcome: Progressing     Problem: SAFETY ADULT - FALL  Goal: Free from fall injury  Description: INTERVENTIONS:  - Assess pt frequently for physical needs  - Identify cognitive and physical deficits and behaviors that affect risk of falls.  - [place] fall precautions as indicated by assessment.  - Educate pt/family on patient safety including physical limitations  - Instruct pt to call for assistance with activity based on assessment  - Modify environment to reduce risk of injury  - Provide assistive devices as appropriate  - Consider OT/PT consult to assist with strengthening/mobility  - Encourage toileting schedule  Outcome: Progressing     Problem: DISCHARGE PLANNING  Goal: Discharge to home or other facility with appropriate resources  Description: INTERVENTIONS:  - Identify barriers to discharge w/pt and caregiver  - Include patient/family/discharge partner in discharge planning  - Arrange for needed discharge resources and transportation as appropriate  - Identify discharge learning needs (meds, wound care, etc)  - Arrange for interpreters to assist at discharge as needed  - Consider post-discharge preferences of patient/family/discharge partner  - Complete POLST form as appropriate  - Assess patient's ability to be responsible for managing their own health  - Refer to Case Management Department for coordinating discharge planning if the patient needs post-hospital services based on physician/LIP order or complex needs related to functional status, cognitive ability or social support system  Outcome: Progressing     Problem: RISK FOR INFECTION - ADULT  Goal: Absence of fever/infection during anticipated neutropenic period  Description: INTERVENTIONS  - Monitor WBC  - Administer growth factors as ordered  - Implement neutropenic guidelines  Outcome:  Progressing     Problem: CARDIOVASCULAR - ADULT  Goal: Maintains optimal cardiac output and hemodynamic stability  Description: INTERVENTIONS:  - Monitor vital signs, rhythm, and trends  - Monitor for bleeding, hypotension and signs of decreased cardiac output  - Evaluate effectiveness of vasoactive medications to optimize hemodynamic stability  - Monitor arterial and/or venous puncture sites for bleeding and/or hematoma  - Assess quality of pulses, skin color and temperature  - Assess for signs of decreased coronary artery perfusion - ex. Angina  - Evaluate fluid balance, assess for edema, trend weights  Outcome: Progressing  Goal: Absence of cardiac arrhythmias or at baseline  Description: INTERVENTIONS:  - Continuous cardiac monitoring, monitor vital signs, obtain 12 lead EKG if indicated  - Evaluate effectiveness of antiarrhythmic and heart rate control medications as ordered  - Initiate emergency measures for life threatening arrhythmias  - Monitor electrolytes and administer replacement therapy as ordered  Outcome: Progressing     Problem: GASTROINTESTINAL - ADULT  Goal: Minimal or absence of nausea and vomiting  Description: INTERVENTIONS:  - Maintain adequate hydration with IV or PO as ordered and tolerated  - Nasogastric tube to low intermittent suction as ordered  - Evaluate effectiveness of ordered antiemetic medications  - Provide nonpharmacologic comfort measures as appropriate  - Advance diet as tolerated, if ordered  - Obtain nutritional consult as needed  - Evaluate fluid balance  Outcome: Progressing  Goal: Maintains or returns to baseline bowel function  Description: INTERVENTIONS:  - Assess bowel function  - Maintain adequate hydration with IV or PO as ordered and tolerated  - Evaluate effectiveness of GI medications  - Encourage mobilization and activity  - Obtain nutritional consult as needed  - Establish a toileting routine/schedule  - Consider collaborating with pharmacy to review patient's  medication profile  Outcome: Progressing     Problem: GENITOURINARY - ADULT  Goal: Absence of urinary retention  Description: INTERVENTIONS:  - Assess patient’s ability to void and empty bladder  - Monitor intake/output and perform bladder scan as needed  - Follow urinary retention protocol/standard of care  - Consider collaborating with pharmacy to review patient's medication profile  - Implement strategies to promote bladder emptying  Outcome: Progressing     Problem: METABOLIC/FLUID AND ELECTROLYTES - ADULT  Goal: Electrolytes maintained within normal limits  Description: INTERVENTIONS:  - Monitor labs and rhythm and assess patient for signs and symptoms of electrolyte imbalances  - Administer electrolyte replacement as ordered  - Monitor response to electrolyte replacements, including rhythm and repeat lab results as appropriate  - Fluid restriction as ordered  - Instruct patient on fluid and nutrition restrictions as appropriate  Outcome: Progressing     Problem: SKIN/TISSUE INTEGRITY - ADULT  Goal: Skin integrity remains intact  Description: INTERVENTIONS  - Assess and document risk factors for pressure ulcer development  - Assess and document skin integrity  - Monitor for areas of redness and/or skin breakdown  - Initiate interventions, skin care algorithm/standards of care as needed  Outcome: Progressing  Goal: Incision(s), wounds(s) or drain site(s) healing without S/S of infection  Description: INTERVENTIONS:  - Assess and document risk factors for pressure ulcer development  - Assess and document skin integrity  - Assess and document dressing/incision, wound bed, drain sites and surrounding tissue  - Implement wound care per orders  - Initiate isolation precautions as appropriate  - Initiate Pressure Ulcer prevention bundle as indicated  Outcome: Progressing     Problem: HEMATOLOGIC - ADULT  Goal: Maintains hematologic stability  Description: INTERVENTIONS  - Assess for signs and symptoms of bleeding or  hemorrhage  - Monitor labs and vital signs for trends  - Administer supportive blood products/factors, fluids and medications as ordered and appropriate  - Administer supportive blood products/factors as ordered and appropriate  Outcome: Progressing  Goal: Free from bleeding injury  Description: (Example usage: patient with low platelets)  INTERVENTIONS:  - Avoid intramuscular injections, enemas and rectal medication administration  - Ensure safe mobilization of patient  - Hold pressure on venipuncture sites to achieve adequate hemostasis  - Assess for signs and symptoms of internal bleeding  - Monitor lab trends  - Patient is to report abnormal signs of bleeding to staff  - Avoid use of toothpicks and dental floss  - Use electric shaver for shaving  - Use soft bristle tooth brush  - Limit straining and forceful nose blowing  Outcome: Progressing     Problem: MUSCULOSKELETAL - ADULT  Goal: Return mobility to safest level of function  Description: INTERVENTIONS:  - Assess patient stability and activity tolerance for standing, transferring and ambulating w/ or w/o assistive devices  - Assist with transfers and ambulation using safe patient handling equipment as needed  - Ensure adequate protection for wounds/incisions during mobilization  - Obtain PT/OT consults as needed  - Advance activity as appropriate  - Communicate ordered activity level and limitations with patient/family  Outcome: Progressing  Goal: Maintain proper alignment of affected body part  Description: INTERVENTIONS:  - Support and protect limb and body alignment per provider's orders  - Instruct and reinforce with patient and family use of appropriate assistive device and precautions (e.g. spinal or hip dislocation precautions)  Outcome: Progressing     Problem: NEUROLOGICAL - ADULT  Goal: Achieves stable or improved neurological status  Description: INTERVENTIONS  - Assess for and report changes in neurological status  - Initiate measures to prevent  increased intracranial pressure  - Maintain blood pressure and fluid volume within ordered parameters to optimize cerebral perfusion and minimize risk of hemorrhage  - Monitor temperature, glucose, and sodium. Initiate appropriate interventions as ordered  Outcome: Progressing     Problem: Impaired Communication  Goal: Patient will achieve maximal communication potential  Description: Interventions:    Outcome: Progressing     Problem: Safety Risk - Non-Violent Restraints  Goal: Patient will remain free from self-harm  Description: INTERVENTIONS:  - Apply the least restrictive restraint to prevent harm  - Notify patient and family of reasons restraints applied  - Assess for any contributing factors to confusion (electrolyte disturbances, delirium, medications)  - Discontinue any unnecessary medical devices as soon as possible  - Assess the patient's physical comfort, circulation, skin condition, hydration, nutrition and elimination needs   - Reorient and redirection as needed  - Assess for the need to continue restraints  Outcome: Progressing     Problem: Delirium  Goal: Minimize duration of delirium  Description: Interventions:  - Encourage use of hearing aids, eye glasses  - Promote highest level of mobility daily  - Provide frequent reorientation  - Promote wakefulness i.e. lights on, blinds open  - Promote sleep, encourage patient's normal rest cycle i.e. lights off, TV off, minimize noise and interruptions  - Encourage family to assist in orientation and promotion of home routines  Outcome: Progressing

## 2024-08-03 NOTE — PROGRESS NOTES
[facility]  part of [facility]     Progress Note    Roc Butcher Patient Status:  Inpatient    1940 MRN [id number]   Location [facility] 4W/SW/SE Attending Ziggy Mitchell, DO   Hosp Day # 9 PCP Mitch Herrera MD     Subjective:  Roc Butcher is a(n) 84 year old male.    Current complaints: none, still confused, asking for a golf ball    Medications:  Current Facility-Administered Medications   Medication Dose Route Frequency    acetaminophen (Ofirmev) 10 mg/mL infusion premix 1,000 mg  1,000 mg Intravenous Q6H PRN    sodium chloride 0.45% infusion   Intravenous Continuous    risperiDONE (RisperDAL) tab 0.25 mg  0.25 mg Oral QPM    temazepam (Restoril) cap 15 mg  15 mg Oral Nightly PRN    acetaminophen (Tylenol) tab 650 mg  650 mg Oral Q8H PRN    melatonin cap/tab 5 mg  5 mg Oral Nightly    isosorbide dinitrate (Isordil Titradose) tab 5 mg  5 mg Oral TID    metoprolol tartrate (Lopressor) tab 25 mg  25 mg Oral 2x Daily(Beta Blocker)    docusate sodium (Colace) cap 100 mg  100 mg Oral BID PRN    apixaban (Eliquis) tab 5 mg  5 mg Oral BID    atorvastatin (Lipitor) tab 40 mg  40 mg Oral Daily    hydrALAZINE (Apresoline) tab 10 mg  10 mg Oral TID    glucose (Dex4) 15 GM/59ML oral liquid 15 g  15 g Oral Q15 Min PRN    Or    glucose (Glutose) 40% oral gel 15 g  15 g Oral Q15 Min PRN    Or    glucose-vitamin C (Dex-4) chewable tab 4 tablet  4 tablet Oral Q15 Min PRN    Or    dextrose 50% injection 50 mL  50 mL Intravenous Q15 Min PRN    Or    glucose (Dex4) 15 GM/59ML oral liquid 30 g  30 g Oral Q15 Min PRN    Or    glucose (Glutose) 40% oral gel 30 g  30 g Oral Q15 Min PRN    Or    glucose-vitamin C (Dex-4) chewable tab 8 tablet  8 tablet Oral Q15 Min PRN    insulin aspart (NovoLOG) 100 Units/mL FlexPen 1-11 Units  1-11 Units Subcutaneous TID CC    ondansetron (Zofran) 4 MG/2ML injection 4 mg  4 mg Intravenous Q6H PRN    famotidine (Pepcid) tab 20 mg  20 mg Oral BID      Objective:  /57 (BP Location: Left arm)   Pulse 67   Temp 99.2 °F (37.3 °C) (Axillary)   Resp 18   Ht 5' 9\" (1.753 m)   Wt 167 lb (75.8 kg)   SpO2 99%   BMI 24.66 kg/m²     Intake/Output Summary (Last 24 hours) at 8/3/2024 1411  Last data filed at 8/3/2024 1400  Gross per 24 hour   Intake 1198 ml   Output 675 ml   Net 523 ml       General Appearance: Alert, cooperative, no distress, appears stated age  Head: Normocephalic, without obvious abnormality, atraumatic  Lungs: Clear to auscultation bilaterally, respirations unlabored  Abdomen: Soft, non-tender, bowel sounds active all four quadrants, no masses,  no organomegaly-incisions intact  Genitalia: male without lesions, discharge or tenderness  Urine: Clear per conduit site                      Assessment:  S.p. radical cystectomy w ileal conduit    Plan:  -observe  -tolerating diet    Marjorie Cabrera MD  -Covering for [facility]

## 2024-08-03 NOTE — PROGRESS NOTES
[facility] Hospitalist Progress Note     CC: Hospital Follow up    PCP: Mitch Herrera MD       Assessment/Plan:   Roc Butcher is a 84 year old male with PMH sig for type 2 diabetes, CAD status post PCI to left circumflex, PAD, pacemaker, hypertension, paroxysmal A-fib on Eliquis baseline, and chronic systolic heart failure with a EF of 35% hyperlipidemia, prior CVA, and bladder cancer who presented for laparoscopic cystoprostatectomy with ileal conduit diversion.  Post op course with complicated by delirium, seen by psych.  Incidental finding on CT of the head was a right parotid mass measuring 1.6 cm with broad differential and radiology recommended ENT evaluation with strong consideration for histological  sampling for definitive characterization.  Plans for subacute rehab at discharge once cognitive state improved see below for details      Delirium, severe   -psychiatry on consult  -improving  -medication adjustments noted  -treat any reversible medical/metabolic issues  -await labs. Noted fever last night     Fever  -obtain viral panel  -check CBC  -monitor fever curve.   -if leukocytosis noted would check blood cultures  -hold empiric antibiotics for now but will consider based on work up      Right Parotid Mass  -noted on CT head   -Partially imaged ovoid 1.6 cm low-attenuation mass in the right parotid gland.   -outpt ENT eval for histological sampling to r/o neoplasm     Bladder cancer S/P lap cystoprostatectomy with ileal conduit diversion on 7/25/2024  -pain meds-change to prn tylenol  -bowel regimen prn  -urostomy teaching   -appt with urology 8/12     CAD status post PCI to L Cfx  PAD  S/P pacemaker  Hypertension,  PAF  Chronic HFrEF -35%   HL  Previous CVA  -CT head with old infarcts- noted on previous imaging  -Not on ACE ARB due to renal function and hypotension.   -Continue hydralazin, eliquis, hydralazine, lopressor and imdur  -holding home ASA for now  -holding lasix with  decreased po   -follows with [facility]     ALBERT  Hypernatremia  -suspect hypovolemic and not taking in much free water   -continue IV 0.45 saline   Addendum 430pm today: renal function worsening. Change fluids to LR. Unclear etiology. Now with leukocytosis as well. Obtain blood cultures. Start ceftriaxone to cover for infection. Will consult nephrology as well.      -DM Type II  -HgbA1C 6.6  -home regimen: lantus 15 units nightly plus novolog   -SSI prn  -accuchecks  -ADA diet     Diet: low fiber soft  DVT prophylaxis: eliquis  Code status: FULL  Dispo: not ready to DC. Fever last night to 101.     Ziggy cj DIEHL  [facility] Hospitalist      Subjective:     Sitting in chair. Delirium improving.     OBJECTIVE:    Blood pressure 146/57, pulse 67, temperature 99.2 °F (37.3 °C), temperature source Axillary, resp. rate 18, height 5' 9\" (1.753 m), weight 167 lb (75.8 kg), SpO2 99%.    Temp:  [99.2 °F (37.3 °C)-101.4 °F (38.6 °C)] 99.2 °F (37.3 °C)  Pulse:  [67-80] 67  Resp:  [18] 18  BP: (111-146)/(47-83) 146/57  SpO2:  [95 %-100 %] 99 %      Intake/Output:    Intake/Output Summary (Last 24 hours) at 8/3/2024 1054  Last data filed at 8/3/2024 0930  Gross per 24 hour   Intake 1078 ml   Output 675 ml   Net 403 ml       Last 3 Weights   07/25/24 1051 167 lb (75.8 kg)   07/12/24 1728 174 lb (78.9 kg)   07/22/24 1108 166 lb (75.3 kg)   03/21/22 0921 188 lb (85.3 kg)       /57 (BP Location: Left arm)   Pulse 67   Temp 99.2 °F (37.3 °C) (Axillary)   Resp 18   Ht 5' 9\" (1.753 m)   Wt 167 lb (75.8 kg)   SpO2 99%   BMI 24.66 kg/m²   General: Alert, no acute distress  Lungs: clear to ausculation bilaterally  Heart: Regular rate and rhythm  Abdomen: soft, non tender  Extremities: No edema    Data Review:       Labs:     Recent Labs   Lab 07/30/24  0626 07/31/24  0539 08/01/24  0939   RBC 3.69* 3.82 3.85   HGB 10.0* 10.5* 10.3*   HCT 31.6* 33.3* 34.0*   MCV 85.6 87.2 88.3   MCH 27.1 27.5 26.8   MCHC 31.6  31.5 30.3*   RDW 23.4* 23.3* 23.6*   NEPRELIM 3.72 3.16 4.69   WBC 4.6 4.8 6.2   .0 265.0 282.0         Recent Labs   Lab 07/31/24  0508 08/01/24  0939 08/02/24  0939   * 148* 168*   BUN 25* 41* 44*   CREATSERUM 1.30 1.53* 1.84*   EGFRCR 54* 45* 36*   CA 8.0* 8.3* 8.1*    147* 145   K 4.3 4.1 3.9   * 118* 117*   CO2 20.0* 22.0 21.0       No results for input(s): \"ALT\", \"AST\", \"ALB\", \"AMYLASE\", \"LIPASE\", \"LDH\" in the last 168 hours.    Invalid input(s): \"ALPHOS\", \"TBIL\", \"DBIL\", \"TPROT\"      Imaging:  No results found.      Meds:      risperiDONE  0.25 mg Oral QPM    melatonin  5 mg Oral Nightly    isosorbide dinitrate  5 mg Oral TID    metoprolol tartrate  25 mg Oral 2x Daily(Beta Blocker)    apixaban  5 mg Oral BID    atorvastatin  40 mg Oral Daily    hydrALAZINE  10 mg Oral TID    insulin aspart  1-11 Units Subcutaneous TID CC    famotidine  20 mg Oral BID      sodium chloride 100 mL/hr at 08/02/24 2246       acetaminophen    temazepam    acetaminophen    docusate sodium    glucose **OR** glucose **OR** glucose-vitamin C **OR** dextrose **OR** glucose **OR** glucose **OR** glucose-vitamin C    ondansetron

## 2024-08-03 NOTE — PLAN OF CARE
Pt became febrile over night with temp of 101.4 MD made aware and IV tylenol was ordered and given. Pt is alert to self. Pt is on RA. Ileal conduit in place. Pt tolerating low fiber soft diet. ACHS. IVF running as ordered. Video monitoring continued. Call light is within reach and safety measures are in place.     Problem: Impaired Cognition  Goal: Patient will exhibit improved attention, thought processing and/or memory  Description: Interventions:    Outcome: Progressing     Problem: PAIN - ADULT  Goal: Verbalizes/displays adequate comfort level or patient's stated pain goal  Description: INTERVENTIONS:  - Encourage pt to monitor pain and request assistance  - Assess pain using appropriate pain scale  - Administer analgesics based on type and severity of pain and evaluate response  - Implement non-pharmacological measures as appropriate and evaluate response  - Consider cultural and social influences on pain and pain management  - Manage/alleviate anxiety  - Utilize distraction and/or relaxation techniques  - Monitor for opioid side effects  - Notify MD/LIP if interventions unsuccessful or patient reports new pain  - Anticipate increased pain with activity and pre-medicate as appropriate  Outcome: Progressing     Problem: SAFETY ADULT - FALL  Goal: Free from fall injury  Description: INTERVENTIONS:  - Assess pt frequently for physical needs  - Identify cognitive and physical deficits and behaviors that affect risk of falls.  - Hedrick fall precautions as indicated by assessment.  - Educate pt/family on patient safety including physical limitations  - Instruct pt to call for assistance with activity based on assessment  - Modify environment to reduce risk of injury  - Provide assistive devices as appropriate  - Consider OT/PT consult to assist with strengthening/mobility  - Encourage toileting schedule  Outcome: Progressing     Problem: DISCHARGE PLANNING  Goal: Discharge to home or other facility with  appropriate resources  Description: INTERVENTIONS:  - Identify barriers to discharge w/pt and caregiver  - Include patient/family/discharge partner in discharge planning  - Arrange for needed discharge resources and transportation as appropriate  - Identify discharge learning needs (meds, wound care, etc)  - Arrange for interpreters to assist at discharge as needed  - Consider post-discharge preferences of patient/family/discharge partner  - Complete POLST form as appropriate  - Assess patient's ability to be responsible for managing their own health  - Refer to Case Management Department for coordinating discharge planning if the patient needs post-hospital services based on physician/LIP order or complex needs related to functional status, cognitive ability or social support system  Outcome: Progressing     Problem: RISK FOR INFECTION - ADULT  Goal: Absence of fever/infection during anticipated neutropenic period  Description: INTERVENTIONS  - Monitor WBC  - Administer growth factors as ordered  - Implement neutropenic guidelines  Outcome: Progressing     Problem: CARDIOVASCULAR - ADULT  Goal: Maintains optimal cardiac output and hemodynamic stability  Description: INTERVENTIONS:  - Monitor vital signs, rhythm, and trends  - Monitor for bleeding, hypotension and signs of decreased cardiac output  - Evaluate effectiveness of vasoactive medications to optimize hemodynamic stability  - Monitor arterial and/or venous puncture sites for bleeding and/or hematoma  - Assess quality of pulses, skin color and temperature  - Assess for signs of decreased coronary artery perfusion - ex. Angina  - Evaluate fluid balance, assess for edema, trend weights  Outcome: Progressing  Goal: Absence of cardiac arrhythmias or at baseline  Description: INTERVENTIONS:  - Continuous cardiac monitoring, monitor vital signs, obtain 12 lead EKG if indicated  - Evaluate effectiveness of antiarrhythmic and heart rate control medications as  ordered  - Initiate emergency measures for life threatening arrhythmias  - Monitor electrolytes and administer replacement therapy as ordered  Outcome: Progressing     Problem: GASTROINTESTINAL - ADULT  Goal: Minimal or absence of nausea and vomiting  Description: INTERVENTIONS:  - Maintain adequate hydration with IV or PO as ordered and tolerated  - Nasogastric tube to low intermittent suction as ordered  - Evaluate effectiveness of ordered antiemetic medications  - Provide nonpharmacologic comfort measures as appropriate  - Advance diet as tolerated, if ordered  - Obtain nutritional consult as needed  - Evaluate fluid balance  Outcome: Progressing  Goal: Maintains or returns to baseline bowel function  Description: INTERVENTIONS:  - Assess bowel function  - Maintain adequate hydration with IV or PO as ordered and tolerated  - Evaluate effectiveness of GI medications  - Encourage mobilization and activity  - Obtain nutritional consult as needed  - Establish a toileting routine/schedule  - Consider collaborating with pharmacy to review patient's medication profile  Outcome: Progressing     Problem: GENITOURINARY - ADULT  Goal: Absence of urinary retention  Description: INTERVENTIONS:  - Assess patient’s ability to void and empty bladder  - Monitor intake/output and perform bladder scan as needed  - Follow urinary retention protocol/standard of care  - Consider collaborating with pharmacy to review patient's medication profile  - Implement strategies to promote bladder emptying  Outcome: Progressing     Problem: METABOLIC/FLUID AND ELECTROLYTES - ADULT  Goal: Electrolytes maintained within normal limits  Description: INTERVENTIONS:  - Monitor labs and rhythm and assess patient for signs and symptoms of electrolyte imbalances  - Administer electrolyte replacement as ordered  - Monitor response to electrolyte replacements, including rhythm and repeat lab results as appropriate  - Fluid restriction as ordered  -  Instruct patient on fluid and nutrition restrictions as appropriate  Outcome: Progressing     Problem: SKIN/TISSUE INTEGRITY - ADULT  Goal: Skin integrity remains intact  Description: INTERVENTIONS  - Assess and document risk factors for pressure ulcer development  - Assess and document skin integrity  - Monitor for areas of redness and/or skin breakdown  - Initiate interventions, skin care algorithm/standards of care as needed  Outcome: Progressing  Goal: Incision(s), wounds(s) or drain site(s) healing without S/S of infection  Description: INTERVENTIONS:  - Assess and document risk factors for pressure ulcer development  - Assess and document skin integrity  - Assess and document dressing/incision, wound bed, drain sites and surrounding tissue  - Implement wound care per orders  - Initiate isolation precautions as appropriate  - Initiate Pressure Ulcer prevention bundle as indicated  Outcome: Progressing     Problem: HEMATOLOGIC - ADULT  Goal: Maintains hematologic stability  Description: INTERVENTIONS  - Assess for signs and symptoms of bleeding or hemorrhage  - Monitor labs and vital signs for trends  - Administer supportive blood products/factors, fluids and medications as ordered and appropriate  - Administer supportive blood products/factors as ordered and appropriate  Outcome: Progressing  Goal: Free from bleeding injury  Description: (Example usage: patient with low platelets)  INTERVENTIONS:  - Avoid intramuscular injections, enemas and rectal medication administration  - Ensure safe mobilization of patient  - Hold pressure on venipuncture sites to achieve adequate hemostasis  - Assess for signs and symptoms of internal bleeding  - Monitor lab trends  - Patient is to report abnormal signs of bleeding to staff  - Avoid use of toothpicks and dental floss  - Use electric shaver for shaving  - Use soft bristle tooth brush  - Limit straining and forceful nose blowing  Outcome: Progressing     Problem:  MUSCULOSKELETAL - ADULT  Goal: Return mobility to safest level of function  Description: INTERVENTIONS:  - Assess patient stability and activity tolerance for standing, transferring and ambulating w/ or w/o assistive devices  - Assist with transfers and ambulation using safe patient handling equipment as needed  - Ensure adequate protection for wounds/incisions during mobilization  - Obtain PT/OT consults as needed  - Advance activity as appropriate  - Communicate ordered activity level and limitations with patient/family  Outcome: Progressing  Goal: Maintain proper alignment of affected body part  Description: INTERVENTIONS:  - Support and protect limb and body alignment per provider's orders  - Instruct and reinforce with patient and family use of appropriate assistive device and precautions (e.g. spinal or hip dislocation precautions)  Outcome: Progressing     Problem: NEUROLOGICAL - ADULT  Goal: Achieves stable or improved neurological status  Description: INTERVENTIONS  - Assess for and report changes in neurological status  - Initiate measures to prevent increased intracranial pressure  - Maintain blood pressure and fluid volume within ordered parameters to optimize cerebral perfusion and minimize risk of hemorrhage  - Monitor temperature, glucose, and sodium. Initiate appropriate interventions as ordered  Outcome: Progressing     Problem: Impaired Communication  Goal: Patient will achieve maximal communication potential  Description: Interventions:    Outcome: Progressing     Problem: Safety Risk - Non-Violent Restraints  Goal: Patient will remain free from self-harm  Description: INTERVENTIONS:  - Apply the least restrictive restraint to prevent harm  - Notify patient and family of reasons restraints applied  - Assess for any contributing factors to confusion (electrolyte disturbances, delirium, medications)  - Discontinue any unnecessary medical devices as soon as possible  - Assess the patient's physical  comfort, circulation, skin condition, hydration, nutrition and elimination needs   - Reorient and redirection as needed  - Assess for the need to continue restraints  Outcome: Progressing     Problem: Delirium  Goal: Minimize duration of delirium  Description: Interventions:  - Encourage use of hearing aids, eye glasses  - Promote highest level of mobility daily  - Provide frequent reorientation  - Promote wakefulness i.e. lights on, blinds open  - Promote sleep, encourage patient's normal rest cycle i.e. lights off, TV off, minimize noise and interruptions  - Encourage family to assist in orientation and promotion of home routines  Outcome: Progressing

## 2024-08-04 ENCOUNTER — APPOINTMENT (OUTPATIENT)
Dept: CT IMAGING | Facility: HOSPITAL | Age: 84
End: 2024-08-04
Attending: INTERNAL MEDICINE
Payer: MEDICARE

## 2024-08-04 ENCOUNTER — APPOINTMENT (OUTPATIENT)
Dept: GENERAL RADIOLOGY | Facility: HOSPITAL | Age: 84
End: 2024-08-04
Attending: HOSPITALIST
Payer: MEDICARE

## 2024-08-04 LAB
ANION GAP SERPL CALC-SCNC: 9 MMOL/L (ref 0–18)
BASOPHILS # BLD AUTO: 0.04 X10(3) UL (ref 0–0.2)
BASOPHILS NFR BLD AUTO: 0.3 %
BUN BLD-MCNC: 49 MG/DL (ref 9–23)
BUN/CREAT SERPL: 21.6 (ref 10–20)
CALCIUM BLD-MCNC: 8.3 MG/DL (ref 8.7–10.4)
CHLORIDE SERPL-SCNC: 115 MMOL/L (ref 98–112)
CO2 SERPL-SCNC: 18 MMOL/L (ref 21–32)
CREAT BLD-MCNC: 2.27 MG/DL
CREAT UR-SCNC: 98.3 MG/DL
DEPRECATED RDW RBC AUTO: 74.1 FL (ref 35.1–46.3)
EGFRCR SERPLBLD CKD-EPI 2021: 28 ML/MIN/1.73M2 (ref 60–?)
EOSINOPHIL # BLD AUTO: 0.11 X10(3) UL (ref 0–0.7)
EOSINOPHIL NFR BLD AUTO: 0.7 %
ERYTHROCYTE [DISTWIDTH] IN BLOOD BY AUTOMATED COUNT: 23.6 % (ref 11–15)
GLUCOSE BLD-MCNC: 175 MG/DL (ref 70–99)
GLUCOSE BLDC GLUCOMTR-MCNC: 157 MG/DL (ref 70–99)
GLUCOSE BLDC GLUCOMTR-MCNC: 161 MG/DL (ref 70–99)
GLUCOSE BLDC GLUCOMTR-MCNC: 165 MG/DL (ref 70–99)
GLUCOSE BLDC GLUCOMTR-MCNC: 191 MG/DL (ref 70–99)
HCT VFR BLD AUTO: 30.6 %
HGB BLD-MCNC: 9.5 G/DL
IMM GRANULOCYTES # BLD AUTO: 0.49 X10(3) UL (ref 0–1)
IMM GRANULOCYTES NFR BLD: 3.1 %
LYMPHOCYTES # BLD AUTO: 0.59 X10(3) UL (ref 1–4)
LYMPHOCYTES NFR BLD AUTO: 3.7 %
MCH RBC QN AUTO: 27.1 PG (ref 26–34)
MCHC RBC AUTO-ENTMCNC: 31 G/DL (ref 31–37)
MCV RBC AUTO: 87.4 FL
MONOCYTES # BLD AUTO: 1.55 X10(3) UL (ref 0.1–1)
MONOCYTES NFR BLD AUTO: 9.8 %
NEUTROPHILS # BLD AUTO: 13.04 X10 (3) UL (ref 1.5–7.7)
NEUTROPHILS # BLD AUTO: 13.04 X10(3) UL (ref 1.5–7.7)
NEUTROPHILS NFR BLD AUTO: 82.4 %
OSMOLALITY SERPL CALC.SUM OF ELEC: 311 MOSM/KG (ref 275–295)
PLATELET # BLD AUTO: 290 10(3)UL (ref 150–450)
POTASSIUM SERPL-SCNC: 4.4 MMOL/L (ref 3.5–5.1)
RBC # BLD AUTO: 3.5 X10(6)UL
SODIUM SERPL-SCNC: 142 MMOL/L (ref 136–145)
SODIUM SERPL-SCNC: 43 MMOL/L
WBC # BLD AUTO: 15.8 X10(3) UL (ref 4–11)

## 2024-08-04 PROCEDURE — 71045 X-RAY EXAM CHEST 1 VIEW: CPT | Performed by: HOSPITALIST

## 2024-08-04 PROCEDURE — 74176 CT ABD & PELVIS W/O CONTRAST: CPT | Performed by: INTERNAL MEDICINE

## 2024-08-04 RX ORDER — VANCOMYCIN HYDROCHLORIDE
15 ONCE
Status: COMPLETED | OUTPATIENT
Start: 2024-08-04 | End: 2024-08-04

## 2024-08-04 NOTE — CONSULTS
NEPHROLOGY CONSULT NOTE     DATE: 8/4/2024    Requesting Physician: Dr. Mitchell      Reason for Consult: ALBERT      HISTORY OF PRESENT ILLNESS: Roc Butcher is an 84 year old male with PMH sig for DM2, HTN, CAD, Afib, systolic heart failure.  Hx of bladder CA and is s/p laparoscopic cystoprostatectomy with ileal conduit diversion on 7/25.  Post op course complicated by fever and leukocytosis. Blood culture positive for enterococcus.  Patient was started on IV antibiotics and ID has been consulted.  Patient also with ALBERT with worsening to 2.27 this AM.  sCr 1.29 on admission. Nephrology is consulted for ALBERT.  Patient currently denies SOB or n/v. Appetite has been decreased, but he is drinking fluids.        MEDICAL HISTORY:   Past Medical History:    Arrhythmia    Cancer (HCC)    bladder    Cataract    High blood pressure    High cholesterol    History of blood transfusion    HYPERLIPIDEMIA    HYPERTENSION    Other and unspecified hyperlipidemia    STROKE    Type II or unspecified type diabetes mellitus without mention of complication, not stated as uncontrolled    Unspecified essential hypertension    Visual impairment       SURGICAL HISTORY:   Past Surgical History:   Procedure Laterality Date    Cardiac pacemaker placement      Other surgical history      bladder tumor removal    Tonsillectomy         FAMILY HISTORY:   Family History   Problem Relation Age of Onset    Heart Disorder Father     Other Father         HIP FX    Heart Disorder Mother     Other Mother         CVA AGE 80       SOCIAL HISTORY:   Social History     Socioeconomic History    Marital status:      Spouse name: Not on file    Number of children: Not on file    Years of education: Not on file    Highest education level: Not on file   Occupational History    Not on file   Tobacco Use    Smoking status: Former     Types: Cigarettes    Smokeless tobacco: Never    Tobacco comments:     2010   Vaping Use    Vaping status: Never Used    Substance and Sexual Activity    Alcohol use: Yes     Comment: HEAVY PREVIOUS NOW SOCIAL    Drug use: No    Sexual activity: Not on file   Other Topics Concern    Not on file   Social History Narrative    Not on file     Social Determinants of Health     Financial Resource Strain: Low Risk  (5/30/2024)    Received from [facility]    Financial Resource Strain     In the past year, have you or any family members you live with been unable to get any of the following when it was really needed? Check all that apply.: None   Food Insecurity: No Food Insecurity (7/25/2024)    Food Insecurity     Food Insecurity: Never true   Transportation Needs: No Transportation Needs (7/25/2024)    Transportation Needs     Lack of Transportation: No     Car Seat: Not on file   Physical Activity: High Risk (5/3/2024)    Received from [facility]    Exercise Vital Sign     On average, how many days per week do you engage in moderate to strenuous exercise (like a brisk walk)?: 0 days     On average, how many minutes do you engage in exercise at this level?: 0 min   Stress: Not on file   Social Connections: Medium Risk (5/30/2024)    Received from [facility]    Social Connections     How often do you see or talk to people that you care about and feel close to? (For example: talking to friends on the phone, visiting friends or family, going to [employer] or club meetings): 1 or 2 times a week   Housing Stability: Low Risk  (7/25/2024)    Housing Stability     Housing Instability: No     Housing Instability Emergency: Not on file     Crib or Bassinette: Not on file       MEDICATIONS:   Current Facility-Administered Medications   Medication Dose Route Frequency    Vancomycin: PHARMACY DOSING  1 each Intravenous See Admin Instructions (RX holding)    acetaminophen (Ofirmev) 10 mg/mL infusion premix 1,000 mg  1,000 mg Intravenous Q6H PRN    lactated ringers infusion   Intravenous Continuous    risperiDONE  (RisperDAL) tab 0.25 mg  0.25 mg Oral QPM    temazepam (Restoril) cap 15 mg  15 mg Oral Nightly PRN    acetaminophen (Tylenol) tab 650 mg  650 mg Oral Q8H PRN    melatonin cap/tab 5 mg  5 mg Oral Nightly    isosorbide dinitrate (Isordil Titradose) tab 5 mg  5 mg Oral TID    metoprolol tartrate (Lopressor) tab 25 mg  25 mg Oral 2x Daily(Beta Blocker)    docusate sodium (Colace) cap 100 mg  100 mg Oral BID PRN    apixaban (Eliquis) tab 5 mg  5 mg Oral BID    atorvastatin (Lipitor) tab 40 mg  40 mg Oral Daily    hydrALAZINE (Apresoline) tab 10 mg  10 mg Oral TID    glucose (Dex4) 15 GM/59ML oral liquid 15 g  15 g Oral Q15 Min PRN    Or    glucose (Glutose) 40% oral gel 15 g  15 g Oral Q15 Min PRN    Or    glucose-vitamin C (Dex-4) chewable tab 4 tablet  4 tablet Oral Q15 Min PRN    Or    dextrose 50% injection 50 mL  50 mL Intravenous Q15 Min PRN    Or    glucose (Dex4) 15 GM/59ML oral liquid 30 g  30 g Oral Q15 Min PRN    Or    glucose (Glutose) 40% oral gel 30 g  30 g Oral Q15 Min PRN    Or    glucose-vitamin C (Dex-4) chewable tab 8 tablet  8 tablet Oral Q15 Min PRN    insulin aspart (NovoLOG) 100 Units/mL FlexPen 1-11 Units  1-11 Units Subcutaneous TID CC    ondansetron (Zofran) 4 MG/2ML injection 4 mg  4 mg Intravenous Q6H PRN    famotidine (Pepcid) tab 20 mg  20 mg Oral BID         ALLERGIES:   Allergies   Allergen Reactions    Metformin Hcl OTHER (SEE COMMENTS)      Oral,   severe dry mouth       REVIEW OF SYSTEMS:  A comprehensive review of systems was conducted and is negative except for what is mentioned in the HPI      PHYSICAL EXAM:  /56 (BP Location: Left arm)   Pulse 80   Temp 98.1 °F (36.7 °C) (Axillary)   Resp 18   Ht 5' 9\" (1.753 m)   Wt 167 lb (75.8 kg)   SpO2 99%   BMI 24.66 kg/m²   GEN:  NAD  HEENT: NCAT, dry MM   CHEST: CTA b/l  CARDIAC: S1S2 normal  ABD: soft, NT/ND  : urostomy - hematuria noted   EXT: no lower ext edema  NEURO: sleepy       LABS:  Recent Labs   Lab 08/02/24  0939  08/03/24  1527 08/04/24  0739   * 243* 175*   BUN 44* 48* 49*   CREATSERUM 1.84* 2.14* 2.27*   EGFRCR 36* 30* 28*   CA 8.1* 8.3* 8.3*    141 142   K 3.9 4.3 4.4   * 114* 115*   CO2 21.0 17.0* 18.0*     Recent Labs   Lab 08/01/24  0939 08/03/24  1527 08/04/24  0739   RBC 3.85 4.20 3.50*   HGB 10.3* 11.4* 9.5*   HCT 34.0* 36.2* 30.6*   MCV 88.3 86.2 87.4   MCH 26.8 27.1 27.1   MCHC 30.3* 31.5 31.0   RDW 23.6* 23.5* 23.6*   NEPRELIM 4.69 12.05* 13.04*   WBC 6.2 14.0* 15.8*   .0 288.0 290.0           INTAKE/OUTPUT:    Intake/Output Summary (Last 24 hours) at 8/4/2024 1240  Last data filed at 8/4/2024 0505  Gross per 24 hour   Intake 1222.5 ml   Output 350 ml   Net 872.5 ml             IMAGING:  XR CHEST AP PORTABLE  (CPT=71045)    Result Date: 8/4/2024  CONCLUSION:   Cardiomegaly with mild bilateral perihilar opacity which could reflect edema.  Mild left greater than right bibasilar opacity may reflect atelectasis with or without superimposed pneumonia.     Dictated by (CST): Angel Le MD on 8/04/2024 at 9:32 AM     Finalized by (CST): Angel Le MD on 8/04/2024 at 9:34 AM              ASSESSMENT AND PLAN:   This is an 84 year old male with PMH sig for DM2, HTN, CAD, Afib, systolic heart failure.  Hx of bladder CA and is s/p laparoscopic cystoprostatectomy with ileal conduit diversion on 7/25. Post op course complicated by enterococcus bacteremia. Nephrology is consulted for ALBERT.     Non-oliguric ALBERT   - sCr 1.29 on admission  - Urine Na 43   - etiology may be pre-renal ALBERT vs ATN in the setting of infection  - sCr 2.27 today -may be starting to plateau   - continue IVFs as clinically patient appears dry on exam   - hold lasix    - continue supportive care    - await results of CT abd /p  - renally dose meds   - avoid nephro toxins  - follow renal fxn and I/Os  - no acute indication for RRT      Acidosis   - likely due to ALBERT and ileal conduit   - currently on LR   - monitor -  may need sodium bicarbonate supplementation if not improving      HTN    - Metoprolol, hydralazine      Fever, leukocytosis   Enterococcus bacteremia  - management and abx per primary / ID      Bladder CA s/p laparoscopic cystoprostatectomy with ileal conduit diversion 7/25  - per Urology     Dw RN     Thank you for the consult and allowing us to participate in the care of Roc Butcher.  We will continue to follow.    Megan Galeano MD  [facility] - Nephrology  8/4/2024

## 2024-08-04 NOTE — PLAN OF CARE
Roc is drowsy and oriented x1. At times can have a conversation, words still at times don't make sense, getting clear but still garbled at times. Currently on video monitoring, bed alarm in place. V paced with pacemaker. On Eliquis, on RA. ACHS blood glucose monitoring overnight. Incontinent. Has ileoconduit in place to nath bag, monitoring I&Os. Max assist, repos as breanne in bed. Hygiene care provided prn. Incisions in place, dermabond and open to air, slightly red to two lap site on the left. Pain managed and no NV. Had temp of 101F, pageashley NEWBERRY to notify, no new orders, on IV abx Rocephin, gave Tylenol IV prn to assist. IVF LR at 75 ml/hr via right ext length IV. Throughout the night, noticed more cough and some wheezes/crackles in lung sounds. Pageashley NEWBERRY to notify, CXR ordered for the morning to assess. Plan for [facility] pending choice, plan pending course, safety measures in place, call light within reach.     Problem: Patient Centered Care  Goal: Patient preferences are identified and integrated in the patient's plan of care  Description: Interventions:  - What would you like us to know as we care for you? From home with wife  - Provide timely, complete, and accurate information to patient/family  - Incorporate patient and family knowledge, values, beliefs, and cultural backgrounds into the planning and delivery of care  - Encourage patient/family to participate in care and decision-making at the level they choose  - Honor patient and family perspectives and choices  Outcome: Progressing     Problem: Patient/Family Goals  Goal: Patient/Family Long Term Goal  Description: Patient's Long Term Goal:     Interventions:  -   - See additional Care Plan goals for specific interventions  Outcome: Progressing  Goal: Patient/Family Short Term Goal  Description: Patient's Short Term Goal:     Interventions:   -   - See additional Care Plan goals for specific interventions  Outcome: Progressing     Problem: Impaired  Cognition  Goal: Patient will exhibit improved attention, thought processing and/or memory  Description: Interventions:  - Minimize distractions in the room when full attention is required  Outcome: Progressing     Problem: PAIN - ADULT  Goal: Verbalizes/displays adequate comfort level or patient's stated pain goal  Description: INTERVENTIONS:  - Encourage pt to monitor pain and request assistance  - Assess pain using appropriate pain scale  - Administer analgesics based on type and severity of pain and evaluate response  - Implement non-pharmacological measures as appropriate and evaluate response  - Consider cultural and social influences on pain and pain management  - Manage/alleviate anxiety  - Utilize distraction and/or relaxation techniques  - Monitor for opioid side effects  - Notify MD/LIP if interventions unsuccessful or patient reports new pain  - Anticipate increased pain with activity and pre-medicate as appropriate  Outcome: Progressing     Problem: SAFETY ADULT - FALL  Goal: Free from fall injury  Description: INTERVENTIONS:  - Assess pt frequently for physical needs  - Identify cognitive and physical deficits and behaviors that affect risk of falls.  - [place] fall precautions as indicated by assessment.  - Educate pt/family on patient safety including physical limitations  - Instruct pt to call for assistance with activity based on assessment  - Modify environment to reduce risk of injury  - Provide assistive devices as appropriate  - Consider OT/PT consult to assist with strengthening/mobility  - Encourage toileting schedule  Outcome: Progressing     Problem: DISCHARGE PLANNING  Goal: Discharge to home or other facility with appropriate resources  Description: INTERVENTIONS:  - Identify barriers to discharge w/pt and caregiver  - Include patient/family/discharge partner in discharge planning  - Arrange for needed discharge resources and transportation as appropriate  - Identify discharge learning  needs (meds, wound care, etc)  - Arrange for interpreters to assist at discharge as needed  - Consider post-discharge preferences of patient/family/discharge partner  - Complete POLST form as appropriate  - Assess patient's ability to be responsible for managing their own health  - Refer to Case Management Department for coordinating discharge planning if the patient needs post-hospital services based on physician/LIP order or complex needs related to functional status, cognitive ability or social support system  Outcome: Progressing     Problem: RISK FOR INFECTION - ADULT  Goal: Absence of fever/infection during anticipated neutropenic period  Description: INTERVENTIONS  - Monitor WBC  - Administer growth factors as ordered  - Implement neutropenic guidelines  Outcome: Progressing     Problem: CARDIOVASCULAR - ADULT  Goal: Maintains optimal cardiac output and hemodynamic stability  Description: INTERVENTIONS:  - Monitor vital signs, rhythm, and trends  - Monitor for bleeding, hypotension and signs of decreased cardiac output  - Evaluate effectiveness of vasoactive medications to optimize hemodynamic stability  - Monitor arterial and/or venous puncture sites for bleeding and/or hematoma  - Assess quality of pulses, skin color and temperature  - Assess for signs of decreased coronary artery perfusion - ex. Angina  - Evaluate fluid balance, assess for edema, trend weights  Outcome: Progressing  Goal: Absence of cardiac arrhythmias or at baseline  Description: INTERVENTIONS:  - Continuous cardiac monitoring, monitor vital signs, obtain 12 lead EKG if indicated  - Evaluate effectiveness of antiarrhythmic and heart rate control medications as ordered  - Initiate emergency measures for life threatening arrhythmias  - Monitor electrolytes and administer replacement therapy as ordered  Outcome: Progressing     Problem: GASTROINTESTINAL - ADULT  Goal: Minimal or absence of nausea and vomiting  Description: INTERVENTIONS:  -  Maintain adequate hydration with IV or PO as ordered and tolerated  - Nasogastric tube to low intermittent suction as ordered  - Evaluate effectiveness of ordered antiemetic medications  - Provide nonpharmacologic comfort measures as appropriate  - Advance diet as tolerated, if ordered  - Obtain nutritional consult as needed  - Evaluate fluid balance  Outcome: Progressing  Goal: Maintains or returns to baseline bowel function  Description: INTERVENTIONS:  - Assess bowel function  - Maintain adequate hydration with IV or PO as ordered and tolerated  - Evaluate effectiveness of GI medications  - Encourage mobilization and activity  - Obtain nutritional consult as needed  - Establish a toileting routine/schedule  - Consider collaborating with pharmacy to review patient's medication profile  Outcome: Progressing     Problem: GENITOURINARY - ADULT  Goal: Absence of urinary retention  Description: INTERVENTIONS:  - Assess patient’s ability to void and empty bladder  - Monitor intake/output and perform bladder scan as needed  - Follow urinary retention protocol/standard of care  - Consider collaborating with pharmacy to review patient's medication profile  - Implement strategies to promote bladder emptying  Outcome: Progressing     Problem: METABOLIC/FLUID AND ELECTROLYTES - ADULT  Goal: Electrolytes maintained within normal limits  Description: INTERVENTIONS:  - Monitor labs and rhythm and assess patient for signs and symptoms of electrolyte imbalances  - Administer electrolyte replacement as ordered  - Monitor response to electrolyte replacements, including rhythm and repeat lab results as appropriate  - Fluid restriction as ordered  - Instruct patient on fluid and nutrition restrictions as appropriate  Outcome: Progressing     Problem: SKIN/TISSUE INTEGRITY - ADULT  Goal: Skin integrity remains intact  Description: INTERVENTIONS  - Assess and document risk factors for pressure ulcer development  - Assess and document  skin integrity  - Monitor for areas of redness and/or skin breakdown  - Initiate interventions, skin care algorithm/standards of care as needed  Outcome: Progressing  Goal: Incision(s), wounds(s) or drain site(s) healing without S/S of infection  Description: INTERVENTIONS:  - Assess and document risk factors for pressure ulcer development  - Assess and document skin integrity  - Assess and document dressing/incision, wound bed, drain sites and surrounding tissue  - Implement wound care per orders  - Initiate isolation precautions as appropriate  - Initiate Pressure Ulcer prevention bundle as indicated  Outcome: Progressing     Problem: HEMATOLOGIC - ADULT  Goal: Maintains hematologic stability  Description: INTERVENTIONS  - Assess for signs and symptoms of bleeding or hemorrhage  - Monitor labs and vital signs for trends  - Administer supportive blood products/factors, fluids and medications as ordered and appropriate  - Administer supportive blood products/factors as ordered and appropriate  Outcome: Progressing  Goal: Free from bleeding injury  Description: (Example usage: patient with low platelets)  INTERVENTIONS:  - Avoid intramuscular injections, enemas and rectal medication administration  - Ensure safe mobilization of patient  - Hold pressure on venipuncture sites to achieve adequate hemostasis  - Assess for signs and symptoms of internal bleeding  - Monitor lab trends  - Patient is to report abnormal signs of bleeding to staff  - Avoid use of toothpicks and dental floss  - Use electric shaver for shaving  - Use soft bristle tooth brush  - Limit straining and forceful nose blowing  Outcome: Progressing     Problem: MUSCULOSKELETAL - ADULT  Goal: Return mobility to safest level of function  Description: INTERVENTIONS:  - Assess patient stability and activity tolerance for standing, transferring and ambulating w/ or w/o assistive devices  - Assist with transfers and ambulation using safe patient handling  equipment as needed  - Ensure adequate protection for wounds/incisions during mobilization  - Obtain PT/OT consults as needed  - Advance activity as appropriate  - Communicate ordered activity level and limitations with patient/family  Outcome: Progressing  Goal: Maintain proper alignment of affected body part  Description: INTERVENTIONS:  - Support and protect limb and body alignment per provider's orders  - Instruct and reinforce with patient and family use of appropriate assistive device and precautions (e.g. spinal or hip dislocation precautions)  Outcome: Progressing     Problem: NEUROLOGICAL - ADULT  Goal: Achieves stable or improved neurological status  Description: INTERVENTIONS  - Assess for and report changes in neurological status  - Initiate measures to prevent increased intracranial pressure  - Maintain blood pressure and fluid volume within ordered parameters to optimize cerebral perfusion and minimize risk of hemorrhage  - Monitor temperature, glucose, and sodium. Initiate appropriate interventions as ordered  Outcome: Progressing     Problem: Impaired Communication  Goal: Patient will achieve maximal communication potential  Description: Interventions:  - Encourage use of alternative/augmentative communication to express basic wants and needs (use of communication/letter board/or smartphone/tablet/handwriting)  Outcome: Progressing     Problem: Safety Risk - Non-Violent Restraints  Goal: Patient will remain free from self-harm  Description: INTERVENTIONS:  - Apply the least restrictive restraint to prevent harm  - Notify patient and family of reasons restraints applied  - Assess for any contributing factors to confusion (electrolyte disturbances, delirium, medications)  - Discontinue any unnecessary medical devices as soon as possible  - Assess the patient's physical comfort, circulation, skin condition, hydration, nutrition and elimination needs   - Reorient and redirection as needed  - Assess for  the need to continue restraints  Outcome: Progressing     Problem: Delirium  Goal: Minimize duration of delirium  Description: Interventions:  - Encourage use of hearing aids, eye glasses  - Promote highest level of mobility daily  - Provide frequent reorientation  - Promote wakefulness i.e. lights on, blinds open  - Promote sleep, encourage patient's normal rest cycle i.e. lights off, TV off, minimize noise and interruptions  - Encourage family to assist in orientation and promotion of home routines  Outcome: Progressing

## 2024-08-04 NOTE — PROGRESS NOTES
[facility]  part of [facility]     Progress Note    Roc Butcher Patient Status:  Inpatient    1940 MRN [id number]   Location [facility] 4W/SW/SE Attending Ziggy Mitchell, DO   Hosp Day # 10 PCP Mitch Herrera MD     Subjective:  Roc Butcher is a(n) 84 year old male.    Current complaints: none, still confused, family at bedside.    Medications:  Current Facility-Administered Medications   Medication Dose Route Frequency    Vancomycin: PHARMACY DOSING  1 each Intravenous See Admin Instructions (RX holding)    acetaminophen (Ofirmev) 10 mg/mL infusion premix 1,000 mg  1,000 mg Intravenous Q6H PRN    lactated ringers infusion   Intravenous Continuous    risperiDONE (RisperDAL) tab 0.25 mg  0.25 mg Oral QPM    temazepam (Restoril) cap 15 mg  15 mg Oral Nightly PRN    acetaminophen (Tylenol) tab 650 mg  650 mg Oral Q8H PRN    melatonin cap/tab 5 mg  5 mg Oral Nightly    isosorbide dinitrate (Isordil Titradose) tab 5 mg  5 mg Oral TID    metoprolol tartrate (Lopressor) tab 25 mg  25 mg Oral 2x Daily(Beta Blocker)    docusate sodium (Colace) cap 100 mg  100 mg Oral BID PRN    apixaban (Eliquis) tab 5 mg  5 mg Oral BID    atorvastatin (Lipitor) tab 40 mg  40 mg Oral Daily    hydrALAZINE (Apresoline) tab 10 mg  10 mg Oral TID    glucose (Dex4) 15 GM/59ML oral liquid 15 g  15 g Oral Q15 Min PRN    Or    glucose (Glutose) 40% oral gel 15 g  15 g Oral Q15 Min PRN    Or    glucose-vitamin C (Dex-4) chewable tab 4 tablet  4 tablet Oral Q15 Min PRN    Or    dextrose 50% injection 50 mL  50 mL Intravenous Q15 Min PRN    Or    glucose (Dex4) 15 GM/59ML oral liquid 30 g  30 g Oral Q15 Min PRN    Or    glucose (Glutose) 40% oral gel 30 g  30 g Oral Q15 Min PRN    Or    glucose-vitamin C (Dex-4) chewable tab 8 tablet  8 tablet Oral Q15 Min PRN    insulin aspart (NovoLOG) 100 Units/mL FlexPen 1-11 Units  1-11 Units Subcutaneous TID CC    ondansetron (Zofran) 4 MG/2ML injection 4 mg   4 mg Intravenous Q6H PRN    famotidine (Pepcid) tab 20 mg  20 mg Oral BID     Objective:  /61 (BP Location: Right arm)   Pulse 76   Temp 98.5 °F (36.9 °C) (Axillary)   Resp 20   Ht 5' 9\" (1.753 m)   Wt 167 lb (75.8 kg)   SpO2 99%   BMI 24.66 kg/m²     Intake/Output Summary (Last 24 hours) at 8/4/2024 1128  Last data filed at 8/4/2024 0505  Gross per 24 hour   Intake 1222.5 ml   Output 350 ml   Net 872.5 ml       General Appearance: Alert, cooperative, no distress, appears stated age  Head: Normocephalic, without obvious abnormality, atraumatic  Lungs: Respirations unlabored  Abdomen: Soft, non-tender, bowel sounds active all four quadrants, no masses,  no organomegaly-incisions intact  Genitalia: male without lesions, discharge or tenderness  Urine: Clear per conduit site            Lab Results   Component Value Date    WBC 15.8 08/04/2024    HGB 9.5 08/04/2024    HCT 30.6 08/04/2024    .0 08/04/2024    CREATSERUM 2.27 08/04/2024    BUN 49 08/04/2024     08/04/2024    K 4.4 08/04/2024     08/04/2024    CO2 18.0 08/04/2024     08/04/2024    CA 8.3 08/04/2024            Assessment:  S.p. radical cystectomy w ileal conduit, fevers, ALBERT, enterrococus bacteremia.    Plan:  Agree with nephrology consult; will follow CT results.    Marjorie Cabrera MD  -Covering for [facility]

## 2024-08-04 NOTE — CM/SW NOTE
Up[facility]  Will require a new insurance auth prior to transitioning to [facility].    Nora Brown MBA BSN RN CRRN   RN Case Manager  [phone]

## 2024-08-04 NOTE — PROGRESS NOTES
[facility] Hospitalist Progress Note     CC: Hospital Follow up    PCP: Mitch Herrera MD       Assessment/Plan:   Roc Butcher is a 84 year old male with PMH sig for type 2 diabetes, CAD status post PCI to left circumflex, PAD, pacemaker, hypertension, paroxysmal A-fib on Eliquis baseline, and chronic systolic heart failure with a EF of 35% hyperlipidemia, prior CVA, and bladder cancer who presented for laparoscopic cystoprostatectomy with ileal conduit diversion.    Post op course with complicated by delirium, ALBERT, bacteremia.      Delirium, severe but improving   -psychiatry on consult  -improving  -medication adjustments noted  -treat any reversible medical/metabolic issues (now noted to be bacteremic), also ALBERT    Enterococcus bacteremia  -now with positive blood cultures  -suspect urinary source  -CT abdomen today without contrast (has ALBERT)  -vancomycin started overnight, further adjustments per ID, consulted      Right Parotid Mass  -noted on CT head   -Partially imaged ovoid 1.6 cm low-attenuation mass in the right parotid gland.   -outpt ENT eval for histological sampling to r/o neoplasm     Bladder cancer S/P lap cystoprostatectomy with ileal conduit diversion on 7/25/2024  -pain meds-change to prn tylenol  -bowel regimen prn  -urostomy teaching   -appt with urology 8/12     CAD status post PCI to L Cfx  PAD  S/P pacemaker  Hypertension,  PAF  Chronic HFrEF -35%   HL  Previous CVA  -CT head with old infarcts- noted on previous imaging  -Not on ACE ARB due to renal function and hypotension.   -Continue hydralazin, eliquis, hydralazine, lopressor and imdur  -holding home ASA for now  -holding lasix with decreased po and also worsening Cr  -follows with [facility]     ALBERT  Hypernatremia  -suspect hypovolemic and not taking in much free water   -8/3 changed from 0.45 to LR, consulted nephrology   -this AM Cr worse again  -obtain CT scan without contrast     -DM Type II  -HgbA1C  6.6  -home regimen: lantus 15 units nightly plus novolog   -SSI prn  -accuchecks  -ADA diet     Diet: low fiber soft  DVT prophylaxis: eliquis  Code status: FULL  Dispo: not ready to DC    Discussed with son and wife.   Discussed with urologist Dr. Cabrera at bedside.     Ziggy Mitchell DO  [facility] Hospitalist      Subjective:     Sitting in chair.   Fever last night  Antibiotics changed    OBJECTIVE:    Blood pressure 135/56, pulse 80, temperature 98.1 °F (36.7 °C), temperature source Axillary, resp. rate 18, height 5' 9\" (1.753 m), weight 167 lb (75.8 kg), SpO2 99%.    Temp:  [98.1 °F (36.7 °C)-101 °F (38.3 °C)] 98.1 °F (36.7 °C)  Pulse:  [60-80] 80  Resp:  [18-20] 18  BP: (108-135)/(45-85) 135/56  SpO2:  [98 %-99 %] 99 %      Intake/Output:    Intake/Output Summary (Last 24 hours) at 8/4/2024 1310  Last data filed at 8/4/2024 0505  Gross per 24 hour   Intake 1222.5 ml   Output 550 ml   Net 672.5 ml       Last 3 Weights   07/25/24 1051 167 lb (75.8 kg)   07/12/24 1728 174 lb (78.9 kg)   07/22/24 1108 166 lb (75.3 kg)   03/21/22 0921 188 lb (85.3 kg)       /56 (BP Location: Left arm)   Pulse 80   Temp 98.1 °F (36.7 °C) (Axillary)   Resp 18   Ht 5' 9\" (1.753 m)   Wt 167 lb (75.8 kg)   SpO2 99%   BMI 24.66 kg/m²   General: Alert, no acute distress  Lungs: clear to ausculation bilaterally  Heart: Regular rate and rhythm  Abdomen: soft, non tender  Extremities: No edema    Data Review:       Labs:     Recent Labs   Lab 08/01/24  0939 08/03/24  1527 08/04/24  0739   RBC 3.85 4.20 3.50*   HGB 10.3* 11.4* 9.5*   HCT 34.0* 36.2* 30.6*   MCV 88.3 86.2 87.4   MCH 26.8 27.1 27.1   MCHC 30.3* 31.5 31.0   RDW 23.6* 23.5* 23.6*   NEPRELIM 4.69 12.05* 13.04*   WBC 6.2 14.0* 15.8*   .0 288.0 290.0         Recent Labs   Lab 08/02/24  0939 08/03/24  1527 08/04/24  0739   * 243* 175*   BUN 44* 48* 49*   CREATSERUM 1.84* 2.14* 2.27*   EGFRCR 36* 30* 28*   CA 8.1* 8.3* 8.3*    141 142   K 3.9  4.3 4.4   * 114* 115*   CO2 21.0 17.0* 18.0*       No results for input(s): \"ALT\", \"AST\", \"ALB\", \"AMYLASE\", \"LIPASE\", \"LDH\" in the last 168 hours.    Invalid input(s): \"ALPHOS\", \"TBIL\", \"DBIL\", \"TPROT\"      Imaging:  XR CHEST AP PORTABLE  (CPT=71045)    Result Date: 8/4/2024  CONCLUSION:   Cardiomegaly with mild bilateral perihilar opacity which could reflect edema.  Mild left greater than right bibasilar opacity may reflect atelectasis with or without superimposed pneumonia.     Dictated by (CST): Angel Le MD on 8/04/2024 at 9:32 AM     Finalized by (CST): Angel Le MD on 8/04/2024 at 9:34 AM             Meds:      Vancomycin IV  1 each Intravenous See Admin Instructions (RX holding)    risperiDONE  0.25 mg Oral QPM    melatonin  5 mg Oral Nightly    isosorbide dinitrate  5 mg Oral TID    metoprolol tartrate  25 mg Oral 2x Daily(Beta Blocker)    apixaban  5 mg Oral BID    atorvastatin  40 mg Oral Daily    hydrALAZINE  10 mg Oral TID    insulin aspart  1-11 Units Subcutaneous TID CC    famotidine  20 mg Oral BID      lactated ringers 75 mL/hr at 08/04/24 0504       acetaminophen    temazepam    acetaminophen    docusate sodium    glucose **OR** glucose **OR** glucose-vitamin C **OR** dextrose **OR** glucose **OR** glucose **OR** glucose-vitamin C    ondansetron

## 2024-08-04 NOTE — CONSULTS
[facility]  part of [facility] Infectious Disease  Report of Consultation    Roc Butcher Patient Status:  Inpatient    1940 MRN [id number]   Location [facility] 4W/SW/SE Attending Ziggy Mitchell, DO   Hosp Day # 10 PCP Mitch Herrera MD     Date of Admission:  2024  Date of Consult:  2024    Reason for Consultation:  Enterococcus sepsis    History of Present Illness:  Roc Butcher is a a(n) 84 year old male being seen at your request regarding enterococcus sepsis.  Patient with a h/o bladder cancer and presented to the hospital on 24 for a cystoprostatectomy with ileal condiut.  Patient's course has been complicated by delirium and confusion.  He spiked a fever and blood cultures were obtained on 8/3/24 and now 2/2 blood cultures are isolating enterococcus.  Urine cultures obtained as well as  source presumed.      Patient has been started on IV vancomycin.  Presently he is quite lethargic so much of history is through chart review.  We are asked to see and assist.     History:  Past Medical History:    Arrhythmia    Cancer (HCC)    bladder    Cataract    High blood pressure    High cholesterol    History of blood transfusion    HYPERLIPIDEMIA    HYPERTENSION    Other and unspecified hyperlipidemia    STROKE    Type II or unspecified type diabetes mellitus without mention of complication, not stated as uncontrolled    Unspecified essential hypertension    Visual impairment     Past Surgical History:   Procedure Laterality Date    Cardiac pacemaker placement      Other surgical history      bladder tumor removal    Tonsillectomy       Family History   Problem Relation Age of Onset    Heart Disorder Father     Other Father         HIP FX    Heart Disorder Mother     Other Mother         CVA AGE 80      reports that he has quit smoking. His smoking use included cigarettes. He has never used smokeless tobacco. He reports  current alcohol use. He reports that he does not use drugs.    Allergies:  Allergies   Allergen Reactions    Metformin Hcl OTHER (SEE COMMENTS)      Oral,   severe dry mouth       Medications:    Current Facility-Administered Medications:     Vancomycin: PHARMACY DOSING, 1 each, Intravenous, See Admin Instructions (RX holding)    acetaminophen (Ofirmev) 10 mg/mL infusion premix 1,000 mg, 1,000 mg, Intravenous, Q6H PRN    lactated ringers infusion, , Intravenous, Continuous    risperiDONE (RisperDAL) tab 0.25 mg, 0.25 mg, Oral, QPM    temazepam (Restoril) cap 15 mg, 15 mg, Oral, Nightly PRN    acetaminophen (Tylenol) tab 650 mg, 650 mg, Oral, Q8H PRN    melatonin cap/tab 5 mg, 5 mg, Oral, Nightly    isosorbide dinitrate (Isordil Titradose) tab 5 mg, 5 mg, Oral, TID    metoprolol tartrate (Lopressor) tab 25 mg, 25 mg, Oral, 2x Daily(Beta Blocker)    docusate sodium (Colace) cap 100 mg, 100 mg, Oral, BID PRN    apixaban (Eliquis) tab 5 mg, 5 mg, Oral, BID    atorvastatin (Lipitor) tab 40 mg, 40 mg, Oral, Daily    hydrALAZINE (Apresoline) tab 10 mg, 10 mg, Oral, TID    glucose (Dex4) 15 GM/59ML oral liquid 15 g, 15 g, Oral, Q15 Min PRN **OR** glucose (Glutose) 40% oral gel 15 g, 15 g, Oral, Q15 Min PRN **OR** glucose-vitamin C (Dex-4) chewable tab 4 tablet, 4 tablet, Oral, Q15 Min PRN **OR** dextrose 50% injection 50 mL, 50 mL, Intravenous, Q15 Min PRN **OR** glucose (Dex4) 15 GM/59ML oral liquid 30 g, 30 g, Oral, Q15 Min PRN **OR** glucose (Glutose) 40% oral gel 30 g, 30 g, Oral, Q15 Min PRN **OR** glucose-vitamin C (Dex-4) chewable tab 8 tablet, 8 tablet, Oral, Q15 Min PRN    insulin aspart (NovoLOG) 100 Units/mL FlexPen 1-11 Units, 1-11 Units, Subcutaneous, TID CC    ondansetron (Zofran) 4 MG/2ML injection 4 mg, 4 mg, Intravenous, Q6H PRN    famotidine (Pepcid) tab 20 mg, 20 mg, Oral, BID **OR** [DISCONTINUED] famotidine (Pepcid) 20 mg/2mL injection 20 mg, 20 mg, Intravenous, BID    Review of Systems:     Constitutional:  Fevers.   HEENT:  No visual changes, oral ulcers, sore throat, difficulty swallowing.   Respiratory: Negative for cough, sputum, hemoptysis, chest pain, wheezing, dyspnea on exertion, or stridor.   Cardiovascular: Negative for chest pain, palpitations, irregular heart beats.   Gastrointestinal:  No abdominal pain, nausea, vomiting, diarrhea, or constipation.   Genitourinary:  No dysuria, hematuria, urine urgency or frequency.   Integument/breast: Negative for rash, skin lesions, and pruritus.   Hematologic/lymphatic: Negative for easy bruising, bleeding, and lymphadenopathy.   Musculoskeletal: Negative for myalgias, arthralgias, muscle weakness.   Neurological: No focal neurologic deficits, seizures, tremors.   Psych:  No h/o anxiety, depression, other psych d/o.   Endocrine: No history of of diabetes, thyroid disorder.    Remainder of 12 point review of systems otherwise negative.    Vital signs in last 24 hours:  Patient Vitals for the past 24 hrs:   BP Temp Temp src Pulse Resp SpO2   08/04/24 1755 142/64 -- -- 79 20 98 %   08/04/24 1733 143/61 98 °F (36.7 °C) Axillary 81 20 98 %   08/04/24 1231 135/56 98.1 °F (36.7 °C) Axillary 80 18 99 %   08/04/24 0852 122/61 -- -- -- -- --   08/04/24 0847 108/85 98.5 °F (36.9 °C) Axillary 76 20 99 %   08/04/24 0443 123/52 99.3 °F (37.4 °C) Axillary 60 18 98 %   08/04/24 0123 -- 98.1 °F (36.7 °C) Axillary -- -- --   08/03/24 2018 119/45 (!) 101 °F (38.3 °C) Axillary 62 20 98 %       Intake/Output:  I/O this shift:  In: -   Out: 550 [Urine:550]    Physical Exam:   General: Lethargic.   Head: Normocephalic, without obvious abnormality, atraumatic.   Eyes: Conjunctivae/corneas clear.  No scleral icterus.  No conjunctival     hemorrhage.   Nose: Nares normal.   Throat:  Oropharynx clear, MMs moist.   Neck: Trachea ML, no masses.   Lungs: CTA b/l no rhonchi, rales, wheezes.   Chest wall: No tenderness or deformity.   Heart: Regular rate and rhythm, normal S1S2,  no murmurs.   Abdomen: Soft, NT/ND.  Bowel sounds present.  No organomegaly.   Extremity: No edema.   Skin: No rashes or lesions.   Neurological: No focal neurologic deficits.    Lab Data Review:  Lab Results   Component Value Date    WBC 15.8 08/04/2024    HGB 9.5 08/04/2024    HCT 30.6 08/04/2024    .0 08/04/2024    CREATSERUM 2.27 08/04/2024    BUN 49 08/04/2024     08/04/2024    K 4.4 08/04/2024     08/04/2024    CO2 18.0 08/04/2024     08/04/2024    CA 8.3 08/04/2024      Cultures:   2/2 blood cultures isolating enterococcus 8/3/24  - Repeat blood cultures pending    Urine culture pending    Radiology:  Findings and Impression:    Small to moderate bibasilar effusions.  Coronary calcification      Normal liver.  Decompressed gallbladder      Normal pancreas, spleen, adrenals      Mild bilateral hydroureteronephrosis.  No obstructing calculi      Cystoprostatectomy.  RLQ ileal conduit with retrograde stents into the distal ureters      Unobstructed bowel.  Normal appendix      No pelvic fluid collection      Mild anasarca and minimal abdominal wall gas extending into the bilateral inguinal canals     Assessment and Plan:    Post-op sepsis in this elderly gentleman with a history of bladder cancer admitted for cystoprostatectomy on 7/25/24  - Post-op course complicated by hospital delirium, ALBERT, fevers, and bacteremia  - 2/2 blood cultures positive for enterococcus with  source presumed but patient does have a h/o pacemaker so we will need to evaluate for endovascular seeding  - Repeat blood cultures obtained  - Urine cultures obtained  - Will get 2D echo, may need TITA to evaluate PPM leads  - CT without obstruction  - IV vancomycin started - will adjust as needed    2.  AMS with severe delirium  - Overall improving but patient is quite lethargic at this time.    3.  R parotid mass  - Will need w/u for possible malignancy    4.  Multifactorial leukocytosis due to the above  - At 15.8K,  will trend    5.  Worsening renal status with nephrology consult requested   - Renal dosing of antibiotics    6.  Disposition - inpatient.  Hopefully this is a transient  sepsis but cannot exclude a more significant infection process.  F/u MICs for enterococcus.  Continue vancomycin for now, will adjust.  Check 2D echo and may need TITA to evaluate PPM leads if any further positive cultures.  Trending temps and WBCs.  Will follow with further recommendations.      Rabia Claros DOFormerly [facility]  [phone]    8/4/2024  6:26 PM

## 2024-08-04 NOTE — PHYSICAL THERAPY NOTE
PHYSICAL THERAPY TREATMENT NOTE - INPATIENT     Room Number: 454/454-A       Presenting Problem: Bladder CA, (s/p laposcopitc prostatectomy)  Co-Morbidities : CAD, DM, bladder CA    Problem List  Active Problems:    Preop testing    Bladder cancer (HCC)    Delirium due to another medical condition    Episodic mood disorder (HCC)      PHYSICAL THERAPY ASSESSMENT   Patient demonstrates limited progress this session, goals  remain in progress.      Patient is requiring maximum assist x2 as a result of the following impairments: decreased functional strength, decreased endurance/aerobic capacity, pain, impaired static and dynamic balance, decreased muscular endurance, and cognitive deficits (A&Ox1).     Patient continues to function below baseline with bed mobility, transfers, gait, and standing prolonged periods.  Next session anticipate patient to progress bed mobility, transfers, gait, and standing prolonged periods.  Physical Therapy will continue to follow patient for duration of hospitalization.    Patient continues to benefit from continued skilled PT services: to promote return to prior level of function and safety with continuous assistance and gradual rehabilitative therapy .    PLAN  PT Treatment Plan: Bed mobility;Body mechanics;Coordination;Endurance;Energy conservation;Patient education;Gait training;Strengthening;Transfer training;Balance training  Frequency (Obs): 3-5x/week    SUBJECTIVE  Pt was agreeable to therapy session.         OBJECTIVE  Precautions: Bed/chair alarm    WEIGHT BEARING RESTRICTION                PAIN ASSESSMENT   Rating: Unable to rate  Location: abd [facility]  Management Techniques: Activity promotion;Body mechanics;Relaxation;Repositioning    BALANCE  Static Sitting: Fair -  Dynamic Sitting: Poor +  Static Standing: Poor -  Dynamic Standing: Poor -    ACTIVITY TOLERANCE                          O2 WALK       AM-PAC '6-Clicks' INPATIENT SHORT FORM - BASIC MOBILITY  How much  difficulty does the patient currently have...  Patient Difficulty: Turning over in bed (including adjusting bedclothes, sheets and blankets)?: A Lot   Patient Difficulty: Sitting down on and standing up from a chair with arms (e.g., wheelchair, bedside commode, etc.): A Lot   Patient Difficulty: Moving from lying on back to sitting on the side of the bed?: A Lot   How much help from another person does the patient currently need...   Help from Another: Moving to and from a bed to a chair (including a wheelchair)?: A Lot   Help from Another: Need to walk in hospital room?: Total   Help from Another: Climbing 3-5 steps with a railing?: Total     AM-PAC Score:  Raw Score: 10   Approx Degree of Impairment: 76.75%   Standardized Score (AM-PAC Scale): 32.29   CMS Modifier (G-Code): CL    FUNCTIONAL ABILITY STATUS  Functional Mobility/Gait Assessment  Gait Assistance: Maximum assistance (x2)  Distance (ft): SPT  Assistive Device: None (holding onto the therapist)  Pattern: Shuffle  Rolling: maximum assist  Supine to Sit: maximum assist  Sit to Supine:  NT  Sit to Stand: maximum assist x2    Skilled Therapy Provided: Pt is received in the bed and was cleared for therapy session. Pt with some cognitive deficits but is able to follow simple commands. RN was also present and assisted in the session. Pt is max A x2 with bed mobility and to transfer to the EOB. Pt required assist with his B LE's and his upper trunk to sit up. Pt sat EOB for a few minutes and was min A for sitting balance. Pt then was max A x2 with sit<>stand transfers while holding onto the therapist. Pt was able to perform a SPT from the bed to the chair holding therapist max A x2. Pt was able to take a few shuffling steps. Pt then was repositioned and left in the chair with all needs within reach and alarm system activated. Handed pt off to the RN in the room.     The patient's Approx Degree of Impairment: 76.75% has been calculated based on documentation in  the [facility] '6 clicks' Inpatient Daily Activity Short Form.  Research supports that patients with this level of impairment may benefit from Rehab.  Final disposition will be made by interdisciplinary medical team.        Patient End of Session: Up in chair;Needs met;Call light within reach;RN aware of session/findings;All patient questions and concerns addressed;Alarm set;With  staff    CURRENT GOALS   Goals to be met by: 8/20/2024  Patient Goal Patient's self-stated goal is: Return home    Goal #1 Patient is able to demonstrate supine - sit EOB @ level: minimum assistance      Goal #1   Current Status  max A x2    Goal #2 Patient is able to demonstrate transfers Sit to/from Stand at assistance level: minimum assistance with walker - rolling      Goal #2  Current Status  max a x2 while holing therapist   Goal #3 Patient is able to ambulate 30 feet with assist device: walker - rolling at assistance level: minimum assistance   Goal #3   Current Status  SPT max A x2 holding therapist   Goal #4     Goal #4   Current Status     Goal #5 Patient to demonstrate independence with home activity/exercise instructions provided to patient in preparation for discharge.   Goal #5   Current Status IN PROGRESS   Goal #6     Goal #6  Current Status         Therapeutic Activity: 23 minutes

## 2024-08-05 ENCOUNTER — APPOINTMENT (OUTPATIENT)
Dept: CARDIOLOGY | Age: 84
End: 2024-08-05
Attending: INTERNAL MEDICINE

## 2024-08-05 ENCOUNTER — APPOINTMENT (OUTPATIENT)
Dept: CV DIAGNOSTICS | Facility: HOSPITAL | Age: 84
End: 2024-08-05
Attending: INTERNAL MEDICINE
Payer: MEDICARE

## 2024-08-05 LAB
ANION GAP SERPL CALC-SCNC: 9 MMOL/L (ref 0–18)
BASOPHILS # BLD: 0 X10(3) UL (ref 0–0.2)
BASOPHILS NFR BLD: 0 %
BUN BLD-MCNC: 48 MG/DL (ref 9–23)
BUN/CREAT SERPL: 21.7 (ref 10–20)
CALCIUM BLD-MCNC: 8.2 MG/DL (ref 8.7–10.4)
CHLORIDE SERPL-SCNC: 114 MMOL/L (ref 98–112)
CO2 SERPL-SCNC: 18 MMOL/L (ref 21–32)
CREAT BLD-MCNC: 2.21 MG/DL
DEPRECATED RDW RBC AUTO: 70.4 FL (ref 35.1–46.3)
EGFRCR SERPLBLD CKD-EPI 2021: 29 ML/MIN/1.73M2 (ref 60–?)
EOSINOPHIL # BLD: 0 X10(3) UL (ref 0–0.7)
EOSINOPHIL NFR BLD: 0 %
ERYTHROCYTE [DISTWIDTH] IN BLOOD BY AUTOMATED COUNT: 23 % (ref 11–15)
GLUCOSE BLD-MCNC: 159 MG/DL (ref 70–99)
GLUCOSE BLDC GLUCOMTR-MCNC: 145 MG/DL (ref 70–99)
GLUCOSE BLDC GLUCOMTR-MCNC: 164 MG/DL (ref 70–99)
GLUCOSE BLDC GLUCOMTR-MCNC: 192 MG/DL (ref 70–99)
HCT VFR BLD AUTO: 31 %
HGB BLD-MCNC: 10 G/DL
LYMPHOCYTES NFR BLD: 0.36 X10(3) UL (ref 1–4)
LYMPHOCYTES NFR BLD: 2 %
MCH RBC QN AUTO: 27.4 PG (ref 26–34)
MCHC RBC AUTO-ENTMCNC: 32.3 G/DL (ref 31–37)
MCV RBC AUTO: 84.9 FL
MONOCYTES # BLD: 0.54 X10(3) UL (ref 0.1–1)
MONOCYTES NFR BLD: 3 %
NEUTROPHILS # BLD AUTO: 14.96 X10 (3) UL (ref 1.5–7.7)
NEUTROPHILS NFR BLD: 95 %
NEUTS HYPERSEG # BLD: 17.1 X10(3) UL (ref 1.5–7.7)
OSMOLALITY SERPL CALC.SUM OF ELEC: 308 MOSM/KG (ref 275–295)
PLATELET # BLD AUTO: 308 10(3)UL (ref 150–450)
PLATELET MORPHOLOGY: NORMAL
POTASSIUM SERPL-SCNC: 4.4 MMOL/L (ref 3.5–5.1)
RBC # BLD AUTO: 3.65 X10(6)UL
SODIUM SERPL-SCNC: 141 MMOL/L (ref 136–145)
TOTAL CELLS COUNTED BLD: 100
WBC # BLD AUTO: 18 X10(3) UL (ref 4–11)

## 2024-08-05 PROCEDURE — 93306 TTE W/DOPPLER COMPLETE: CPT | Performed by: INTERNAL MEDICINE

## 2024-08-05 PROCEDURE — B24BZZ4 ULTRASONOGRAPHY OF HEART WITH AORTA, TRANSESOPHAGEAL: ICD-10-PCS | Performed by: INTERNAL MEDICINE

## 2024-08-05 RX ORDER — SODIUM CHLORIDE 9 MG/ML
INJECTION, SOLUTION INTRAVENOUS
Status: ACTIVE | OUTPATIENT
Start: 2024-08-06 | End: 2024-08-06

## 2024-08-05 RX ORDER — SODIUM CHLORIDE 0.9 % (FLUSH) 0.9 %
10 SYRINGE (ML) INJECTION AS NEEDED
Status: DISCONTINUED | OUTPATIENT
Start: 2024-08-05 | End: 2024-08-13

## 2024-08-05 RX ORDER — SODIUM CHLORIDE, SODIUM LACTATE, POTASSIUM CHLORIDE, CALCIUM CHLORIDE 600; 310; 30; 20 MG/100ML; MG/100ML; MG/100ML; MG/100ML
INJECTION, SOLUTION INTRAVENOUS CONTINUOUS
Status: DISCONTINUED | OUTPATIENT
Start: 2024-08-05 | End: 2024-08-08

## 2024-08-05 NOTE — PLAN OF CARE
Patient alert and oriented to self, more confused today than other days. Speech more garbled at times today but clear at other times. Patient able to follow commands but very impulsive today trying to get out of bed. This evening more agitated and aggressive. Noted to be febrile. Dr Love notified of increased restlessness and temp; stated to \"continue to monitor\". This RN encourages son to allow patient to rest a little and see if we can hit a reset button due to patient refusing to take medications, including tylenol for fever. Patient at the time of this note, resting in bed, mumbling here and there but not attempting to get out of bed. Continues to deny pain when asked. Continues with IVFs and IV abx. Refusing to eat at all today. Accuchecks AC/HS with insulin sliding scale coverage. Ileal conduit in place, draining dark red colored urine, Dr elizondo aware and stated it was ok. Stents removed from ileal conduit by Dr Elizondo. Abdomen lap sites ZOILA.  2D echo done this am; plans for TITA tomorrow.  Bed locked and in lowest position, call light within reach, bed alarm and chair alarm utilized for added safety along with safety cam. Family at beside throughout the day.    Problem: Patient Centered Care  Goal: Patient preferences are identified and integrated in the patient's plan of care  Description: Interventions:  - What would you like us to know as we care for you? From home with wife  - Provide timely, complete, and accurate information to patient/family  - Incorporate patient and family knowledge, values, beliefs, and cultural backgrounds into the planning and delivery of care  - Encourage patient/family to participate in care and decision-making at the level they choose  - Honor patient and family perspectives and choices  Outcome: Not Progressing     Problem: Impaired Cognition  Goal: Patient will exhibit improved attention, thought processing and/or memory  Description: Interventions:    Outcome: Not  Progressing     Problem: PAIN - ADULT  Goal: Verbalizes/displays adequate comfort level or patient's stated pain goal  Description: INTERVENTIONS:  - Encourage pt to monitor pain and request assistance  - Assess pain using appropriate pain scale  - Administer analgesics based on type and severity of pain and evaluate response  - Implement non-pharmacological measures as appropriate and evaluate response  - Consider cultural and social influences on pain and pain management  - Manage/alleviate anxiety  - Utilize distraction and/or relaxation techniques  - Monitor for opioid side effects  - Notify MD/LIP if interventions unsuccessful or patient reports new pain  - Anticipate increased pain with activity and pre-medicate as appropriate  Outcome: Not Progressing     Problem: SAFETY ADULT - FALL  Goal: Free from fall injury  Description: INTERVENTIONS:  - Assess pt frequently for physical needs  - Identify cognitive and physical deficits and behaviors that affect risk of falls.  - [place] fall precautions as indicated by assessment.  - Educate pt/family on patient safety including physical limitations  - Instruct pt to call for assistance with activity based on assessment  - Modify environment to reduce risk of injury  - Provide assistive devices as appropriate  - Consider OT/PT consult to assist with strengthening/mobility  - Encourage toileting schedule  Outcome: Not Progressing     Problem: DISCHARGE PLANNING  Goal: Discharge to home or other facility with appropriate resources  Description: INTERVENTIONS:  - Identify barriers to discharge w/pt and caregiver  - Include patient/family/discharge partner in discharge planning  - Arrange for needed discharge resources and transportation as appropriate  - Identify discharge learning needs (meds, wound care, etc)  - Arrange for interpreters to assist at discharge as needed  - Consider post-discharge preferences of patient/family/discharge partner  - Complete POLST form as  appropriate  - Assess patient's ability to be responsible for managing their own health  - Refer to Case Management Department for coordinating discharge planning if the patient needs post-hospital services based on physician/LIP order or complex needs related to functional status, cognitive ability or social support system  Outcome: Not Progressing     Problem: RISK FOR INFECTION - ADULT  Goal: Absence of fever/infection during anticipated neutropenic period  Description: INTERVENTIONS  - Monitor WBC  - Administer growth factors as ordered  - Implement neutropenic guidelines  Outcome: Not Progressing     Problem: CARDIOVASCULAR - ADULT  Goal: Maintains optimal cardiac output and hemodynamic stability  Description: INTERVENTIONS:  - Monitor vital signs, rhythm, and trends  - Monitor for bleeding, hypotension and signs of decreased cardiac output  - Evaluate effectiveness of vasoactive medications to optimize hemodynamic stability  - Monitor arterial and/or venous puncture sites for bleeding and/or hematoma  - Assess quality of pulses, skin color and temperature  - Assess for signs of decreased coronary artery perfusion - ex. Angina  - Evaluate fluid balance, assess for edema, trend weights  Outcome: Not Progressing  Goal: Absence of cardiac arrhythmias or at baseline  Description: INTERVENTIONS:  - Continuous cardiac monitoring, monitor vital signs, obtain 12 lead EKG if indicated  - Evaluate effectiveness of antiarrhythmic and heart rate control medications as ordered  - Initiate emergency measures for life threatening arrhythmias  - Monitor electrolytes and administer replacement therapy as ordered  Outcome: Not Progressing     Problem: GASTROINTESTINAL - ADULT  Goal: Minimal or absence of nausea and vomiting  Description: INTERVENTIONS:  - Maintain adequate hydration with IV or PO as ordered and tolerated  - Nasogastric tube to low intermittent suction as ordered  - Evaluate effectiveness of ordered antiemetic  medications  - Provide nonpharmacologic comfort measures as appropriate  - Advance diet as tolerated, if ordered  - Obtain nutritional consult as needed  - Evaluate fluid balance  Outcome: Not Progressing  Goal: Maintains or returns to baseline bowel function  Description: INTERVENTIONS:  - Assess bowel function  - Maintain adequate hydration with IV or PO as ordered and tolerated  - Evaluate effectiveness of GI medications  - Encourage mobilization and activity  - Obtain nutritional consult as needed  - Establish a toileting routine/schedule  - Consider collaborating with pharmacy to review patient's medication profile  Outcome: Not Progressing     Problem: GENITOURINARY - ADULT  Goal: Absence of urinary retention  Description: INTERVENTIONS:  - Assess patient’s ability to void and empty bladder  - Monitor intake/output and perform bladder scan as needed  - Follow urinary retention protocol/standard of care  - Consider collaborating with pharmacy to review patient's medication profile  - Implement strategies to promote bladder emptying  Outcome: Not Progressing     Problem: METABOLIC/FLUID AND ELECTROLYTES - ADULT  Goal: Electrolytes maintained within normal limits  Description: INTERVENTIONS:  - Monitor labs and rhythm and assess patient for signs and symptoms of electrolyte imbalances  - Administer electrolyte replacement as ordered  - Monitor response to electrolyte replacements, including rhythm and repeat lab results as appropriate  - Fluid restriction as ordered  - Instruct patient on fluid and nutrition restrictions as appropriate  Outcome: Not Progressing     Problem: SKIN/TISSUE INTEGRITY - ADULT  Goal: Skin integrity remains intact  Description: INTERVENTIONS  - Assess and document risk factors for pressure ulcer development  - Assess and document skin integrity  - Monitor for areas of redness and/or skin breakdown  - Initiate interventions, skin care algorithm/standards of care as needed  Outcome: Not  Progressing  Goal: Incision(s), wounds(s) or drain site(s) healing without S/S of infection  Description: INTERVENTIONS:  - Assess and document risk factors for pressure ulcer development  - Assess and document skin integrity  - Assess and document dressing/incision, wound bed, drain sites and surrounding tissue  - Implement wound care per orders  - Initiate isolation precautions as appropriate  - Initiate Pressure Ulcer prevention bundle as indicated  Outcome: Not Progressing     Problem: HEMATOLOGIC - ADULT  Goal: Maintains hematologic stability  Description: INTERVENTIONS  - Assess for signs and symptoms of bleeding or hemorrhage  - Monitor labs and vital signs for trends  - Administer supportive blood products/factors, fluids and medications as ordered and appropriate  - Administer supportive blood products/factors as ordered and appropriate  Outcome: Not Progressing  Goal: Free from bleeding injury  Description: (Example usage: patient with low platelets)  INTERVENTIONS:  - Avoid intramuscular injections, enemas and rectal medication administration  - Ensure safe mobilization of patient  - Hold pressure on venipuncture sites to achieve adequate hemostasis  - Assess for signs and symptoms of internal bleeding  - Monitor lab trends  - Patient is to report abnormal signs of bleeding to staff  - Avoid use of toothpicks and dental floss  - Use electric shaver for shaving  - Use soft bristle tooth brush  - Limit straining and forceful nose blowing  Outcome: Not Progressing     Problem: MUSCULOSKELETAL - ADULT  Goal: Return mobility to safest level of function  Description: INTERVENTIONS:  - Assess patient stability and activity tolerance for standing, transferring and ambulating w/ or w/o assistive devices  - Assist with transfers and ambulation using safe patient handling equipment as needed  - Ensure adequate protection for wounds/incisions during mobilization  - Obtain PT/OT consults as needed  - Advance activity  as appropriate  - Communicate ordered activity level and limitations with patient/family  Outcome: Not Progressing  Goal: Maintain proper alignment of affected body part  Description: INTERVENTIONS:  - Support and protect limb and body alignment per provider's orders  - Instruct and reinforce with patient and family use of appropriate assistive device and precautions (e.g. spinal or hip dislocation precautions)  Outcome: Not Progressing     Problem: NEUROLOGICAL - ADULT  Goal: Achieves stable or improved neurological status  Description: INTERVENTIONS  - Assess for and report changes in neurological status  - Initiate measures to prevent increased intracranial pressure  - Maintain blood pressure and fluid volume within ordered parameters to optimize cerebral perfusion and minimize risk of hemorrhage  - Monitor temperature, glucose, and sodium. Initiate appropriate interventions as ordered  Outcome: Not Progressing     Problem: Impaired Communication  Goal: Patient will achieve maximal communication potential  Description: Interventions:    Outcome: Not Progressing     Problem: Safety Risk - Non-Violent Restraints  Goal: Patient will remain free from self-harm  Description: INTERVENTIONS:  - Apply the least restrictive restraint to prevent harm  - Notify patient and family of reasons restraints applied  - Assess for any contributing factors to confusion (electrolyte disturbances, delirium, medications)  - Discontinue any unnecessary medical devices as soon as possible  - Assess the patient's physical comfort, circulation, skin condition, hydration, nutrition and elimination needs   - Reorient and redirection as needed  - Assess for the need to continue restraints  Outcome: Not Progressing     Problem: Delirium  Goal: Minimize duration of delirium  Description: Interventions:  - Encourage use of hearing aids, eye glasses  - Promote highest level of mobility daily  - Provide frequent reorientation  - Promote  wakefulness i.e. lights on, blinds open  - Promote sleep, encourage patient's normal rest cycle i.e. lights off, TV off, minimize noise and interruptions  - Encourage family to assist in orientation and promotion of home routines  Outcome: Not Progressing

## 2024-08-05 NOTE — CONSULTS
Cardiology Consultation  [facility]    Roc Butcher Patient Status:  Inpatient    1940 MRN [id number]   Location [facility] 4W/SW/SE Attending Pearl Love MD   Hosp Day # 11 PCP Mitch Herrera MD     Primary cardiologist: [facility]    Reason for Consultation:  TITA    History of Present Illness:  Roc Butcher is a a(n) 84 year old male with T2DM, CAD s/p PCI (LCx), PAD, PPM, HTN, pAfib on Eliquis, chronic HFrEF (LVEF 35%), HLD, prior CVA, bladder CA initially presented for laparoscopic cystoprostatectomy with ileal conduit diversion.  His postop course has been complicated by Enterococcus bacteremia and UTI status post stent removal.  TTE without obvious endocarditis; however, ID requesting TITA given pacemaker in place.    Patient somewhat confused with delirium, but able to follow my commands and attempts to respond to my questions.  No issues with swallowing food or with aspiration.  Does have dental issues; however, he has no loose teeth.  Confirmed by his wife.    Assessment/Plan:    Enterococcus faecalis bacteremia secondary to UTI  - TTE without any obvious vegetation  Coronary artery disease status post PCI (left circumflex)  Paroxysmal atrial fibrillation on DOAC  Status post pacemaker  Hypertension,  Cardiomyopathy, LVEF 35%  Hyperlipidemia on statin  CVA  Bladder cancer    Plan  Continue anticoagulation  Continue statin  Continue metoprolol, isosorbide dinitrate  Plan for TITA tomorrow  N.p.o. at midnight    History:  Past Medical History:    Arrhythmia    Cancer (HCC)    bladder    Cataract    High blood pressure    High cholesterol    History of blood transfusion    HYPERLIPIDEMIA    HYPERTENSION    Other and unspecified hyperlipidemia    STROKE    Type II or unspecified type diabetes mellitus without mention of complication, not stated as uncontrolled    Unspecified essential hypertension    Visual impairment     Past Surgical History:   Procedure Laterality Date     Cardiac pacemaker placement      Other surgical history      bladder tumor removal    Tonsillectomy       Family History   Problem Relation Age of Onset    Heart Disorder Father     Other Father         HIP FX    Heart Disorder Mother     Other Mother         CVA AGE 80      reports that he has quit smoking. His smoking use included cigarettes. He has never used smokeless tobacco. He reports current alcohol use. He reports that he does not use drugs.    Allergies:  Allergies   Allergen Reactions    Metformin Hcl OTHER (SEE COMMENTS)      Oral,   severe dry mouth       Medications:  No current facility-administered medications on file prior to encounter.     Current Outpatient Medications on File Prior to Encounter   Medication Sig Dispense Refill    isosorbide dinitrate 5 MG Oral Tab Take 1 tablet (5 mg total) by mouth 3 (three) times daily.      furosemide 20 MG Oral Tab Take 1 tablet (20 mg total) by mouth daily.      hydrALAZINE 10 MG Oral Tab Take 1 tablet (10 mg total) by mouth 3 (three) times daily.      insulin aspart (NOVOLOG FLEXPEN) 100 Units/mL Subcutaneous Solution Pen-injector Take 10 units with breakfast  and 10 units lunch      aspirin 81 MG Oral Tab EC Take 1 tablet (81 mg total) by mouth daily.      traMADol 50 MG Oral Tab Take 1 tablet (50 mg total) by mouth every 6 (six) hours as needed for Pain.      ATORVASTATIN 40 MG Oral Tab TAKE 1 TABLET BY MOUTH IN  THE EVENING (Patient taking differently: every morning.) 90 tablet 1    ELIQUIS 5 MG Oral Tab 2 (two) times daily.      LANTUS SOLOSTAR 100 UNIT/ML Subcutaneous Solution Pen-injector Take 15 units daily 30 mL 5    Insulin Lispro, 1 Unit Dial, (HUMALOG KWIKPEN) 100 UNIT/ML Subcutaneous Solution Pen-injector Inject 5 Units into the skin As Directed. Take 5 units with each meal 30 mL 1    metoprolol tartrate 25 MG Oral Tab TAKE ONE tablet daily (Patient taking differently: 2 (two) times daily.) 90 tablet 3    Glucose Blood (ACCU-CHEK MICK PLUS)  In Vitro Strip USE TWICE DAILY 200 strip 3    ACCU-CHEK MICK In Vitro Strip Test glucose three times daily. 300 strip 3    Insulin Pen Needle (BD PEN NEEDLE MINI U/F) 31G X 5 MM Does not apply Misc AS DIRECTED  each 3    ACCU-CHEK MULTICLIX LANCETS Does not apply Misc test blood sugar QID as directed 360 Each 3       Review of Systems:  As above otherwise negative.      Physical Exam:  Blood pressure 123/58, pulse 76, temperature (!) 100.5 °F (38.1 °C), temperature source Axillary, resp. rate 20, height 5' 9\" (1.753 m), weight 167 lb (75.8 kg), SpO2 98%.  Wt Readings from Last 3 Encounters:   07/25/24 167 lb (75.8 kg)   07/22/24 166 lb (75.3 kg)   03/21/22 188 lb (85.3 kg)       HEENT: at/nc, perrl, eomi  Neck: No JVD, carotids 2+ no bruits.  Cardiac: Regular rate and rhythm, S1, S2 normal, no murmur, rub or gallop.  Lungs: Clear without wheezes, rales, rhonchi or dullness.  Normal excursions and effort.  Abdomen: Soft, non-tender, non-distended, normal bowel sounds   Extremities: Without clubbing, cyanosis or edema.  Peripheral pulses are 2+  Psych: somewhat confused; able to follow my commands  Skin: Warm and dry.       Laboratories and Data:  Diagnostics:    Labs:   CBC:    Lab Results   Component Value Date    WBC 18.0 (H) 08/05/2024    WBC 15.8 (H) 08/04/2024    WBC 14.0 (H) 08/03/2024     Lab Results   Component Value Date    HGB 10.0 (L) 08/05/2024    HGB 9.5 (L) 08/04/2024    HGB 11.4 (L) 08/03/2024      Lab Results   Component Value Date    .0 08/05/2024    .0 08/04/2024    .0 08/03/2024     BMP:     Lab Results   Component Value Date    GLUCOSE 128 (H) 12/17/2014    GLUCOSE 144 (H) 08/20/2014    GLUCOSE 120 (H) 03/20/2014     Lab Results   Component Value Date    K 4.4 08/05/2024    K 4.4 08/04/2024    K 4.3 08/03/2024     Lab Results   Component Value Date    BUN 48 (H) 08/05/2024    BUN 49 (H) 08/04/2024    BUN 48 (H) 08/03/2024     Lab Results   Component Value Date     CREATSERUM 2.21 (H) 08/05/2024    CREATSERUM 2.27 (H) 08/04/2024    CREATSERUM 2.14 (H) 08/03/2024     Cholesterol:     Lab Results   Component Value Date    CHOLEST 154.00 03/17/2022    CHOLEST 153.00 08/23/2021    CHOLEST 179.00 03/08/2021     Lab Results   Component Value Date    HDL 49 03/17/2022    HDL 43 08/23/2021    HDL 45 03/08/2021     Lab Results   Component Value Date    TRIG 165.00 (H) 03/17/2022    TRIG 171.00 (H) 08/23/2021    TRIG 170.00 (H) 03/08/2021    TRIGLY 198 (H) 12/17/2014    TRIGLY 260 (H) 08/20/2014    TRIGLY 186 (H) 03/20/2014     Lab Results   Component Value Date    LDL 72 03/17/2022    LDL 76 08/23/2021     03/08/2021     Lab Results   Component Value Date    AST 17 03/17/2022    AST 15 11/08/2021    AST 18 10/04/2021     Lab Results   Component Value Date    ALT 14 03/17/2022    ALT 10 11/08/2021    ALT 16 10/04/2021           Ted Silva MD  Interventional Cardiology  [place]  8/5/2024  5:39 PM

## 2024-08-05 NOTE — PROGRESS NOTES
[facility]  part of [facility]    Progress Note    Roc Butcher Patient Status:  Inpatient    1940 MRN [id number]   Location [facility] 4W/SW/SE Attending Pearl Love MD   Hosp Day # 11 PCP Mitch Herrera MD     Subjective:   Roc Butcher is a(n) 84 year old male   POD#11 radical cystectomy    Objective:   Blood pressure 137/54, pulse 85, temperature 98.3 °F (36.8 °C), temperature source Axillary, resp. rate 18, height 69\", weight 167 lb (75.8 kg), SpO2 97%.    Still confused  Wife at bedside    Abd soft, nt, nd  Stents removed.  No edema    Results:   Lab Results   Component Value Date    WBC 18.0 (H) 2024    HGB 10.0 (L) 2024    HCT 31.0 (L) 2024    .0 2024    CREATSERUM 2.21 (H) 2024    BUN 48 (H) 2024     2024    K 4.4 2024     (H) 2024    CO2 18.0 (L) 2024     (H) 2024    CA 8.2 (L) 2024    ALB 4.0 2022    ALKPHO 75 2022    BILT 0.63 2022    TP 6.1 (L) 2022    AST 17 2022    ALT 14 2022    TSH 1.520 2022    PSA 0.5 2012    B12 380 2020       XR CHEST AP PORTABLE  (CPT=71045)    Result Date: 2024  CONCLUSION:   Cardiomegaly with mild bilateral perihilar opacity which could reflect edema.  Mild left greater than right bibasilar opacity may reflect atelectasis with or without superimposed pneumonia.     Dictated by (CST): Angel Le MD on 2024 at 9:32 AM     Finalized by (CST): Angel Le MD on 2024 at 9:34 AM               Assessment & Plan:       Bladder cancer (HCC)  POD#11 radical cystectomy, ileal conduit   HD stable   UTI    Stents removed today   Stoma pink      Delirium due to another medical condition  Infection, surgery, electrolyte management  Discharge to rehab when improved    Continue hospitalization  Nutrition - with assistance  Needs PT in  bed    **Certification      PHYSICIAN Certification of Need for Inpatient Hospitalization - Initial Certification    Patient will require inpatient services that will reasonably be expected to span two midnight's based on the clinical documentation in H+P.   Based on patients current state of illness, I anticipate that, after discharge, patient will require TBD.      José Miguel Paula MD  8/5/2024

## 2024-08-05 NOTE — PLAN OF CARE
Patient alert and oriented to self, confusion continues to improve but still present. Speech clear today. Patient able to follow commands. Continues to be pleasant with staff and less restless. Easily reoriented when trying to get up and \"go home\".  Continues to deny pain when asked. Continues with IVFs and IV abx. Still tolerating oral intake and able to feed himself a little today, still with poor appetite; completes protein drinks. Accuchecks AC/HS with insulin sliding scale coverage. Ileal conduit in place, draining brown/dark braulio colored urine. Urine culture sent. CT of abd/pelvis completed. Abdomen lap sites ZOILA.  Plans for 2D echo. Patient up to the chair this am into the afternoon.  Bed locked and in lowest position, call light within reach, bed alarm and chair alarm utilized for added safety along with safety cam. Family at beside throughout the day.     Problem: Patient Centered Care  Goal: Patient preferences are identified and integrated in the patient's plan of care  Description: Interventions:  - What would you like us to know as we care for you? From home with wife  - Provide timely, complete, and accurate information to patient/family  - Incorporate patient and family knowledge, values, beliefs, and cultural backgrounds into the planning and delivery of care  - Encourage patient/family to participate in care and decision-making at the level they choose  - Honor patient and family perspectives and choices  Outcome: Progressing     Problem: Impaired Cognition  Goal: Patient will exhibit improved attention, thought processing and/or memory  Description: Interventions:    Outcome: Progressing     Problem: PAIN - ADULT  Goal: Verbalizes/displays adequate comfort level or patient's stated pain goal  Description: INTERVENTIONS:  - Encourage pt to monitor pain and request assistance  - Assess pain using appropriate pain scale  - Administer analgesics based on type and severity of pain and evaluate  response  - Implement non-pharmacological measures as appropriate and evaluate response  - Consider cultural and social influences on pain and pain management  - Manage/alleviate anxiety  - Utilize distraction and/or relaxation techniques  - Monitor for opioid side effects  - Notify MD/LIP if interventions unsuccessful or patient reports new pain  - Anticipate increased pain with activity and pre-medicate as appropriate  Outcome: Progressing     Problem: SAFETY ADULT - FALL  Goal: Free from fall injury  Description: INTERVENTIONS:  - Assess pt frequently for physical needs  - Identify cognitive and physical deficits and behaviors that affect risk of falls.  - [place] fall precautions as indicated by assessment.  - Educate pt/family on patient safety including physical limitations  - Instruct pt to call for assistance with activity based on assessment  - Modify environment to reduce risk of injury  - Provide assistive devices as appropriate  - Consider OT/PT consult to assist with strengthening/mobility  - Encourage toileting schedule  Outcome: Progressing     Problem: DISCHARGE PLANNING  Goal: Discharge to home or other facility with appropriate resources  Description: INTERVENTIONS:  - Identify barriers to discharge w/pt and caregiver  - Include patient/family/discharge partner in discharge planning  - Arrange for needed discharge resources and transportation as appropriate  - Identify discharge learning needs (meds, wound care, etc)  - Arrange for interpreters to assist at discharge as needed  - Consider post-discharge preferences of patient/family/discharge partner  - Complete POLST form as appropriate  - Assess patient's ability to be responsible for managing their own health  - Refer to Case Management Department for coordinating discharge planning if the patient needs post-hospital services based on physician/LIP order or complex needs related to functional status, cognitive ability or social support  system  Outcome: Progressing     Problem: RISK FOR INFECTION - ADULT  Goal: Absence of fever/infection during anticipated neutropenic period  Description: INTERVENTIONS  - Monitor WBC  - Administer growth factors as ordered  - Implement neutropenic guidelines  Outcome: Progressing     Problem: CARDIOVASCULAR - ADULT  Goal: Maintains optimal cardiac output and hemodynamic stability  Description: INTERVENTIONS:  - Monitor vital signs, rhythm, and trends  - Monitor for bleeding, hypotension and signs of decreased cardiac output  - Evaluate effectiveness of vasoactive medications to optimize hemodynamic stability  - Monitor arterial and/or venous puncture sites for bleeding and/or hematoma  - Assess quality of pulses, skin color and temperature  - Assess for signs of decreased coronary artery perfusion - ex. Angina  - Evaluate fluid balance, assess for edema, trend weights  Outcome: Progressing  Goal: Absence of cardiac arrhythmias or at baseline  Description: INTERVENTIONS:  - Continuous cardiac monitoring, monitor vital signs, obtain 12 lead EKG if indicated  - Evaluate effectiveness of antiarrhythmic and heart rate control medications as ordered  - Initiate emergency measures for life threatening arrhythmias  - Monitor electrolytes and administer replacement therapy as ordered  Outcome: Progressing     Problem: GASTROINTESTINAL - ADULT  Goal: Minimal or absence of nausea and vomiting  Description: INTERVENTIONS:  - Maintain adequate hydration with IV or PO as ordered and tolerated  - Nasogastric tube to low intermittent suction as ordered  - Evaluate effectiveness of ordered antiemetic medications  - Provide nonpharmacologic comfort measures as appropriate  - Advance diet as tolerated, if ordered  - Obtain nutritional consult as needed  - Evaluate fluid balance  Outcome: Progressing  Goal: Maintains or returns to baseline bowel function  Description: INTERVENTIONS:  - Assess bowel function  - Maintain adequate  hydration with IV or PO as ordered and tolerated  - Evaluate effectiveness of GI medications  - Encourage mobilization and activity  - Obtain nutritional consult as needed  - Establish a toileting routine/schedule  - Consider collaborating with pharmacy to review patient's medication profile  Outcome: Progressing     Problem: GENITOURINARY - ADULT  Goal: Absence of urinary retention  Description: INTERVENTIONS:  - Assess patient’s ability to void and empty bladder  - Monitor intake/output and perform bladder scan as needed  - Follow urinary retention protocol/standard of care  - Consider collaborating with pharmacy to review patient's medication profile  - Implement strategies to promote bladder emptying  Outcome: Progressing     Problem: METABOLIC/FLUID AND ELECTROLYTES - ADULT  Goal: Electrolytes maintained within normal limits  Description: INTERVENTIONS:  - Monitor labs and rhythm and assess patient for signs and symptoms of electrolyte imbalances  - Administer electrolyte replacement as ordered  - Monitor response to electrolyte replacements, including rhythm and repeat lab results as appropriate  - Fluid restriction as ordered  - Instruct patient on fluid and nutrition restrictions as appropriate  Outcome: Progressing     Problem: SKIN/TISSUE INTEGRITY - ADULT  Goal: Skin integrity remains intact  Description: INTERVENTIONS  - Assess and document risk factors for pressure ulcer development  - Assess and document skin integrity  - Monitor for areas of redness and/or skin breakdown  - Initiate interventions, skin care algorithm/standards of care as needed  Outcome: Progressing  Goal: Incision(s), wounds(s) or drain site(s) healing without S/S of infection  Description: INTERVENTIONS:  - Assess and document risk factors for pressure ulcer development  - Assess and document skin integrity  - Assess and document dressing/incision, wound bed, drain sites and surrounding tissue  - Implement wound care per orders  -  Initiate isolation precautions as appropriate  - Initiate Pressure Ulcer prevention bundle as indicated  Outcome: Progressing     Problem: HEMATOLOGIC - ADULT  Goal: Maintains hematologic stability  Description: INTERVENTIONS  - Assess for signs and symptoms of bleeding or hemorrhage  - Monitor labs and vital signs for trends  - Administer supportive blood products/factors, fluids and medications as ordered and appropriate  - Administer supportive blood products/factors as ordered and appropriate  Outcome: Progressing  Goal: Free from bleeding injury  Description: (Example usage: patient with low platelets)  INTERVENTIONS:  - Avoid intramuscular injections, enemas and rectal medication administration  - Ensure safe mobilization of patient  - Hold pressure on venipuncture sites to achieve adequate hemostasis  - Assess for signs and symptoms of internal bleeding  - Monitor lab trends  - Patient is to report abnormal signs of bleeding to staff  - Avoid use of toothpicks and dental floss  - Use electric shaver for shaving  - Use soft bristle tooth brush  - Limit straining and forceful nose blowing  Outcome: Progressing     Problem: MUSCULOSKELETAL - ADULT  Goal: Return mobility to safest level of function  Description: INTERVENTIONS:  - Assess patient stability and activity tolerance for standing, transferring and ambulating w/ or w/o assistive devices  - Assist with transfers and ambulation using safe patient handling equipment as needed  - Ensure adequate protection for wounds/incisions during mobilization  - Obtain PT/OT consults as needed  - Advance activity as appropriate  - Communicate ordered activity level and limitations with patient/family  Outcome: Progressing  Goal: Maintain proper alignment of affected body part  Description: INTERVENTIONS:  - Support and protect limb and body alignment per provider's orders  - Instruct and reinforce with patient and family use of appropriate assistive device and  precautions (e.g. spinal or hip dislocation precautions)  Outcome: Progressing     Problem: NEUROLOGICAL - ADULT  Goal: Achieves stable or improved neurological status  Description: INTERVENTIONS  - Assess for and report changes in neurological status  - Initiate measures to prevent increased intracranial pressure  - Maintain blood pressure and fluid volume within ordered parameters to optimize cerebral perfusion and minimize risk of hemorrhage  - Monitor temperature, glucose, and sodium. Initiate appropriate interventions as ordered  Outcome: Progressing     Problem: Impaired Communication  Goal: Patient will achieve maximal communication potential  Description: Interventions:    Outcome: Progressing     Problem: Safety Risk - Non-Violent Restraints  Goal: Patient will remain free from self-harm  Description: INTERVENTIONS:  - Apply the least restrictive restraint to prevent harm  - Notify patient and family of reasons restraints applied  - Assess for any contributing factors to confusion (electrolyte disturbances, delirium, medications)  - Discontinue any unnecessary medical devices as soon as possible  - Assess the patient's physical comfort, circulation, skin condition, hydration, nutrition and elimination needs   - Reorient and redirection as needed  - Assess for the need to continue restraints  Outcome: Progressing     Problem: Delirium  Goal: Minimize duration of delirium  Description: Interventions:  - Encourage use of hearing aids, eye glasses  - Promote highest level of mobility daily  - Provide frequent reorientation  - Promote wakefulness i.e. lights on, blinds open  - Promote sleep, encourage patient's normal rest cycle i.e. lights off, TV off, minimize noise and interruptions  - Encourage family to assist in orientation and promotion of home routines  Outcome: Progressing

## 2024-08-05 NOTE — SIGNIFICANT EVENT
Upon entering the nurses station this RN noticed multiple nurses in patients room. Once entered the room this RN was informed that patient had fell, was found to be bended on left knee. Patient ileal conduit bag was off and patient had an incontinent BM. Vitals taken and WNL. Patient denied pain and no new bruising noted. Dr Mitchell notified of incident; no new orders. Wife updated. Patient able to eat dinner at his baseline. Bed locked; bed alarm active.

## 2024-08-05 NOTE — WOUND PROGRESS NOTE
Ostomy Care  Following up on the pt. he is in bed having a bedside test. Spoke with the nurse, the ileal conduit appliance was changed last night. I will follow back up on him.

## 2024-08-05 NOTE — PROGRESS NOTES
[facility] AND CARE   Progress Note  -  Roc Butcher Patient Status:  Inpatient    1940 MRN [id number]   Location [facility] 4W/SW/SE Attending Pearl Love MD   Hosp Day # 11 PCP Mitch Herrera MD     PCP: Mitch Herrera MD      SEE ATTENDING NOTE AT BOTTOM OF PAGE    Is this a shared or split note between Advanced Practice Provider and Physician? Yes    Assessment and Plan:  Roc Butcher is a 84 year old male with PMH sig for type 2 diabetes, CAD status post PCI to left circumflex, PAD, pacemaker, hypertension, paroxysmal A-fib on Eliquis baseline, and chronic systolic heart failure with a EF of 35% hyperlipidemia, prior CVA, and bladder cancer who presented for laparoscopic cystoprostatectomy with ileal conduit diversion.  Post op course with complicated by delirium, seen by psych and enterococcus bacteremia and uti s/p stent removal.  Incidental finding on CT of the head was a right parotid mass measuring 1.6 cm with broad differential and radiology recommended ENT evaluation with strong consideration for histological  sampling for definitive characterization. Pt will need rehab at discharge, see below for details       Delirium  Metabolic Encephalopathy  -CT head without acute process   -infectious tx below  -was on zyprexxa and switched to risperdal also on melatonin  -appreciate psych     Enterococcus bacteremia and UTI  -afebrile. WBC continue to increase, currently at 18K  -blood cx enterococcus with repeat blood cx NGTD  -urine with enterococcus  -CT A/P without acute process   -TTE without mention of vegetation, discussed with ID who request TITA  -stents removed by urology  -abx-vanco as per ID   -as per ID    Acute Kidney Injury on CKD stage 3  Acidosis   -baseline Cr  1.3-1.6, current Cr 2.2   -did not get contrast with CT A/P  -LR   -as per renal      Right Parotid Mass  -noted on CT head   -Partially imaged ovoid 1.6 cm low-attenuation mass in the right  parotid gland.   -outpt ENT eval for histological sampling to r/o neoplasm     Bladder cancer S/P lap cystoprostatectomy with ileal conduit diversion on 7/25/2024  -pain meds-tylenol  -bowel regimen prn  -urostomy teaching   -8/5 s/p stent removal by urology, ileal conduit with noted hematuria, no clots  -appreciate urology pt has outpt urology appt 8/12     CAD status post PCI to L Cfx  PAD  S/P pacemaker  Hypertension  PAF  Chronic HFrEF   HL  Previous CVA  -CT head with old infarcts- noted on previous imaging  -echo with EF 30%, last echo with EF 35% with WMA, mild-mod AI  -Not on ACE ARB due to renal function and hypotension.   -Continue hydralazine, eliquis, hydralazine, lopressor and imdur  -holding home ASA for now  -holding lasix with decreased po and also worsening Cr  -follows with [facility]     DM Type II  -HgbA1C 6.6  -home regimen: lantus 15 units nightly plus novolog   -currently on SSI prn  -accuchecks  -ADA diet      GOC  -full code     MA/ACO [place]  -ER Visits 2024: 0  -Admissions 2024: 3  -Re- Entry: no   -Consults: urology, cards and ID  -Discharge Needs: QUENTIN  -Appointments: follow up with PCP Mitch Herrera MD after discharge from rehab    [facility] in am  -diet-LFD    Prophy  -SCD  -eliquis     Dispo  -pending clinical coarse  -8/5 son given update   PCP: Mitch Herrera MD      Concerns regarding plan of care were discussed with patient. Patient agrees with plan as detailed above. Discussed plan of care with Dr. Love    Note: This chart was prepared using voice recognition software and may contain unintended word substitution errors.      Cookie Hugo RN, NP   Pending sale to [facility] Hospitalist Team  Contact via [employer] (Check Availability)  8/5/2024    SUBJECTIVE:   Confused, speech is clear. Wife at bedside    OBJECTIVE:   Blood pressure 137/54, pulse 85, temperature 98.3 °F (36.8 °C), temperature source Axillary, resp. rate 18, height 5' 9\" (1.753 m), weight  167 lb (75.8 kg), SpO2 97%.    GENERAL: no apparent distress  NEURO: A/A Ox1  RESP: non labored, CTA  CARDIO: Regular, no murmur  ABD: soft, NT, ND, BS+  : ileal conduit with hematuria   EXTREMITIES: no edema    DIAGNOSTIC DATA:   Labs:     Recent Labs   Lab 07/31/24  0539 08/01/24  0939 08/03/24  1527 08/04/24  0739 08/05/24  0554   WBC 4.8 6.2 14.0* 15.8* 18.0*   HGB 10.5* 10.3* 11.4* 9.5* 10.0*   MCV 87.2 88.3 86.2 87.4 84.9   .0 282.0 288.0 290.0 308.0       Recent Labs   Lab 08/01/24 0939 08/02/24  0939 08/03/24  1527 08/04/24  0739 08/05/24  0554   * 145 141 142 141   K 4.1 3.9 4.3 4.4 4.4   * 117* 114* 115* 114*   CO2 22.0 21.0 17.0* 18.0* 18.0*   BUN 41* 44* 48* 49* 48*   CREATSERUM 1.53* 1.84* 2.14* 2.27* 2.21*   CA 8.3* 8.1* 8.3* 8.3* 8.2*   * 168* 243* 175* 159*       No results for input(s): \"ALT\", \"AST\", \"ALB\", \"AMYLASE\", \"LIPASE\", \"LDH\" in the last 168 hours.    Invalid input(s): \"ALPHOS\", \"TBIL\", \"DBIL\", \"TPROT\"    Recent Labs   Lab 08/04/24  1342 08/04/24  1738 08/04/24 2018 08/05/24  0801 08/05/24  1257   PGLU 191* 161* 157* 145* 192*       No results for input(s): \"TROP\" in the last 168 hours.        MEDICATIONS       Vancomycin IV  1 each Intravenous See Admin Instructions (RX holding)    risperiDONE  0.25 mg Oral QPM    melatonin  5 mg Oral Nightly    isosorbide dinitrate  5 mg Oral TID    metoprolol tartrate  25 mg Oral 2x Daily(Beta Blocker)    apixaban  5 mg Oral BID    atorvastatin  40 mg Oral Daily    hydrALAZINE  10 mg Oral TID    insulin aspart  1-11 Units Subcutaneous TID CC    famotidine  20 mg Oral BID      lactated ringers 75 mL/hr at 08/04/24 2016       acetaminophen    temazepam    acetaminophen    docusate sodium    glucose **OR** glucose **OR** glucose-vitamin C **OR** dextrose **OR** glucose **OR** glucose **OR** glucose-vitamin C    ondansetron    Cookie Hugo, JOAN      IMAGING     XR CHEST AP PORTABLE  (CPT=71045)    Result Date:  8/4/2024  CONCLUSION:   Cardiomegaly with mild bilateral perihilar opacity which could reflect edema.  Mild left greater than right bibasilar opacity may reflect atelectasis with or without superimposed pneumonia.     Dictated by (CST): Angel Le MD on 8/04/2024 at 9:32 AM     Finalized by (CST): Angel Le MD on 8/04/2024 at 9:34 AM             SEE ATTENDING NOTE BELOW:

## 2024-08-05 NOTE — PROGRESS NOTES
[facility]  part of [facility] Infectious Disease  Progress Note    Roc Butcher Patient Status:  Inpatient    1940 MRN [id number]   Location [facility] 4W/SW/SE Attending Pearl Love MD   Hosp Day # 11 PCP Mitch Herrera MD     Subjective:    Pt seen and examined resting in bed. Awake, confused.    Review of Systems:  Review of systems reviewed and negative except as mentioned    Objective:  Blood pressure 141/56, pulse 78, temperature 98.2 °F (36.8 °C), temperature source Axillary, resp. rate 18, height 5' 9\" (1.753 m), weight 167 lb (75.8 kg), SpO2 99%.      Physical Exam:  General: Awake, alert, NAD.  HEENT:  Oropharynx clear, trachea ML.  Heart: RRR S1S2 no murmurs.  Lungs: Essentially CTA b/l, no rhonchi, rales, wheezes.  Abdomen: Soft, NT/ND.  BS present.  +ileal conduit  Extremity: No edema.  Neurological: No focal deficits.  Derm:  Warm, dry, free from rashes.    Lab Data Review:  Lab Results   Component Value Date    WBC 18.0 2024    HGB 10.0 2024    HCT 31.0 2024    .0 2024    CREATSERUM 2.21 2024    BUN 48 2024     2024    K 4.4 2024     2024    CO2 18.0 2024     2024    CA 8.2 2024        Cultures:  Hospital Encounter on 24   1. Urine Culture, Routine     Status: Abnormal (Preliminary result)    Collection Time: 24 10:55 AM    Specimen: Urine, nath catheter   Result Value Ref Range    Urine Culture >100,000 CFU/ML Enterococcus faecalis (A) N/A   2. Blood Culture     Status: None (Preliminary result)    Collection Time: 24  7:45 AM    Specimen: Blood,peripheral   Result Value Ref Range    Blood Culture Result No Growth 1 Day N/A        Radiology:  XR CHEST AP PORTABLE  (CPT=71045)    Result Date: 2024  CONCLUSION:   Cardiomegaly with mild bilateral perihilar opacity which could reflect edema.  Mild left greater  than right bibasilar opacity may reflect atelectasis with or without superimposed pneumonia.     Dictated by (CST): Angel Le MD on 8/04/2024 at 9:32 AM     Finalized by (CST): Angel Le MD on 8/04/2024 at 9:34 AM          CT BRAIN OR HEAD (CPT=70450)    Result Date: 7/31/2024  CONCLUSION:  1. No acute intracranial process by noncontrast CT technique. If symptoms or suspicion for acute ischemia persist, consider followup brain MR. 2. 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  There is also a chronic-appearing lacunar infarct in the right cerebellum. 3. Intracranial atherosclerosis. 4. 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  Differential considerations for parotid gland masses are broad and include both benign and malignant primary neoplasms as well as metastatic disease.  Suggest nonemergent otolaryngology evaluation of this finding with strong consideration of histologic sampling for definitive characterization.    elm-remote  Dictated by (CST): Maulik Craig MD on 7/31/2024 at 8:56 AM     Finalized by (CST): Maulik Craig MD on 7/31/2024 at 9:00 AM             Assessment and Plan:    Post-op sepsis in this elderly gentleman with a history of bladder cancer admitted for cystoprostatectomy on 7/25/24  - Post-op course complicated by hospital delirium, ALBERT, fevers, and bacteremia  - 2/2 blood cultures positive on 8/3/24   - Repeat blood cultures on 8/4 with NGTD  - Urine culture with enterococcus faecalis   - TTE pending, with PM in place may need to consider TITA  - CT without obstruction  - IV vancomycin ongoing    AMS with severe delirium  - improving but still confused      R parotid mass  - Will need w/u for possible malignancy  - per attending      Multifactorial leukocytosis due to the above  - will trend      ALBERT  - nephrology following  - abx to be renally adjusted    Recommendations  - Continue IV Vancomycin  - Follow  pending cultures  - TTE pending,will follow  - Repeat labs tomorrow  - Further recommendations to follow    Plan of care discussed with Dr. Johnson, she is in agreement with the plan of care.      If you have any questions or concerns please call [facility] Infectious Disease at [phone].     LUIZA Vaca    8/5/2024  10:19 AM

## 2024-08-05 NOTE — PLAN OF CARE
Roc is drowsy and oriented x1. At times can have a conversation, words still at times don't make sense, getting clear but still garbled at times. Currently on video monitoring, bed alarm in place. V paced with pacemaker. On Eliquis, on RA. ACHS blood glucose monitoring overnight. Incontinent. Has ileoconduit in place to nath bag, monitoring I&Os. Max assist, repos as breanne in bed. Hygiene care provided prn, had BM overnight and bathed. Incisions in place, dermabond and open to air, slightly red to two lap site on the left. Pain managed and no NV. Had temp of 100.1F, on IV abx Rocephin, gave Tylenol IV prn to assist. IVF LR at 75 ml/hr via right ext length IV. Cough better no SOB per pt and upon listening. Plan for [facility] pending choice, plan pending course, safety measures in place, call light within reach.     Problem: Patient Centered Care  Goal: Patient preferences are identified and integrated in the patient's plan of care  Description: Interventions:  - What would you like us to know as we care for you? From home with wife  - Provide timely, complete, and accurate information to patient/family  - Incorporate patient and family knowledge, values, beliefs, and cultural backgrounds into the planning and delivery of care  - Encourage patient/family to participate in care and decision-making at the level they choose  - Honor patient and family perspectives and choices  Outcome: Progressing     Problem: Patient/Family Goals  Goal: Patient/Family Long Term Goal  Description: Patient's Long Term Goal:     Interventions:  -   - See additional Care Plan goals for specific interventions  Outcome: Progressing  Goal: Patient/Family Short Term Goal  Description: Patient's Short Term Goal:     Interventions:   -   - See additional Care Plan goals for specific interventions  Outcome: Progressing     Problem: Impaired Cognition  Goal: Patient will exhibit improved attention, thought processing and/or memory  Description:  Interventions:  - Minimize distractions in the room when full attention is required  Outcome: Progressing     Problem: PAIN - ADULT  Goal: Verbalizes/displays adequate comfort level or patient's stated pain goal  Description: INTERVENTIONS:  - Encourage pt to monitor pain and request assistance  - Assess pain using appropriate pain scale  - Administer analgesics based on type and severity of pain and evaluate response  - Implement non-pharmacological measures as appropriate and evaluate response  - Consider cultural and social influences on pain and pain management  - Manage/alleviate anxiety  - Utilize distraction and/or relaxation techniques  - Monitor for opioid side effects  - Notify MD/LIP if interventions unsuccessful or patient reports new pain  - Anticipate increased pain with activity and pre-medicate as appropriate  Outcome: Progressing     Problem: SAFETY ADULT - FALL  Goal: Free from fall injury  Description: INTERVENTIONS:  - Assess pt frequently for physical needs  - Identify cognitive and physical deficits and behaviors that affect risk of falls.  - [place] fall precautions as indicated by assessment.  - Educate pt/family on patient safety including physical limitations  - Instruct pt to call for assistance with activity based on assessment  - Modify environment to reduce risk of injury  - Provide assistive devices as appropriate  - Consider OT/PT consult to assist with strengthening/mobility  - Encourage toileting schedule  Outcome: Progressing     Problem: DISCHARGE PLANNING  Goal: Discharge to home or other facility with appropriate resources  Description: INTERVENTIONS:  - Identify barriers to discharge w/pt and caregiver  - Include patient/family/discharge partner in discharge planning  - Arrange for needed discharge resources and transportation as appropriate  - Identify discharge learning needs (meds, wound care, etc)  - Arrange for interpreters to assist at discharge as needed  - Consider  post-discharge preferences of patient/family/discharge partner  - Complete POLST form as appropriate  - Assess patient's ability to be responsible for managing their own health  - Refer to Case Management Department for coordinating discharge planning if the patient needs post-hospital services based on physician/LIP order or complex needs related to functional status, cognitive ability or social support system  Outcome: Progressing     Problem: RISK FOR INFECTION - ADULT  Goal: Absence of fever/infection during anticipated neutropenic period  Description: INTERVENTIONS  - Monitor WBC  - Administer growth factors as ordered  - Implement neutropenic guidelines  Outcome: Progressing     Problem: CARDIOVASCULAR - ADULT  Goal: Maintains optimal cardiac output and hemodynamic stability  Description: INTERVENTIONS:  - Monitor vital signs, rhythm, and trends  - Monitor for bleeding, hypotension and signs of decreased cardiac output  - Evaluate effectiveness of vasoactive medications to optimize hemodynamic stability  - Monitor arterial and/or venous puncture sites for bleeding and/or hematoma  - Assess quality of pulses, skin color and temperature  - Assess for signs of decreased coronary artery perfusion - ex. Angina  - Evaluate fluid balance, assess for edema, trend weights  Outcome: Progressing  Goal: Absence of cardiac arrhythmias or at baseline  Description: INTERVENTIONS:  - Continuous cardiac monitoring, monitor vital signs, obtain 12 lead EKG if indicated  - Evaluate effectiveness of antiarrhythmic and heart rate control medications as ordered  - Initiate emergency measures for life threatening arrhythmias  - Monitor electrolytes and administer replacement therapy as ordered  Outcome: Progressing     Problem: GASTROINTESTINAL - ADULT  Goal: Minimal or absence of nausea and vomiting  Description: INTERVENTIONS:  - Maintain adequate hydration with IV or PO as ordered and tolerated  - Nasogastric tube to low  intermittent suction as ordered  - Evaluate effectiveness of ordered antiemetic medications  - Provide nonpharmacologic comfort measures as appropriate  - Advance diet as tolerated, if ordered  - Obtain nutritional consult as needed  - Evaluate fluid balance  Outcome: Progressing  Goal: Maintains or returns to baseline bowel function  Description: INTERVENTIONS:  - Assess bowel function  - Maintain adequate hydration with IV or PO as ordered and tolerated  - Evaluate effectiveness of GI medications  - Encourage mobilization and activity  - Obtain nutritional consult as needed  - Establish a toileting routine/schedule  - Consider collaborating with pharmacy to review patient's medication profile  Outcome: Progressing     Problem: GENITOURINARY - ADULT  Goal: Absence of urinary retention  Description: INTERVENTIONS:  - Assess patient’s ability to void and empty bladder  - Monitor intake/output and perform bladder scan as needed  - Follow urinary retention protocol/standard of care  - Consider collaborating with pharmacy to review patient's medication profile  - Implement strategies to promote bladder emptying  Outcome: Progressing     Problem: METABOLIC/FLUID AND ELECTROLYTES - ADULT  Goal: Electrolytes maintained within normal limits  Description: INTERVENTIONS:  - Monitor labs and rhythm and assess patient for signs and symptoms of electrolyte imbalances  - Administer electrolyte replacement as ordered  - Monitor response to electrolyte replacements, including rhythm and repeat lab results as appropriate  - Fluid restriction as ordered  - Instruct patient on fluid and nutrition restrictions as appropriate  Outcome: Progressing     Problem: SKIN/TISSUE INTEGRITY - ADULT  Goal: Skin integrity remains intact  Description: INTERVENTIONS  - Assess and document risk factors for pressure ulcer development  - Assess and document skin integrity  - Monitor for areas of redness and/or skin breakdown  - Initiate interventions,  skin care algorithm/standards of care as needed  Outcome: Progressing  Goal: Incision(s), wounds(s) or drain site(s) healing without S/S of infection  Description: INTERVENTIONS:  - Assess and document risk factors for pressure ulcer development  - Assess and document skin integrity  - Assess and document dressing/incision, wound bed, drain sites and surrounding tissue  - Implement wound care per orders  - Initiate isolation precautions as appropriate  - Initiate Pressure Ulcer prevention bundle as indicated  Outcome: Progressing     Problem: HEMATOLOGIC - ADULT  Goal: Maintains hematologic stability  Description: INTERVENTIONS  - Assess for signs and symptoms of bleeding or hemorrhage  - Monitor labs and vital signs for trends  - Administer supportive blood products/factors, fluids and medications as ordered and appropriate  - Administer supportive blood products/factors as ordered and appropriate  Outcome: Progressing  Goal: Free from bleeding injury  Description: (Example usage: patient with low platelets)  INTERVENTIONS:  - Avoid intramuscular injections, enemas and rectal medication administration  - Ensure safe mobilization of patient  - Hold pressure on venipuncture sites to achieve adequate hemostasis  - Assess for signs and symptoms of internal bleeding  - Monitor lab trends  - Patient is to report abnormal signs of bleeding to staff  - Avoid use of toothpicks and dental floss  - Use electric shaver for shaving  - Use soft bristle tooth brush  - Limit straining and forceful nose blowing  Outcome: Progressing     Problem: MUSCULOSKELETAL - ADULT  Goal: Return mobility to safest level of function  Description: INTERVENTIONS:  - Assess patient stability and activity tolerance for standing, transferring and ambulating w/ or w/o assistive devices  - Assist with transfers and ambulation using safe patient handling equipment as needed  - Ensure adequate protection for wounds/incisions during mobilization  - Obtain  PT/OT consults as needed  - Advance activity as appropriate  - Communicate ordered activity level and limitations with patient/family  Outcome: Progressing  Goal: Maintain proper alignment of affected body part  Description: INTERVENTIONS:  - Support and protect limb and body alignment per provider's orders  - Instruct and reinforce with patient and family use of appropriate assistive device and precautions (e.g. spinal or hip dislocation precautions)  Outcome: Progressing     Problem: NEUROLOGICAL - ADULT  Goal: Achieves stable or improved neurological status  Description: INTERVENTIONS  - Assess for and report changes in neurological status  - Initiate measures to prevent increased intracranial pressure  - Maintain blood pressure and fluid volume within ordered parameters to optimize cerebral perfusion and minimize risk of hemorrhage  - Monitor temperature, glucose, and sodium. Initiate appropriate interventions as ordered  Outcome: Progressing     Problem: Impaired Communication  Goal: Patient will achieve maximal communication potential  Description: Interventions:  - Encourage use of alternative/augmentative communication to express basic wants and needs (use of communication/letter board/or smartphone/tablet/handwriting)  Outcome: Progressing     Problem: Safety Risk - Non-Violent Restraints  Goal: Patient will remain free from self-harm  Description: INTERVENTIONS:  - Apply the least restrictive restraint to prevent harm  - Notify patient and family of reasons restraints applied  - Assess for any contributing factors to confusion (electrolyte disturbances, delirium, medications)  - Discontinue any unnecessary medical devices as soon as possible  - Assess the patient's physical comfort, circulation, skin condition, hydration, nutrition and elimination needs   - Reorient and redirection as needed  - Assess for the need to continue restraints  Outcome: Progressing     Problem: Delirium  Goal: Minimize duration  of delirium  Description: Interventions:  - Encourage use of hearing aids, eye glasses  - Promote highest level of mobility daily  - Provide frequent reorientation  - Promote wakefulness i.e. lights on, blinds open  - Promote sleep, encourage patient's normal rest cycle i.e. lights off, TV off, minimize noise and interruptions  - Encourage family to assist in orientation and promotion of home routines  Outcome: Progressing

## 2024-08-06 ENCOUNTER — APPOINTMENT (OUTPATIENT)
Dept: INTERVENTIONAL RADIOLOGY/VASCULAR | Facility: HOSPITAL | Age: 84
End: 2024-08-06
Attending: INTERNAL MEDICINE
Payer: MEDICARE

## 2024-08-06 ENCOUNTER — APPOINTMENT (OUTPATIENT)
Dept: CV DIAGNOSTICS | Facility: HOSPITAL | Age: 84
End: 2024-08-06
Attending: INTERNAL MEDICINE
Payer: MEDICARE

## 2024-08-06 LAB
ALBUMIN SERPL-MCNC: 2.6 G/DL (ref 3.2–4.8)
ALBUMIN/GLOB SERPL: 1.2 {RATIO} (ref 1–2)
ALP LIVER SERPL-CCNC: 74 U/L
ALT SERPL-CCNC: 37 U/L
ANION GAP SERPL CALC-SCNC: 7 MMOL/L (ref 0–18)
AST SERPL-CCNC: 48 U/L (ref ?–34)
BASOPHILS # BLD: 0 X10(3) UL (ref 0–0.2)
BASOPHILS NFR BLD: 0 %
BILIRUB SERPL-MCNC: 0.3 MG/DL (ref 0.2–1.1)
BUN BLD-MCNC: 47 MG/DL (ref 9–23)
BUN/CREAT SERPL: 24.2 (ref 10–20)
CALCIUM BLD-MCNC: 8 MG/DL (ref 8.7–10.4)
CHLORIDE SERPL-SCNC: 114 MMOL/L (ref 98–112)
CO2 SERPL-SCNC: 19 MMOL/L (ref 21–32)
CREAT BLD-MCNC: 1.94 MG/DL
DEPRECATED RDW RBC AUTO: 70.5 FL (ref 35.1–46.3)
EGFRCR SERPLBLD CKD-EPI 2021: 34 ML/MIN/1.73M2 (ref 60–?)
EOSINOPHIL # BLD: 0.23 X10(3) UL (ref 0–0.7)
EOSINOPHIL NFR BLD: 2 %
ERYTHROCYTE [DISTWIDTH] IN BLOOD BY AUTOMATED COUNT: 22.9 % (ref 11–15)
GLOBULIN PLAS-MCNC: 2.1 G/DL (ref 2–3.5)
GLUCOSE BLD-MCNC: 135 MG/DL (ref 70–99)
GLUCOSE BLDC GLUCOMTR-MCNC: 123 MG/DL (ref 70–99)
GLUCOSE BLDC GLUCOMTR-MCNC: 126 MG/DL (ref 70–99)
GLUCOSE BLDC GLUCOMTR-MCNC: 149 MG/DL (ref 70–99)
HCT VFR BLD AUTO: 28.5 %
HGB BLD-MCNC: 8.9 G/DL
LYMPHOCYTES NFR BLD: 1.17 X10(3) UL (ref 1–4)
LYMPHOCYTES NFR BLD: 9 %
MCH RBC QN AUTO: 26.5 PG (ref 26–34)
MCHC RBC AUTO-ENTMCNC: 31.2 G/DL (ref 31–37)
MCV RBC AUTO: 84.8 FL
MONOCYTES # BLD: 0.23 X10(3) UL (ref 0.1–1)
MONOCYTES NFR BLD: 2 %
NEUTROPHILS # BLD AUTO: 8.96 X10 (3) UL (ref 1.5–7.7)
NEUTROPHILS NFR BLD: 86 %
NEUTS HYPERSEG # BLD: 10.06 X10(3) UL (ref 1.5–7.7)
OSMOLALITY SERPL CALC.SUM OF ELEC: 304 MOSM/KG (ref 275–295)
PLATELET # BLD AUTO: 316 10(3)UL (ref 150–450)
PLATELET MORPHOLOGY: NORMAL
POTASSIUM SERPL-SCNC: 4 MMOL/L (ref 3.5–5.1)
PROT SERPL-MCNC: 4.7 G/DL (ref 5.7–8.2)
RBC # BLD AUTO: 3.36 X10(6)UL
SODIUM SERPL-SCNC: 140 MMOL/L (ref 136–145)
TOTAL CELLS COUNTED BLD: 100
VANCOMYCIN SERPL-MCNC: 5.9 UG/ML (ref ?–40)
VARIANT LYMPHS NFR BLD MANUAL: 1 %
WBC # BLD AUTO: 11.7 X10(3) UL (ref 4–11)

## 2024-08-06 PROCEDURE — 93325 DOPPLER ECHO COLOR FLOW MAPG: CPT | Performed by: INTERNAL MEDICINE

## 2024-08-06 PROCEDURE — 93320 DOPPLER ECHO COMPLETE: CPT | Performed by: INTERNAL MEDICINE

## 2024-08-06 RX ORDER — VANCOMYCIN HYDROCHLORIDE
15 EVERY 24 HOURS
Status: DISCONTINUED | OUTPATIENT
Start: 2024-08-06 | End: 2024-08-07

## 2024-08-06 RX ORDER — FAMOTIDINE 10 MG
10 TABLET ORAL EVERY OTHER DAY
Status: DISCONTINUED | OUTPATIENT
Start: 2024-08-07 | End: 2024-08-11

## 2024-08-06 RX ORDER — QUETIAPINE FUMARATE 25 MG/1
12.5 TABLET, FILM COATED ORAL EVERY EVENING
Status: DISCONTINUED | OUTPATIENT
Start: 2024-08-06 | End: 2024-08-13

## 2024-08-06 RX ORDER — MIDAZOLAM HYDROCHLORIDE 1 MG/ML
2 INJECTION INTRAMUSCULAR; INTRAVENOUS ONCE
Status: COMPLETED | OUTPATIENT
Start: 2024-08-06 | End: 2024-08-06

## 2024-08-06 RX ORDER — QUETIAPINE FUMARATE 25 MG/1
12.5 TABLET, FILM COATED ORAL ONCE
Status: DISCONTINUED | OUTPATIENT
Start: 2024-08-06 | End: 2024-08-13

## 2024-08-06 RX ORDER — ALPRAZOLAM 0.25 MG/1
0.25 TABLET ORAL NIGHTLY
Status: DISCONTINUED | OUTPATIENT
Start: 2024-08-06 | End: 2024-08-08

## 2024-08-06 RX ORDER — QUETIAPINE FUMARATE 25 MG/1
12.5 TABLET, FILM COATED ORAL NIGHTLY PRN
Status: DISCONTINUED | OUTPATIENT
Start: 2024-08-06 | End: 2024-08-12

## 2024-08-06 RX ORDER — MIDAZOLAM HYDROCHLORIDE 1 MG/ML
INJECTION INTRAMUSCULAR; INTRAVENOUS
Status: COMPLETED
Start: 2024-08-06 | End: 2024-08-06

## 2024-08-06 NOTE — PROGRESS NOTES
NEPHROLOGY CONSULT NOTE     DATE: 8/4/2024    Requesting Physician: Dr. Mitchell      Reason for Consult: ALBERT      Interval history:   Cr improved today  Poor po intake  Sp TITA    HISTORY OF PRESENT ILLNESS: Roc Butcher is an 84 year old male with PMH sig for DM2, HTN, CAD, Afib, systolic heart failure.  Hx of bladder CA and is s/p laparoscopic cystoprostatectomy with ileal conduit diversion on 7/25.  Post op course complicated by fever and leukocytosis. Blood culture positive for enterococcus.  Patient was started on IV antibiotics and ID has been consulted.  Patient also with ALBERT with worsening to 2.27 this AM.  sCr 1.29 on admission. Nephrology is consulted for ALBERT.  Patient currently denies SOB or n/v. Appetite has been decreased, but he is drinking fluids.        MEDICAL HISTORY:   Past Medical History:    Arrhythmia    Cancer (HCC)    bladder    Cataract    High blood pressure    High cholesterol    History of blood transfusion    HYPERLIPIDEMIA    HYPERTENSION    Other and unspecified hyperlipidemia    STROKE    Type II or unspecified type diabetes mellitus without mention of complication, not stated as uncontrolled    Unspecified essential hypertension    Visual impairment       SURGICAL HISTORY:   Past Surgical History:   Procedure Laterality Date    Cardiac pacemaker placement      Other surgical history      bladder tumor removal    Tonsillectomy         FAMILY HISTORY:   Family History   Problem Relation Age of Onset    Heart Disorder Father     Other Father         HIP FX    Heart Disorder Mother     Other Mother         CVA AGE 80       SOCIAL HISTORY:   Social History     Socioeconomic History    Marital status:      Spouse name: Not on file    Number of children: Not on file    Years of education: Not on file    Highest education level: Not on file   Occupational History    Not on file   Tobacco Use    Smoking status: Former     Types: Cigarettes    Smokeless tobacco: Never    Tobacco  comments:     2010   Vaping Use    Vaping status: Never Used   Substance and Sexual Activity    Alcohol use: Yes     Comment: HEAVY PREVIOUS NOW SOCIAL    Drug use: No    Sexual activity: Not on file   Other Topics Concern    Not on file   Social History Narrative    Not on file     Social Determinants of Health     Financial Resource Strain: Low Risk  (5/30/2024)    Received from [facility]    Financial Resource Strain     In the past year, have you or any family members you live with been unable to get any of the following when it was really needed? Check all that apply.: None   Food Insecurity: No Food Insecurity (7/25/2024)    Food Insecurity     Food Insecurity: Never true   Transportation Needs: No Transportation Needs (7/25/2024)    Transportation Needs     Lack of Transportation: No     Car Seat: Not on file   Physical Activity: High Risk (5/3/2024)    Received from [facility]    Exercise Vital Sign     On average, how many days per week do you engage in moderate to strenuous exercise (like a brisk walk)?: 0 days     On average, how many minutes do you engage in exercise at this level?: 0 min   Stress: Not on file   Social Connections: Medium Risk (5/30/2024)    Received from [facility]    Social Connections     How often do you see or talk to people that you care about and feel close to? (For example: talking to friends on the phone, visiting friends or family, going to [employer] or club meetings): 1 or 2 times a week   Housing Stability: Low Risk  (7/25/2024)    Housing Stability     Housing Instability: No     Housing Instability Emergency: Not on file     Crib or Bassinette: Not on file       MEDICATIONS:   Current Facility-Administered Medications   Medication Dose Route Frequency    QUEtiapine (SEROquel) tab 12.5 mg  12.5 mg Oral Once    vancomycin (Vancocin) 1.25 g in sodium chloride 0.9% 250mL IVPB premix  15 mg/kg Intravenous Q24H    [START ON 8/7/2024] famotidine  (Pepcid) tab 10 mg  10 mg Oral QOD    QUEtiapine (SEROquel) tab 12.5 mg  12.5 mg Oral QPM    ALPRAZolam (Xanax) tab 0.25 mg  0.25 mg Oral Nightly    QUEtiapine (SEROquel) tab 12.5 mg  12.5 mg Oral Nightly PRN    lactated ringers infusion   Intravenous Continuous    sodium chloride 0.9% 0.9% flush injection 10 mL  10 mL Intravenous PRN    benzocaine (Hurricaine/Topex) 20 % mouth spray 1 spray  1 spray Mouth/Throat Once    Vancomycin: PHARMACY DOSING  1 each Intravenous See Admin Instructions (RX holding)    acetaminophen (Tylenol) tab 650 mg  650 mg Oral Q8H PRN    melatonin cap/tab 5 mg  5 mg Oral Nightly    isosorbide dinitrate (Isordil Titradose) tab 5 mg  5 mg Oral TID    metoprolol tartrate (Lopressor) tab 25 mg  25 mg Oral 2x Daily(Beta Blocker)    docusate sodium (Colace) cap 100 mg  100 mg Oral BID PRN    apixaban (Eliquis) tab 5 mg  5 mg Oral BID    atorvastatin (Lipitor) tab 40 mg  40 mg Oral Daily    hydrALAZINE (Apresoline) tab 10 mg  10 mg Oral TID    glucose (Dex4) 15 GM/59ML oral liquid 15 g  15 g Oral Q15 Min PRN    Or    glucose (Glutose) 40% oral gel 15 g  15 g Oral Q15 Min PRN    Or    glucose-vitamin C (Dex-4) chewable tab 4 tablet  4 tablet Oral Q15 Min PRN    Or    dextrose 50% injection 50 mL  50 mL Intravenous Q15 Min PRN    Or    glucose (Dex4) 15 GM/59ML oral liquid 30 g  30 g Oral Q15 Min PRN    Or    glucose (Glutose) 40% oral gel 30 g  30 g Oral Q15 Min PRN    Or    glucose-vitamin C (Dex-4) chewable tab 8 tablet  8 tablet Oral Q15 Min PRN    insulin aspart (NovoLOG) 100 Units/mL FlexPen 1-11 Units  1-11 Units Subcutaneous TID CC    ondansetron (Zofran) 4 MG/2ML injection 4 mg  4 mg Intravenous Q6H PRN         ALLERGIES:   Allergies   Allergen Reactions    Metformin Hcl OTHER (SEE COMMENTS)      Oral,   severe dry mouth       REVIEW OF SYSTEMS:  A comprehensive review of systems was conducted and is negative except for what is mentioned in the HPI      PHYSICAL EXAM:  /40 (BP  Location: Right arm)   Pulse 67   Temp 97 °F (36.1 °C) (Axillary)   Resp 16   Ht 5' 9\" (1.753 m)   Wt 167 lb (75.8 kg)   SpO2 100%   BMI 24.66 kg/m²   GEN:  NAD  HEENT: NCAT, dry MM   CHEST: CTA b/l  CARDIAC: S1S2 normal  ABD: soft, NT/ND  : urostomy - hematuria noted   EXT: no lower ext edema  NEURO: sleepy       LABS:  Recent Labs   Lab 24  0554 24  0602   * 159* 135*   BUN 49* 48* 47*   CREATSERUM 2.27* 2.21* 1.94*   EGFRCR 28* 29* 34*   CA 8.3* 8.2* 8.0*    141 140   K 4.4 4.4 4.0   * 114* 114*   CO2 18.0* 18.0* 19.0*     Recent Labs   Lab 24  0554 24  0602   RBC 3.50* 3.65* 3.36*   HGB 9.5* 10.0* 8.9*   HCT 30.6* 31.0* 28.5*   MCV 87.4 84.9 84.8   MCH 27.1 27.4 26.5   MCHC 31.0 32.3 31.2   RDW 23.6* 23.0* 22.9*   NEPRELIM 13.04* 14.96* 8.96*   WBC 15.8* 18.0* 11.7*   .0 308.0 316.0           INTAKE/OUTPUT:    Intake/Output Summary (Last 24 hours) at 2024 1708  Last data filed at 2024 0441  Gross per 24 hour   Intake 410 ml   Output 600 ml   Net -190 ml             IMAGING:  CARD ECHO TITA (CPT=93320/61080)    Addendum Date: 2024    Transesophageal Echocardiogram (Report amended ) Name:Roc Butcher Date: 2024 :  1940 Ht:  (69in)  BP: 129 / 63 MRN:  [id number]    Age:  84years    Wt:  (167lb) HR: 75bpm Loc:  [facility]       Gndr: M          BSA: 1.91m^2 Sonographer: Omayra BARROS Ordering:    Eben Rick Consulting:  Eben Rick --------------------------------------------------------------------------- - History/Indications:   Bacteremia. --------------------------------------------------------------------------- - Procedure information:  Diagnostic transesophageal echocardiogram. Additional evaluation included 2D and Doppler.  Patient status:  Inpatient. Location:  Catheterization laboratory.    This was a routine study. The risks, benefits, and alternatives to the procedure were explained  and informed consent was obtained. Initial setup. The patient arrived at the laboratory. Surface ECG leads, blood pressure measurements, and pulse oximetric signals were monitored. Sedation. Moderate sedation was administered. Transesophageal echocardiography. Topical anesthesia was obtained using Hurricaine. A transesophageal probe was inserted by the attending cardiologist without difficulty. Image quality was adequate. Intravenous contrast (agitated saline) was administered to evaluate for shunting. The transesophageal probe was removed.  Study completion:  The patient tolerated the procedure well. There were no complications. Administered medications:   Fentanyl , 50 mcg.  Midazolam , 2 mg. An independent trained observer was present to assess the patient's hemodynamic status and level of sedation. Moderate sedation time: 13:15 to 13:45. --------------------------------------------------------------------------- - Conclusions: 1. Left ventricle: The cavity size was at the upper limits of normal.    Systolic function was moderately to markedly reduced. The estimated    ejection fraction was 30%. 2. Left atrium: The atrium was mildly enlarged. 3. Aortic valve: There was mild to moderate regurgitation. 4. Mitral valve: There was mild to moderate regurgitation. 5. No vegetation seen (very good look at the valves). * --------------------------------------------------------------------------- - * Findings: Left ventricle:  The cavity size was at the upper limits of normal. Systolic function was moderately to markedly reduced. The estimated ejection fraction was 30%. Left atrium:  The atrium was mildly enlarged.  There is no evidence of a thrombus in the atrial cavity or appendage. Right ventricle:  The cavity size was normal. Systolic function was normal. A pacing wire could be seen. Right atrium:  The atrium was at the upper limits of normal in size. A pacing wire was noted. Mitral valve:  The valve was structurally  normal. Leaflet separation was normal.  There was no evidence of vegetation.  Doppler:  There was mild to moderate regurgitation. Aortic valve:   The valve was trileaflet. The leaflets were mildly thickened. Cusp separation was normal.  Doppler:   There was no evidence for stenosis.   There was mild to moderate regurgitation. Tricuspid valve:  The valve is structurally normal. Leaflet separation was normal.  There was no evidence of a vegetation.  Doppler:  There was trivial regurgitation. Pulmonic valve:   There was no thickening. Cusp separation was normal. There was no evidence of a vegetation.  Doppler:  There was mild regurgitation. Pericardium:   There was no pericardial effusion. Aorta: Aorta: No evidence of aneurysm, dissection, or atheroma. Aortic root: The aortic root was normal-sized. Descending aorta:  There was mild diffuse disease. Systemic veins: Superior vena cava: The SVC was normal-sized. Atrial septum:  No defect or patent foramen ovale was identified.  Doppler and echo contrast study showed no right-to-left atrial level shunt, following an increase in RA pressure induced by provocative maneuvers. --------------------------------------------------------------------------- - Eben Page 08/06/2024 15:58         ASSESSMENT AND PLAN:   This is an 84 year old male with PMH sig for DM2, HTN, CAD, Afib, systolic heart failure.  Hx of bladder CA and is s/p laparoscopic cystoprostatectomy with ileal conduit diversion on 7/25. Post op course complicated by enterococcus bacteremia. Nephrology is consulted for ALBERT.     Non-oliguric ALBERT   - sCr 1.29 on admission  - Urine Na 43   - etiology may be pre-renal ALBERT vs ATN in the setting of infection  - sCr 1.9 today -appears to be plauteing/improving renal function  - continue IVFs as clinically patient appears dry on exam -increase IVF rate to 100ml/hr  - hold lasix    - continue supportive care    - await results of CT abd /p  - renally dose meds    - avoid nephro toxins  - follow renal fxn and I/Os  - no acute indication for RRT      Acidosis   - likely due to ALBERT and ileal conduit   - currently on LR   - monitor - may need sodium bicarbonate supplementation if not improving      HTN    - Metoprolol, hydralazine      Fever, leukocytosis   Enterococcus bacteremia  - management and abx per primary / ID      Bladder CA s/p laparoscopic cystoprostatectomy with ileal conduit diversion 7/25  - per Urology     Dw RN     Thank you for the consult and allowing us to participate in the care of Roc Butcher.  We will continue to follow.    Jael George MD  [facility]  Nephrology

## 2024-08-06 NOTE — CM/SW NOTE
CM rec'd upd from Cookie JARRETT stating she informed wife to choose a [facility] today so ins auth can be processed in anticipation of dc Friday if med cleared.    CM met with son, Nick at bedside wife has left for medical appt. CM reviewed complete [facility] List. CM reiterated that [facility] did not accept the ref, they will need to choose from available list.    Per Nick they have it narrowed down to [facility], [facility] or [facility], he will discuss with family tonight and provide choice by tm.    CM updated Cookie and RN with above. CM spoke with rehab dept to request early visit and early notes tm am.    Will need choice, upd PT OT notes and ins auth.    8/7 0950  CM rec'd vm from son Nick.  Choice #1-[facility] (now [facility])  #2 [facility]  #3 [facility]    CM reserved [facility] and notified liaison of anticipated dc Fri if med cleared.    JASEN called 2nd req to rehab dept for  PT & OT notes asap    CM req DSC start Brian auth once therapy notes rec'd for soc on Friday 8/8 1225  auth approved for [facility] JARRETT RN notified    Plan  [facility] date/time tbd  Amb is on will call  Pcs done for 8/9  RN report [phone]    / to remain available for support and/or discharge planning.     Tatyana Rajan, EVARISTO    Ext [phone]

## 2024-08-06 NOTE — PROGRESS NOTES
[facility]  part of [facility] Infectious Disease  Progress Note    Roc Butcher Patient Status:  Inpatient    1940 MRN [id number]   Location [facility] 4W/SW/SE Attending Pearl Love MD   Hosp Day # 12 PCP Mitch Herrera MD     Subjective:    Patient seen and examined sleeping in bed, NAD. Chart reviewed, had a fever last night 100.5, plans for TITA today.    Review of Systems:  Review of systems reviewed and negative except as mentioned    Objective:  Blood pressure 142/66, pulse 68, temperature 97.8 °F (36.6 °C), temperature source Axillary, resp. rate 22, height 5' 9\" (1.753 m), weight 167 lb (75.8 kg), SpO2 97%.         Physical Exam:  General: Awake, confused, NAD.  HEENT:  Oropharynx clear, trachea ML.  Heart: RRR S1S2 no murmurs.  Lungs: Essentially CTA b/l, no rhonchi, rales, wheezes.  Abdomen: Soft, NT/ND.  BS present.  +ileal conduit   Extremity: No edema.  Neurological: No focal deficits.  Derm:  Warm, dry, free from rashes.    Lab Data Review:  Lab Results   Component Value Date    WBC 11.7 2024    HGB 8.9 2024    HCT 28.5 2024    .0 2024    CREATSERUM 1.94 2024    BUN 47 2024     2024    K 4.0 2024     2024    CO2 19.0 2024     2024    CA 8.0 2024    ALB 2.6 2024    ALKPHO 74 2024    BILT 0.3 2024    TP 4.7 2024    AST 48 2024    ALT 37 2024        Cultures:  Hospital Encounter on 24   1. Urine Culture, Routine     Status: Abnormal    Collection Time: 24 10:55 AM    Specimen: Urine, nath catheter   Result Value Ref Range    Urine Culture >100,000 CFU/ML Enterococcus faecalis NOT VRE (A) N/A   2. Blood Culture     Status: None (Preliminary result)    Collection Time: 24  7:45 AM    Specimen: Blood,peripheral   Result Value Ref Range    Blood Culture Result No Growth 1 Day N/A         Radiology:  XR CHEST AP PORTABLE  (CPT=71045)    Result Date: 8/4/2024  CONCLUSION:   Cardiomegaly with mild bilateral perihilar opacity which could reflect edema.  Mild left greater than right bibasilar opacity may reflect atelectasis with or without superimposed pneumonia.     Dictated by (CST): Angel Le MD on 8/04/2024 at 9:32 AM     Finalized by (CST): Angel Le MD on 8/04/2024 at 9:34 AM          CT BRAIN OR HEAD (CPT=70450)    Result Date: 7/31/2024  CONCLUSION:  1. No acute intracranial process by noncontrast CT technique. If symptoms or suspicion for acute ischemia persist, consider followup brain MR. 2. 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  There is also a chronic-appearing lacunar infarct in the right cerebellum. 3. Intracranial atherosclerosis. 4. 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  Differential considerations for parotid gland masses are broad and include both benign and malignant primary neoplasms as well as metastatic disease.  Suggest nonemergent otolaryngology evaluation of this finding with strong consideration of histologic sampling for definitive characterization.    elm-remote  Dictated by (CST): Maulik Craig MD on 7/31/2024 at 8:56 AM     Finalized by (CST): Maulik Craig MD on 7/31/2024 at 9:00 AM             Assessment and Plan:    Post-op sepsis in this elderly gentleman with a history of bladder cancer admitted for cystoprostatectomy on 7/25/24  - Post-op course complicated by hospital delirium, ALBERT, fevers, and bacteremia  - 2/2 blood cultures positive on 8/3/24   - Repeat blood cultures on 8/4 with NGTD  - Urine culture with enterococcus faecalis   - TTE w/o evidence of vegetations, however, with PM in place recommend TITA  - CT without obstruction  - IV vancomycin ongoing     AMS with severe delirium  - improving but still confused      R parotid mass  - Will need w/u for possible  malignancy  - per attending      Multifactorial leukocytosis due to the above  - trending down  - will trend      ALBERT  - nephrology following  - abx to be renally adjusted     Recommendations  - Continue IV Vancomycin  - Follow pending cultures  - TITA today,will follow  - Repeat labs tomorrow  - Further recommendations to follow     Plan of care discussed with Dr. Johnson, she is in agreement with the plan of care.      If you have any questions or concerns please call [facility] Infectious Disease at [phone].     LUIZA Vaca    8/6/2024  9:36 AM

## 2024-08-06 NOTE — IVS NOTE
Patient tolerated procedure well. No complications. Vital signs stable. Patient remains sleepy but easily arousable. NPO for 1 hour. Hand off report given to EVARISTO Thapa

## 2024-08-06 NOTE — PROGRESS NOTES
[facility] AND CARE   Progress Note  -  Roc Butcher Patient Status:  Inpatient    1940 MRN [id number]   Location [facility] 4W/SW/SE Attending Pearl Love MD   Hosp Day # 12 PCP Mitch Herrera MD     PCP: Mitch Herrera MD      SEE ATTENDING NOTE AT BOTTOM OF PAGE    Is this a shared or split note between Advanced Practice Provider and Physician? Yes    Assessment and Plan:  Roc Butcher is a 84 year old male with PMH sig for type 2 diabetes, CAD status post PCI to left circumflex, PAD, pacemaker, hypertension, paroxysmal A-fib on Eliquis baseline, and chronic systolic heart failure with a EF of 35% hyperlipidemia, prior CVA, and bladder cancer who presented for laparoscopic cystoprostatectomy with ileal conduit diversion.  Post op course with complicated by delirium, seen by psych and enterococcus bacteremia and uti s/p stent removal.  Incidental finding on CT of the head was a right parotid mass measuring 1.6 cm with broad differential and radiology recommended ENT evaluation with strong consideration for histological  sampling for definitive characterization. Pt will need rehab at discharge, see below for details       Delirium  Metabolic Encephalopathy  -CT head without acute process   -infectious tx below  -was on zyprexxa and switched to risperdal which has been stopped. Pt now placed on seroquel and xanax  -appreciate psych     Enterococcus bacteremia and UTI  -tmax 100.5. WBC now down trending to 11.7  -blood cx enterococcus with repeat blood cx NGTD  -urine with enterococcus  -CT A/P without acute process   -TTE without mention of vegetation.   -/ stents removed by urology  -TITA today   -abx-vanco as per ID   -as per ID    Acute Kidney Injury on CKD stage 3  Acidosis   -baseline Cr  1.3-1.6, peak Cr 2.27 now down to 1.94  -did not get contrast with CT A/P  -LR   -as per renal     Hematuria-resolved  Acute on Chronic Anemia  -baseline hgb ~10's, did have  hematuria on 8/5 now resolved, current hgb 8.9, will follow      Right Parotid Mass  -noted on CT head   -Partially imaged ovoid 1.6 cm low-attenuation mass in the right parotid gland.   -outpt ENT eval for histological sampling to r/o neoplasm     Bladder cancer S/P lap cystoprostatectomy with ileal conduit diversion on 7/25/2024  -pain meds-tylenol  -bowel regimen prn  -urostomy teaching   -8/5 s/p stent removal by urology  -appreciate urology pt has outpt urology appt 8/12     CAD status post PCI to L Cfx  PAD  S/P pacemaker  Hypertension  PAF  Chronic HFrEF   HL  Previous CVA  -CT head with old infarcts- noted on previous imaging  -echo with EF 30%, last echo with EF 35% with WMA, mild-mod AI  -Not on ACE ARB due to renal function and hypotension.   -Continue hydralazine, eliquis, hydralazine, lopressor and imdur  -holding home ASA for now  -holding lasix with decreased po and also worsening Cr  -follows with [facility]     DM Type II  -HgbA1C 6.6  -home regimen: lantus 15 units nightly plus novolog   -currently on SSI prn  -accuchecks  -ADA diet      GOC  -full code     MA/ACO [place]  -ER Visits 2024: 0  -Admissions 2024: 3  -Re- Entry: no   -Consults: urology, cards and ID  -Discharge Needs: [facility]  -Appointments: follow up with PCP Mitch Herrera MD after discharge from rehab    [facility] in   -diet-NPO    Prophy  -SCD  -eliquis     Dispo  -pending clinical coarse  -8/5 son given update   -8/6 update given to wife at   PCP: Mitch Herrera MD      Concerns regarding plan of care were discussed with patient. Patient agrees with plan as detailed above. Discussed plan of care with Dr. Lvoe    Note: This chart was prepared using voice recognition software and may contain unintended word substitution errors.      Cookie Hugo RN, NP   [facility] and Care Hospitalist Team  Contact via [employer] (Check Availability)    SUBJECTIVE:   Wife at bedside.  Pt Lying in bed and confused   Apparently was agitated last night and got Seroquel.  White count improved to 11.  Plans for TITA today.    OBJECTIVE:   Blood pressure 143/55, pulse 72, temperature 97 °F (36.1 °C), temperature source Axillary, resp. rate 20, height 5' 9\" (1.753 m), weight 167 lb (75.8 kg), SpO2 99%.    GENERAL: no apparent distress  NEURO: A/A Ox1  RESP: non labored, CTA  CARDIO: Regular, no murmur  ABD: soft, NT, ND, BS+  : ileal conduit with yellow urine  EXTREMITIES: no edema    DIAGNOSTIC DATA:   Labs:     Recent Labs   Lab 08/01/24  0939 08/03/24  1527 08/04/24  0739 08/05/24  0554 08/06/24  0602   WBC 6.2 14.0* 15.8* 18.0* 11.7*   HGB 10.3* 11.4* 9.5* 10.0* 8.9*   MCV 88.3 86.2 87.4 84.9 84.8   .0 288.0 290.0 308.0 316.0       Recent Labs   Lab 08/02/24  0939 08/03/24  1527 08/04/24  0739 08/05/24  0554 08/06/24  0602    141 142 141 140   K 3.9 4.3 4.4 4.4 4.0   * 114* 115* 114* 114*   CO2 21.0 17.0* 18.0* 18.0* 19.0*   BUN 44* 48* 49* 48* 47*   CREATSERUM 1.84* 2.14* 2.27* 2.21* 1.94*   CA 8.1* 8.3* 8.3* 8.2* 8.0*   * 243* 175* 159* 135*       Recent Labs   Lab 08/06/24  0602   ALT 37   AST 48*   ALB 2.6*       Recent Labs   Lab 08/04/24  1738 08/04/24  2018 08/05/24  0801 08/05/24  1257 08/05/24  2135   PGLU 161* 157* 145* 192* 164*       No results for input(s): \"TROP\" in the last 168 hours.        MEDICATIONS       QUEtiapine  12.5 mg Oral Once    vancomycin  15 mg/kg Intravenous Q24H    [START ON 8/7/2024] famotidine  10 mg Oral QOD    sodium chloride   Intravenous On Call    benzocaine  1 spray Mouth/Throat Once    Vancomycin IV  1 each Intravenous See Admin Instructions (RX holding)    risperiDONE  0.25 mg Oral QPM    melatonin  5 mg Oral Nightly    isosorbide dinitrate  5 mg Oral TID    metoprolol tartrate  25 mg Oral 2x Daily(Beta Blocker)    apixaban  5 mg Oral BID    atorvastatin  40 mg Oral Daily    hydrALAZINE  10 mg Oral TID    insulin aspart  1-11 Units Subcutaneous TID CC       lactated ringers 75 mL/hr at 08/05/24 2313       sodium chloride 0.9%    temazepam    acetaminophen    docusate sodium    glucose **OR** glucose **OR** glucose-vitamin C **OR** dextrose **OR** glucose **OR** glucose **OR** glucose-vitamin C    ondansetron    Cookie Hugo, JOAN      IMAGING     XR CHEST AP PORTABLE  (CPT=71045)    Result Date: 8/4/2024  CONCLUSION:   Cardiomegaly with mild bilateral perihilar opacity which could reflect edema.  Mild left greater than right bibasilar opacity may reflect atelectasis with or without superimposed pneumonia.     Dictated by (CST): Angel Le MD on 8/04/2024 at 9:32 AM     Finalized by (CST): Angel Le MD on 8/04/2024 at 9:34 AM             SEE ATTENDING NOTE BELOW:

## 2024-08-06 NOTE — PLAN OF CARE
Pt A/O to self. Lethargic throughout the morning, easy to arouse. More awake towards the evening. Noted to be more irritable when wife is at the bedside. TITA this afternoon- no vegetation found. Poor appetite, accu-checks AC/HS. Lu in place and draining braulio urine, nephro aware, IVF rate increased. VSS, afebrile. IV abx continued. Fall precautions in place. Call light within reach. Bed alarm & video monitoring on and active. Frequent rounding. Son updated on care of plan. Plan to dc to [facility] pending family choice.     Problem: Patient Centered Care  Goal: Patient preferences are identified and integrated in the patient's plan of care  Description: Interventions:  - What would you like us to know as we care for you? From home with wife  - Provide timely, complete, and accurate information to patient/family  - Incorporate patient and family knowledge, values, beliefs, and cultural backgrounds into the planning and delivery of care  - Encourage patient/family to participate in care and decision-making at the level they choose  - Honor patient and family perspectives and choices  Outcome: Progressing     Problem: Patient/Family Goals  Goal: Patient/Family Long Term Goal  Description: Patient's Long Term Goal:     Interventions:  -   - See additional Care Plan goals for specific interventions  Outcome: Progressing  Goal: Patient/Family Short Term Goal  Description: Patient's Short Term Goal:     Interventions:   -   - See additional Care Plan goals for specific interventions  Outcome: Progressing     Problem: Impaired Cognition  Goal: Patient will exhibit improved attention, thought processing and/or memory  Description: Interventions:    Outcome: Progressing     Problem: PAIN - ADULT  Goal: Verbalizes/displays adequate comfort level or patient's stated pain goal  Description: INTERVENTIONS:  - Encourage pt to monitor pain and request assistance  - Assess pain using appropriate pain scale  - Administer analgesics based  on type and severity of pain and evaluate response  - Implement non-pharmacological measures as appropriate and evaluate response  - Consider cultural and social influences on pain and pain management  - Manage/alleviate anxiety  - Utilize distraction and/or relaxation techniques  - Monitor for opioid side effects  - Notify MD/LIP if interventions unsuccessful or patient reports new pain  - Anticipate increased pain with activity and pre-medicate as appropriate  Outcome: Progressing     Problem: SAFETY ADULT - FALL  Goal: Free from fall injury  Description: INTERVENTIONS:  - Assess pt frequently for physical needs  - Identify cognitive and physical deficits and behaviors that affect risk of falls.  - [place] fall precautions as indicated by assessment.  - Educate pt/family on patient safety including physical limitations  - Instruct pt to call for assistance with activity based on assessment  - Modify environment to reduce risk of injury  - Provide assistive devices as appropriate  - Consider OT/PT consult to assist with strengthening/mobility  - Encourage toileting schedule  Outcome: Progressing     Problem: DISCHARGE PLANNING  Goal: Discharge to home or other facility with appropriate resources  Description: INTERVENTIONS:  - Identify barriers to discharge w/pt and caregiver  - Include patient/family/discharge partner in discharge planning  - Arrange for needed discharge resources and transportation as appropriate  - Identify discharge learning needs (meds, wound care, etc)  - Arrange for interpreters to assist at discharge as needed  - Consider post-discharge preferences of patient/family/discharge partner  - Complete POLST form as appropriate  - Assess patient's ability to be responsible for managing their own health  - Refer to Case Management Department for coordinating discharge planning if the patient needs post-hospital services based on physician/LIP order or complex needs related to functional status,  cognitive ability or social support system  Outcome: Progressing     Problem: RISK FOR INFECTION - ADULT  Goal: Absence of fever/infection during anticipated neutropenic period  Description: INTERVENTIONS  - Monitor WBC  - Administer growth factors as ordered  - Implement neutropenic guidelines  Outcome: Progressing     Problem: CARDIOVASCULAR - ADULT  Goal: Maintains optimal cardiac output and hemodynamic stability  Description: INTERVENTIONS:  - Monitor vital signs, rhythm, and trends  - Monitor for bleeding, hypotension and signs of decreased cardiac output  - Evaluate effectiveness of vasoactive medications to optimize hemodynamic stability  - Monitor arterial and/or venous puncture sites for bleeding and/or hematoma  - Assess quality of pulses, skin color and temperature  - Assess for signs of decreased coronary artery perfusion - ex. Angina  - Evaluate fluid balance, assess for edema, trend weights  Outcome: Progressing  Goal: Absence of cardiac arrhythmias or at baseline  Description: INTERVENTIONS:  - Continuous cardiac monitoring, monitor vital signs, obtain 12 lead EKG if indicated  - Evaluate effectiveness of antiarrhythmic and heart rate control medications as ordered  - Initiate emergency measures for life threatening arrhythmias  - Monitor electrolytes and administer replacement therapy as ordered  Outcome: Progressing     Problem: GASTROINTESTINAL - ADULT  Goal: Minimal or absence of nausea and vomiting  Description: INTERVENTIONS:  - Maintain adequate hydration with IV or PO as ordered and tolerated  - Nasogastric tube to low intermittent suction as ordered  - Evaluate effectiveness of ordered antiemetic medications  - Provide nonpharmacologic comfort measures as appropriate  - Advance diet as tolerated, if ordered  - Obtain nutritional consult as needed  - Evaluate fluid balance  Outcome: Progressing  Goal: Maintains or returns to baseline bowel function  Description: INTERVENTIONS:  - Assess bowel  function  - Maintain adequate hydration with IV or PO as ordered and tolerated  - Evaluate effectiveness of GI medications  - Encourage mobilization and activity  - Obtain nutritional consult as needed  - Establish a toileting routine/schedule  - Consider collaborating with pharmacy to review patient's medication profile  Outcome: Progressing     Problem: GENITOURINARY - ADULT  Goal: Absence of urinary retention  Description: INTERVENTIONS:  - Assess patient’s ability to void and empty bladder  - Monitor intake/output and perform bladder scan as needed  - Follow urinary retention protocol/standard of care  - Consider collaborating with pharmacy to review patient's medication profile  - Implement strategies to promote bladder emptying  Outcome: Progressing     Problem: METABOLIC/FLUID AND ELECTROLYTES - ADULT  Goal: Electrolytes maintained within normal limits  Description: INTERVENTIONS:  - Monitor labs and rhythm and assess patient for signs and symptoms of electrolyte imbalances  - Administer electrolyte replacement as ordered  - Monitor response to electrolyte replacements, including rhythm and repeat lab results as appropriate  - Fluid restriction as ordered  - Instruct patient on fluid and nutrition restrictions as appropriate  Outcome: Progressing     Problem: SKIN/TISSUE INTEGRITY - ADULT  Goal: Skin integrity remains intact  Description: INTERVENTIONS  - Assess and document risk factors for pressure ulcer development  - Assess and document skin integrity  - Monitor for areas of redness and/or skin breakdown  - Initiate interventions, skin care algorithm/standards of care as needed  Outcome: Progressing  Goal: Incision(s), wounds(s) or drain site(s) healing without S/S of infection  Description: INTERVENTIONS:  - Assess and document risk factors for pressure ulcer development  - Assess and document skin integrity  - Assess and document dressing/incision, wound bed, drain sites and surrounding tissue  -  Implement wound care per orders  - Initiate isolation precautions as appropriate  - Initiate Pressure Ulcer prevention bundle as indicated  Outcome: Progressing     Problem: HEMATOLOGIC - ADULT  Goal: Maintains hematologic stability  Description: INTERVENTIONS  - Assess for signs and symptoms of bleeding or hemorrhage  - Monitor labs and vital signs for trends  - Administer supportive blood products/factors, fluids and medications as ordered and appropriate  - Administer supportive blood products/factors as ordered and appropriate  Outcome: Progressing  Goal: Free from bleeding injury  Description: (Example usage: patient with low platelets)  INTERVENTIONS:  - Avoid intramuscular injections, enemas and rectal medication administration  - Ensure safe mobilization of patient  - Hold pressure on venipuncture sites to achieve adequate hemostasis  - Assess for signs and symptoms of internal bleeding  - Monitor lab trends  - Patient is to report abnormal signs of bleeding to staff  - Avoid use of toothpicks and dental floss  - Use electric shaver for shaving  - Use soft bristle tooth brush  - Limit straining and forceful nose blowing  Outcome: Progressing     Problem: MUSCULOSKELETAL - ADULT  Goal: Return mobility to safest level of function  Description: INTERVENTIONS:  - Assess patient stability and activity tolerance for standing, transferring and ambulating w/ or w/o assistive devices  - Assist with transfers and ambulation using safe patient handling equipment as needed  - Ensure adequate protection for wounds/incisions during mobilization  - Obtain PT/OT consults as needed  - Advance activity as appropriate  - Communicate ordered activity level and limitations with patient/family  Outcome: Progressing  Goal: Maintain proper alignment of affected body part  Description: INTERVENTIONS:  - Support and protect limb and body alignment per provider's orders  - Instruct and reinforce with patient and family use of  appropriate assistive device and precautions (e.g. spinal or hip dislocation precautions)  Outcome: Progressing     Problem: NEUROLOGICAL - ADULT  Goal: Achieves stable or improved neurological status  Description: INTERVENTIONS  - Assess for and report changes in neurological status  - Initiate measures to prevent increased intracranial pressure  - Maintain blood pressure and fluid volume within ordered parameters to optimize cerebral perfusion and minimize risk of hemorrhage  - Monitor temperature, glucose, and sodium. Initiate appropriate interventions as ordered  Outcome: Progressing     Problem: Impaired Communication  Goal: Patient will achieve maximal communication potential  Description: Interventions:    Outcome: Progressing     Problem: Safety Risk - Non-Violent Restraints  Goal: Patient will remain free from self-harm  Description: INTERVENTIONS:  - Apply the least restrictive restraint to prevent harm  - Notify patient and family of reasons restraints applied  - Assess for any contributing factors to confusion (electrolyte disturbances, delirium, medications)  - Discontinue any unnecessary medical devices as soon as possible  - Assess the patient's physical comfort, circulation, skin condition, hydration, nutrition and elimination needs   - Reorient and redirection as needed  - Assess for the need to continue restraints  Outcome: Progressing     Problem: Delirium  Goal: Minimize duration of delirium  Description: Interventions:  - Encourage use of hearing aids, eye glasses  - Promote highest level of mobility daily  - Provide frequent reorientation  - Promote wakefulness i.e. lights on, blinds open  - Promote sleep, encourage patient's normal rest cycle i.e. lights off, TV off, minimize noise and interruptions  - Encourage family to assist in orientation and promotion of home routines  Outcome: Progressing

## 2024-08-06 NOTE — PROGRESS NOTES
NEPHROLOGY CONSULT NOTE     DATE: 8/4/2024    Requesting Physician: Dr. Mitchell      Reason for Consult: ALBERT      Interval history:   Cr stable today  Poor po intake    HISTORY OF PRESENT ILLNESS: Roc Butcher is an 84 year old male with PMH sig for DM2, HTN, CAD, Afib, systolic heart failure.  Hx of bladder CA and is s/p laparoscopic cystoprostatectomy with ileal conduit diversion on 7/25.  Post op course complicated by fever and leukocytosis. Blood culture positive for enterococcus.  Patient was started on IV antibiotics and ID has been consulted.  Patient also with ALBERT with worsening to 2.27 this AM.  sCr 1.29 on admission. Nephrology is consulted for ALBERT.  Patient currently denies SOB or n/v. Appetite has been decreased, but he is drinking fluids.        MEDICAL HISTORY:   Past Medical History:    Arrhythmia    Cancer (HCC)    bladder    Cataract    High blood pressure    High cholesterol    History of blood transfusion    HYPERLIPIDEMIA    HYPERTENSION    Other and unspecified hyperlipidemia    STROKE    Type II or unspecified type diabetes mellitus without mention of complication, not stated as uncontrolled    Unspecified essential hypertension    Visual impairment       SURGICAL HISTORY:   Past Surgical History:   Procedure Laterality Date    Cardiac pacemaker placement      Other surgical history      bladder tumor removal    Tonsillectomy         FAMILY HISTORY:   Family History   Problem Relation Age of Onset    Heart Disorder Father     Other Father         HIP FX    Heart Disorder Mother     Other Mother         CVA AGE 80       SOCIAL HISTORY:   Social History     Socioeconomic History    Marital status:      Spouse name: Not on file    Number of children: Not on file    Years of education: Not on file    Highest education level: Not on file   Occupational History    Not on file   Tobacco Use    Smoking status: Former     Types: Cigarettes    Smokeless tobacco: Never    Tobacco comments:      2010   Vaping Use    Vaping status: Never Used   Substance and Sexual Activity    Alcohol use: Yes     Comment: HEAVY PREVIOUS NOW SOCIAL    Drug use: No    Sexual activity: Not on file   Other Topics Concern    Not on file   Social History Narrative    Not on file     Social Determinants of Health     Financial Resource Strain: Low Risk  (5/30/2024)    Received from [facility]    Financial Resource Strain     In the past year, have you or any family members you live with been unable to get any of the following when it was really needed? Check all that apply.: None   Food Insecurity: No Food Insecurity (7/25/2024)    Food Insecurity     Food Insecurity: Never true   Transportation Needs: No Transportation Needs (7/25/2024)    Transportation Needs     Lack of Transportation: No     Car Seat: Not on file   Physical Activity: High Risk (5/3/2024)    Received from [facility]    Exercise Vital Sign     On average, how many days per week do you engage in moderate to strenuous exercise (like a brisk walk)?: 0 days     On average, how many minutes do you engage in exercise at this level?: 0 min   Stress: Not on file   Social Connections: Medium Risk (5/30/2024)    Received from [facility]    Social Connections     How often do you see or talk to people that you care about and feel close to? (For example: talking to friends on the phone, visiting friends or family, going to Jewish or club meetings): 1 or 2 times a week   Housing Stability: Low Risk  (7/25/2024)    Housing Stability     Housing Instability: No     Housing Instability Emergency: Not on file     Crib or [place]: Not on file       MEDICATIONS:   Current Facility-Administered Medications   Medication Dose Route Frequency    lactated ringers infusion   Intravenous Continuous    [START ON 8/6/2024] sodium chloride 0.9% infusion   Intravenous On Call    sodium chloride 0.9% 0.9% flush injection 10 mL  10 mL Intravenous PRN     benzocaine (Hurricaine/Topex) 20 % mouth spray 1 spray  1 spray Mouth/Throat Once    Vancomycin: PHARMACY DOSING  1 each Intravenous See Admin Instructions (RX holding)    risperiDONE (RisperDAL) tab 0.25 mg  0.25 mg Oral QPM    temazepam (Restoril) cap 15 mg  15 mg Oral Nightly PRN    acetaminophen (Tylenol) tab 650 mg  650 mg Oral Q8H PRN    melatonin cap/tab 5 mg  5 mg Oral Nightly    isosorbide dinitrate (Isordil Titradose) tab 5 mg  5 mg Oral TID    metoprolol tartrate (Lopressor) tab 25 mg  25 mg Oral 2x Daily(Beta Blocker)    docusate sodium (Colace) cap 100 mg  100 mg Oral BID PRN    apixaban (Eliquis) tab 5 mg  5 mg Oral BID    atorvastatin (Lipitor) tab 40 mg  40 mg Oral Daily    hydrALAZINE (Apresoline) tab 10 mg  10 mg Oral TID    glucose (Dex4) 15 GM/59ML oral liquid 15 g  15 g Oral Q15 Min PRN    Or    glucose (Glutose) 40% oral gel 15 g  15 g Oral Q15 Min PRN    Or    glucose-vitamin C (Dex-4) chewable tab 4 tablet  4 tablet Oral Q15 Min PRN    Or    dextrose 50% injection 50 mL  50 mL Intravenous Q15 Min PRN    Or    glucose (Dex4) 15 GM/59ML oral liquid 30 g  30 g Oral Q15 Min PRN    Or    glucose (Glutose) 40% oral gel 30 g  30 g Oral Q15 Min PRN    Or    glucose-vitamin C (Dex-4) chewable tab 8 tablet  8 tablet Oral Q15 Min PRN    insulin aspart (NovoLOG) 100 Units/mL FlexPen 1-11 Units  1-11 Units Subcutaneous TID CC    ondansetron (Zofran) 4 MG/2ML injection 4 mg  4 mg Intravenous Q6H PRN    famotidine (Pepcid) tab 20 mg  20 mg Oral BID         ALLERGIES:   Allergies   Allergen Reactions    Metformin Hcl OTHER (SEE COMMENTS)      Oral,   severe dry mouth       REVIEW OF SYSTEMS:  A comprehensive review of systems was conducted and is negative except for what is mentioned in the HPI      PHYSICAL EXAM:  /48   Pulse 68   Temp 99.8 °F (37.7 °C) (Axillary)   Resp 18   Ht 5' 9\" (1.753 m)   Wt 167 lb (75.8 kg)   SpO2 96%   BMI 24.66 kg/m²   GEN:  NAD  HEENT: NCAT, dry MM   CHEST: CTA  b/l  CARDIAC: S1S2 normal  ABD: soft, NT/ND  : urostomy - hematuria noted   EXT: no lower ext edema  NEURO: sleepy       LABS:  Recent Labs   Lab 08/03/24  1527 08/04/24  0739 08/05/24  0554   * 175* 159*   BUN 48* 49* 48*   CREATSERUM 2.14* 2.27* 2.21*   EGFRCR 30* 28* 29*   CA 8.3* 8.3* 8.2*    142 141   K 4.3 4.4 4.4   * 115* 114*   CO2 17.0* 18.0* 18.0*     Recent Labs   Lab 08/03/24  1527 08/04/24  0739 08/05/24  0554   RBC 4.20 3.50* 3.65*   HGB 11.4* 9.5* 10.0*   HCT 36.2* 30.6* 31.0*   MCV 86.2 87.4 84.9   MCH 27.1 27.1 27.4   MCHC 31.5 31.0 32.3   RDW 23.5* 23.6* 23.0*   NEPRELIM 12.05* 13.04* 14.96*   WBC 14.0* 15.8* 18.0*   .0 290.0 308.0           INTAKE/OUTPUT:    Intake/Output Summary (Last 24 hours) at 8/5/2024 2143  Last data filed at 8/5/2024 1945  Gross per 24 hour   Intake 1169.5 ml   Output 1025 ml   Net 144.5 ml             IMAGING:  XR CHEST AP PORTABLE  (CPT=71045)    Result Date: 8/4/2024  CONCLUSION:   Cardiomegaly with mild bilateral perihilar opacity which could reflect edema.  Mild left greater than right bibasilar opacity may reflect atelectasis with or without superimposed pneumonia.     Dictated by (CST): Angel Le MD on 8/04/2024 at 9:32 AM     Finalized by (CST): Angel Le MD on 8/04/2024 at 9:34 AM              ASSESSMENT AND PLAN:   This is an 84 year old male with PMH sig for DM2, HTN, CAD, Afib, systolic heart failure.  Hx of bladder CA and is s/p laparoscopic cystoprostatectomy with ileal conduit diversion on 7/25. Post op course complicated by enterococcus bacteremia. Nephrology is consulted for ALBERT.     Non-oliguric ALBERT   - sCr 1.29 on admission  - Urine Na 43   - etiology may be pre-renal ALBERT vs ATN in the setting of infection  - sCr 2.2 today -may be starting to plateau   - continue IVFs as clinically patient appears dry on exam   - hold lasix    - continue supportive care    - await results of CT abd /p  - renally dose meds   -  avoid nephro toxins  - follow renal fxn and I/Os  - no acute indication for RRT      Acidosis   - likely due to ALBERT and ileal conduit   - currently on LR   - monitor - may need sodium bicarbonate supplementation if not improving      HTN    - Metoprolol, hydralazine      Fever, leukocytosis   Enterococcus bacteremia  - management and abx per primary / ID      Bladder CA s/p laparoscopic cystoprostatectomy with ileal conduit diversion 7/25  - per Urology     Dw RN     Thank you for the consult and allowing us to participate in the care of Roc Butcher.  We will continue to follow.    Jael George MD  [facility]  Nephrology

## 2024-08-06 NOTE — PLAN OF CARE
Roc is AxO1-2. Drowsy. Incomprehensible speech. Pt can be restless at times, trying to get out of bed. Frequent reorientation provided, decreased environmental stimuli, and video monitoring in place. No signs of pain or distress observed. Tmax: 99.8 overnight.  Ileal conduit in place to gravity drainage via nath bag with red output. Surgical site is clean/dry/intact. Minimal PO intake,  Pt declined to eat dinner last night. Denies nausea/vomiting overnight. Monitoring blood sugar per order. No BM overnight. Pt is a max assist. Frequent repositioning provided. Receiving IVF and IV abx.  Pacemaker in place. On Eliquis. On RA. Going to receive TITA today. NPO since 0500. Consent obtained from Son via telephone call with 2 RN witnesses. Going to [facility] upon discharge, pending medical clearance. Bed alarm on, bed locked in lowest position, call light within reach, frequent rounding by nursing staff, and appropriate safety measures in place.         Pacemaker in place.   Problem: Patient Centered Care  Goal: Patient preferences are identified and integrated in the patient's plan of care  Description: Interventions:  - What would you like us to know as we care for you? From home with wife  - Provide timely, complete, and accurate information to patient/family  - Incorporate patient and family knowledge, values, beliefs, and cultural backgrounds into the planning and delivery of care  - Encourage patient/family to participate in care and decision-making at the level they choose  - Honor patient and family perspectives and choices  Outcome: Progressing     Problem: Patient/Family Goals  Goal: Patient/Family Long Term Goal  Description: Patient's Long Term Goal:     Interventions:  -   - See additional Care Plan goals for specific interventions  Outcome: Progressing  Goal: Patient/Family Short Term Goal  Description: Patient's Short Term Goal:     Interventions:   -   - See additional Care Plan goals for specific  interventions  Outcome: Progressing     Problem: Impaired Cognition  Goal: Patient will exhibit improved attention, thought processing and/or memory  Description: Interventions:    Outcome: Progressing     Problem: CARDIOVASCULAR - ADULT  Goal: Maintains optimal cardiac output and hemodynamic stability  Description: INTERVENTIONS:  - Monitor vital signs, rhythm, and trends  - Monitor for bleeding, hypotension and signs of decreased cardiac output  - Evaluate effectiveness of vasoactive medications to optimize hemodynamic stability  - Monitor arterial and/or venous puncture sites for bleeding and/or hematoma  - Assess quality of pulses, skin color and temperature  - Assess for signs of decreased coronary artery perfusion - ex. Angina  - Evaluate fluid balance, assess for edema, trend weights  Outcome: Progressing  Goal: Absence of cardiac arrhythmias or at baseline  Description: INTERVENTIONS:  - Continuous cardiac monitoring, monitor vital signs, obtain 12 lead EKG if indicated  - Evaluate effectiveness of antiarrhythmic and heart rate control medications as ordered  - Initiate emergency measures for life threatening arrhythmias  - Monitor electrolytes and administer replacement therapy as ordered  Outcome: Progressing     Problem: GASTROINTESTINAL - ADULT  Goal: Minimal or absence of nausea and vomiting  Description: INTERVENTIONS:  - Maintain adequate hydration with IV or PO as ordered and tolerated  - Nasogastric tube to low intermittent suction as ordered  - Evaluate effectiveness of ordered antiemetic medications  - Provide nonpharmacologic comfort measures as appropriate  - Advance diet as tolerated, if ordered  - Obtain nutritional consult as needed  - Evaluate fluid balance  Outcome: Progressing  Goal: Maintains or returns to baseline bowel function  Description: INTERVENTIONS:  - Assess bowel function  - Maintain adequate hydration with IV or PO as ordered and tolerated  - Evaluate effectiveness of GI  medications  - Encourage mobilization and activity  - Obtain nutritional consult as needed  - Establish a toileting routine/schedule  - Consider collaborating with pharmacy to review patient's medication profile  Outcome: Progressing     Problem: GENITOURINARY - ADULT  Goal: Absence of urinary retention  Description: INTERVENTIONS:  - Assess patient’s ability to void and empty bladder  - Monitor intake/output and perform bladder scan as needed  - Follow urinary retention protocol/standard of care  - Consider collaborating with pharmacy to review patient's medication profile  - Implement strategies to promote bladder emptying  Outcome: Progressing     Problem: METABOLIC/FLUID AND ELECTROLYTES - ADULT  Goal: Electrolytes maintained within normal limits  Description: INTERVENTIONS:  - Monitor labs and rhythm and assess patient for signs and symptoms of electrolyte imbalances  - Administer electrolyte replacement as ordered  - Monitor response to electrolyte replacements, including rhythm and repeat lab results as appropriate  - Fluid restriction as ordered  - Instruct patient on fluid and nutrition restrictions as appropriate  Outcome: Progressing     Problem: SKIN/TISSUE INTEGRITY - ADULT  Goal: Skin integrity remains intact  Description: INTERVENTIONS  - Assess and document risk factors for pressure ulcer development  - Assess and document skin integrity  - Monitor for areas of redness and/or skin breakdown  - Initiate interventions, skin care algorithm/standards of care as needed  Outcome: Progressing  Goal: Incision(s), wounds(s) or drain site(s) healing without S/S of infection  Description: INTERVENTIONS:  - Assess and document risk factors for pressure ulcer development  - Assess and document skin integrity  - Assess and document dressing/incision, wound bed, drain sites and surrounding tissue  - Implement wound care per orders  - Initiate isolation precautions as appropriate  - Initiate Pressure Ulcer  prevention bundle as indicated  Outcome: Progressing     Problem: HEMATOLOGIC - ADULT  Goal: Maintains hematologic stability  Description: INTERVENTIONS  - Assess for signs and symptoms of bleeding or hemorrhage  - Monitor labs and vital signs for trends  - Administer supportive blood products/factors, fluids and medications as ordered and appropriate  - Administer supportive blood products/factors as ordered and appropriate  Outcome: Progressing  Goal: Free from bleeding injury  Description: (Example usage: patient with low platelets)  INTERVENTIONS:  - Avoid intramuscular injections, enemas and rectal medication administration  - Ensure safe mobilization of patient  - Hold pressure on venipuncture sites to achieve adequate hemostasis  - Assess for signs and symptoms of internal bleeding  - Monitor lab trends  - Patient is to report abnormal signs of bleeding to staff  - Avoid use of toothpicks and dental floss  - Use electric shaver for shaving  - Use soft bristle tooth brush  - Limit straining and forceful nose blowing  Outcome: Progressing     Problem: MUSCULOSKELETAL - ADULT  Goal: Return mobility to safest level of function  Description: INTERVENTIONS:  - Assess patient stability and activity tolerance for standing, transferring and ambulating w/ or w/o assistive devices  - Assist with transfers and ambulation using safe patient handling equipment as needed  - Ensure adequate protection for wounds/incisions during mobilization  - Obtain PT/OT consults as needed  - Advance activity as appropriate  - Communicate ordered activity level and limitations with patient/family  Outcome: Progressing  Goal: Maintain proper alignment of affected body part  Description: INTERVENTIONS:  - Support and protect limb and body alignment per provider's orders  - Instruct and reinforce with patient and family use of appropriate assistive device and precautions (e.g. spinal or hip dislocation precautions)  Outcome: Progressing      Problem: NEUROLOGICAL - ADULT  Goal: Achieves stable or improved neurological status  Description: INTERVENTIONS  - Assess for and report changes in neurological status  - Initiate measures to prevent increased intracranial pressure  - Maintain blood pressure and fluid volume within ordered parameters to optimize cerebral perfusion and minimize risk of hemorrhage  - Monitor temperature, glucose, and sodium. Initiate appropriate interventions as ordered  Outcome: Progressing     Problem: Impaired Communication  Goal: Patient will achieve maximal communication potential  Description: Interventions:    Outcome: Progressing     Problem: DISCHARGE PLANNING  Goal: Discharge to home or other facility with appropriate resources  Description: INTERVENTIONS:  - Identify barriers to discharge w/pt and caregiver  - Include patient/family/discharge partner in discharge planning  - Arrange for needed discharge resources and transportation as appropriate  - Identify discharge learning needs (meds, wound care, etc)  - Arrange for interpreters to assist at discharge as needed  - Consider post-discharge preferences of patient/family/discharge partner  - Complete POLST form as appropriate  - Assess patient's ability to be responsible for managing their own health  - Refer to Case Management Department for coordinating discharge planning if the patient needs post-hospital services based on physician/LIP order or complex needs related to functional status, cognitive ability or social support system  Outcome: Progressing     Problem: SAFETY ADULT - FALL  Goal: Free from fall injury  Description: INTERVENTIONS:  - Assess pt frequently for physical needs  - Identify cognitive and physical deficits and behaviors that affect risk of falls.  - [place] fall precautions as indicated by assessment.  - Educate pt/family on patient safety including physical limitations  - Instruct pt to call for assistance with activity based on assessment  -  Modify environment to reduce risk of injury  - Provide assistive devices as appropriate  - Consider OT/PT consult to assist with strengthening/mobility  - Encourage toileting schedule  Outcome: Progressing     Problem: PAIN - ADULT  Goal: Verbalizes/displays adequate comfort level or patient's stated pain goal  Description: INTERVENTIONS:  - Encourage pt to monitor pain and request assistance  - Assess pain using appropriate pain scale  - Administer analgesics based on type and severity of pain and evaluate response  - Implement non-pharmacological measures as appropriate and evaluate response  - Consider cultural and social influences on pain and pain management  - Manage/alleviate anxiety  - Utilize distraction and/or relaxation techniques  - Monitor for opioid side effects  - Notify MD/LIP if interventions unsuccessful or patient reports new pain  - Anticipate increased pain with activity and pre-medicate as appropriate  Outcome: Progressing     Problem: Safety Risk - Non-Violent Restraints  Goal: Patient will remain free from self-harm  Description: INTERVENTIONS:  - Apply the least restrictive restraint to prevent harm  - Notify patient and family of reasons restraints applied  - Assess for any contributing factors to confusion (electrolyte disturbances, delirium, medications)  - Discontinue any unnecessary medical devices as soon as possible  - Assess the patient's physical comfort, circulation, skin condition, hydration, nutrition and elimination needs   - Reorient and redirection as needed  - Assess for the need to continue restraints  Outcome: Progressing     Problem: Delirium  Goal: Minimize duration of delirium  Description: Interventions:  - Encourage use of hearing aids, eye glasses  - Promote highest level of mobility daily  - Provide frequent reorientation  - Promote wakefulness i.e. lights on, blinds open  - Promote sleep, encourage patient's normal rest cycle i.e. lights off, TV off, minimize noise  and interruptions  - Encourage family to assist in orientation and promotion of home routines  Outcome: Progressing

## 2024-08-07 LAB
ANION GAP SERPL CALC-SCNC: 9 MMOL/L (ref 0–18)
BASOPHILS # BLD: 0 X10(3) UL (ref 0–0.2)
BASOPHILS NFR BLD: 0 %
BUN BLD-MCNC: 42 MG/DL (ref 9–23)
BUN/CREAT SERPL: 26.3 (ref 10–20)
CALCIUM BLD-MCNC: 8.3 MG/DL (ref 8.7–10.4)
CHLORIDE SERPL-SCNC: 116 MMOL/L (ref 98–112)
CO2 SERPL-SCNC: 19 MMOL/L (ref 21–32)
CREAT BLD-MCNC: 1.6 MG/DL
DEPRECATED RDW RBC AUTO: 73.1 FL (ref 35.1–46.3)
EGFRCR SERPLBLD CKD-EPI 2021: 42 ML/MIN/1.73M2 (ref 60–?)
EOSINOPHIL # BLD: 0.13 X10(3) UL (ref 0–0.7)
EOSINOPHIL NFR BLD: 1 %
ERYTHROCYTE [DISTWIDTH] IN BLOOD BY AUTOMATED COUNT: 23.3 % (ref 11–15)
GLUCOSE BLD-MCNC: 116 MG/DL (ref 70–99)
GLUCOSE BLDC GLUCOMTR-MCNC: 118 MG/DL (ref 70–99)
GLUCOSE BLDC GLUCOMTR-MCNC: 120 MG/DL (ref 70–99)
GLUCOSE BLDC GLUCOMTR-MCNC: 121 MG/DL (ref 70–99)
GLUCOSE BLDC GLUCOMTR-MCNC: 126 MG/DL (ref 70–99)
HCT VFR BLD AUTO: 31.1 %
HGB BLD-MCNC: 9.5 G/DL
LYMPHOCYTES NFR BLD: 0.8 X10(3) UL (ref 1–4)
LYMPHOCYTES NFR BLD: 6 %
MCH RBC QN AUTO: 26.5 PG (ref 26–34)
MCHC RBC AUTO-ENTMCNC: 30.5 G/DL (ref 31–37)
MCV RBC AUTO: 86.6 FL
MONOCYTES # BLD: 0.94 X10(3) UL (ref 0.1–1)
MONOCYTES NFR BLD: 7 %
NEUTROPHILS # BLD AUTO: 10.39 X10 (3) UL (ref 1.5–7.7)
NEUTROPHILS NFR BLD: 86 %
NEUTS HYPERSEG # BLD: 11.52 X10(3) UL (ref 1.5–7.7)
OSMOLALITY SERPL CALC.SUM OF ELEC: 309 MOSM/KG (ref 275–295)
PLATELET # BLD AUTO: 384 10(3)UL (ref 150–450)
PLATELET MORPHOLOGY: NORMAL
POTASSIUM SERPL-SCNC: 4.2 MMOL/L (ref 3.5–5.1)
RBC # BLD AUTO: 3.59 X10(6)UL
SODIUM SERPL-SCNC: 144 MMOL/L (ref 136–145)
TOTAL CELLS COUNTED BLD: 100
WBC # BLD AUTO: 13.4 X10(3) UL (ref 4–11)

## 2024-08-07 RX ORDER — AMOXICILLIN AND CLAVULANATE POTASSIUM 500; 125 MG/1; MG/1
500 TABLET, FILM COATED ORAL EVERY 12 HOURS SCHEDULED
Status: DISCONTINUED | OUTPATIENT
Start: 2024-08-07 | End: 2024-08-09

## 2024-08-07 NOTE — PROGRESS NOTES
NEPHROLOGY CONSULT NOTE     DATE: 8/4/2024    Requesting Physician: Dr. Mitchell      Reason for Consult: ALBERT      Interval history:   Cr improved today  Poor po intake  On IVF    HISTORY OF PRESENT ILLNESS: Roc Butcher is an 84 year old male with PMH sig for DM2, HTN, CAD, Afib, systolic heart failure.  Hx of bladder CA and is s/p laparoscopic cystoprostatectomy with ileal conduit diversion on 7/25.  Post op course complicated by fever and leukocytosis. Blood culture positive for enterococcus.  Patient was started on IV antibiotics and ID has been consulted.  Patient also with ALBERT with worsening to 2.27 this AM.  sCr 1.29 on admission. Nephrology is consulted for ALBERT.  Patient currently denies SOB or n/v. Appetite has been decreased, but he is drinking fluids.        MEDICAL HISTORY:   Past Medical History:    Arrhythmia    Cancer (HCC)    bladder    Cataract    High blood pressure    High cholesterol    History of blood transfusion    HYPERLIPIDEMIA    HYPERTENSION    Other and unspecified hyperlipidemia    STROKE    Type II or unspecified type diabetes mellitus without mention of complication, not stated as uncontrolled    Unspecified essential hypertension    Visual impairment       SURGICAL HISTORY:   Past Surgical History:   Procedure Laterality Date    Cardiac pacemaker placement      Other surgical history      bladder tumor removal    Tonsillectomy         FAMILY HISTORY:   Family History   Problem Relation Age of Onset    Heart Disorder Father     Other Father         HIP FX    Heart Disorder Mother     Other Mother         CVA AGE 80       SOCIAL HISTORY:   Social History     Socioeconomic History    Marital status:      Spouse name: Not on file    Number of children: Not on file    Years of education: Not on file    Highest education level: Not on file   Occupational History    Not on file   Tobacco Use    Smoking status: Former     Types: Cigarettes    Smokeless tobacco: Never    Tobacco  comments:     2010   Vaping Use    Vaping status: Never Used   Substance and Sexual Activity    Alcohol use: Yes     Comment: HEAVY PREVIOUS NOW SOCIAL    Drug use: No    Sexual activity: Not on file   Other Topics Concern    Not on file   Social History Narrative    Not on file     Social Determinants of Health     Financial Resource Strain: Low Risk  (5/30/2024)    Received from [facility]    Financial Resource Strain     In the past year, have you or any family members you live with been unable to get any of the following when it was really needed? Check all that apply.: None   Food Insecurity: No Food Insecurity (7/25/2024)    Food Insecurity     Food Insecurity: Never true   Transportation Needs: No Transportation Needs (7/25/2024)    Transportation Needs     Lack of Transportation: No     Car Seat: Not on file   Physical Activity: High Risk (5/3/2024)    Received from [facility]    Exercise Vital Sign     On average, how many days per week do you engage in moderate to strenuous exercise (like a brisk walk)?: 0 days     On average, how many minutes do you engage in exercise at this level?: 0 min   Stress: Not on file   Social Connections: Medium Risk (5/30/2024)    Received from [facility]    Social Connections     How often do you see or talk to people that you care about and feel close to? (For example: talking to friends on the phone, visiting friends or family, going to [employer] or club meetings): 1 or 2 times a week   Housing Stability: Low Risk  (7/25/2024)    Housing Stability     Housing Instability: No     Housing Instability Emergency: Not on file     Crib or Bassinette: Not on file       MEDICATIONS:   Current Facility-Administered Medications   Medication Dose Route Frequency    amoxicillin clavulanate (Augmentin) 500-125 MG per tab 500 mg  500 mg Oral Q12H    QUEtiapine (SEROquel) tab 12.5 mg  12.5 mg Oral Once    famotidine (Pepcid) tab 10 mg  10 mg Oral QOD     QUEtiapine (SEROquel) tab 12.5 mg  12.5 mg Oral QPM    ALPRAZolam (Xanax) tab 0.25 mg  0.25 mg Oral Nightly    QUEtiapine (SEROquel) tab 12.5 mg  12.5 mg Oral Nightly PRN    lactated ringers infusion   Intravenous Continuous    sodium chloride 0.9% 0.9% flush injection 10 mL  10 mL Intravenous PRN    acetaminophen (Tylenol) tab 650 mg  650 mg Oral Q8H PRN    melatonin cap/tab 5 mg  5 mg Oral Nightly    isosorbide dinitrate (Isordil Titradose) tab 5 mg  5 mg Oral TID    metoprolol tartrate (Lopressor) tab 25 mg  25 mg Oral 2x Daily(Beta Blocker)    docusate sodium (Colace) cap 100 mg  100 mg Oral BID PRN    apixaban (Eliquis) tab 5 mg  5 mg Oral BID    atorvastatin (Lipitor) tab 40 mg  40 mg Oral Daily    hydrALAZINE (Apresoline) tab 10 mg  10 mg Oral TID    glucose (Dex4) 15 GM/59ML oral liquid 15 g  15 g Oral Q15 Min PRN    Or    glucose (Glutose) 40% oral gel 15 g  15 g Oral Q15 Min PRN    Or    glucose-vitamin C (Dex-4) chewable tab 4 tablet  4 tablet Oral Q15 Min PRN    Or    dextrose 50% injection 50 mL  50 mL Intravenous Q15 Min PRN    Or    glucose (Dex4) 15 GM/59ML oral liquid 30 g  30 g Oral Q15 Min PRN    Or    glucose (Glutose) 40% oral gel 30 g  30 g Oral Q15 Min PRN    Or    glucose-vitamin C (Dex-4) chewable tab 8 tablet  8 tablet Oral Q15 Min PRN    insulin aspart (NovoLOG) 100 Units/mL FlexPen 1-11 Units  1-11 Units Subcutaneous TID CC    ondansetron (Zofran) 4 MG/2ML injection 4 mg  4 mg Intravenous Q6H PRN         ALLERGIES:   Allergies   Allergen Reactions    Metformin Hcl OTHER (SEE COMMENTS)      Oral,   severe dry mouth       REVIEW OF SYSTEMS:  A comprehensive review of systems was conducted and is negative except for what is mentioned in the HPI      PHYSICAL EXAM:  /56 (BP Location: Right arm)   Pulse 94   Temp 97.3 °F (36.3 °C) (Axillary)   Resp 18   Ht 5' 9\" (1.753 m)   Wt 167 lb (75.8 kg)   SpO2 97%   BMI 24.66 kg/m²   GEN:  NAD  HEENT: NCAT, dry MM   CHEST: CTA b/l  CARDIAC:  S1S2 normal  ABD: soft, NT/ND  : urostomy - hematuria noted   EXT: no lower ext edema  NEURO: sleepy       LABS:  Recent Labs   Lab 24  0554 24  0602 24  0651   * 135* 116*   BUN 48* 47* 42*   CREATSERUM 2.21* 1.94* 1.60*   EGFRCR 29* 34* 42*   CA 8.2* 8.0* 8.3*    140 144   K 4.4 4.0 4.2   * 114* 116*   CO2 18.0* 19.0* 19.0*     Recent Labs   Lab 24  0554 24  0602 24  0651   RBC 3.65* 3.36* 3.59*   HGB 10.0* 8.9* 9.5*   HCT 31.0* 28.5* 31.1*   MCV 84.9 84.8 86.6   MCH 27.4 26.5 26.5   MCHC 32.3 31.2 30.5*   RDW 23.0* 22.9* 23.3*   NEPRELIM 14.96* 8.96* 10.39*   WBC 18.0* 11.7* 13.4*   .0 316.0 384.0           INTAKE/OUTPUT:    Intake/Output Summary (Last 24 hours) at 2024 1539  Last data filed at 2024 0522  Gross per 24 hour   Intake 2075 ml   Output 1100 ml   Net 975 ml             IMAGING:  CARD ECHO TITA (CPT=93320/22494)    Addendum Date: 2024    Transesophageal Echocardiogram (Report amended ) Name:Roc Butcher Date: 2024 :  1940 Ht:  (69in)  BP: 129 / 63 MRN:  [id number]    Age:  84years    Wt:  (167lb) HR: 75bpm Loc:  [facility]       Gndr: M          BSA: 1.91m^2 Sonographer: Omayra BARROS Ordering:    Eben Rick Consulting:  Eben Rick --------------------------------------------------------------------------- - History/Indications:   Bacteremia. --------------------------------------------------------------------------- - Procedure information:  Diagnostic transesophageal echocardiogram. Additional evaluation included 2D and Doppler.  Patient status:  Inpatient. Location:  Catheterization laboratory.    This was a routine study. The risks, benefits, and alternatives to the procedure were explained and informed consent was obtained. Initial setup. The patient arrived at the laboratory. Surface ECG leads, blood pressure measurements, and pulse oximetric signals were monitored. Sedation. Moderate sedation was  administered. Transesophageal echocardiography. Topical anesthesia was obtained using Hurricaine. A transesophageal probe was inserted by the attending cardiologist without difficulty. Image quality was adequate. Intravenous contrast (agitated saline) was administered to evaluate for shunting. The transesophageal probe was removed.  Study completion:  The patient tolerated the procedure well. There were no complications. Administered medications:   Fentanyl , 50 mcg.  Midazolam , 2 mg. An independent trained observer was present to assess the patient's hemodynamic status and level of sedation. Moderate sedation time: 13:15 to 13:45. --------------------------------------------------------------------------- - Conclusions: 1. Left ventricle: The cavity size was at the upper limits of normal.    Systolic function was moderately to markedly reduced. The estimated    ejection fraction was 30%. 2. Left atrium: The atrium was mildly enlarged. 3. Aortic valve: There was mild to moderate regurgitation. 4. Mitral valve: There was mild to moderate regurgitation. 5. No vegetation seen (very good look at the valves). * --------------------------------------------------------------------------- - * Findings: Left ventricle:  The cavity size was at the upper limits of normal. Systolic function was moderately to markedly reduced. The estimated ejection fraction was 30%. Left atrium:  The atrium was mildly enlarged.  There is no evidence of a thrombus in the atrial cavity or appendage. Right ventricle:  The cavity size was normal. Systolic function was normal. A pacing wire could be seen. Right atrium:  The atrium was at the upper limits of normal in size. A pacing wire was noted. Mitral valve:  The valve was structurally normal. Leaflet separation was normal.  There was no evidence of vegetation.  Doppler:  There was mild to moderate regurgitation. Aortic valve:   The valve was trileaflet. The leaflets were mildly thickened. Cusp  separation was normal.  Doppler:   There was no evidence for stenosis.   There was mild to moderate regurgitation. Tricuspid valve:  The valve is structurally normal. Leaflet separation was normal.  There was no evidence of a vegetation.  Doppler:  There was trivial regurgitation. Pulmonic valve:   There was no thickening. Cusp separation was normal. There was no evidence of a vegetation.  Doppler:  There was mild regurgitation. Pericardium:   There was no pericardial effusion. Aorta: Aorta: No evidence of aneurysm, dissection, or atheroma. Aortic root: The aortic root was normal-sized. Descending aorta:  There was mild diffuse disease. Systemic veins: Superior vena cava: The SVC was normal-sized. Atrial septum:  No defect or patent foramen ovale was identified.  Doppler and echo contrast study showed no right-to-left atrial level shunt, following an increase in RA pressure induced by provocative maneuvers. --------------------------------------------------------------------------- - Amended Eben Rick 08/06/2024 15:58         ASSESSMENT AND PLAN:   This is an 84 year old male with PMH sig for DM2, HTN, CAD, Afib, systolic heart failure.  Hx of bladder CA and is s/p laparoscopic cystoprostatectomy with ileal conduit diversion on 7/25. Post op course complicated by enterococcus bacteremia. Nephrology is consulted for ALBERT.     Non-oliguric ALBERT   - sCr 1.29 on admission  - Urine Na 43   - etiology may be pre-renal ALBERT vs ATN in the setting of infection  - sCr 1.6 today -appears to have plateued and now improving renal function  - continue IVFs as clinically patient appears dry on exam -increase IVF rate to 150ml/hr. Urine quite concentrated  - hold lasix, monitor volume status  - continue supportive care    - renally dose meds   - avoid nephro toxins  - follow renal fxn and I/Os  - no acute indication for RRT      Acidosis   - likely due to ALBERT and ileal conduit   - currently on LR   - monitor - may need sodium  bicarbonate supplementation if not improving      HTN    - Metoprolol, hydralazine      Fever, leukocytosis   Enterococcus bacteremia  - management and abx per primary / ID    -sp TITA    Bladder CA s/p laparoscopic cystoprostatectomy with ileal conduit diversion 7/25  - per Urology     Dw RN     Thank you for the consult and allowing us to participate in the care of Roc Butcher.  We will continue to follow.    Jael George MD  [facility]  Nephrology

## 2024-08-07 NOTE — CM/SW NOTE
Department  asked to send updates   Assigned SW/CM to follow up with patient/family on discharge plan.     Brian ID [id number]    PT sent via portal    Pilar Esposito DSC

## 2024-08-07 NOTE — PROGRESS NOTES
08/07/24 1030   Urostomy Ileal conduit RLQ   Placement Date: 07/25/24   Inserted by: Dr Jeong  Urostomy Type: Ileal conduit  Location: RLQ  Stoma Size (cm): 1 cm  Appliance Size: 1 3/4   Stomal Appliance 2 piece;Intact  (2 1/4 inch urostomy)   Stomal Assessment Moist;Red;Budding  (stents out by MD)   Peristomal Assessment LUIS E   Dressing Change Due 08/11/24   Wound Follow Up   Follow up needed Yes     Ostomy Care  Follow up on the pt. he is sleeping in bed, no family here at this time. His ileal conduit appliance is on and intact, darker urine in the nath bag, nurse is at the bedside. Pt. is not teachable. Plan is QUENTIN.

## 2024-08-07 NOTE — PROGRESS NOTES
[facility]  part of [facility] Infectious Disease Consult    Roc Butcher Patient Status:  Inpatient    1940 MRN [id number]   Location [facility] 4W/SW/SE Attending Pearl Love MD   Hosp Day # 13 PCP MD Roc Crum seen and examined,   Afebrile,   Previous entries noted  Wife at the bed side,   Mental status is off and on,     History:  Past Medical History:    Arrhythmia    Cancer (HCC)    bladder    Cataract    High blood pressure    High cholesterol    History of blood transfusion    HYPERLIPIDEMIA    HYPERTENSION    Other and unspecified hyperlipidemia    STROKE    Type II or unspecified type diabetes mellitus without mention of complication, not stated as uncontrolled    Unspecified essential hypertension    Visual impairment     Past Surgical History:   Procedure Laterality Date    Cardiac pacemaker placement      Other surgical history      bladder tumor removal    Tonsillectomy       Family History   Problem Relation Age of Onset    Heart Disorder Father     Other Father         HIP FX    Heart Disorder Mother     Other Mother         CVA AGE 80      reports that he has quit smoking. His smoking use included cigarettes. He has never used smokeless tobacco. He reports current alcohol use. He reports that he does not use drugs.    Allergies:  Allergies   Allergen Reactions    Metformin Hcl OTHER (SEE COMMENTS)      Oral,   severe dry mouth       Medications:    Current Facility-Administered Medications:     QUEtiapine (SEROquel) tab 12.5 mg, 12.5 mg, Oral, Once    vancomycin (Vancocin) 1.25 g in sodium chloride 0.9% 250mL IVPB premix, 15 mg/kg, Intravenous, Q24H    famotidine (Pepcid) tab 10 mg, 10 mg, Oral, QOD **OR** [DISCONTINUED] famotidine (Pepcid) 20 mg/2mL injection 20 mg, 20 mg, Intravenous, BID    QUEtiapine (SEROquel) tab 12.5 mg, 12.5 mg, Oral, QPM    ALPRAZolam (Xanax) tab 0.25 mg, 0.25 mg, Oral, Nightly     QUEtiapine (SEROquel) tab 12.5 mg, 12.5 mg, Oral, Nightly PRN    lactated ringers infusion, , Intravenous, Continuous    sodium chloride 0.9% 0.9% flush injection 10 mL, 10 mL, Intravenous, PRN    Vancomycin: PHARMACY DOSING, 1 each, Intravenous, See Admin Instructions (RX holding)    acetaminophen (Tylenol) tab 650 mg, 650 mg, Oral, Q8H PRN    melatonin cap/tab 5 mg, 5 mg, Oral, Nightly    isosorbide dinitrate (Isordil Titradose) tab 5 mg, 5 mg, Oral, TID    metoprolol tartrate (Lopressor) tab 25 mg, 25 mg, Oral, 2x Daily(Beta Blocker)    docusate sodium (Colace) cap 100 mg, 100 mg, Oral, BID PRN    apixaban (Eliquis) tab 5 mg, 5 mg, Oral, BID    atorvastatin (Lipitor) tab 40 mg, 40 mg, Oral, Daily    hydrALAZINE (Apresoline) tab 10 mg, 10 mg, Oral, TID    glucose (Dex4) 15 GM/59ML oral liquid 15 g, 15 g, Oral, Q15 Min PRN **OR** glucose (Glutose) 40% oral gel 15 g, 15 g, Oral, Q15 Min PRN **OR** glucose-vitamin C (Dex-4) chewable tab 4 tablet, 4 tablet, Oral, Q15 Min PRN **OR** dextrose 50% injection 50 mL, 50 mL, Intravenous, Q15 Min PRN **OR** glucose (Dex4) 15 GM/59ML oral liquid 30 g, 30 g, Oral, Q15 Min PRN **OR** glucose (Glutose) 40% oral gel 30 g, 30 g, Oral, Q15 Min PRN **OR** glucose-vitamin C (Dex-4) chewable tab 8 tablet, 8 tablet, Oral, Q15 Min PRN    insulin aspart (NovoLOG) 100 Units/mL FlexPen 1-11 Units, 1-11 Units, Subcutaneous, TID CC    ondansetron (Zofran) 4 MG/2ML injection 4 mg, 4 mg, Intravenous, Q6H PRN    Review of Systems:   Constitutional: Negative for anorexia, chills, fatigue, fevers, malaise, night sweats and weight loss.  Eyes: Negative for visual disturbance, irritation and redness.  Ears, nose, mouth, throat, and face: Negative for hearing loss, tinnitus, nasal congestion, snoring, sore throat, hoarseness and voice change.  Respiratory: Negative for cough, sputum, hemoptysis, chest pain, wheezing, dyspnea on exertion, or stridor.  Cardiovascular: Negative for chest pain,  palpitations, irregular heart beats, syncope, fatigue, orthopnea, paroxysmal nocturnal dyspnea, lower extremity edema.  Gastrointestinal: Negative for dysphagia, odynophagia, reflux symptoms, nausea, vomiting, change in bowel habits, diarrhea, constipation and abdominal pain.  Integument/breast: Negative for rash, skin lesions, and pruritus.  Hematologic/lymphatic: Negative for easy bruising, bleeding, and lymphadenopathy.  Musculoskeletal: Negative for myalgias, arthralgias, muscle weakness.  Neurological: Negative for headaches, dizziness, seizures, memory problems, trouble swallowing, speech problems, gait problems and weakness.  Behavioral/Psych: Negative for active tobacco use.  Endocrine: No history of of diabetes, thyroid disorder.  All other review of systems are negative.    Vital signs in last 24 hours:  Patient Vitals for the past 24 hrs:   BP Temp Temp src Pulse Resp SpO2   08/07/24 1053 149/56 97.3 °F (36.3 °C) Axillary 93 18 97 %   08/07/24 0417 142/57 98.1 °F (36.7 °C) Axillary 76 16 97 %   08/06/24 1942 141/49 98.7 °F (37.1 °C) Axillary 71 18 98 %   08/06/24 1714 151/58 98.7 °F (37.1 °C) Axillary 81 16 98 %   08/06/24 1351 136/40 -- -- -- -- --       Physical Exam:   General: alert, cooperative, oriented.  No respiratory distress.   Head: Normocephalic, without obvious abnormality, atraumatic.   Eyes: Conjunctivae/corneas clear.     Nose: Nares normal.   Throat: Lips, mucosa, and tongue normal.  No thrush noted.   Neck: Soft, supple neck; trachea midline,    Lungs: CTAB, normal and equal bilateral chest rise   Chest wall: No tenderness or deformity.   Heart: Regular rate and rhythm, normal S1S2, no murmur.   Abdomen: soft, non-tender, non-distended, positive BS.   Extremity: no edema, no cyanosis   Skin: No rashes or lesions.   Neurological: Alert, interactive, no focal deficits    Labs:  Lab Results   Component Value Date    WBC 13.4 08/07/2024    HGB 9.5 08/07/2024    HCT 31.1 08/07/2024    PLT  384.0 08/07/2024    CREATSERUM 1.60 08/07/2024    BUN 42 08/07/2024     08/07/2024    K 4.2 08/07/2024     08/07/2024    CO2 19.0 08/07/2024     08/07/2024    CA 8.3 08/07/2024       Radiology:  Reviewed,       Cultures:  Reviewed,     Assessment and Plan:    1.  Post-op sepsis following Cystoprostatectomy on 7/25/24  - S/P robotic assisted lap radical cystoprostatectomy, Bilat Pelvic LN dissection, ileal conduit diversion, 7/25/24  - Post-op course complicated by hospital delirium, ALBERT, blood cul with Enterococcus Fecalis in 2/2 bottles,   - Blood cul with Enterococcus Faecalis in 2/2 bottles on 8/03, repeats are negative on 8/04,   - UA bland, Cul with Enterococcus Faecalis too,   - + PPM in place, seeding risk is there,   - TTE with no vegetation, TITA negative too,   - CT without obstruction, renal function improving slowly,   - On IV Vanc, 8/04- present,      2.  AMS with severe delirium: Overall improving,  - Sometimes coughs with eating per RN, aspiration precautions,      3.  R parotid mass: Will need w/u for possible malignancy     4.  Multifactorial leukocytosis due to the above: slowly improving,      6.  Disposition - inpatient. An elderly male with Enterococcal Sepsis starting post operatively, likely  source, TTE and TITA noted,   - DC IV Vanc, start PO Augmentin for 10 more days through 8/17/24,   - Aspiration precautions,   - Supportive care,     Discussed with patient, RN, all questions answered, stable per ID. Discharge planning per PCP.    Thank you for consulting [facility] for Roc Butcher.  If you have any questions or concerns please call [facility] Infectious Disease at [phone].     John Johnson MD  8/7/2024  1:47 PM

## 2024-08-07 NOTE — OCCUPATIONAL THERAPY NOTE
OCCUPATIONAL THERAPY TREATMENT NOTE - INPATIENT        Room Number: 454/454-A     Presenting Problem: Bladder cancer S/P lap cystoprostatectomy with ileal conduit diversion on 7/25/2024    Problem List  Active Problems:    Preop testing    Bladder cancer (HCC)    Delirium due to another medical condition    Episodic mood disorder (HCC)      OCCUPATIONAL THERAPY ASSESSMENT   Patient demonstrates fair progress this session, goals remain in progress.    Patient is requiring maximum assist as a result of the following impairments: decreased functional strength, decreased functional reach, decreased endurance, pain, impaired sitting and standing balance, impaired coordination, impaired motor planning, decreased muscular endurance, cognitive deficits (hallucinating), and decreased insight to deficits.    Patient continues to function below baseline with  self care/ADLs .  Next session anticipate patient to progress bed mobility and transfers.  Occupational Therapy will continue to follow patient for duration of hospitalization.    Patient continues to benefit from continued skilled OT services: to promote return to prior level of function and safety with continuous assistance and gradual rehabilitative therapy.     PLAN  OT Treatment Plan: Balance activities;Energy conservation/work simplification techniques;ADL training;Functional transfer training;UE strengthening/ROM;Endurance training;Cognitive reorientation;Patient/Family education  OT Device Recommendations: TBD    SUBJECTIVE  \"What is that on your leg?\"    OBJECTIVE  Precautions: Bed/chair alarm    WEIGHT BEARING RESTRICTION     PAIN ASSESSMENT  Rating: Unable to rate  Location: c/o R thigh pain with movement  Management Techniques: Activity promotion    ACTIVITY TOLERANCE            [id number]             O2 SATURATIONS       ACTIVITIES OF DAILY LIVING ASSESSMENT  AM-PAC ‘6-Clicks’ Inpatient Daily Activity Short Form  How much help from another person does the patient  currently need…  -   Putting on and taking off regular lower body clothing?: A Lot  -   Bathing (including washing, rinsing, drying)?: A Lot  -   Toileting, which includes using toilet, bedpan or urinal? : Total  -   Putting on and taking off regular upper body clothing?: A Lot  -   Taking care of personal grooming such as brushing teeth?: A Lot  -   Eating meals?: A Lot    AM-PAC Score:  Score: 11  Approx Degree of Impairment: 70.42%  Standardized Score (AM-PAC Scale): 29.04  CMS Modifier (G-Code): CL    FUNCTIONAL TRANSFER ASSESSMENT  Sit to Stand: Edge of Bed  Edge of Bed: Moderate Assist (Mod A x2, therapist blocking left foot)    BED MOBILITY  Rolling: Maximum Assist  Supine to Sit : Maximum Assist (Max A x2)  Sit to Supine (OT): Maximum Assist  Scooting: Max A    BALANCE ASSESSMENT  Static Sitting: Contact Guard Assist (SBA to CGA sitting EOB)  Sitting Unilateral: Maximum Assist  Static Standing: Maximum Assist    FUNCTIONAL ADL ASSESSMENT  Eating: Maximum Assist (Max A to bring swab to his mouth.)  Grooming Seated: Maximum Assist (Max A to comb his hair. Pt frequently dropping comb and having difficulty with coordinating movements to comb his hair. Pt shakey, requiring assist from OT.)  Bathing Seated: Maximum Assist  UB Dressing Seated: Minimal Assist  LB Dressing Seated: Maximum Assist  Toileting Seated: Dependent    THERAPEUTIC EXERCISE       Skilled Therapy Provided: RN approved session. Pt received in the bed, agreeable to tx. Wife present. Pt reports \"no\" pain initially but does indicate pain in R thigh with movement. Pt and wife educated in importance of mobilizing, getting up to the chair, and participating in basic ADL tasks. Pt lethargic but able to follow simple commands with repetition. His speech is garbled and difficult to understand. Pt also presents with significant weakness. He has difficulty maintaining grasp on washcloth and comb during simple grooming task. With encouragement and support  at his elbow, pt was able to comb his hair for ~1 minute. Pt required Max A x2 for bed mobility. He initially required Max A progressing to SBA/CGA for static sitting at the edge of the bed. Mod A x2 for stand pivot transfer from bed to chair with RW with therapist blocking left foot. He participated in  strengthening exercises with a washcloth but limited by lethargy. Pt conversing with therapist about his garden. He transitions from periods of being lucid and oriented (oriented to self, place, year on arrival) to episodes of confusion (did not know where he was once in the chair). Pt also appears to have some visual hallucinations and wife confirms he tells her he sees things she does not see.  Will continue to follow. Pt up in chair with wife present and instructions for wife to notify RN if she leaves the room.      EDUCATION PROVIDED  Patient: Role of Occupational Therapy; Plan of Care; Discharge Recommendations; Functional Transfer Techniques; UE HEP for ROM; Compensatory ADL Techniques  Patient's Response to Education: Requires Further Education  Family/Caregiver: Role of Occupational Therapy; Plan of Care; Discharge Recommendations  Family/Caregiver's Response to Education: Verbalized Understanding    The patient's Approx Degree of Impairment: 70.42% has been calculated based on documentation in the [facility] '6 clicks' Inpatient Daily Activity Short Form.  Research supports that patients with this level of impairment may benefit from GR.  Final disposition will be made by interdisciplinary medical team.    Patient End of Session: Up in chair;Needs met;Call light within reach;RN aware of session/findings;All patient questions and concerns addressed;Family present    OT Goals:     Patient will complete dressing with Min A  Comment: ongoing    Patient will complete toilet transfer with Min A  Comment: ongoing    Patient will complete self care task at sink level with Min A    Comment: ongoing         Goals   on: 8/15  Frequency: 3-5x week     OT Session   Self-Care Home Management: 5 minutes  Therapeutic Activity: 20 minutes

## 2024-08-07 NOTE — PROGRESS NOTES
Cardiology Progress Note  [facility]    Roc Butcher Patient Status:  Inpatient    1940 MRN [id number]   Location [facility] 4W/SW/SE Attending Pearl Love MD   Hosp Day # 13 PCP Mitch Herrera MD                 Primary cardiologist: [facility]    Reason for Consultation:  TITA    History of Present Illness:  Roc Butcher is a a(n) 84 year old male with T2DM, CAD s/p PCI (LCx), PAD, PPM, HTN, pAfib on Eliquis, chronic HFrEF (LVEF 35%), HLD, prior CVA, bladder CA initially presented for laparoscopic cystoprostatectomy with ileal conduit diversion.  His postop course has been complicated by Enterococcus bacteremia and UTI status post stent removal.  TTE without obvious endocarditis; however, ID requesting TITA given pacemaker in place.    Patient somewhat confused with delirium, but able to follow my commands and attempts to respond to my questions.  No issues with swallowing food or with aspiration.  Does have dental issues; however, he has no loose teeth.  Confirmed by his wife.    Assessment/Plan:    Enterococcus faecalis bacteremia secondary to UTI  - TTE without any obvious vegetation  Coronary artery disease status post PCI (left circumflex)  Paroxysmal atrial fibrillation on DOAC  Status post pacemaker  Hypertension,  Cardiomyopathy, LVEF 35%  Hyperlipidemia on statin  CVA  Bladder cancer    Plan  Continue anticoagulation  Continue statin  Continue metoprolol, isosorbide dinitrate  TITA reviewed  Will sign off for now. See [facility] cardiology        SUBJECTIVE:    Sleeping for me this morning.           History:  Past Medical History:    Arrhythmia    Cancer (HCC)    bladder    Cataract    High blood pressure    High cholesterol    History of blood transfusion    HYPERLIPIDEMIA    HYPERTENSION    Other and unspecified hyperlipidemia    STROKE    Type II or unspecified type diabetes mellitus without mention of complication, not stated as uncontrolled    Unspecified essential  hypertension    Visual impairment     Past Surgical History:   Procedure Laterality Date    Cardiac pacemaker placement      Other surgical history      bladder tumor removal    Tonsillectomy       Family History   Problem Relation Age of Onset    Heart Disorder Father     Other Father         HIP FX    Heart Disorder Mother     Other Mother         CVA AGE 80      reports that he has quit smoking. His smoking use included cigarettes. He has never used smokeless tobacco. He reports current alcohol use. He reports that he does not use drugs.    Allergies:  Allergies   Allergen Reactions    Metformin Hcl OTHER (SEE COMMENTS)      Oral,   severe dry mouth       Medications:  No current facility-administered medications on file prior to encounter.     Current Outpatient Medications on File Prior to Encounter   Medication Sig Dispense Refill    isosorbide dinitrate 5 MG Oral Tab Take 1 tablet (5 mg total) by mouth 3 (three) times daily.      furosemide 20 MG Oral Tab Take 1 tablet (20 mg total) by mouth daily.      hydrALAZINE 10 MG Oral Tab Take 1 tablet (10 mg total) by mouth 3 (three) times daily.      insulin aspart (NOVOLOG FLEXPEN) 100 Units/mL Subcutaneous Solution Pen-injector Take 10 units with breakfast  and 10 units lunch      aspirin 81 MG Oral Tab EC Take 1 tablet (81 mg total) by mouth daily.      traMADol 50 MG Oral Tab Take 1 tablet (50 mg total) by mouth every 6 (six) hours as needed for Pain.      ATORVASTATIN 40 MG Oral Tab TAKE 1 TABLET BY MOUTH IN  THE EVENING (Patient taking differently: every morning.) 90 tablet 1    ELIQUIS 5 MG Oral Tab 2 (two) times daily.      LANTUS SOLOSTAR 100 UNIT/ML Subcutaneous Solution Pen-injector Take 15 units daily 30 mL 5    Insulin Lispro, 1 Unit Dial, (HUMALOG KWIKPEN) 100 UNIT/ML Subcutaneous Solution Pen-injector Inject 5 Units into the skin As Directed. Take 5 units with each meal 30 mL 1    metoprolol tartrate 25 MG Oral Tab TAKE ONE tablet daily (Patient  taking differently: 2 (two) times daily.) 90 tablet 3    Glucose Blood (ACCU-CHEK MICK PLUS) In Vitro Strip USE TWICE DAILY 200 strip 3    ACCU-CHEK MICK In Vitro Strip Test glucose three times daily. 300 strip 3    Insulin Pen Needle (BD PEN NEEDLE MINI U/F) 31G X 5 MM Does not apply Misc AS DIRECTED  each 3    ACCU-CHEK MULTICLIX LANCETS Does not apply Misc test blood sugar QID as directed 360 Each 3       Review of Systems:  As above otherwise negative.      Physical Exam:  Blood pressure 142/57, pulse 76, temperature 98.1 °F (36.7 °C), temperature source Axillary, resp. rate 16, height 5' 9\" (1.753 m), weight 167 lb (75.8 kg), SpO2 97%.  Wt Readings from Last 3 Encounters:   07/25/24 167 lb (75.8 kg)   07/22/24 166 lb (75.3 kg)   03/21/22 188 lb (85.3 kg)       HEENT: at/nc, perrl, eomi  Neck: supple  Cardiac: Regular rate and rhythm, S1, S2 normal, no murmur, rub or gallop.  Lungs: Clear without wheezes, rales, rhonchi or dullness.  Normal excursions and effort.  Abdomen: Soft, obese  Extremities: no edema  Psych: sleepy  Skin: Warm and dry.       Laboratories and Data:  Diagnostics:    Labs:   CBC:    Lab Results   Component Value Date    WBC 11.7 (H) 08/06/2024    WBC 18.0 (H) 08/05/2024    WBC 15.8 (H) 08/04/2024     Lab Results   Component Value Date    HGB 8.9 (L) 08/06/2024    HGB 10.0 (L) 08/05/2024    HGB 9.5 (L) 08/04/2024      Lab Results   Component Value Date    .0 08/06/2024    .0 08/05/2024    .0 08/04/2024     BMP:     Lab Results   Component Value Date    GLUCOSE 128 (H) 12/17/2014    GLUCOSE 144 (H) 08/20/2014    GLUCOSE 120 (H) 03/20/2014     Lab Results   Component Value Date    K 4.0 08/06/2024    K 4.4 08/05/2024    K 4.4 08/04/2024     Lab Results   Component Value Date    BUN 47 (H) 08/06/2024    BUN 48 (H) 08/05/2024    BUN 49 (H) 08/04/2024     Lab Results   Component Value Date    CREATSERUM 1.94 (H) 08/06/2024    CREATSERUM 2.21 (H) 08/05/2024     CREATSERUM 2.27 (H) 08/04/2024     Cholesterol:     Lab Results   Component Value Date    CHOLEST 154.00 03/17/2022    CHOLEST 153.00 08/23/2021    CHOLEST 179.00 03/08/2021     Lab Results   Component Value Date    HDL 49 03/17/2022    HDL 43 08/23/2021    HDL 45 03/08/2021     Lab Results   Component Value Date    TRIG 165.00 (H) 03/17/2022    TRIG 171.00 (H) 08/23/2021    TRIG 170.00 (H) 03/08/2021    TRIGLY 198 (H) 12/17/2014    TRIGLY 260 (H) 08/20/2014    TRIGLY 186 (H) 03/20/2014     Lab Results   Component Value Date    LDL 72 03/17/2022    LDL 76 08/23/2021     03/08/2021     Lab Results   Component Value Date    AST 48 (H) 08/06/2024    AST 17 03/17/2022    AST 15 11/08/2021     Lab Results   Component Value Date    ALT 37 08/06/2024    ALT 14 03/17/2022    ALT 10 11/08/2021       TITA:  1. Left ventricle: The cavity size was at the upper limits of normal.     Systolic function was moderately to markedly reduced. The estimated     ejection fraction was 30%.  2. Left atrium: The atrium was mildly enlarged.  3. Aortic valve: There was mild to moderate regurgitation.  4. Mitral valve: There was mild to moderate regurgitation.  5. No vegetation seen (very good look at the valves).

## 2024-08-07 NOTE — CM/SW NOTE
Submitted clinical via [email or web address].  [employer] Case/Auth ID is [id number].  Final insurance authorization is pending at this time.      SW/CM assigned to the case will continue to follow auth status.      Pilar Esposito, DSC

## 2024-08-07 NOTE — DIETARY NOTE
ADULT NUTRITION REASSESSMENT    Pt is at moderate nutrition risk.  Pt meets moderate malnutrition criteria.        CRITERIA FOR MALNUTRITION DIAGNOSIS:  Criteria for non-severe malnutrition diagnosis: acute illness/injury related to energy intake less than 75% for greater than 7 days and muscle mass mild depletion.      RECOMMENDATIONS TO MD: See Nutrition Intervention for diet and ONS specifics     ADMITTING DIAGNOSIS:  Bladder cancer  Bladder cancer (HCC)  PERTINENT PAST MEDICAL HISTORY:   Past Medical History:    Arrhythmia    Cancer (HCC)    bladder    Cataract    High blood pressure    High cholesterol    History of blood transfusion    HYPERLIPIDEMIA    HYPERTENSION    Other and unspecified hyperlipidemia    STROKE    Type II or unspecified type diabetes mellitus without mention of complication, not stated as uncontrolled    Unspecified essential hypertension    Visual impairment     PATIENT STATUS:   08/01/24 INITIAL VISIT: Pt rescreened at nutritional risk r/t poor intake since admission. Pt presented to the hospital for laparoscopic cystoprostatectomy with ileal conduit diversion. Has extensive PMH as listed above. POD #7. Post-op course complicated by delirium. Plan for transfer to SNF once delirium improves. Pt sitting up in chair sleeping at time of visit. Roused for interview however pt not appropriate - mumbling/garbled speech. Noted poor dentition. Obtained further diet hx from son Nick via phone. Per son, pt's appetite/intake was good/normal PTA. Typically consumes 2 meals per day with an occasional evening snack. Self / spouse prepared breakfast. Pt and spouse eat out for a late lunch daily. Occasionally drinks chocolate Ensure. No recent wt changes.           Update 08/07/24: Reassessment per protocol, additionally consult received today for \"poor po'. Chart reviewed. Pt with poor appetite, consuming 0-25% of meals since last assessment. Per NP, \"poor po intake per staff, does like Donna arroyo  and liquids but not much food, continue Glucerna tid with meals, limited options, would rec against NGT with confusion and risk of removal/dislodgement, And ppn/tpn not indicated, ensure staff is feeding pt for all meals\". RD recommends CPM.       FOOD/NUTRITION RELATED HISTORY:  Appetite:  Good appetite PTA however very poor since admission   Intake:  Minimal intake of meal trays  0-25% of most meals  Intake Meeting Needs: No, even with oral nutrition supplements (ONS) ordered  Percent Meals Eaten (last 6 days)       Date/Time Percent Meals Eaten (%)    08/01/24 0952 0 %    08/02/24 1023 25 %     Percent Meals Eaten (%): bites of blueberry muffin and eggs at 08/02/24 1023    08/03/24 0930 30 %    08/03/24 1400 25 %    08/03/24 1900 50 %    08/05/24 0832 5 %    08/05/24 1558 25 %    08/05/24 2100 0 %    08/06/24 0949 0 %     Percent Meals Eaten (%): NPO at 08/06/24 0949    08/06/24 1753 35 %    08/07/24 1053 5 %          Food Allergies: No Known Food Allergies (NKFA)  Cultural/Ethnic/Episcopalian Preferences: Not Obtained    GASTROINTESTINAL: +BM small, soft/watery, brown x1 / 24 hrs and abdomen soft, rounded, hypoactive bowel sounds    MEDICATIONS: reviewed; Noted non-cardiac electrolyte replacement protocol ordered; noted IVF dose increased today, per Nephro concentrated urine and appeared dry   lactated ringers 150 mL/hr at 08/07/24 1352      amoxicillin potassium and clavulanate  500 mg Oral Q12H    QUEtiapine  12.5 mg Oral Once    famotidine  10 mg Oral QOD    QUEtiapine  12.5 mg Oral QPM    ALPRAZolam  0.25 mg Oral Nightly    melatonin  5 mg Oral Nightly    isosorbide dinitrate  5 mg Oral TID    metoprolol tartrate  25 mg Oral 2x Daily(Beta Blocker)    apixaban  5 mg Oral BID    atorvastatin  40 mg Oral Daily    hydrALAZINE  10 mg Oral TID    insulin aspart  1-11 Units Subcutaneous TID CC     LABS: reviewed; noted recent A1C 6.6 pm 7/26/24; hypernatremia and hyperchloremia; elevated BUN and creatinine  Recent  Labs     08/05/24  0554 08/06/24  0602 08/07/24  0651   * 135* 116*   BUN 48* 47* 42*   CREATSERUM 2.21* 1.94* 1.60*   CA 8.2* 8.0* 8.3*    140 144   K 4.4 4.0 4.2   * 114* 116*   CO2 18.0* 19.0* 19.0*   OSMOCALC 308* 304* 309*     NUTRITION RELATED PHYSICAL FINDINGS:  - Nutrition Focused Physical Exam (NFPE): mild depletion muscle mass temple region per visual exam.   - Fluid Accumulation: none  see RN documentation for details  - Skin Integrity: at risk and surgical wound(s) see RN documentation for details    ANTHROPOMETRICS:  HT: 175.3 cm (5' 9\")  WT: 75.8 kg (167 lb)   BMI: Body mass index is 24.66 kg/m².  BMI CLASSIFICATION: 19-24.9 kg/m2 - WNL  IBW: 160 lbs        104% IBW  Usual Body Wt: 171 lbs (per Care Everywhere 5/3/24)      98% UBW    WEIGHT HISTORY: Family denies recent wt loss.    Patient Weight(s) for the past 336 hrs:   Weight   07/25/24 1051 75.8 kg (167 lb)     Wt Readings from Last 10 Encounters:   07/25/24 75.8 kg (167 lb)   07/22/24 75.3 kg (166 lb)   03/21/22 85.3 kg (188 lb)   11/23/21 85.7 kg (189 lb)   10/06/21 84.4 kg (186 lb)   09/20/21 84.8 kg (187 lb)   08/25/21 87.1 kg (192 lb)   03/10/21 89.8 kg (198 lb)   09/10/20 89.8 kg (198 lb)   05/07/20 93 kg (205 lb)     NUTRITION DIAGNOSIS/PROBLEM:   Inadequate oral intake related to Decreased ability to consume sufficient energy in the setting of delirium as evidenced by refusing to eat / minimum intake of meal trays since admission (x7 days).     NUTRITION DIAGNOSIS PROGRESS:  No Improvement (continue) - 0-25% po intakes since last assessment      NUTRITION INTERVENTION:     NUTRITION PRESCRIPTION:   Estimated Nutrition needs: --dosing wt of 76 kg - wt taken on 7/25/24  Calories: 7000-1912 calories/day (25-30 calories per kg Dosing wt)  Protein:  g protein/day (1.2-1.5 g protein/kg Dosing wt)  Fluid Needs: 1112-4337 ml/day (1 ml/kcal)    - Diet:       Procedures    Low Fiber/Soft diet Low Fiber/Soft; Is Patient on  Accuchecks? Yes      - Meals and snacks:  Added ONS  - Medical Food Supplements: RD added Glucerna (220 calories/ 10 g protein each) TID Chocolate. Rational/use of oral supplements discussed.  - Vitamin and mineral supplements: none  - Feeding assistance: meal set up and feed and requested feed by staff per MD  - Nutrition education: not appropriate at this time   - Coordination of nutrition care: collaboration with other providers - discussed with RN on unit  - Discharge and transfer of nutrition care to new setting or provider: monitor plans - to SNF when delirium improves    MONITOR AND EVALUATE/NUTRITION GOALS:  - Food and Nutrient Intake:      Monitor: adequacy of PO intake, tolerance of PO intake, adequacy of supplement intake, and tolerance of supplement intake  - Food and Nutrient Administration:      Monitor: need for temporary nutrition support and goc/family wishes regarding nutrition, not appropriate for EN per risk of removal/dislodgement 2/2 pt with confusion, and ppn/tpn not indicated, per MD  - Anthropometric Measurement:    Monitor weight  - Nutrition Goals:      PO and supplement greater than 75% of needs, labs within acceptable limits, promote healing, monitor fluid status, and maintain true wt within 5%    DIETITIAN FOLLOW UP: RD to follow and monitor nutrition status    Lorraine Roberts RD, LDN  Clinical Dietitian  Office: [phone]

## 2024-08-07 NOTE — PLAN OF CARE
Roc is AxO1-2. Lethargic, but arouse able. Follows commands. No signs of restlessness/agitation overnight.  Incomprehensible speech.  Frequent reorientation provided, decreased environmental stimuli, and video monitoring in place. No signs of pain or distress observed. Afebrile overnight.  Ileal conduit in place to gravity drainage via nath bag with red output. Surgical site is clean/dry/intact. Minimal PO intake,  Pt declined to eat dinner last night. Denies nausea/vomiting overnight. Accuchecks AC/HS. No BM overnight. Pt is a max assist. Frequent repositioning provided. Hygiene care +  bathed . Receiving IVF and IV abx.  Pacemaker in place. On Eliquis. On RA.Going to [facility], [place] upon discharge, pending medical clearance. Bed alarm on, bed locked in lowest position, call light within reach, frequent rounding by nursing staff, and appropriate safety measures in place.      Problem: Patient Centered Care  Goal: Patient preferences are identified and integrated in the patient's plan of care  Description: Interventions:  - What would you like us to know as we care for you? From home with wife  - Provide timely, complete, and accurate information to patient/family  - Incorporate patient and family knowledge, values, beliefs, and cultural backgrounds into the planning and delivery of care  - Encourage patient/family to participate in care and decision-making at the level they choose  - Honor patient and family perspectives and choices  Outcome: Progressing     Problem: Patient/Family Goals  Goal: Patient/Family Long Term Goal  Description: Patient's Long Term Goal:     Interventions:  -   - See additional Care Plan goals for specific interventions  Outcome: Progressing  Goal: Patient/Family Short Term Goal  Description: Patient's Short Term Goal:     Interventions:   -   - See additional Care Plan goals for specific interventions  Outcome: Progressing     Problem: Impaired Cognition  Goal: Patient will exhibit improved  attention, thought processing and/or memory  Description: Interventions:    Outcome: Progressing     Problem: CARDIOVASCULAR - ADULT  Goal: Maintains optimal cardiac output and hemodynamic stability  Description: INTERVENTIONS:  - Monitor vital signs, rhythm, and trends  - Monitor for bleeding, hypotension and signs of decreased cardiac output  - Evaluate effectiveness of vasoactive medications to optimize hemodynamic stability  - Monitor arterial and/or venous puncture sites for bleeding and/or hematoma  - Assess quality of pulses, skin color and temperature  - Assess for signs of decreased coronary artery perfusion - ex. Angina  - Evaluate fluid balance, assess for edema, trend weights  Outcome: Progressing     Problem: GENITOURINARY - ADULT  Goal: Absence of urinary retention  Description: INTERVENTIONS:  - Assess patient’s ability to void and empty bladder  - Monitor intake/output and perform bladder scan as needed  - Follow urinary retention protocol/standard of care  - Consider collaborating with pharmacy to review patient's medication profile  - Implement strategies to promote bladder emptying  Outcome: Progressing     Problem: METABOLIC/FLUID AND ELECTROLYTES - ADULT  Goal: Electrolytes maintained within normal limits  Description: INTERVENTIONS:  - Monitor labs and rhythm and assess patient for signs and symptoms of electrolyte imbalances  - Administer electrolyte replacement as ordered  - Monitor response to electrolyte replacements, including rhythm and repeat lab results as appropriate  - Fluid restriction as ordered  - Instruct patient on fluid and nutrition restrictions as appropriate  Outcome: Progressing     Problem: SKIN/TISSUE INTEGRITY - ADULT  Goal: Skin integrity remains intact  Description: INTERVENTIONS  - Assess and document risk factors for pressure ulcer development  - Assess and document skin integrity  - Monitor for areas of redness and/or skin breakdown  - Initiate interventions, skin  care algorithm/standards of care as needed  Outcome: Progressing  Goal: Incision(s), wounds(s) or drain site(s) healing without S/S of infection  Description: INTERVENTIONS:  - Assess and document risk factors for pressure ulcer development  - Assess and document skin integrity  - Assess and document dressing/incision, wound bed, drain sites and surrounding tissue  - Implement wound care per orders  - Initiate isolation precautions as appropriate  - Initiate Pressure Ulcer prevention bundle as indicated  Outcome: Progressing     Problem: HEMATOLOGIC - ADULT  Goal: Maintains hematologic stability  Description: INTERVENTIONS  - Assess for signs and symptoms of bleeding or hemorrhage  - Monitor labs and vital signs for trends  - Administer supportive blood products/factors, fluids and medications as ordered and appropriate  - Administer supportive blood products/factors as ordered and appropriate  Outcome: Progressing  Goal: Free from bleeding injury  Description: (Example usage: patient with low platelets)  INTERVENTIONS:  - Avoid intramuscular injections, enemas and rectal medication administration  - Ensure safe mobilization of patient  - Hold pressure on venipuncture sites to achieve adequate hemostasis  - Assess for signs and symptoms of internal bleeding  - Monitor lab trends  - Patient is to report abnormal signs of bleeding to staff  - Avoid use of toothpicks and dental floss  - Use electric shaver for shaving  - Use soft bristle tooth brush  - Limit straining and forceful nose blowing  Outcome: Progressing     Problem: MUSCULOSKELETAL - ADULT  Goal: Return mobility to safest level of function  Description: INTERVENTIONS:  - Assess patient stability and activity tolerance for standing, transferring and ambulating w/ or w/o assistive devices  - Assist with transfers and ambulation using safe patient handling equipment as needed  - Ensure adequate protection for wounds/incisions during mobilization  - Obtain  PT/OT consults as needed  - Advance activity as appropriate  - Communicate ordered activity level and limitations with patient/family  Outcome: Progressing  Goal: Maintain proper alignment of affected body part  Description: INTERVENTIONS:  - Support and protect limb and body alignment per provider's orders  - Instruct and reinforce with patient and family use of appropriate assistive device and precautions (e.g. spinal or hip dislocation precautions)  Outcome: Progressing     Problem: NEUROLOGICAL - ADULT  Goal: Achieves stable or improved neurological status  Description: INTERVENTIONS  - Assess for and report changes in neurological status  - Initiate measures to prevent increased intracranial pressure  - Maintain blood pressure and fluid volume within ordered parameters to optimize cerebral perfusion and minimize risk of hemorrhage  - Monitor temperature, glucose, and sodium. Initiate appropriate interventions as ordered  Outcome: Progressing     Problem: Impaired Communication  Goal: Patient will achieve maximal communication potential  Description: Interventions:    Outcome: Progressing     Problem: PAIN - ADULT  Goal: Verbalizes/displays adequate comfort level or patient's stated pain goal  Description: INTERVENTIONS:  - Encourage pt to monitor pain and request assistance  - Assess pain using appropriate pain scale  - Administer analgesics based on type and severity of pain and evaluate response  - Implement non-pharmacological measures as appropriate and evaluate response  - Consider cultural and social influences on pain and pain management  - Manage/alleviate anxiety  - Utilize distraction and/or relaxation techniques  - Monitor for opioid side effects  - Notify MD/LIP if interventions unsuccessful or patient reports new pain  - Anticipate increased pain with activity and pre-medicate as appropriate  Outcome: Progressing     Problem: SAFETY ADULT - FALL  Goal: Free from fall injury  Description:  INTERVENTIONS:  - Assess pt frequently for physical needs  - Identify cognitive and physical deficits and behaviors that affect risk of falls.  - [place] fall precautions as indicated by assessment.  - Educate pt/family on patient safety including physical limitations  - Instruct pt to call for assistance with activity based on assessment  - Modify environment to reduce risk of injury  - Provide assistive devices as appropriate  - Consider OT/PT consult to assist with strengthening/mobility  - Encourage toileting schedule  Outcome: Progressing     Problem: DISCHARGE PLANNING  Goal: Discharge to home or other facility with appropriate resources  Description: INTERVENTIONS:  - Identify barriers to discharge w/pt and caregiver  - Include patient/family/discharge partner in discharge planning  - Arrange for needed discharge resources and transportation as appropriate  - Identify discharge learning needs (meds, wound care, etc)  - Arrange for interpreters to assist at discharge as needed  - Consider post-discharge preferences of patient/family/discharge partner  - Complete POLST form as appropriate  - Assess patient's ability to be responsible for managing their own health  - Refer to Case Management Department for coordinating discharge planning if the patient needs post-hospital services based on physician/LIP order or complex needs related to functional status, cognitive ability or social support system  Outcome: Progressing     Problem: RISK FOR INFECTION - ADULT  Goal: Absence of fever/infection during anticipated neutropenic period  Description: INTERVENTIONS  - Monitor WBC  - Administer growth factors as ordered  - Implement neutropenic guidelines  Outcome: Progressing     Problem: Safety Risk - Non-Violent Restraints  Goal: Patient will remain free from self-harm  Description: INTERVENTIONS:  - Apply the least restrictive restraint to prevent harm  - Notify patient and family of reasons restraints applied  -  Assess for any contributing factors to confusion (electrolyte disturbances, delirium, medications)  - Discontinue any unnecessary medical devices as soon as possible  - Assess the patient's physical comfort, circulation, skin condition, hydration, nutrition and elimination needs   - Reorient and redirection as needed  - Assess for the need to continue restraints  Outcome: Progressing     Problem: Delirium  Goal: Minimize duration of delirium  Description: Interventions:  - Encourage use of hearing aids, eye glasses  - Promote highest level of mobility daily  - Provide frequent reorientation  - Promote wakefulness i.e. lights on, blinds open  - Promote sleep, encourage patient's normal rest cycle i.e. lights off, TV off, minimize noise and interruptions  - Encourage family to assist in orientation and promotion of home routines  Outcome: Progressing

## 2024-08-07 NOTE — PHYSICAL THERAPY NOTE
PHYSICAL THERAPY TREATMENT NOTE - INPATIENT     Room Number: 454/454-A       Presenting Problem: Bladder CA, (s/p laposcopitc prostatectomy)  Co-Morbidities : CAD, DM, bladder CA    Problem List  Active Problems:    Preop testing    Bladder cancer (HCC)    Delirium due to another medical condition    Episodic mood disorder (HCC)      PHYSICAL THERAPY ASSESSMENT   Patient demonstrates limited progress this session, goals  updated to reflect patient performance.      Patient is requiring maximum assist as a result of the following impairments: decreased functional strength.     Patient continues to function below baseline with bed mobility, transfers, gait, maintaining seated position, standing prolonged periods, performing household tasks, and wheelchair mobility.  Next session anticipate patient to progress bed mobility, transfers, gait, maintaining seated position, standing prolonged periods, and performing household tasks.  Physical Therapy will continue to follow patient for duration of hospitalization.    Patient continues to benefit from continued skilled PT services: to promote return to prior level of function and safety with continuous assistance and gradual rehabilitative therapy .    PLAN  PT Treatment Plan: Bed mobility;Body mechanics;Endurance;Energy conservation;Family education;Gait training;Strengthening;Transfer training;Balance training;Patient education  Frequency (Obs): 3-5x/week    SUBJECTIVE  I have no pain mostly I feel tiered and weak. I do not have much of an appetite.     OBJECTIVE  Precautions: Bed/chair alarm    WEIGHT BEARING RESTRICTION                PAIN ASSESSMENT   Rating: Unable to rate  Location: some pain behavior with movement stifffnessin the legs  Management Techniques: Activity promotion;Body mechanics;Breathing techniques;Relaxation;Repositioning    BALANCE  Static Sitting: Fair -  Dynamic Sitting: Poor +  Static Standing: Poor -  Dynamic Standing: Poor -    ACTIVITY  TOLERANCE  Pulse: 94  Heart Rate Source: Monitor  Resp: 18  BP: 149/56  BP Location: Right arm  BP Method: Automatic  Patient Position: Sitting     O2 WALK  Oxygen Therapy  SPO2% Ambulation on Room Air: 98    AM-PAC '6-Clicks' INPATIENT SHORT FORM - BASIC MOBILITY  How much difficulty does the patient currently have...  Patient Difficulty: Turning over in bed (including adjusting bedclothes, sheets and blankets)?: A Lot   Patient Difficulty: Sitting down on and standing up from a chair with arms (e.g., wheelchair, bedside commode, etc.): A Lot   Patient Difficulty: Moving from lying on back to sitting on the side of the bed?: A Lot   How much help from another person does the patient currently need...   Help from Another: Moving to and from a bed to a chair (including a wheelchair)?: A Lot   Help from Another: Need to walk in hospital room?: Total   Help from Another: Climbing 3-5 steps with a railing?: Total     AM-PAC Score:  Raw Score: 10   Approx Degree of Impairment: 76.75%   Standardized Score (AM-PAC Scale): 32.29   CMS Modifier (G-Code): CL    FUNCTIONAL ABILITY STATUS  Functional Mobility/Gait Assessment  Gait Assistance: Maximum assistance  Distance (ft): 3-steps up to chair  Assistive Device: Rolling walker  Pattern: Shuffle  Rolling: maximum assist  Supine to Sit: maximum assist  Sit to Supine: moderate assist  Sit to Stand: moderate assist    Skilled Therapy Provided: Pt ed with bed mobility with stafff max A x 2 from supine to sit. Pt ed with transfers with staff mod a x 2 with RW and 3-4 shuffling steps to the chair. Pt presents with a high fall risk with poor safety awareness and judgement. All transfers were facilitated by PT, OT to reduce fall risk with use of gait behalf and support with chair near by for SPT to chair. Standing balance is poor and pt fatigues quickly. Gradual rehab is indicated to regain maximal PLOF and safety. Pt tolerated AAROM in chair. Pt handed off to OT at end of PT session  in chair reporting comfort.     The patient's Approx Degree of Impairment: 76.75% has been calculated based on documentation in the [facility] '6 clicks' Inpatient Daily Activity Short Form.  Research supports that patients with this level of impairment may benefit from IP Rehab with PT, OT.  Final disposition will be made by interdisciplinary medical team.    THERAPEUTIC EXERCISES  Lower Extremity Ankle pumps  Heel slides  LAQ     Position Sitting & Standing       Patient End of Session: Up in chair;With  staff;Needs met;Call light within reach;RN aware of session/findings;All patient questions and concerns addressed;Alarm set;Family present    CURRENT GOALS   CURRENT GOALS   Goals to be met by: 2024  Patient Goal Patient's self-stated goal is: Return home    Goal #1 Patient is able to demonstrate supine - sit EOB @ level: minimum assistance      Goal #1   Current Status  Max A x 2 supine to sit lethargic    Goal #2 Patient is able to demonstrate transfers Sit to/from Stand at assistance level: minimum assistance with walker - rolling      Goal #2  Current Status  Mod A x 2 to stand and pivot to the chair 3-4 steps with RW unsteady poor safety awareness   Goal #3 Patient is able to ambulate 30 feet with assist device: walker - rolling at assistance level: minimum assistance   Goal #3   Current Status  3-4 steps with RW poor balance poor safety awareness shuffling steps up to chair    Goal #4     Goal #4   Current Status     Goal #5 Patient to demonstrate independence with home activity/exercise instructions provided to patient in preparation for discharge.   Goal #5   Current Status  unmet   Goal #6       Gait Trainin minutes

## 2024-08-07 NOTE — PROGRESS NOTES
Formerly [facility] AND CARE   Progress Note  -  Roc Butcher Patient Status:  Inpatient    1940 MRN [id number]   Location [facility] 4W/SW/SE Attending Pearl Love MD   Hosp Day # 13 PCP Mitch Herrera MD     PCP: Mitch Herrera MD      SEE ATTENDING NOTE AT BOTTOM OF PAGE    Is this a shared or split note between Advanced Practice Provider and Physician? Yes    Assessment and Plan:  Roc Butcher is a 84 year old male with PMH sig for type 2 diabetes, CAD status post PCI to left circumflex, PAD, pacemaker, hypertension, paroxysmal A-fib on Eliquis baseline, and chronic systolic heart failure with a EF of 35% hyperlipidemia, prior CVA, and bladder cancer who presented for laparoscopic cystoprostatectomy with ileal conduit diversion.  Post op course with complicated by delirium, seen by psych and enterococcus bacteremia and uti s/p stent removal.  Incidental finding on CT of the head was a right parotid mass measuring 1.6 cm with broad differential and radiology recommended ENT evaluation with strong consideration for histological  sampling for definitive characterization. Pt will need rehab at discharge, see below for details       Delirium  Metabolic Encephalopathy  -CT head without acute process   -infectious tx below  -was on zyprexxa and switched to risperdal which has been stopped. Pt now placed on seroquel and xanax  - seems more lethargic today but also got fentanyl and Versed for TITA yesterday patient had new psych meds started.  Will reassess tomorrow.  -appreciate psych     Enterococcus bacteremia and UTI  -afebrile  WBC still elevated but improved  -blood cx enterococcus with repeat blood cx NGTD  -urine with enterococcus  -CT A/P without acute process   -TTE without mention of vegetation.   - stents removed by urology  -TITA without vegetation   -abx-vanco as per ID, awaiting final ID plan for discharge    -as per ID    Acute Kidney Injury on CKD stage 3  Acidosis    -baseline Cr  1.3-1.6, peak Cr 2.27 now down to 1.6  -did not get contrast with CT A/P  -LR   -as per renal     Hypoalbuminemia   -poor po intake per staff, does like Glucerna shakes and liquids but not much food  -continue Glucerna tid with meals  -dietary to resee but limited options, would rec against NGT with confusion and risk of removal/dislodgement  And ppn/tpn not indicated  -ensure staff is feeding pt for all meals    Hematuria-recurrent  Acute on Chronic Anemia  -baseline hgb ~10's, did have hematuria on 8/5 and this was resolved on 8/6 but now recurrent  -hgb 9.5     Right Parotid Mass  -noted on CT head   -Partially imaged ovoid 1.6 cm low-attenuation mass in the right parotid gland.   -outpt ENT eval for histological sampling to r/o neoplasm     Bladder cancer S/P lap cystoprostatectomy with ileal conduit diversion on 7/25/2024  -pain meds-tylenol prn   -bowel regimen prn  -urostomy teaching   -8/5 s/p stent removal by urology  -appreciate urology pt has outpt urology appt 8/12     CAD status post PCI to L Cfx  PAD  S/P pacemaker  Hypertension  PAF  Chronic HFrEF   HL  Previous CVA  -CT head with old infarcts- noted on previous imaging  -echo with EF 30%, last echo with EF 35% with WMA, mild-mod AI  -Not on ACE ARB due to renal function and hypotension.   -Continue hydralazine, eliquis, hydralazine, lopressor and imdur  -holding home ASA for now  -holding lasix with decreased po and shilpa   -follows with [facility]     DM Type II  -HgbA1C 6.6  -home regimen: lantus 15 units nightly plus novolog   -currently on SSI prn  -accuchecks  -ADA diet      GO  -full code     MA/ACO [place]  -ER Visits 2024: 0  -Admissions 2024: 3  -Re- Entry: no   -Consults: urology, cards and ID  -Discharge Needs: [facility]   -Appointments: follow up with PCP Mitch Herrera MD after discharge from rehab    FEN  -lytes in am  -diet-soft diet     Prophy  -SCD  -eliquis     Dispo  -pending clinical coarse  -8/7 update  given to son via phone   PCP: Mitch Herrera MD      Concerns regarding plan of care were discussed with patient. Patient agrees with plan as detailed above. Discussed plan of care with Dr. Love    Note: This chart was prepared using voice recognition software and may contain unintended word substitution errors.      Cookie Hugo RN, NP   [facility] Hospitalist Team  Contact via Perfect Serve and Bubble (Check Availability)    SUBJECTIVE:     No family at bedside.  Seems more lethargic today but did get sedation for his TITA yesterday and also had psych med changes.  Patient has noted hematuria today.  Kidney function improving with creatinine of 1.6.  No fever white count holding at 13    OBJECTIVE:   Blood pressure 149/56, pulse 93, temperature 97.3 °F (36.3 °C), temperature source Axillary, resp. rate 18, height 5' 9\" (1.753 m), weight 167 lb (75.8 kg), SpO2 97%.    GENERAL: no apparent distress  NEURO: lethargic, mumbled when opened his eyes   RESP: non labored, CTA  CARDIO: Regular, no murmur  ABD: soft, NT, ND, BS+  : ileal conduit with hematuria in bag  EXTREMITIES: no edema    DIAGNOSTIC DATA:   Labs:     Recent Labs   Lab 08/03/24  1527 08/04/24  0739 08/05/24  0554 08/06/24  0602 08/07/24  0651   WBC 14.0* 15.8* 18.0* 11.7* 13.4*   HGB 11.4* 9.5* 10.0* 8.9* 9.5*   MCV 86.2 87.4 84.9 84.8 86.6   .0 290.0 308.0 316.0 384.0       Recent Labs   Lab 08/03/24  1527 08/04/24  0739 08/05/24  0554 08/06/24  0602 08/07/24  0651    142 141 140 144   K 4.3 4.4 4.4 4.0 4.2   * 115* 114* 114* 116*   CO2 17.0* 18.0* 18.0* 19.0* 19.0*   BUN 48* 49* 48* 47* 42*   CREATSERUM 2.14* 2.27* 2.21* 1.94* 1.60*   CA 8.3* 8.3* 8.2* 8.0* 8.3*   * 175* 159* 135* 116*       Recent Labs   Lab 08/06/24  0602   ALT 37   AST 48*   ALB 2.6*       Recent Labs   Lab 08/05/24  2135 08/06/24  1208 08/06/24  1717 08/06/24 2059 08/07/24  0944   PGLU 164* 149* 123* 126* 118*       No results for  input(s): \"TROP\" in the last 168 hours.        MEDICATIONS       QUEtiapine  12.5 mg Oral Once    vancomycin  15 mg/kg Intravenous Q24H    famotidine  10 mg Oral QOD    QUEtiapine  12.5 mg Oral QPM    ALPRAZolam  0.25 mg Oral Nightly    Vancomycin IV  1 each Intravenous See Admin Instructions (RX holding)    melatonin  5 mg Oral Nightly    isosorbide dinitrate  5 mg Oral TID    metoprolol tartrate  25 mg Oral 2x Daily(Beta Blocker)    apixaban  5 mg Oral BID    atorvastatin  40 mg Oral Daily    hydrALAZINE  10 mg Oral TID    insulin aspart  1-11 Units Subcutaneous TID CC      lactated ringers 100 mL/hr at 24 2346       QUEtiapine    sodium chloride 0.9%    acetaminophen    docusate sodium    glucose **OR** glucose **OR** glucose-vitamin C **OR** dextrose **OR** glucose **OR** glucose **OR** glucose-vitamin C    ondansetron    Cookie Hugo NP      IMAGING     CARD ECHO TITA (CPT=93320/73534)    Addendum Date: 2024    Transesophageal Echocardiogram (Report amended ) Name:Roc Butcher Date: 2024 :  1940 Ht:  (69in)  BP: 129 / 63 MRN:  [id number]    Age:  84years    Wt:  (167lb) HR: 75bpm Loc:  [facility]       Gndr: M          BSA: 1.91m^2 Sonographer: Omayra BARROS Ordering:    Eben Rick Consulting:  Eben Rick --------------------------------------------------------------------------- - History/Indications:   Bacteremia. --------------------------------------------------------------------------- - Procedure information:  Diagnostic transesophageal echocardiogram. Additional evaluation included 2D and Doppler.  Patient status:  Inpatient. Location:  Catheterization laboratory.    This was a routine study. The risks, benefits, and alternatives to the procedure were explained and informed consent was obtained. Initial setup. The patient arrived at the laboratory. Surface ECG leads, blood pressure measurements, and pulse oximetric signals were monitored. Sedation. Moderate sedation  was administered. Transesophageal echocardiography. Topical anesthesia was obtained using Hurricaine. A transesophageal probe was inserted by the attending cardiologist without difficulty. Image quality was adequate. Intravenous contrast (agitated saline) was administered to evaluate for shunting. The transesophageal probe was removed.  Study completion:  The patient tolerated the procedure well. There were no complications. Administered medications:   Fentanyl , 50 mcg.  Midazolam , 2 mg. An independent trained observer was present to assess the patient's hemodynamic status and level of sedation. Moderate sedation time: 13:15 to 13:45. --------------------------------------------------------------------------- - Conclusions: 1. Left ventricle: The cavity size was at the upper limits of normal.    Systolic function was moderately to markedly reduced. The estimated    ejection fraction was 30%. 2. Left atrium: The atrium was mildly enlarged. 3. Aortic valve: There was mild to moderate regurgitation. 4. Mitral valve: There was mild to moderate regurgitation. 5. No vegetation seen (very good look at the valves). * --------------------------------------------------------------------------- - * Findings: Left ventricle:  The cavity size was at the upper limits of normal. Systolic function was moderately to markedly reduced. The estimated ejection fraction was 30%. Left atrium:  The atrium was mildly enlarged.  There is no evidence of a thrombus in the atrial cavity or appendage. Right ventricle:  The cavity size was normal. Systolic function was normal. A pacing wire could be seen. Right atrium:  The atrium was at the upper limits of normal in size. A pacing wire was noted. Mitral valve:  The valve was structurally normal. Leaflet separation was normal.  There was no evidence of vegetation.  Doppler:  There was mild to moderate regurgitation. Aortic valve:   The valve was trileaflet. The leaflets were mildly thickened.  Cusp separation was normal.  Doppler:   There was no evidence for stenosis.   There was mild to moderate regurgitation. Tricuspid valve:  The valve is structurally normal. Leaflet separation was normal.  There was no evidence of a vegetation.  Doppler:  There was trivial regurgitation. Pulmonic valve:   There was no thickening. Cusp separation was normal. There was no evidence of a vegetation.  Doppler:  There was mild regurgitation. Pericardium:   There was no pericardial effusion. Aorta: Aorta: No evidence of aneurysm, dissection, or atheroma. Aortic root: The aortic root was normal-sized. Descending aorta:  There was mild diffuse disease. Systemic veins: Superior vena cava: The SVC was normal-sized. Atrial septum:  No defect or patent foramen ovale was identified.  Doppler and echo contrast study showed no right-to-left atrial level shunt, following an increase in RA pressure induced by provocative maneuvers. --------------------------------------------------------------------------- - Eben Page 08/06/2024 15:58        SEE ATTENDING NOTE BELOW:

## 2024-08-07 NOTE — PROGRESS NOTES
Patient alert and oriented to self. Lethargic throughout the morning, easy to arouse. More awake towards the evening. Noted to be more irritable when wife is at the bedside. Poor appetite, even with staff feeding him and encouraging him to eat; accu-checks AC/HS. Lu in place and draining dark red/braulio urine, nephro aware, IVF rate increased again today. VSS, afebrile. IV abx continued. Fall precautions in place. Up to hair with PT/OT today and back to bed with 2 person stand pivot assist.  Call light within reach. Bed alarm & video monitoring on and active. Frequent rounding. Son updated on care of plan. Plan to dc to [facility] pending medical clearance, family choice is [place].     Problem: Patient Centered Care  Goal: Patient preferences are identified and integrated in the patient's plan of care  Description: Interventions:  - What would you like us to know as we care for you? From home with wife  - Provide timely, complete, and accurate information to patient/family  - Incorporate patient and family knowledge, values, beliefs, and cultural backgrounds into the planning and delivery of care  - Encourage patient/family to participate in care and decision-making at the level they choose  - Honor patient and family perspectives and choices  Outcome: Progressing     Problem: Patient/Family Goals  Goal: Patient/Family Long Term Goal  Description: Patient's Long Term Goal:     Interventions:  -   - See additional Care Plan goals for specific interventions  Outcome: Progressing  Goal: Patient/Family Short Term Goal  Description: Patient's Short Term Goal:     Interventions:   -   - See additional Care Plan goals for specific interventions  Outcome: Progressing     Problem: Impaired Cognition  Goal: Patient will exhibit improved attention, thought processing and/or memory  Description: Interventions:     Outcome: Progressing     Problem: PAIN - ADULT  Goal: Verbalizes/displays adequate comfort level or patient's stated  pain goal  Description: INTERVENTIONS:  - Encourage pt to monitor pain and request assistance  - Assess pain using appropriate pain scale  - Administer analgesics based on type and severity of pain and evaluate response  - Implement non-pharmacological measures as appropriate and evaluate response  - Consider cultural and social influences on pain and pain management  - Manage/alleviate anxiety  - Utilize distraction and/or relaxation techniques  - Monitor for opioid side effects  - Notify MD/LIP if interventions unsuccessful or patient reports new pain  - Anticipate increased pain with activity and pre-medicate as appropriate  Outcome: Progressing     Problem: SAFETY ADULT - FALL  Goal: Free from fall injury  Description: INTERVENTIONS:  - Assess pt frequently for physical needs  - Identify cognitive and physical deficits and behaviors that affect risk of falls.  - [place] fall precautions as indicated by assessment.  - Educate pt/family on patient safety including physical limitations  - Instruct pt to call for assistance with activity based on assessment  - Modify environment to reduce risk of injury  - Provide assistive devices as appropriate  - Consider OT/PT consult to assist with strengthening/mobility  - Encourage toileting schedule  Outcome: Progressing     Problem: DISCHARGE PLANNING  Goal: Discharge to home or other facility with appropriate resources  Description: INTERVENTIONS:  - Identify barriers to discharge w/pt and caregiver  - Include patient/family/discharge partner in discharge planning  - Arrange for needed discharge resources and transportation as appropriate  - Identify discharge learning needs (meds, wound care, etc)  - Arrange for interpreters to assist at discharge as needed  - Consider post-discharge preferences of patient/family/discharge partner  - Complete POLST form as appropriate  - Assess patient's ability to be responsible for managing their own health  - Refer to Case Management  Department for coordinating discharge planning if the patient needs post-hospital services based on physician/LIP order or complex needs related to functional status, cognitive ability or social support system  Outcome: Progressing     Problem: RISK FOR INFECTION - ADULT  Goal: Absence of fever/infection during anticipated neutropenic period  Description: INTERVENTIONS  - Monitor WBC  - Administer growth factors as ordered  - Implement neutropenic guidelines  Outcome: Progressing     Problem: CARDIOVASCULAR - ADULT  Goal: Maintains optimal cardiac output and hemodynamic stability  Description: INTERVENTIONS:  - Monitor vital signs, rhythm, and trends  - Monitor for bleeding, hypotension and signs of decreased cardiac output  - Evaluate effectiveness of vasoactive medications to optimize hemodynamic stability  - Monitor arterial and/or venous puncture sites for bleeding and/or hematoma  - Assess quality of pulses, skin color and temperature  - Assess for signs of decreased coronary artery perfusion - ex. Angina  - Evaluate fluid balance, assess for edema, trend weights  Outcome: Progressing  Goal: Absence of cardiac arrhythmias or at baseline  Description: INTERVENTIONS:  - Continuous cardiac monitoring, monitor vital signs, obtain 12 lead EKG if indicated  - Evaluate effectiveness of antiarrhythmic and heart rate control medications as ordered  - Initiate emergency measures for life threatening arrhythmias  - Monitor electrolytes and administer replacement therapy as ordered  Outcome: Progressing     Problem: GASTROINTESTINAL - ADULT  Goal: Minimal or absence of nausea and vomiting  Description: INTERVENTIONS:  - Maintain adequate hydration with IV or PO as ordered and tolerated  - Nasogastric tube to low intermittent suction as ordered  - Evaluate effectiveness of ordered antiemetic medications  - Provide nonpharmacologic comfort measures as appropriate  - Advance diet as tolerated, if ordered  - Obtain nutritional  consult as needed  - Evaluate fluid balance  Outcome: Progressing  Goal: Maintains or returns to baseline bowel function  Description: INTERVENTIONS:  - Assess bowel function  - Maintain adequate hydration with IV or PO as ordered and tolerated  - Evaluate effectiveness of GI medications  - Encourage mobilization and activity  - Obtain nutritional consult as needed  - Establish a toileting routine/schedule  - Consider collaborating with pharmacy to review patient's medication profile  Outcome: Progressing     Problem: GENITOURINARY - ADULT  Goal: Absence of urinary retention  Description: INTERVENTIONS:  - Assess patient’s ability to void and empty bladder  - Monitor intake/output and perform bladder scan as needed  - Follow urinary retention protocol/standard of care  - Consider collaborating with pharmacy to review patient's medication profile  - Implement strategies to promote bladder emptying  Outcome: Progressing     Problem: METABOLIC/FLUID AND ELECTROLYTES - ADULT  Goal: Electrolytes maintained within normal limits  Description: INTERVENTIONS:  - Monitor labs and rhythm and assess patient for signs and symptoms of electrolyte imbalances  - Administer electrolyte replacement as ordered  - Monitor response to electrolyte replacements, including rhythm and repeat lab results as appropriate  - Fluid restriction as ordered  - Instruct patient on fluid and nutrition restrictions as appropriate  Outcome: Progressing     Problem: SKIN/TISSUE INTEGRITY - ADULT  Goal: Skin integrity remains intact  Description: INTERVENTIONS  - Assess and document risk factors for pressure ulcer development  - Assess and document skin integrity  - Monitor for areas of redness and/or skin breakdown  - Initiate interventions, skin care algorithm/standards of care as needed  Outcome: Progressing  Goal: Incision(s), wounds(s) or drain site(s) healing without S/S of infection  Description: INTERVENTIONS:  - Assess and document risk factors  for pressure ulcer development  - Assess and document skin integrity  - Assess and document dressing/incision, wound bed, drain sites and surrounding tissue  - Implement wound care per orders  - Initiate isolation precautions as appropriate  - Initiate Pressure Ulcer prevention bundle as indicated  Outcome: Progressing     Problem: HEMATOLOGIC - ADULT  Goal: Maintains hematologic stability  Description: INTERVENTIONS  - Assess for signs and symptoms of bleeding or hemorrhage  - Monitor labs and vital signs for trends  - Administer supportive blood products/factors, fluids and medications as ordered and appropriate  - Administer supportive blood products/factors as ordered and appropriate  Outcome: Progressing  Goal: Free from bleeding injury  Description: (Example usage: patient with low platelets)  INTERVENTIONS:  - Avoid intramuscular injections, enemas and rectal medication administration  - Ensure safe mobilization of patient  - Hold pressure on venipuncture sites to achieve adequate hemostasis  - Assess for signs and symptoms of internal bleeding  - Monitor lab trends  - Patient is to report abnormal signs of bleeding to staff  - Avoid use of toothpicks and dental floss  - Use electric shaver for shaving  - Use soft bristle tooth brush  - Limit straining and forceful nose blowing  Outcome: Progressing     Problem: MUSCULOSKELETAL - ADULT  Goal: Return mobility to safest level of function  Description: INTERVENTIONS:  - Assess patient stability and activity tolerance for standing, transferring and ambulating w/ or w/o assistive devices  - Assist with transfers and ambulation using safe patient handling equipment as needed  - Ensure adequate protection for wounds/incisions during mobilization  - Obtain PT/OT consults as needed  - Advance activity as appropriate  - Communicate ordered activity level and limitations with patient/family  Outcome: Progressing  Goal: Maintain proper alignment of affected body  part  Description: INTERVENTIONS:  - Support and protect limb and body alignment per provider's orders  - Instruct and reinforce with patient and family use of appropriate assistive device and precautions (e.g. spinal or hip dislocation precautions)  Outcome: Progressing     Problem: NEUROLOGICAL - ADULT  Goal: Achieves stable or improved neurological status  Description: INTERVENTIONS  - Assess for and report changes in neurological status  - Initiate measures to prevent increased intracranial pressure  - Maintain blood pressure and fluid volume within ordered parameters to optimize cerebral perfusion and minimize risk of hemorrhage  - Monitor temperature, glucose, and sodium. Initiate appropriate interventions as ordered  Outcome: Progressing     Problem: Impaired Communication  Goal: Patient will achieve maximal communication potential  Description: Interventions:     Outcome: Progressing     Problem: Safety Risk - Non-Violent Restraints  Goal: Patient will remain free from self-harm  Description: INTERVENTIONS:  - Apply the least restrictive restraint to prevent harm  - Notify patient and family of reasons restraints applied  - Assess for any contributing factors to confusion (electrolyte disturbances, delirium, medications)  - Discontinue any unnecessary medical devices as soon as possible  - Assess the patient's physical comfort, circulation, skin condition, hydration, nutrition and elimination needs   - Reorient and redirection as needed  - Assess for the need to continue restraints  Outcome: Progressing     Problem: Delirium  Goal: Minimize duration of delirium  Description: Interventions:  - Encourage use of hearing aids, eye glasses  - Promote highest level of mobility daily  - Provide frequent reorientation  - Promote wakefulness i.e. lights on, blinds open  - Promote sleep, encourage patient's normal rest cycle i.e. lights off, TV off, minimize noise and interruptions  - Encourage family to assist in  orientation and promotion of home routines  Outcome: Progressing

## 2024-08-08 LAB
ANION GAP SERPL CALC-SCNC: 11 MMOL/L (ref 0–18)
BACTERIA BLD CULT: POSITIVE
BASOPHILS # BLD: 0.14 X10(3) UL (ref 0–0.2)
BASOPHILS NFR BLD: 1 %
BUN BLD-MCNC: 39 MG/DL (ref 9–23)
BUN/CREAT SERPL: 25.7 (ref 10–20)
CALCIUM BLD-MCNC: 8.4 MG/DL (ref 8.7–10.4)
CHLORIDE SERPL-SCNC: 118 MMOL/L (ref 98–112)
CO2 SERPL-SCNC: 17 MMOL/L (ref 21–32)
CREAT BLD-MCNC: 1.52 MG/DL
DEPRECATED RDW RBC AUTO: 71.7 FL (ref 35.1–46.3)
EGFRCR SERPLBLD CKD-EPI 2021: 45 ML/MIN/1.73M2 (ref 60–?)
EOSINOPHIL # BLD: 0 X10(3) UL (ref 0–0.7)
EOSINOPHIL NFR BLD: 0 %
ERYTHROCYTE [DISTWIDTH] IN BLOOD BY AUTOMATED COUNT: 23 % (ref 11–15)
GLUCOSE BLD-MCNC: 142 MG/DL (ref 70–99)
GLUCOSE BLDC GLUCOMTR-MCNC: 129 MG/DL (ref 70–99)
GLUCOSE BLDC GLUCOMTR-MCNC: 139 MG/DL (ref 70–99)
HCT VFR BLD AUTO: 30.9 %
HGB BLD-MCNC: 9.9 G/DL
LYMPHOCYTES NFR BLD: 0.7 X10(3) UL (ref 1–4)
LYMPHOCYTES NFR BLD: 5 %
MCH RBC QN AUTO: 27.4 PG (ref 26–34)
MCHC RBC AUTO-ENTMCNC: 32 G/DL (ref 31–37)
MCV RBC AUTO: 85.6 FL
METAMYELOCYTES # BLD: 0.14 X10(3) UL
METAMYELOCYTES NFR BLD: 1 %
MONOCYTES # BLD: 0.42 X10(3) UL (ref 0.1–1)
MONOCYTES NFR BLD: 3 %
NEUTROPHILS # BLD AUTO: 10.5 X10 (3) UL (ref 1.5–7.7)
NEUTROPHILS NFR BLD: 90 %
NEUTS HYPERSEG # BLD: 12.6 X10(3) UL (ref 1.5–7.7)
OSMOLALITY SERPL CALC.SUM OF ELEC: 314 MOSM/KG (ref 275–295)
PLATELET # BLD AUTO: 411 10(3)UL (ref 150–450)
PLATELET MORPHOLOGY: NORMAL
POTASSIUM SERPL-SCNC: 4.3 MMOL/L (ref 3.5–5.1)
RBC # BLD AUTO: 3.61 X10(6)UL
SODIUM SERPL-SCNC: 146 MMOL/L (ref 136–145)
TOTAL CELLS COUNTED BLD: 100
WBC # BLD AUTO: 14 X10(3) UL (ref 4–11)

## 2024-08-08 PROCEDURE — 99232 SBSQ HOSP IP/OBS MODERATE 35: CPT | Performed by: NURSE PRACTITIONER

## 2024-08-08 RX ORDER — ALPRAZOLAM 0.25 MG/1
0.25 TABLET ORAL NIGHTLY PRN
Status: DISCONTINUED | OUTPATIENT
Start: 2024-08-08 | End: 2024-08-12

## 2024-08-08 RX ORDER — SODIUM CHLORIDE 450 MG/100ML
INJECTION, SOLUTION INTRAVENOUS CONTINUOUS
Status: DISCONTINUED | OUTPATIENT
Start: 2024-08-08 | End: 2024-08-09

## 2024-08-08 NOTE — PROGRESS NOTES
[facility] AND CARE   Progress Note  -  Roc Butcher Patient Status:  Inpatient    1940 MRN [id number]   Location [facility] 4W/SW/SE Attending Pearl Love MD   Hosp Day # 14 PCP Mitch Herrera MD     PCP: Mitch Herrera MD      SEE ATTENDING NOTE AT BOTTOM OF PAGE    Is this a shared or split note between Advanced Practice Provider and Physician? Yes    Assessment and Plan:  Roc Butcher is a 84 year old male with PMH sig for type 2 diabetes, CAD status post PCI to left circumflex, PAD, pacemaker, hypertension, paroxysmal A-fib on Eliquis baseline, and chronic systolic heart failure with a EF of 35% hyperlipidemia, prior CVA, and bladder cancer who presented for laparoscopic cystoprostatectomy with ileal conduit diversion.  Post op course with complicated by delirium and enterococcus bacteremia and uti s/p stent removal now on po abx.  Incidental finding on CT of the head was a right parotid mass measuring 1.6 cm with broad differential and radiology recommended ENT evaluation with strong consideration for histological sampling for definitive characterization. Plans for rehab at [facility] once oral intake improves, see below for details       Delirium  Metabolic Encephalopathy  -CT head without acute process   -infectious tx below  -was on zyprexxa and switched to risperdal which has been stopped, currently on seroquel and xanax  -appreciate psych     Enterococcus bacteremia and UTI  -afebrile  WBC still elevated but improved  -blood cx enterococcus with repeat blood cx NGTD  -urine with enterococcus  -CT A/P without acute process   -TTE without mention of vegetation.   - stents removed by urology  -TITA without vegetation   -abx-augmentin till   -appreciate ID who signed off case    Acute Kidney Injury on CKD stage 3  Acidosis   Hypernatremia   -baseline Cr  1.3-1.6, peak Cr 2.27 now down to 1.52  -  -IVF changed, as per renal     Hypoalbuminemia   -poor po  intake per staff, does like Glucerna shakes and liquids but not much food  -continue Glucerna tid with meals  -dietary to resee but limited options, would rec against NGT with confusion and risk of removal/dislodgement  And ppn/tpn not indicated  -ensure staff is feeding pt for all meals    Hematuria  Acute on Chronic Anemia  -baseline hgb ~10's,current hgb 9.9  -intermittent issues with blood in ileal conduit bag     Right Parotid Mass  -noted on CT head   -Partially imaged ovoid 1.6 cm low-attenuation mass in the right parotid gland.   -outpt ENT eval for histological sampling to r/o neoplasm     Bladder cancer S/P lap cystoprostatectomy with ileal conduit diversion on 7/25/2024  -pain meds-tylenol prn   -bowel regimen prn  -urostomy teaching   -8/5 s/p stent removal by urology  -appreciate urology pt has outpt urology appt 8/12     CAD status post PCI to L Cfx  PAD  S/P pacemaker  Hypertension  PAF  Chronic HFrEF   HL  Previous CVA  -CT head with old infarcts- noted on previous imaging  -echo with EF 30%, last echo with EF 35% with WMA, mild-mod AI  -Not on ACE ARB due to renal function and hypotension.   -Continue hydralazine, eliquis, hydralazine, lopressor and imdur  -holding home ASA for now  -holding lasix with decreased po and shilpa   -follows with [facility]     DM Type II  -HgbA1C 6.6  -home regimen: lantus 15 units nightly plus novolog   -blood sugars stable and has not needed insulin, will change to prn accuchecks to prevent \"irritation to pt\"       GOC  -full code     MA/ACO Reach  -ER Visits 2024: 0  -Admissions 2024: 3  -Re- Entry: no   -Consults: urology, cards and ID  -Discharge Needs: [facility]   -Appointments: follow up with PCP Mitch Herrera MD after discharge from rehab    LAMONT Millerlyrolly in am  -diet-soft diet     Prophy  -SCD  -eliquis     Dispo  -pending clinical coarse  -8/8 wife at bs  PCP: Mitch Herrera MD      Concerns regarding plan of care were discussed with patient.  Patient agrees with plan as detailed above. Discussed plan of care with Dr. Mitchell    Note: This chart was prepared using voice recognition software and may contain unintended word substitution errors.      Cookie Hugo RN, NP   [facility] Hospitalist Team  Contact via [employer] (Check Availability)    SUBJECTIVE:     Agitated that staff is checking his blood. Has not had lunch yet. Tells me to \"answer the door\". Wife at bs. Speech is not clear at times but this may be to poor oral hygiene     OBJECTIVE:   Blood pressure 156/53, pulse 71, temperature 97.2 °F (36.2 °C), temperature source Axillary, resp. rate 18, height 5' 9\" (1.753 m), weight 167 lb (75.8 kg), SpO2 98%.    GENERAL: no apparent distress  NEURO: A/A confused   RESP: non labored, CTA  CARDIO: Regular, no murmur  ABD: soft, NT, ND, BS+  : ileal conduit   EXTREMITIES: no edema    DIAGNOSTIC DATA:   Labs:     Recent Labs   Lab 08/04/24  0739 08/05/24  0554 08/06/24  0602 08/07/24  0651 08/08/24  0632   WBC 15.8* 18.0* 11.7* 13.4* 14.0*   HGB 9.5* 10.0* 8.9* 9.5* 9.9*   MCV 87.4 84.9 84.8 86.6 85.6   .0 308.0 316.0 384.0 411.0       Recent Labs   Lab 08/04/24  0739 08/05/24  0554 08/06/24  0602 08/07/24  0651 08/08/24  0632    141 140 144 146*   K 4.4 4.4 4.0 4.2 4.3   * 114* 114* 116* 118*   CO2 18.0* 18.0* 19.0* 19.0* 17.0*   BUN 49* 48* 47* 42* 39*   CREATSERUM 2.27* 2.21* 1.94* 1.60* 1.52*   CA 8.3* 8.2* 8.0* 8.3* 8.4*   * 159* 135* 116* 142*       Recent Labs   Lab 08/06/24  0602   ALT 37   AST 48*   ALB 2.6*       Recent Labs   Lab 08/07/24  0944 08/07/24  1427 08/07/24  1830 08/07/24  2140 08/08/24  0800   PGLU 118* 120* 121* 126* 129*       No results for input(s): \"TROP\" in the last 168 hours.        MEDICATIONS       amoxicillin potassium and clavulanate  500 mg Oral Q12H    QUEtiapine  12.5 mg Oral Once    famotidine  10 mg Oral QOD    QUEtiapine  12.5 mg Oral QPM    ALPRAZolam  0.25 mg Oral  Nightly    melatonin  5 mg Oral Nightly    isosorbide dinitrate  5 mg Oral TID    metoprolol tartrate  25 mg Oral 2x Daily(Beta Blocker)    apixaban  5 mg Oral BID    atorvastatin  40 mg Oral Daily    hydrALAZINE  10 mg Oral TID    insulin aspart  1-11 Units Subcutaneous TID CC      sodium chloride 100 mL/hr at 24 1231       QUEtiapine    sodium chloride 0.9%    acetaminophen    docusate sodium    glucose **OR** glucose **OR** glucose-vitamin C **OR** dextrose **OR** glucose **OR** glucose **OR** glucose-vitamin C    ondansetron    Cookie Hugo NP      IMAGING     CARD ECHO TITA (CPT=93320/67990)    Addendum Date: 2024    Transesophageal Echocardiogram (Report amended ) Name:Roc Butcher Date: 2024 :  1940 Ht:  (69in)  BP: 129 / 63 MRN:  [id number]    Age:  84years    Wt:  (167lb) HR: 75bpm Loc:  [facility]       Gndr: M          BSA: 1.91m^2 Sonographer: Omayra BARROS Ordering:    Eben Rick Consulting:  Eben Rick --------------------------------------------------------------------------- - History/Indications:   Bacteremia. --------------------------------------------------------------------------- - Procedure information:  Diagnostic transesophageal echocardiogram. Additional evaluation included 2D and Doppler.  Patient status:  Inpatient. Location:  Catheterization laboratory.    This was a routine study. The risks, benefits, and alternatives to the procedure were explained and informed consent was obtained. Initial setup. The patient arrived at the laboratory. Surface ECG leads, blood pressure measurements, and pulse oximetric signals were monitored. Sedation. Moderate sedation was administered. Transesophageal echocardiography. Topical anesthesia was obtained using Hurricaine. A transesophageal probe was inserted by the attending cardiologist without difficulty. Image quality was adequate. Intravenous contrast (agitated saline) was administered to evaluate for shunting. The  transesophageal probe was removed.  Study completion:  The patient tolerated the procedure well. There were no complications. Administered medications:   Fentanyl , 50 mcg.  Midazolam , 2 mg. An independent trained observer was present to assess the patient's hemodynamic status and level of sedation. Moderate sedation time: 13:15 to 13:45. --------------------------------------------------------------------------- - Conclusions: 1. Left ventricle: The cavity size was at the upper limits of normal.    Systolic function was moderately to markedly reduced. The estimated    ejection fraction was 30%. 2. Left atrium: The atrium was mildly enlarged. 3. Aortic valve: There was mild to moderate regurgitation. 4. Mitral valve: There was mild to moderate regurgitation. 5. No vegetation seen (very good look at the valves). * --------------------------------------------------------------------------- - * Findings: Left ventricle:  The cavity size was at the upper limits of normal. Systolic function was moderately to markedly reduced. The estimated ejection fraction was 30%. Left atrium:  The atrium was mildly enlarged.  There is no evidence of a thrombus in the atrial cavity or appendage. Right ventricle:  The cavity size was normal. Systolic function was normal. A pacing wire could be seen. Right atrium:  The atrium was at the upper limits of normal in size. A pacing wire was noted. Mitral valve:  The valve was structurally normal. Leaflet separation was normal.  There was no evidence of vegetation.  Doppler:  There was mild to moderate regurgitation. Aortic valve:   The valve was trileaflet. The leaflets were mildly thickened. Cusp separation was normal.  Doppler:   There was no evidence for stenosis.   There was mild to moderate regurgitation. Tricuspid valve:  The valve is structurally normal. Leaflet separation was normal.  There was no evidence of a vegetation.  Doppler:  There was trivial regurgitation. Pulmonic valve:    There was no thickening. Cusp separation was normal. There was no evidence of a vegetation.  Doppler:  There was mild regurgitation. Pericardium:   There was no pericardial effusion. Aorta: Aorta: No evidence of aneurysm, dissection, or atheroma. Aortic root: The aortic root was normal-sized. Descending aorta:  There was mild diffuse disease. Systemic veins: Superior vena cava: The SVC was normal-sized. Atrial septum:  No defect or patent foramen ovale was identified.  Doppler and echo contrast study showed no right-to-left atrial level shunt, following an increase in RA pressure induced by provocative maneuvers. --------------------------------------------------------------------------- - Amended Eben Rick 08/06/2024 15:58        SEE ATTENDING NOTE BELOW:   Agree with documentation as stated above in  NP note  Patient seen and examined independently     Looks worse then when I saw him last on weekend  Agree with xanax discontinuation. Appreciate psychiatry help with delirium management. Continue seroquel HS   Needs to increase PO intake, and needs assistance in feeding  Start 0.45 saline for hydration to avoid hypernatremia   Infection being treated, augmentin until 8/17  Continue day by day assessment for discharge planning   Discussed with wife at bedside     Ziggy Ivy Hospitalist

## 2024-08-08 NOTE — PLAN OF CARE
Roc is AxO1-2. More alert. Follows commands. Incomprehensible speech.  Frequent reorientation provided, decreased environmental stimuli, and video monitoring in place. No signs of pain or distress observed. Afebrile overnight.  Ileal conduit in place to gravity drainage via nath bag with red output. Surgical site is clean/dry/intact. Minimal PO intake,  Pt declined to eat dinner last night. Denies nausea/vomiting overnight. Accuchecks AC/HS. No BM overnight. Pt is a max assist. Frequent repositioning provided. Hygiene care +    Oral care provided.  Receiving IVF. Started PO abx.  Pacemaker in place. On Eliquis. On RA.Going to [facility] for [facility] upon discharge, possibly on Friday. Bed alarm on, bed locked in lowest position, call light within reach, frequent rounding by nursing staff, and appropriate safety measures in place.       Problem: Patient Centered Care        Goal: Patient preferences are identified and integrated in the patient's plan of care  Description: Interventions:  - What would you like us to know as we care for you? From home with wife  - Provide timely, complete, and accurate information to patient/family  - Incorporate patient and family knowledge, values, beliefs, and cultural backgrounds into the planning and delivery of care  - Encourage patient/family to participate in care and decision-making at the level they choose  - Honor patient and family perspectives and choices  Outcome: Progressing     Problem: Patient/Family Goals  Goal: Patient/Family Long Term Goal  Description: Patient's Long Term Goal:     Interventions:  -   - See additional Care Plan goals for specific interventions  Outcome: Progressing  Goal: Patient/Family Short Term Goal  Description: Patient's Short Term Goal:     Interventions:   -   - See additional Care Plan goals for specific interventions  Outcome: Progressing     Problem: Impaired Cognition  Goal: Patient will exhibit improved attention, thought processing  and/or memory  Description: Interventions:    Outcome: Progressing     Problem: CARDIOVASCULAR - ADULT  Goal: Maintains optimal cardiac output and hemodynamic stability  Description: INTERVENTIONS:  - Monitor vital signs, rhythm, and trends  - Monitor for bleeding, hypotension and signs of decreased cardiac output  - Evaluate effectiveness of vasoactive medications to optimize hemodynamic stability  - Monitor arterial and/or venous puncture sites for bleeding and/or hematoma  - Assess quality of pulses, skin color and temperature  - Assess for signs of decreased coronary artery perfusion - ex. Angina  - Evaluate fluid balance, assess for edema, trend weights  Outcome: Progressing  Goal: Absence of cardiac arrhythmias or at baseline  Description: INTERVENTIONS:  - Continuous cardiac monitoring, monitor vital signs, obtain 12 lead EKG if indicated  - Evaluate effectiveness of antiarrhythmic and heart rate control medications as ordered  - Initiate emergency measures for life threatening arrhythmias  - Monitor electrolytes and administer replacement therapy as ordered  Outcome: Progressing     Problem: GASTROINTESTINAL - ADULT  Goal: Minimal or absence of nausea and vomiting  Description: INTERVENTIONS:  - Maintain adequate hydration with IV or PO as ordered and tolerated  - Nasogastric tube to low intermittent suction as ordered  - Evaluate effectiveness of ordered antiemetic medications  - Provide nonpharmacologic comfort measures as appropriate  - Advance diet as tolerated, if ordered  - Obtain nutritional consult as needed  - Evaluate fluid balance  Outcome: Progressing  Goal: Maintains or returns to baseline bowel function  Description: INTERVENTIONS:  - Assess bowel function  - Maintain adequate hydration with IV or PO as ordered and tolerated  - Evaluate effectiveness of GI medications  - Encourage mobilization and activity  - Obtain nutritional consult as needed  - Establish a toileting routine/schedule  -  Consider collaborating with pharmacy to review patient's medication profile  Outcome: Progressing     Problem: GENITOURINARY - ADULT  Goal: Absence of urinary retention  Description: INTERVENTIONS:  - Assess patient’s ability to void and empty bladder  - Monitor intake/output and perform bladder scan as needed  - Follow urinary retention protocol/standard of care  - Consider collaborating with pharmacy to review patient's medication profile  - Implement strategies to promote bladder emptying  Outcome: Progressing     Problem: SKIN/TISSUE INTEGRITY - ADULT  Goal: Skin integrity remains intact  Description: INTERVENTIONS  - Assess and document risk factors for pressure ulcer development  - Assess and document skin integrity  - Monitor for areas of redness and/or skin breakdown  - Initiate interventions, skin care algorithm/standards of care as needed  Outcome: Progressing  Goal: Incision(s), wounds(s) or drain site(s) healing without S/S of infection  Description: INTERVENTIONS:  - Assess and document risk factors for pressure ulcer development  - Assess and document skin integrity  - Assess and document dressing/incision, wound bed, drain sites and surrounding tissue  - Implement wound care per orders  - Initiate isolation precautions as appropriate  - Initiate Pressure Ulcer prevention bundle as indicated  Outcome: Progressing     Problem: METABOLIC/FLUID AND ELECTROLYTES - ADULT  Goal: Electrolytes maintained within normal limits  Description: INTERVENTIONS:  - Monitor labs and rhythm and assess patient for signs and symptoms of electrolyte imbalances  - Administer electrolyte replacement as ordered  - Monitor response to electrolyte replacements, including rhythm and repeat lab results as appropriate  - Fluid restriction as ordered  - Instruct patient on fluid and nutrition restrictions as appropriate  Outcome: Progressing     Problem: HEMATOLOGIC - ADULT  Goal: Maintains hematologic stability  Description:  INTERVENTIONS  - Assess for signs and symptoms of bleeding or hemorrhage  - Monitor labs and vital signs for trends  - Administer supportive blood products/factors, fluids and medications as ordered and appropriate  - Administer supportive blood products/factors as ordered and appropriate  Outcome: Progressing  Goal: Free from bleeding injury  Description: (Example usage: patient with low platelets)  INTERVENTIONS:  - Avoid intramuscular injections, enemas and rectal medication administration  - Ensure safe mobilization of patient  - Hold pressure on venipuncture sites to achieve adequate hemostasis  - Assess for signs and symptoms of internal bleeding  - Monitor lab trends  - Patient is to report abnormal signs of bleeding to staff  - Avoid use of toothpicks and dental floss  - Use electric shaver for shaving  - Use soft bristle tooth brush  - Limit straining and forceful nose blowing  Outcome: Progressing     Problem: NEUROLOGICAL - ADULT  Goal: Achieves stable or improved neurological status  Description: INTERVENTIONS  - Assess for and report changes in neurological status  - Initiate measures to prevent increased intracranial pressure  - Maintain blood pressure and fluid volume within ordered parameters to optimize cerebral perfusion and minimize risk of hemorrhage  - Monitor temperature, glucose, and sodium. Initiate appropriate interventions as ordered  Outcome: Progressing     Problem: PAIN - ADULT  Goal: Verbalizes/displays adequate comfort level or patient's stated pain goal  Description: INTERVENTIONS:  - Encourage pt to monitor pain and request assistance  - Assess pain using appropriate pain scale  - Administer analgesics based on type and severity of pain and evaluate response  - Implement non-pharmacological measures as appropriate and evaluate response  - Consider cultural and social influences on pain and pain management  - Manage/alleviate anxiety  - Utilize distraction and/or relaxation  techniques  - Monitor for opioid side effects  - Notify MD/LIP if interventions unsuccessful or patient reports new pain  - Anticipate increased pain with activity and pre-medicate as appropriate  Outcome: Progressing     Problem: Impaired Communication  Goal: Patient will achieve maximal communication potential  Description: Interventions:    Outcome: Progressing     Problem: SAFETY ADULT - FALL  Goal: Free from fall injury  Description: INTERVENTIONS:  - Assess pt frequently for physical needs  - Identify cognitive and physical deficits and behaviors that affect risk of falls.  - [place] fall precautions as indicated by assessment.  - Educate pt/family on patient safety including physical limitations  - Instruct pt to call for assistance with activity based on assessment  - Modify environment to reduce risk of injury  - Provide assistive devices as appropriate  - Consider OT/PT consult to assist with strengthening/mobility  - Encourage toileting schedule  Outcome: Progressing     Problem: Safety Risk - Non-Violent Restraints  Goal: Patient will remain free from self-harm  Description: INTERVENTIONS:  - Apply the least restrictive restraint to prevent harm  - Notify patient and family of reasons restraints applied  - Assess for any contributing factors to confusion (electrolyte disturbances, delirium, medications)  - Discontinue any unnecessary medical devices as soon as possible  - Assess the patient's physical comfort, circulation, skin condition, hydration, nutrition and elimination needs   - Reorient and redirection as needed  - Assess for the need to continue restraints  Outcome: Progressing     Problem: RISK FOR INFECTION - ADULT  Goal: Absence of fever/infection during anticipated neutropenic period  Description: INTERVENTIONS  - Monitor WBC  - Administer growth factors as ordered  - Implement neutropenic guidelines  Outcome: Progressing     Problem: Delirium  Goal: Minimize duration of  delirium  Description: Interventions:  - Encourage use of hearing aids, eye glasses  - Promote highest level of mobility daily  - Provide frequent reorientation  - Promote wakefulness i.e. lights on, blinds open  - Promote sleep, encourage patient's normal rest cycle i.e. lights off, TV off, minimize noise and interruptions  - Encourage family to assist in orientation and promotion of home routines  Outcome: Progressing

## 2024-08-08 NOTE — PROGRESS NOTES
NEPHROLOGY CONSULT NOTE     DATE: 8/4/2024    Requesting Physician: Dr. Mitchell      Reason for Consult: ALBERT      Interval history:   Cr improved today  Poor po intake  On IVF    HISTORY OF PRESENT ILLNESS: Roc Butcher is an 84 year old male with PMH sig for DM2, HTN, CAD, Afib, systolic heart failure.  Hx of bladder CA and is s/p laparoscopic cystoprostatectomy with ileal conduit diversion on 7/25.  Post op course complicated by fever and leukocytosis. Blood culture positive for enterococcus.  Patient was started on IV antibiotics and ID has been consulted.  Patient also with ALBERT with worsening to 2.27 this AM.  sCr 1.29 on admission. Nephrology is consulted for ALBERT.  Patient currently denies SOB or n/v. Appetite has been decreased, but he is drinking fluids.        MEDICAL HISTORY:   Past Medical History:    Arrhythmia    Cancer (HCC)    bladder    Cataract    High blood pressure    High cholesterol    History of blood transfusion    HYPERLIPIDEMIA    HYPERTENSION    Other and unspecified hyperlipidemia    STROKE    Type II or unspecified type diabetes mellitus without mention of complication, not stated as uncontrolled    Unspecified essential hypertension    Visual impairment       SURGICAL HISTORY:   Past Surgical History:   Procedure Laterality Date    Cardiac pacemaker placement      Other surgical history      bladder tumor removal    Tonsillectomy         FAMILY HISTORY:   Family History   Problem Relation Age of Onset    Heart Disorder Father     Other Father         HIP FX    Heart Disorder Mother     Other Mother         CVA AGE 80       SOCIAL HISTORY:   Social History     Socioeconomic History    Marital status:      Spouse name: Not on file    Number of children: Not on file    Years of education: Not on file    Highest education level: Not on file   Occupational History    Not on file   Tobacco Use    Smoking status: Former     Types: Cigarettes    Smokeless tobacco: Never    Tobacco  comments:     2010   Vaping Use    Vaping status: Never Used   Substance and Sexual Activity    Alcohol use: Yes     Comment: HEAVY PREVIOUS NOW SOCIAL    Drug use: No    Sexual activity: Not on file   Other Topics Concern    Not on file   Social History Narrative    Not on file     Social Determinants of Health     Financial Resource Strain: Low Risk  (5/30/2024)    Received from [employer]    Financial Resource Strain     In the past year, have you or any family members you live with been unable to get any of the following when it was really needed? Check all that apply.: None   Food Insecurity: No Food Insecurity (7/25/2024)    Food Insecurity     Food Insecurity: Never true   Transportation Needs: No Transportation Needs (7/25/2024)    Transportation Needs     Lack of Transportation: No     Car Seat: Not on file   Physical Activity: High Risk (5/3/2024)    Received from [employer]    Exercise Vital Sign     On average, how many days per week do you engage in moderate to strenuous exercise (like a brisk walk)?: 0 days     On average, how many minutes do you engage in exercise at this level?: 0 min   Stress: Not on file   Social Connections: Medium Risk (5/30/2024)    Received from [employer]    Social Connections     How often do you see or talk to people that you care about and feel close to? (For example: talking to friends on the phone, visiting friends or family, going to Islam or club meetings): 1 or 2 times a week   Housing Stability: Low Risk  (7/25/2024)    Housing Stability     Housing Instability: No     Housing Instability Emergency: Not on file     Crib or Bassinette: Not on file       MEDICATIONS:   Current Facility-Administered Medications   Medication Dose Route Frequency    sodium chloride 0.45% infusion   Intravenous Continuous    ALPRAZolam (Xanax) tab 0.25 mg  0.25 mg Oral Nightly PRN    amoxicillin clavulanate (Augmentin) 500-125 MG per tab 500 mg  500 mg  Oral Q12H    QUEtiapine (SEROquel) tab 12.5 mg  12.5 mg Oral Once    famotidine (Pepcid) tab 10 mg  10 mg Oral QOD    QUEtiapine (SEROquel) tab 12.5 mg  12.5 mg Oral QPM    QUEtiapine (SEROquel) tab 12.5 mg  12.5 mg Oral Nightly PRN    sodium chloride 0.9% 0.9% flush injection 10 mL  10 mL Intravenous PRN    acetaminophen (Tylenol) tab 650 mg  650 mg Oral Q8H PRN    melatonin cap/tab 5 mg  5 mg Oral Nightly    isosorbide dinitrate (Isordil Titradose) tab 5 mg  5 mg Oral TID    metoprolol tartrate (Lopressor) tab 25 mg  25 mg Oral 2x Daily(Beta Blocker)    docusate sodium (Colace) cap 100 mg  100 mg Oral BID PRN    apixaban (Eliquis) tab 5 mg  5 mg Oral BID    atorvastatin (Lipitor) tab 40 mg  40 mg Oral Daily    hydrALAZINE (Apresoline) tab 10 mg  10 mg Oral TID    glucose (Dex4) 15 GM/59ML oral liquid 15 g  15 g Oral Q15 Min PRN    Or    glucose (Glutose) 40% oral gel 15 g  15 g Oral Q15 Min PRN    Or    glucose-vitamin C (Dex-4) chewable tab 4 tablet  4 tablet Oral Q15 Min PRN    Or    dextrose 50% injection 50 mL  50 mL Intravenous Q15 Min PRN    Or    glucose (Dex4) 15 GM/59ML oral liquid 30 g  30 g Oral Q15 Min PRN    Or    glucose (Glutose) 40% oral gel 30 g  30 g Oral Q15 Min PRN    Or    glucose-vitamin C (Dex-4) chewable tab 8 tablet  8 tablet Oral Q15 Min PRN    ondansetron (Zofran) 4 MG/2ML injection 4 mg  4 mg Intravenous Q6H PRN         ALLERGIES:   Allergies   Allergen Reactions    Metformin Hcl OTHER (SEE COMMENTS)      Oral,   severe dry mouth       REVIEW OF SYSTEMS:  A comprehensive review of systems was conducted and is negative except for what is mentioned in the HPI      PHYSICAL EXAM:  /63 (BP Location: Right arm)   Pulse 79   Temp 98.7 °F (37.1 °C) (Oral)   Resp 20   Ht 5' 9\" (1.753 m)   Wt 167 lb (75.8 kg)   SpO2 99%   BMI 24.66 kg/m²   GEN:  NAD  HEENT: NCAT, dry MM   CHEST: CTA b/l  CARDIAC: S1S2 normal  ABD: soft, NT/ND  : urostomy - hematuria noted   EXT: no lower ext  edema  NEURO: sleepy       LABS:  Recent Labs   Lab 08/06/24  0602 08/07/24  0651 08/08/24  0632   * 116* 142*   BUN 47* 42* 39*   CREATSERUM 1.94* 1.60* 1.52*   EGFRCR 34* 42* 45*   CA 8.0* 8.3* 8.4*    144 146*   K 4.0 4.2 4.3   * 116* 118*   CO2 19.0* 19.0* 17.0*     Recent Labs   Lab 08/06/24  0602 08/07/24  0651 08/08/24  0632   RBC 3.36* 3.59* 3.61*   HGB 8.9* 9.5* 9.9*   HCT 28.5* 31.1* 30.9*   MCV 84.8 86.6 85.6   MCH 26.5 26.5 27.4   MCHC 31.2 30.5* 32.0   RDW 22.9* 23.3* 23.0*   NEPRELIM 8.96* 10.39* 10.50*   WBC 11.7* 13.4* 14.0*   .0 384.0 411.0           INTAKE/OUTPUT:    Intake/Output Summary (Last 24 hours) at 8/8/2024 1639  Last data filed at 8/8/2024 1036  Gross per 24 hour   Intake 100 ml   Output 615 ml   Net -515 ml             IMAGING:  No results found.       ASSESSMENT AND PLAN:   This is an 84 year old male with PMH sig for DM2, HTN, CAD, Afib, systolic heart failure.  Hx of bladder CA and is s/p laparoscopic cystoprostatectomy with ileal conduit diversion on 7/25. Post op course complicated by enterococcus bacteremia. Nephrology is consulted for ALBERT.     Non-oliguric ALBERT   - sCr 1.29 on admission  - Urine Na 43   - etiology may be pre-renal ALBERT vs ATN in the setting of infection  - sCr 1.5 today -appears to have plateued and now improving renal function  - continue IVFs as clinically patient appears dry on exam -switch to 1/2 NS d/t hypernatremia   - hold lasix, monitor volume status  - continue supportive care    - renally dose meds   - avoid nephro toxins  - follow renal fxn and I/Os  - no acute indication for RRT      Hypernatremia  -switched IVF to 0.45 NS    Acidosis   - likely due to ALBERT and ileal conduit   -IVF as above  - monitor - may need sodium bicarbonate supplementation if not improving      HTN    - Metoprolol, hydralazine      Fever, leukocytosis   Enterococcus bacteremia  - management and abx per primary / ID    -sp TITA    Bladder CA s/p laparoscopic  cystoprostatectomy with ileal conduit diversion 7/25  - per Urology     Dw RN     Thank you for the consult and allowing us to participate in the care of Roc HIDALGO Guadalupe.  We will continue to follow.    Jael George MD  [facility]  Nephrology

## 2024-08-08 NOTE — PROGRESS NOTES
[facility]  part of [facility]     Progress Note    Roc Butcher Patient Status:  Inpatient    1940 MRN [id number]   Location [facility] 4W/SW/SE Attending Ziggy Mitchell, DO   Hosp Day # 14 PCP Mitch Herrera MD     Subjective:  Roc Butcher is a(n) 84 year old male.    Current  complaints: confused    Medications:  Current Facility-Administered Medications   Medication Dose Route Frequency    sodium chloride 0.45% infusion   Intravenous Continuous    ALPRAZolam (Xanax) tab 0.25 mg  0.25 mg Oral Nightly PRN    amoxicillin clavulanate (Augmentin) 500-125 MG per tab 500 mg  500 mg Oral Q12H    QUEtiapine (SEROquel) tab 12.5 mg  12.5 mg Oral Once    famotidine (Pepcid) tab 10 mg  10 mg Oral QOD    QUEtiapine (SEROquel) tab 12.5 mg  12.5 mg Oral QPM    QUEtiapine (SEROquel) tab 12.5 mg  12.5 mg Oral Nightly PRN    sodium chloride 0.9% 0.9% flush injection 10 mL  10 mL Intravenous PRN    acetaminophen (Tylenol) tab 650 mg  650 mg Oral Q8H PRN    melatonin cap/tab 5 mg  5 mg Oral Nightly    isosorbide dinitrate (Isordil Titradose) tab 5 mg  5 mg Oral TID    metoprolol tartrate (Lopressor) tab 25 mg  25 mg Oral 2x Daily(Beta Blocker)    docusate sodium (Colace) cap 100 mg  100 mg Oral BID PRN    apixaban (Eliquis) tab 5 mg  5 mg Oral BID    atorvastatin (Lipitor) tab 40 mg  40 mg Oral Daily    hydrALAZINE (Apresoline) tab 10 mg  10 mg Oral TID    glucose (Dex4) 15 GM/59ML oral liquid 15 g  15 g Oral Q15 Min PRN    Or    glucose (Glutose) 40% oral gel 15 g  15 g Oral Q15 Min PRN    Or    glucose-vitamin C (Dex-4) chewable tab 4 tablet  4 tablet Oral Q15 Min PRN    Or    dextrose 50% injection 50 mL  50 mL Intravenous Q15 Min PRN    Or    glucose (Dex4) 15 GM/59ML oral liquid 30 g  30 g Oral Q15 Min PRN    Or    glucose (Glutose) 40% oral gel 30 g  30 g Oral Q15 Min PRN    Or    glucose-vitamin C (Dex-4) chewable tab 8 tablet  8 tablet Oral Q15 Min PRN    ondansetron  (Zofran) 4 MG/2ML injection 4 mg  4 mg Intravenous Q6H PRN     Objective:  /63 (BP Location: Right arm)   Pulse 79   Temp 98.7 °F (37.1 °C) (Oral)   Resp 20   Ht 5' 9\" (1.753 m)   Wt 167 lb (75.8 kg)   SpO2 99%   BMI 24.66 kg/m²     Intake/Output Summary (Last 24 hours) at 8/8/2024 1544  Last data filed at 8/8/2024 1036  Gross per 24 hour   Intake 100 ml   Output 1315 ml   Net -1215 ml       General Appearance: Alert, cooperative, no distress, appears stated age  Head: Normocephalic, without obvious abnormality, atraumatic  Abdomen: Soft, non-tender, bowel sounds active all four quadrants, no masses,  no organomegaly  Genitalia: male without lesions, discharge or tenderness  Urine: slight blood per IC            Lab Results   Component Value Date    WBC 14.0 08/08/2024    HGB 9.9 08/08/2024    HCT 30.9 08/08/2024    .0 08/08/2024    CREATSERUM 1.52 08/08/2024    BUN 39 08/08/2024     08/08/2024    K 4.3 08/08/2024     08/08/2024    CO2 17.0 08/08/2024     08/08/2024    CA 8.4 08/08/2024          Assessment:  Bladder Ca  Confusion    Plan:  His confusion remains  Tolerating P.O.  CR normalizing    Raudel Vega MD  8/8/2024  3:44 PM

## 2024-08-08 NOTE — PROGRESS NOTES
Patient is a 84 year old ,  male with past medical history of diabetes, CAD, pacemaker, hypertension, paroxymal a-fib, hyperlipidemia, CVA who was admitted to the hospital for laparoscopic cystoprostatectomy with ileal conduit diversion. The patient has been demonstrating increased confusion, restlessness.Patient indicated for psych consult for evaluation and advise.  Consult Duration     The patient seen for over 50-minute, follow-up evaluation, over 50% counseling and coordinating care addressing  confusion, restlessness.  Record reviewed, communication with attending, communication with RN and patient seen face to face evaluation.    History of Present Illness:     According to the team the patient continues to demonstrate confusion and restlessness.    The patient receiving Seroquel 12.5 mg nightly and xanax 0.25 nightly.   Labs and imaging reviewed: glucose 142, sodium 146, BUN 39, creatinine 1.52, WBC 14.0     The patient today sitting laying in hospital bed. He presents restless otherwise no agitation.    He is alert and oriented to person and place. He notes that it is 2024. He is able to identify his wife in the room.     The patients speech otherwise continues to be mumbled and incomprehensible at times.     He demonstrates some confused and disorganized thought process.    Wife notes that the patient has been sleeping a lot. She states that the patient was in the chair a few days ago and that she had a nice conversation about his garden with him.     The patient continues to demonstrate confusion, restlessness, agitation and response to internal stimuli.     Past Psychiatric/Medication History:  1. Prior diagnoses: none reported  2. Past psychiatric inpatient: none reported  3. Past outpatient history: none reported  4. Past suicide history: none reported  5. Medication history: none reported    Social History:   Patient has been  for 64 years. He lives at home with his wife. He has  two adult children.  Retired 24 years ago. He worked for [employer]     No alcohol, tobacco, cannabis or illicit     Family History:  None reported  Medical History:   Past Medical History  Past Medical History:    Arrhythmia    Cancer (HCC)    bladder    Cataract    High blood pressure    High cholesterol    History of blood transfusion    HYPERLIPIDEMIA    HYPERTENSION    Other and unspecified hyperlipidemia    STROKE    Type II or unspecified type diabetes mellitus without mention of complication, not stated as uncontrolled    Unspecified essential hypertension    Visual impairment       Past Surgical History  Past Surgical History:   Procedure Laterality Date    Cardiac pacemaker placement      Other surgical history      bladder tumor removal    Tonsillectomy         Family History  Family History   Problem Relation Age of Onset    Heart Disorder Father     Other Father         HIP FX    Heart Disorder Mother     Other Mother         CVA AGE 80       Social History  Social History     Socioeconomic History    Marital status:    Tobacco Use    Smoking status: Former     Types: Cigarettes    Smokeless tobacco: Never    Tobacco comments:     2010   Vaping Use    Vaping status: Never Used   Substance and Sexual Activity    Alcohol use: Yes     Comment: HEAVY PREVIOUS NOW SOCIAL    Drug use: No     Social Determinants of Health     Financial Resource Strain: Low Risk  (5/30/2024)    Received from [facility]    Financial Resource Strain     In the past year, have you or any family members you live with been unable to get any of the following when it was really needed? Check all that apply.: None   Food Insecurity: No Food Insecurity (7/25/2024)    Food Insecurity     Food Insecurity: Never true   Transportation Needs: No Transportation Needs (7/25/2024)    Transportation Needs     Lack of Transportation: No   Physical Activity: High Risk (5/3/2024)    Received from [facility]     Exercise Vital Sign     On average, how many days per week do you engage in moderate to strenuous exercise (like a brisk walk)?: 0 days     On average, how many minutes do you engage in exercise at this level?: 0 min   Social Connections: Medium Risk (5/30/2024)    Received from [facility]    Social Connections     How often do you see or talk to people that you care about and feel close to? (For example: talking to friends on the phone, visiting friends or family, going to [employer] or club meetings): 1 or 2 times a week   Housing Stability: Low Risk  (7/25/2024)    Housing Stability     Housing Instability: No           Current Medications:  Current Facility-Administered Medications   Medication Dose Route Frequency    amoxicillin clavulanate (Augmentin) 500-125 MG per tab 500 mg  500 mg Oral Q12H    QUEtiapine (SEROquel) tab 12.5 mg  12.5 mg Oral Once    famotidine (Pepcid) tab 10 mg  10 mg Oral QOD    QUEtiapine (SEROquel) tab 12.5 mg  12.5 mg Oral QPM    ALPRAZolam (Xanax) tab 0.25 mg  0.25 mg Oral Nightly    QUEtiapine (SEROquel) tab 12.5 mg  12.5 mg Oral Nightly PRN    lactated ringers infusion   Intravenous Continuous    sodium chloride 0.9% 0.9% flush injection 10 mL  10 mL Intravenous PRN    acetaminophen (Tylenol) tab 650 mg  650 mg Oral Q8H PRN    melatonin cap/tab 5 mg  5 mg Oral Nightly    isosorbide dinitrate (Isordil Titradose) tab 5 mg  5 mg Oral TID    metoprolol tartrate (Lopressor) tab 25 mg  25 mg Oral 2x Daily(Beta Blocker)    docusate sodium (Colace) cap 100 mg  100 mg Oral BID PRN    apixaban (Eliquis) tab 5 mg  5 mg Oral BID    atorvastatin (Lipitor) tab 40 mg  40 mg Oral Daily    hydrALAZINE (Apresoline) tab 10 mg  10 mg Oral TID    glucose (Dex4) 15 GM/59ML oral liquid 15 g  15 g Oral Q15 Min PRN    Or    glucose (Glutose) 40% oral gel 15 g  15 g Oral Q15 Min PRN    Or    glucose-vitamin C (Dex-4) chewable tab 4 tablet  4 tablet Oral Q15 Min PRN    Or    dextrose 50% injection 50  mL  50 mL Intravenous Q15 Min PRN    Or    glucose (Dex4) 15 GM/59ML oral liquid 30 g  30 g Oral Q15 Min PRN    Or    glucose (Glutose) 40% oral gel 30 g  30 g Oral Q15 Min PRN    Or    glucose-vitamin C (Dex-4) chewable tab 8 tablet  8 tablet Oral Q15 Min PRN    insulin aspart (NovoLOG) 100 Units/mL FlexPen 1-11 Units  1-11 Units Subcutaneous TID CC    ondansetron (Zofran) 4 MG/2ML injection 4 mg  4 mg Intravenous Q6H PRN     Medications Prior to Admission   Medication Sig    isosorbide dinitrate 5 MG Oral Tab Take 1 tablet (5 mg total) by mouth 3 (three) times daily.    furosemide 20 MG Oral Tab Take 1 tablet (20 mg total) by mouth daily.    hydrALAZINE 10 MG Oral Tab Take 1 tablet (10 mg total) by mouth 3 (three) times daily.    insulin aspart (NOVOLOG FLEXPEN) 100 Units/mL Subcutaneous Solution Pen-injector Take 10 units with breakfast  and 10 units lunch    aspirin 81 MG Oral Tab EC Take 1 tablet (81 mg total) by mouth daily.    traMADol 50 MG Oral Tab Take 1 tablet (50 mg total) by mouth every 6 (six) hours as needed for Pain.    ATORVASTATIN 40 MG Oral Tab TAKE 1 TABLET BY MOUTH IN  THE EVENING (Patient taking differently: every morning.)    ELIQUIS 5 MG Oral Tab 2 (two) times daily.    LANTUS SOLOSTAR 100 UNIT/ML Subcutaneous Solution Pen-injector Take 15 units daily    Insulin Lispro, 1 Unit Dial, (HUMALOG KWIKPEN) 100 UNIT/ML Subcutaneous Solution Pen-injector Inject 5 Units into the skin As Directed. Take 5 units with each meal    metoprolol tartrate 25 MG Oral Tab TAKE ONE tablet daily (Patient taking differently: 2 (two) times daily.)    Glucose Blood (ACCU-CHEK MICK PLUS) In Vitro Strip USE TWICE DAILY    ACCU-CHEK MICK In Vitro Strip Test glucose three times daily.    Insulin Pen Needle (BD PEN NEEDLE MINI U/F) 31G X 5 MM Does not apply Misc AS DIRECTED QID    ACCU-CHEK MULTICLIX LANCETS Does not apply Misc test blood sugar QID as directed       Allergies  Allergies   Allergen Reactions    Metformin  Hcl OTHER (SEE COMMENTS)      Oral,   severe dry mouth       Review of Systems:   As by Admitting/Attending    Results:   Laboratory Data:  Lab Results   Component Value Date    WBC 14.0 (H) 2024    HGB 9.9 (L) 2024    HCT 30.9 (L) 2024    .0 2024    CREATSERUM 1.52 (H) 2024    BUN 39 (H) 2024     (H) 2024    K 4.3 2024     (H) 2024    CO2 17.0 (L) 2024     (H) 2024    CA 8.4 (L) 2024    ALB 2.6 (L) 2024    ALKPHO 74 2024    TP 4.7 (L) 2024    AST 48 (H) 2024    ALT 37 2024    TSH 1.520 2022    PSA 0.5 2012    B12 380 2020         Imaging:  CARD ECHO TITA (CPT=93320/32368)    Addendum Date: 2024    Transesophageal Echocardiogram (Report amended ) Name:Roc Butcher Date: 2024 :  1940 Ht:  (69in)  BP: 129 / 63 MRN:  [id number]    Age:  84years    Wt:  (167lb) HR: 75bpm Loc:  [facility]       Gndr: M          BSA: 1.91m^2 Sonographer: Omayra BARROS Ordering:    Eben Rick Consulting:  Eben Rick --------------------------------------------------------------------------- - History/Indications:   Bacteremia. --------------------------------------------------------------------------- - Procedure information:  Diagnostic transesophageal echocardiogram. Additional evaluation included 2D and Doppler.  Patient status:  Inpatient. Location:  Catheterization laboratory.    This was a routine study. The risks, benefits, and alternatives to the procedure were explained and informed consent was obtained. Initial setup. The patient arrived at the laboratory. Surface ECG leads, blood pressure measurements, and pulse oximetric signals were monitored. Sedation. Moderate sedation was administered. Transesophageal echocardiography. Topical anesthesia was obtained using Hurricaine. A transesophageal probe was inserted by the attending cardiologist without difficulty.  Image quality was adequate. Intravenous contrast (agitated saline) was administered to evaluate for shunting. The transesophageal probe was removed.  Study completion:  The patient tolerated the procedure well. There were no complications. Administered medications:   Fentanyl , 50 mcg.  Midazolam , 2 mg. An independent trained observer was present to assess the patient's hemodynamic status and level of sedation. Moderate sedation time: 13:15 to 13:45. --------------------------------------------------------------------------- - Conclusions: 1. Left ventricle: The cavity size was at the upper limits of normal.    Systolic function was moderately to markedly reduced. The estimated    ejection fraction was 30%. 2. Left atrium: The atrium was mildly enlarged. 3. Aortic valve: There was mild to moderate regurgitation. 4. Mitral valve: There was mild to moderate regurgitation. 5. No vegetation seen (very good look at the valves). * --------------------------------------------------------------------------- - * Findings: Left ventricle:  The cavity size was at the upper limits of normal. Systolic function was moderately to markedly reduced. The estimated ejection fraction was 30%. Left atrium:  The atrium was mildly enlarged.  There is no evidence of a thrombus in the atrial cavity or appendage. Right ventricle:  The cavity size was normal. Systolic function was normal. A pacing wire could be seen. Right atrium:  The atrium was at the upper limits of normal in size. A pacing wire was noted. Mitral valve:  The valve was structurally normal. Leaflet separation was normal.  There was no evidence of vegetation.  Doppler:  There was mild to moderate regurgitation. Aortic valve:   The valve was trileaflet. The leaflets were mildly thickened. Cusp separation was normal.  Doppler:   There was no evidence for stenosis.   There was mild to moderate regurgitation. Tricuspid valve:  The valve is structurally normal. Leaflet separation  was normal.  There was no evidence of a vegetation.  Doppler:  There was trivial regurgitation. Pulmonic valve:   There was no thickening. Cusp separation was normal. There was no evidence of a vegetation.  Doppler:  There was mild regurgitation. Pericardium:   There was no pericardial effusion. Aorta: Aorta: No evidence of aneurysm, dissection, or atheroma. Aortic root: The aortic root was normal-sized. Descending aorta:  There was mild diffuse disease. Systemic veins: Superior vena cava: The SVC was normal-sized. Atrial septum:  No defect or patent foramen ovale was identified.  Doppler and echo contrast study showed no right-to-left atrial level shunt, following an increase in RA pressure induced by provocative maneuvers. --------------------------------------------------------------------------- - Eben Page 08/06/2024 15:58      Vital Signs:   Blood pressure 156/53, pulse 71, temperature 97.2 °F (36.2 °C), temperature source Axillary, resp. rate 18, height 5' 9\" (1.753 m), weight 75.8 kg (167 lb), SpO2 98%.    Mental Status Exam:   Appearance: Stated age male, in hospital gown, laying in hospital bed.   Psychomotor: Patient has been demonstrating some restlessness and agitation last night.    Orientation: Alert and oriented to person and place but not to date. Patient notes that it is 2024. He is able to identify his wife.   Gait: Not evaluated.  Attitude/Coorperation: patient makes effort to cooperate  Behavior: Patient made an effort to be cooperative and attentive.  Speech: soft, slow, incomprehensible at times.   Mood: Apathy with difficulty expressing emotion  Affect: restricted  Thought process: Confused, otherwise improving  Thought content: No reports of  suicidal or homicidal ideation.  Perceptions: Patient has been demonstrating response to internal stimuli.  Concentration: improving  Memory: improving  Intellect: Average.  Judgment and Insight: Questionable.     Impression:      Delirium due to another medical condition.  Episodic mood disorder.    Preop testing    Bladder cancer (HCC)    Patient is a 84 year old ,  male with past medical history of diabetes, CAD, pacemaker, hypertension, paroxymal a-fib, hyperlipidemia, CVA who was admitted to the hospital for laparoscopic cystoprostatectomy with ileal conduit diversion. The patient has been demonstrating increased confusion, restlessness    The patient has been demonstrating delirium episode with alternation in mood and cognition with episodes of  increased confusion, restlessness, agitation and response to internal stimuli.     7/29/2024: Patient continues to demonstrate alternation in his mood and cognition. He was restless and agitated overnight requiring use of haldol. The patient otherwise demonstrating improvement in his cognition with no restlessness or agitation this morning.    7/30/2024: patient continues to demonstrate confusion and restlessness. No recent use of restraints. No recent use of PRN haldol.     8/1/2024: The patient continue demonstrating some slow responses with alternation in the mood and sundowning.  After today to change his medication to less sedative side effect.    8/2/2024: The patient demonstrating improvement in his delirious process with no agitation nightly but continue mistreat exhaustion and tiredness daily.    8/8/2024: The patient has been demonstrating confusion, restlessness, and agitation with response to internal stimuli.     Discussed risk and benefit, acknowledging the current symptom and severity.  At this point, I would recommend the following approach:     Focus on safety  Focus on education and support.  Focus on insight orientation helping the patient understand diagnosis and treatment plan.  discontinue xanax 0.25 mg nightly  Continue Seroquel 12.5 mg nightly  Continue melatonin 3 mg nightly.  Coordinate plan with team    Orders This Visit:  Orders Placed This  Encounter   Procedures    Hemoglobin A1C    RBC Morphology Scan    Basic Metabolic Panel (8)    CBC With Differential With Platelet    Basic Metabolic Panel (8)    Basic Metabolic Panel (8)    CBC With Differential With Platelet    RBC Morphology Scan    CBC With Differential With Platelet    Sodium, Urine, Random    Creatinine, Urine, Random    CBC With Differential With Platelet    Manual differential    Comp Metabolic Panel (14)    Vancomycin, random    Manual differential    Manual differential    CBC With Differential With Platelet    Basic Metabolic Panel (8)    Basic Metabolic Panel (8)    Manual differential    Type and screen    ABORH Confirmation    Specimen to Pathology Tissue    $$$$Respiratory Flu Expanded Panel + Covid-19$$$$    Blood Culture    Blood Culture PCR    Blood Culture    Urine Culture, Routine       Meds This Visit:  Requested Prescriptions      No prescriptions requested or ordered in this encounter     EVERETT Arroyo  8/8/2024    Note to Patient: The 21st Century Cures Act makes medical notes like these available to patients in the interest of transparency. However, be advised this is a medical document. It is intended as peer to peer communication. It is written in medical language and may contain abbreviations or verbiage that are unfamiliar. It may appear blunt or direct. Medical documents are intended to carry relevant information, facts as evident, and the clinical opinion of the practitioner. This note may have been transcribed using a voice dictation system. Voice recognition errors may occur. This should not be taken to alter the content or meaning of this note.

## 2024-08-08 NOTE — PROGRESS NOTES
[facility]   Progress Note  Roc Butcher Patient Status:  Inpatient    1940 MRN [id number]   Location [facility] 4W/SW/SE Attending Pearl Love MD   Hosp Day # 13 PCP Mitch Herrera MD     Subjective:  No changes over last 24 hours; remains confused     Objective:  Blood pressure 127/42, pulse 67, temperature 98.3 °F (36.8 °C), temperature source Axillary, resp. rate 16, height 5' 9\" (1.753 m), weight 167 lb (75.8 kg), SpO2 100%.  General appearance:no acute distress   Lungs: Unlabored respirations  Abdomen: Soft, non-tender, not distended; stoma pink and healthy   Extremities: no edema  : urine per ileal conduit yellow     Lab Results   Component Value Date    WBC 13.4 2024    HGB 9.5 2024    HCT 31.1 2024    .0 2024    CREATSERUM 1.60 2024    BUN 42 2024     2024    K 4.2 2024     2024    CO2 19.0 2024     2024    CA 8.3 2024    PGLU 121 2024         Intake/Output Summary (Last 24 hours) at 2024  Last data filed at 2024 1636  Gross per 24 hour   Intake 1050 ml   Output 1250 ml   Net -200 ml       Assessment:    Bladder cancer, POD 13 s/p robotic cystoprostatectomy, PLND, ileal conduit diversion   Acute on chronic renal failure   Post operative Delirium   Post operative UTI with bacteremia     Plan:    Renal failure improving with hydration   Appreciate ID recommendations regarding antibiotics   Poor PO intake and nutrition - to discuss with nutrition other options  Continue supportive care     Mauricio Farrar MD  [facility]  [phone]

## 2024-08-09 ENCOUNTER — APPOINTMENT (OUTPATIENT)
Dept: ULTRASOUND IMAGING | Facility: HOSPITAL | Age: 84
End: 2024-08-09
Attending: INTERNAL MEDICINE
Payer: MEDICARE

## 2024-08-09 ENCOUNTER — APPOINTMENT (OUTPATIENT)
Dept: CT IMAGING | Facility: HOSPITAL | Age: 84
End: 2024-08-09
Attending: NURSE PRACTITIONER
Payer: MEDICARE

## 2024-08-09 ENCOUNTER — APPOINTMENT (OUTPATIENT)
Dept: GENERAL RADIOLOGY | Facility: HOSPITAL | Age: 84
End: 2024-08-09
Attending: NURSE PRACTITIONER
Payer: MEDICARE

## 2024-08-09 LAB
ANION GAP SERPL CALC-SCNC: 8 MMOL/L (ref 0–18)
BACTERIA BLD CULT: POSITIVE
BASOPHILS # BLD: 0 X10(3) UL (ref 0–0.2)
BASOPHILS NFR BLD: 0 %
BILIRUB UR QL CFM: NEGATIVE
BUN BLD-MCNC: 36 MG/DL (ref 9–23)
BUN/CREAT SERPL: 23.4 (ref 10–20)
CALCIUM BLD-MCNC: 8.3 MG/DL (ref 8.7–10.4)
CHLORIDE SERPL-SCNC: 119 MMOL/L (ref 98–112)
CO2 SERPL-SCNC: 18 MMOL/L (ref 21–32)
CREAT BLD-MCNC: 1.54 MG/DL
CRP SERPL-MCNC: 18.9 MG/DL (ref ?–1)
DEPRECATED RDW RBC AUTO: 71.5 FL (ref 35.1–46.3)
E FAECALIS DNA BLD POS QL NAA+NON-PROBE: DETECTED
EGFRCR SERPLBLD CKD-EPI 2021: 44 ML/MIN/1.73M2 (ref 60–?)
EOSINOPHIL # BLD: 0.35 X10(3) UL (ref 0–0.7)
EOSINOPHIL NFR BLD: 2 %
ERYTHROCYTE [DISTWIDTH] IN BLOOD BY AUTOMATED COUNT: 23.2 % (ref 11–15)
ERYTHROCYTE [SEDIMENTATION RATE] IN BLOOD: 69 MM/HR
GLUCOSE BLD-MCNC: 130 MG/DL (ref 70–99)
HCT VFR BLD AUTO: 31.1 %
HGB BLD-MCNC: 9.9 G/DL
LYMPHOCYTES NFR BLD: 0 %
LYMPHOCYTES NFR BLD: 0 X10(3) UL (ref 1–4)
MCH RBC QN AUTO: 27 PG (ref 26–34)
MCHC RBC AUTO-ENTMCNC: 31.8 G/DL (ref 31–37)
MCV RBC AUTO: 85 FL
MONOCYTES # BLD: 0.88 X10(3) UL (ref 0.1–1)
MONOCYTES NFR BLD: 5 %
NEUTROPHILS # BLD AUTO: 13.62 X10 (3) UL (ref 1.5–7.7)
NEUTROPHILS NFR BLD: 92 %
NEUTS BAND NFR BLD: 1 %
NEUTS HYPERSEG # BLD: 16.37 X10(3) UL (ref 1.5–7.7)
OSMOLALITY SERPL CALC.SUM OF ELEC: 310 MOSM/KG (ref 275–295)
PLATELET # BLD AUTO: 427 10(3)UL (ref 150–450)
PLATELET MORPHOLOGY: NORMAL
POTASSIUM SERPL-SCNC: 4.3 MMOL/L (ref 3.5–5.1)
RBC # BLD AUTO: 3.66 X10(6)UL
RBC #/AREA URNS AUTO: >10 /HPF
SODIUM SERPL-SCNC: 145 MMOL/L (ref 136–145)
SP GR UR STRIP: 1.01 (ref 1–1.03)
TOTAL CELLS COUNTED BLD: 100
WBC # BLD AUTO: 17.6 X10(3) UL (ref 4–11)
WBC #/AREA URNS AUTO: >50 /HPF
WBC CLUMPS UR QL AUTO: PRESENT /HPF

## 2024-08-09 PROCEDURE — 74176 CT ABD & PELVIS W/O CONTRAST: CPT | Performed by: NURSE PRACTITIONER

## 2024-08-09 PROCEDURE — 99232 SBSQ HOSP IP/OBS MODERATE 35: CPT | Performed by: NURSE PRACTITIONER

## 2024-08-09 PROCEDURE — 93970 EXTREMITY STUDY: CPT | Performed by: INTERNAL MEDICINE

## 2024-08-09 PROCEDURE — 71045 X-RAY EXAM CHEST 1 VIEW: CPT | Performed by: NURSE PRACTITIONER

## 2024-08-09 RX ORDER — AMOXICILLIN AND CLAVULANATE POTASSIUM 875; 125 MG/1; MG/1
875 TABLET, FILM COATED ORAL EVERY 12 HOURS SCHEDULED
Status: DISCONTINUED | OUTPATIENT
Start: 2024-08-09 | End: 2024-08-09

## 2024-08-09 RX ORDER — VANCOMYCIN HYDROCHLORIDE
1250 EVERY 24 HOURS
Status: DISCONTINUED | OUTPATIENT
Start: 2024-08-09 | End: 2024-08-11

## 2024-08-09 NOTE — PROGRESS NOTES
[facility] AND CARE   Progress Note  -  Roc Butcher Patient Status:  Inpatient    1940 MRN [id number]   Location [facility] 4W/SW/SE Attending Pearl Love MD   Hosp Day # 15 PCP Mitch Herrera MD     PCP: Mitch Herrera MD      SEE ATTENDING NOTE AT BOTTOM OF PAGE    Is this a shared or split note between Advanced Practice Provider and Physician? Yes    Assessment and Plan:  Roc Butcher is a 84 year old male with PMH sig for type 2 diabetes, CAD status post PCI to left circumflex, PAD, pacemaker, hypertension, paroxysmal A-fib on Eliquis baseline, and chronic systolic heart failure with a EF of 35% hyperlipidemia, prior CVA, and bladder cancer who presented for laparoscopic cystoprostatectomy with ileal conduit diversion.  Post op course with complicated by delirium and enterococcus bacteremia and uti s/p stent removal now on po abx.  Incidental finding on CT of the head was a right parotid mass measuring 1.6 cm with broad differential and radiology recommended ENT evaluation with strong consideration for histological sampling for definitive characterization. Discharge delayed with poor po intake and rising wbc. Plans for rehab at [facility] once medically stable , see below for details       Delirium  Metabolic Encephalopathy  -CT head without acute process   -infectious tx below  -was on zyprexxa and switched to risperdal which has been stopped, currently on seroquel and xanax  -appreciate psych    Leukocytosis-worsening   Enterococcus bacteremia and UTI R/T procedure and ureteral stens   -afebrile  WBC still elevated but improved  -blood cx enterococcus with repeat blood cx NGTD  -urine with enterococcus  -CT A/P without acute process   -TTE without mention of vegetation.   - stents removed by urology  -TITA without vegetation   -abx-augmentin till   - noted low grade temps and worsening leukocytosis, cxr, blood cx and UA ordered   - CT A/P with Mild fat  stranding seen adjacent to the ileal conduit as well as mild bilateral perinephric and periureteric fat stranding suggestive of underlying infectious or inflammatory etiology.   -8/9 call placed to urology to review CT, he states \"stable\"  -call placed to ID to re-see patient      Acute Kidney Injury on CKD stage 3  Acidosis   Hypernatremia   -baseline Cr  1.3-1.6, peak Cr 2.27 now down to 1.54  --->145  -IVF stopped with b/l pleural effusion on CT   -CT A/P with ongoing Mild bilateral hydroureteronephrosis   -as per renal     Pleural Effusions  Atelectasis   -CT A/P with Moderate-sized bilateral pleural effusions with bilateral lower lobe atelectasis is unchanged.   -IVF stopped  -hold off on diuretic with poor po intake  -on RA   -Unable to cooperate with incentive spirometer    Hypoalbuminemia   -poor po intake per staff, does like Glucerna shakes and liquids but not much food  -continue Glucerna tid with meals  -Discussed NG tube with son knowing that patient might not allow us to put it in or may dislodge it, he will discuss with his mother whom he states will make the ultimate decision. I went back to see if wife was in room after talking to son and she was not there   -ensure staff is feeding pt for all meals    Hematuria-intermittent issues   Acute on Chronic Anemia  -baseline hgb ~10's,current hgb 9.9  -intermittent issues with blood   -8/9 CT A/P done with noted mild bilateral hydroureteronephrosis and fat stranding adjacent to the ileal conduit suggestive of infectious or inflammatory etiology, await urology input  -follow hgb     Right Parotid Mass  -noted on CT head   -Partially imaged ovoid 1.6 cm low-attenuation mass in the right parotid gland.   -outpt ENT eval for histological sampling to r/o neoplasm     Bladder cancer S/P lap cystoprostatectomy with ileal conduit diversion on 7/25/2024  -pain meds-tylenol prn   -bowel regimen prn  -urostomy teaching   -8/5 s/p stent removal by  urology  -intermittent issues with hematuria, urology notes indicate they are aware of this, repeat UA and CT A/P ordered   -appreciate urology      CAD status post PCI to L Cfx  PAD  S/P pacemaker  Hypertension  PAF  Chronic HFrEF   HL  Previous CVA  -CT head with old infarcts- noted on previous imaging  -echo with EF 30%, last echo with EF 35% with WMA, mild-mod AI  -Not on ACE ARB due to renal function and hypotension.   -Continue hydralazine, eliquis, hydralazine, lopressor and imdur  -holding home ASA for now  -holding lasix with decreased po and shilpa   -follows with [facility]     DM Type II  -HgbA1C 6.6  -home regimen: lantus 15 units nightly plus novolog   -blood sugars stable and has not needed insulin, will change to prn accuchecks to prevent \"irritation to pt\"     GOC  -full code     MA/ACO Reach  -ER Visits 2024: 0  -Admissions 2024: 3  -Re- Entry: no   -Consults: urology, cards and ID  -Discharge Needs: [facility]   -Appointments: follow up with PCP Mitch eHrrera MD after discharge from rehab    [facility] in am  -diet-soft diet     Prophy  -SCD  -eliquis     Dispo  -pending clinical coarse  -8/9 update given to son via phone, he asked I discuss NGT trial with his mother to get final decision however she was not in the room when I went back to see pt  -consideration for palliative care consult next week if continues to decline/not improve  PCP: Mitch Herrera MD      Concerns regarding plan of care were discussed with patient. Patient agrees with plan as detailed above. Discussed plan of care with Dr. Mitchell    Note: This chart was prepared using voice recognition software and may contain unintended word substitution errors.      Cookie Hugo RN, NP   [facility] Hospitalist Team  Contact via [employer] (Check Availability)    SUBJECTIVE:   Rising WBC and low-grade temps.  Per staff patient oral liquid intake is decreasing as this was his preferred food  option.    OBJECTIVE:   Blood pressure 128/55, pulse 80, temperature 99 °F (37.2 °C), temperature source Axillary, resp. rate 18, height 5' 9\" (1.753 m), weight 167 lb (75.8 kg), SpO2 98%.    GENERAL: no apparent distress  NEURO: lethargic  RESP: non labored, CTA  CARDIO: Regular, no murmur  ABD: soft, NT, ND, BS+  : ileal conduit with blood   EXTREMITIES: no edema    DIAGNOSTIC DATA:   Labs:     Recent Labs   Lab 08/05/24  0554 08/06/24  0602 08/07/24  0651 08/08/24  0632 08/09/24  0730   WBC 18.0* 11.7* 13.4* 14.0* 17.6*   HGB 10.0* 8.9* 9.5* 9.9* 9.9*   MCV 84.9 84.8 86.6 85.6 85.0   .0 316.0 384.0 411.0 427.0       Recent Labs   Lab 08/05/24  0554 08/06/24  0602 08/07/24  0651 08/08/24  0632 08/09/24  0730    140 144 146* 145   K 4.4 4.0 4.2 4.3 4.3   * 114* 116* 118* 119*   CO2 18.0* 19.0* 19.0* 17.0* 18.0*   BUN 48* 47* 42* 39* 36*   CREATSERUM 2.21* 1.94* 1.60* 1.52* 1.54*   CA 8.2* 8.0* 8.3* 8.4* 8.3*   * 135* 116* 142* 130*       Recent Labs   Lab 08/06/24  0602   ALT 37   AST 48*   ALB 2.6*       Recent Labs   Lab 08/07/24  1427 08/07/24  1830 08/07/24  2140 08/08/24  0800 08/08/24  1235   PGLU 120* 121* 126* 129* 139*       No results for input(s): \"TROP\" in the last 168 hours.        MEDICATIONS       amoxicillin potassium and clavulanate  875 mg Oral Q12H    QUEtiapine  12.5 mg Oral Once    famotidine  10 mg Oral QOD    QUEtiapine  12.5 mg Oral QPM    melatonin  5 mg Oral Nightly    isosorbide dinitrate  5 mg Oral TID    metoprolol tartrate  25 mg Oral 2x Daily(Beta Blocker)    apixaban  5 mg Oral BID    atorvastatin  40 mg Oral Daily    hydrALAZINE  10 mg Oral TID      sodium chloride 100 mL/hr at 08/09/24 0558       ALPRAZolam    QUEtiapine    sodium chloride 0.9%    acetaminophen    docusate sodium    glucose **OR** glucose **OR** glucose-vitamin C **OR** dextrose **OR** glucose **OR** glucose **OR** glucose-vitamin C    ondansetron    Cookie Hugo NP      IMAGING      CARD ECHO TITA (CPT=93320/89159)    Addendum Date: 2024    Transesophageal Echocardiogram (Report amended ) Name:Roc Butcher Date: 2024 :  1940 Ht:  (69in)  BP: 129 / 63 MRN:  [id number]    Age:  84years    Wt:  (167lb) HR: 75bpm Loc:  [facility]       Gndr: M          BSA: 1.91m^2 Sonographer: Omayra BARROS Ordering:    Eben Rick Consulting:  Eben Rick --------------------------------------------------------------------------- - History/Indications:   Bacteremia. --------------------------------------------------------------------------- - Procedure information:  Diagnostic transesophageal echocardiogram. Additional evaluation included 2D and Doppler.  Patient status:  Inpatient. Location:  Catheterization laboratory.    This was a routine study. The risks, benefits, and alternatives to the procedure were explained and informed consent was obtained. Initial setup. The patient arrived at the laboratory. Surface ECG leads, blood pressure measurements, and pulse oximetric signals were monitored. Sedation. Moderate sedation was administered. Transesophageal echocardiography. Topical anesthesia was obtained using Hurricaine. A transesophageal probe was inserted by the attending cardiologist without difficulty. Image quality was adequate. Intravenous contrast (agitated saline) was administered to evaluate for shunting. The transesophageal probe was removed.  Study completion:  The patient tolerated the procedure well. There were no complications. Administered medications:   Fentanyl , 50 mcg.  Midazolam , 2 mg. An independent trained observer was present to assess the patient's hemodynamic status and level of sedation. Moderate sedation time: 13:15 to 13:45. --------------------------------------------------------------------------- - Conclusions: 1. Left ventricle: The cavity size was at the upper limits of normal.    Systolic function was moderately to markedly reduced. The estimated     ejection fraction was 30%. 2. Left atrium: The atrium was mildly enlarged. 3. Aortic valve: There was mild to moderate regurgitation. 4. Mitral valve: There was mild to moderate regurgitation. 5. No vegetation seen (very good look at the valves). * --------------------------------------------------------------------------- - * Findings: Left ventricle:  The cavity size was at the upper limits of normal. Systolic function was moderately to markedly reduced. The estimated ejection fraction was 30%. Left atrium:  The atrium was mildly enlarged.  There is no evidence of a thrombus in the atrial cavity or appendage. Right ventricle:  The cavity size was normal. Systolic function was normal. A pacing wire could be seen. Right atrium:  The atrium was at the upper limits of normal in size. A pacing wire was noted. Mitral valve:  The valve was structurally normal. Leaflet separation was normal.  There was no evidence of vegetation.  Doppler:  There was mild to moderate regurgitation. Aortic valve:   The valve was trileaflet. The leaflets were mildly thickened. Cusp separation was normal.  Doppler:   There was no evidence for stenosis.   There was mild to moderate regurgitation. Tricuspid valve:  The valve is structurally normal. Leaflet separation was normal.  There was no evidence of a vegetation.  Doppler:  There was trivial regurgitation. Pulmonic valve:   There was no thickening. Cusp separation was normal. There was no evidence of a vegetation.  Doppler:  There was mild regurgitation. Pericardium:   There was no pericardial effusion. Aorta: Aorta: No evidence of aneurysm, dissection, or atheroma. Aortic root: The aortic root was normal-sized. Descending aorta:  There was mild diffuse disease. Systemic veins: Superior vena cava: The SVC was normal-sized. Atrial septum:  No defect or patent foramen ovale was identified.  Doppler and echo contrast study showed no right-to-left atrial level shunt, following an increase in  RA pressure induced by provocative maneuvers. --------------------------------------------------------------------------- - Livan Macedoisaias Eben 08/06/2024 15:58        SEE ATTENDING NOTE BELOW:   Agree with documentation as stated above in  NP note  Patient seen and examined independently      Stable from yesterday   Delirium: psych following, continue current antipsychotics   Leukocytosis: worsening. ID re-eval reviewed. CT done. Addition of vancomycin   Hypernatremia, ALBERT: sodium improving on 0.45 saline, can stop fluids now given some signs of volume overload on CT  Poor PO intake: concerning, will have to consider enteral tube feeds. I will need to address with family soon regarding this   Hematuria: noted, urology aware that this can occur intermittently     Ziggy Mitchell DO

## 2024-08-09 NOTE — PROGRESS NOTES
08/09/24 0810   Urostomy Ileal conduit RLQ   Placement Date: 07/25/24   Inserted by: Dr Jeong  Urostomy Type: Ileal conduit  Location: RLQ  Stoma Size (cm): 1 cm  Appliance Size: 1 3/4   Stomal Appliance 2 piece;Intact  (2 1/4 inch urostomy)   Stomal Assessment Red;Moist;Budding   Peristomal Assessment LUIS E   Dressing Change Due 08/11/24   Wound Follow Up   Follow up needed Yes     Ostomy Care  Follow up on the pt., he is awake in bed, oriented to self, no family here, Ileal conduit appliance intact, 2 1/4 inch 2 piece. Pt. is not teachable. Will continue to follow and teach/ assist as needed.

## 2024-08-09 NOTE — PROGRESS NOTES
NEPHROLOGY CONSULT NOTE     DATE: 8/4/2024    Requesting Physician: Dr. Mitchell      Reason for Consult: ALBERT      Interval history:   Cr improved today  Poor po intake  On IVF    HISTORY OF PRESENT ILLNESS: Roc Butcher is an 84 year old male with PMH sig for DM2, HTN, CAD, Afib, systolic heart failure.  Hx of bladder CA and is s/p laparoscopic cystoprostatectomy with ileal conduit diversion on 7/25.  Post op course complicated by fever and leukocytosis. Blood culture positive for enterococcus.  Patient was started on IV antibiotics and ID has been consulted.  Patient also with ALBERT with worsening to 2.27 this AM.  sCr 1.29 on admission. Nephrology is consulted for ALBERT.  Patient currently denies SOB or n/v. Appetite has been decreased, but he is drinking fluids.        MEDICAL HISTORY:   Past Medical History:    Arrhythmia    Cancer (HCC)    bladder    Cataract    High blood pressure    High cholesterol    History of blood transfusion    HYPERLIPIDEMIA    HYPERTENSION    Other and unspecified hyperlipidemia    STROKE    Type II or unspecified type diabetes mellitus without mention of complication, not stated as uncontrolled    Unspecified essential hypertension    Visual impairment       SURGICAL HISTORY:   Past Surgical History:   Procedure Laterality Date    Cardiac pacemaker placement      Other surgical history      bladder tumor removal    Tonsillectomy         FAMILY HISTORY:   Family History   Problem Relation Age of Onset    Heart Disorder Father     Other Father         HIP FX    Heart Disorder Mother     Other Mother         CVA AGE 80       SOCIAL HISTORY:   Social History     Socioeconomic History    Marital status:      Spouse name: Not on file    Number of children: Not on file    Years of education: Not on file    Highest education level: Not on file   Occupational History    Not on file   Tobacco Use    Smoking status: Former     Types: Cigarettes    Smokeless tobacco: Never    Tobacco  comments:     2010   Vaping Use    Vaping status: Never Used   Substance and Sexual Activity    Alcohol use: Yes     Comment: HEAVY PREVIOUS NOW SOCIAL    Drug use: No    Sexual activity: Not on file   Other Topics Concern    Not on file   Social History Narrative    Not on file     Social Determinants of Health     Financial Resource Strain: Low Risk  (5/30/2024)    Received from [facility]    Financial Resource Strain     In the past year, have you or any family members you live with been unable to get any of the following when it was really needed? Check all that apply.: None   Food Insecurity: No Food Insecurity (7/25/2024)    Food Insecurity     Food Insecurity: Never true   Transportation Needs: No Transportation Needs (7/25/2024)    Transportation Needs     Lack of Transportation: No     Car Seat: Not on file   Physical Activity: High Risk (5/3/2024)    Received from [facility]    Exercise Vital Sign     On average, how many days per week do you engage in moderate to strenuous exercise (like a brisk walk)?: 0 days     On average, how many minutes do you engage in exercise at this level?: 0 min   Stress: Not on file   Social Connections: Medium Risk (5/30/2024)    Received from [facility]    Social Connections     How often do you see or talk to people that you care about and feel close to? (For example: talking to friends on the phone, visiting friends or family, going to [employer] or club meetings): 1 or 2 times a week   Housing Stability: Low Risk  (7/25/2024)    Housing Stability     Housing Instability: No     Housing Instability Emergency: Not on file     Crib or [place]inette: Not on file       MEDICATIONS:   Current Facility-Administered Medications   Medication Dose Route Frequency    vancomycin (Vancocin) 1.25 g in sodium chloride 0.9% 250mL IVPB premix  1,250 mg Intravenous Q24H    ALPRAZolam (Xanax) tab 0.25 mg  0.25 mg Oral Nightly PRN    QUEtiapine (SEROquel) tab  12.5 mg  12.5 mg Oral Once    famotidine (Pepcid) tab 10 mg  10 mg Oral QOD    QUEtiapine (SEROquel) tab 12.5 mg  12.5 mg Oral QPM    QUEtiapine (SEROquel) tab 12.5 mg  12.5 mg Oral Nightly PRN    sodium chloride 0.9% 0.9% flush injection 10 mL  10 mL Intravenous PRN    acetaminophen (Tylenol) tab 650 mg  650 mg Oral Q8H PRN    melatonin cap/tab 5 mg  5 mg Oral Nightly    isosorbide dinitrate (Isordil Titradose) tab 5 mg  5 mg Oral TID    metoprolol tartrate (Lopressor) tab 25 mg  25 mg Oral 2x Daily(Beta Blocker)    docusate sodium (Colace) cap 100 mg  100 mg Oral BID PRN    apixaban (Eliquis) tab 5 mg  5 mg Oral BID    atorvastatin (Lipitor) tab 40 mg  40 mg Oral Daily    hydrALAZINE (Apresoline) tab 10 mg  10 mg Oral TID    glucose (Dex4) 15 GM/59ML oral liquid 15 g  15 g Oral Q15 Min PRN    Or    glucose (Glutose) 40% oral gel 15 g  15 g Oral Q15 Min PRN    Or    glucose-vitamin C (Dex-4) chewable tab 4 tablet  4 tablet Oral Q15 Min PRN    Or    dextrose 50% injection 50 mL  50 mL Intravenous Q15 Min PRN    Or    glucose (Dex4) 15 GM/59ML oral liquid 30 g  30 g Oral Q15 Min PRN    Or    glucose (Glutose) 40% oral gel 30 g  30 g Oral Q15 Min PRN    Or    glucose-vitamin C (Dex-4) chewable tab 8 tablet  8 tablet Oral Q15 Min PRN    ondansetron (Zofran) 4 MG/2ML injection 4 mg  4 mg Intravenous Q6H PRN         ALLERGIES:   Allergies   Allergen Reactions    Metformin Hcl OTHER (SEE COMMENTS)      Oral,   severe dry mouth       REVIEW OF SYSTEMS:  A comprehensive review of systems was conducted and is negative except for what is mentioned in the HPI      PHYSICAL EXAM:  /60 (BP Location: Right arm)   Pulse 82   Temp 99 °F (37.2 °C) (Axillary)   Resp 18   Ht 5' 9\" (1.753 m)   Wt 167 lb (75.8 kg)   SpO2 98%   BMI 24.66 kg/m²   GEN:  NAD  HEENT: NCAT, dry MM   CHEST: CTA b/l  CARDIAC: S1S2 normal  ABD: soft, NT/ND  : urostomy - hematuria noted   EXT: no lower ext edema  NEURO: sleepy       LABS:  Recent  Labs   Lab 08/07/24  0651 08/08/24  0632 08/09/24  0730   * 142* 130*   BUN 42* 39* 36*   CREATSERUM 1.60* 1.52* 1.54*   EGFRCR 42* 45* 44*   CA 8.3* 8.4* 8.3*    146* 145   K 4.2 4.3 4.3   * 118* 119*   CO2 19.0* 17.0* 18.0*     Recent Labs   Lab 08/07/24  0651 08/08/24  0632 08/09/24  0730   RBC 3.59* 3.61* 3.66*   HGB 9.5* 9.9* 9.9*   HCT 31.1* 30.9* 31.1*   MCV 86.6 85.6 85.0   MCH 26.5 27.4 27.0   MCHC 30.5* 32.0 31.8   RDW 23.3* 23.0* 23.2*   NEPRELIM 10.39* 10.50* 13.62*   WBC 13.4* 14.0* 17.6*   .0 411.0 427.0           INTAKE/OUTPUT:    Intake/Output Summary (Last 24 hours) at 8/9/2024 1619  Last data filed at 8/9/2024 1100  Gross per 24 hour   Intake 1183.33 ml   Output 1375 ml   Net -191.67 ml             IMAGING:  CT ABDOMEN+PELVIS(CPT=74176)    Result Date: 8/9/2024  CONCLUSION:   Right lower quadrant ileal conduit with removal of catheter.  Mild bilateral hydroureteronephrosis is unchanged since the prior exams.  No obstructing calculus.  Mild fat stranding seen adjacent to the ileal conduit as well as mild bilateral perinephric and periureteric fat stranding suggestive of underlying infectious or inflammatory etiology.  Anasarca.  Moderate-sized bilateral pleural effusions with bilateral lower lobe atelectasis is unchanged.  Multiple other incidental findings as described in the body of the report which are unchanged.     Dictated by (CST): Vin Olson MD on 8/09/2024 at 12:31 PM     Finalized by (CST): Vin Olson MD on 8/09/2024 at 12:41 PM          XR CHEST AP PORTABLE  (CPT=71045)    Result Date: 8/9/2024  CONCLUSION:  1. Cardiomegaly.  Tortuous thoracic aorta. 2. Bilateral perihilar and lower lobe mixed alveolar and interstitial airspace opacification with left basilar pleural reaction.  Transvenous pacing leads unchanged.    Dictated by (CST): Italo Malone MD on 8/09/2024 at 11:23 AM     Finalized by (CST): Italo Malone MD on 8/09/2024 at 11:26 AM               ASSESSMENT AND PLAN:   This is an 84 year old male with PMH sig for DM2, HTN, CAD, Afib, systolic heart failure.  Hx of bladder CA and is s/p laparoscopic cystoprostatectomy with ileal conduit diversion on 7/25. Post op course complicated by enterococcus bacteremia. Nephrology is consulted for ALBERT.     Non-oliguric ALBERT   - sCr 1.29 on admission  - Urine Na 43   - etiology may be pre-renal ALBERT vs ATN in the setting of infection  - sCr 1.5 today -appears to have plateued and now improving renal function  - stop IVFs given mod effusion and ansarca on CT chest. Push po hydration.  - hold lasix, monitor volume status  - continue supportive care    - renally dose meds   - avoid nephro toxins  - follow renal fxn and I/Os  - no acute indication for RRT      Hypernatremia  -switched IVF to 0.45 NS    Acidosis   - likely due to ALBERT and ileal conduit   -IVF as above  - monitor - may need sodium bicarbonate supplementation if not improving      HTN    - Metoprolol, hydralazine      Fever, leukocytosis   Enterococcus bacteremia  - management and abx per primary / ID    -sp TITA    Bladder CA s/p laparoscopic cystoprostatectomy with ileal conduit diversion 7/25  Mild hydronephrosis   - per Urology     Dw RN     Thank you for the consult and allowing us to participate in the care of Roc Bucther.  We will continue to follow.    Jael George MD  [facility]  Nephrology

## 2024-08-09 NOTE — CDS QUERY
.Clinical Documentation Query     Dear Doctor Paula or Cookie JARRETT     Please clarify cause of UTI. Thank you     (    ) UTI related to or due to the ureter stents     (    ) UTI related to or due to the 7/25/2024 procedure of laparoscopic radical cystoprostatectomy     (    x) Other - please specify: uti r/t ureter stents and recent lap radical cystoprostatectomy        Documentation from the Medical Record:         Admitted for scheduled surgery for High grade Urothelial cancer of bladder   S/P 7/25/24-- OPERATION:  Robotic-assisted laparoscopic radical cystoprostatectomy, bilateral pelvic lymphadenectomy, and intracorporeal urinary intestinal diversion (ileal conduit  8/4/24-- Urine culture positive for Enterococcus faecalis not VRE  8/5/24-- Stents removed by urologist   8/8 progress note-- Enterococcus bacteremia and UTI   Being treated with Augmentin till 8/17       If you have any questions, please contact Clinical :  Angela Castellanos RN at [phone]     Thank You!         THIS FORM IS A PERMANENT PART OF THE MEDICAL RECORD

## 2024-08-09 NOTE — PROGRESS NOTES
[facility]  part of formerly [facility]     [facility] Infectious Disease Consult    Rocjaime Urbanghada Patient Status:  Inpatient    1940 MRN [id number]   Location [facility] 4W/SW/SE Attending Pearl Love MD   Hosp Day # 15 PCP MD Misael Crumjaime HIDALGO Guadalupe seen and examined,   Afebrile,   Previous entries noted  Little groggy   Recalled to see him due to worsening WBC and abn CT scan,     History:  Past Medical History:    Arrhythmia    Cancer (HCC)    bladder    Cataract    High blood pressure    High cholesterol    History of blood transfusion    HYPERLIPIDEMIA    HYPERTENSION    Other and unspecified hyperlipidemia    STROKE    Type II or unspecified type diabetes mellitus without mention of complication, not stated as uncontrolled    Unspecified essential hypertension    Visual impairment     Past Surgical History:   Procedure Laterality Date    Cardiac pacemaker placement      Other surgical history      bladder tumor removal    Tonsillectomy       Family History   Problem Relation Age of Onset    Heart Disorder Father     Other Father         HIP FX    Heart Disorder Mother     Other Mother         CVA AGE 80      reports that he has quit smoking. His smoking use included cigarettes. He has never used smokeless tobacco. He reports current alcohol use. He reports that he does not use drugs.    Allergies:  Allergies   Allergen Reactions    Metformin Hcl OTHER (SEE COMMENTS)      Oral,   severe dry mouth       Medications:    Current Facility-Administered Medications:     amoxicillin clavulanate (Augmentin) 875-125 MG per tab 875 mg, 875 mg, Oral, Q12H    ALPRAZolam (Xanax) tab 0.25 mg, 0.25 mg, Oral, Nightly PRN    QUEtiapine (SEROquel) tab 12.5 mg, 12.5 mg, Oral, Once    famotidine (Pepcid) tab 10 mg, 10 mg, Oral, QOD **OR** [DISCONTINUED] famotidine (Pepcid) 20 mg/2mL injection 20 mg, 20 mg, Intravenous, BID    QUEtiapine (SEROquel) tab 12.5 mg, 12.5 mg, Oral, QPM     QUEtiapine (SEROquel) tab 12.5 mg, 12.5 mg, Oral, Nightly PRN    sodium chloride 0.9% 0.9% flush injection 10 mL, 10 mL, Intravenous, PRN    acetaminophen (Tylenol) tab 650 mg, 650 mg, Oral, Q8H PRN    melatonin cap/tab 5 mg, 5 mg, Oral, Nightly    isosorbide dinitrate (Isordil Titradose) tab 5 mg, 5 mg, Oral, TID    metoprolol tartrate (Lopressor) tab 25 mg, 25 mg, Oral, 2x Daily(Beta Blocker)    docusate sodium (Colace) cap 100 mg, 100 mg, Oral, BID PRN    apixaban (Eliquis) tab 5 mg, 5 mg, Oral, BID    atorvastatin (Lipitor) tab 40 mg, 40 mg, Oral, Daily    hydrALAZINE (Apresoline) tab 10 mg, 10 mg, Oral, TID    glucose (Dex4) 15 GM/59ML oral liquid 15 g, 15 g, Oral, Q15 Min PRN **OR** glucose (Glutose) 40% oral gel 15 g, 15 g, Oral, Q15 Min PRN **OR** glucose-vitamin C (Dex-4) chewable tab 4 tablet, 4 tablet, Oral, Q15 Min PRN **OR** dextrose 50% injection 50 mL, 50 mL, Intravenous, Q15 Min PRN **OR** glucose (Dex4) 15 GM/59ML oral liquid 30 g, 30 g, Oral, Q15 Min PRN **OR** glucose (Glutose) 40% oral gel 30 g, 30 g, Oral, Q15 Min PRN **OR** glucose-vitamin C (Dex-4) chewable tab 8 tablet, 8 tablet, Oral, Q15 Min PRN    ondansetron (Zofran) 4 MG/2ML injection 4 mg, 4 mg, Intravenous, Q6H PRN    Review of Systems:   Constitutional: Negative for anorexia, chills, fatigue, fevers, malaise, night sweats and weight loss.  Eyes: Negative for visual disturbance, irritation and redness.  Ears, nose, mouth, throat, and face: Negative for hearing loss, tinnitus, nasal congestion, snoring, sore throat, hoarseness and voice change.  Respiratory: Negative for cough, sputum, hemoptysis, chest pain, wheezing, dyspnea on exertion, or stridor.  Cardiovascular: Negative for chest pain, palpitations, irregular heart beats, syncope, fatigue, orthopnea, paroxysmal nocturnal dyspnea, lower extremity edema.  Gastrointestinal: Negative for dysphagia, odynophagia, reflux symptoms, nausea, vomiting, change in bowel habits, diarrhea,  constipation and abdominal pain.  Integument/breast: Negative for rash, skin lesions, and pruritus.  Hematologic/lymphatic: Negative for easy bruising, bleeding, and lymphadenopathy.  Musculoskeletal: Negative for myalgias, arthralgias, muscle weakness.  Neurological: Negative for headaches, dizziness, seizures, memory problems, trouble swallowing, speech problems, gait problems and weakness.  Behavioral/Psych: Negative for active tobacco use.  Endocrine: No history of of diabetes, thyroid disorder.  All other review of systems are negative.    Vital signs in last 24 hours:  Patient Vitals for the past 24 hrs:   BP Temp Temp src Pulse Resp SpO2   08/07/24 1053 149/56 97.3 °F (36.3 °C) Axillary 93 18 97 %   08/07/24 0417 142/57 98.1 °F (36.7 °C) Axillary 76 16 97 %   08/06/24 1942 141/49 98.7 °F (37.1 °C) Axillary 71 18 98 %   08/06/24 1714 151/58 98.7 °F (37.1 °C) Axillary 81 16 98 %   08/06/24 1351 136/40 -- -- -- -- --       Physical Exam:   General: alert, cooperative, oriented.  No respiratory distress.   Head: Normocephalic, without obvious abnormality, atraumatic.   Eyes: Conjunctivae/corneas clear.     Nose: Nares normal.   Throat: Lips, mucosa, and tongue normal.  No thrush noted.   Neck: Soft, supple neck; trachea midline,    Lungs: CTAB, normal and equal bilateral chest rise   Chest wall: No tenderness or deformity.   Heart: Regular rate and rhythm, normal S1S2, no murmur.   Abdomen: soft, non-tender, non-distended, positive BS.   Extremity: no edema, no cyanosis   Skin: No rashes or lesions.   Neurological: Alert, interactive, no focal deficits    Labs:  Lab Results   Component Value Date    WBC 17.6 08/09/2024    HGB 9.9 08/09/2024    HCT 31.1 08/09/2024    .0 08/09/2024    CREATSERUM 1.54 08/09/2024    BUN 36 08/09/2024     08/09/2024    K 4.3 08/09/2024     08/09/2024    CO2 18.0 08/09/2024     08/09/2024    CA 8.3 08/09/2024    ESRML 69 08/09/2024    CRP 18.90 08/09/2024        Radiology:  CT 8/09  Right lower quadrant ileal conduit with removal of catheter.    Mild bilateral hydroureteronephrosis is unchanged since the prior exams.  No obstructing calculus.    Mild fat stranding seen adjacent to the ileal conduit as well as mild bilateral perinephric and periureteric fat stranding suggestive of underlying infectious or inflammatory etiology.    Anasarca.    Moderate-sized bilateral pleural effusions with bilateral lower lobe atelectasis is unchanged.    Multiple other incidental findings as described in the body of the report which are unchanged.       Cultures:  Reviewed,     Assessment and Plan:    1.  Post-op sepsis following Cystoprostatectomy on 7/25/24  - S/P robotic assisted lap radical cystoprostatectomy, Bilat Pelvic LN dissection, ileal conduit diversion, 7/25/24  - Post-op course complicated by hospital delirium, ALBERT, blood cul with Enterococcus Fecalis in 2/2 bottles,   - Blood cul with Enterococcus Faecalis in 2/2 bottles on 8/03, repeats are negative on 8/04,   - UA bland, Cul with Enterococcus Faecalis too,   - + PPM in place, seeding risk is there,   - TTE with no vegetation, TITA negative too,   - Repeat CT with mild fat stranding adjacent to the conduit as well as mild bilateral perinephric and periureteral fat stranding, bilateral lower lobe atelectasis,   - On IV Vanc, 8/04- 8/07 and now on PO Augmentin, 8/008-8/09 will restart IV Vanc,      2.  AMS with severe delirium: Overall improving,aspiration precautions,      3.  Multifactorial leukocytosis due to persistent infection, LUE phlebitis, rule out underlying abscess,   - Blood cul and will start IV Vanc again,      4.  Disposition - inpatient. An elderly male with Enterococcal Sepsis starting post operatively, likely  source, TTE and TITA noted,   - Blood cul,   - IV Vanc, pharm to dose,   - Dopplers of LUE  - Aspiration precautions,   - Supportive care,     Discussed with patient, RN, all questions answered,  Overall looks little groggy will follow with further recommendations, thanks,     Thank you for consulting [facility] for Roc Butcher.  If you have any questions or concerns please call [facility] Infectious Disease at [phone].     John Johnson MD  8/7/2024  1:47 PM

## 2024-08-09 NOTE — PROGRESS NOTES
Patient is a 84 year old ,  male with past medical history of diabetes, CAD, pacemaker, hypertension, paroxymal a-fib, hyperlipidemia, CVA who was admitted to the hospital for laparoscopic cystoprostatectomy with ileal conduit diversion. The patient has been demonstrating increased confusion, restlessness.Patient indicated for psych consult for evaluation and advise.  Consult Duration     The patient seen for over 50-minute, follow-up evaluation, over 50% counseling and coordinating care addressing  confusion, restlessness.  Record reviewed, communication with attending, communication with RN and patient seen face to face evaluation.    History of Present Illness:     According to the team the patient continues to demonstrate confusion and restlessness otherwise no issues reported overnight. The patient has been redirectable.     The patient receiving Seroquel 12.5 mg nightly    Labs and imaging reviewed: glucose 130, sodium 145, BUN 36, creatinine 1.54, WBC 17.6    The patient today sitting laying in hospital bed. He presents restless otherwise no agitation.    He is alert and oriented to person. The patients speech otherwise continues to be mumbled and incomprehensible today.     He continues to demonstrate confused and disorganized thought process with response to internal stimuli.     Past Psychiatric/Medication History:  1. Prior diagnoses: none reported  2. Past psychiatric inpatient: none reported  3. Past outpatient history: none reported  4. Past suicide history: none reported  5. Medication history: none reported    Social History:   Patient has been  for 64 years. He lives at home with his wife. He has two adult children.  Retired 24 years ago. He worked for [employer]     No alcohol, tobacco, cannabis or illicit     Family History:  None reported  Medical History:   Past Medical History  Past Medical History:    Arrhythmia    Cancer (HCC)    bladder    Cataract    High blood  pressure    High cholesterol    History of blood transfusion    HYPERLIPIDEMIA    HYPERTENSION    Other and unspecified hyperlipidemia    STROKE    Type II or unspecified type diabetes mellitus without mention of complication, not stated as uncontrolled    Unspecified essential hypertension    Visual impairment       Past Surgical History  Past Surgical History:   Procedure Laterality Date    Cardiac pacemaker placement      Other surgical history      bladder tumor removal    Tonsillectomy         Family History  Family History   Problem Relation Age of Onset    Heart Disorder Father     Other Father         HIP FX    Heart Disorder Mother     Other Mother         CVA AGE 80       Social History  Social History     Socioeconomic History    Marital status:    Tobacco Use    Smoking status: Former     Types: Cigarettes    Smokeless tobacco: Never    Tobacco comments:     2010   Vaping Use    Vaping status: Never Used   Substance and Sexual Activity    Alcohol use: Yes     Comment: HEAVY PREVIOUS NOW SOCIAL    Drug use: No     Social Determinants of Health     Financial Resource Strain: Low Risk  (5/30/2024)    Received from [facility]    Financial Resource Strain     In the past year, have you or any family members you live with been unable to get any of the following when it was really needed? Check all that apply.: None   Food Insecurity: No Food Insecurity (7/25/2024)    Food Insecurity     Food Insecurity: Never true   Transportation Needs: No Transportation Needs (7/25/2024)    Transportation Needs     Lack of Transportation: No   Physical Activity: High Risk (5/3/2024)    Received from [facility]    Exercise Vital Sign     On average, how many days per week do you engage in moderate to strenuous exercise (like a brisk walk)?: 0 days     On average, how many minutes do you engage in exercise at this level?: 0 min   Social Connections: Medium Risk (5/30/2024)    Received from  [facility]    Social Connections     How often do you see or talk to people that you care about and feel close to? (For example: talking to friends on the phone, visiting friends or family, going to Mosque or club meetings): 1 or 2 times a week   Housing Stability: Low Risk  (7/25/2024)    Housing Stability     Housing Instability: No           Current Medications:  Current Facility-Administered Medications   Medication Dose Route Frequency    amoxicillin clavulanate (Augmentin) 875-125 MG per tab 875 mg  875 mg Oral Q12H    sodium chloride 0.45% infusion   Intravenous Continuous    ALPRAZolam (Xanax) tab 0.25 mg  0.25 mg Oral Nightly PRN    QUEtiapine (SEROquel) tab 12.5 mg  12.5 mg Oral Once    famotidine (Pepcid) tab 10 mg  10 mg Oral QOD    QUEtiapine (SEROquel) tab 12.5 mg  12.5 mg Oral QPM    QUEtiapine (SEROquel) tab 12.5 mg  12.5 mg Oral Nightly PRN    sodium chloride 0.9% 0.9% flush injection 10 mL  10 mL Intravenous PRN    acetaminophen (Tylenol) tab 650 mg  650 mg Oral Q8H PRN    melatonin cap/tab 5 mg  5 mg Oral Nightly    isosorbide dinitrate (Isordil Titradose) tab 5 mg  5 mg Oral TID    metoprolol tartrate (Lopressor) tab 25 mg  25 mg Oral 2x Daily(Beta Blocker)    docusate sodium (Colace) cap 100 mg  100 mg Oral BID PRN    apixaban (Eliquis) tab 5 mg  5 mg Oral BID    atorvastatin (Lipitor) tab 40 mg  40 mg Oral Daily    hydrALAZINE (Apresoline) tab 10 mg  10 mg Oral TID    glucose (Dex4) 15 GM/59ML oral liquid 15 g  15 g Oral Q15 Min PRN    Or    glucose (Glutose) 40% oral gel 15 g  15 g Oral Q15 Min PRN    Or    glucose-vitamin C (Dex-4) chewable tab 4 tablet  4 tablet Oral Q15 Min PRN    Or    dextrose 50% injection 50 mL  50 mL Intravenous Q15 Min PRN    Or    glucose (Dex4) 15 GM/59ML oral liquid 30 g  30 g Oral Q15 Min PRN    Or    glucose (Glutose) 40% oral gel 30 g  30 g Oral Q15 Min PRN    Or    glucose-vitamin C (Dex-4) chewable tab 8 tablet  8 tablet Oral Q15 Min PRN     ondansetron (Zofran) 4 MG/2ML injection 4 mg  4 mg Intravenous Q6H PRN     Medications Prior to Admission   Medication Sig    isosorbide dinitrate 5 MG Oral Tab Take 1 tablet (5 mg total) by mouth 3 (three) times daily.    furosemide 20 MG Oral Tab Take 1 tablet (20 mg total) by mouth daily.    hydrALAZINE 10 MG Oral Tab Take 1 tablet (10 mg total) by mouth 3 (three) times daily.    insulin aspart (NOVOLOG FLEXPEN) 100 Units/mL Subcutaneous Solution Pen-injector Take 10 units with breakfast  and 10 units lunch    aspirin 81 MG Oral Tab EC Take 1 tablet (81 mg total) by mouth daily.    traMADol 50 MG Oral Tab Take 1 tablet (50 mg total) by mouth every 6 (six) hours as needed for Pain.    ATORVASTATIN 40 MG Oral Tab TAKE 1 TABLET BY MOUTH IN  THE EVENING (Patient taking differently: every morning.)    ELIQUIS 5 MG Oral Tab 2 (two) times daily.    LANTUS SOLOSTAR 100 UNIT/ML Subcutaneous Solution Pen-injector Take 15 units daily    Insulin Lispro, 1 Unit Dial, (HUMALOG KWIKPEN) 100 UNIT/ML Subcutaneous Solution Pen-injector Inject 5 Units into the skin As Directed. Take 5 units with each meal    metoprolol tartrate 25 MG Oral Tab TAKE ONE tablet daily (Patient taking differently: 2 (two) times daily.)    Glucose Blood (ACCU-CHEK MICK PLUS) In Vitro Strip USE TWICE DAILY    ACCU-CHEK MICK In Vitro Strip Test glucose three times daily.    Insulin Pen Needle (BD PEN NEEDLE MINI U/F) 31G X 5 MM Does not apply Misc AS DIRECTED QID    ACCU-CHEK MULTICLIX LANCETS Does not apply Misc test blood sugar QID as directed       Allergies  Allergies   Allergen Reactions    Metformin Hcl OTHER (SEE COMMENTS)      Oral,   severe dry mouth       Review of Systems:   As by Admitting/Attending    Results:   Laboratory Data:  Lab Results   Component Value Date    WBC 17.6 (H) 08/09/2024    HGB 9.9 (L) 08/09/2024    HCT 31.1 (L) 08/09/2024    .0 08/09/2024    CREATSERUM 1.54 (H) 08/09/2024    BUN 36 (H) 08/09/2024      2024    K 4.3 2024     (H) 2024    CO2 18.0 (L) 2024     (H) 2024    CA 8.3 (L) 2024    ALB 2.6 (L) 2024    ALKPHO 74 2024    TP 4.7 (L) 2024    AST 48 (H) 2024    ALT 37 2024    TSH 1.520 2022    PSA 0.5 2012    ESRML 69 (H) 2024    CRP 18.90 (H) 2024    B12 380 2020         Imaging:  CARD ECHO TITA (CPT=93320/36575)    Addendum Date: 2024    Transesophageal Echocardiogram (Report amended ) Name:Roc Butcher Date: 2024 :  1940 Ht:  (69in)  BP: 129 / 63 MRN:  [id number]    Age:  84years    Wt:  (167lb) HR: 75bpm Loc:  [facility]       Gndr: M          BSA: 1.91m^2 Sonographer: Omayra BARROS Ordering:    Eben Rick Consulting:  Eben Rick --------------------------------------------------------------------------- - History/Indications:   Bacteremia. --------------------------------------------------------------------------- - Procedure information:  Diagnostic transesophageal echocardiogram. Additional evaluation included 2D and Doppler.  Patient status:  Inpatient. Location:  Catheterization laboratory.    This was a routine study. The risks, benefits, and alternatives to the procedure were explained and informed consent was obtained. Initial setup. The patient arrived at the laboratory. Surface ECG leads, blood pressure measurements, and pulse oximetric signals were monitored. Sedation. Moderate sedation was administered. Transesophageal echocardiography. Topical anesthesia was obtained using Hurricaine. A transesophageal probe was inserted by the attending cardiologist without difficulty. Image quality was adequate. Intravenous contrast (agitated saline) was administered to evaluate for shunting. The transesophageal probe was removed.  Study completion:  The patient tolerated the procedure well. There were no complications. Administered medications:   Fentanyl , 50 mcg.  Midazolam  , 2 mg. An independent trained observer was present to assess the patient's hemodynamic status and level of sedation. Moderate sedation time: 13:15 to 13:45. --------------------------------------------------------------------------- - Conclusions: 1. Left ventricle: The cavity size was at the upper limits of normal.    Systolic function was moderately to markedly reduced. The estimated    ejection fraction was 30%. 2. Left atrium: The atrium was mildly enlarged. 3. Aortic valve: There was mild to moderate regurgitation. 4. Mitral valve: There was mild to moderate regurgitation. 5. No vegetation seen (very good look at the valves). * --------------------------------------------------------------------------- - * Findings: Left ventricle:  The cavity size was at the upper limits of normal. Systolic function was moderately to markedly reduced. The estimated ejection fraction was 30%. Left atrium:  The atrium was mildly enlarged.  There is no evidence of a thrombus in the atrial cavity or appendage. Right ventricle:  The cavity size was normal. Systolic function was normal. A pacing wire could be seen. Right atrium:  The atrium was at the upper limits of normal in size. A pacing wire was noted. Mitral valve:  The valve was structurally normal. Leaflet separation was normal.  There was no evidence of vegetation.  Doppler:  There was mild to moderate regurgitation. Aortic valve:   The valve was trileaflet. The leaflets were mildly thickened. Cusp separation was normal.  Doppler:   There was no evidence for stenosis.   There was mild to moderate regurgitation. Tricuspid valve:  The valve is structurally normal. Leaflet separation was normal.  There was no evidence of a vegetation.  Doppler:  There was trivial regurgitation. Pulmonic valve:   There was no thickening. Cusp separation was normal. There was no evidence of a vegetation.  Doppler:  There was mild regurgitation. Pericardium:   There was no pericardial effusion.  Aorta: Aorta: No evidence of aneurysm, dissection, or atheroma. Aortic root: The aortic root was normal-sized. Descending aorta:  There was mild diffuse disease. Systemic veins: Superior vena cava: The SVC was normal-sized. Atrial septum:  No defect or patent foramen ovale was identified.  Doppler and echo contrast study showed no right-to-left atrial level shunt, following an increase in RA pressure induced by provocative maneuvers. --------------------------------------------------------------------------- - Eben Page 08/06/2024 15:58      Vital Signs:   Blood pressure 128/55, pulse 80, temperature 99 °F (37.2 °C), temperature source Axillary, resp. rate 18, height 5' 9\" (1.753 m), weight 75.8 kg (167 lb), SpO2 98%.    Mental Status Exam:   Appearance: Stated age male, in hospital gown, laying in hospital bed.   Psychomotor: Patient has been demonstrating some restlessness and agitation last night.    Orientation: Alert and oriented to person and place but not to date. Patient notes that it is 2024. He is able to identify his wife.   Gait: Not evaluated.  Attitude/Coorperation: patient makes effort to cooperate  Behavior: Patient made an effort to be cooperative and attentive.  Speech: soft, slow, incomprehensible at times.   Mood: Apathy with difficulty expressing emotion  Affect: restricted  Thought process: Confused, otherwise improving  Thought content: No reports of  suicidal or homicidal ideation.  Perceptions: Patient has been demonstrating response to internal stimuli.  Concentration: improving  Memory: improving  Intellect: Average.  Judgment and Insight: Questionable.     Impression:     Delirium due to another medical condition.  Episodic mood disorder.    Preop testing    Bladder cancer (HCC)    Patient is a 84 year old ,  male with past medical history of diabetes, CAD, pacemaker, hypertension, paroxymal a-fib, hyperlipidemia, CVA who was admitted to the hospital for  laparoscopic cystoprostatectomy with ileal conduit diversion. The patient has been demonstrating increased confusion, restlessness    The patient has been demonstrating delirium episode with alternation in mood and cognition with episodes of  increased confusion, restlessness, agitation and response to internal stimuli.     7/29/2024: Patient continues to demonstrate alternation in his mood and cognition. He was restless and agitated overnight requiring use of haldol. The patient otherwise demonstrating improvement in his cognition with no restlessness or agitation this morning.    7/30/2024: patient continues to demonstrate confusion and restlessness. No recent use of restraints. No recent use of PRN haldol.     8/1/2024: The patient continue demonstrating some slow responses with alternation in the mood and sundowning.  After today to change his medication to less sedative side effect.    8/2/2024: The patient demonstrating improvement in his delirious process with no agitation nightly but continue mistreat exhaustion and tiredness daily.    8/8/2024: The patient has been demonstrating confusion, restlessness, and agitation with response to internal stimuli.     8/9/2024: patient confused and restless otherwise no agitation. No issues overnight.    Discussed risk and benefit, acknowledging the current symptom and severity.  At this point, I would recommend the following approach:     Focus on safety  Focus on education and support.  Focus on insight orientation helping the patient understand diagnosis and treatment plan.  Continue Seroquel 12.5 mg nightly  Continue melatonin 3 mg nightly.  Coordinate plan with team    Orders This Visit:  Orders Placed This Encounter   Procedures    Hemoglobin A1C    RBC Morphology Scan    Basic Metabolic Panel (8)    CBC With Differential With Platelet    Basic Metabolic Panel (8)    Basic Metabolic Panel (8)    CBC With Differential With Platelet    RBC Morphology Scan    CBC  With Differential With Platelet    Sodium, Urine, Random    Creatinine, Urine, Random    CBC With Differential With Platelet    Manual differential    Comp Metabolic Panel (14)    Vancomycin, random    Manual differential    Manual differential    Basic Metabolic Panel (8)    Manual differential    Manual differential    Urinalysis with Culture Reflex    Sed Rate, Westergren (Automated)    C-Reactive Protein    Basic Metabolic Panel (8)    CBC With Differential With Platelet    Type and screen    ABORH Confirmation    Specimen to Pathology Tissue    $$$$Respiratory Flu Expanded Panel + Covid-19$$$$    Blood Culture    Blood Culture PCR    Blood Culture    Urine Culture, Routine    Blood Culture       Meds This Visit:  Requested Prescriptions      No prescriptions requested or ordered in this encounter     Jennifer Shaka, APRN  8/9/2024    Note to Patient: The 21st Century Cures Act makes medical notes like these available to patients in the interest of transparency. However, be advised this is a medical document. It is intended as peer to peer communication. It is written in medical language and may contain abbreviations or verbiage that are unfamiliar. It may appear blunt or direct. Medical documents are intended to carry relevant information, facts as evident, and the clinical opinion of the practitioner. This note may have been transcribed using a voice dictation system. Voice recognition errors may occur. This should not be taken to alter the content or meaning of this note.

## 2024-08-09 NOTE — PROGRESS NOTES
[facility]  Urology Progress Note    Roc Butcher Patient Status:  Inpatient    1940 MRN [id number]   Location [facility] 4W/SW/SE Attending Ziggy Mitchell, DO   Hosp Day # 15 PCP Mitch Herrera MD     Subjective:  Roc Butcher is a(n) 84 year old male.      Current complaints: Confused but in no distress.    Objective:  General appearance: cooperative and no distress  Blood pressure 128/55, pulse 80, temperature 99 °F (37.2 °C), temperature source Axillary, resp. rate 18, height 5' 9\" (1.753 m), weight 167 lb (75.8 kg), SpO2 98%.  Lungs: Respirations non labored.  Abdomen: Soft, non tender.  : Ileal conduit draining red urine.    Lab Results   Component Value Date    PGLU 139 2024     Assessment:  Bladder cancer, POD# 15 s/p robotic cystoprostatectomy, PLND, ileal conduit diversion   Acute on chronic renal failure   Post operative delirium   Post operative UTI with bacteremia     Plan:  Confusion persists. Continue present management.    Nathan Garibay MD  2024  7:24 AM

## 2024-08-09 NOTE — PLAN OF CARE
Problem: Patient Centered Care  Goal: Patient preferences are identified and integrated in the patient's plan of care  Description: Interventions:  - What would you like us to know as we care for you? From home with wife  - Provide timely, complete, and accurate information to patient/family  - Incorporate patient and family knowledge, values, beliefs, and cultural backgrounds into the planning and delivery of care  - Encourage patient/family to participate in care and decision-making at the level they choose  - Honor patient and family perspectives and choices  Outcome: Progressing     Problem: PAIN - ADULT  Goal: Verbalizes/displays adequate comfort level or patient's stated pain goal  Description: INTERVENTIONS:  - Encourage pt to monitor pain and request assistance  - Assess pain using appropriate pain scale  - Administer analgesics based on type and severity of pain and evaluate response  - Implement non-pharmacological measures as appropriate and evaluate response  - Consider cultural and social influences on pain and pain management  - Manage/alleviate anxiety  - Utilize distraction and/or relaxation techniques  - Monitor for opioid side effects  - Notify MD/LIP if interventions unsuccessful or patient reports new pain  - Anticipate increased pain with activity and pre-medicate as appropriate  Outcome: Progressing     Problem: GENITOURINARY - ADULT  Goal: Absence of urinary retention  Description: INTERVENTIONS:  - Assess patient’s ability to void and empty bladder  - Monitor intake/output and perform bladder scan as needed  - Follow urinary retention protocol/standard of care  - Consider collaborating with pharmacy to review patient's medication profile  - Implement strategies to promote bladder emptying  Outcome: Progressing   No acute changes overnight.  Patient alert to self with some irritability; scheduled melatonin given and patient was able to sleep overnight.  Patient did take meds without  difficulty.  IVF infusing, no tele (but pacemaker, v-paced), ileal conduit to nath bag with bright red blood at start of shift.  MD notified and eliquis held per order.  Patient did have loose BM last night; cleansed and barrier cream applied. Safety precautions maintained; frequent rounding done and video monitoring.

## 2024-08-10 LAB
ANION GAP SERPL CALC-SCNC: 11 MMOL/L (ref 0–18)
BASOPHILS # BLD: 0 X10(3) UL (ref 0–0.2)
BASOPHILS NFR BLD: 0 %
BUN BLD-MCNC: 33 MG/DL (ref 9–23)
BUN/CREAT SERPL: 23.2 (ref 10–20)
CALCIUM BLD-MCNC: 8 MG/DL (ref 8.7–10.4)
CHLORIDE SERPL-SCNC: 120 MMOL/L (ref 98–112)
CO2 SERPL-SCNC: 16 MMOL/L (ref 21–32)
CREAT BLD-MCNC: 1.42 MG/DL
DEPRECATED RDW RBC AUTO: 71.7 FL (ref 35.1–46.3)
EGFRCR SERPLBLD CKD-EPI 2021: 49 ML/MIN/1.73M2 (ref 60–?)
EOSINOPHIL # BLD: 0.24 X10(3) UL (ref 0–0.7)
EOSINOPHIL NFR BLD: 2 %
ERYTHROCYTE [DISTWIDTH] IN BLOOD BY AUTOMATED COUNT: 22.7 % (ref 11–15)
GLUCOSE BLD-MCNC: 114 MG/DL (ref 70–99)
HCT VFR BLD AUTO: 30.6 %
HGB BLD-MCNC: 9.6 G/DL
LYMPHOCYTES NFR BLD: 0.36 X10(3) UL (ref 1–4)
LYMPHOCYTES NFR BLD: 2 %
MCH RBC QN AUTO: 27.3 PG (ref 26–34)
MCHC RBC AUTO-ENTMCNC: 31.4 G/DL (ref 31–37)
MCV RBC AUTO: 86.9 FL
MONOCYTES # BLD: 0.24 X10(3) UL (ref 0.1–1)
MONOCYTES NFR BLD: 2 %
NEUTROPHILS # BLD AUTO: 8.42 X10 (3) UL (ref 1.5–7.7)
NEUTROPHILS NFR BLD: 92 %
NEUTS BAND NFR BLD: 1 %
NEUTS HYPERSEG # BLD: 11.16 X10(3) UL (ref 1.5–7.7)
OSMOLALITY SERPL CALC.SUM OF ELEC: 312 MOSM/KG (ref 275–295)
PLATELET # BLD AUTO: 418 10(3)UL (ref 150–450)
PLATELET MORPHOLOGY: NORMAL
POTASSIUM SERPL-SCNC: 3.9 MMOL/L (ref 3.5–5.1)
RBC # BLD AUTO: 3.52 X10(6)UL
SODIUM SERPL-SCNC: 147 MMOL/L (ref 136–145)
TOTAL CELLS COUNTED BLD: 100
VARIANT LYMPHS NFR BLD MANUAL: 1 %
WBC # BLD AUTO: 12 X10(3) UL (ref 4–11)

## 2024-08-10 RX ORDER — SODIUM BICARBONATE 650 MG/1
650 TABLET ORAL 2 TIMES DAILY
Status: DISCONTINUED | OUTPATIENT
Start: 2024-08-10 | End: 2024-08-13

## 2024-08-10 NOTE — PLAN OF CARE
Roc is drowsy and oriented x1. Can be irritable but no agitation. At times can have a conversation, words still at times don't make sense, getting clear but still garbled at times. Currently on video monitoring, bed alarm in place. V paced with pacemaker. Eliquis on hold, on RA. Incontinent. Has ileoconduit in place to nath bag, monitoring I&Os, urine still red to dark red/tessie color. Max assist, repos as breanne in bed. Hygiene care provided prn. Incisions in place, dermabond and open to air. Left arm has superficial clot, elevated and arm precaution. Redness to sacrum, cream and mepilex changed prn. Scrotum swollen. Pain managed and no NV. IV Vanco abx. Placed new IV to right forearm. Plan for [facility] once med clear, plan pending course, safety measures in place, call light within reach.     Problem: Patient Centered Care  Goal: Patient preferences are identified and integrated in the patient's plan of care  Description: Interventions:  - What would you like us to know as we care for you? From home with wife  - Provide timely, complete, and accurate information to patient/family  - Incorporate patient and family knowledge, values, beliefs, and cultural backgrounds into the planning and delivery of care  - Encourage patient/family to participate in care and decision-making at the level they choose  - Honor patient and family perspectives and choices  Outcome: Progressing     Problem: Patient/Family Goals  Goal: Patient/Family Long Term Goal  Description: Patient's Long Term Goal:     Interventions:  -   - See additional Care Plan goals for specific interventions  Outcome: Progressing  Goal: Patient/Family Short Term Goal  Description: Patient's Short Term Goal:     Interventions:   -   - See additional Care Plan goals for specific interventions  Outcome: Progressing     Problem: Impaired Cognition  Goal: Patient will exhibit improved attention, thought processing and/or memory  Description: Interventions:  - Minimize  distractions in the room when full attention is required  Outcome: Progressing     Problem: PAIN - ADULT  Goal: Verbalizes/displays adequate comfort level or patient's stated pain goal  Description: INTERVENTIONS:  - Encourage pt to monitor pain and request assistance  - Assess pain using appropriate pain scale  - Administer analgesics based on type and severity of pain and evaluate response  - Implement non-pharmacological measures as appropriate and evaluate response  - Consider cultural and social influences on pain and pain management  - Manage/alleviate anxiety  - Utilize distraction and/or relaxation techniques  - Monitor for opioid side effects  - Notify MD/LIP if interventions unsuccessful or patient reports new pain  - Anticipate increased pain with activity and pre-medicate as appropriate  Outcome: Progressing     Problem: SAFETY ADULT - FALL  Goal: Free from fall injury  Description: INTERVENTIONS:  - Assess pt frequently for physical needs  - Identify cognitive and physical deficits and behaviors that affect risk of falls.  - [place] fall precautions as indicated by assessment.  - Educate pt/family on patient safety including physical limitations  - Instruct pt to call for assistance with activity based on assessment  - Modify environment to reduce risk of injury  - Provide assistive devices as appropriate  - Consider OT/PT consult to assist with strengthening/mobility  - Encourage toileting schedule  Outcome: Progressing     Problem: DISCHARGE PLANNING  Goal: Discharge to home or other facility with appropriate resources  Description: INTERVENTIONS:  - Identify barriers to discharge w/pt and caregiver  - Include patient/family/discharge partner in discharge planning  - Arrange for needed discharge resources and transportation as appropriate  - Identify discharge learning needs (meds, wound care, etc)  - Arrange for interpreters to assist at discharge as needed  - Consider post-discharge preferences of  patient/family/discharge partner  - Complete POLST form as appropriate  - Assess patient's ability to be responsible for managing their own health  - Refer to Case Management Department for coordinating discharge planning if the patient needs post-hospital services based on physician/LIP order or complex needs related to functional status, cognitive ability or social support system  Outcome: Progressing     Problem: RISK FOR INFECTION - ADULT  Goal: Absence of fever/infection during anticipated neutropenic period  Description: INTERVENTIONS  - Monitor WBC  - Administer growth factors as ordered  - Implement neutropenic guidelines  Outcome: Progressing     Problem: CARDIOVASCULAR - ADULT  Goal: Maintains optimal cardiac output and hemodynamic stability  Description: INTERVENTIONS:  - Monitor vital signs, rhythm, and trends  - Monitor for bleeding, hypotension and signs of decreased cardiac output  - Evaluate effectiveness of vasoactive medications to optimize hemodynamic stability  - Monitor arterial and/or venous puncture sites for bleeding and/or hematoma  - Assess quality of pulses, skin color and temperature  - Assess for signs of decreased coronary artery perfusion - ex. Angina  - Evaluate fluid balance, assess for edema, trend weights  Outcome: Progressing  Goal: Absence of cardiac arrhythmias or at baseline  Description: INTERVENTIONS:  - Continuous cardiac monitoring, monitor vital signs, obtain 12 lead EKG if indicated  - Evaluate effectiveness of antiarrhythmic and heart rate control medications as ordered  - Initiate emergency measures for life threatening arrhythmias  - Monitor electrolytes and administer replacement therapy as ordered  Outcome: Progressing     Problem: GASTROINTESTINAL - ADULT  Goal: Minimal or absence of nausea and vomiting  Description: INTERVENTIONS:  - Maintain adequate hydration with IV or PO as ordered and tolerated  - Nasogastric tube to low intermittent suction as ordered  -  Evaluate effectiveness of ordered antiemetic medications  - Provide nonpharmacologic comfort measures as appropriate  - Advance diet as tolerated, if ordered  - Obtain nutritional consult as needed  - Evaluate fluid balance  Outcome: Progressing  Goal: Maintains or returns to baseline bowel function  Description: INTERVENTIONS:  - Assess bowel function  - Maintain adequate hydration with IV or PO as ordered and tolerated  - Evaluate effectiveness of GI medications  - Encourage mobilization and activity  - Obtain nutritional consult as needed  - Establish a toileting routine/schedule  - Consider collaborating with pharmacy to review patient's medication profile  Outcome: Progressing     Problem: GENITOURINARY - ADULT  Goal: Absence of urinary retention  Description: INTERVENTIONS:  - Assess patient’s ability to void and empty bladder  - Monitor intake/output and perform bladder scan as needed  - Follow urinary retention protocol/standard of care  - Consider collaborating with pharmacy to review patient's medication profile  - Implement strategies to promote bladder emptying  Outcome: Progressing     Problem: METABOLIC/FLUID AND ELECTROLYTES - ADULT  Goal: Electrolytes maintained within normal limits  Description: INTERVENTIONS:  - Monitor labs and rhythm and assess patient for signs and symptoms of electrolyte imbalances  - Administer electrolyte replacement as ordered  - Monitor response to electrolyte replacements, including rhythm and repeat lab results as appropriate  - Fluid restriction as ordered  - Instruct patient on fluid and nutrition restrictions as appropriate  Outcome: Progressing     Problem: SKIN/TISSUE INTEGRITY - ADULT  Goal: Skin integrity remains intact  Description: INTERVENTIONS  - Assess and document risk factors for pressure ulcer development  - Assess and document skin integrity  - Monitor for areas of redness and/or skin breakdown  - Initiate interventions, skin care algorithm/standards of  care as needed  Outcome: Progressing  Goal: Incision(s), wounds(s) or drain site(s) healing without S/S of infection  Description: INTERVENTIONS:  - Assess and document risk factors for pressure ulcer development  - Assess and document skin integrity  - Assess and document dressing/incision, wound bed, drain sites and surrounding tissue  - Implement wound care per orders  - Initiate isolation precautions as appropriate  - Initiate Pressure Ulcer prevention bundle as indicated  Outcome: Progressing     Problem: HEMATOLOGIC - ADULT  Goal: Maintains hematologic stability  Description: INTERVENTIONS  - Assess for signs and symptoms of bleeding or hemorrhage  - Monitor labs and vital signs for trends  - Administer supportive blood products/factors, fluids and medications as ordered and appropriate  - Administer supportive blood products/factors as ordered and appropriate  Outcome: Progressing  Goal: Free from bleeding injury  Description: (Example usage: patient with low platelets)  INTERVENTIONS:  - Avoid intramuscular injections, enemas and rectal medication administration  - Ensure safe mobilization of patient  - Hold pressure on venipuncture sites to achieve adequate hemostasis  - Assess for signs and symptoms of internal bleeding  - Monitor lab trends  - Patient is to report abnormal signs of bleeding to staff  - Avoid use of toothpicks and dental floss  - Use electric shaver for shaving  - Use soft bristle tooth brush  - Limit straining and forceful nose blowing  Outcome: Progressing     Problem: MUSCULOSKELETAL - ADULT  Goal: Return mobility to safest level of function  Description: INTERVENTIONS:  - Assess patient stability and activity tolerance for standing, transferring and ambulating w/ or w/o assistive devices  - Assist with transfers and ambulation using safe patient handling equipment as needed  - Ensure adequate protection for wounds/incisions during mobilization  - Obtain PT/OT consults as needed  -  Advance activity as appropriate  - Communicate ordered activity level and limitations with patient/family  Outcome: Progressing  Goal: Maintain proper alignment of affected body part  Description: INTERVENTIONS:  - Support and protect limb and body alignment per provider's orders  - Instruct and reinforce with patient and family use of appropriate assistive device and precautions (e.g. spinal or hip dislocation precautions)  Outcome: Progressing     Problem: NEUROLOGICAL - ADULT  Goal: Achieves stable or improved neurological status  Description: INTERVENTIONS  - Assess for and report changes in neurological status  - Initiate measures to prevent increased intracranial pressure  - Maintain blood pressure and fluid volume within ordered parameters to optimize cerebral perfusion and minimize risk of hemorrhage  - Monitor temperature, glucose, and sodium. Initiate appropriate interventions as ordered  Outcome: Progressing     Problem: Impaired Communication  Goal: Patient will achieve maximal communication potential  Description: Interventions:  - Encourage use of alternative/augmentative communication to express basic wants and needs (use of communication/letter board/or smartphone/tablet/handwriting)  Outcome: Progressing     Problem: Safety Risk - Non-Violent Restraints  Goal: Patient will remain free from self-harm  Description: INTERVENTIONS:  - Apply the least restrictive restraint to prevent harm  - Notify patient and family of reasons restraints applied  - Assess for any contributing factors to confusion (electrolyte disturbances, delirium, medications)  - Discontinue any unnecessary medical devices as soon as possible  - Assess the patient's physical comfort, circulation, skin condition, hydration, nutrition and elimination needs   - Reorient and redirection as needed  - Assess for the need to continue restraints  Outcome: Progressing     Problem: Delirium  Goal: Minimize duration of delirium  Description:  Interventions:  - Encourage use of hearing aids, eye glasses  - Promote highest level of mobility daily  - Provide frequent reorientation  - Promote wakefulness i.e. lights on, blinds open  - Promote sleep, encourage patient's normal rest cycle i.e. lights off, TV off, minimize noise and interruptions  - Encourage family to assist in orientation and promotion of home routines  Outcome: Progressing

## 2024-08-10 NOTE — PROGRESS NOTES
[facility] Hospitalist Progress Note     CC: Hospital Follow up    PCP: Mitch Herrera MD       Assessment/Plan:   Roc Butcher is a 84 year old male with PMH sig for type 2 diabetes, CAD status post PCI to left circumflex, PAD, pacemaker, hypertension, paroxysmal A-fib on Eliquis baseline, and chronic systolic heart failure with a EF of 35% hyperlipidemia, prior CVA, and bladder cancer who presented for laparoscopic cystoprostatectomy with ileal conduit diversion.  Post op course with complicated by delirium and enterococcus bacteremia and uti s/p stent removal now on po abx.  Incidental finding on CT of the head was a right parotid mass measuring 1.6 cm with broad differential and radiology recommended ENT evaluation with strong consideration for histological sampling for definitive characterization. Discharge delayed with poor po intake and rising wbc. Plans for rehab at [facility] once medically stable , see below for details       Delirium  Metabolic Encephalopathy  -CT head without acute process   -infectious tx below  -was on zyprexxa and switched to risperdal which has been stopped, currently on seroquel and xanax  -appreciate psych     Leukocytosis-worsening   Enterococcus bacteremia and UTI R/T procedure and ureteral stens   -afebrile  WBC still elevated but improved  -blood cx enterococcus with repeat blood cx NGTD  -urine with enterococcus  -CT A/P without acute process   -TTE without mention of vegetation.   -8/5 stents removed by urology  -TITA without vegetation   -abx-augmentin till 8/17  -8/9 noted low grade temps and worsening leukocytosis, cxr, blood cx and UA ordered   -8/6 CT A/P with Mild fat stranding seen adjacent to the ileal conduit as well as mild bilateral perinephric and periureteric fat stranding suggestive of underlying infectious or inflammatory etiology.   -8/9 call placed to urology to review CT, he states \"stable\"  -ID consulted on 8/9 again--vancomycin resumed       Acute Kidney Injury on CKD stage 3  Acidosis   Hypernatremia   -Cr improving   -IVF stopped with b/l pleural effusion on CT   -CT A/P with ongoing Mild bilateral hydroureteronephrosis      Pleural Effusions  Atelectasis   -CT A/P with Moderate-sized bilateral pleural effusions with bilateral lower lobe atelectasis is unchanged.   -IVF stopped  -hold off on diuretic with poor po intake  -on RA   -Unable to cooperate with incentive spirometer     Hypoalbuminemia   -poor po intake per staff, does like Glucerna shakes and liquids but not much food  -continue Glucerna tid with meals  -Discussed NG tube with son knowing that patient might not allow us to put it in or may dislodge it, he will discuss with his mother   -ensure staff is feeding pt for all meals  -change to general diet or have family bring in food      Hematuria-intermittent issues   Acute on Chronic Anemia  -baseline hgb ~10's,current hgb 9.9  -intermittent issues with blood   -8/9 CT A/P done with noted mild bilateral hydroureteronephrosis and fat stranding adjacent to the ileal conduit suggestive of infectious or inflammatory etiology, await urology input  -follow hgb     Right Parotid Mass  -noted on CT head   -Partially imaged ovoid 1.6 cm low-attenuation mass in the right parotid gland.   -outpt ENT eval for histological sampling to r/o neoplasm     Bladder cancer S/P lap cystoprostatectomy with ileal conduit diversion on 7/25/2024  -pain meds-tylenol prn   -bowel regimen prn  -urostomy teaching   -8/5 s/p stent removal by urology  -intermittent issues with hematuria, urology notes indicate they are aware of this, repeat UA and CT A/P ordered   -appreciate urology      CAD status post PCI to L Cfx  PAD  S/P pacemaker  Hypertension  PAF  Chronic HFrEF   HL  Previous CVA  -CT head with old infarcts- noted on previous imaging  -echo with EF 30%, last echo with EF 35% with WMA, mild-mod AI  -Not on ACE ARB due to renal function and hypotension.    -Continue hydralazine, eliquis, hydralazine, lopressor and imdur  -holding home ASA for now  -holding lasix with decreased po and shilpa   -follows with [facility]      DM Type II  -HgbA1C 6.6  -home regimen: lantus 15 units nightly plus novolog   -blood sugars stable and has not needed insulin, will change to prn accuchecks to prevent \"irritation to pt\"     MA/ACO [place]  -ER Visits 2024: 0  -Admissions 2024: 3  -Re- Entry: no   -Consults: urology, cards and ID  -Discharge Needs: [facility]   -Appointments: follow up with PCP Mitch Herrera MD after discharge from rehab    Dispo  -consideration for palliative care consult next week if continues to decline/not improve  PCP: Mitch Herrera MD    Fluids: off IV fluids  Diet: liberalize diet to general   DVT prophylaxis: eliquis (on hold)  Code status: FULL CODE    [facility] Hospitaldavid Mitchell DO  [facility]      Subjective:         OBJECTIVE:    Blood pressure 132/50, pulse 73, temperature 97.6 °F (36.4 °C), temperature source Axillary, resp. rate 18, height 5' 9\" (1.753 m), weight 167 lb (75.8 kg), SpO2 100%.    Temp:  [97.1 °F (36.2 °C)-99.2 °F (37.3 °C)] 97.6 °F (36.4 °C)  Pulse:  [73-85] 73  Resp:  [18-20] 18  BP: (132-155)/(50-67) 132/50  SpO2:  [98 %-100 %] 100 %      Intake/Output:    Intake/Output Summary (Last 24 hours) at 8/10/2024 1316  Last data filed at 8/10/2024 1000  Gross per 24 hour   Intake 250 ml   Output 900 ml   Net -650 ml       Last 3 Weights   07/25/24 1051 167 lb (75.8 kg)   07/12/24 1728 174 lb (78.9 kg)   07/22/24 1108 166 lb (75.3 kg)   03/21/22 0921 188 lb (85.3 kg)       /50 (BP Location: Right arm)   Pulse 73   Temp 97.6 °F (36.4 °C) (Axillary)   Resp 18   Ht 5' 9\" (1.753 m)   Wt 167 lb (75.8 kg)   SpO2 100%   BMI 24.66 kg/m²   GENERAL: no apparent distress  NEURO: lethargic  RESP: non labored, CTA  CARDIO: Regular, no murmur  ABD: soft, NT, ND, BS+  : ileal conduit with blood   EXTREMITIES: no  edema    Data Review:       Labs:     Recent Labs   Lab 08/08/24  0632 08/09/24  0730 08/10/24  0542   RBC 3.61* 3.66* 3.52*   HGB 9.9* 9.9* 9.6*   HCT 30.9* 31.1* 30.6*   MCV 85.6 85.0 86.9   MCH 27.4 27.0 27.3   MCHC 32.0 31.8 31.4   RDW 23.0* 23.2* 22.7*   NEPRELIM 10.50* 13.62* 8.42*   WBC 14.0* 17.6* 12.0*   .0 427.0 418.0         Recent Labs   Lab 08/08/24  0632 08/09/24  0730 08/10/24  0542   * 130* 114*   BUN 39* 36* 33*   CREATSERUM 1.52* 1.54* 1.42*   EGFRCR 45* 44* 49*   CA 8.4* 8.3* 8.0*   * 145 147*   K 4.3 4.3 3.9   * 119* 120*   CO2 17.0* 18.0* 16.0*       Recent Labs   Lab 08/06/24  0602   ALT 37   AST 48*   ALB 2.6*         Imaging:  US VENOUS DOPPLER ARM BILAT - DIAG IMG (CPT=93970)    Result Date: 8/9/2024  CONCLUSION:  1. Nonocclusive superficial venous thrombus in left basilic vein. 2. No right-sided thrombus or DVT bilaterally.    Dictated by (CST): Alberto Woo MD on 8/09/2024 at 5:52 PM     Finalized by (CST): Alberto Woo MD on 8/09/2024 at 5:55 PM          CT ABDOMEN+PELVIS(CPT=74176)    Result Date: 8/9/2024  CONCLUSION:   Right lower quadrant ileal conduit with removal of catheter.  Mild bilateral hydroureteronephrosis is unchanged since the prior exams.  No obstructing calculus.  Mild fat stranding seen adjacent to the ileal conduit as well as mild bilateral perinephric and periureteric fat stranding suggestive of underlying infectious or inflammatory etiology.  Anasarca.  Moderate-sized bilateral pleural effusions with bilateral lower lobe atelectasis is unchanged.  Multiple other incidental findings as described in the body of the report which are unchanged.     Dictated by (CST): Vin Olson MD on 8/09/2024 at 12:31 PM     Finalized by (CST): Vin Olson MD on 8/09/2024 at 12:41 PM          XR CHEST AP PORTABLE  (CPT=71045)    Result Date: 8/9/2024  CONCLUSION:  1. Cardiomegaly.  Tortuous thoracic aorta. 2. Bilateral perihilar and lower lobe mixed  alveolar and interstitial airspace opacification with left basilar pleural reaction.  Transvenous pacing leads unchanged.    Dictated by (CST): Italo Malone MD on 8/09/2024 at 11:23 AM     Finalized by (CST): Italo Malone MD on 8/09/2024 at 11:26 AM             Meds:      sodium bicarbonate  650 mg Oral BID    vancomycin  1,250 mg Intravenous Q24H    QUEtiapine  12.5 mg Oral Once    famotidine  10 mg Oral QOD    QUEtiapine  12.5 mg Oral QPM    melatonin  5 mg Oral Nightly    isosorbide dinitrate  5 mg Oral TID    metoprolol tartrate  25 mg Oral 2x Daily(Beta Blocker)    [Held by provider] apixaban  5 mg Oral BID    atorvastatin  40 mg Oral Daily    hydrALAZINE  10 mg Oral TID         ALPRAZolam    QUEtiapine    sodium chloride 0.9%    acetaminophen    docusate sodium    glucose **OR** glucose **OR** glucose-vitamin C **OR** dextrose **OR** glucose **OR** glucose **OR** glucose-vitamin C    ondansetron

## 2024-08-10 NOTE — PROGRESS NOTES
Patient alert and oriented to self. Lethargic throughout the morning, easy to arouse.  Noted to be more irritable when family is at the bedside or when encouraged to eat. Poor appetite, even with staff feeding him and encouraging him to eat; accu-checks dc'd.. Lu in place and draining dark red/braulio urine, nephro aware, VSS, afebrile. Oral abx started. Call placed to urology regarding urine becoming more bloody, stated to hold anticoagulant if ok with primary. Fall precautions in place.  Call light within reach. Bed alarm & video monitoring on and active. Frequent rounding. Son updated on care of plan. Plan to dc to [facility] pending medical clearance, family choice is [facility].     Problem: Patient Centered Care  Goal: Patient preferences are identified and integrated in the patient's plan of care  Description: Interventions:  - What would you like us to know as we care for you? From home with wife  - Provide timely, complete, and accurate information to patient/family  - Incorporate patient and family knowledge, values, beliefs, and cultural backgrounds into the planning and delivery of care  - Encourage patient/family to participate in care and decision-making at the level they choose  - Honor patient and family perspectives and choices  Outcome: Progressing     Problem: Patient/Family Goals  Goal: Patient/Family Long Term Goal  Description: Patient's Long Term Goal:     Interventions:  -   - See additional Care Plan goals for specific interventions  Outcome: Progressing  Goal: Patient/Family Short Term Goal  Description: Patient's Short Term Goal:     Interventions:   -   - See additional Care Plan goals for specific interventions  Outcome: Progressing     Problem: Impaired Cognition  Goal: Patient will exhibit improved attention, thought processing and/or memory  Description: Interventions:     Outcome: Progressing     Problem: PAIN - ADULT  Goal: Verbalizes/displays adequate comfort level or patient's stated  pain goal  Description: INTERVENTIONS:  - Encourage pt to monitor pain and request assistance  - Assess pain using appropriate pain scale  - Administer analgesics based on type and severity of pain and evaluate response  - Implement non-pharmacological measures as appropriate and evaluate response  - Consider cultural and social influences on pain and pain management  - Manage/alleviate anxiety  - Utilize distraction and/or relaxation techniques  - Monitor for opioid side effects  - Notify MD/LIP if interventions unsuccessful or patient reports new pain  - Anticipate increased pain with activity and pre-medicate as appropriate  Outcome: Progressing     Problem: SAFETY ADULT - FALL  Goal: Free from fall injury  Description: INTERVENTIONS:  - Assess pt frequently for physical needs  - Identify cognitive and physical deficits and behaviors that affect risk of falls.  - [place] fall precautions as indicated by assessment.  - Educate pt/family on patient safety including physical limitations  - Instruct pt to call for assistance with activity based on assessment  - Modify environment to reduce risk of injury  - Provide assistive devices as appropriate  - Consider OT/PT consult to assist with strengthening/mobility  - Encourage toileting schedule  Outcome: Progressing     Problem: DISCHARGE PLANNING  Goal: Discharge to home or other facility with appropriate resources  Description: INTERVENTIONS:  - Identify barriers to discharge w/pt and caregiver  - Include patient/family/discharge partner in discharge planning  - Arrange for needed discharge resources and transportation as appropriate  - Identify discharge learning needs (meds, wound care, etc)  - Arrange for interpreters to assist at discharge as needed  - Consider post-discharge preferences of patient/family/discharge partner  - Complete POLST form as appropriate  - Assess patient's ability to be responsible for managing their own health  - Refer to Case Management  Department for coordinating discharge planning if the patient needs post-hospital services based on physician/LIP order or complex needs related to functional status, cognitive ability or social support system  Outcome: Progressing     Problem: RISK FOR INFECTION - ADULT  Goal: Absence of fever/infection during anticipated neutropenic period  Description: INTERVENTIONS  - Monitor WBC  - Administer growth factors as ordered  - Implement neutropenic guidelines  Outcome: Progressing     Problem: CARDIOVASCULAR - ADULT  Goal: Maintains optimal cardiac output and hemodynamic stability  Description: INTERVENTIONS:  - Monitor vital signs, rhythm, and trends  - Monitor for bleeding, hypotension and signs of decreased cardiac output  - Evaluate effectiveness of vasoactive medications to optimize hemodynamic stability  - Monitor arterial and/or venous puncture sites for bleeding and/or hematoma  - Assess quality of pulses, skin color and temperature  - Assess for signs of decreased coronary artery perfusion - ex. Angina  - Evaluate fluid balance, assess for edema, trend weights  Outcome: Progressing  Goal: Absence of cardiac arrhythmias or at baseline  Description: INTERVENTIONS:  - Continuous cardiac monitoring, monitor vital signs, obtain 12 lead EKG if indicated  - Evaluate effectiveness of antiarrhythmic and heart rate control medications as ordered  - Initiate emergency measures for life threatening arrhythmias  - Monitor electrolytes and administer replacement therapy as ordered  Outcome: Progressing     Problem: GASTROINTESTINAL - ADULT  Goal: Minimal or absence of nausea and vomiting  Description: INTERVENTIONS:  - Maintain adequate hydration with IV or PO as ordered and tolerated  - Nasogastric tube to low intermittent suction as ordered  - Evaluate effectiveness of ordered antiemetic medications  - Provide nonpharmacologic comfort measures as appropriate  - Advance diet as tolerated, if ordered  - Obtain nutritional  consult as needed  - Evaluate fluid balance  Outcome: Progressing  Goal: Maintains or returns to baseline bowel function  Description: INTERVENTIONS:  - Assess bowel function  - Maintain adequate hydration with IV or PO as ordered and tolerated  - Evaluate effectiveness of GI medications  - Encourage mobilization and activity  - Obtain nutritional consult as needed  - Establish a toileting routine/schedule  - Consider collaborating with pharmacy to review patient's medication profile  Outcome: Progressing     Problem: GENITOURINARY - ADULT  Goal: Absence of urinary retention  Description: INTERVENTIONS:  - Assess patient’s ability to void and empty bladder  - Monitor intake/output and perform bladder scan as needed  - Follow urinary retention protocol/standard of care  - Consider collaborating with pharmacy to review patient's medication profile  - Implement strategies to promote bladder emptying  Outcome: Progressing     Problem: METABOLIC/FLUID AND ELECTROLYTES - ADULT  Goal: Electrolytes maintained within normal limits  Description: INTERVENTIONS:  - Monitor labs and rhythm and assess patient for signs and symptoms of electrolyte imbalances  - Administer electrolyte replacement as ordered  - Monitor response to electrolyte replacements, including rhythm and repeat lab results as appropriate  - Fluid restriction as ordered  - Instruct patient on fluid and nutrition restrictions as appropriate  Outcome: Progressing     Problem: SKIN/TISSUE INTEGRITY - ADULT  Goal: Skin integrity remains intact  Description: INTERVENTIONS  - Assess and document risk factors for pressure ulcer development  - Assess and document skin integrity  - Monitor for areas of redness and/or skin breakdown  - Initiate interventions, skin care algorithm/standards of care as needed  Outcome: Progressing  Goal: Incision(s), wounds(s) or drain site(s) healing without S/S of infection  Description: INTERVENTIONS:  - Assess and document risk factors  for pressure ulcer development  - Assess and document skin integrity  - Assess and document dressing/incision, wound bed, drain sites and surrounding tissue  - Implement wound care per orders  - Initiate isolation precautions as appropriate  - Initiate Pressure Ulcer prevention bundle as indicated  Outcome: Progressing     Problem: HEMATOLOGIC - ADULT  Goal: Maintains hematologic stability  Description: INTERVENTIONS  - Assess for signs and symptoms of bleeding or hemorrhage  - Monitor labs and vital signs for trends  - Administer supportive blood products/factors, fluids and medications as ordered and appropriate  - Administer supportive blood products/factors as ordered and appropriate  Outcome: Progressing  Goal: Free from bleeding injury  Description: (Example usage: patient with low platelets)  INTERVENTIONS:  - Avoid intramuscular injections, enemas and rectal medication administration  - Ensure safe mobilization of patient  - Hold pressure on venipuncture sites to achieve adequate hemostasis  - Assess for signs and symptoms of internal bleeding  - Monitor lab trends  - Patient is to report abnormal signs of bleeding to staff  - Avoid use of toothpicks and dental floss  - Use electric shaver for shaving  - Use soft bristle tooth brush  - Limit straining and forceful nose blowing  Outcome: Progressing     Problem: MUSCULOSKELETAL - ADULT  Goal: Return mobility to safest level of function  Description: INTERVENTIONS:  - Assess patient stability and activity tolerance for standing, transferring and ambulating w/ or w/o assistive devices  - Assist with transfers and ambulation using safe patient handling equipment as needed  - Ensure adequate protection for wounds/incisions during mobilization  - Obtain PT/OT consults as needed  - Advance activity as appropriate  - Communicate ordered activity level and limitations with patient/family  Outcome: Progressing  Goal: Maintain proper alignment of affected body  part  Description: INTERVENTIONS:  - Support and protect limb and body alignment per provider's orders  - Instruct and reinforce with patient and family use of appropriate assistive device and precautions (e.g. spinal or hip dislocation precautions)  Outcome: Progressing     Problem: NEUROLOGICAL - ADULT  Goal: Achieves stable or improved neurological status  Description: INTERVENTIONS  - Assess for and report changes in neurological status  - Initiate measures to prevent increased intracranial pressure  - Maintain blood pressure and fluid volume within ordered parameters to optimize cerebral perfusion and minimize risk of hemorrhage  - Monitor temperature, glucose, and sodium. Initiate appropriate interventions as ordered  Outcome: Progressing     Problem: Impaired Communication  Goal: Patient will achieve maximal communication potential  Description: Interventions:     Outcome: Progressing     Problem: Safety Risk - Non-Violent Restraints  Goal: Patient will remain free from self-harm  Description: INTERVENTIONS:  - Apply the least restrictive restraint to prevent harm  - Notify patient and family of reasons restraints applied  - Assess for any contributing factors to confusion (electrolyte disturbances, delirium, medications)  - Discontinue any unnecessary medical devices as soon as possible  - Assess the patient's physical comfort, circulation, skin condition, hydration, nutrition and elimination needs   - Reorient and redirection as needed  - Assess for the need to continue restraints  Outcome: Progressing     Problem: Delirium  Goal: Minimize duration of delirium  Description: Interventions:  - Encourage use of hearing aids, eye glasses  - Promote highest level of mobility daily  - Provide frequent reorientation  - Promote wakefulness i.e. lights on, blinds open  - Promote sleep, encourage patient's normal rest cycle i.e. lights off, TV off, minimize noise and interruptions  - Encourage family to assist in  orientation and promotion of home routines  Outcome: Progressing

## 2024-08-10 NOTE — PROGRESS NOTES
Patient alert and oriented to self. Lethargic throughout the morning, easy to arouse, refusing to take medications.  Poor appetite, even with staff feeding him and encouraging him to eat; Lu in place and draining red urine. IVFs dc'd. VSS, afebrile. IV abx restarted.  Fall precautions in place. Sent for CT abd/pelvis; doppler of pieter UE; blood cultures drawn; ua/ucx from ileal conduit, appliance changed to obtain a clean specimen. Call light within reach. Bed alarm & video monitoring on and active. Frequent rounding.  Plan to dc to [facility] pending medical clearance, family choice is [facility].     Problem: Patient Centered Care  Goal: Patient preferences are identified and integrated in the patient's plan of care  Description: Interventions:  - What would you like us to know as we care for you? From home with wife  - Provide timely, complete, and accurate information to patient/family  - Incorporate patient and family knowledge, values, beliefs, and cultural backgrounds into the planning and delivery of care  - Encourage patient/family to participate in care and decision-making at the level they choose  - Honor patient and family perspectives and choices  Outcome: Progressing     Problem: Patient/Family Goals  Goal: Patient/Family Long Term Goal  Description: Patient's Long Term Goal:     Interventions:  -   - See additional Care Plan goals for specific interventions  Outcome: Progressing  Goal: Patient/Family Short Term Goal  Description: Patient's Short Term Goal:     Interventions:   -   - See additional Care Plan goals for specific interventions  Outcome: Progressing     Problem: Impaired Cognition  Goal: Patient will exhibit improved attention, thought processing and/or memory  Description: Interventions:     Outcome: Progressing     Problem: PAIN - ADULT  Goal: Verbalizes/displays adequate comfort level or patient's stated pain goal  Description: INTERVENTIONS:  - Encourage pt to monitor pain and request  assistance  - Assess pain using appropriate pain scale  - Administer analgesics based on type and severity of pain and evaluate response  - Implement non-pharmacological measures as appropriate and evaluate response  - Consider cultural and social influences on pain and pain management  - Manage/alleviate anxiety  - Utilize distraction and/or relaxation techniques  - Monitor for opioid side effects  - Notify MD/LIP if interventions unsuccessful or patient reports new pain  - Anticipate increased pain with activity and pre-medicate as appropriate  Outcome: Progressing     Problem: SAFETY ADULT - FALL  Goal: Free from fall injury  Description: INTERVENTIONS:  - Assess pt frequently for physical needs  - Identify cognitive and physical deficits and behaviors that affect risk of falls.  - [place] fall precautions as indicated by assessment.  - Educate pt/family on patient safety including physical limitations  - Instruct pt to call for assistance with activity based on assessment  - Modify environment to reduce risk of injury  - Provide assistive devices as appropriate  - Consider OT/PT consult to assist with strengthening/mobility  - Encourage toileting schedule  Outcome: Progressing     Problem: DISCHARGE PLANNING  Goal: Discharge to home or other facility with appropriate resources  Description: INTERVENTIONS:  - Identify barriers to discharge w/pt and caregiver  - Include patient/family/discharge partner in discharge planning  - Arrange for needed discharge resources and transportation as appropriate  - Identify discharge learning needs (meds, wound care, etc)  - Arrange for interpreters to assist at discharge as needed  - Consider post-discharge preferences of patient/family/discharge partner  - Complete POLST form as appropriate  - Assess patient's ability to be responsible for managing their own health  - Refer to Case Management Department for coordinating discharge planning if the patient needs post-hospital  services based on physician/LIP order or complex needs related to functional status, cognitive ability or social support system  Outcome: Progressing     Problem: RISK FOR INFECTION - ADULT  Goal: Absence of fever/infection during anticipated neutropenic period  Description: INTERVENTIONS  - Monitor WBC  - Administer growth factors as ordered  - Implement neutropenic guidelines  Outcome: Progressing     Problem: CARDIOVASCULAR - ADULT  Goal: Maintains optimal cardiac output and hemodynamic stability  Description: INTERVENTIONS:  - Monitor vital signs, rhythm, and trends  - Monitor for bleeding, hypotension and signs of decreased cardiac output  - Evaluate effectiveness of vasoactive medications to optimize hemodynamic stability  - Monitor arterial and/or venous puncture sites for bleeding and/or hematoma  - Assess quality of pulses, skin color and temperature  - Assess for signs of decreased coronary artery perfusion - ex. Angina  - Evaluate fluid balance, assess for edema, trend weights  Outcome: Progressing  Goal: Absence of cardiac arrhythmias or at baseline  Description: INTERVENTIONS:  - Continuous cardiac monitoring, monitor vital signs, obtain 12 lead EKG if indicated  - Evaluate effectiveness of antiarrhythmic and heart rate control medications as ordered  - Initiate emergency measures for life threatening arrhythmias  - Monitor electrolytes and administer replacement therapy as ordered  Outcome: Progressing     Problem: GASTROINTESTINAL - ADULT  Goal: Minimal or absence of nausea and vomiting  Description: INTERVENTIONS:  - Maintain adequate hydration with IV or PO as ordered and tolerated  - Nasogastric tube to low intermittent suction as ordered  - Evaluate effectiveness of ordered antiemetic medications  - Provide nonpharmacologic comfort measures as appropriate  - Advance diet as tolerated, if ordered  - Obtain nutritional consult as needed  - Evaluate fluid balance  Outcome: Progressing  Goal:  Maintains or returns to baseline bowel function  Description: INTERVENTIONS:  - Assess bowel function  - Maintain adequate hydration with IV or PO as ordered and tolerated  - Evaluate effectiveness of GI medications  - Encourage mobilization and activity  - Obtain nutritional consult as needed  - Establish a toileting routine/schedule  - Consider collaborating with pharmacy to review patient's medication profile  Outcome: Progressing     Problem: GENITOURINARY - ADULT  Goal: Absence of urinary retention  Description: INTERVENTIONS:  - Assess patient’s ability to void and empty bladder  - Monitor intake/output and perform bladder scan as needed  - Follow urinary retention protocol/standard of care  - Consider collaborating with pharmacy to review patient's medication profile  - Implement strategies to promote bladder emptying  Outcome: Progressing     Problem: METABOLIC/FLUID AND ELECTROLYTES - ADULT  Goal: Electrolytes maintained within normal limits  Description: INTERVENTIONS:  - Monitor labs and rhythm and assess patient for signs and symptoms of electrolyte imbalances  - Administer electrolyte replacement as ordered  - Monitor response to electrolyte replacements, including rhythm and repeat lab results as appropriate  - Fluid restriction as ordered  - Instruct patient on fluid and nutrition restrictions as appropriate  Outcome: Progressing     Problem: SKIN/TISSUE INTEGRITY - ADULT  Goal: Skin integrity remains intact  Description: INTERVENTIONS  - Assess and document risk factors for pressure ulcer development  - Assess and document skin integrity  - Monitor for areas of redness and/or skin breakdown  - Initiate interventions, skin care algorithm/standards of care as needed  Outcome: Progressing  Goal: Incision(s), wounds(s) or drain site(s) healing without S/S of infection  Description: INTERVENTIONS:  - Assess and document risk factors for pressure ulcer development  - Assess and document skin integrity  -  Assess and document dressing/incision, wound bed, drain sites and surrounding tissue  - Implement wound care per orders  - Initiate isolation precautions as appropriate  - Initiate Pressure Ulcer prevention bundle as indicated  Outcome: Progressing     Problem: HEMATOLOGIC - ADULT  Goal: Maintains hematologic stability  Description: INTERVENTIONS  - Assess for signs and symptoms of bleeding or hemorrhage  - Monitor labs and vital signs for trends  - Administer supportive blood products/factors, fluids and medications as ordered and appropriate  - Administer supportive blood products/factors as ordered and appropriate  Outcome: Progressing  Goal: Free from bleeding injury  Description: (Example usage: patient with low platelets)  INTERVENTIONS:  - Avoid intramuscular injections, enemas and rectal medication administration  - Ensure safe mobilization of patient  - Hold pressure on venipuncture sites to achieve adequate hemostasis  - Assess for signs and symptoms of internal bleeding  - Monitor lab trends  - Patient is to report abnormal signs of bleeding to staff  - Avoid use of toothpicks and dental floss  - Use electric shaver for shaving  - Use soft bristle tooth brush  - Limit straining and forceful nose blowing  Outcome: Progressing     Problem: MUSCULOSKELETAL - ADULT  Goal: Return mobility to safest level of function  Description: INTERVENTIONS:  - Assess patient stability and activity tolerance for standing, transferring and ambulating w/ or w/o assistive devices  - Assist with transfers and ambulation using safe patient handling equipment as needed  - Ensure adequate protection for wounds/incisions during mobilization  - Obtain PT/OT consults as needed  - Advance activity as appropriate  - Communicate ordered activity level and limitations with patient/family  Outcome: Progressing  Goal: Maintain proper alignment of affected body part  Description: INTERVENTIONS:  - Support and protect limb and body alignment  per provider's orders  - Instruct and reinforce with patient and family use of appropriate assistive device and precautions (e.g. spinal or hip dislocation precautions)  Outcome: Progressing     Problem: NEUROLOGICAL - ADULT  Goal: Achieves stable or improved neurological status  Description: INTERVENTIONS  - Assess for and report changes in neurological status  - Initiate measures to prevent increased intracranial pressure  - Maintain blood pressure and fluid volume within ordered parameters to optimize cerebral perfusion and minimize risk of hemorrhage  - Monitor temperature, glucose, and sodium. Initiate appropriate interventions as ordered  Outcome: Progressing     Problem: Impaired Communication  Goal: Patient will achieve maximal communication potential  Description: Interventions:     Outcome: Progressing     Problem: Safety Risk - Non-Violent Restraints  Goal: Patient will remain free from self-harm  Description: INTERVENTIONS:  - Apply the least restrictive restraint to prevent harm  - Notify patient and family of reasons restraints applied  - Assess for any contributing factors to confusion (electrolyte disturbances, delirium, medications)  - Discontinue any unnecessary medical devices as soon as possible  - Assess the patient's physical comfort, circulation, skin condition, hydration, nutrition and elimination needs   - Reorient and redirection as needed  - Assess for the need to continue restraints  Outcome: Progressing     Problem: Delirium  Goal: Minimize duration of delirium  Description: Interventions:  - Encourage use of hearing aids, eye glasses  - Promote highest level of mobility daily  - Provide frequent reorientation  - Promote wakefulness i.e. lights on, blinds open  - Promote sleep, encourage patient's normal rest cycle i.e. lights off, TV off, minimize noise and interruptions  - Encourage family to assist in orientation and promotion of home routines  Outcome: Progressing

## 2024-08-10 NOTE — PROGRESS NOTES
NEPHROLOGY CONSULT NOTE     DATE: 8/4/2024    Requesting Physician: Dr. Mitchell      Reason for Consult: ALBERT      Interval history:   Cr improved today  Poor po intake  Off IVF  Lu in place     HISTORY OF PRESENT ILLNESS: Roc Butcher is an 84 year old male with PMH sig for DM2, HTN, CAD, Afib, systolic heart failure.  Hx of bladder CA and is s/p laparoscopic cystoprostatectomy with ileal conduit diversion on 7/25.  Post op course complicated by fever and leukocytosis. Blood culture positive for enterococcus.  Patient was started on IV antibiotics and ID has been consulted.  Patient also with ALBERT with worsening to 2.27 this AM.  sCr 1.29 on admission. Nephrology is consulted for ALBERT.  Patient currently denies SOB or n/v. Appetite has been decreased, but he is drinking fluids.        MEDICAL HISTORY:   Past Medical History:    Arrhythmia    Cancer (HCC)    bladder    Cataract    High blood pressure    High cholesterol    History of blood transfusion    HYPERLIPIDEMIA    HYPERTENSION    Other and unspecified hyperlipidemia    STROKE    Type II or unspecified type diabetes mellitus without mention of complication, not stated as uncontrolled    Unspecified essential hypertension    Visual impairment       SURGICAL HISTORY:   Past Surgical History:   Procedure Laterality Date    Cardiac pacemaker placement      Other surgical history      bladder tumor removal    Tonsillectomy         FAMILY HISTORY:   Family History   Problem Relation Age of Onset    Heart Disorder Father     Other Father         HIP FX    Heart Disorder Mother     Other Mother         CVA AGE 80       SOCIAL HISTORY:   Social History     Socioeconomic History    Marital status:      Spouse name: Not on file    Number of children: Not on file    Years of education: Not on file    Highest education level: Not on file   Occupational History    Not on file   Tobacco Use    Smoking status: Former     Types: Cigarettes    Smokeless tobacco:  Never    Tobacco comments:     2010   Vaping Use    Vaping status: Never Used   Substance and Sexual Activity    Alcohol use: Yes     Comment: HEAVY PREVIOUS NOW SOCIAL    Drug use: No    Sexual activity: Not on file   Other Topics Concern    Not on file   Social History Narrative    Not on file     Social Determinants of Health     Financial Resource Strain: Low Risk  (5/30/2024)    Received from [facility]    Financial Resource Strain     In the past year, have you or any family members you live with been unable to get any of the following when it was really needed? Check all that apply.: None   Food Insecurity: No Food Insecurity (7/25/2024)    Food Insecurity     Food Insecurity: Never true   Transportation Needs: No Transportation Needs (7/25/2024)    Transportation Needs     Lack of Transportation: No     Car Seat: Not on file   Physical Activity: High Risk (5/3/2024)    Received from [facility]    Exercise Vital Sign     On average, how many days per week do you engage in moderate to strenuous exercise (like a brisk walk)?: 0 days     On average, how many minutes do you engage in exercise at this level?: 0 min   Stress: Not on file   Social Connections: Medium Risk (5/30/2024)    Received from [facility]    Social Connections     How often do you see or talk to people that you care about and feel close to? (For example: talking to friends on the phone, visiting friends or family, going to Buddhist or club meetings): 1 or 2 times a week   Housing Stability: Low Risk  (7/25/2024)    Housing Stability     Housing Instability: No     Housing Instability Emergency: Not on file     Crib or Bassinette: Not on file       MEDICATIONS:   Current Facility-Administered Medications   Medication Dose Route Frequency    vancomycin (Vancocin) 1.25 g in sodium chloride 0.9% 250mL IVPB premix  1,250 mg Intravenous Q24H    ALPRAZolam (Xanax) tab 0.25 mg  0.25 mg Oral Nightly PRN    QUEtiapine  (SEROquel) tab 12.5 mg  12.5 mg Oral Once    famotidine (Pepcid) tab 10 mg  10 mg Oral QOD    QUEtiapine (SEROquel) tab 12.5 mg  12.5 mg Oral QPM    QUEtiapine (SEROquel) tab 12.5 mg  12.5 mg Oral Nightly PRN    sodium chloride 0.9% 0.9% flush injection 10 mL  10 mL Intravenous PRN    acetaminophen (Tylenol) tab 650 mg  650 mg Oral Q8H PRN    melatonin cap/tab 5 mg  5 mg Oral Nightly    isosorbide dinitrate (Isordil Titradose) tab 5 mg  5 mg Oral TID    metoprolol tartrate (Lopressor) tab 25 mg  25 mg Oral 2x Daily(Beta Blocker)    docusate sodium (Colace) cap 100 mg  100 mg Oral BID PRN    [Held by provider] apixaban (Eliquis) tab 5 mg  5 mg Oral BID    atorvastatin (Lipitor) tab 40 mg  40 mg Oral Daily    hydrALAZINE (Apresoline) tab 10 mg  10 mg Oral TID    glucose (Dex4) 15 GM/59ML oral liquid 15 g  15 g Oral Q15 Min PRN    Or    glucose (Glutose) 40% oral gel 15 g  15 g Oral Q15 Min PRN    Or    glucose-vitamin C (Dex-4) chewable tab 4 tablet  4 tablet Oral Q15 Min PRN    Or    dextrose 50% injection 50 mL  50 mL Intravenous Q15 Min PRN    Or    glucose (Dex4) 15 GM/59ML oral liquid 30 g  30 g Oral Q15 Min PRN    Or    glucose (Glutose) 40% oral gel 30 g  30 g Oral Q15 Min PRN    Or    glucose-vitamin C (Dex-4) chewable tab 8 tablet  8 tablet Oral Q15 Min PRN    ondansetron (Zofran) 4 MG/2ML injection 4 mg  4 mg Intravenous Q6H PRN         ALLERGIES:   Allergies   Allergen Reactions    Metformin Hcl OTHER (SEE COMMENTS)      Oral,   severe dry mouth       REVIEW OF SYSTEMS:  A comprehensive review of systems was conducted and is negative except for what is mentioned in the HPI      PHYSICAL EXAM:  /50 (BP Location: Right arm)   Pulse 73   Temp 97.6 °F (36.4 °C) (Axillary)   Resp 18   Ht 5' 9\" (1.753 m)   Wt 167 lb (75.8 kg)   SpO2 100%   BMI 24.66 kg/m²   GEN:  NAD  HEENT: NCAT, dry MM   CHEST: CTA b/l  CARDIAC: S1S2 normal  ABD: soft, NT/ND  : urostomy - hematuria noted   EXT: no lower ext  edema  NEURO: sleepy       LABS:  Recent Labs   Lab 08/08/24  0632 08/09/24  0730 08/10/24  0542   * 130* 114*   BUN 39* 36* 33*   CREATSERUM 1.52* 1.54* 1.42*   EGFRCR 45* 44* 49*   CA 8.4* 8.3* 8.0*   * 145 147*   K 4.3 4.3 3.9   * 119* 120*   CO2 17.0* 18.0* 16.0*     Recent Labs   Lab 08/08/24  0632 08/09/24  0730 08/10/24  0542   RBC 3.61* 3.66* 3.52*   HGB 9.9* 9.9* 9.6*   HCT 30.9* 31.1* 30.6*   MCV 85.6 85.0 86.9   MCH 27.4 27.0 27.3   MCHC 32.0 31.8 31.4   RDW 23.0* 23.2* 22.7*   NEPRELIM 10.50* 13.62* 8.42*   WBC 14.0* 17.6* 12.0*   .0 427.0 418.0           INTAKE/OUTPUT:    Intake/Output Summary (Last 24 hours) at 8/10/2024 1251  Last data filed at 8/10/2024 1000  Gross per 24 hour   Intake 250 ml   Output 900 ml   Net -650 ml             IMAGING:  US VENOUS DOPPLER ARM BILAT - DIAG IMG (CPT=93970)    Result Date: 8/9/2024  CONCLUSION:  1. Nonocclusive superficial venous thrombus in left basilic vein. 2. No right-sided thrombus or DVT bilaterally.    Dictated by (CST): Alberto Woo MD on 8/09/2024 at 5:52 PM     Finalized by (CST): Alberto Woo MD on 8/09/2024 at 5:55 PM          CT ABDOMEN+PELVIS(CPT=74176)    Result Date: 8/9/2024  CONCLUSION:   Right lower quadrant ileal conduit with removal of catheter.  Mild bilateral hydroureteronephrosis is unchanged since the prior exams.  No obstructing calculus.  Mild fat stranding seen adjacent to the ileal conduit as well as mild bilateral perinephric and periureteric fat stranding suggestive of underlying infectious or inflammatory etiology.  Anasarca.  Moderate-sized bilateral pleural effusions with bilateral lower lobe atelectasis is unchanged.  Multiple other incidental findings as described in the body of the report which are unchanged.     Dictated by (CST): Vin Olson MD on 8/09/2024 at 12:31 PM     Finalized by (CST): Vin Olson MD on 8/09/2024 at 12:41 PM          XR CHEST AP PORTABLE  (CPT=71045)    Result Date:  8/9/2024  CONCLUSION:  1. Cardiomegaly.  Tortuous thoracic aorta. 2. Bilateral perihilar and lower lobe mixed alveolar and interstitial airspace opacification with left basilar pleural reaction.  Transvenous pacing leads unchanged.    Dictated by (CST): Italo Malone MD on 8/09/2024 at 11:23 AM     Finalized by (CST): Italo Malone MD on 8/09/2024 at 11:26 AM              ASSESSMENT AND PLAN:   This is an 84 year old male with PMH sig for DM2, HTN, CAD, Afib, systolic heart failure.  Hx of bladder CA and is s/p laparoscopic cystoprostatectomy with ileal conduit diversion on 7/25. Post op course complicated by enterococcus bacteremia. Nephrology is consulted for ALBERT.     Non-oliguric ALBERT   - sCr 1.29 on admission  - Urine Na 43   - etiology may be pre-renal ALBERT vs ATN in the setting of infection  - sCr 1.54 today -appears to have plateued and now improving renal function  - stopped IVFs given mod effusion and ansarca on CT chest. Push po hydration.  - hold lasix, monitor volume status  - continue supportive care    - renally dose meds   - avoid nephro toxins  - follow renal fxn and I/Os  - no acute indication for RRT      Hypernatremia  -switched IVF to 0.45 NS   -now off IVF d/t effusion, anasarca  -push po hydration    Acidosis   - likely due to ALBERT and ileal conduit   -IVF as above  -start na bicarb tabs BID   - monitor      HTN    - Metoprolol, hydralazine      Fever, leukocytosis   Enterococcus bacteremia  - management and abx per primary / ID    -sp TITA    Bladder CA s/p laparoscopic cystoprostatectomy with ileal conduit diversion 7/25  Mild hydronephrosis   - per Urology     Dw RN     Thank you for the consult and allowing us to participate in the care of Roc HIDALGO Dalebertram.  We will continue to follow.    Jael George MD  [facility]  Nephrology

## 2024-08-10 NOTE — PLAN OF CARE
Alert to self. Irritable and agitated but able to verbally deescalate. Drowsy dozing on and off most of the shift but easily arousable. Up to chair most of afternoon, transfers with ceiling lift. Ileal conduit with blood-tinged urine draining. Loose incontinent BM this shift. Poor oral intake. Vanco per orders. Repositioned every 2 hours, heels offloaded. Bed in lowest position, call light in reach, frequent rounding, video monitoring, room near nurses station, nonskid footwear, fall precautions in place. Plan to discharge to rehab when medically cleared. Family at bedside and updated on plan of care.

## 2024-08-11 LAB
ANION GAP SERPL CALC-SCNC: 11 MMOL/L (ref 0–18)
BASOPHILS # BLD: 0 X10(3) UL (ref 0–0.2)
BASOPHILS NFR BLD: 0 %
BUN BLD-MCNC: 28 MG/DL (ref 9–23)
BUN/CREAT SERPL: 21.5 (ref 10–20)
CALCIUM BLD-MCNC: 8 MG/DL (ref 8.7–10.4)
CHLORIDE SERPL-SCNC: 121 MMOL/L (ref 98–112)
CO2 SERPL-SCNC: 16 MMOL/L (ref 21–32)
CREAT BLD-MCNC: 1.3 MG/DL
DEPRECATED RDW RBC AUTO: 75.6 FL (ref 35.1–46.3)
EGFRCR SERPLBLD CKD-EPI 2021: 54 ML/MIN/1.73M2 (ref 60–?)
EOSINOPHIL # BLD: 0.28 X10(3) UL (ref 0–0.7)
EOSINOPHIL NFR BLD: 3 %
ERYTHROCYTE [DISTWIDTH] IN BLOOD BY AUTOMATED COUNT: 23.5 % (ref 11–15)
GLUCOSE BLD-MCNC: 144 MG/DL (ref 70–99)
HCT VFR BLD AUTO: 30.6 %
HGB BLD-MCNC: 9.2 G/DL
LYMPHOCYTES NFR BLD: 0.18 X10(3) UL (ref 1–4)
LYMPHOCYTES NFR BLD: 2 %
MCH RBC QN AUTO: 26.7 PG (ref 26–34)
MCHC RBC AUTO-ENTMCNC: 30.1 G/DL (ref 31–37)
MCV RBC AUTO: 89 FL
MONOCYTES # BLD: 0.18 X10(3) UL (ref 0.1–1)
MONOCYTES NFR BLD: 2 %
NEUTROPHILS # BLD AUTO: 6.2 X10 (3) UL (ref 1.5–7.7)
NEUTROPHILS NFR BLD: 93 %
NEUTS HYPERSEG # BLD: 8.56 X10(3) UL (ref 1.5–7.7)
OSMOLALITY SERPL CALC.SUM OF ELEC: 314 MOSM/KG (ref 275–295)
PLATELET # BLD AUTO: 356 10(3)UL (ref 150–450)
PLATELET MORPHOLOGY: NORMAL
POTASSIUM SERPL-SCNC: 3.9 MMOL/L (ref 3.5–5.1)
RBC # BLD AUTO: 3.44 X10(6)UL
SODIUM SERPL-SCNC: 148 MMOL/L (ref 136–145)
TOTAL CELLS COUNTED BLD: 100
WBC # BLD AUTO: 9.2 X10(3) UL (ref 4–11)

## 2024-08-11 RX ORDER — FAMOTIDINE 20 MG/1
20 TABLET, FILM COATED ORAL DAILY
Status: DISCONTINUED | OUTPATIENT
Start: 2024-08-12 | End: 2024-08-13

## 2024-08-11 RX ORDER — AMOXICILLIN AND CLAVULANATE POTASSIUM 875; 125 MG/1; MG/1
875 TABLET, FILM COATED ORAL EVERY 12 HOURS SCHEDULED
Status: DISCONTINUED | OUTPATIENT
Start: 2024-08-12 | End: 2024-08-13

## 2024-08-11 NOTE — PLAN OF CARE
A/O x 1. Cooperative with staff this shift, not agitated. Sat on side of the bed today with PT, did not want to sit in chair. Appetite improving. Ileal conduit with braulio/brown output, significant sediment observed. Repositioned every 2 hours, heels offloaded. Family at bedside and updated on plan of care. Bed in lowest position, call light in reach, frequent rounding, nonskid footwear, [place] monitoring, room near nurses station, fall precautions in place.

## 2024-08-11 NOTE — PROGRESS NOTES
NEPHROLOGY CONSULT NOTE     DATE: 8/4/2024    Requesting Physician: Dr. Mitchell      Reason for Consult: ALBERT      Interval history:   Cr improved today  Eating more per RN  Off IVF  Lu in place     HISTORY OF PRESENT ILLNESS: Roc Butcher is an 84 year old male with PMH sig for DM2, HTN, CAD, Afib, systolic heart failure.  Hx of bladder CA and is s/p laparoscopic cystoprostatectomy with ileal conduit diversion on 7/25.  Post op course complicated by fever and leukocytosis. Blood culture positive for enterococcus.  Patient was started on IV antibiotics and ID has been consulted.  Patient also with ALBERT with worsening to 2.27 this AM.  sCr 1.29 on admission. Nephrology is consulted for ALBERT.  Patient currently denies SOB or n/v. Appetite has been decreased, but he is drinking fluids.        MEDICAL HISTORY:   Past Medical History:    Arrhythmia    Cancer (HCC)    bladder    Cataract    High blood pressure    High cholesterol    History of blood transfusion    HYPERLIPIDEMIA    HYPERTENSION    Other and unspecified hyperlipidemia    STROKE    Type II or unspecified type diabetes mellitus without mention of complication, not stated as uncontrolled    Unspecified essential hypertension    Visual impairment       SURGICAL HISTORY:   Past Surgical History:   Procedure Laterality Date    Cardiac pacemaker placement      Other surgical history      bladder tumor removal    Tonsillectomy         FAMILY HISTORY:   Family History   Problem Relation Age of Onset    Heart Disorder Father     Other Father         HIP FX    Heart Disorder Mother     Other Mother         CVA AGE 80       SOCIAL HISTORY:   Social History     Socioeconomic History    Marital status:      Spouse name: Not on file    Number of children: Not on file    Years of education: Not on file    Highest education level: Not on file   Occupational History    Not on file   Tobacco Use    Smoking status: Former     Types: Cigarettes    Smokeless  tobacco: Never    Tobacco comments:     2010   Vaping Use    Vaping status: Never Used   Substance and Sexual Activity    Alcohol use: Yes     Comment: HEAVY PREVIOUS NOW SOCIAL    Drug use: No    Sexual activity: Not on file   Other Topics Concern    Not on file   Social History Narrative    Not on file     Social Determinants of Health     Financial Resource Strain: Low Risk  (5/30/2024)    Received from [facility]    Financial Resource Strain     In the past year, have you or any family members you live with been unable to get any of the following when it was really needed? Check all that apply.: None   Food Insecurity: No Food Insecurity (7/25/2024)    Food Insecurity     Food Insecurity: Never true   Transportation Needs: No Transportation Needs (7/25/2024)    Transportation Needs     Lack of Transportation: No     Car Seat: Not on file   Physical Activity: High Risk (5/3/2024)    Received from [facility]    Exercise Vital Sign     On average, how many days per week do you engage in moderate to strenuous exercise (like a brisk walk)?: 0 days     On average, how many minutes do you engage in exercise at this level?: 0 min   Stress: Not on file   Social Connections: Medium Risk (5/30/2024)    Received from [facility]    Social Connections     How often do you see or talk to people that you care about and feel close to? (For example: talking to friends on the phone, visiting friends or family, going to Gnostic[employer] or club meetings): 1 or 2 times a week   Housing Stability: Low Risk  (7/25/2024)    Housing Stability     Housing Instability: No     Housing Instability Emergency: Not on file     Crib or Bassinette: Not on file       MEDICATIONS:   Current Facility-Administered Medications   Medication Dose Route Frequency    sodium bicarbonate tab 650 mg  650 mg Oral BID    vancomycin (Vancocin) 1.25 g in sodium chloride 0.9% 250mL IVPB premix  1,250 mg Intravenous Q24H    ALPRAZolam  (Xanax) tab 0.25 mg  0.25 mg Oral Nightly PRN    QUEtiapine (SEROquel) tab 12.5 mg  12.5 mg Oral Once    famotidine (Pepcid) tab 10 mg  10 mg Oral QOD    QUEtiapine (SEROquel) tab 12.5 mg  12.5 mg Oral QPM    QUEtiapine (SEROquel) tab 12.5 mg  12.5 mg Oral Nightly PRN    sodium chloride 0.9% 0.9% flush injection 10 mL  10 mL Intravenous PRN    acetaminophen (Tylenol) tab 650 mg  650 mg Oral Q8H PRN    melatonin cap/tab 5 mg  5 mg Oral Nightly    isosorbide dinitrate (Isordil Titradose) tab 5 mg  5 mg Oral TID    metoprolol tartrate (Lopressor) tab 25 mg  25 mg Oral 2x Daily(Beta Blocker)    docusate sodium (Colace) cap 100 mg  100 mg Oral BID PRN    [Held by provider] apixaban (Eliquis) tab 5 mg  5 mg Oral BID    atorvastatin (Lipitor) tab 40 mg  40 mg Oral Daily    hydrALAZINE (Apresoline) tab 10 mg  10 mg Oral TID    glucose (Dex4) 15 GM/59ML oral liquid 15 g  15 g Oral Q15 Min PRN    Or    glucose (Glutose) 40% oral gel 15 g  15 g Oral Q15 Min PRN    Or    glucose-vitamin C (Dex-4) chewable tab 4 tablet  4 tablet Oral Q15 Min PRN    Or    dextrose 50% injection 50 mL  50 mL Intravenous Q15 Min PRN    Or    glucose (Dex4) 15 GM/59ML oral liquid 30 g  30 g Oral Q15 Min PRN    Or    glucose (Glutose) 40% oral gel 30 g  30 g Oral Q15 Min PRN    Or    glucose-vitamin C (Dex-4) chewable tab 8 tablet  8 tablet Oral Q15 Min PRN    ondansetron (Zofran) 4 MG/2ML injection 4 mg  4 mg Intravenous Q6H PRN         ALLERGIES:   Allergies   Allergen Reactions    Metformin Hcl OTHER (SEE COMMENTS)      Oral,   severe dry mouth       REVIEW OF SYSTEMS:  A comprehensive review of systems was conducted and is negative except for what is mentioned in the HPI      PHYSICAL EXAM:  BP (!) 129/97 (BP Location: Right arm)   Pulse 73   Temp 98.6 °F (37 °C) (Axillary)   Resp 16   Ht 5' 9\" (1.753 m)   Wt 167 lb (75.8 kg)   SpO2 99%   BMI 24.66 kg/m²   GEN:  NAD  HEENT: NCAT, dry MM   CHEST: CTA b/l  CARDIAC: S1S2 normal  ABD: soft,  NT/ND  : urostomy - hematuria noted   EXT: no lower ext edema  NEURO: sleepy       LABS:  Recent Labs   Lab 08/09/24  0730 08/10/24  0542 08/11/24  0546   * 114* 144*   BUN 36* 33* 28*   CREATSERUM 1.54* 1.42* 1.30   EGFRCR 44* 49* 54*   CA 8.3* 8.0* 8.0*    147* 148*   K 4.3 3.9 3.9   * 120* 121*   CO2 18.0* 16.0* 16.0*     Recent Labs   Lab 08/09/24  0730 08/10/24  0542 08/11/24  0550   RBC 3.66* 3.52* 3.44*   HGB 9.9* 9.6* 9.2*   HCT 31.1* 30.6* 30.6*   MCV 85.0 86.9 89.0   MCH 27.0 27.3 26.7   MCHC 31.8 31.4 30.1*   RDW 23.2* 22.7* 23.5*   NEPRELIM 13.62* 8.42* 6.20   WBC 17.6* 12.0* 9.2   .0 418.0 356.0           INTAKE/OUTPUT:    Intake/Output Summary (Last 24 hours) at 8/11/2024 1449  Last data filed at 8/11/2024 1100  Gross per 24 hour   Intake 360 ml   Output 775 ml   Net -415 ml             IMAGING:  US VENOUS DOPPLER ARM BILAT - DIAG IMG (CPT=93970)    Result Date: 8/9/2024  CONCLUSION:  1. Nonocclusive superficial venous thrombus in left basilic vein. 2. No right-sided thrombus or DVT bilaterally.    Dictated by (CST): Alberto Woo MD on 8/09/2024 at 5:52 PM     Finalized by (CST): Alberto Woo MD on 8/09/2024 at 5:55 PM              ASSESSMENT AND PLAN:   This is an 84 year old male with PMH sig for DM2, HTN, CAD, Afib, systolic heart failure.  Hx of bladder CA and is s/p laparoscopic cystoprostatectomy with ileal conduit diversion on 7/25. Post op course complicated by enterococcus bacteremia. Nephrology is consulted for ALBERT.     Non-oliguric ALBERT   - sCr 1.29 on admission  - Urine Na 43   - etiology may be pre-renal ALBERT vs ATN in the setting of infection  - sCr 1.3 today -appears to have plateued and now improving renal function  - stopped IVFs given mod effusion and ansarca on CT chest. Push po hydration given hypernatremia.  - hold lasix, monitor volume status  - continue supportive care    - renally dose meds   - avoid nephro toxins  - follow renal fxn and I/Os  - no  acute indication for RRT      Hypernatremia  -switched IVF to 0.45 NS  -now off   -now off IVF d/t effusion, anasarca  -push po hydration    Acidosis   - likely due to ALBERT and ileal conduit   -IVF as above  -cont na bicarb tabs BID   - monitor      HTN    - Metoprolol, hydralazine      Fever, leukocytosis   Enterococcus bacteremia  - management and abx per primary / ID    -sp TITA    Bladder CA s/p laparoscopic cystoprostatectomy with ileal conduit diversion 7/25  Mild hydronephrosis   - per Urology     Dw RN     Thank you for the consult and allowing us to participate in the care of Roc HIDALGO Guadalupe.  We will continue to follow.    Jael Goerge MD  [facility]  Nephrology

## 2024-08-11 NOTE — PROGRESS NOTES
[facility]  part of [facility]     [facility] Consult    Roc Butcher Patient Status:  Inpatient    1940 MRN [id number]   Location [facility] 4W/SW/SE Attending Pearl Love MD   Hosp Day # 17 PCP MD Roc Crum seen and examined,   Afebrile,   Previous entries noted  Much alert today     History:  Past Medical History:    Arrhythmia    Cancer (HCC)    bladder    Cataract    High blood pressure    High cholesterol    History of blood transfusion    HYPERLIPIDEMIA    HYPERTENSION    Other and unspecified hyperlipidemia    STROKE    Type II or unspecified type diabetes mellitus without mention of complication, not stated as uncontrolled    Unspecified essential hypertension    Visual impairment     Past Surgical History:   Procedure Laterality Date    Cardiac pacemaker placement      Other surgical history      bladder tumor removal    Tonsillectomy       Family History   Problem Relation Age of Onset    Heart Disorder Father     Other Father         HIP FX    Heart Disorder Mother     Other Mother         CVA AGE 80      reports that he has quit smoking. His smoking use included cigarettes. He has never used smokeless tobacco. He reports current alcohol use. He reports that he does not use drugs.    Allergies:  Allergies   Allergen Reactions    Metformin Hcl OTHER (SEE COMMENTS)      Oral,   severe dry mouth       Medications:    Current Facility-Administered Medications:     sodium bicarbonate tab 650 mg, 650 mg, Oral, BID    vancomycin (Vancocin) 1.25 g in sodium chloride 0.9% 250mL IVPB premix, 1,250 mg, Intravenous, Q24H    ALPRAZolam (Xanax) tab 0.25 mg, 0.25 mg, Oral, Nightly PRN    QUEtiapine (SEROquel) tab 12.5 mg, 12.5 mg, Oral, Once    famotidine (Pepcid) tab 10 mg, 10 mg, Oral, QOD **OR** [DISCONTINUED] famotidine (Pepcid) 20 mg/2mL injection 20 mg, 20 mg, Intravenous, BID    QUEtiapine (SEROquel) tab 12.5 mg, 12.5  mg, Oral, QPM    QUEtiapine (SEROquel) tab 12.5 mg, 12.5 mg, Oral, Nightly PRN    sodium chloride 0.9% 0.9% flush injection 10 mL, 10 mL, Intravenous, PRN    acetaminophen (Tylenol) tab 650 mg, 650 mg, Oral, Q8H PRN    melatonin cap/tab 5 mg, 5 mg, Oral, Nightly    isosorbide dinitrate (Isordil Titradose) tab 5 mg, 5 mg, Oral, TID    metoprolol tartrate (Lopressor) tab 25 mg, 25 mg, Oral, 2x Daily(Beta Blocker)    docusate sodium (Colace) cap 100 mg, 100 mg, Oral, BID PRN    [Held by provider] apixaban (Eliquis) tab 5 mg, 5 mg, Oral, BID    atorvastatin (Lipitor) tab 40 mg, 40 mg, Oral, Daily    hydrALAZINE (Apresoline) tab 10 mg, 10 mg, Oral, TID    glucose (Dex4) 15 GM/59ML oral liquid 15 g, 15 g, Oral, Q15 Min PRN **OR** glucose (Glutose) 40% oral gel 15 g, 15 g, Oral, Q15 Min PRN **OR** glucose-vitamin C (Dex-4) chewable tab 4 tablet, 4 tablet, Oral, Q15 Min PRN **OR** dextrose 50% injection 50 mL, 50 mL, Intravenous, Q15 Min PRN **OR** glucose (Dex4) 15 GM/59ML oral liquid 30 g, 30 g, Oral, Q15 Min PRN **OR** glucose (Glutose) 40% oral gel 30 g, 30 g, Oral, Q15 Min PRN **OR** glucose-vitamin C (Dex-4) chewable tab 8 tablet, 8 tablet, Oral, Q15 Min PRN    ondansetron (Zofran) 4 MG/2ML injection 4 mg, 4 mg, Intravenous, Q6H PRN    Review of Systems:   Constitutional: Negative for anorexia, chills, fatigue, fevers, malaise, night sweats and weight loss.  Eyes: Negative for visual disturbance, irritation and redness.  Ears, nose, mouth, throat, and face: Negative for hearing loss, tinnitus, nasal congestion, snoring, sore throat, hoarseness and voice change.  Respiratory: Negative for cough, sputum, hemoptysis, chest pain, wheezing, dyspnea on exertion, or stridor.  Cardiovascular: Negative for chest pain, palpitations, irregular heart beats, syncope, fatigue, orthopnea, paroxysmal nocturnal dyspnea, lower extremity edema.  Gastrointestinal: Negative for dysphagia, odynophagia, reflux symptoms, nausea, vomiting,  change in bowel habits, diarrhea, constipation and abdominal pain.  Integument/breast: Negative for rash, skin lesions, and pruritus.  Hematologic/lymphatic: Negative for easy bruising, bleeding, and lymphadenopathy.  Musculoskeletal: Negative for myalgias, arthralgias, muscle weakness.  Neurological: Negative for headaches, dizziness, seizures, memory problems, trouble swallowing, speech problems, gait problems and weakness.  Behavioral/Psych: Negative for active tobacco use.  Endocrine: No history of of diabetes, thyroid disorder.  All other review of systems are negative.    Vital signs in last 24 hours:  Patient Vitals for the past 24 hrs:   BP Temp Temp src Pulse Resp SpO2   08/07/24 1053 149/56 97.3 °F (36.3 °C) Axillary 93 18 97 %   08/07/24 0417 142/57 98.1 °F (36.7 °C) Axillary 76 16 97 %   08/06/24 1942 141/49 98.7 °F (37.1 °C) Axillary 71 18 98 %   08/06/24 1714 151/58 98.7 °F (37.1 °C) Axillary 81 16 98 %   08/06/24 1351 136/40 -- -- -- -- --       Physical Exam:   General: alert, cooperative, oriented.  No respiratory distress.   Head: Normocephalic, without obvious abnormality, atraumatic.   Eyes: Conjunctivae/corneas clear.     Nose: Nares normal.   Throat: Lips, mucosa, and tongue normal.  No thrush noted.   Neck: Soft, supple neck; trachea midline,    Lungs: CTAB, normal and equal bilateral chest rise   Chest wall: No tenderness or deformity.   Heart: Regular rate and rhythm, normal S1S2, no murmur.   Abdomen: soft, non-tender, non-distended, positive BS.   Extremity: no edema, no cyanosis   Skin: No rashes or lesions.   Neurological: Alert, interactive, no focal deficits    Labs:  Lab Results   Component Value Date    WBC 9.2 08/11/2024    HGB 9.2 08/11/2024    HCT 30.6 08/11/2024    .0 08/11/2024    CREATSERUM 1.30 08/11/2024    BUN 28 08/11/2024     08/11/2024    K 3.9 08/11/2024     08/11/2024    CO2 16.0 08/11/2024     08/11/2024    CA 8.0 08/11/2024        Radiology:  CT 8/09  Right lower quadrant ileal conduit with removal of catheter.    Mild bilateral hydroureteronephrosis is unchanged since the prior exams.  No obstructing calculus.    Mild fat stranding seen adjacent to the ileal conduit as well as mild bilateral perinephric and periureteric fat stranding suggestive of underlying infectious or inflammatory etiology.    Anasarca.    Moderate-sized bilateral pleural effusions with bilateral lower lobe atelectasis is unchanged.    Multiple other incidental findings as described in the body of the report which are unchanged.       Cultures:  Reviewed,     Assessment and Plan:    1.  Post-op sepsis following Cystoprostatectomy on 7/25/24  - S/P robotic assisted lap radical cystoprostatectomy, Bilat Pelvic LN dissection, ileal conduit diversion, 7/25/24  - Post-op course complicated by hospital delirium, ALBERT, blood cul with Enterococcus Fecalis in 2/2 bottles,   - Blood cul with Enterococcus Faecalis in 2/2 bottles on 8/03, repeats are negative on 8/04,   - UA bland, Cul with Enterococcus Faecalis too,   - + PPM in place, seeding risk is there,   - TTE with no vegetation, TITA negative too,   - Repeat CT with mild fat stranding adjacent to the conduit as well as mild bilateral perinephric and periureteral fat stranding, bilateral lower lobe atelectasis,   - On IV Vanc, 8/04- 8/07 then PO Augmentin, 8/08-8/09 will restart IV Vanc, 8/09- present,      2.  AMS with severe delirium: Overall improving,aspiration precautions,      3.  Multifactorial leukocytosis due to persistent infection, LUE phlebitis- improving,   - Dopplers with superficial clot,   - Blood cul are NGTD,  - On IV Vanc again, pharm to dose,       4.  Disposition - inpatient. An elderly male with Enterococcal Sepsis starting post operatively, likely  source, TTE and TITA noted, later also developed thrombophlebitis of LUE,   - DC IV Vanc after today dosage, Will start PO Augmentin through 8/17/24,    - Aspiration precautions,   - Supportive care,     Discussed with patient, RN, all questions answered, ID will sign off, Please call if any questions,   Thank you for consulting [facility] ID for Roc Butcher.  If you have any questions or concerns please call [facility] Infectious Disease at [phone].     John Johnson MD  8/7/2024  1:47 PM

## 2024-08-11 NOTE — PHYSICAL THERAPY NOTE
PHYSICAL THERAPY TREATMENT NOTE - INPATIENT     Room Number: 454/454-A       Presenting Problem: Bladder CA, (s/p laposcopitc prostatectomy)  Co-Morbidities : CAD, DM, bladder CA    Problem List  Active Problems:    Preop testing    Bladder cancer (HCC)    Delirium due to another medical condition    Episodic mood disorder (HCC)      PHYSICAL THERAPY ASSESSMENT   Patient demonstrates fair progress this session, goals  remain in progress.      Patient is requiring minimal assist and moderate assist as a result of the following impairments: decreased functional strength, pain, impaired sitting balance, and decreased muscular endurance.     Patient continues to function below baseline with bed mobility, transfers, and gait.  Next session anticipate patient to progress bed mobility and transfers.  Physical Therapy will continue to follow patient for duration of hospitalization.    Patient continues to benefit from continued skilled PT services: to promote return to prior level of function and safety with continuous assistance and gradual rehabilitative therapy .    PLAN  PT Treatment Plan: Bed mobility;Body mechanics;Endurance;Energy conservation;Patient education;Gait training;Strengthening;Transfer training;Balance training  Frequency (Obs): 3-5x/week    SUBJECTIVE  I can do that    OBJECTIVE  Precautions: Bed/chair alarm    WEIGHT BEARING RESTRICTION                PAIN ASSESSMENT   Rating: Unable to rate  Location: touched lower abdomen a couple times during the session  Management Techniques: Activity promotion;Body mechanics;Breathing techniques;Relaxation;Repositioning    BALANCE  Static Sitting: Fair -  Dynamic Sitting: Poor +  Static Standing: Not tested  Dynamic Standing: Not tested    ACTIVITY TOLERANCE                          O2 WALK       AM-PAC '6-Clicks' INPATIENT SHORT FORM - BASIC MOBILITY  How much difficulty does the patient currently have...  Patient Difficulty: Turning over in bed (including  adjusting bedclothes, sheets and blankets)?: A Lot   Patient Difficulty: Sitting down on and standing up from a chair with arms (e.g., wheelchair, bedside commode, etc.): A Lot   Patient Difficulty: Moving from lying on back to sitting on the side of the bed?: A Lot   How much help from another person does the patient currently need...   Help from Another: Moving to and from a bed to a chair (including a wheelchair)?: A Lot   Help from Another: Need to walk in hospital room?: A Lot   Help from Another: Climbing 3-5 steps with a railing?: Total     AM-PAC Score:  Raw Score: 11   Approx Degree of Impairment: 72.57%   Standardized Score (AM-PAC Scale): 33.86   CMS Modifier (G-Code): CL    FUNCTIONAL ABILITY STATUS  Functional Mobility/Gait Assessment  Gait Assistance: Not tested  Distance (ft): 0  Assistive Device: Rolling walker  Pattern: Shuffle  Rolling: moderate assist  Supine to Sit: moderate assist with HOB elevated  Sit to Supine: maximum assist  Sit to Stand:  NT    Skilled Therapy Provided: Pt ok to be seen per RN Janel. Pt educ in role of PT. Pt willing to participate. Pt modA for sup to sit EOB, c/o pain in quads with mobility. Pt provided with rest break. Able to perform ankle pumps and initial LAQ before c/o pain. Pt performed second set of ankle pumps. Pt declined moving to chair. RN present for returning pt back to bed as he requested, then pt wanted to walk. Pt with post COG and LOB in sitting with seated therex and wanting to go back to bed, so standing deferred at this time.    The patient's Approx Degree of Impairment: 72.57% has been calculated based on documentation in the [facility] '6 clicks' Inpatient Daily Activity Short Form.  Research supports that patients with this level of impairment may benefit from gradual rehab therapy.  Final disposition will be made by interdisciplinary medical team.    THERAPEUTIC EXERCISES  Lower Extremity Ankle pumps  Knee extension  LAQ     Position Sitting        Patient End of Session: In bed;Needs met;Call light within reach;RN aware of session/findings;With  staff;Alarm set    CURRENT GOALS   Goals to be met by: 8/20/2024  Patient Goal Patient's self-stated goal is: Return home    Goal #1 Patient is able to demonstrate supine - sit EOB @ level: minimum assistance      Goal #1   Current Status  modA   Goal #2 Patient is able to demonstrate transfers Sit to/from Stand at assistance level: minimum assistance with walker - rolling      Goal #2  Current Status  NT   Goal #3 Patient is able to ambulate 30 feet with assist device: walker - rolling at assistance level: minimum assistance   Goal #3   Current Status NT   Goal #4     Goal #4   Current Status     Goal #5 Patient to demonstrate independence with home activity/exercise instructions provided to patient in preparation for discharge.   Goal #5   Current Status  in progress   Goal #6         Therapeutic Activity: 15 minutes

## 2024-08-11 NOTE — PROGRESS NOTES
[facility] Hospitalist Progress Note     CC: Hospital Follow up    PCP: Mitch Herrera MD       Assessment/Plan:   Roc Butcher is a 84 year old male with PMH sig for type 2 diabetes, CAD status post PCI to left circumflex, PAD, pacemaker, hypertension, paroxysmal A-fib on Eliquis baseline, and chronic systolic heart failure with a EF of 35% hyperlipidemia, prior CVA, and bladder cancer who presented for laparoscopic cystoprostatectomy with ileal conduit diversion.  Post op course with complicated by delirium and enterococcus bacteremia and uti s/p stent removal now on po abx.  Incidental finding on CT of the head was a right parotid mass measuring 1.6 cm with broad differential and radiology recommended ENT evaluation with strong consideration for histological sampling for definitive characterization. Discharge delayed with poor po intake and rising wbc. Plans for rehab at [facility] once medically stable , see below for details       Delirium, Improving   Metabolic Encephalopathy  -CT head without acute process   -infectious tx below  -improving on current regimen   -appreciate psych     Leukocytosis-now improving   Enterococcus bacteremia and UTI R/T procedure and ureteral stens   -afebrile  WBC still elevated but improved  -blood cx enterococcus with repeat blood cx NGTD  -urine with enterococcus  -CT A/P without acute process   -TTE without mention of vegetation.   -8/5 stents removed by urology  -TITA without vegetation   -abx-augmentin till 8/17  -8/9 noted low grade temps and worsening leukocytosis, cxr, blood cx and UA ordered   -8/6 CT A/P with Mild fat stranding seen adjacent to the ileal conduit as well as mild bilateral perinephric and periureteric fat stranding suggestive of underlying infectious or inflammatory etiology.   -8/9 call placed to urology to review CT, he states \"stable\"  -ID consulted on 8/9 again--vancomycin resumed      Acute Kidney Injury on CKD stage 3  Acidosis    Hypernatremia   -Cr improving   -IVF stopped with b/l pleural effusion on CT   -CT A/P with ongoing Mild bilateral hydroureteronephrosis      Pleural Effusions  Atelectasis   -CT A/P with Moderate-sized bilateral pleural effusions with bilateral lower lobe atelectasis is unchanged.   -IVF stopped  -hold off on diuretic with poor po intake  -on RA   -may need diuresis at some point?  -Unable to cooperate with incentive spirometer     Hypoalbuminemia   -poor po intake per staff, does like Glucerna shakes and liquids but not much food  -continue Glucerna tid with meals   -ensure staff or family is feeding pt for all meals  -change to general diet or have family bring in food---he is now eating better      Hematuria-intermittent issues   Acute on Chronic Anemia  -baseline hgb ~10's,current hgb 9.9  -intermittent issues with blood   -8/9 CT A/P done with noted mild bilateral hydroureteronephrosis and fat stranding adjacent to the ileal conduit suggestive of infectious or inflammatory etiology, await urology input  -follow hgb     Right Parotid Mass  -noted on CT head   -Partially imaged ovoid 1.6 cm low-attenuation mass in the right parotid gland.   -outpt ENT eval for histological sampling to r/o neoplasm     Bladder cancer S/P lap cystoprostatectomy with ileal conduit diversion on 7/25/2024  -pain meds-tylenol prn   -bowel regimen prn  -urostomy teaching   -8/5 s/p stent removal by urology  -intermittent issues with hematuria, urology notes indicate they are aware of this, repeat UA and CT A/P ordered   -appreciate urology      CAD status post PCI to L Cfx  PAD  S/P pacemaker  Hypertension  PAF  Chronic HFrEF   HL  Previous CVA  -CT head with old infarcts- noted on previous imaging  -echo with EF 30%, last echo with EF 35% with WMA, mild-mod AI  -Not on ACE ARB due to renal function and hypotension.   -Continue hydralazine, eliquis, hydralazine, lopressor and imdur  -holding home ASA for now  -holding lasix with  decreased po and shilpa   -follows with [facility]      DM Type II  -HgbA1C 6.6  -home regimen: lantus 15 units nightly plus novolog   -blood sugars stable and has not needed insulin, will change to prn accuchecks to prevent \"irritation to pt\"     MA/ACO Reach  -ER Visits 2024: 0  -Admissions 2024: 3  -Re- Entry: no   -Consults: urology, cards and ID  -Discharge Needs: [facility]   -Appointments: follow up with PCP Mitch Herrera MD after discharge from rehab    Dispo  -suspect he can discharge to rehab soon, maybe 2-3 days at most. Tomorrow doesn't seem likely but maybe tuesday  PCP: Mitch Herrera MD    Fluids: off IV fluids  Diet: liberalize diet to general   DVT prophylaxis: eliquis (on hold)  Code status: FULL CODE    Ziggy Mitchell   [place] Hospitalist      Subjective:     He is eating more.   Looks better today.   Famly at bedside and discussed with them.     OBJECTIVE:    Blood pressure (!) 129/97, pulse 73, temperature 98.6 °F (37 °C), temperature source Axillary, resp. rate 16, height 5' 9\" (1.753 m), weight 167 lb (75.8 kg), SpO2 99%.    Temp:  [98.5 °F (36.9 °C)-98.6 °F (37 °C)] 98.6 °F (37 °C)  Pulse:  [63-78] 73  Resp:  [16-18] 16  BP: (125-148)/(49-97) 129/97  SpO2:  [91 %-99 %] 99 %      Intake/Output:    Intake/Output Summary (Last 24 hours) at 8/11/2024 1318  Last data filed at 8/11/2024 1100  Gross per 24 hour   Intake 360 ml   Output 775 ml   Net -415 ml       Last 3 Weights   07/25/24 1051 167 lb (75.8 kg)   07/12/24 1728 174 lb (78.9 kg)   07/22/24 1108 166 lb (75.3 kg)   03/21/22 0921 188 lb (85.3 kg)       BP (!) 129/97 (BP Location: Right arm)   Pulse 73   Temp 98.6 °F (37 °C) (Axillary)   Resp 16   Ht 5' 9\" (1.753 m)   Wt 167 lb (75.8 kg)   SpO2 99%   BMI 24.66 kg/m²   GENERAL: no apparent distress  NEURO: lethargic  RESP: non labored, CTA  CARDIO: Regular, no murmur  ABD: soft, NT, ND, BS+  : ileal conduit with blood   EXTREMITIES: no edema    Data  Review:       Labs:     Recent Labs   Lab 08/09/24  0730 08/10/24  0542 08/11/24  0550   RBC 3.66* 3.52* 3.44*   HGB 9.9* 9.6* 9.2*   HCT 31.1* 30.6* 30.6*   MCV 85.0 86.9 89.0   MCH 27.0 27.3 26.7   MCHC 31.8 31.4 30.1*   RDW 23.2* 22.7* 23.5*   NEPRELIM 13.62* 8.42* 6.20   WBC 17.6* 12.0* 9.2   .0 418.0 356.0         Recent Labs   Lab 08/09/24  0730 08/10/24  0542 08/11/24  0546   * 114* 144*   BUN 36* 33* 28*   CREATSERUM 1.54* 1.42* 1.30   EGFRCR 44* 49* 54*   CA 8.3* 8.0* 8.0*    147* 148*   K 4.3 3.9 3.9   * 120* 121*   CO2 18.0* 16.0* 16.0*       Recent Labs   Lab 08/06/24  0602   ALT 37   AST 48*   ALB 2.6*         Imaging:  US VENOUS DOPPLER ARM BILAT - DIAG IMG (CPT=93970)    Result Date: 8/9/2024  CONCLUSION:  1. Nonocclusive superficial venous thrombus in left basilic vein. 2. No right-sided thrombus or DVT bilaterally.    Dictated by (CST): Alberto Woo MD on 8/09/2024 at 5:52 PM     Finalized by (CST): Alberto Woo MD on 8/09/2024 at 5:55 PM          CT ABDOMEN+PELVIS(CPT=74176)    Result Date: 8/9/2024  CONCLUSION:   Right lower quadrant ileal conduit with removal of catheter.  Mild bilateral hydroureteronephrosis is unchanged since the prior exams.  No obstructing calculus.  Mild fat stranding seen adjacent to the ileal conduit as well as mild bilateral perinephric and periureteric fat stranding suggestive of underlying infectious or inflammatory etiology.  Anasarca.  Moderate-sized bilateral pleural effusions with bilateral lower lobe atelectasis is unchanged.  Multiple other incidental findings as described in the body of the report which are unchanged.     Dictated by (CST): Vin Olson MD on 8/09/2024 at 12:31 PM     Finalized by (CST): Vin Olson MD on 8/09/2024 at 12:41 PM          XR CHEST AP PORTABLE  (CPT=71045)    Result Date: 8/9/2024  CONCLUSION:  1. Cardiomegaly.  Tortuous thoracic aorta. 2. Bilateral perihilar and lower lobe mixed alveolar and  interstitial airspace opacification with left basilar pleural reaction.  Transvenous pacing leads unchanged.    Dictated by (CST): Italo Malone MD on 8/09/2024 at 11:23 AM     Finalized by (CST): Italo Malone MD on 8/09/2024 at 11:26 AM             Meds:      sodium bicarbonate  650 mg Oral BID    vancomycin  1,250 mg Intravenous Q24H    QUEtiapine  12.5 mg Oral Once    famotidine  10 mg Oral QOD    QUEtiapine  12.5 mg Oral QPM    melatonin  5 mg Oral Nightly    isosorbide dinitrate  5 mg Oral TID    metoprolol tartrate  25 mg Oral 2x Daily(Beta Blocker)    [Held by provider] apixaban  5 mg Oral BID    atorvastatin  40 mg Oral Daily    hydrALAZINE  10 mg Oral TID         ALPRAZolam    QUEtiapine    sodium chloride 0.9%    acetaminophen    docusate sodium    glucose **OR** glucose **OR** glucose-vitamin C **OR** dextrose **OR** glucose **OR** glucose **OR** glucose-vitamin C    ondansetron

## 2024-08-11 NOTE — PLAN OF CARE
No acute changes overnight. Alert and oriented x1. Drowsy throughout most of the shift. Can be irritable at times. Will answer some questions appropriately at times. Ileal conduit in place, bloody urine. IV abx given. Took pills crushed with applesauce. Safety measures in place.     Problem: Patient Centered Care  Goal: Patient preferences are identified and integrated in the patient's plan of care  Description: Interventions:  - What would you like us to know as we care for you? From home with wife  - Provide timely, complete, and accurate information to patient/family  - Incorporate patient and family knowledge, values, beliefs, and cultural backgrounds into the planning and delivery of care  - Encourage patient/family to participate in care and decision-making at the level they choose  - Honor patient and family perspectives and choices  Outcome: Progressing     Problem: Patient/Family Goals  Goal: Patient/Family Long Term Goal  Description: Patient's Long Term Goal: LUIS E    Interventions:  -   - See additional Care Plan goals for specific interventions  Outcome: Progressing  Goal: Patient/Family Short Term Goal  Description: Patient's Short Term Goal: LUIS E    Interventions:   -   - See additional Care Plan goals for specific interventions  Outcome: Progressing     Problem: Impaired Cognition  Goal: Patient will exhibit improved attention, thought processing and/or memory  Description: Interventions:  - Minimize distractions in the room when full attention is required  Outcome: Progressing     Problem: CARDIOVASCULAR - ADULT  Goal: Maintains optimal cardiac output and hemodynamic stability  Description: INTERVENTIONS:  - Monitor vital signs, rhythm, and trends  - Monitor for bleeding, hypotension and signs of decreased cardiac output  - Evaluate effectiveness of vasoactive medications to optimize hemodynamic stability  - Monitor arterial and/or venous puncture sites for bleeding and/or hematoma  - Assess quality of  pulses, skin color and temperature  - Assess for signs of decreased coronary artery perfusion - ex. Angina  - Evaluate fluid balance, assess for edema, trend weights  Outcome: Progressing  Goal: Absence of cardiac arrhythmias or at baseline  Description: INTERVENTIONS:  - Continuous cardiac monitoring, monitor vital signs, obtain 12 lead EKG if indicated  - Evaluate effectiveness of antiarrhythmic and heart rate control medications as ordered  - Initiate emergency measures for life threatening arrhythmias  - Monitor electrolytes and administer replacement therapy as ordered  Outcome: Progressing     Problem: GASTROINTESTINAL - ADULT  Goal: Minimal or absence of nausea and vomiting  Description: INTERVENTIONS:  - Maintain adequate hydration with IV or PO as ordered and tolerated  - Nasogastric tube to low intermittent suction as ordered  - Evaluate effectiveness of ordered antiemetic medications  - Provide nonpharmacologic comfort measures as appropriate  - Advance diet as tolerated, if ordered  - Obtain nutritional consult as needed  - Evaluate fluid balance  Outcome: Progressing  Goal: Maintains or returns to baseline bowel function  Description: INTERVENTIONS:  - Assess bowel function  - Maintain adequate hydration with IV or PO as ordered and tolerated  - Evaluate effectiveness of GI medications  - Encourage mobilization and activity  - Obtain nutritional consult as needed  - Establish a toileting routine/schedule  - Consider collaborating with pharmacy to review patient's medication profile  Outcome: Progressing     Problem: GENITOURINARY - ADULT  Goal: Absence of urinary retention  Description: INTERVENTIONS:  - Assess patient’s ability to void and empty bladder  - Monitor intake/output and perform bladder scan as needed  - Follow urinary retention protocol/standard of care  - Consider collaborating with pharmacy to review patient's medication profile  - Implement strategies to promote bladder  emptying  Outcome: Progressing     Problem: METABOLIC/FLUID AND ELECTROLYTES - ADULT  Goal: Electrolytes maintained within normal limits  Description: INTERVENTIONS:  - Monitor labs and rhythm and assess patient for signs and symptoms of electrolyte imbalances  - Administer electrolyte replacement as ordered  - Monitor response to electrolyte replacements, including rhythm and repeat lab results as appropriate  - Fluid restriction as ordered  - Instruct patient on fluid and nutrition restrictions as appropriate  Outcome: Progressing     Problem: SKIN/TISSUE INTEGRITY - ADULT  Goal: Skin integrity remains intact  Description: INTERVENTIONS  - Assess and document risk factors for pressure ulcer development  - Assess and document skin integrity  - Monitor for areas of redness and/or skin breakdown  - Initiate interventions, skin care algorithm/standards of care as needed  Outcome: Progressing  Goal: Incision(s), wounds(s) or drain site(s) healing without S/S of infection  Description: INTERVENTIONS:  - Assess and document risk factors for pressure ulcer development  - Assess and document skin integrity  - Assess and document dressing/incision, wound bed, drain sites and surrounding tissue  - Implement wound care per orders  - Initiate isolation precautions as appropriate  - Initiate Pressure Ulcer prevention bundle as indicated  Outcome: Progressing     Problem: HEMATOLOGIC - ADULT  Goal: Maintains hematologic stability  Description: INTERVENTIONS  - Assess for signs and symptoms of bleeding or hemorrhage  - Monitor labs and vital signs for trends  - Administer supportive blood products/factors, fluids and medications as ordered and appropriate  - Administer supportive blood products/factors as ordered and appropriate  Outcome: Progressing  Goal: Free from bleeding injury  Description: (Example usage: patient with low platelets)  INTERVENTIONS:  - Avoid intramuscular injections, enemas and rectal medication  administration  - Ensure safe mobilization of patient  - Hold pressure on venipuncture sites to achieve adequate hemostasis  - Assess for signs and symptoms of internal bleeding  - Monitor lab trends  - Patient is to report abnormal signs of bleeding to staff  - Avoid use of toothpicks and dental floss  - Use electric shaver for shaving  - Use soft bristle tooth brush  - Limit straining and forceful nose blowing  Outcome: Progressing     Problem: MUSCULOSKELETAL - ADULT  Goal: Return mobility to safest level of function  Description: INTERVENTIONS:  - Assess patient stability and activity tolerance for standing, transferring and ambulating w/ or w/o assistive devices  - Assist with transfers and ambulation using safe patient handling equipment as needed  - Ensure adequate protection for wounds/incisions during mobilization  - Obtain PT/OT consults as needed  - Advance activity as appropriate  - Communicate ordered activity level and limitations with patient/family  Outcome: Progressing  Goal: Maintain proper alignment of affected body part  Description: INTERVENTIONS:  - Support and protect limb and body alignment per provider's orders  - Instruct and reinforce with patient and family use of appropriate assistive device and precautions (e.g. spinal or hip dislocation precautions)  Outcome: Progressing     Problem: NEUROLOGICAL - ADULT  Goal: Achieves stable or improved neurological status  Description: INTERVENTIONS  - Assess for and report changes in neurological status  - Initiate measures to prevent increased intracranial pressure  - Maintain blood pressure and fluid volume within ordered parameters to optimize cerebral perfusion and minimize risk of hemorrhage  - Monitor temperature, glucose, and sodium. Initiate appropriate interventions as ordered  Outcome: Progressing     Problem: Impaired Communication  Goal: Patient will achieve maximal communication potential  Description: Interventions:  - Allow  additional time for processing after asking questions or providing instructions  Outcome: Progressing     Problem: PAIN - ADULT  Goal: Verbalizes/displays adequate comfort level or patient's stated pain goal  Description: INTERVENTIONS:  - Encourage pt to monitor pain and request assistance  - Assess pain using appropriate pain scale  - Administer analgesics based on type and severity of pain and evaluate response  - Implement non-pharmacological measures as appropriate and evaluate response  - Consider cultural and social influences on pain and pain management  - Manage/alleviate anxiety  - Utilize distraction and/or relaxation techniques  - Monitor for opioid side effects  - Notify MD/LIP if interventions unsuccessful or patient reports new pain  - Anticipate increased pain with activity and pre-medicate as appropriate  Outcome: Progressing     Problem: SAFETY ADULT - FALL  Goal: Free from fall injury  Description: INTERVENTIONS:  - Assess pt frequently for physical needs  - Identify cognitive and physical deficits and behaviors that affect risk of falls.  - [place] fall precautions as indicated by assessment.  - Educate pt/family on patient safety including physical limitations  - Instruct pt to call for assistance with activity based on assessment  - Modify environment to reduce risk of injury  - Provide assistive devices as appropriate  - Consider OT/PT consult to assist with strengthening/mobility  - Encourage toileting schedule  Outcome: Progressing     Problem: DISCHARGE PLANNING  Goal: Discharge to home or other facility with appropriate resources  Description: INTERVENTIONS:  - Identify barriers to discharge w/pt and caregiver  - Include patient/family/discharge partner in discharge planning  - Arrange for needed discharge resources and transportation as appropriate  - Identify discharge learning needs (meds, wound care, etc)  - Arrange for interpreters to assist at discharge as needed  - Consider  post-discharge preferences of patient/family/discharge partner  - Complete POLST form as appropriate  - Assess patient's ability to be responsible for managing their own health  - Refer to Case Management Department for coordinating discharge planning if the patient needs post-hospital services based on physician/LIP order or complex needs related to functional status, cognitive ability or social support system  Outcome: Progressing     Problem: RISK FOR INFECTION - ADULT  Goal: Absence of fever/infection during anticipated neutropenic period  Description: INTERVENTIONS  - Monitor WBC  - Administer growth factors as ordered  - Implement neutropenic guidelines  Outcome: Progressing     Problem: Delirium  Goal: Minimize duration of delirium  Description: Interventions:  - Encourage use of hearing aids, eye glasses  - Promote highest level of mobility daily  - Provide frequent reorientation  - Promote wakefulness i.e. lights on, blinds open  - Promote sleep, encourage patient's normal rest cycle i.e. lights off, TV off, minimize noise and interruptions  - Encourage family to assist in orientation and promotion of home routines  Outcome: Progressing

## 2024-08-11 NOTE — PROGRESS NOTES
[facility]  part of [facility]     [facility] Infectious Disease Consult    Roc Butcher Patient Status:  Inpatient    1940 MRN [id number]   Location [facility] 4W/SW/SE Attending Pearl Love MD   Hosp Day # 16 PCP MD Roc Crum seen and examined,   Afebrile,   Previous entries noted  Little groggy     History:  Past Medical History:    Arrhythmia    Cancer (HCC)    bladder    Cataract    High blood pressure    High cholesterol    History of blood transfusion    HYPERLIPIDEMIA    HYPERTENSION    Other and unspecified hyperlipidemia    STROKE    Type II or unspecified type diabetes mellitus without mention of complication, not stated as uncontrolled    Unspecified essential hypertension    Visual impairment     Past Surgical History:   Procedure Laterality Date    Cardiac pacemaker placement      Other surgical history      bladder tumor removal    Tonsillectomy       Family History   Problem Relation Age of Onset    Heart Disorder Father     Other Father         HIP FX    Heart Disorder Mother     Other Mother         CVA AGE 80      reports that he has quit smoking. His smoking use included cigarettes. He has never used smokeless tobacco. He reports current alcohol use. He reports that he does not use drugs.    Allergies:  Allergies   Allergen Reactions    Metformin Hcl OTHER (SEE COMMENTS)      Oral,   severe dry mouth       Medications:    Current Facility-Administered Medications:     sodium bicarbonate tab 650 mg, 650 mg, Oral, BID    vancomycin (Vancocin) 1.25 g in sodium chloride 0.9% 250mL IVPB premix, 1,250 mg, Intravenous, Q24H    ALPRAZolam (Xanax) tab 0.25 mg, 0.25 mg, Oral, Nightly PRN    QUEtiapine (SEROquel) tab 12.5 mg, 12.5 mg, Oral, Once    famotidine (Pepcid) tab 10 mg, 10 mg, Oral, QOD **OR** [DISCONTINUED] famotidine (Pepcid) 20 mg/2mL injection 20 mg, 20 mg, Intravenous, BID    QUEtiapine (SEROquel) tab 12.5 mg, 12.5 mg,  Oral, QPM    QUEtiapine (SEROquel) tab 12.5 mg, 12.5 mg, Oral, Nightly PRN    sodium chloride 0.9% 0.9% flush injection 10 mL, 10 mL, Intravenous, PRN    acetaminophen (Tylenol) tab 650 mg, 650 mg, Oral, Q8H PRN    melatonin cap/tab 5 mg, 5 mg, Oral, Nightly    isosorbide dinitrate (Isordil Titradose) tab 5 mg, 5 mg, Oral, TID    metoprolol tartrate (Lopressor) tab 25 mg, 25 mg, Oral, 2x Daily(Beta Blocker)    docusate sodium (Colace) cap 100 mg, 100 mg, Oral, BID PRN    [Held by provider] apixaban (Eliquis) tab 5 mg, 5 mg, Oral, BID    atorvastatin (Lipitor) tab 40 mg, 40 mg, Oral, Daily    hydrALAZINE (Apresoline) tab 10 mg, 10 mg, Oral, TID    glucose (Dex4) 15 GM/59ML oral liquid 15 g, 15 g, Oral, Q15 Min PRN **OR** glucose (Glutose) 40% oral gel 15 g, 15 g, Oral, Q15 Min PRN **OR** glucose-vitamin C (Dex-4) chewable tab 4 tablet, 4 tablet, Oral, Q15 Min PRN **OR** dextrose 50% injection 50 mL, 50 mL, Intravenous, Q15 Min PRN **OR** glucose (Dex4) 15 GM/59ML oral liquid 30 g, 30 g, Oral, Q15 Min PRN **OR** glucose (Glutose) 40% oral gel 30 g, 30 g, Oral, Q15 Min PRN **OR** glucose-vitamin C (Dex-4) chewable tab 8 tablet, 8 tablet, Oral, Q15 Min PRN    ondansetron (Zofran) 4 MG/2ML injection 4 mg, 4 mg, Intravenous, Q6H PRN    Review of Systems:   Constitutional: Negative for anorexia, chills, fatigue, fevers, malaise, night sweats and weight loss.  Eyes: Negative for visual disturbance, irritation and redness.  Ears, nose, mouth, throat, and face: Negative for hearing loss, tinnitus, nasal congestion, snoring, sore throat, hoarseness and voice change.  Respiratory: Negative for cough, sputum, hemoptysis, chest pain, wheezing, dyspnea on exertion, or stridor.  Cardiovascular: Negative for chest pain, palpitations, irregular heart beats, syncope, fatigue, orthopnea, paroxysmal nocturnal dyspnea, lower extremity edema.  Gastrointestinal: Negative for dysphagia, odynophagia, reflux symptoms, nausea, vomiting, change  in bowel habits, diarrhea, constipation and abdominal pain.  Integument/breast: Negative for rash, skin lesions, and pruritus.  Hematologic/lymphatic: Negative for easy bruising, bleeding, and lymphadenopathy.  Musculoskeletal: Negative for myalgias, arthralgias, muscle weakness.  Neurological: Negative for headaches, dizziness, seizures, memory problems, trouble swallowing, speech problems, gait problems and weakness.  Behavioral/Psych: Negative for active tobacco use.  Endocrine: No history of of diabetes, thyroid disorder.  All other review of systems are negative.    Vital signs in last 24 hours:  Patient Vitals for the past 24 hrs:   BP Temp Temp src Pulse Resp SpO2   08/07/24 1053 149/56 97.3 °F (36.3 °C) Axillary 93 18 97 %   08/07/24 0417 142/57 98.1 °F (36.7 °C) Axillary 76 16 97 %   08/06/24 1942 141/49 98.7 °F (37.1 °C) Axillary 71 18 98 %   08/06/24 1714 151/58 98.7 °F (37.1 °C) Axillary 81 16 98 %   08/06/24 1351 136/40 -- -- -- -- --       Physical Exam:   General: alert, cooperative, oriented.  No respiratory distress.   Head: Normocephalic, without obvious abnormality, atraumatic.   Eyes: Conjunctivae/corneas clear.     Nose: Nares normal.   Throat: Lips, mucosa, and tongue normal.  No thrush noted.   Neck: Soft, supple neck; trachea midline,    Lungs: CTAB, normal and equal bilateral chest rise   Chest wall: No tenderness or deformity.   Heart: Regular rate and rhythm, normal S1S2, no murmur.   Abdomen: soft, non-tender, non-distended, positive BS.   Extremity: no edema, no cyanosis   Skin: No rashes or lesions.   Neurological: Alert, interactive, no focal deficits    Labs:  Lab Results   Component Value Date    WBC 12.0 08/10/2024    HGB 9.6 08/10/2024    HCT 30.6 08/10/2024    .0 08/10/2024    CREATSERUM 1.42 08/10/2024    BUN 33 08/10/2024     08/10/2024    K 3.9 08/10/2024     08/10/2024    CO2 16.0 08/10/2024     08/10/2024    CA 8.0 08/10/2024       Radiology:  CT  8/09  Right lower quadrant ileal conduit with removal of catheter.    Mild bilateral hydroureteronephrosis is unchanged since the prior exams.  No obstructing calculus.    Mild fat stranding seen adjacent to the ileal conduit as well as mild bilateral perinephric and periureteric fat stranding suggestive of underlying infectious or inflammatory etiology.    Anasarca.    Moderate-sized bilateral pleural effusions with bilateral lower lobe atelectasis is unchanged.    Multiple other incidental findings as described in the body of the report which are unchanged.       Cultures:  Reviewed,     Assessment and Plan:    1.  Post-op sepsis following Cystoprostatectomy on 7/25/24  - S/P robotic assisted lap radical cystoprostatectomy, Bilat Pelvic LN dissection, ileal conduit diversion, 7/25/24  - Post-op course complicated by hospital delirium, ALBERT, blood cul with Enterococcus Fecalis in 2/2 bottles,   - Blood cul with Enterococcus Faecalis in 2/2 bottles on 8/03, repeats are negative on 8/04,   - UA bland, Cul with Enterococcus Faecalis too,   - + PPM in place, seeding risk is there,   - TTE with no vegetation, TITA negative too,   - Repeat CT with mild fat stranding adjacent to the conduit as well as mild bilateral perinephric and periureteral fat stranding, bilateral lower lobe atelectasis,   - On IV Vanc, 8/04- 8/07 then PO Augmentin, 8/08-8/09 will restart IV Vanc, 8/09- present,      2.  AMS with severe delirium: Overall improving,aspiration precautions,      3.  Multifactorial leukocytosis due to persistent infection, LUE phlebitis,  - Dopplers with superficial clot,   - Blood cul are NGTD,  - On IV Vanc again, pharm to dose,       4.  Disposition - inpatient. An elderly male with Enterococcal Sepsis starting post operatively, likely  source, TTE and TITA noted,   - Follow pending cul,   - IV Vanc, pharm to dose,   - WBC trend,   - Aspiration precautions,   - Supportive care,     Discussed with patient, RN, all  questions answered, Overall looks little groggy will follow with further recommendations, thanks,     Thank you for consulting [facility] for Roc Butcher.  If you have any questions or concerns please call [facility] and [facility] Infectious Disease at [phone].     John Johnson MD  8/7/2024  1:47 PM

## 2024-08-12 ENCOUNTER — APPOINTMENT (OUTPATIENT)
Dept: GENERAL RADIOLOGY | Facility: HOSPITAL | Age: 84
End: 2024-08-12
Attending: NURSE PRACTITIONER
Payer: MEDICARE

## 2024-08-12 ENCOUNTER — APPOINTMENT (OUTPATIENT)
Dept: ULTRASOUND IMAGING | Facility: HOSPITAL | Age: 84
End: 2024-08-12
Attending: NURSE PRACTITIONER
Payer: MEDICARE

## 2024-08-12 LAB
ANION GAP SERPL CALC-SCNC: 7 MMOL/L (ref 0–18)
BASOPHILS # BLD: 0.06 X10(3) UL (ref 0–0.2)
BASOPHILS NFR BLD: 1 %
BUN BLD-MCNC: 21 MG/DL (ref 9–23)
BUN/CREAT SERPL: 17.1 (ref 10–20)
CALCIUM BLD-MCNC: 7.8 MG/DL (ref 8.7–10.4)
CHLORIDE SERPL-SCNC: 122 MMOL/L (ref 98–112)
CO2 SERPL-SCNC: 17 MMOL/L (ref 21–32)
CREAT BLD-MCNC: 1.23 MG/DL
DEPRECATED RDW RBC AUTO: 72.7 FL (ref 35.1–46.3)
EGFRCR SERPLBLD CKD-EPI 2021: 58 ML/MIN/1.73M2 (ref 60–?)
EOSINOPHIL # BLD: 0.12 X10(3) UL (ref 0–0.7)
EOSINOPHIL NFR BLD: 2 %
ERYTHROCYTE [DISTWIDTH] IN BLOOD BY AUTOMATED COUNT: 22.8 % (ref 11–15)
GLUCOSE BLD-MCNC: 153 MG/DL (ref 70–99)
HCT VFR BLD AUTO: 29.4 %
HGB BLD-MCNC: 9.1 G/DL
LYMPHOCYTES NFR BLD: 0.68 X10(3) UL (ref 1–4)
LYMPHOCYTES NFR BLD: 11 %
MCH RBC QN AUTO: 27 PG (ref 26–34)
MCHC RBC AUTO-ENTMCNC: 31 G/DL (ref 31–37)
MCV RBC AUTO: 87.2 FL
MONOCYTES # BLD: 0.5 X10(3) UL (ref 0.1–1)
MONOCYTES NFR BLD: 8 %
NEUTROPHILS # BLD AUTO: 3.61 X10 (3) UL (ref 1.5–7.7)
NEUTROPHILS NFR BLD: 76 %
NEUTS BAND NFR BLD: 2 %
NEUTS HYPERSEG # BLD: 4.84 X10(3) UL (ref 1.5–7.7)
OSMOLALITY SERPL CALC.SUM OF ELEC: 308 MOSM/KG (ref 275–295)
PLATELET # BLD AUTO: 360 10(3)UL (ref 150–450)
PLATELET MORPHOLOGY: NORMAL
POTASSIUM SERPL-SCNC: 3.9 MMOL/L (ref 3.5–5.1)
RBC # BLD AUTO: 3.37 X10(6)UL
SODIUM SERPL-SCNC: 146 MMOL/L (ref 136–145)
TOTAL CELLS COUNTED BLD: 100
WBC # BLD AUTO: 6.2 X10(3) UL (ref 4–11)

## 2024-08-12 PROCEDURE — 93971 EXTREMITY STUDY: CPT | Performed by: NURSE PRACTITIONER

## 2024-08-12 PROCEDURE — 73502 X-RAY EXAM HIP UNI 2-3 VIEWS: CPT | Performed by: NURSE PRACTITIONER

## 2024-08-12 RX ORDER — ACETAMINOPHEN 500 MG
1000 TABLET ORAL EVERY 8 HOURS
Status: DISCONTINUED | OUTPATIENT
Start: 2024-08-12 | End: 2024-08-13

## 2024-08-12 NOTE — PROGRESS NOTES
Patient is a 84 year old ,  male with past medical history of diabetes, CAD, pacemaker, hypertension, paroxymal a-fib, hyperlipidemia, CVA who was admitted to the hospital for laparoscopic cystoprostatectomy with ileal conduit diversion. The patient has been demonstrating increased confusion, restlessness.Patient indicated for psych consult for evaluation and advise.  Consult Duration     The patient seen for over 50-minute, follow-up evaluation, over 50% counseling and coordinating care addressing  confusion, restlessness.  Record reviewed, communication with attending, communication with RN and patient seen face to face evaluation.    History of Present Illness:     According to the team the patient continues to demonstrate confusion and restlessness otherwise no issues reported overnight. The patient has been redirectable.     The patient receiving Seroquel 12.5 mg nightly    Labs and imaging reviewed: Glucose 153, sodium 146, BUN 21, creatinine 1.23    The patient today sitting laying in hospital bed. He presents restless otherwise no agitation.    He is alert and oriented to person. The patients speech otherwise continues to be mumbled and incomprehensible today.     He continues to demonstrate confused and disorganized thought process with response to internal stimuli.     Past Psychiatric/Medication History:  1. Prior diagnoses: none reported  2. Past psychiatric inpatient: none reported  3. Past outpatient history: none reported  4. Past suicide history: none reported  5. Medication history: none reported    Social History:   Patient has been  for 64 years. He lives at home with his wife. He has two adult children.  Retired 24 years ago. He worked for [employer]     No alcohol, tobacco, cannabis or illicit     Family History:  None reported  Medical History:   Past Medical History  Past Medical History:    Arrhythmia    Cancer (HCC)    bladder    Cataract    High blood pressure     High cholesterol    History of blood transfusion    HYPERLIPIDEMIA    HYPERTENSION    Other and unspecified hyperlipidemia    STROKE    Type II or unspecified type diabetes mellitus without mention of complication, not stated as uncontrolled    Unspecified essential hypertension    Visual impairment       Past Surgical History  Past Surgical History:   Procedure Laterality Date    Cardiac pacemaker placement      Other surgical history      bladder tumor removal    Tonsillectomy         Family History  Family History   Problem Relation Age of Onset    Heart Disorder Father     Other Father         HIP FX    Heart Disorder Mother     Other Mother         CVA AGE 80       Social History  Social History     Socioeconomic History    Marital status:    Tobacco Use    Smoking status: Former     Types: Cigarettes    Smokeless tobacco: Never    Tobacco comments:     2010   Vaping Use    Vaping status: Never Used   Substance and Sexual Activity    Alcohol use: Yes     Comment: HEAVY PREVIOUS NOW SOCIAL    Drug use: No     Social Determinants of Health     Financial Resource Strain: Low Risk  (5/30/2024)    Received from [facility]    Financial Resource Strain     In the past year, have you or any family members you live with been unable to get any of the following when it was really needed? Check all that apply.: None   Food Insecurity: No Food Insecurity (7/25/2024)    Food Insecurity     Food Insecurity: Never true   Transportation Needs: No Transportation Needs (7/25/2024)    Transportation Needs     Lack of Transportation: No   Physical Activity: High Risk (5/3/2024)    Received from [facility]    Exercise Vital Sign     On average, how many days per week do you engage in moderate to strenuous exercise (like a brisk walk)?: 0 days     On average, how many minutes do you engage in exercise at this level?: 0 min   Social Connections: Medium Risk (5/30/2024)    Received from [facility]    Social Connections     How often do you see or talk to people that you care about and feel close to? (For example: talking to friends on the phone, visiting friends or family, going to Judaism or club meetings): 1 or 2 times a week   Housing Stability: Low Risk  (7/25/2024)    Housing Stability     Housing Instability: No           Current Medications:  Current Facility-Administered Medications   Medication Dose Route Frequency    famotidine (Pepcid) tab 20 mg  20 mg Oral Daily    amoxicillin clavulanate (Augmentin) 875-125 MG per tab 875 mg  875 mg Oral Q12H    sodium bicarbonate tab 650 mg  650 mg Oral BID    ALPRAZolam (Xanax) tab 0.25 mg  0.25 mg Oral Nightly PRN    QUEtiapine (SEROquel) tab 12.5 mg  12.5 mg Oral Once    QUEtiapine (SEROquel) tab 12.5 mg  12.5 mg Oral QPM    QUEtiapine (SEROquel) tab 12.5 mg  12.5 mg Oral Nightly PRN    sodium chloride 0.9% 0.9% flush injection 10 mL  10 mL Intravenous PRN    acetaminophen (Tylenol) tab 650 mg  650 mg Oral Q8H PRN    melatonin cap/tab 5 mg  5 mg Oral Nightly    isosorbide dinitrate (Isordil Titradose) tab 5 mg  5 mg Oral TID    metoprolol tartrate (Lopressor) tab 25 mg  25 mg Oral 2x Daily(Beta Blocker)    docusate sodium (Colace) cap 100 mg  100 mg Oral BID PRN    [Held by provider] apixaban (Eliquis) tab 5 mg  5 mg Oral BID    atorvastatin (Lipitor) tab 40 mg  40 mg Oral Daily    hydrALAZINE (Apresoline) tab 10 mg  10 mg Oral TID    glucose (Dex4) 15 GM/59ML oral liquid 15 g  15 g Oral Q15 Min PRN    Or    glucose (Glutose) 40% oral gel 15 g  15 g Oral Q15 Min PRN    Or    glucose-vitamin C (Dex-4) chewable tab 4 tablet  4 tablet Oral Q15 Min PRN    Or    dextrose 50% injection 50 mL  50 mL Intravenous Q15 Min PRN    Or    glucose (Dex4) 15 GM/59ML oral liquid 30 g  30 g Oral Q15 Min PRN    Or    glucose (Glutose) 40% oral gel 30 g  30 g Oral Q15 Min PRN    Or    glucose-vitamin C (Dex-4) chewable tab 8 tablet  8 tablet Oral Q15 Min PRN    ondansetron  (Zofran) 4 MG/2ML injection 4 mg  4 mg Intravenous Q6H PRN     Medications Prior to Admission   Medication Sig    isosorbide dinitrate 5 MG Oral Tab Take 1 tablet (5 mg total) by mouth 3 (three) times daily.    furosemide 20 MG Oral Tab Take 1 tablet (20 mg total) by mouth daily.    hydrALAZINE 10 MG Oral Tab Take 1 tablet (10 mg total) by mouth 3 (three) times daily.    insulin aspart (NOVOLOG FLEXPEN) 100 Units/mL Subcutaneous Solution Pen-injector Take 10 units with breakfast  and 10 units lunch    aspirin 81 MG Oral Tab EC Take 1 tablet (81 mg total) by mouth daily.    traMADol 50 MG Oral Tab Take 1 tablet (50 mg total) by mouth every 6 (six) hours as needed for Pain.    ATORVASTATIN 40 MG Oral Tab TAKE 1 TABLET BY MOUTH IN  THE EVENING (Patient taking differently: every morning.)    ELIQUIS 5 MG Oral Tab 2 (two) times daily.    LANTUS SOLOSTAR 100 UNIT/ML Subcutaneous Solution Pen-injector Take 15 units daily    Insulin Lispro, 1 Unit Dial, (HUMALOG KWIKPEN) 100 UNIT/ML Subcutaneous Solution Pen-injector Inject 5 Units into the skin As Directed. Take 5 units with each meal    metoprolol tartrate 25 MG Oral Tab TAKE ONE tablet daily (Patient taking differently: 2 (two) times daily.)    Glucose Blood (ACCU-CHEK MICK PLUS) In Vitro Strip USE TWICE DAILY    ACCU-CHEK MICK In Vitro Strip Test glucose three times daily.    Insulin Pen Needle (BD PEN NEEDLE MINI U/F) 31G X 5 MM Does not apply Misc AS DIRECTED QID    ACCU-CHEK MULTICLIX LANCETS Does not apply Misc test blood sugar QID as directed       Allergies  Allergies   Allergen Reactions    Metformin Hcl OTHER (SEE COMMENTS)      Oral,   severe dry mouth       Review of Systems:   As by Admitting/Attending    Results:   Laboratory Data:  Lab Results   Component Value Date    WBC 6.2 08/12/2024    HGB 9.1 (L) 08/12/2024    HCT 29.4 (L) 08/12/2024    .0 08/12/2024    CREATSERUM 1.23 08/12/2024    BUN 21 08/12/2024     (H) 08/12/2024    K 3.9  08/12/2024     (H) 08/12/2024    CO2 17.0 (L) 08/12/2024     (H) 08/12/2024    CA 7.8 (L) 08/12/2024    ALB 2.6 (L) 08/06/2024    ALKPHO 74 08/06/2024    TP 4.7 (L) 08/06/2024    AST 48 (H) 08/06/2024    ALT 37 08/06/2024    TSH 1.520 03/17/2022    PSA 0.5 02/22/2012    ESRML 69 (H) 08/09/2024    CRP 18.90 (H) 08/09/2024    B12 380 01/13/2020         Imaging:  US VENOUS DOPPLER ARM BILAT - DIAG IMG (CPT=93970)    Result Date: 8/9/2024  CONCLUSION:  1. Nonocclusive superficial venous thrombus in left basilic vein. 2. No right-sided thrombus or DVT bilaterally.    Dictated by (CST): Alberto Woo MD on 8/09/2024 at 5:52 PM     Finalized by (CST): Alberto Woo MD on 8/09/2024 at 5:55 PM          XR CHEST AP PORTABLE  (CPT=71045)    Result Date: 8/9/2024  CONCLUSION:  1. Cardiomegaly.  Tortuous thoracic aorta. 2. Bilateral perihilar and lower lobe mixed alveolar and interstitial airspace opacification with left basilar pleural reaction.  Transvenous pacing leads unchanged.    Dictated by (CST): Italo Malone MD on 8/09/2024 at 11:23 AM     Finalized by (CST): Italo Malone MD on 8/09/2024 at 11:26 AM           Vital Signs:   Blood pressure 134/50, pulse 66, temperature 98.8 °F (37.1 °C), temperature source Oral, resp. rate 18, height 5' 9\" (1.753 m), weight 75.8 kg (167 lb), SpO2 99%.    Mental Status Exam:   Appearance: Stated age male, in hospital gown, laying in hospital bed.   Psychomotor: Patient has been demonstrating some restlessness and agitation last night.    Orientation: Alert and oriented to person and place but not to date. Patient notes that it is 2024. He is able to identify his wife.   Gait: Not evaluated.  Attitude/Coorperation: patient makes effort to cooperate  Behavior: Patient made an effort to be cooperative and attentive.  Speech: soft, slow, incomprehensible at times.   Mood: Apathy with difficulty expressing emotion  Affect: restricted  Thought process: Confused,  otherwise improving  Thought content: No reports of  suicidal or homicidal ideation.  Perceptions: Patient has been demonstrating response to internal stimuli.  Concentration: improving  Memory: improving  Intellect: Average.  Judgment and Insight: Questionable.     Impression:     Delirium due to another medical condition.  Episodic mood disorder.    Preop testing    Bladder cancer (HCC)    Patient is a 84 year old ,  male with past medical history of diabetes, CAD, pacemaker, hypertension, paroxymal a-fib, hyperlipidemia, CVA who was admitted to the hospital for laparoscopic cystoprostatectomy with ileal conduit diversion. The patient has been demonstrating increased confusion, restlessness    The patient has been demonstrating delirium episode with alternation in mood and cognition with episodes of  increased confusion, restlessness, agitation and response to internal stimuli.     7/29/2024: Patient continues to demonstrate alternation in his mood and cognition. He was restless and agitated overnight requiring use of haldol. The patient otherwise demonstrating improvement in his cognition with no restlessness or agitation this morning.    7/30/2024: patient continues to demonstrate confusion and restlessness. No recent use of restraints. No recent use of PRN haldol.     8/1/2024: The patient continue demonstrating some slow responses with alternation in the mood and sundowning.  After today to change his medication to less sedative side effect.    8/2/2024: The patient demonstrating improvement in his delirious process with no agitation nightly but continue mistreat exhaustion and tiredness daily.    8/8/2024: The patient has been demonstrating confusion, restlessness, and agitation with response to internal stimuli.     8/9/2024: patient confused and restless otherwise no agitation. No issues overnight.    8/12/2024: ***      Discussed risk and benefit, acknowledging the current symptom and  severity.  At this point, I would recommend the following approach:     Focus on safety  Focus on education and support.  Focus on insight orientation helping the patient understand diagnosis and treatment plan.  Continue Seroquel 12.5 mg nightly  Continue melatonin 3 mg nightly.  Coordinate plan with team    Orders This Visit:  Orders Placed This Encounter   Procedures    Hemoglobin A1C    RBC Morphology Scan    Basic Metabolic Panel (8)    CBC With Differential With Platelet    Basic Metabolic Panel (8)    Basic Metabolic Panel (8)    CBC With Differential With Platelet    RBC Morphology Scan    CBC With Differential With Platelet    Sodium, Urine, Random    Creatinine, Urine, Random    CBC With Differential With Platelet    Manual differential    Comp Metabolic Panel (14)    Vancomycin, random    Manual differential    Manual differential    Basic Metabolic Panel (8)    Manual differential    Manual differential    Urinalysis with Culture Reflex    Sed Rate, Westergren (Automated)    C-Reactive Protein    Basic Metabolic Panel (8)    CBC With Differential With Platelet    UA Microscopic only, urine    Ictotest    Manual differential    Manual differential    Manual differential    Phosphorus    Renal Function Panel    Magnesium    Type and screen    ABORH Confirmation    Specimen to Pathology Tissue    $$$$Respiratory Flu Expanded Panel + Covid-19$$$$    Blood Culture    Blood Culture PCR    Blood Culture    Urine Culture, Routine    Blood Culture    Blood Culture    Urine Culture, Routine       Meds This Visit:  Requested Prescriptions      No prescriptions requested or ordered in this encounter     Jennifer Matt, APRN  8/12/2024    Note to Patient: The 21st Century Cures Act makes medical notes like these available to patients in the interest of transparency. However, be advised this is a medical document. It is intended as peer to peer communication. It is written in medical language and may contain abbreviations  or verbiage that are unfamiliar. It may appear blunt or direct. Medical documents are intended to carry relevant information, facts as evident, and the clinical opinion of the practitioner. This note may have been transcribed using a voice dictation system. Voice recognition errors may occur. This should not be taken to alter the content or meaning of this note.

## 2024-08-12 NOTE — PROGRESS NOTES
[facility]  part of [facility]    Progress Note    Roc Butcher Patient Status:  Inpatient    1940 MRN [id number]   Location [facility] 4W/SW/SE Attending Annika Cadet MD   Hosp Day # 18 PCP Mitch Herrera MD     Subjective:   Roc Butcher is a(n) 84 year old male     Son at bedside  Per nursing, having diarrhea now  He is on augmentin for enterococcus UTI *(culture from the bag)  No fevers  Still having confusion      Objective:   Blood pressure 141/52, pulse 63, temperature 98.8 °F (37.1 °C), temperature source Oral, resp. rate 18, height 69\", weight 167 lb (75.8 kg), SpO2 100%.    Awake, alert  Breathing comfortably  Abd soft, ileal conduit stoma is pink  Urine with mucous which is normal   Urine brown with mucous, but no blood  No LE edema        Results:   Lab Results   Component Value Date    WBC 6.2 2024    HGB 9.1 (L) 2024    HCT 29.4 (L) 2024    .0 2024    CREATSERUM 1.23 2024    BUN 21 2024     (H) 2024    K 3.9 2024     (H) 2024    CO2 17.0 (L) 2024     (H) 2024    CA 7.8 (L) 2024    ALB 2.6 (L) 2024    ALKPHO 74 2024    BILT 0.3 2024    TP 4.7 (L) 2024    AST 48 (H) 2024    ALT 37 2024    TSH 1.520 2022    PSA 0.5 2012    ESRML 69 (H) 2024    CRP 18.90 (H) 2024    B12 380 2020       US VENOUS DOPPLER LEG RIGHT - DIAG IMG (CPT=93971)    Result Date: 2024  CONCLUSION:  No DVT in the right lower extremity.    Dictated by (CST): Jericho Santos MD on 2024 at 4:59 PM     Finalized by (CST): Jericho Santos MD on 2024 at 4:59 PM          XR HIP W OR WO PELVIS 2 OR 3 VIEWS, RIGHT (CPT=73502)    Result Date: 2024  CONCLUSION:  1. No fracture or dislocation. 2. Stable mild hip joint osteoarthritis bilaterally. 3. Lesser incidental findings as above.    Dictated by  (CST): Jericho Santos MD on 8/12/2024 at 3:30 PM     Finalized by (CST): Jericho Santos MD on 8/12/2024 at 3:32 PM               Assessment & Plan:       Bladder cancer (HCC)  POD#17 s/p radical cystectomy for Urothelial cell carcioma,  Ileal conduit urinary diversion   Complicated with UTI/fever   Afebrile now   Recommend stopping abx - no need for repeat cultures unless clinically indicated (ie, fevers, chills, Major MS changes over current baseline)   Needs rehab and PT aggressive PT   Reviewed LE US and Xray - negative for DVT or pathology    UTI   Previously with fever   Currently no leukocytosis, fever   Recommend stopping augmentin - may be causing diarrhea and possibly Cdif     Diarrhea   Culture pending        Delirium due to another medical condition  Improving  Labs improving  Needs ambulation and aggressive rehab                  José Miguel Paula MD  8/12/2024

## 2024-08-12 NOTE — OCCUPATIONAL THERAPY NOTE
Chart reviewed, attempt to see patient for PT & OT followup. Per secure chat message from NP, patient discharge for today canceled d/t inability to  \"move his right leg off the bed and has right thigh pain, doing work up for this.\" OT will f/u pending results of XR of R hip and ultrasound of R LE.    Viridiana Moreau, OTR/L  [facility]  #[id number]    Alida Maza, PT, DPT  [facility]  Rehab Services - Physical Therapy  [id number]

## 2024-08-12 NOTE — PROGRESS NOTES
[facility] met with pt at bedside.  Pt provided with resources for outpatient mental health treatment.  Pt thanked [facility] for resources.    Kate Balbuena LCSW  [facility]

## 2024-08-12 NOTE — CM/SW NOTE
24 1233   Discharge disposition   Expected discharge disposition subacute   Post Acute Care Provider Vinny Cadena   Discharge transportation [facility]     Pt discussed during nursing rounds. Pt is stable for dc today. MD dc order entered. Pt will dc to [facility] for [facility], liaison Luma confirmed ins auth has been obtained and bed is available today. CM LVM for pt's spouse, and son informed of transfer today. [facility] scheduled for 3pm .    Number for nurse report is [phone].    1220: DC cancelled by APN due to new RLE weakness. Transport cancelled, [facility] placed on will call from -, PCS updated. ARMANDO Quezada notified CM that ins auth for QUENTIN will  on .    Plan: [facility] for QUENTIN pending medical clearance.    / to remain available for support and/or discharge planning.     Tyshawn Ross, BSN    [phone]

## 2024-08-12 NOTE — PLAN OF CARE
Patient is alert & oriented x4, can be forgetful at times. On room air. Vitals stable. IV vanco switched to PO agumentin      Problem: Patient Centered Care  Goal: Patient preferences are identified and integrated in the patient's plan of care  Description: Interventions:  - What would you like us to know as we care for you? From home with wife  - Provide timely, complete, and accurate information to patient/family  - Incorporate patient and family knowledge, values, beliefs, and cultural backgrounds into the planning and delivery of care  - Encourage patient/family to participate in care and decision-making at the level they choose  - Honor patient and family perspectives and choices  Outcome: Progressing     Problem: Patient/Family Goals  Goal: Patient/Family Long Term Goal  Description: Patient's Long Term Goal: LUIS E    Interventions:  -   - See additional Care Plan goals for specific interventions  Outcome: Progressing  Goal: Patient/Family Short Term Goal  Description: Patient's Short Term Goal: LUIS E    Interventions:   -   - See additional Care Plan goals for specific interventions  Outcome: Progressing     Problem: Impaired Cognition  Goal: Patient will exhibit improved attention, thought processing and/or memory  Description: Interventions:  {Cognition Interventions:[id number]:::0}  Outcome: Progressing     Problem: PAIN - ADULT  Goal: Verbalizes/displays adequate comfort level or patient's stated pain goal  Description: INTERVENTIONS:  - Encourage pt to monitor pain and request assistance  - Assess pain using appropriate pain scale  - Administer analgesics based on type and severity of pain and evaluate response  - Implement non-pharmacological measures as appropriate and evaluate response  - Consider cultural and social influences on pain and pain management  - Manage/alleviate anxiety  - Utilize distraction and/or relaxation techniques  - Monitor for opioid side effects  - Notify MD/LIP if interventions  unsuccessful or patient reports new pain  - Anticipate increased pain with activity and pre-medicate as appropriate  Outcome: Progressing     Problem: SAFETY ADULT - FALL  Goal: Free from fall injury  Description: INTERVENTIONS:  - Assess pt frequently for physical needs  - Identify cognitive and physical deficits and behaviors that affect risk of falls.  - [place] fall precautions as indicated by assessment.  - Educate pt/family on patient safety including physical limitations  - Instruct pt to call for assistance with activity based on assessment  - Modify environment to reduce risk of injury  - Provide assistive devices as appropriate  - Consider OT/PT consult to assist with strengthening/mobility  - Encourage toileting schedule  Outcome: Progressing     Problem: DISCHARGE PLANNING  Goal: Discharge to home or other facility with appropriate resources  Description: INTERVENTIONS:  - Identify barriers to discharge w/pt and caregiver  - Include patient/family/discharge partner in discharge planning  - Arrange for needed discharge resources and transportation as appropriate  - Identify discharge learning needs (meds, wound care, etc)  - Arrange for interpreters to assist at discharge as needed  - Consider post-discharge preferences of patient/family/discharge partner  - Complete POLST form as appropriate  - Assess patient's ability to be responsible for managing their own health  - Refer to Case Management Department for coordinating discharge planning if the patient needs post-hospital services based on physician/LIP order or complex needs related to functional status, cognitive ability or social support system  Outcome: Progressing     Problem: RISK FOR INFECTION - ADULT  Goal: Absence of fever/infection during anticipated neutropenic period  Description: INTERVENTIONS  - Monitor WBC  - Administer growth factors as ordered  - Implement neutropenic guidelines  Outcome: Progressing     Problem: CARDIOVASCULAR -  ADULT  Goal: Maintains optimal cardiac output and hemodynamic stability  Description: INTERVENTIONS:  - Monitor vital signs, rhythm, and trends  - Monitor for bleeding, hypotension and signs of decreased cardiac output  - Evaluate effectiveness of vasoactive medications to optimize hemodynamic stability  - Monitor arterial and/or venous puncture sites for bleeding and/or hematoma  - Assess quality of pulses, skin color and temperature  - Assess for signs of decreased coronary artery perfusion - ex. Angina  - Evaluate fluid balance, assess for edema, trend weights  Outcome: Progressing  Goal: Absence of cardiac arrhythmias or at baseline  Description: INTERVENTIONS:  - Continuous cardiac monitoring, monitor vital signs, obtain 12 lead EKG if indicated  - Evaluate effectiveness of antiarrhythmic and heart rate control medications as ordered  - Initiate emergency measures for life threatening arrhythmias  - Monitor electrolytes and administer replacement therapy as ordered  Outcome: Progressing     Problem: GASTROINTESTINAL - ADULT  Goal: Minimal or absence of nausea and vomiting  Description: INTERVENTIONS:  - Maintain adequate hydration with IV or PO as ordered and tolerated  - Nasogastric tube to low intermittent suction as ordered  - Evaluate effectiveness of ordered antiemetic medications  - Provide nonpharmacologic comfort measures as appropriate  - Advance diet as tolerated, if ordered  - Obtain nutritional consult as needed  - Evaluate fluid balance  Outcome: Progressing  Goal: Maintains or returns to baseline bowel function  Description: INTERVENTIONS:  - Assess bowel function  - Maintain adequate hydration with IV or PO as ordered and tolerated  - Evaluate effectiveness of GI medications  - Encourage mobilization and activity  - Obtain nutritional consult as needed  - Establish a toileting routine/schedule  - Consider collaborating with pharmacy to review patient's medication profile  Outcome: Progressing      Problem: GENITOURINARY - ADULT  Goal: Absence of urinary retention  Description: INTERVENTIONS:  - Assess patient’s ability to void and empty bladder  - Monitor intake/output and perform bladder scan as needed  - Follow urinary retention protocol/standard of care  - Consider collaborating with pharmacy to review patient's medication profile  - Implement strategies to promote bladder emptying  Outcome: Progressing     Problem: METABOLIC/FLUID AND ELECTROLYTES - ADULT  Goal: Electrolytes maintained within normal limits  Description: INTERVENTIONS:  - Monitor labs and rhythm and assess patient for signs and symptoms of electrolyte imbalances  - Administer electrolyte replacement as ordered  - Monitor response to electrolyte replacements, including rhythm and repeat lab results as appropriate  - Fluid restriction as ordered  - Instruct patient on fluid and nutrition restrictions as appropriate  Outcome: Progressing     Problem: SKIN/TISSUE INTEGRITY - ADULT  Goal: Skin integrity remains intact  Description: INTERVENTIONS  - Assess and document risk factors for pressure ulcer development  - Assess and document skin integrity  - Monitor for areas of redness and/or skin breakdown  - Initiate interventions, skin care algorithm/standards of care as needed  Outcome: Progressing  Goal: Incision(s), wounds(s) or drain site(s) healing without S/S of infection  Description: INTERVENTIONS:  - Assess and document risk factors for pressure ulcer development  - Assess and document skin integrity  - Assess and document dressing/incision, wound bed, drain sites and surrounding tissue  - Implement wound care per orders  - Initiate isolation precautions as appropriate  - Initiate Pressure Ulcer prevention bundle as indicated  Outcome: Progressing     Problem: HEMATOLOGIC - ADULT  Goal: Maintains hematologic stability  Description: INTERVENTIONS  - Assess for signs and symptoms of bleeding or hemorrhage  - Monitor labs and vital signs  for trends  - Administer supportive blood products/factors, fluids and medications as ordered and appropriate  - Administer supportive blood products/factors as ordered and appropriate  Outcome: Progressing  Goal: Free from bleeding injury  Description: (Example usage: patient with low platelets)  INTERVENTIONS:  - Avoid intramuscular injections, enemas and rectal medication administration  - Ensure safe mobilization of patient  - Hold pressure on venipuncture sites to achieve adequate hemostasis  - Assess for signs and symptoms of internal bleeding  - Monitor lab trends  - Patient is to report abnormal signs of bleeding to staff  - Avoid use of toothpicks and dental floss  - Use electric shaver for shaving  - Use soft bristle tooth brush  - Limit straining and forceful nose blowing  Outcome: Progressing     Problem: MUSCULOSKELETAL - ADULT  Goal: Return mobility to safest level of function  Description: INTERVENTIONS:  - Assess patient stability and activity tolerance for standing, transferring and ambulating w/ or w/o assistive devices  - Assist with transfers and ambulation using safe patient handling equipment as needed  - Ensure adequate protection for wounds/incisions during mobilization  - Obtain PT/OT consults as needed  - Advance activity as appropriate  - Communicate ordered activity level and limitations with patient/family  Outcome: Progressing  Goal: Maintain proper alignment of affected body part  Description: INTERVENTIONS:  - Support and protect limb and body alignment per provider's orders  - Instruct and reinforce with patient and family use of appropriate assistive device and precautions (e.g. spinal or hip dislocation precautions)  Outcome: Progressing     Problem: NEUROLOGICAL - ADULT  Goal: Achieves stable or improved neurological status  Description: INTERVENTIONS  - Assess for and report changes in neurological status  - Initiate measures to prevent increased intracranial pressure  - Maintain  blood pressure and fluid volume within ordered parameters to optimize cerebral perfusion and minimize risk of hemorrhage  - Monitor temperature, glucose, and sodium. Initiate appropriate interventions as ordered  Outcome: Progressing     Problem: Impaired Communication  Goal: Patient will achieve maximal communication potential  Description: Interventions:  {Communication Interventions:[id number]:::0}  Outcome: Progressing     Problem: Safety Risk - Non-Violent Restraints  Goal: Patient will remain free from self-harm  Description: INTERVENTIONS:  - Apply the least restrictive restraint to prevent harm  - Notify patient and family of reasons restraints applied  - Assess for any contributing factors to confusion (electrolyte disturbances, delirium, medications)  - Discontinue any unnecessary medical devices as soon as possible  - Assess the patient's physical comfort, circulation, skin condition, hydration, nutrition and elimination needs   - Reorient and redirection as needed  - Assess for the need to continue restraints  Outcome: Progressing     Problem: Delirium  Goal: Minimize duration of delirium  Description: Interventions:  - Encourage use of hearing aids, eye glasses  - Promote highest level of mobility daily  - Provide frequent reorientation  - Promote wakefulness i.e. lights on, blinds open  - Promote sleep, encourage patient's normal rest cycle i.e. lights off, TV off, minimize noise and interruptions  - Encourage family to assist in orientation and promotion of home routines  Outcome: Progressing

## 2024-08-12 NOTE — PLAN OF CARE
Patient alert and more oriented today, able to answer all orientation questions appropriately. Co-operative with staff. Able to eat a good amount of breakfast this morning. Poor appetite at lunch, ate a couple of bites slightly agitated with wife at bedside  Problem: Patient Centered Care  Goal: Patient preferences are identified and integrated in the patient's plan of care  Description: Interventions:  - What would you like us to know as we care for you? From home with wife  - Provide timely, complete, and accurate information to patient/family  - Incorporate patient and family knowledge, values, beliefs, and cultural backgrounds into the planning and delivery of care  - Encourage patient/family to participate in care and decision-making at the level they choose  - Honor patient and family perspectives and choices  Outcome: Progressing     Problem: Patient/Family Goals  Goal: Patient/Family Long Term Goal  Description: Patient's Long Term Goal: LUIS E    Interventions:  -   - See additional Care Plan goals for specific interventions  Outcome: Progressing  Goal: Patient/Family Short Term Goal  Description: Patient's Short Term Goal: LUIS E    Interventions:   -   - See additional Care Plan goals for specific interventions  Outcome: Progressing     Problem: Impaired Cognition  Goal: Patient will exhibit improved attention, thought processing and/or memory  Description: Interventions:    Outcome: Progressing     Problem: PAIN - ADULT  Goal: Verbalizes/displays adequate comfort level or patient's stated pain goal  Description: INTERVENTIONS:  - Encourage pt to monitor pain and request assistance  - Assess pain using appropriate pain scale  - Administer analgesics based on type and severity of pain and evaluate response  - Implement non-pharmacological measures as appropriate and evaluate response  - Consider cultural and social influences on pain and pain management  - Manage/alleviate anxiety  - Utilize distraction and/or  relaxation techniques  - Monitor for opioid side effects  - Notify MD/LIP if interventions unsuccessful or patient reports new pain  - Anticipate increased pain with activity and pre-medicate as appropriate  Outcome: Progressing     Problem: SAFETY ADULT - FALL  Goal: Free from fall injury  Description: INTERVENTIONS:  - Assess pt frequently for physical needs  - Identify cognitive and physical deficits and behaviors that affect risk of falls.  - [place] fall precautions as indicated by assessment.  - Educate pt/family on patient safety including physical limitations  - Instruct pt to call for assistance with activity based on assessment  - Modify environment to reduce risk of injury  - Provide assistive devices as appropriate  - Consider OT/PT consult to assist with strengthening/mobility  - Encourage toileting schedule  Outcome: Progressing     Problem: DISCHARGE PLANNING  Goal: Discharge to home or other facility with appropriate resources  Description: INTERVENTIONS:  - Identify barriers to discharge w/pt and caregiver  - Include patient/family/discharge partner in discharge planning  - Arrange for needed discharge resources and transportation as appropriate  - Identify discharge learning needs (meds, wound care, etc)  - Arrange for interpreters to assist at discharge as needed  - Consider post-discharge preferences of patient/family/discharge partner  - Complete POLST form as appropriate  - Assess patient's ability to be responsible for managing their own health  - Refer to Case Management Department for coordinating discharge planning if the patient needs post-hospital services based on physician/LIP order or complex needs related to functional status, cognitive ability or social support system  Outcome: Progressing     Problem: RISK FOR INFECTION - ADULT  Goal: Absence of fever/infection during anticipated neutropenic period  Description: INTERVENTIONS  - Monitor WBC  - Administer growth factors as  ordered  - Implement neutropenic guidelines  Outcome: Progressing     Problem: CARDIOVASCULAR - ADULT  Goal: Maintains optimal cardiac output and hemodynamic stability  Description: INTERVENTIONS:  - Monitor vital signs, rhythm, and trends  - Monitor for bleeding, hypotension and signs of decreased cardiac output  - Evaluate effectiveness of vasoactive medications to optimize hemodynamic stability  - Monitor arterial and/or venous puncture sites for bleeding and/or hematoma  - Assess quality of pulses, skin color and temperature  - Assess for signs of decreased coronary artery perfusion - ex. Angina  - Evaluate fluid balance, assess for edema, trend weights  Outcome: Progressing  Goal: Absence of cardiac arrhythmias or at baseline  Description: INTERVENTIONS:  - Continuous cardiac monitoring, monitor vital signs, obtain 12 lead EKG if indicated  - Evaluate effectiveness of antiarrhythmic and heart rate control medications as ordered  - Initiate emergency measures for life threatening arrhythmias  - Monitor electrolytes and administer replacement therapy as ordered  Outcome: Progressing     Problem: GASTROINTESTINAL - ADULT  Goal: Minimal or absence of nausea and vomiting  Description: INTERVENTIONS:  - Maintain adequate hydration with IV or PO as ordered and tolerated  - Nasogastric tube to low intermittent suction as ordered  - Evaluate effectiveness of ordered antiemetic medications  - Provide nonpharmacologic comfort measures as appropriate  - Advance diet as tolerated, if ordered  - Obtain nutritional consult as needed  - Evaluate fluid balance  Outcome: Progressing  Goal: Maintains or returns to baseline bowel function  Description: INTERVENTIONS:  - Assess bowel function  - Maintain adequate hydration with IV or PO as ordered and tolerated  - Evaluate effectiveness of GI medications  - Encourage mobilization and activity  - Obtain nutritional consult as needed  - Establish a toileting routine/schedule  -  Consider collaborating with pharmacy to review patient's medication profile  Outcome: Progressing     Problem: GENITOURINARY - ADULT  Goal: Absence of urinary retention  Description: INTERVENTIONS:  - Assess patient’s ability to void and empty bladder  - Monitor intake/output and perform bladder scan as needed  - Follow urinary retention protocol/standard of care  - Consider collaborating with pharmacy to review patient's medication profile  - Implement strategies to promote bladder emptying  Outcome: Progressing     Problem: METABOLIC/FLUID AND ELECTROLYTES - ADULT  Goal: Electrolytes maintained within normal limits  Description: INTERVENTIONS:  - Monitor labs and rhythm and assess patient for signs and symptoms of electrolyte imbalances  - Administer electrolyte replacement as ordered  - Monitor response to electrolyte replacements, including rhythm and repeat lab results as appropriate  - Fluid restriction as ordered  - Instruct patient on fluid and nutrition restrictions as appropriate  Outcome: Progressing     Problem: SKIN/TISSUE INTEGRITY - ADULT  Goal: Skin integrity remains intact  Description: INTERVENTIONS  - Assess and document risk factors for pressure ulcer development  - Assess and document skin integrity  - Monitor for areas of redness and/or skin breakdown  - Initiate interventions, skin care algorithm/standards of care as needed  Outcome: Progressing  Goal: Incision(s), wounds(s) or drain site(s) healing without S/S of infection  Description: INTERVENTIONS:  - Assess and document risk factors for pressure ulcer development  - Assess and document skin integrity  - Assess and document dressing/incision, wound bed, drain sites and surrounding tissue  - Implement wound care per orders  - Initiate isolation precautions as appropriate  - Initiate Pressure Ulcer prevention bundle as indicated  Outcome: Progressing     Problem: HEMATOLOGIC - ADULT  Goal: Maintains hematologic stability  Description:  INTERVENTIONS  - Assess for signs and symptoms of bleeding or hemorrhage  - Monitor labs and vital signs for trends  - Administer supportive blood products/factors, fluids and medications as ordered and appropriate  - Administer supportive blood products/factors as ordered and appropriate  Outcome: Progressing  Goal: Free from bleeding injury  Description: (Example usage: patient with low platelets)  INTERVENTIONS:  - Avoid intramuscular injections, enemas and rectal medication administration  - Ensure safe mobilization of patient  - Hold pressure on venipuncture sites to achieve adequate hemostasis  - Assess for signs and symptoms of internal bleeding  - Monitor lab trends  - Patient is to report abnormal signs of bleeding to staff  - Avoid use of toothpicks and dental floss  - Use electric shaver for shaving  - Use soft bristle tooth brush  - Limit straining and forceful nose blowing  Outcome: Progressing     Problem: MUSCULOSKELETAL - ADULT  Goal: Return mobility to safest level of function  Description: INTERVENTIONS:  - Assess patient stability and activity tolerance for standing, transferring and ambulating w/ or w/o assistive devices  - Assist with transfers and ambulation using safe patient handling equipment as needed  - Ensure adequate protection for wounds/incisions during mobilization  - Obtain PT/OT consults as needed  - Advance activity as appropriate  - Communicate ordered activity level and limitations with patient/family  Outcome: Progressing  Goal: Maintain proper alignment of affected body part  Description: INTERVENTIONS:  - Support and protect limb and body alignment per provider's orders  - Instruct and reinforce with patient and family use of appropriate assistive device and precautions (e.g. spinal or hip dislocation precautions)  Outcome: Progressing     Problem: NEUROLOGICAL - ADULT  Goal: Achieves stable or improved neurological status  Description: INTERVENTIONS  - Assess for and report  changes in neurological status  - Initiate measures to prevent increased intracranial pressure  - Maintain blood pressure and fluid volume within ordered parameters to optimize cerebral perfusion and minimize risk of hemorrhage  - Monitor temperature, glucose, and sodium. Initiate appropriate interventions as ordered  Outcome: Progressing     Problem: Impaired Communication  Goal: Patient will achieve maximal communication potential  Description: Interventions:    Outcome: Progressing     Problem: Safety Risk - Non-Violent Restraints  Goal: Patient will remain free from self-harm  Description: INTERVENTIONS:  - Apply the least restrictive restraint to prevent harm  - Notify patient and family of reasons restraints applied  - Assess for any contributing factors to confusion (electrolyte disturbances, delirium, medications)  - Discontinue any unnecessary medical devices as soon as possible  - Assess the patient's physical comfort, circulation, skin condition, hydration, nutrition and elimination needs   - Reorient and redirection as needed  - Assess for the need to continue restraints  Outcome: Progressing     Problem: Delirium  Goal: Minimize duration of delirium  Description: Interventions:  - Encourage use of hearing aids, eye glasses  - Promote highest level of mobility daily  - Provide frequent reorientation  - Promote wakefulness i.e. lights on, blinds open  - Promote sleep, encourage patient's normal rest cycle i.e. lights off, TV off, minimize noise and interruptions  - Encourage family to assist in orientation and promotion of home routines  Outcome: Progressing

## 2024-08-12 NOTE — PROGRESS NOTES
[facility] AND CARE   Progress Note  -  Roc Butcher Patient Status:  Inpatient    1940 MRN [id number]   Location [facility] 4W/SW/SE Attending Annika Cadet MD   Hosp Day # 18 PCP Mitch Herrera MD     PCP: Mitch Herrera MD      SEE ATTENDING NOTE AT BOTTOM OF PAGE    Is this a shared or split note between Advanced Practice Provider and Physician? Yes    Assessment and Plan:    Roc Butcher is a 84 year old male with PMH sig for type 2 diabetes, CAD status post PCI to left circumflex, PAD, pacemaker, hypertension, paroxysmal A-fib on Eliquis baseline, and chronic systolic heart failure with a EF of 35% hyperlipidemia, prior CVA, and bladder cancer who presented for laparoscopic cystoprostatectomy with ileal conduit diversion.  Post op course with complicated by delirium now improving and enterococcus bacteremia and uti s/p stent removal now on po abx.  Incidental finding on CT of the head was a right parotid mass measuring 1.6 cm with broad differential and radiology recommended ENT evaluation with strong consideration for histological sampling for definitive characterization. Discharge delayed with poor po intake, hypernatremia, acidosis rising wbc. Plans for rehab at [facility] once medically stable , see below for details       Delirium, Improving   Metabolic Encephalopathy  -CT head without acute process   -infectious tx below  -improving on current regimen of seroquel and melatonin  -appreciate psych     Leukocytosis-now improving   Enterococcus bacteremia and UTI R/T procedure and ureteral stents  -afebrile  WBC still elevated but improved  -blood cx enterococcus with repeat blood cx NGTD  -urine with enterococcus  -CT A/P without acute process   -TTE without mention of vegetation.   - stents removed by urology  - TITA without vegetation  - CT A/P with Mild fat stranding seen adjacent to the ileal conduit as well as mild bilateral perinephric and periureteric  fat stranding suggestive of underlying infectious or inflammatory etiology.   -TITA without vegetation   -abx- augmentin to 8/17 as per ID   -appreciate ID who signed off case    Left Leg Weakness  -complains of left hip and thigh pain  -US left thigh r/o DVT  -Xray left hip and pelvis  -PT/OT    Left Basilic Vein DVT  -US with Nonocclusive superficial venous thrombus in left basilic vein.      Acute Kidney Injury on CKD stage 3  Acidosis   Hypernatremia   -CT A/P with ongoing Mild bilateral hydroureteronephrosis, per urology stable   -Cr improving to 1.2  -Na down to 146  -holding off on diuretics   -sodium bicarb  -as per renal      Pleural Effusions/Atelectasis   -no sob or hypoxemia   -CT A/P with Moderate-sized bilateral pleural effusions with bilateral lower lobe atelectasis is unchanged.   -on RA   -encourage IS as able, up in chair       Moderate Malnutrition  -continue Glucerna tid with meals   -ensure staff or family is feeding pt for all meals, changed to general diet or have family bring in food---he is now eating better      Hematuria-intermittent issues   Acute on Chronic Anemia  -baseline hgb ~10's,current hgb 9.1  -intermittent issues with blood   -8/9 CT A/P done with noted mild bilateral hydroureteronephrosis and fat stranding adjacent to the ileal conduit suggestive of infectious or inflammatory etiology, await urology input  -follow hgb     Right Parotid Mass  -noted on CT head   -Partially imaged ovoid 1.6 cm low-attenuation mass in the right parotid gland.   -outpt ENT eval for histological sampling to r/o neoplasm     Bladder cancer S/P lap cystoprostatectomy with ileal conduit diversion on 7/25/2024  -pain meds-tylenol prn   -bowel regimen prn  -urostomy teaching   -8/5 s/p stent removal by urology  -intermittent issues with hematuria, urology notes indicate they are aware of this, repeat UA and CT A/P ordered   -appreciate urology      CAD status post PCI to L Cfx  PAD  S/P  pacemaker  Hypertension  PAF  Chronic HFrEF   HL  Previous CVA  -CT head with old infarcts- noted on previous imaging  -echo with EF 30%, last echo with EF 35% with WMA, mild-mod AI  -Not on ACE ARB due to renal function and hypotension.   -Continue hydralazine, eliquis, hydralazine, lopressor and imdur  -holding home ASA for now  -holding lasix with decreased po and shilpa   -follows with [facility]      DM Type II  -HgbA1C 6.6  -home regimen: lantus 15 units nightly plus novolog   -blood sugars stable and has not needed insulin, will change to prn accuchecks to prevent \"irritation to pt\"     MA/ACO [place]  -ER Visits 2024: 0  -Admissions 2024: 3  -Re- Entry: no   -Consults: urology, cards and ID  -Discharge Needs: [facility]   -Appointments: follow up with PCP Mitch Herrera MD after discharge from rehab     [facility]  -full code      FEN  -lytes in am  -diet-general     Prophy  -SCD  -resume eliquis     Dispo  -possible discharge tomorrow   -update given to wife at  and Nick via phone  PCP: Mitch Herrera MD      Concerns regarding plan of care were discussed with patient. Patient agrees with plan as detailed above. Discussed plan of care with Dr. Cadet     Note: This chart was prepared using voice recognition software and may contain unintended word substitution errors.      Cookie Hugo RN, NP   [facility] Hospitalist Team  Contact via [employer] (Check Availability)  8/12/2024    SUBJECTIVE:   A/A O to person, place and year. Thinks he is here for a fall. Per BS RN pt is eating better today. Pt unable to lift right leg off bed, complaining of left leg pain especially to thigh area. No hematuria       OBJECTIVE:   Blood pressure 141/52, pulse 63, temperature 98.8 °F (37.1 °C), temperature source Oral, resp. rate 18, height 5' 9\" (1.753 m), weight 167 lb (75.8 kg), SpO2 100%.    GENERAL: no apparent distress  NEURO: A/A Ox3, unable to lift right leg off bed   RESP: non  labored, CTA  CARDIO: Regular, no murmur  ABD: soft, NT, ND, BS+  :ileal conduit without blood   EXTREMITIES: generalized edema to LE improved    DIAGNOSTIC DATA:   Labs:     Recent Labs   Lab 08/08/24  0632 08/09/24  0730 08/10/24  0542 08/11/24  0550 08/12/24  0538   WBC 14.0* 17.6* 12.0* 9.2 6.2   HGB 9.9* 9.9* 9.6* 9.2* 9.1*   MCV 85.6 85.0 86.9 89.0 87.2   .0 427.0 418.0 356.0 360.0   BAND  --  1 1  --  2       Recent Labs   Lab 08/08/24  0632 08/09/24  0730 08/10/24  0542 08/11/24  0546 08/12/24  0538   * 145 147* 148* 146*   K 4.3 4.3 3.9 3.9 3.9   * 119* 120* 121* 122*   CO2 17.0* 18.0* 16.0* 16.0* 17.0*   BUN 39* 36* 33* 28* 21   CREATSERUM 1.52* 1.54* 1.42* 1.30 1.23   CA 8.4* 8.3* 8.0* 8.0* 7.8*   * 130* 114* 144* 153*       Recent Labs   Lab 08/06/24  0602   ALT 37   AST 48*   ALB 2.6*       Recent Labs   Lab 08/07/24  1427 08/07/24  1830 08/07/24  2140 08/08/24  0800 08/08/24  1235   PGLU 120* 121* 126* 129* 139*       No results for input(s): \"TROP\" in the last 168 hours.        MEDICATIONS       famotidine  20 mg Oral Daily    amoxicillin potassium and clavulanate  875 mg Oral Q12H    sodium bicarbonate  650 mg Oral BID    QUEtiapine  12.5 mg Oral Once    QUEtiapine  12.5 mg Oral QPM    melatonin  5 mg Oral Nightly    isosorbide dinitrate  5 mg Oral TID    metoprolol tartrate  25 mg Oral 2x Daily(Beta Blocker)    apixaban  5 mg Oral BID    atorvastatin  40 mg Oral Daily    hydrALAZINE  10 mg Oral TID         ALPRAZolam    QUEtiapine    sodium chloride 0.9%    acetaminophen    docusate sodium    glucose **OR** glucose **OR** glucose-vitamin C **OR** dextrose **OR** glucose **OR** glucose **OR** glucose-vitamin C    ondansetron    Cookie Hugo, JOAN      IMAGING     US VENOUS DOPPLER ARM BILAT - DIAG IMG (CPT=93970)    Result Date: 8/9/2024  CONCLUSION:  1. Nonocclusive superficial venous thrombus in left basilic vein. 2. No right-sided thrombus or DVT bilaterally.     Dictated by (CST): Alberto Woo MD on 8/09/2024 at 5:52 PM     Finalized by (CST): Alberto Woo MD on 8/09/2024 at 5:55 PM             SEE ATTENDING NOTE BELOW:     Patient seen and examined independently.  Discussed with APN and agree with note above.    S:  Left leg pain reproted to APN, did not have acute complaints when I saw but just woke from a nap.  No Cp or SOB.  No n/v    Objective:  /52 (BP Location: Right arm)   Pulse 63   Temp 98.8 °F (37.1 °C) (Oral)   Resp 18   Ht 5' 9\" (1.753 m)   Wt 167 lb (75.8 kg)   SpO2 100%   BMI 24.66 kg/m²     Gen: No acute distress, alert and appropriate, MS imrpoved per dw staff   Neck Supple, no JVD  Pulm: Lungs clear, normal respiratory effort, No wheezing or crackles  CV: Heart with regular rate and rhythm, No murmurs, rubs, gallops  Abd: Abdomen soft, nontender, nondistended, no organomegaly, bowel sounds present  MSK:  no clubbing, no cyanosis.  No Lower extremity edema  Skin: no rashes or lesions, well perfused  Psych: mood stable, cooperative  Neuro: no focal deficits    Assessment and Plan  Roc Butcher is a 84 year old male with PMH sig for type 2 diabetes, CAD status post PCI to left circumflex, PAD, pacemaker, hypertension, paroxysmal A-fib on Eliquis baseline, and chronic systolic heart failure with a EF of 35% hyperlipidemia, prior CVA, and bladder cancer who presented for laparoscopic cystoprostatectomy with ileal conduit diversion.  Post op course with complicated by delirium now improving and enterococcus bacteremia and uti s/p stent removal now on po abx.  Incidental finding on CT of the head was a right parotid mass measuring 1.6 cm with broad differential and radiology recommended ENT evaluation with strong consideration for histological sampling for definitive characterization. Discharge delayed with poor po intake, hypernatremia, acidosis rising wbc. Plans for rehab at [facility] once medically stable , see below for details        Delirium, Improving   Metabolic Encephalopathy  -CT head without acute process   -infectious tx below  -improving on current regimen of seroquel and melatonin  -appreciate psych     Leukocytosis-now improving   Enterococcus bacteremia and UTI R/T procedure and ureteral stents  -afebrile  WBC still elevated but improved  -blood cx enterococcus with repeat blood cx NGTD  -urine with enterococcus  -CT A/P without acute process   -TTE without mention of vegetation.   -8/5 stents removed by urology  -8/5 TITA without vegetation  -8/6 CT A/P with Mild fat stranding seen adjacent to the ileal conduit as well as mild bilateral perinephric and periureteric fat stranding suggestive of underlying infectious or inflammatory etiology.   -TITA without vegetation   -abx- augmentin to 8/17 as per ID   -appreciate ID who signed off case    Left Leg Weakness  -complains of left hip and thigh pain  -US left thigh r/o DVT  -Xray left hip and pelvis  -PT/OT    Rest as above with above    Annika Cadet MD

## 2024-08-13 ENCOUNTER — EXTERNAL FACILITY (OUTPATIENT)
Dept: INTERNAL MEDICINE CLINIC | Facility: CLINIC | Age: 84
End: 2024-08-13

## 2024-08-13 VITALS
DIASTOLIC BLOOD PRESSURE: 70 MMHG | RESPIRATION RATE: 18 BRPM | TEMPERATURE: 98 F | HEART RATE: 71 BPM | OXYGEN SATURATION: 98 % | WEIGHT: 167 LBS | SYSTOLIC BLOOD PRESSURE: 122 MMHG | BODY MASS INDEX: 24.73 KG/M2 | HEIGHT: 69 IN

## 2024-08-13 DIAGNOSIS — Z98.890 HISTORY OF ILEOSTOMY: Primary | ICD-10-CM

## 2024-08-13 DIAGNOSIS — I25.118 CORONARY ARTERY DISEASE OF NATIVE ARTERY OF NATIVE HEART WITH STABLE ANGINA PECTORIS (HCC): ICD-10-CM

## 2024-08-13 DIAGNOSIS — D64.9 ANEMIA, UNSPECIFIED TYPE: ICD-10-CM

## 2024-08-13 DIAGNOSIS — E11.65 UNCONTROLLED TYPE 2 DIABETES MELLITUS WITH HYPERGLYCEMIA (HCC): ICD-10-CM

## 2024-08-13 DIAGNOSIS — I10 ESSENTIAL HYPERTENSION: ICD-10-CM

## 2024-08-13 DIAGNOSIS — E78.5 HYPERLIPIDEMIA, UNSPECIFIED HYPERLIPIDEMIA TYPE: ICD-10-CM

## 2024-08-13 DIAGNOSIS — K11.8 PAROTID MASS: ICD-10-CM

## 2024-08-13 DIAGNOSIS — C67.9 MALIGNANT NEOPLASM OF URINARY BLADDER, UNSPECIFIED SITE (HCC): ICD-10-CM

## 2024-08-13 DIAGNOSIS — R78.81 BACTEREMIA DUE TO ENTEROCOCCUS: ICD-10-CM

## 2024-08-13 DIAGNOSIS — F05 DELIRIUM DUE TO ANOTHER MEDICAL CONDITION: ICD-10-CM

## 2024-08-13 DIAGNOSIS — B95.2 BACTEREMIA DUE TO ENTEROCOCCUS: ICD-10-CM

## 2024-08-13 DIAGNOSIS — N18.31 STAGE 3A CHRONIC KIDNEY DISEASE (HCC): ICD-10-CM

## 2024-08-13 LAB
ALBUMIN SERPL-MCNC: 2.7 G/DL (ref 3.2–4.8)
ANION GAP SERPL CALC-SCNC: 8 MMOL/L (ref 0–18)
ANION GAP SERPL CALC-SCNC: 8 MMOL/L (ref 0–18)
BASOPHILS # BLD: 0 X10(3) UL (ref 0–0.2)
BASOPHILS NFR BLD: 0 %
BUN BLD-MCNC: 25 MG/DL (ref 9–23)
BUN BLD-MCNC: 25 MG/DL (ref 9–23)
BUN/CREAT SERPL: 20 (ref 10–20)
BUN/CREAT SERPL: 20 (ref 10–20)
C DIFF GDH + TOXINS A+B STL QL IA.RAPID: NOT DETECTED
C DIFF TOX B STL QL: POSITIVE
CALCIUM BLD-MCNC: 8.2 MG/DL (ref 8.7–10.4)
CALCIUM BLD-MCNC: 8.2 MG/DL (ref 8.7–10.4)
CHLORIDE SERPL-SCNC: 119 MMOL/L (ref 98–112)
CHLORIDE SERPL-SCNC: 119 MMOL/L (ref 98–112)
CO2 SERPL-SCNC: 20 MMOL/L (ref 21–32)
CO2 SERPL-SCNC: 20 MMOL/L (ref 21–32)
CREAT BLD-MCNC: 1.25 MG/DL
CREAT BLD-MCNC: 1.25 MG/DL
DEPRECATED RDW RBC AUTO: 69.1 FL (ref 35.1–46.3)
EGFRCR SERPLBLD CKD-EPI 2021: 57 ML/MIN/1.73M2 (ref 60–?)
EGFRCR SERPLBLD CKD-EPI 2021: 57 ML/MIN/1.73M2 (ref 60–?)
EOSINOPHIL # BLD: 0.16 X10(3) UL (ref 0–0.7)
EOSINOPHIL NFR BLD: 3 %
ERYTHROCYTE [DISTWIDTH] IN BLOOD BY AUTOMATED COUNT: 22.4 % (ref 11–15)
GLUCOSE BLD-MCNC: 145 MG/DL (ref 70–99)
GLUCOSE BLD-MCNC: 145 MG/DL (ref 70–99)
HCT VFR BLD AUTO: 30 %
HGB BLD-MCNC: 9.6 G/DL
LYMPHOCYTES NFR BLD: 0.54 X10(3) UL (ref 1–4)
LYMPHOCYTES NFR BLD: 10 %
MAGNESIUM SERPL-MCNC: 1.8 MG/DL (ref 1.6–2.6)
MCH RBC QN AUTO: 27.1 PG (ref 26–34)
MCHC RBC AUTO-ENTMCNC: 32 G/DL (ref 31–37)
MCV RBC AUTO: 84.7 FL
MONOCYTES # BLD: 0.32 X10(3) UL (ref 0.1–1)
MONOCYTES NFR BLD: 6 %
NEUTROPHILS # BLD AUTO: 3.25 X10 (3) UL (ref 1.5–7.7)
NEUTROPHILS NFR BLD: 80 %
NEUTS BAND NFR BLD: 1 %
NEUTS HYPERSEG # BLD: 4.37 X10(3) UL (ref 1.5–7.7)
OSMOLALITY SERPL CALC.SUM OF ELEC: 311 MOSM/KG (ref 275–295)
OSMOLALITY SERPL CALC.SUM OF ELEC: 311 MOSM/KG (ref 275–295)
PHOSPHATE SERPL-MCNC: 3.8 MG/DL (ref 2.4–5.1)
PLATELET # BLD AUTO: 430 10(3)UL (ref 150–450)
PLATELET MORPHOLOGY: NORMAL
POTASSIUM SERPL-SCNC: 4 MMOL/L (ref 3.5–5.1)
POTASSIUM SERPL-SCNC: 4 MMOL/L (ref 3.5–5.1)
RBC # BLD AUTO: 3.54 X10(6)UL
SODIUM SERPL-SCNC: 147 MMOL/L (ref 136–145)
SODIUM SERPL-SCNC: 147 MMOL/L (ref 136–145)
TOTAL CELLS COUNTED BLD: 100
WBC # BLD AUTO: 5.4 X10(3) UL (ref 4–11)

## 2024-08-13 PROCEDURE — 99306 1ST NF CARE HIGH MDM 50: CPT | Performed by: INTERNAL MEDICINE

## 2024-08-13 PROCEDURE — 99232 SBSQ HOSP IP/OBS MODERATE 35: CPT | Performed by: NURSE PRACTITIONER

## 2024-08-13 RX ORDER — SODIUM BICARBONATE 650 MG/1
650 TABLET ORAL 2 TIMES DAILY
Status: SHIPPED | COMMUNITY
Start: 2024-08-13

## 2024-08-13 RX ORDER — ESCITALOPRAM OXALATE 5 MG/1
5 TABLET ORAL NIGHTLY
Status: SHIPPED | COMMUNITY
Start: 2024-08-13

## 2024-08-13 RX ORDER — MAGNESIUM OXIDE 400 MG (241.3 MG MAGNESIUM) TABLET
TABLET
Status: SHIPPED | COMMUNITY
Start: 2024-08-13

## 2024-08-13 RX ORDER — ACETAMINOPHEN 500 MG
1000 TABLET ORAL EVERY 8 HOURS PRN
Status: SHIPPED | COMMUNITY
Start: 2024-08-13

## 2024-08-13 RX ORDER — QUETIAPINE FUMARATE 25 MG/1
12.5 TABLET, FILM COATED ORAL EVERY EVENING
Status: SHIPPED | COMMUNITY
Start: 2024-08-13

## 2024-08-13 RX ORDER — ESCITALOPRAM OXALATE 5 MG/1
5 TABLET ORAL NIGHTLY
Status: DISCONTINUED | OUTPATIENT
Start: 2024-08-13 | End: 2024-08-13

## 2024-08-13 NOTE — PLAN OF CARE
Problem: Patient Centered Care  Goal: Patient preferences are identified and integrated in the patient's plan of care  Description: Interventions:  - What would you like us to know as we care for you? From home with wife  - Provide timely, complete, and accurate information to patient/family  - Incorporate patient and family knowledge, values, beliefs, and cultural backgrounds into the planning and delivery of care  - Encourage patient/family to participate in care and decision-making at the level they choose  - Honor patient and family perspectives and choices  Outcome: Progressing     Problem: Impaired Cognition  Goal: Patient will exhibit improved attention, thought processing and/or memory  Description: Interventions:  - Minimize distractions in the room when full attention is required  - Allow additional time for processing after asking questions or providing instructions  Outcome: Progressing     Problem: SAFETY ADULT - FALL  Goal: Free from fall injury  Description: INTERVENTIONS:  - Assess pt frequently for physical needs  - Identify cognitive and physical deficits and behaviors that affect risk of falls.  - Dyke fall precautions as indicated by assessment.  - Educate pt/family on patient safety including physical limitations  - Instruct pt to call for assistance with activity based on assessment  - Modify environment to reduce risk of injury  - Provide assistive devices as appropriate  - Consider OT/PT consult to assist with strengthening/mobility  - Encourage toileting schedule  Outcome: Progressing  No acute changes overnight.  Patient alert X1-2 with some irritability and restlessness at start of shift; however, pt calmed down after taking PM meds (melatonin and scheduled tylenol along with lidocaine patch on right hip).  VSS on room air, no tele (has pacemaker), eliquis for VTE prophylaxis (though pt has a superficial DVT in the left upper arm).  Ileal conduit draining  to nath bag with braulio  hazy urine and  sediment.  C-diff results still pending.  Patient is stand/pivot with 2 assist for transfers.  Safety precautions maintained; frequent rounding done as well as video monitoring.  Nothing acute noted on 8/12  right lower extremity imaging.  Plan is for patient to go to [facility] rehab when medically cleared.

## 2024-08-13 NOTE — DIETARY NOTE
ADULT NUTRITION REASSESSMENT    Pt is at moderate nutrition risk.  Pt meets moderate malnutrition criteria.  ***      CRITERIA FOR MALNUTRITION DIAGNOSIS:  Criteria for non-severe malnutrition diagnosis: acute illness/injury related to energy intake less than 75% for greater than 7 days and muscle mass mild depletion.      RECOMMENDATIONS TO MD: See Nutrition Intervention for diet and ONS specifics     ADMITTING DIAGNOSIS:  Bladder cancer  Bladder cancer (HCC)  PERTINENT PAST MEDICAL HISTORY:   Past Medical History:    Arrhythmia    Cancer (HCC)    bladder    Cataract    High blood pressure    High cholesterol    History of blood transfusion    HYPERLIPIDEMIA    HYPERTENSION    Other and unspecified hyperlipidemia    STROKE    Type II or unspecified type diabetes mellitus without mention of complication, not stated as uncontrolled    Unspecified essential hypertension    Visual impairment     PATIENT STATUS:   08/01/24 INITIAL VISIT: Pt rescreened at nutritional risk r/t poor intake since admission. Pt presented to the hospital for laparoscopic cystoprostatectomy with ileal conduit diversion. Has extensive PMH as listed above. POD #7. Post-op course complicated by delirium. Plan for transfer to SNF once delirium improves. Pt sitting up in chair sleeping at time of visit. Roused for interview however pt not appropriate - mumbling/garbled speech. Noted poor dentition. Obtained further diet hx from son Nick via phone. Per son, pt's appetite/intake was good/normal PTA. Typically consumes 2 meals per day with an occasional evening snack. Self / spouse prepared breakfast. Pt and spouse eat out for a late lunch daily. Occasionally drinks chocolate Ensure. No recent wt changes.           Update 08/07/24: Reassessment per protocol, additionally consult received today for \"poor po'. Chart reviewed. Pt with poor appetite, consuming 0-25% of meals since last assessment. Per NP, \"poor po intake per staff, does like Glucerna  shakes and liquids but not much food, continue Glucerna tid with meals, limited options, would rec against NGT with confusion and risk of removal/dislodgement, And ppn/tpn not indicated, ensure staff is feeding pt for all meals\". RD recommends CPM.     Update 08/13/24: ***      FOOD/NUTRITION RELATED HISTORY:  Appetite:  Good appetite PTA however very poor since admission   Intake:  poor-fair intakes with meal trays, mostly 50-75% but occasionally 0-15%    Intake Meeting Needs: No, even with oral nutrition supplements (ONS) ordered ***  Percent Meals Eaten (last 6 days)       Date/Time Percent Meals Eaten (%)    08/07/24 1053 5 %    08/07/24 2000 0 %    08/08/24 1036 0 %    08/08/24 1238 0 %    08/08/24 1845 0 %    08/09/24 1100 0 %     Percent Meals Eaten (%): 1 bite of banana, 1 bite Irish toast at 08/09/24 1100    08/10/24 1000 0 %    08/10/24 1300 50 %    08/10/24 1800 0 %     Percent Meals Eaten (%): 2-3 bites & said no more at 08/10/24 1800    08/11/24 1100 75 %     Percent Meals Eaten (%): Irish toast, sausage, coke, glucerna at 08/11/24 1100    08/11/24 1900 15 %     Percent Meals Eaten (%): ice cream & cookies at 08/11/24 1900    08/12/24 0911 75 %     Percent Meals Eaten (%): Irish toast and coke at 08/12/24 0911    08/12/24 1904 50 %     Percent Meals Eaten (%): less than half of cheeseburger and 3 fries at 08/12/24 1904          Food Allergies: No Known Food Allergies (NKFA)  Cultural/Ethnic/Gnosticism Preferences: Not Obtained    GASTROINTESTINAL: 8/12 loose BM x 5     MEDICATIONS: reviewed; Noted non-cardiac electrolyte replacement protocol ordered ***       acetaminophen  1,000 mg Oral Q8H    lidocaine-menthol  1 patch Transdermal Daily    famotidine  20 mg Oral Daily    amoxicillin potassium and clavulanate  875 mg Oral Q12H    sodium bicarbonate  650 mg Oral BID    QUEtiapine  12.5 mg Oral Once    QUEtiapine  12.5 mg Oral QPM    melatonin  5 mg Oral Nightly    isosorbide dinitrate  5 mg Oral TID     metoprolol tartrate  25 mg Oral 2x Daily(Beta Blocker)    apixaban  5 mg Oral BID    atorvastatin  40 mg Oral Daily    hydrALAZINE  10 mg Oral TID     LABS: reviewed; noted recent A1C 6.6 pm 7/26/24 ***    Recent Labs     08/11/24  0546 08/12/24  0538 08/13/24  0625   * 153* 145*  145*   BUN 28* 21 25*  25*   CREATSERUM 1.30 1.23 1.25  1.25   CA 8.0* 7.8* 8.2*  8.2*   MG  --   --  1.8   * 146* 147*  147*   K 3.9 3.9 4.0  4.0   * 122* 119*  119*   CO2 16.0* 17.0* 20.0*  20.0*   PHOS  --   --  3.8   OSMOCALC 314* 308* 311*  311*     NUTRITION RELATED PHYSICAL FINDINGS:  - Nutrition Focused Physical Exam (NFPE): mild depletion muscle mass temple region per visual exam.   - Fluid Accumulation: none  see RN documentation for details  - Skin Integrity: at risk and surgical wound(s) see RN documentation for details    ANTHROPOMETRICS:  HT: 175.3 cm (5' 9\")  WT: 75.8 kg (167 lb)   BMI: Body mass index is 24.66 kg/m².  BMI CLASSIFICATION: 19-24.9 kg/m2 - WNL  IBW: 160 lbs        104% IBW  Usual Body Wt: 171 lbs (per Care Everywhere 5/3/24)      98% UBW    WEIGHT HISTORY: Family denies recent wt loss.    No data found.    Wt Readings from Last 10 Encounters:   07/25/24 75.8 kg (167 lb)   07/22/24 75.3 kg (166 lb)   03/21/22 85.3 kg (188 lb)   11/23/21 85.7 kg (189 lb)   10/06/21 84.4 kg (186 lb)   09/20/21 84.8 kg (187 lb)   08/25/21 87.1 kg (192 lb)   03/10/21 89.8 kg (198 lb)   09/10/20 89.8 kg (198 lb)   05/07/20 93 kg (205 lb)     NUTRITION DIAGNOSIS/PROBLEM:   Inadequate oral intake related to Decreased ability to consume sufficient energy in the setting of delirium as evidenced by refusing to eat / minimum intake of meal trays since admission (x7 days).     NUTRITION DIAGNOSIS PROGRESS:  No Improvement (continue) - 0-25% po intakes since last assessment ***      NUTRITION INTERVENTION:     NUTRITION PRESCRIPTION:   Estimated Nutrition needs: --dosing wt of 76 kg - wt taken on  7/25/24  Calories: 5801-3034 calories/day (25-30 calories per kg Dosing wt)  Protein:  g protein/day (1.2-1.5 g protein/kg Dosing wt)  Fluid Needs: 0019-6071 ml/day (1 ml/kcal)    - Diet:       Procedures    Regular/General diet Is Patient on Accuchecks? Yes      - Meals and snacks:  Added ONS  - Medical Food Supplements: RD added Glucerna (220 calories/ 10 g protein each) TID Chocolate. Rational/use of oral supplements discussed.  - Vitamin and mineral supplements: none  - Feeding assistance: meal set up and feed and requested feed by staff per MD  - Nutrition education: not appropriate at this time   - Coordination of nutrition care: collaboration with other providers - discussed with RN on unit  - Discharge and transfer of nutrition care to new setting or provider: monitor plans - to SNF when delirium improves    MONITOR AND EVALUATE/NUTRITION GOALS:  - Food and Nutrient Intake:      Monitor: adequacy of PO intake, tolerance of PO intake, adequacy of supplement intake, and tolerance of supplement intake  - Food and Nutrient Administration:      Monitor: need for temporary nutrition support and goc/family wishes regarding nutrition, not appropriate for EN per risk of removal/dislodgement 2/2 pt with confusion, and ppn/tpn not indicated, per MD  - Anthropometric Measurement:    Monitor weight  - Nutrition Goals:      PO and supplement greater than 75% of needs, labs within acceptable limits, promote healing, monitor fluid status, and maintain true wt within 5%    DIETITIAN FOLLOW UP: RD to follow and monitor nutrition status    Ann-Marie Parker MS, RD, LDN   Clinical Dietitian   P: [phone]

## 2024-08-13 NOTE — PROGRESS NOTES
Interval history:   Creatinine improving today.  Sodium levels improving      MEDICAL HISTORY:   Past Medical History:    Arrhythmia    Cancer (HCC)    bladder    Cataract    High blood pressure    High cholesterol    History of blood transfusion    HYPERLIPIDEMIA    HYPERTENSION    Other and unspecified hyperlipidemia    STROKE    Type II or unspecified type diabetes mellitus without mention of complication, not stated as uncontrolled    Unspecified essential hypertension    Visual impairment       SURGICAL HISTORY:   Past Surgical History:   Procedure Laterality Date    Cardiac pacemaker placement      Other surgical history      bladder tumor removal    Tonsillectomy         FAMILY HISTORY:   Family History   Problem Relation Age of Onset    Heart Disorder Father     Other Father         HIP FX    Heart Disorder Mother     Other Mother         CVA AGE 80       SOCIAL HISTORY:   Social History     Socioeconomic History    Marital status:      Spouse name: Not on file    Number of children: Not on file    Years of education: Not on file    Highest education level: Not on file   Occupational History    Not on file   Tobacco Use    Smoking status: Former     Types: Cigarettes    Smokeless tobacco: Never    Tobacco comments:     2010   Vaping Use    Vaping status: Never Used   Substance and Sexual Activity    Alcohol use: Yes     Comment: HEAVY PREVIOUS NOW SOCIAL    Drug use: No    Sexual activity: Not on file   Other Topics Concern    Not on file   Social History Narrative    Not on file     Social Determinants of Health     Financial Resource Strain: Low Risk  (5/30/2024)    Received from [facility]    Financial Resource Strain     In the past year, have you or any family members you live with been unable to get any of the following when it was really needed? Check all that apply.: None   Food Insecurity: No Food Insecurity (7/25/2024)    Food Insecurity     Food Insecurity: Never true    Transportation Needs: No Transportation Needs (7/25/2024)    Transportation Needs     Lack of Transportation: No     Car Seat: Not on file   Physical Activity: High Risk (5/3/2024)    Received from [facility]    Exercise Vital Sign     On average, how many days per week do you engage in moderate to strenuous exercise (like a brisk walk)?: 0 days     On average, how many minutes do you engage in exercise at this level?: 0 min   Stress: Not on file   Social Connections: Medium Risk (5/30/2024)    Received from [facility]    Social Connections     How often do you see or talk to people that you care about and feel close to? (For example: talking to friends on the phone, visiting friends or family, going to [employer] or club meetings): 1 or 2 times a week   Housing Stability: Low Risk  (7/25/2024)    Housing Stability     Housing Instability: No     Housing Instability Emergency: Not on file     Crib or [place]inette: Not on file       MEDICATIONS:   Current Facility-Administered Medications   Medication Dose Route Frequency    acetaminophen (Tylenol Extra Strength) tab 1,000 mg  1,000 mg Oral Q8H    lidocaine-menthol 4-1 % patch 1 patch  1 patch Transdermal Daily    famotidine (Pepcid) tab 20 mg  20 mg Oral Daily    amoxicillin clavulanate (Augmentin) 875-125 MG per tab 875 mg  875 mg Oral Q12H    sodium bicarbonate tab 650 mg  650 mg Oral BID    QUEtiapine (SEROquel) tab 12.5 mg  12.5 mg Oral Once    QUEtiapine (SEROquel) tab 12.5 mg  12.5 mg Oral QPM    sodium chloride 0.9% 0.9% flush injection 10 mL  10 mL Intravenous PRN    melatonin cap/tab 5 mg  5 mg Oral Nightly    isosorbide dinitrate (Isordil Titradose) tab 5 mg  5 mg Oral TID    metoprolol tartrate (Lopressor) tab 25 mg  25 mg Oral 2x Daily(Beta Blocker)    docusate sodium (Colace) cap 100 mg  100 mg Oral BID PRN    apixaban (Eliquis) tab 5 mg  5 mg Oral BID    atorvastatin (Lipitor) tab 40 mg  40 mg Oral Daily    hydrALAZINE (Apresoline)  tab 10 mg  10 mg Oral TID    glucose (Dex4) 15 GM/59ML oral liquid 15 g  15 g Oral Q15 Min PRN    Or    glucose (Glutose) 40% oral gel 15 g  15 g Oral Q15 Min PRN    Or    glucose-vitamin C (Dex-4) chewable tab 4 tablet  4 tablet Oral Q15 Min PRN    Or    dextrose 50% injection 50 mL  50 mL Intravenous Q15 Min PRN    Or    glucose (Dex4) 15 GM/59ML oral liquid 30 g  30 g Oral Q15 Min PRN    Or    glucose (Glutose) 40% oral gel 30 g  30 g Oral Q15 Min PRN    Or    glucose-vitamin C (Dex-4) chewable tab 8 tablet  8 tablet Oral Q15 Min PRN    ondansetron (Zofran) 4 MG/2ML injection 4 mg  4 mg Intravenous Q6H PRN         ALLERGIES:   Allergies   Allergen Reactions    Metformin Hcl OTHER (SEE COMMENTS)      Oral,   severe dry mouth       REVIEW OF SYSTEMS:  A comprehensive review of systems was conducted and is negative except for what is mentioned in the HPI      PHYSICAL EXAM:  /60 (BP Location: Right arm)   Pulse 68   Temp 97.5 °F (36.4 °C) (Axillary)   Resp 16   Ht 5' 9\" (1.753 m)   Wt 167 lb (75.8 kg)   SpO2 99%   BMI 24.66 kg/m²   GEN:  NAD  HEENT: NCAT, dry MM   CHEST: CTA b/l  CARDIAC: S1S2 normal  ABD: soft, NT/ND  : urostomy - hematuria noted   EXT: no lower ext edema  NEURO: sleepy       LABS:  Recent Labs   Lab 08/11/24  0546 08/12/24  0538 08/13/24  0625   * 153* 145*  145*   BUN 28* 21 25*  25*   CREATSERUM 1.30 1.23 1.25  1.25   EGFRCR 54* 58* 57*  57*   CA 8.0* 7.8* 8.2*  8.2*   * 146* 147*  147*   K 3.9 3.9 4.0  4.0   * 122* 119*  119*   CO2 16.0* 17.0* 20.0*  20.0*     Recent Labs   Lab 08/11/24  0550 08/12/24  0538 08/13/24  0625   RBC 3.44* 3.37* 3.54*   HGB 9.2* 9.1* 9.6*   HCT 30.6* 29.4* 30.0*   MCV 89.0 87.2 84.7   MCH 26.7 27.0 27.1   MCHC 30.1* 31.0 32.0   RDW 23.5* 22.8* 22.4*   NEPRELIM 6.20 3.61 3.25   WBC 9.2 6.2 5.4   .0 360.0 430.0           INTAKE/OUTPUT:    Intake/Output Summary (Last 24 hours) at 8/13/2024 0839  Last data filed at  8/13/2024 0601  Gross per 24 hour   Intake 220 ml   Output 1400 ml   Net -1180 ml             IMAGING:  US VENOUS DOPPLER LEG RIGHT - DIAG IMG (CPT=93971)    Result Date: 8/12/2024  CONCLUSION:  No DVT in the right lower extremity.    Dictated by (CST): Jericho Santos MD on 8/12/2024 at 4:59 PM     Finalized by (CST): Jericho Santos MD on 8/12/2024 at 4:59 PM          XR HIP W OR WO PELVIS 2 OR 3 VIEWS, RIGHT (CPT=73502)    Result Date: 8/12/2024  CONCLUSION:  1. No fracture or dislocation. 2. Stable mild hip joint osteoarthritis bilaterally. 3. Lesser incidental findings as above.    Dictated by (CST): Jericho Santos MD on 8/12/2024 at 3:30 PM     Finalized by (CST): Jericho Santos MD on 8/12/2024 at 3:32 PM              ASSESSMENT AND PLAN:   This is an 84 year old male with PMH sig for DM2, HTN, CAD, Afib, systolic heart failure.  Hx of bladder CA and is s/p laparoscopic cystoprostatectomy with ileal conduit diversion on 7/25. Post op course complicated by enterococcus bacteremia. Nephrology is consulted for ALBERT.     Non-oliguric ALBERT   - sCr 1.29 on admission  - Urine Na 43   - etiology may be pre-renal ALBERT vs ATN in the setting of infection  - sCr improving  - stopped IVFs given mod effusion and ansarca on CT chest. Push po hydration given hypernatremia.  - hold lasix, monitor volume status  - continue supportive care    - renally dose meds   - avoid nephro toxins  - follow renal fxn and I/Os  - no acute indication for RRT      Hypernatremia  -switched IVF to 0.45 NS  -now off   -now off IVF d/t effusion, anasarca  -push po hydration    Acidosis   - likely due to ALBERT and ileal conduit   -IVF as above  -cont na bicarb tabs BID   - monitor      HTN    - Metoprolol, hydralazine      Fever, leukocytosis   Enterococcus bacteremia  - management and abx per primary / ID    -sp TITA    Bladder CA s/p laparoscopic cystoprostatectomy with ileal conduit diversion 7/25  Mild hydronephrosis   - per Urology         Thank you for the consult and allowing us to participate in the care of Roc Butcher.  We will continue to follow.    Juan Carlos Brady MD  [facility]  Nephrology

## 2024-08-13 NOTE — CM/SW NOTE
CM was notified by RN that [facility] has now cleared pt to dc today pending c diff result and psych clearance.    CM notified [facility] of above.  They will have a bed today and will confirm auth is still good.    CM notified son Nick and wife of above.    1340  C diff +  CM notified [facility] liaison. Req priv room.  RN will confirm pt is dc cleared w/ [facility]/ID    1345  [facility] confirmed ins auth is good thru today and they will have a bed.    1415  Per RN pt is clear to dc today.    Plan  [facility] 4:30  [facility]  RN report [phone]    / to remain available for support and/or discharge planning.     Tatyana Rajan RN    Ext [phone]

## 2024-08-13 NOTE — DISCHARGE SUMMARY
[facility] Hospitalist Discharge Summary   Patient ID:  Roc Butcher  [id number]  84 year old  2/8/1940    Admit date: 7/25/2024  Discharge date: 8/13/2024    Primary Care Physician: Mitch Herrera MD   Attending Physician: Annika Cadet MD   Consults:   Consultants         Provider   Role Specialty     John Johnson MD      Consulting Physician INFECTIOUS DISEASES     Eben Rick MD      Consulting Physician Cardiovascular Diseases     Rabia Claros DO      Consulting Physician INFECTIOUS DISEASES     Megan Galeano MD      Consulting Physician NEPHROLOGY     Inocente Rivera MD      Consulting Physician Psychiatry     Alysha Sibley DO      Consulting Physician Internal Medicine            Hospital Discharge Diagnoses:   <principal problem not specified>  ----See D/C Summary for further Dx    Risk of Readmission Lace+ Score: 64  59-90 High Risk  29-58 Medium Risk  0-28   Low Risk.    TCM Follow-Up Recommendation:  LACE > 58: High Risk of readmission after discharge from the hospital.    Please note that only IHP DMG and EMG patients enrolled in the Medicare ACO, [facility] ACO and [facility] HMOs will be handled by the \[facility] [place] [facility]\"" Care Management team.  For all other patients, please follow usual protocol for discharge care transition.    Reason for admission  Per H/P Dated 7/25/2024  by Dr Fenton  Roc Butcher is a 84 year old male with PMH sig for type 2 diabetes, CAD status post PCI to left circumflex, PAD, pacemaker, hypertension, paroxysmal A-fib on Eliquis baseline, and chronic systolic heart failure with a EF of 35% hyperlipidemia, prior CVA, and bladder cancer who presented for laparoscopic cystoprostatectomy with ileal conduit diversion.       Postoperatively pain is controlled with IV pain meds.  Denies any nausea.  No chest pain or shortness of breath.  Feels a little sleepy.  Otherwise doing well.    Hospital Course:    Roc Butcher is a 84 year old male  with PMH sig for type 2 diabetes, CAD status post PCI to left circumflex, PAD, pacemaker, hypertension, paroxysmal A-fib on Eliquis baseline, and chronic systolic heart failure with a EF of 35% hyperlipidemia, prior CVA, and bladder cancer who presented for laparoscopic cystoprostatectomy with ileal conduit diversion.  Post op course with complicated by delirium now improving and enterococcus bacteremia and uti s/p stent removal now on po abx.  Incidental finding on CT of the head was a right parotid mass measuring 1.6 cm with broad differential and radiology recommended ENT evaluation with strong consideration for histological sampling for definitive characterization. Discharge delayed with poor po intake, hypernatremia, acidosis rising wbc, now improved. [facility] discharged [facility],   see below for details       Delirium, Improving   Metabolic Encephalopathy  -CT head without acute process   -seen by psych, currently on seroquel, melatonin and lexapro added 8/13     Enterococcus bacteremia and UTI R/T procedure and ureteral stents  -blood cx enterococcus with repeat blood cx NGTD  -urine with enterococcus  -CT A/P without acute process   -TTE without mention of vegetation.   -8/5 stents removed by urology  -8/5 TITA without vegetation  -8/6 CT A/P with Mild fat stranding seen adjacent to the ileal conduit as well as mild bilateral perinephric and periureteric fat stranding suggestive of underlying infectious or inflammatory etiology.   -TITA without vegetation   -off abx now      Left Leg Weakness-resolved   -complains of left hip and thigh pain  -US left leg negative for dvt  -Xray left hip and pelvis- no acute fx   -PT/OT     Left Basilic Vein DVT  -US with Nonocclusive superficial venous thrombus in left basilic vein.   -no tx needed     Diarrhea  -noted 6 stools x 24 hours, apparently soft  -augmentin held as rec by urology in setting of numerous BM's  -c-diff + but EIA negative     Acute Kidney Injury on CKD  stage 3  Acidosis   Hypernatremia   -CT A/P with ongoing Mild bilateral hydroureteronephrosis, per urology stable   -Cr improved, currently at 1.25  -Na at 147  -sodium bicarb     Pleural Effusions/Atelectasis   -no sob or hypoxemia   -CT A/P with Moderate-sized bilateral pleural effusions with bilateral lower lobe atelectasis is unchanged.   -on RA   -encourage IS as able, up in chair       Moderate Malnutrition  -continue Glucerna tid with meals   -ensure staff or family is feeding pt for all meals, changed to general diet or have family bring in food---he is now eating better      Hematuria-intermittent issues   Acute on Chronic Anemia  -baseline hgb ~10's,current hgb 9.6  -intermittent issues with blood   -8/9 CT A/P done with noted mild bilateral hydroureteronephrosis and fat stranding adjacent to the ileal conduit suggestive of infectious or inflammatory etiology, await urology input     Right Parotid Mass  -noted on CT head   -Partially imaged ovoid 1.6 cm low-attenuation mass in the right parotid gland.   -outpt ENT eval for histological sampling to r/o neoplasm     Bladder cancer S/P lap cystoprostatectomy with ileal conduit diversion on 7/25/2024  -pain meds-tylenol prn   -urostomy teaching   -8/5 s/p stent removal by urology  -intermittent issues with hematuria, urology was aware, repeat CT A/P negative for acute process  -follow up with urology      CAD status post PCI to L Cfx  PAD  S/P pacemaker  Hypertension  PAF  Chronic HFrEF   HL  Previous CVA  -CT head with old infarcts- noted on previous imaging  -echo with EF 30%, last echo with EF 35% with WMA, mild-mod AI  -Not on ACE ARB due to renal function and hypotension.   -Continue hydralazine, eliquis, hydralazine, lopressor and imdur  -holding home ASA for now with hematuria and eliquis use  -holding lasix with decreased po and shilpa   -follows with [facility]      DM Type II  -HgbA1C 6.6  -holding home regimen: lantus 15 units nightly plus novolog    Due to low BS     MA/ACO [place]  -ER Visits 2024: 0  -Admissions 2024: 3  -Re- Entry: no   -Consults: urology, cards and ID  -Discharge Needs: [facility]   -Appointments: follow up with PCP Mitch Herrera MD after discharge from rehab     [facility]  -full code     EXAM:   GENERAL: no apparent distress  NEURO: A/A Ox3  RESP: non labored, CTA  CARDIO: Regular, no murmur  ABD: soft, NT, ND, BS+  : ileal conduit   EXTREMITIES: trace LE edema     Operative Procedures: Procedure(s) (LRB):  Xi robotic assisted laparoscopic radical cystoprostatectomy, bilateral pelvic lymph node dissection ileal conduit diversion (N/A)  Radiology:   US VENOUS DOPPLER LEG RIGHT - DIAG IMG (CPT=93971)    Result Date: 8/12/2024  CONCLUSION:  No DVT in the right lower extremity.    Dictated by (CST): Jericho Santos MD on 8/12/2024 at 4:59 PM     Finalized by (CST): Jericho Santos MD on 8/12/2024 at 4:59 PM          XR HIP W OR WO PELVIS 2 OR 3 VIEWS, RIGHT (CPT=73502)    Result Date: 8/12/2024  CONCLUSION:  1. No fracture or dislocation. 2. Stable mild hip joint osteoarthritis bilaterally. 3. Lesser incidental findings as above.    Dictated by (CST): Jericho Santos MD on 8/12/2024 at 3:30 PM     Finalized by (CST): Jericho Santos MD on 8/12/2024 at 3:32 PM           Discharge Instructions     Medication List        START taking these medications      acetaminophen 500 MG Tabs  Commonly known as: Tylenol Extra Strength     escitalopram 5 MG Tabs  Commonly known as: Lexapro     melatonin 1 MG Tabs     QUEtiapine 25 MG Tabs  Commonly known as: SEROquel     sodium bicarbonate 650 MG Tabs            CHANGE how you take these medications      apixaban 5 MG Tabs  Commonly known as: Eliquis  What changed:   how much to take  how to take this     atorvastatin 40 MG Tabs  Commonly known as: Lipitor  TAKE 1 TABLET BY MOUTH IN  THE EVENING  What changed:   how much to take  how to take this  when to take this     metoprolol tartrate 25  MG Tabs  Commonly known as: Lopressor  TAKE ONE tablet daily  What changed:   when to take this  additional instructions            CONTINUE taking these medications      * Accu-Chek Carisa Strp  Test glucose three times daily.     * Accu-Chek Carisa Plus Strp  USE TWICE DAILY     Accu-Chek Multiclix Lancets Misc  test blood sugar QID as directed     hydrALAZINE 10 MG Tabs  Commonly known as: Apresoline     Insulin Pen Needle 31G X 5 MM Misc  Commonly known as: BD Pen Needle Mini U/F  AS DIRECTED QID     isosorbide dinitrate 5 MG Tabs  Commonly known as: Isordil Titradose           * This list has 2 medication(s) that are the same as other medications prescribed for you. Read the directions carefully, and ask your doctor or other care provider to review them with you.                STOP taking these medications      aspirin 81 MG Tbec     furosemide 20 MG Tabs  Commonly known as: Lasix     Insulin Lispro (1 Unit Dial) 100 UNIT/ML Sopn  Commonly known as: HumaLOG KwikPen     Lantus SoloStar 100 UNIT/ML Sopn  Generic drug: insulin glargine     NovoLOG FlexPen 100 UNIT/ML Sopn  Generic drug: insulin aspart     traMADol 50 MG Tabs  Commonly known as: Ultram            Code Status: Full Code    Important follow up:   Follow-up Information       Mauricio Jeong MD Follow up on 8/12/2024.    Specialty: UROLOGY  Why: 8/12 at 10:50 am  Contact information:  [address]  [place] [address] [phone]               Mitch Herrera MD Follow up.    Specialties: Internal Medicine, ENDOCRINOLOGY  Why: within 1 week of discharge from rehab  Contact information:  [address]  [address]  [place] [address] [phone]                           Disposition: SNF  Discharged Condition: stable      Additional patient instructions:  CT Head with   -Partially imaged ovoid 1.6 cm low-attenuation mass in the right parotid gland.  Differential considerations for parotid gland masses are broad and include both benign  and malignant primary neoplasms as well as metastatic disease. Suggest nonemergent otolaryngology evaluation of this finding with strong consideration of histologic sampling for definitive characterization     -recommend outpt ENT appt, primary can help facilitate     Patient taken off of insulin with poor po intake. Please follow blood sugars and resume as needed, was on long acting 15 units daily with novolog    Seen by psych and on lexapro, melatonin and seroquel for delirium    Pt needs cbc and bmp in 3-5 days. On Bicarb for acidosis by renal   =========================================================================================================================    I Reconciled current and discharge medications on day of discharge  Patient had opportunity to ask questions and state understand and agree with therapeutic plan as outlined    Total Time Coordinating Care: greater than 30 minutes  Is this a shared or split note between Advanced Practice Provider and Physician? Yes    Note: This chart was prepared using voice recognition software and may contain unintended word substitution errors.   Cookie Hugo RN, NP   [facility] Hospitalist Team   8/13/2024      SEE ATTENDING NOTE BELOW          Patient seen and examined independently.  Discussed with APN and agree with note above    Patient improved.  Stable for discharge to SNF, pt wihtout acute complaints     GEN: NAD  RESP: CTAB  CV: RRR    Time of discharge >30 minutes    Annika Cadte MD

## 2024-08-13 NOTE — PHYSICAL THERAPY NOTE
PHYSICAL THERAPY TREATMENT NOTE - INPATIENT     Room Number: 454/454-A       Presenting Problem: Bladder CA, (s/p laposcopitc prostatectomy)  Co-Morbidities : DM, PPM, HTN, CAD, afib, heart failure.    Problem List  Active Problems:    Preop testing    Bladder cancer (HCC)    Delirium due to another medical condition    Episodic mood disorder (HCC)    PHYSICAL THERAPY ASSESSMENT   Patient demonstrates fair progress this session, goals  remain in progress.      Patient is requiring moderate assist and maximum assist x 2 as a result of the following impairments: decreased functional strength, pain, impaired static and dynamic standing balance, decreased muscular endurance, cognitive deficits (memory, safety awareness), and medical status.     Patient continues to function below baseline with transfers, gait, standing prolonged periods, and performing household tasks.  Next session anticipate patient to progress bed mobility, transfers, and gait.  Physical Therapy will continue to follow patient for duration of hospitalization.    Patient continues to benefit from continued skilled PT services: to promote return to prior level of function and safety with continuous assistance and gradual rehabilitative therapy .    PLAN  PT Treatment Plan: Bed mobility;Body mechanics;Endurance;Energy conservation;Patient education;Gait training;Strengthening;Transfer training;Balance training  Frequency (Obs): 3-5x/week    SUBJECTIVE  Agreeable to activity.     OBJECTIVE  Precautions: Bed/chair alarm    WEIGHT BEARING RESTRICTION  none    PAIN ASSESSMENT   Ratin  Location: touched lower abdomen a couple times during the session  Management Techniques: Activity promotion;Body mechanics;Breathing techniques;Relaxation;Repositioning    BALANCE  Static Sitting: Fair  Dynamic Sitting: Fair -  Static Standing: Poor  Dynamic Standing: Poor    AM-PAC '6-Clicks' INPATIENT SHORT FORM - BASIC MOBILITY  How much difficulty does the patient  currently have...  Patient Difficulty: Turning over in bed (including adjusting bedclothes, sheets and blankets)?: A Lot   Patient Difficulty: Sitting down on and standing up from a chair with arms (e.g., wheelchair, bedside commode, etc.): A Lot   Patient Difficulty: Moving from lying on back to sitting on the side of the bed?: A Lot   How much help from another person does the patient currently need...   Help from Another: Moving to and from a bed to a chair (including a wheelchair)?: A Lot   Help from Another: Need to walk in hospital room?: A Lot   Help from Another: Climbing 3-5 steps with a railing?: Total     AM-PAC Score:  Raw Score: 11   Approx Degree of Impairment: 72.57%   Standardized Score (AM-PAC Scale): 33.86   CMS Modifier (G-Code): CL    FUNCTIONAL ABILITY STATUS  Functional Mobility/Gait Assessment  Gait Assistance: Moderate assistance (x2)  Distance (ft): 8 x 2  Assistive Device: Rolling walker (chair follow)  Pattern: Shuffle (slow pace, posterior lean, unsteady, needs VC to advance each leg, forward flexed posture, VC to correct posture and for sequencing of gait; chair follow)  Sit to Stand: maximum assist x 2     Skilled Therapy Provided: Pt received sitting in chair, wife at bedside; agreeable to activity; introduced self and role. Pt with no c/o pain. Demos STS transfer from chair with max A x 2, 2 trials. VC given to scoot forward in chair and for bilateral foot placement prior to standing; required BLE to be blocked by therapist prior to standing. Pt tolerated static standing with mod A x 2 for up to 1 minute, then ambulated ~8 ft forward with chair follow, mod A x 2 and RW. After seated rest break, completed ambulation trial again. Pt was left sitting in chair with needs within reach, alarm on, handoff to RN complete.     The patient's Approx Degree of Impairment: 72.57% has been calculated based on documentation in the [facility] '6 clicks' Inpatient Daily Activity Short Form.  Research  supports that patients with this level of impairment may benefit from LTAC however anticipate benefit from rehab facility.  Final disposition will be made by interdisciplinary medical team.    Patient End of Session: Up in chair;Needs met;Call light within reach;RN aware of session/findings;All patient questions and concerns addressed;Alarm set;Family present    CURRENT GOALS   Goals to be met by: 2024  Patient Goal Patient's self-stated goal is: Return home    Goal #1 Patient is able to demonstrate supine - sit EOB @ level: minimum assistance      Goal #1   Current Status  NT   Goal #2 Patient is able to demonstrate transfers Sit to/from Stand at assistance level: minimum assistance with walker - rolling      Goal #2  Current Status  unmet   Goal #3 Patient is able to ambulate 30 feet with assist device: walker - rolling at assistance level: minimum assistance   Goal #3   Current Status unmet   Goal #4     Goal #4   Current Status     Goal #5 Patient to demonstrate independence with home activity/exercise instructions provided to patient in preparation for discharge.   Goal #5   Current Status  unmet   Goal #6          Gait Trainin minutes

## 2024-08-13 NOTE — DIETARY NOTE
ADULT NUTRITION REASSESSMENT    Pt is at moderate nutrition risk.  Pt meets moderate malnutrition criteria.        CRITERIA FOR MALNUTRITION DIAGNOSIS:  Criteria for non-severe malnutrition diagnosis: acute illness/injury related to energy intake less than 75% for greater than 7 days and muscle mass mild depletion.      RECOMMENDATIONS TO MD: See Nutrition Intervention for diet and ONS specifics     ADMITTING DIAGNOSIS:  Bladder cancer  Bladder cancer (HCC)  PERTINENT PAST MEDICAL HISTORY:   Past Medical History:    Arrhythmia    Cancer (HCC)    bladder    Cataract    High blood pressure    High cholesterol    History of blood transfusion    HYPERLIPIDEMIA    HYPERTENSION    Other and unspecified hyperlipidemia    STROKE    Type II or unspecified type diabetes mellitus without mention of complication, not stated as uncontrolled    Unspecified essential hypertension    Visual impairment     PATIENT STATUS:   08/01/24 INITIAL VISIT: Pt rescreened at nutritional risk r/t poor intake since admission. Pt presented to the hospital for laparoscopic cystoprostatectomy with ileal conduit diversion. Has extensive PMH as listed above. POD #7. Post-op course complicated by delirium. Plan for transfer to SNF once delirium improves. Pt sitting up in chair sleeping at time of visit. Roused for interview however pt not appropriate - mumbling/garbled speech. Noted poor dentition. Obtained further diet hx from son Nick via phone. Per son, pt's appetite/intake was good/normal PTA. Typically consumes 2 meals per day with an occasional evening snack. Self / spouse prepared breakfast. Pt and spouse eat out for a late lunch daily. Occasionally drinks chocolate Ensure. No recent wt changes.           Update 08/07/24: Reassessment per protocol, additionally consult received today for \"poor po'. Chart reviewed. Pt with poor appetite, consuming 0-25% of meals since last assessment. Per NP, \"poor po intake per staff, does like Donna arroyo  and liquids but not much food, continue Glucerna tid with meals, limited options, would rec against NGT with confusion and risk of removal/dislodgement, And ppn/tpn not indicated, ensure staff is feeding pt for all meals\". RD recommends CPM.     Update 08/13/24: Flowsheet indicates improving PO intakes, as endorsed by RN and pt. Visited pt at bedside. Pt reported his appetite is slowly returning and has been tolerating food and beverages. However, he has been consuming primarily Glucernas, iced teas, and Cokes. Pt's wife at bedside, continues to encourage food intakes. Pt endorsed tolerance for ONS and would like to continue receiving them TID. Noted three unopened bottles at bedside. Also noted lunch tray consisted of a half-eaten mac and cheese accompanied by fruit and pudding cups, none of which have been touched yet. Continued to encourage solid food intakes before Glucernas to further maximize intakes -- pt verbalized understanding. Continue to encourage and monitor PO intakes.       FOOD/NUTRITION RELATED HISTORY:  Appetite:  Good appetite PTA; poor appetite during admission, slowly improving x 3 days  Intake: 50-75% x 3 days   Intake Meeting Needs: Yes, and oral nutrition supplements (ONS) to maximize  Percent Meals Eaten (last 6 days)       Date/Time Percent Meals Eaten (%)    08/07/24 1053 5 %    08/07/24 2000 0 %    08/08/24 1036 0 %    08/08/24 1238 0 %    08/08/24 1845 0 %    08/09/24 1100 0 %     Percent Meals Eaten (%): 1 bite of banana, 1 bite Yoruba toast at 08/09/24 1100    08/10/24 1000 0 %    08/10/24 1300 50 %    08/10/24 1800 0 %     Percent Meals Eaten (%): 2-3 bites & said no more at 08/10/24 1800    08/11/24 1100 75 %     Percent Meals Eaten (%): Yoruba toast, sausage, coke, glucerna at 08/11/24 1100    08/11/24 1900 15 %     Percent Meals Eaten (%): ice cream & cookies at 08/11/24 1900    08/12/24 0911 75 %     Percent Meals Eaten (%): Yoruba toast and coke at 08/12/24 0911    08/12/24 1908  50 %     Percent Meals Eaten (%): less than half of cheeseburger and 3 fries at 08/12/24 1904          Food Allergies: No Known Food Allergies (NKFA)  Cultural/Ethnic/Zoroastrian Preferences: Not Obtained    GASTROINTESTINAL:  LBM 8/12, x 4 loose BMs x 24 hr; c diff    MEDICATIONS: reviewed; Noted non-cardiac electrolyte replacement protocol ordered       escitalopram  5 mg Oral Nightly    acetaminophen  1,000 mg Oral Q8H    lidocaine-menthol  1 patch Transdermal Daily    famotidine  20 mg Oral Daily    [Held by provider] amoxicillin potassium and clavulanate  875 mg Oral Q12H    sodium bicarbonate  650 mg Oral BID    QUEtiapine  12.5 mg Oral Once    QUEtiapine  12.5 mg Oral QPM    melatonin  5 mg Oral Nightly    isosorbide dinitrate  5 mg Oral TID    metoprolol tartrate  25 mg Oral 2x Daily(Beta Blocker)    apixaban  5 mg Oral BID    atorvastatin  40 mg Oral Daily    hydrALAZINE  10 mg Oral TID     LABS: reviewed; noted recent A1C 6.6 pm 7/26/24; hypernatremia and hyperchloremia; elevated BUN  Recent Labs     08/11/24  0546 08/12/24  0538 08/13/24  0625   * 153* 145*  145*   BUN 28* 21 25*  25*   CREATSERUM 1.30 1.23 1.25  1.25   CA 8.0* 7.8* 8.2*  8.2*   MG  --   --  1.8   * 146* 147*  147*   K 3.9 3.9 4.0  4.0   * 122* 119*  119*   CO2 16.0* 17.0* 20.0*  20.0*   PHOS  --   --  3.8   OSMOCALC 314* 308* 311*  311*     NUTRITION RELATED PHYSICAL FINDINGS:  - Nutrition Focused Physical Exam (NFPE): mild depletion muscle mass temple region per visual exam.   - Fluid Accumulation: none  see RN documentation for details  - Skin Integrity: at risk and surgical wound(s) see RN documentation for details    ANTHROPOMETRICS:  HT: 175.3 cm (5' 9\")  WT: 75.8 kg (167 lb)   BMI: Body mass index is 24.66 kg/m².  BMI CLASSIFICATION: 19-24.9 kg/m2 - WNL  IBW: 160 lbs        104% IBW  Usual Body Wt: 171 lbs (per Care Everywhere 5/3/24)      98% UBW    WEIGHT HISTORY: Family denies recent wt loss.    No data  found.    Wt Readings from Last 10 Encounters:   07/25/24 75.8 kg (167 lb)   07/22/24 75.3 kg (166 lb)   03/21/22 85.3 kg (188 lb)   11/23/21 85.7 kg (189 lb)   10/06/21 84.4 kg (186 lb)   09/20/21 84.8 kg (187 lb)   08/25/21 87.1 kg (192 lb)   03/10/21 89.8 kg (198 lb)   09/10/20 89.8 kg (198 lb)   05/07/20 93 kg (205 lb)     NUTRITION DIAGNOSIS/PROBLEM:   Inadequate oral intake related to Decreased ability to consume sufficient energy in the setting of delirium as evidenced by refusing to eat / minimum intake of meal trays since admission (x7 days).     NUTRITION DIAGNOSIS PROGRESS:  Improving - Mostly 50-75% meal intakes, tolerating ONS      NUTRITION INTERVENTION:     NUTRITION PRESCRIPTION:   Estimated Nutrition needs: --dosing wt of 76 kg - wt taken on 7/25/24  Calories: 9679-0012 calories/day (25-30 calories per kg Dosing wt)  Protein:  g protein/day (1.2-1.5 g protein/kg Dosing wt)  Fluid Needs: 3427-5200 ml/day (1 ml/kcal)    - Diet:       Procedures    Regular/General diet Is Patient on Accuchecks? Yes      - Meals and snacks:  continue ONS  - Medical Food Supplements: RD added Glucerna (220 calories/ 10 g protein each) TID Chocolate. Rational/use of oral supplements discussed.  - Vitamin and mineral supplements: none  - Feeding assistance: meal set up and feed and requested feed by staff per MD  - Nutrition education: not appropriate at this time   - Coordination of nutrition care: collaboration with other providers - discussed with RN on unit  - Discharge and transfer of nutrition care to new setting or provider: monitor plans - to SNF when delirium improves    MONITOR AND EVALUATE/NUTRITION GOALS:  - Food and Nutrient Intake:      Monitor: adequacy of PO intake, tolerance of PO intake, adequacy of supplement intake, and tolerance of supplement intake  - Food and Nutrient Administration:      Monitor: need for temporary nutrition support and goc/family wishes regarding nutrition should PO intakes  be inconsistently inadequate   - Anthropometric Measurement:    Monitor weight  - Nutrition Goals:      PO and supplement greater than 75% of needs, labs within acceptable limits, promote healing, monitor fluid status, and maintain true wt within 5%    DIETITIAN FOLLOW UP: RD to follow and monitor nutrition status    Ann-Marie Parker MS, RD, LDN   Clinical Dietitian   P: [phone]

## 2024-08-13 NOTE — PROGRESS NOTES
Pending sale to [facility]   Progress Note  -  Roc Butcher Patient Status:  Inpatient    1940 MRN [id number]   Location [facility] 4W/SW/SE Attending Annika Cadet MD   Hosp Day # 19 PCP Mitch Hererra MD     PCP: Mitch Herrera MD      SEE ATTENDING NOTE AT BOTTOM OF PAGE    Is this a shared or split note between Advanced Practice Provider and Physician? Yes    Assessment and Plan:    Roc Butcher is a 84 year old male with PMH sig for type 2 diabetes, CAD status post PCI to left circumflex, PAD, pacemaker, hypertension, paroxysmal A-fib on Eliquis baseline, and chronic systolic heart failure with a EF of 35% hyperlipidemia, prior CVA, and bladder cancer who presented for laparoscopic cystoprostatectomy with ileal conduit diversion.  Post op course with complicated by delirium now improving and enterococcus bacteremia and uti s/p stent removal now on po abx.  Incidental finding on CT of the head was a right parotid mass measuring 1.6 cm with broad differential and radiology recommended ENT evaluation with strong consideration for histological sampling for definitive characterization. Discharge delayed with poor po intake, hypernatremia, acidosis rising wbc. Plans for rehab at [facility] today if ok with others as pt improving and insurance auth expires today,  see below for details       Delirium, Improving   Metabolic Encephalopathy  -CT head without acute process   -infectious tx below  -improving on current regimen of seroquel and melatonin  - lexapro added by psych  -appreciate psych     Leukocytosis-now improving   Enterococcus bacteremia and UTI R/T procedure and ureteral stents  -blood cx enterococcus with repeat blood cx NGTD  -urine with enterococcus  -CT A/P without acute process   -TTE without mention of vegetation.   - stents removed by urology  - TITA without vegetation  - CT A/P with Mild fat stranding seen adjacent to the ileal conduit as well as mild  bilateral perinephric and periureteric fat stranding suggestive of underlying infectious or inflammatory etiology.   -TITA without vegetation   -abx- augmentin held now with numerous bm's and as rec by urology, EOT was 8/17  -appreciate ID who signed off case    Left Leg Weakness  -complains of left hip and thigh pain  -US left leg negative for dvt  -Xray left hip and pelvis- no acute fx   -PT/OT    Left Basilic Vein DVT  -US with Nonocclusive superficial venous thrombus in left basilic vein.   -no tx needed    Diarrhea  -noted 6 stools x 24 hours, apparently soft?  -augmentin held as rec by urology in setting of numerous BM's  -c-diff pending     Acute Kidney Injury on CKD stage 3  Acidosis   Hypernatremia   -CT A/P with ongoing Mild bilateral hydroureteronephrosis, per urology stable   -Cr improved, currently at 1.25  -Na at 147  -sodium bicarb  -as per renal      Pleural Effusions/Atelectasis   -no sob or hypoxemia   -CT A/P with Moderate-sized bilateral pleural effusions with bilateral lower lobe atelectasis is unchanged.   -on RA   -encourage IS as able, up in chair       Moderate Malnutrition  -continue Glucerna tid with meals   -ensure staff or family is feeding pt for all meals, changed to general diet or have family bring in food---he is now eating better      Hematuria-intermittent issues   Acute on Chronic Anemia  -baseline hgb ~10's,current hgb 9.6  -intermittent issues with blood   -8/9 CT A/P done with noted mild bilateral hydroureteronephrosis and fat stranding adjacent to the ileal conduit suggestive of infectious or inflammatory etiology, await urology input     Right Parotid Mass  -noted on CT head   -Partially imaged ovoid 1.6 cm low-attenuation mass in the right parotid gland.   -outpt ENT eval for histological sampling to r/o neoplasm     Bladder cancer S/P lap cystoprostatectomy with ileal conduit diversion on 7/25/2024  -pain meds-tylenol prn   -bowel regimen prn  -urostomy teaching   -8/5 s/p  stent removal by urology  -intermittent issues with hematuria, urology notes indicate they are aware of this, repeat UA and CT A/P ordered   -appreciate urology      CAD status post PCI to L Cfx  PAD  S/P pacemaker  Hypertension  PAF  Chronic HFrEF   HL  Previous CVA  -CT head with old infarcts- noted on previous imaging  -echo with EF 30%, last echo with EF 35% with WMA, mild-mod AI  -Not on ACE ARB due to renal function and hypotension.   -Continue hydralazine, eliquis, hydralazine, lopressor and imdur  -holding home ASA for now  -holding lasix with decreased po and shilpa   -follows with [facility]      DM Type II  -HgbA1C 6.6  -home regimen: lantus 15 units nightly plus novolog   -blood sugars stable and has not needed insulin, will change to prn accuchecks to prevent \"irritation to pt\"     MA/ACO Re[place]  -ER Visits 2024: 0  -Admissions 2024: 3  -Re- Entry: no   -Consults: urology, cards and ID  -Discharge Needs: [facility]   -Appointments: follow up with PCP Mitch Herrera MD after discharge from rehab     [facility]  -full code      FEN  -lytes in am  -diet-general     Prophy  -SCD  -eliquis     Dispo  -plans for discharge today if ok with family and consultants as insurance auth expires for rehab  -8/13 update given to wife at  and [facility] via phone  PCP: Mitch Herrera MD      Concerns regarding plan of care were discussed with patient. Patient agrees with plan as detailed above. Discussed plan of care with Dr. Cadet     Note: This chart was prepared using voice recognition software and may contain unintended word substitution errors.      Cookie Hugo RN, NP   [facility] Hospitalist Team  Contact via [employer] (Check Availability)    SUBJECTIVE:   Pt sitting in the chair eating fruit. Wife at bedside and thinks pt is doing better. Pt thinks I am here to exercise   Him.     OBJECTIVE:   Blood pressure 133/65, pulse 69, temperature 97.5 °F (36.4 °C), temperature source  Axillary, resp. rate 18, height 5' 9\" (1.753 m), weight 167 lb (75.8 kg), SpO2 98%.    GENERAL: no apparent distress  NEURO: A/A Ox3,   RESP: non labored, CTA  CARDIO: Regular  ABD: soft, NT, ND, BS+  :ileal conduit with nath with clear yellow urine   EXTREMITIES: trace LE edema     DIAGNOSTIC DATA:   Labs:     Recent Labs   Lab 08/09/24  0730 08/10/24  0542 08/11/24  0550 08/12/24  0538 08/13/24  0625   WBC 17.6* 12.0* 9.2 6.2 5.4   HGB 9.9* 9.6* 9.2* 9.1* 9.6*   MCV 85.0 86.9 89.0 87.2 84.7   .0 418.0 356.0 360.0 430.0   BAND 1 1  --  2 1       Recent Labs   Lab 08/09/24  0730 08/10/24  0542 08/11/24  0546 08/12/24  0538 08/13/24  0625    147* 148* 146* 147*  147*   K 4.3 3.9 3.9 3.9 4.0  4.0   * 120* 121* 122* 119*  119*   CO2 18.0* 16.0* 16.0* 17.0* 20.0*  20.0*   BUN 36* 33* 28* 21 25*  25*   CREATSERUM 1.54* 1.42* 1.30 1.23 1.25  1.25   CA 8.3* 8.0* 8.0* 7.8* 8.2*  8.2*   MG  --   --   --   --  1.8   PHOS  --   --   --   --  3.8   * 114* 144* 153* 145*  145*       Recent Labs   Lab 08/13/24  0625   ALB 2.7*       Recent Labs   Lab 08/07/24  1427 08/07/24  1830 08/07/24  2140 08/08/24  0800 08/08/24  1235   PGLU 120* 121* 126* 129* 139*       No results for input(s): \"TROP\" in the last 168 hours.        MEDICATIONS       acetaminophen  1,000 mg Oral Q8H    lidocaine-menthol  1 patch Transdermal Daily    famotidine  20 mg Oral Daily    [Held by provider] amoxicillin potassium and clavulanate  875 mg Oral Q12H    sodium bicarbonate  650 mg Oral BID    QUEtiapine  12.5 mg Oral Once    QUEtiapine  12.5 mg Oral QPM    melatonin  5 mg Oral Nightly    isosorbide dinitrate  5 mg Oral TID    metoprolol tartrate  25 mg Oral 2x Daily(Beta Blocker)    apixaban  5 mg Oral BID    atorvastatin  40 mg Oral Daily    hydrALAZINE  10 mg Oral TID         sodium chloride 0.9%    glucose **OR** glucose **OR** glucose-vitamin C **OR** dextrose **OR** glucose **OR** glucose **OR** glucose-vitamin  C    ondansetron    Cookie Hugo NP      IMAGING     US VENOUS DOPPLER LEG RIGHT - DIAG IMG (CPT=93971)    Result Date: 8/12/2024  CONCLUSION:  No DVT in the right lower extremity.    Dictated by (CST): Jericho Santos MD on 8/12/2024 at 4:59 PM     Finalized by (CST): Jericho Santos MD on 8/12/2024 at 4:59 PM          XR HIP W OR WO PELVIS 2 OR 3 VIEWS, RIGHT (CPT=73502)    Result Date: 8/12/2024  CONCLUSION:  1. No fracture or dislocation. 2. Stable mild hip joint osteoarthritis bilaterally. 3. Lesser incidental findings as above.    Dictated by (CST): Jericho Santos MD on 8/12/2024 at 3:30 PM     Finalized by (CST): Jericho Santos MD on 8/12/2024 at 3:32 PM             SEE ATTENDING NOTE BELOW:

## 2024-08-13 NOTE — OCCUPATIONAL THERAPY NOTE
OCCUPATIONAL THERAPY TREATMENT NOTE - INPATIENT        Room Number: 454/454-A     Presenting Problem: s/p lap cystoprostatectomy on 7/25    Problem List  Active Problems:    Preop testing    Bladder cancer (HCC)    Delirium due to another medical condition    Episodic mood disorder (HCC)      OCCUPATIONAL THERAPY ASSESSMENT   Patient demonstrates fair progress this session, goals remain in progress.    Patient is requiring maximum assist as a result of the following impairments: decreased functional strength, decreased functional reach, impaired sit and stand balance, decreased muscular endurance, cognitive deficits (initial lethargy, limited carry over), medical status, and decreased safety awareness.    Patient continues to function below baseline with ADLs and functional mobility.  Occupational Therapy will continue to follow patient for duration of hospitalization.    Patient continues to benefit from continued skilled OT services: to promote return to prior level of function and safety with continuous assistance and gradual rehabilitative therapy.     PLAN  OT Treatment Plan: Balance activities;Energy conservation/work simplification techniques;ADL training;Functional transfer training;UE strengthening/ROM;Endurance training;Cognitive reorientation;Patient/Family education  OT Device Recommendations: TBD    SUBJECTIVE  \"CAn I have my Coke?\"    OBJECTIVE  Precautions: Bed/chair alarm    PAIN ASSESSMENT  Rating: Unable to rate  Location: RT hip  Management Techniques: Repositioning    ACTIVITY TOLERANCE  Pt participation initially limited by lethargy- difficult to arouse     ACTIVITIES OF DAILY LIVING ASSESSMENT  AM-PAC ‘6-Clicks’ Inpatient Daily Activity Short Form  How much help from another person does the patient currently need…  -   Putting on and taking off regular lower body clothing?: A Lot  -   Bathing (including washing, rinsing, drying)?: A Lot  -   Toileting, which includes using toilet, bedpan or  urinal? : Total  -   Putting on and taking off regular upper body clothing?: A Lot  -   Taking care of personal grooming such as brushing teeth?: A Lot  -   Eating meals?: A Little    AM-PAC Score:  Score: 12  Approx Degree of Impairment: 66.57%  Standardized Score (AM-PAC Scale): 30.6  CMS Modifier (G-Code): CL    FUNCTIONAL TRANSFER ASSESSMENT  Sit to Stand: Chair  Edge of Bed: Moderate Assist (Mod A x2, therapist blocking left foot)  Chair: -- (Mod A x2)    BED MOBILITY  Rolling: Maximum Assist  Supine to Sit : Maximum Assist  Sit to Supine (OT): Maximum Assist  Scooting: Max A    BALANCE ASSESSMENT  Static Sitting: Maximum Assist  Sitting Unilateral: Maximum Assist  Static Standing: Maximum Assist    FUNCTIONAL ADL ASSESSMENT  Feeding: Mod A to manage cup  LE self care: Max  Toileting: dependent    Skilled Therapy Provided: Nurse approves pt participation in OOB activity. Upon arrival, pt asleep in bed, requiring increased effort and time to fully wake. Pt is pleasant and cooperative. Provide Max A for bed mobility, Max A tolerating ~5 minutes EOB with poor balance. Utilized mechanical lift for bed to chair xfer. From chair, provide Mod A x2 for sit to stand, tolerates ~3 minutes supported stand with difficult shift and maintaining weight anteriorly. As session progressed, pt becomes more alert, able to state name, place, able to read clock accurately and participate in conversation appropriately.     EDUCATION PROVIDED  Patient: Plan of Care; Functional Transfer Techniques  Patient's Response to Education: Demonstrates Poor Carry Over to Information  Family/Caregiver: Role of Occupational Therapy; Plan of Care; Discharge Recommendations  Family/Caregiver's Response to Education: Verbalized Understanding    The patient's Approx Degree of Impairment: 66.57% has been calculated based on documentation in the [facility] '6 clicks' Inpatient Daily Activity Short Form.  Research supports that patients with this level of  impairment may benefit from QUENTIN.  Final disposition will be made by interdisciplinary medical team.    Patient End of Session: Up in chair;Needs met;Call light within reach;RN aware of session/findings;Alarm set    OT Goals:   Patient will complete dressing with Min A  Comment: ongoing    Patient will complete toilet transfer with Min A  Comment: ongoing    Patient will complete self care task at sink level with Min A    Comment: ongoing         Goals  on: 8/15  Frequency: 3-5x week     Self-Care Home Management: 30 minutes

## 2024-08-13 NOTE — PLAN OF CARE
Patient alert and still pretty oriented today, still able to answer all orientation questions appropriately. Co-operative with staff. Appetite good today. Eating snacks in addition to meal. Able to feed himself today but with some spills, but great improvement. Up to chair with OT with lift; able to stand and take steps with PT. Ileal conduit in place and draining appropriately. C-diff positive but EIA negative. Patient cleared for transfer to [facility], report called to Sonal. Wife and son aware of transfer. Patient transported via [facility], patient able to take steps from chair to stretcher with walker and nurse assist. Wife at bedside at time of discharge. All belongings with patient and wife. PIV removed.     Problem: Patient Centered Care  Goal: Patient preferences are identified and integrated in the patient's plan of care  Description: Interventions:  - What would you like us to know as we care for you? From home with wife  - Provide timely, complete, and accurate information to patient/family  - Incorporate patient and family knowledge, values, beliefs, and cultural backgrounds into the planning and delivery of care  - Encourage patient/family to participate in care and decision-making at the level they choose  - Honor patient and family perspectives and choices  Outcome: Adequate for Discharge     Problem: Patient/Family Goals  Goal: Patient/Family Long Term Goal  Description: Patient's Long Term Goal: LUIS E    Interventions:  -   - See additional Care Plan goals for specific interventions  Outcome: Adequate for Discharge  Goal: Patient/Family Short Term Goal  Description: Patient's Short Term Goal: LUIS E    Interventions:   -   - See additional Care Plan goals for specific interventions  Outcome: Adequate for Discharge     Problem: Impaired Cognition  Goal: Patient will exhibit improved attention, thought processing and/or memory  Description: Interventions:    Outcome: Adequate for Discharge      Problem: PAIN - ADULT  Goal: Verbalizes/displays adequate comfort level or patient's stated pain goal  Description: INTERVENTIONS:  - Encourage pt to monitor pain and request assistance  - Assess pain using appropriate pain scale  - Administer analgesics based on type and severity of pain and evaluate response  - Implement non-pharmacological measures as appropriate and evaluate response  - Consider cultural and social influences on pain and pain management  - Manage/alleviate anxiety  - Utilize distraction and/or relaxation techniques  - Monitor for opioid side effects  - Notify MD/LIP if interventions unsuccessful or patient reports new pain  - Anticipate increased pain with activity and pre-medicate as appropriate  Outcome: Adequate for Discharge     Problem: SAFETY ADULT - FALL  Goal: Free from fall injury  Description: INTERVENTIONS:  - Assess pt frequently for physical needs  - Identify cognitive and physical deficits and behaviors that affect risk of falls.  - [place] fall precautions as indicated by assessment.  - Educate pt/family on patient safety including physical limitations  - Instruct pt to call for assistance with activity based on assessment  - Modify environment to reduce risk of injury  - Provide assistive devices as appropriate  - Consider OT/PT consult to assist with strengthening/mobility  - Encourage toileting schedule  Outcome: Adequate for Discharge     Problem: DISCHARGE PLANNING  Goal: Discharge to home or other facility with appropriate resources  Description: INTERVENTIONS:  - Identify barriers to discharge w/pt and caregiver  - Include patient/family/discharge partner in discharge planning  - Arrange for needed discharge resources and transportation as appropriate  - Identify discharge learning needs (meds, wound care, etc)  - Arrange for interpreters to assist at discharge as needed  - Consider post-discharge preferences of patient/family/discharge partner  - Complete POLST form as  appropriate  - Assess patient's ability to be responsible for managing their own health  - Refer to Case Management Department for coordinating discharge planning if the patient needs post-hospital services based on physician/LIP order or complex needs related to functional status, cognitive ability or social support system  Outcome: Adequate for Discharge     Problem: RISK FOR INFECTION - ADULT  Goal: Absence of fever/infection during anticipated neutropenic period  Description: INTERVENTIONS  - Monitor WBC  - Administer growth factors as ordered  - Implement neutropenic guidelines  Outcome: Adequate for Discharge     Problem: CARDIOVASCULAR - ADULT  Goal: Maintains optimal cardiac output and hemodynamic stability  Description: INTERVENTIONS:  - Monitor vital signs, rhythm, and trends  - Monitor for bleeding, hypotension and signs of decreased cardiac output  - Evaluate effectiveness of vasoactive medications to optimize hemodynamic stability  - Monitor arterial and/or venous puncture sites for bleeding and/or hematoma  - Assess quality of pulses, skin color and temperature  - Assess for signs of decreased coronary artery perfusion - ex. Angina  - Evaluate fluid balance, assess for edema, trend weights  Outcome: Adequate for Discharge  Goal: Absence of cardiac arrhythmias or at baseline  Description: INTERVENTIONS:  - Continuous cardiac monitoring, monitor vital signs, obtain 12 lead EKG if indicated  - Evaluate effectiveness of antiarrhythmic and heart rate control medications as ordered  - Initiate emergency measures for life threatening arrhythmias  - Monitor electrolytes and administer replacement therapy as ordered  Outcome: Adequate for Discharge     Problem: GASTROINTESTINAL - ADULT  Goal: Minimal or absence of nausea and vomiting  Description: INTERVENTIONS:  - Maintain adequate hydration with IV or PO as ordered and tolerated  - Nasogastric tube to low intermittent suction as ordered  - Evaluate  effectiveness of ordered antiemetic medications  - Provide nonpharmacologic comfort measures as appropriate  - Advance diet as tolerated, if ordered  - Obtain nutritional consult as needed  - Evaluate fluid balance  Outcome: Adequate for Discharge  Goal: Maintains or returns to baseline bowel function  Description: INTERVENTIONS:  - Assess bowel function  - Maintain adequate hydration with IV or PO as ordered and tolerated  - Evaluate effectiveness of GI medications  - Encourage mobilization and activity  - Obtain nutritional consult as needed  - Establish a toileting routine/schedule  - Consider collaborating with pharmacy to review patient's medication profile  Outcome: Adequate for Discharge     Problem: GENITOURINARY - ADULT  Goal: Absence of urinary retention  Description: INTERVENTIONS:  - Assess patient’s ability to void and empty bladder  - Monitor intake/output and perform bladder scan as needed  - Follow urinary retention protocol/standard of care  - Consider collaborating with pharmacy to review patient's medication profile  - Implement strategies to promote bladder emptying  Outcome: Adequate for Discharge     Problem: METABOLIC/FLUID AND ELECTROLYTES - ADULT  Goal: Electrolytes maintained within normal limits  Description: INTERVENTIONS:  - Monitor labs and rhythm and assess patient for signs and symptoms of electrolyte imbalances  - Administer electrolyte replacement as ordered  - Monitor response to electrolyte replacements, including rhythm and repeat lab results as appropriate  - Fluid restriction as ordered  - Instruct patient on fluid and nutrition restrictions as appropriate  Outcome: Adequate for Discharge     Problem: SKIN/TISSUE INTEGRITY - ADULT  Goal: Skin integrity remains intact  Description: INTERVENTIONS  - Assess and document risk factors for pressure ulcer development  - Assess and document skin integrity  - Monitor for areas of redness and/or skin breakdown  - Initiate interventions,  skin care algorithm/standards of care as needed  Outcome: Adequate for Discharge  Goal: Incision(s), wounds(s) or drain site(s) healing without S/S of infection  Description: INTERVENTIONS:  - Assess and document risk factors for pressure ulcer development  - Assess and document skin integrity  - Assess and document dressing/incision, wound bed, drain sites and surrounding tissue  - Implement wound care per orders  - Initiate isolation precautions as appropriate  - Initiate Pressure Ulcer prevention bundle as indicated  Outcome: Adequate for Discharge     Problem: HEMATOLOGIC - ADULT  Goal: Maintains hematologic stability  Description: INTERVENTIONS  - Assess for signs and symptoms of bleeding or hemorrhage  - Monitor labs and vital signs for trends  - Administer supportive blood products/factors, fluids and medications as ordered and appropriate  - Administer supportive blood products/factors as ordered and appropriate  Outcome: Adequate for Discharge  Goal: Free from bleeding injury  Description: (Example usage: patient with low platelets)  INTERVENTIONS:  - Avoid intramuscular injections, enemas and rectal medication administration  - Ensure safe mobilization of patient  - Hold pressure on venipuncture sites to achieve adequate hemostasis  - Assess for signs and symptoms of internal bleeding  - Monitor lab trends  - Patient is to report abnormal signs of bleeding to staff  - Avoid use of toothpicks and dental floss  - Use electric shaver for shaving  - Use soft bristle tooth brush  - Limit straining and forceful nose blowing  Outcome: Adequate for Discharge     Problem: MUSCULOSKELETAL - ADULT  Goal: Return mobility to safest level of function  Description: INTERVENTIONS:  - Assess patient stability and activity tolerance for standing, transferring and ambulating w/ or w/o assistive devices  - Assist with transfers and ambulation using safe patient handling equipment as needed  - Ensure adequate protection for  wounds/incisions during mobilization  - Obtain PT/OT consults as needed  - Advance activity as appropriate  - Communicate ordered activity level and limitations with patient/family  Outcome: Adequate for Discharge  Goal: Maintain proper alignment of affected body part  Description: INTERVENTIONS:  - Support and protect limb and body alignment per provider's orders  - Instruct and reinforce with patient and family use of appropriate assistive device and precautions (e.g. spinal or hip dislocation precautions)  Outcome: Adequate for Discharge     Problem: NEUROLOGICAL - ADULT  Goal: Achieves stable or improved neurological status  Description: INTERVENTIONS  - Assess for and report changes in neurological status  - Initiate measures to prevent increased intracranial pressure  - Maintain blood pressure and fluid volume within ordered parameters to optimize cerebral perfusion and minimize risk of hemorrhage  - Monitor temperature, glucose, and sodium. Initiate appropriate interventions as ordered  Outcome: Adequate for Discharge     Problem: Impaired Communication  Goal: Patient will achieve maximal communication potential  Description: Interventions:    Outcome: Adequate for Discharge     Problem: Safety Risk - Non-Violent Restraints  Goal: Patient will remain free from self-harm  Description: INTERVENTIONS:  - Apply the least restrictive restraint to prevent harm  - Notify patient and family of reasons restraints applied  - Assess for any contributing factors to confusion (electrolyte disturbances, delirium, medications)  - Discontinue any unnecessary medical devices as soon as possible  - Assess the patient's physical comfort, circulation, skin condition, hydration, nutrition and elimination needs   - Reorient and redirection as needed  - Assess for the need to continue restraints  Outcome: Adequate for Discharge     Problem: Delirium  Goal: Minimize duration of delirium  Description: Interventions:  - Encourage use  of hearing aids, eye glasses  - Promote highest level of mobility daily  - Provide frequent reorientation  - Promote wakefulness i.e. lights on, blinds open  - Promote sleep, encourage patient's normal rest cycle i.e. lights off, TV off, minimize noise and interruptions  - Encourage family to assist in orientation and promotion of home routines  Outcome: Adequate for Discharge

## 2024-08-14 NOTE — PROGRESS NOTES
History and physical    [facility] [place] note transcribed by Dr. Jeff Damico    Admit date: 8/13/2024    Date seen: 8/13/2024    Chief complaint: UTI, diverting ileostomy, Enterococcus infection    HPI: Roc Butcher is a 84 year old male who has been admitted to [facility] stable condition after being discharged from the hospital.  Patient seen and examined by me, medications continued as recommended by the discharging hospital physician.  Patient had a complicated recent hospitalization which she underwent a diverting ileostomy, subsequently dealt with infection, Enterococcus bacteremia, he was treated, stabilized improved, did have delirium related to medical conditions, eventually was deemed stable for discharge, he was discharged here to [facility] in stable condition, was seen and examined by me, of note patient did note to have a parotid mass on scanning as well, recommended for outpatient follow-up possible biopsy.  Patient seen and examined by me, seen in the late evening hours, was a touch confused but able to reorient this seems to be baseline for patient, medications continued as recommended by the discharging hospital physician.  History was taken from the chart.    Wt Readings from Last 3 Encounters:   07/25/24 167 lb (75.8 kg)   07/22/24 166 lb (75.3 kg)   03/21/22 188 lb (85.3 kg)      Past Medical History:    Arrhythmia    Cancer (HCC)    bladder    Cataract    High blood pressure    High cholesterol    History of blood transfusion    HYPERLIPIDEMIA    HYPERTENSION    Other and unspecified hyperlipidemia    STROKE    Type II or unspecified type diabetes mellitus without mention of complication, not stated as uncontrolled    Unspecified essential hypertension    Visual impairment      Past Surgical History:   Procedure Laterality Date    Cardiac pacemaker placement      Other surgical history      bladder tumor removal    Tonsillectomy        Family History   Problem  Relation Age of Onset    Heart Disorder Father     Other Father         HIP FX    Heart Disorder Mother     Other Mother         CVA AGE 80      Social History:  Social History     Socioeconomic History    Marital status:    Tobacco Use    Smoking status: Former     Types: Cigarettes    Smokeless tobacco: Never    Tobacco comments:     2010   Vaping Use    Vaping status: Never Used   Substance and Sexual Activity    Alcohol use: Yes     Comment: HEAVY PREVIOUS NOW SOCIAL    Drug use: No     Social Determinants of Health     Financial Resource Strain: Low Risk  (5/30/2024)    Received from [facility]    Financial Resource Strain     In the past year, have you or any family members you live with been unable to get any of the following when it was really needed? Check all that apply.: None   Food Insecurity: No Food Insecurity (7/25/2024)    Food Insecurity     Food Insecurity: Never true   Transportation Needs: No Transportation Needs (7/25/2024)    Transportation Needs     Lack of Transportation: No   Physical Activity: High Risk (5/3/2024)    Received from [facility] [facility]    Exercise Vital Sign     On average, how many days per week do you engage in moderate to strenuous exercise (like a brisk walk)?: 0 days     On average, how many minutes do you engage in exercise at this level?: 0 min   Social Connections: Medium Risk (5/30/2024)    Received from [facility]    Social Connections     How often do you see or talk to people that you care about and feel close to? (For example: talking to friends on the phone, visiting friends or family, going to Congregational or club meetings): 1 or 2 times a week   Housing Stability: Low Risk  (7/25/2024)    Housing Stability     Housing Instability: No         Current medications: See chart  Current Outpatient Medications   Medication Sig Dispense Refill    apixaban 5 MG Oral Tab Take 1 tablet (5 mg total) by mouth 2 (two) times daily.       acetaminophen 500 MG Oral Tab Take 2 tablets (1,000 mg total) by mouth every 8 (eight) hours as needed for Pain.      escitalopram 5 MG Oral Tab Take 1 tablet (5 mg total) by mouth at bedtime.      melatonin 1 MG Oral Tab       QUEtiapine 25 MG Oral Tab Take 0.5 tablets (12.5 mg total) by mouth every evening.      sodium bicarbonate 650 MG Oral Tab Take 1 tablet (650 mg total) by mouth 2 (two) times daily.      isosorbide dinitrate 5 MG Oral Tab Take 1 tablet (5 mg total) by mouth 3 (three) times daily.      hydrALAZINE 10 MG Oral Tab Take 1 tablet (10 mg total) by mouth 3 (three) times daily.      Glucose Blood (ACCU-CHEK MICK PLUS) In Vitro Strip USE TWICE DAILY 200 strip 3    ATORVASTATIN 40 MG Oral Tab TAKE 1 TABLET BY MOUTH IN  THE EVENING (Patient taking differently: every morning.) 90 tablet 1    metoprolol tartrate 25 MG Oral Tab TAKE ONE tablet daily (Patient taking differently: 2 (two) times daily.) 90 tablet 3    ACCU-CHEK MICK In Vitro Strip Test glucose three times daily. 300 strip 3    Insulin Pen Needle (BD PEN NEEDLE MINI U/F) 31G X 5 MM Does not apply Misc AS DIRECTED  each 3    ACCU-CHEK MULTICLIX LANCETS Does not apply Misc test blood sugar QID as directed 360 Each 3       REVIEW OF SYSTEMS:  Constitutional: Negative for Chills, fatigue, fever, malaise, weight gain and weight loss.  ENMT: Negative for Nasal drainage and sinus pressure.  Eyes: Negative for Vision changes.  Respiratory: Negative for Cough, dyspnea and wheezing.  Cardio: Negative Chest pain and irregular heartbeat/palpitations.  GI: Negative for Abdominal pain, constipation, diarrhea, heartburn, nausea and vomiting.  : Negative for Dysuria and urinary frequency.  Endocrine: Negative for Cold intolerance and heat intolerance.  Neuro: Negative for Gait disturbance and memory impairment.  Psych: Negative for Anxiety and depression.  Integumentary: Negative for Hives and rash.  MS: Negative muscle weakness. neg for joint  pain  Hema/Lymph: Negative Easy bleeding and easy bruising.  Allergic/Immuno: Negative Environmental allergies and food allergies.    EXAM:   There were no vitals taken for this visit.  There is no height or weight on file to calculate BMI.   Vital signs: See point click care charting for updated details  Gen. exam: Alert and awake, baseline mental status noted, in no acute distress, poor dentition  HEENT: Pupils equal and reactive to light and accommodation, moist mucous membranes  Neck exam:  Supple.  Normal thyroid trachea midline, no JVD  Heart exam: Regular rate and rhythm no murmurs no S3 no S4   Lung exam: No rales no rhonchi no wheezes  Abdominal exam: Soft, nontender, nondistended, positive bowel sounds are normoactive, postoperative incisions, clean dry and intact, ileostomy, right-sided with clear urine in pouch, obese abdomen  Extremities exam: no clubbing, no cyanosis, no edema  Skin exam: No obvious wounds, no rashes  Neurological exam: Cranial nerves II through XII intact, no gross deficits  Musculoskeletal exam: Moderate bilateral generalized hand arthritis appreciated, no obvious deformity    Labs/imaging: See chart    DVT prophylaxis: Eliquis for novel anticoagulation    Ambulatory status: Per hospital discharge recommendations, specialist involvement/recommendations and physical therapy evaluation    ASSESSMENT AND PLAN:   Roc Butcher is a 84 year old male seen at [facility] [place] with the followin. History of ileostomy  Stable, continue current monitoring management, self-care teaching, further orders pending clinical course    2. Bacteremia due to Enterococcus  Stable and antibiotics completed continue current monitoring management, low threshold for retesting, infectious disease involvement with any recurrence    3. Parotid mass  Outpatient follow-up for possible biopsy, complete rehab, then outpatient plan for ENT evaluation    4. Malignant neoplasm of urinary bladder,  unspecified site (HCC)  Stable continue current monitor management, oncology follow-up    5. Uncontrolled type 2 diabetes mellitus with hyperglycemia (HCC)  Stable continue current monitor management, blood sugar control, blood sugar checks, management per protocols.    6. Coronary artery disease of native artery of native heart with stable angina pectoris (HCC)  Stable continue current monitoring management, medical therapy    7. Delirium due to another medical condition  Stable and resolved, continue current monitoring management, nighttime reorientation    8. Essential hypertension  Stable continue current monitoring management    9. Hyperlipidemia, unspecified hyperlipidemia type  Stable continue home medications monitoring management    10. Stage 3a chronic kidney disease (HCC)  Stable continue current monitoring management, and serial lab work next on Thursday of this week    11. Anemia, unspecified type  Serial lab work, continue current monitoring management      CODE STATUS:   Code Status: Full Code    admit condition: Stable    Over 60 minutes spent in direct patient contact reviewing and creating admission orders, obtaining history, evaluating patient, discussing treatment options, family/staff communication, review of available labs and radiology reports, completing documentation, and coordinating care      Jeff Anthony D'Amico,   8/13/2024  9:59 PM

## 2024-08-14 NOTE — PAYOR COMM NOTE
--------------  DISCHARGE REVIEW    Payor: [employer]  Subscriber #:  [id number]  Authorization Number: [id number]    Admit date: 7/25/24  Admit time:  10:33 AM  Discharge Date: 8/13/2024  6:00 PM     Admitting Physician: Mauricio Jeong MD  Attending Physician:  Priscilla davis. providers found  Primary Care Physician: Mitch Herrera MD          Discharge Summary Notes        Discharge Summary signed by Annika Cadet MD at 8/13/2024  3:09 PM       Author: Annika Cadet MD Specialty: HOSPITALIST Author Type: Physician    Filed: 8/13/2024  3:09 PM Date of Service: 8/13/2024  1:17 PM Status: Signed    : Annika Cadet MD (Physician)    Related Notes: Original Note by Cookie Hugo NP (Nurse Practitioner) filed at 8/13/2024  2:11 PM           [facility] Hospitalist Discharge Summary   Patient ID:  Roc Butcher  [id number]  84 year old  2/8/1940    Admit date: 7/25/2024  Discharge date: 8/13/2024    Primary Care Physician: Mitch Herrera MD   Attending Physician: Annika Cadet MD   Consults:   Consultants         Provider   Role Specialty     John Johnson MD      Consulting Physician INFECTIOUS DISEASES     Eben Rick MD      Consulting Physician Cardiovascular Diseases     Rabia Claros DO      Consulting Physician INFECTIOUS DISEASES     Megan Galeano MD      Consulting Physician NEPHROLOGY     Inocente Rivera MD      Consulting Physician Psychiatry     Alysha Sibley DO      Consulting Physician Internal Medicine            Hospital Discharge Diagnoses:   <principal problem not specified>  ----See D/C Summary for further Dx    Risk of Readmission Lace+ Score: 64  59-90 High Risk  29-58 Medium Risk  0-28   Low Risk.    TCM Follow-Up Recommendation:  LACE > 58: High Risk of readmission after discharge from the hospital.    Please note that only IHP DMG and EMG patients enrolled in the Medicare ACO, BCBS ACO and BCBS HMOs will be  handled by the [facility] Care Management team.  For all other patients, please follow usual protocol for discharge care transition.    Reason for admission  Per H/P Dated 7/25/2024  by Dr Fenton  Roc Butcher is a 84 year old male with PMH sig for type 2 diabetes, CAD status post PCI to left circumflex, PAD, pacemaker, hypertension, paroxysmal A-fib on Eliquis baseline, and chronic systolic heart failure with a EF of 35% hyperlipidemia, prior CVA, and bladder cancer who presented for laparoscopic cystoprostatectomy with ileal conduit diversion.       Postoperatively pain is controlled with IV pain meds.  Denies any nausea.  No chest pain or shortness of breath.  Feels a little sleepy.  Otherwise doing well.    Hospital Course:    Roc Butcher is a 84 year old male with PMH sig for type 2 diabetes, CAD status post PCI to left circumflex, PAD, pacemaker, hypertension, paroxysmal A-fib on Eliquis baseline, and chronic systolic heart failure with a EF of 35% hyperlipidemia, prior CVA, and bladder cancer who presented for laparoscopic cystoprostatectomy with ileal conduit diversion.  Post op course with complicated by delirium now improving and enterococcus bacteremia and uti s/p stent removal now on po abx.  Incidental finding on CT of the head was a right parotid mass measuring 1.6 cm with broad differential and radiology recommended ENT evaluation with strong consideration for histological sampling for definitive characterization. Discharge delayed with poor po intake, hypernatremia, acidosis rising wbc, now improved. Pt discharged at [facility],   see below for details       Delirium, Improving   Metabolic Encephalopathy  -CT head without acute process   -seen by psych, currently on seroquel, melatonin and lexapro added 8/13     Enterococcus bacteremia and UTI R/T procedure and ureteral stents  -blood cx enterococcus with repeat blood cx NGTD  -urine with enterococcus  -CT A/P without acute process   -TTE  without mention of vegetation.   -8/5 stents removed by urology  -8/5 TITA without vegetation  -8/6 CT A/P with Mild fat stranding seen adjacent to the ileal conduit as well as mild bilateral perinephric and periureteric fat stranding suggestive of underlying infectious or inflammatory etiology.   -TITA without vegetation   -off abx now      Left Leg Weakness-resolved   -complains of left hip and thigh pain  -US left leg negative for dvt  -Xray left hip and pelvis- no acute fx   -PT/OT     Left Basilic Vein DVT  -US with Nonocclusive superficial venous thrombus in left basilic vein.   -no tx needed     Diarrhea  -noted 6 stools x 24 hours, apparently soft  -augmentin held as rec by urology in setting of numerous BM's  -c-diff + but EIA negative     Acute Kidney Injury on CKD stage 3  Acidosis   Hypernatremia   -CT A/P with ongoing Mild bilateral hydroureteronephrosis, per urology stable   -Cr improved, currently at 1.25  -Na at 147  -sodium bicarb     Pleural Effusions/Atelectasis   -no sob or hypoxemia   -CT A/P with Moderate-sized bilateral pleural effusions with bilateral lower lobe atelectasis is unchanged.   -on RA   -encourage IS as able, up in chair       Moderate Malnutrition  -continue Glucerna tid with meals   -ensure staff or family is feeding pt for all meals, changed to general diet or have family bring in food---he is now eating better      Hematuria-intermittent issues   Acute on Chronic Anemia  -baseline hgb ~10's,current hgb 9.6  -intermittent issues with blood   -8/9 CT A/P done with noted mild bilateral hydroureteronephrosis and fat stranding adjacent to the ileal conduit suggestive of infectious or inflammatory etiology, await urology input     Right Parotid Mass  -noted on CT head   -Partially imaged ovoid 1.6 cm low-attenuation mass in the right parotid gland.   -outpt ENT eval for histological sampling to r/o neoplasm     Bladder cancer S/P lap cystoprostatectomy with ileal conduit diversion on  7/25/2024  -pain meds-tylenol prn   -urostomy teaching   -8/5 s/p stent removal by urology  -intermittent issues with hematuria, urology was aware, repeat CT A/P negative for acute process  -follow up with urology      CAD status post PCI to L Cfx  PAD  S/P pacemaker  Hypertension  PAF  Chronic HFrEF   HL  Previous CVA  -CT head with old infarcts- noted on previous imaging  -echo with EF 30%, last echo with EF 35% with WMA, mild-mod AI  -Not on ACE ARB due to renal function and hypotension.   -Continue hydralazine, eliquis, hydralazine, lopressor and imdur  -holding home ASA for now with hematuria and eliquis use  -holding lasix with decreased po and shilpa   -follows with [facility]      DM Type II  -HgbA1C 6.6  -holding home regimen: lantus 15 units nightly plus novolog   Due to low BS     MA/ACO [place]  -ER Visits 2024: 0  -Admissions 2024: 3  -Re- Entry: no   -Consults: urology, cards and ID  -Discharge Needs: [facility]   -Appointments: follow up with PCP Mitch Herrera MD after discharge from rehab     [facility]  -full code     EXAM:   GENERAL: no apparent distress  NEURO: A/A Ox3  RESP: non labored, CTA  CARDIO: Regular, no murmur  ABD: soft, NT, ND, BS+  : ileal conduit   EXTREMITIES: trace LE edema     Operative Procedures: Procedure(s) (LRB):  Xi robotic assisted laparoscopic radical cystoprostatectomy, bilateral pelvic lymph node dissection ileal conduit diversion (N/A)  Radiology:   US VENOUS DOPPLER LEG RIGHT - DIAG IMG (CPT=93971)    Result Date: 8/12/2024  CONCLUSION:  No DVT in the right lower extremity.    Dictated by (CST): Jericho Santos MD on 8/12/2024 at 4:59 PM     Finalized by (CST): Jericho Santos MD on 8/12/2024 at 4:59 PM          XR HIP W OR WO PELVIS 2 OR 3 VIEWS, RIGHT (CPT=73502)    Result Date: 8/12/2024  CONCLUSION:  1. No fracture or dislocation. 2. Stable mild hip joint osteoarthritis bilaterally. 3. Lesser incidental findings as above.    Dictated by (CST): Tom  MD Jericho on 8/12/2024 at 3:30 PM     Finalized by (CST): Jericho Santos MD on 8/12/2024 at 3:32 PM           Discharge Instructions     Medication List        START taking these medications      acetaminophen 500 MG Tabs  Commonly known as: Tylenol Extra Strength     escitalopram 5 MG Tabs  Commonly known as: Lexapro     melatonin 1 MG Tabs     QUEtiapine 25 MG Tabs  Commonly known as: SEROquel     sodium bicarbonate 650 MG Tabs            CHANGE how you take these medications      apixaban 5 MG Tabs  Commonly known as: Eliquis  What changed:   how much to take  how to take this     atorvastatin 40 MG Tabs  Commonly known as: Lipitor  TAKE 1 TABLET BY MOUTH IN  THE EVENING  What changed:   how much to take  how to take this  when to take this     metoprolol tartrate 25 MG Tabs  Commonly known as: Lopressor  TAKE ONE tablet daily  What changed:   when to take this  additional instructions            CONTINUE taking these medications      * Accu-Chek Carisa Strp  Test glucose three times daily.     * Accu-Chek Carisa Plus Strp  USE TWICE DAILY     Accu-Chek Multiclix Lancets Misc  test blood sugar QID as directed     hydrALAZINE 10 MG Tabs  Commonly known as: Apresoline     Insulin Pen Needle 31G X 5 MM Misc  Commonly known as: BD Pen Needle Mini U/F  AS DIRECTED QID     isosorbide dinitrate 5 MG Tabs  Commonly known as: Isordil Titradose           * This list has 2 medication(s) that are the same as other medications prescribed for you. Read the directions carefully, and ask your doctor or other care provider to review them with you.                STOP taking these medications      aspirin 81 MG Tbec     furosemide 20 MG Tabs  Commonly known as: Lasix     Insulin Lispro (1 Unit Dial) 100 UNIT/ML Sopn  Commonly known as: HumaLOG KwikPen     Lantus SoloStar 100 UNIT/ML Sopn  Generic drug: insulin glargine     NovoLOG FlexPen 100 UNIT/ML Sopn  Generic drug: insulin aspart     traMADol 50 MG Tabs  Commonly known as:  Ultram            Code Status: Full Code    Important follow up:   Follow-up Information       Mauricio Jeong MD Follow up on 8/12/2024.    Specialty: UROLOGY  Why: 8/12 at 10:50 am  Contact information:  [address]  [address]  [facility] [address]  [phone]               Mitch Herrera MD Follow up.    Specialties: Internal Medicine, ENDOCRINOLOGY  Why: within 1 week of discharge from rehab  Contact information:  [address]  [address]  [facility] [id number] [phone]                           Disposition: SNF  Discharged Condition: stable      Additional patient instructions:  CT Head with   -Partially imaged ovoid 1.6 cm low-attenuation mass in the right parotid gland.  Differential considerations for parotid gland masses are broad and include both benign and malignant primary neoplasms as well as metastatic disease. Suggest nonemergent otolaryngology evaluation of this finding with strong consideration of histologic sampling for definitive characterization     -recommend outpt ENT appt, primary can help facilitate     Patient taken off of insulin with poor po intake. Please follow blood sugars and resume as needed, was on long acting 15 units daily with novolog    Seen by psych and on lexapro, melatonin and seroquel for delirium    Pt needs cbc and bmp in 3-5 days. On Bicarb for acidosis by renal   =========================================================================================================================    I Reconciled current and discharge medications on day of discharge  Patient had opportunity to ask questions and state understand and agree with therapeutic plan as outlined    Total Time Coordinating Care: greater than 30 minutes  Is this a shared or split note between Advanced Practice Provider and Physician? Yes    Note: This chart was prepared using voice recognition software and may contain unintended word substitution errors.   Cookie Hugo RN, NP   [facility]  Hospitalist Team   8/13/2024      SEE ATTENDING NOTE BELOW          Patient seen and examined independently.  Discussed with APN and agree with note above    Patient improved.  Stable for discharge to SNF, pt wihtout acute complaints     GEN: NAD  RESP: CTAB  CV: RRR    Time of discharge >30 minutes    Annika Cadet MD         Electronically signed by Annika Cadet MD on 8/13/2024  3:09 PM         REVIEWER COMMENTS

## 2024-08-15 ENCOUNTER — EXTERNAL FACILITY (OUTPATIENT)
Dept: INTERNAL MEDICINE CLINIC | Facility: CLINIC | Age: 84
End: 2024-08-15

## 2024-08-15 DIAGNOSIS — F05 DELIRIUM DUE TO ANOTHER MEDICAL CONDITION: ICD-10-CM

## 2024-08-15 DIAGNOSIS — R78.81 BACTEREMIA DUE TO ENTEROCOCCUS: ICD-10-CM

## 2024-08-15 DIAGNOSIS — B95.2 BACTEREMIA DUE TO ENTEROCOCCUS: ICD-10-CM

## 2024-08-15 DIAGNOSIS — K11.8 PAROTID MASS: ICD-10-CM

## 2024-08-15 DIAGNOSIS — Z98.890 HISTORY OF ILEOSTOMY: Primary | ICD-10-CM

## 2024-08-15 DIAGNOSIS — E11.65 UNCONTROLLED TYPE 2 DIABETES MELLITUS WITH HYPERGLYCEMIA (HCC): ICD-10-CM

## 2024-08-15 PROCEDURE — 1111F DSCHRG MED/CURRENT MED MERGE: CPT | Performed by: INTERNAL MEDICINE

## 2024-08-15 PROCEDURE — 99309 SBSQ NF CARE MODERATE MDM 30: CPT | Performed by: INTERNAL MEDICINE

## 2024-08-19 ENCOUNTER — EXTERNAL FACILITY (OUTPATIENT)
Dept: INTERNAL MEDICINE CLINIC | Facility: CLINIC | Age: 84
End: 2024-08-19

## 2024-08-19 DIAGNOSIS — R78.81 BACTEREMIA DUE TO ENTEROCOCCUS: ICD-10-CM

## 2024-08-19 DIAGNOSIS — B95.2 BACTEREMIA DUE TO ENTEROCOCCUS: ICD-10-CM

## 2024-08-19 DIAGNOSIS — Z98.890 HISTORY OF ILEOSTOMY: Primary | ICD-10-CM

## 2024-08-19 DIAGNOSIS — K11.8 PAROTID MASS: ICD-10-CM

## 2024-08-19 DIAGNOSIS — E11.65 UNCONTROLLED TYPE 2 DIABETES MELLITUS WITH HYPERGLYCEMIA (HCC): ICD-10-CM

## 2024-08-19 DIAGNOSIS — F05 DELIRIUM DUE TO ANOTHER MEDICAL CONDITION: ICD-10-CM

## 2024-08-19 PROCEDURE — 1111F DSCHRG MED/CURRENT MED MERGE: CPT | Performed by: INTERNAL MEDICINE

## 2024-08-19 PROCEDURE — 99309 SBSQ NF CARE MODERATE MDM 30: CPT | Performed by: INTERNAL MEDICINE

## 2024-08-21 ENCOUNTER — ANCILLARY PROCEDURE (OUTPATIENT)
Dept: CARDIOLOGY | Age: 84
End: 2024-08-21
Attending: INTERNAL MEDICINE

## 2024-08-21 ENCOUNTER — TELEPHONE (OUTPATIENT)
Dept: CARDIOLOGY | Age: 84
End: 2024-08-21

## 2024-08-21 DIAGNOSIS — Z95.0 CARDIAC PACEMAKER IN SITU: ICD-10-CM

## 2024-08-22 ENCOUNTER — EXTERNAL FACILITY (OUTPATIENT)
Dept: INTERNAL MEDICINE CLINIC | Facility: CLINIC | Age: 84
End: 2024-08-22

## 2024-08-22 DIAGNOSIS — E11.65 UNCONTROLLED TYPE 2 DIABETES MELLITUS WITH HYPERGLYCEMIA (HCC): ICD-10-CM

## 2024-08-22 DIAGNOSIS — B95.2 BACTEREMIA DUE TO ENTEROCOCCUS: ICD-10-CM

## 2024-08-22 DIAGNOSIS — K11.8 PAROTID MASS: ICD-10-CM

## 2024-08-22 DIAGNOSIS — F05 DELIRIUM DUE TO ANOTHER MEDICAL CONDITION: ICD-10-CM

## 2024-08-22 DIAGNOSIS — R78.81 BACTEREMIA DUE TO ENTEROCOCCUS: ICD-10-CM

## 2024-08-22 DIAGNOSIS — Z98.890 HISTORY OF ILEOSTOMY: Primary | ICD-10-CM

## 2024-08-22 LAB
MDC_IDC_LEAD_CONNECTION_STATUS: NORMAL
MDC_IDC_LEAD_CONNECTION_STATUS: NORMAL
MDC_IDC_LEAD_IMPLANT_DT: NORMAL
MDC_IDC_LEAD_IMPLANT_DT: NORMAL
MDC_IDC_LEAD_LOCATION: NORMAL
MDC_IDC_LEAD_LOCATION: NORMAL
MDC_IDC_LEAD_LOCATION_DETAIL_1: NORMAL
MDC_IDC_LEAD_LOCATION_DETAIL_1: NORMAL
MDC_IDC_LEAD_MFG: NORMAL
MDC_IDC_LEAD_MFG: NORMAL
MDC_IDC_LEAD_MODEL: NORMAL
MDC_IDC_LEAD_MODEL: NORMAL
MDC_IDC_LEAD_POLARITY_TYPE: NORMAL
MDC_IDC_LEAD_POLARITY_TYPE: NORMAL
MDC_IDC_LEAD_SERIAL: NORMAL
MDC_IDC_LEAD_SERIAL: NORMAL
MDC_IDC_PG_IMPLANT_DTM: NORMAL
MDC_IDC_PG_MFG: NORMAL
MDC_IDC_PG_MODEL: NORMAL
MDC_IDC_PG_SERIAL: NORMAL
MDC_IDC_PG_TYPE: NORMAL
MDC_IDC_SESS_CLINIC_NAME: NORMAL
MDC_IDC_SESS_TYPE: NORMAL

## 2024-08-22 PROCEDURE — 1111F DSCHRG MED/CURRENT MED MERGE: CPT | Performed by: INTERNAL MEDICINE

## 2024-08-22 PROCEDURE — 99309 SBSQ NF CARE MODERATE MDM 30: CPT | Performed by: INTERNAL MEDICINE

## 2024-08-26 ENCOUNTER — EXTERNAL FACILITY (OUTPATIENT)
Dept: INTERNAL MEDICINE CLINIC | Facility: CLINIC | Age: 84
End: 2024-08-26

## 2024-08-26 DIAGNOSIS — B95.2 BACTEREMIA DUE TO ENTEROCOCCUS: ICD-10-CM

## 2024-08-26 DIAGNOSIS — Z98.890 HISTORY OF ILEOSTOMY: Primary | ICD-10-CM

## 2024-08-26 DIAGNOSIS — R78.81 BACTEREMIA DUE TO ENTEROCOCCUS: ICD-10-CM

## 2024-08-26 DIAGNOSIS — K11.8 PAROTID MASS: ICD-10-CM

## 2024-08-26 DIAGNOSIS — E11.65 UNCONTROLLED TYPE 2 DIABETES MELLITUS WITH HYPERGLYCEMIA (HCC): ICD-10-CM

## 2024-08-26 DIAGNOSIS — F05 DELIRIUM DUE TO ANOTHER MEDICAL CONDITION: ICD-10-CM

## 2024-08-26 PROCEDURE — 1111F DSCHRG MED/CURRENT MED MERGE: CPT | Performed by: INTERNAL MEDICINE

## 2024-08-26 PROCEDURE — 99309 SBSQ NF CARE MODERATE MDM 30: CPT | Performed by: INTERNAL MEDICINE

## 2024-08-28 NOTE — PROGRESS NOTES
[facility] [place] note transcribed by Dr. Jeff Damico  .  Date seen: 8/15/2024  .  Subjective: Patient seen and examined for renal cancer, diverting ileostomy's, Enterococcus UTI infection, delirium dementia, diabetes mellitus type 2. Patient seemsstable, doing well, is able to answer questions, the staff claims he has not been sleeping well, he did get up in the middle of the night, was redirectable, no injuries, his wife is at bedside, long conversation had, ileostomy site looks stable, no fevers, patient sleeping on and off through the day. He is able to communicate with me. Lab work pending from today.  .  EXAM:  Vital signs: See point click care charting for updated details  Gen. exam: Alert and awake, baseline mental status noted, in no acute distress, poor dentition  HEENT: Pupils equal and reactive to light and accommodation, moist mucous membranes  Neck exam: Supple. Normal thyroid trachea midline, no JVD  Heart exam: Regular rate and rhythm no murmurs no S3 no S4  Lung exam: No rales no rhonchi no wheezes  Abdominal exam: Soft, nontender, nondistended, positive bowel sounds are normoactive, postoperative incisions, clean dry and intact, ileostomy, right-sided with clear urine in pouch, obese abdomen  Extremitiesexam: no clubbing, no cyanosis, no edema  Skin exam: No obvious wounds, no rashes  Neurological exam: Cranial nerves II through XII intact, no gross deficits  Musculoskeletal exam: Moderate bilateral generalized hand arthritis appreciated, no obvious deformity  .  Labs/imaging: See chart  DVT prophylaxis: Eliquis for novel anticoagulation  Ambulatory status: Per hospital discharge recommendations, specialist involvement/recommendations and physical therapy evaluation  .  ASSESSMENT AND PLAN:  Roc Butcher is a 84 year oldmale seen at [facility] [place] with the followin. History of ileostomy  Stable, continue current monitoring management, self-care teaching, further orders  pending clinical course  2. Bacteremia due to Enterococcus  Stable and antibiotics completed continue current monitoring management, low threshold for retesting, infectious disease involvement with any recurrence, low threshold for retesting  3. Parotid mass  Outpatient follow-up for possible biopsy, complete rehab, then outpatient plan for ENT evaluation  4.Uncontrolled type 2 diabetes mellitus with hyperglycemia (HCC): Blood sugar checks continue current home medications, further orders pending clinical course  5.delirium/dementia: Will ask psychiatry to see continue Seroquel 12.5 mg nightly  .  Stable problem list:  Malignant neoplasm of urinary bladder, unspecified site (HCC)  Coronary artery disease of native artery of native heart with stable angina pectoris (HCC)  Delirium due to another medical condition  Essential hypertension  Hyperlipidemia, unspecified hyperlipidemia type  Stage 3a chronic kidney disease (HCC)  Anemia, unspecified type

## 2024-08-29 NOTE — PROGRESS NOTES
[facility] [place] note transcribed by Dr. Jeff Damico  .  Date seen: 2024  .  Subjective: Patient seen and examined for renal cancer, diverting ileostomy's, Enterococcus UTI infection, delirium dementia, diabetes mellitus type 2. Pt seems stable but had a fall this weekend. no injuries and pt does not recall the event, urine noted to be red tinged note to have white flakes. Mental status seems stable, unchanged. blood sugars mildly elevated. intake seems stable, no pain.  .  EXAM:  Vital signs: See point click care charting for updated details  Gen. exam: Alert and awake, baseline mental status noted, in no acute distress, poor dentition  HEENT: Pupils equal and reactive to light and accommodation, moist mucous membranes  Neck exam: Supple. Normal thyroid trachea midline, no JVD  Heart exam: Regular rate and rhythm no murmurs no S3 no S4  Lung exam: No rales no rhonchi no wheezes  Abdominal exam: Soft, nontender, nondistended, positive bowel sounds are normoactive, postoperative incisions,clean dry and intact, ileostomy, right-sided with red tinged urine with white flakes  Extremitiesexam: no clubbing, no cyanosis, no edema  Skin exam: No obvious wounds, no rashes  Neurological exam: Cranial nerves II through XII intact, no gross deficits  Musculoskeletal exam: Moderate bilateral generalized hand arthritis appreciated, no obvious deformity  .  Labs/imaging: See chart  DVT prophylaxis: Eliquis for novel anticoagulation  Ambulatory status: Per hospital discharge recommendations, specialist involvement/recommendations and physical therapy evaluation  .  ASSESSMENT AND PLAN:  Roc Butcher is a 84 year oldmale seen at [facility] [place] with the followin. History of ileostomy  Stable, continue current monitoring management, self-care teaching, further orders pending clinical course  2. Bacteremia due to Enterococcus  Stable and antibiotics completed continue current monitoring management, low  threshold for retesting, reechk ua and culture. empitic abx and antifungals  3. Parotid mass  Outpatient follow-up for possible biopsy, complete rehab, then outpatient plan for ENT evaluation  4.Uncontrolled type 2 diabetes mellitus with hyperglycemia (HCC): Blood sugar checks continue current home medications, add iss tid ac  5.delirium/dementia: Will ask psychiatry to see continue Seroquel 12.5 mg nightly  .  Stable problem list:  Malignant neoplasm of urinary bladder, unspecified site (HCC)  Coronary artery disease of native artery of native heart with stable angina pectoris (HCC)  Delirium due to another medical condition  Essential hypertension  Hyperlipidemia, unspecified hyperlipidemia type  Stage 3a chronic kidney disease (HCC)  Anemia, unspecified type

## 2024-09-03 ENCOUNTER — EXTERNAL FACILITY (OUTPATIENT)
Dept: INTERNAL MEDICINE CLINIC | Facility: CLINIC | Age: 84
End: 2024-09-03

## 2024-09-03 DIAGNOSIS — E11.65 UNCONTROLLED TYPE 2 DIABETES MELLITUS WITH HYPERGLYCEMIA (HCC): ICD-10-CM

## 2024-09-03 DIAGNOSIS — Z98.890 HISTORY OF ILEOSTOMY: Primary | ICD-10-CM

## 2024-09-03 DIAGNOSIS — F05 DELIRIUM DUE TO ANOTHER MEDICAL CONDITION: ICD-10-CM

## 2024-09-03 DIAGNOSIS — B95.2 BACTEREMIA DUE TO ENTEROCOCCUS: ICD-10-CM

## 2024-09-03 DIAGNOSIS — K11.8 PAROTID MASS: ICD-10-CM

## 2024-09-03 DIAGNOSIS — R78.81 BACTEREMIA DUE TO ENTEROCOCCUS: ICD-10-CM

## 2024-09-03 PROCEDURE — 99309 SBSQ NF CARE MODERATE MDM 30: CPT | Performed by: INTERNAL MEDICINE

## 2024-09-05 ENCOUNTER — EXTERNAL FACILITY (OUTPATIENT)
Dept: INTERNAL MEDICINE CLINIC | Facility: CLINIC | Age: 84
End: 2024-09-05

## 2024-09-05 DIAGNOSIS — K11.8 PAROTID MASS: ICD-10-CM

## 2024-09-05 DIAGNOSIS — B95.2 BACTEREMIA DUE TO ENTEROCOCCUS: ICD-10-CM

## 2024-09-05 DIAGNOSIS — R78.81 BACTEREMIA DUE TO ENTEROCOCCUS: ICD-10-CM

## 2024-09-05 DIAGNOSIS — F05 DELIRIUM DUE TO ANOTHER MEDICAL CONDITION: ICD-10-CM

## 2024-09-05 DIAGNOSIS — Z98.890 HISTORY OF ILEOSTOMY: Primary | ICD-10-CM

## 2024-09-05 DIAGNOSIS — E11.65 UNCONTROLLED TYPE 2 DIABETES MELLITUS WITH HYPERGLYCEMIA (HCC): ICD-10-CM

## 2024-09-05 PROCEDURE — 99316 NF DSCHRG MGMT 30 MIN+: CPT | Performed by: INTERNAL MEDICINE

## 2024-09-06 ENCOUNTER — APPOINTMENT (OUTPATIENT)
Dept: CT IMAGING | Age: 84
End: 2024-09-06
Attending: STUDENT IN AN ORGANIZED HEALTH CARE EDUCATION/TRAINING PROGRAM

## 2024-09-06 ENCOUNTER — HOSPITAL ENCOUNTER (EMERGENCY)
Age: 84
Discharge: HOME | End: 2024-09-06
Attending: STUDENT IN AN ORGANIZED HEALTH CARE EDUCATION/TRAINING PROGRAM

## 2024-09-06 VITALS
HEIGHT: 69 IN | DIASTOLIC BLOOD PRESSURE: 73 MMHG | RESPIRATION RATE: 17 BRPM | HEART RATE: 69 BPM | BODY MASS INDEX: 26.12 KG/M2 | WEIGHT: 176.37 LBS | OXYGEN SATURATION: 97 % | TEMPERATURE: 97.6 F | SYSTOLIC BLOOD PRESSURE: 136 MMHG
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
## 2024-09-06 DIAGNOSIS — S09.90XA CLOSED HEAD INJURY, INITIAL ENCOUNTER: ICD-10-CM

## 2024-09-06 DIAGNOSIS — S00.12XA TRAUMATIC HEMATOMA OF LEFT EYEBROW, INITIAL ENCOUNTER: ICD-10-CM

## 2024-09-06 PROCEDURE — 72125 CT NECK SPINE W/O DYE: CPT

## 2024-09-06 PROCEDURE — 70450 CT HEAD/BRAIN W/O DYE: CPT

## 2024-09-06 PROCEDURE — 10003579 HB TRAUMA W/O CRITICAL CARE

## 2024-09-06 PROCEDURE — 99284 EMERGENCY DEPT VISIT MOD MDM: CPT

## 2024-09-06 SDOH — SOCIAL STABILITY: SOCIAL INSECURITY: HOW OFTEN DOES ANYONE, INCLUDING FAMILY AND FRIENDS, INSULT OR TALK DOWN TO YOU?: NEVER

## 2024-09-06 SDOH — SOCIAL STABILITY: SOCIAL INSECURITY: HOW OFTEN DOES ANYONE, INCLUDING FAMILY AND FRIENDS, SCREAM OR CURSE AT YOU?: NEVER

## 2024-09-06 SDOH — SOCIAL STABILITY: SOCIAL INSECURITY: HOW OFTEN DOES ANYONE, INCLUDING FAMILY AND FRIENDS, THREATEN YOU WITH HARM?: NEVER

## 2024-09-06 SDOH — SOCIAL STABILITY: SOCIAL INSECURITY: HOW OFTEN DOES ANYONE, INCLUDING FAMILY AND FRIENDS, PHYSICALLY HURT YOU?: NEVER

## 2024-09-06 ASSESSMENT — PAIN SCALES - GENERAL
PAINLEVEL_OUTOF10: 0
PAINLEVEL_OUTOF10: 0

## 2024-09-10 ENCOUNTER — APPOINTMENT (OUTPATIENT)
Dept: CT IMAGING | Age: 84
DRG: 082 | End: 2024-09-10
Attending: STUDENT IN AN ORGANIZED HEALTH CARE EDUCATION/TRAINING PROGRAM

## 2024-09-10 ENCOUNTER — HOSPITAL ENCOUNTER (EMERGENCY)
Age: 84
Discharge: HOME | DRG: 082 | End: 2024-09-10
Attending: STUDENT IN AN ORGANIZED HEALTH CARE EDUCATION/TRAINING PROGRAM

## 2024-09-10 VITALS
SYSTOLIC BLOOD PRESSURE: 118 MMHG | DIASTOLIC BLOOD PRESSURE: 63 MMHG | HEART RATE: 70 BPM | TEMPERATURE: 97.3 F | OXYGEN SATURATION: 98 % | WEIGHT: 169.53 LBS | RESPIRATION RATE: 16 BRPM | BODY MASS INDEX: 25.04 KG/M2

## 2024-09-10 DIAGNOSIS — S00.03XA HEMATOMA OF SCALP, INITIAL ENCOUNTER: Primary | ICD-10-CM

## 2024-09-10 DIAGNOSIS — J90 BILATERAL PLEURAL EFFUSION: ICD-10-CM

## 2024-09-10 DIAGNOSIS — R29.6 UNWITNESSED FALL: ICD-10-CM

## 2024-09-10 PROCEDURE — 10003579 HB TRAUMA W/O CRITICAL CARE

## 2024-09-10 PROCEDURE — 70450 CT HEAD/BRAIN W/O DYE: CPT

## 2024-09-10 PROCEDURE — 72125 CT NECK SPINE W/O DYE: CPT

## 2024-09-10 PROCEDURE — 99284 EMERGENCY DEPT VISIT MOD MDM: CPT

## 2024-09-10 ASSESSMENT — PAIN SCALES - GENERAL: PAINLEVEL_OUTOF10: 3

## 2024-09-10 NOTE — PROGRESS NOTES
[facility] [place] note transcribed by Dr. Jeff Damico  .  Date seen: 2024  .  Subjective: Patient seen and examined for renal cancer, diverting ileostomy's, Enterococcus UTI infection, delirium dementia, diabetes mellitus type 2. Patient seen and examined seems stable, appears to be getting better, is able to voice and be understood better, is working with physical therapy, appetite seems solid, medication seems stable, no family available today. Ileostomy does appear to have fungal appearingelements changed rooms to the [facility].  .  EXAM:  Vital signs: See point click care charting for updated details  Gen. exam: Alert and awake, baseline mental status noted, in no acute distress, poor dentition  HEENT: Pupils equal and reactive to light and accommodation, moist mucous membranes  Neck exam: Supple. Normal thyroid trachea midline, no JVD  Heart exam: Regular rate and rhythm no murmurs no S3 no S4  Lung exam: No rales no rhonchi no wheezes  Abdominal exam: Soft, nontender, nondistended, positive bowel sounds are normoactive, postoperative incisions,clean dry and intact, ileostomy, right-sided with red tinged urine with white flakes  Extremitiesexam: no clubbing, no cyanosis, no edema  Skin exam: No obvious wounds, no rashes  Neurological exam: Cranial nerves II through XII intact, no gross deficits  Musculoskeletal exam: Moderate bilateral generalized hand arthritis appreciated, no obvious deformity  .  Labs/imaging: See chart  DVT prophylaxis: Eliquis for novel anticoagulation  Ambulatory status: Per hospital discharge recommendations, specialist involvement/recommendations and physical therapy evaluation  .  ASSESSMENT AND PLAN:  Roc Butcher is a 84 year oldmale seen at [facility] [place] with the followin. History of ileostomy  Stable, continue current monitoring management, self-care teaching, further orders pending clinical course  2. Bacteremia due to Enterococcus  Stable and  antibiotics completed, now with fungal appearing elements in the bag, continue current fluconazole for a total of 10 to 14 days  3. Parotid mass  Outpatient follow-up for possible biopsy, complete rehab, then outpatient plan for ENT evaluation  4.Uncontrolled type 2 diabetes mellitus with hyperglycemia (HCC): Blood sugar checks continue current home medications, continue blood sugar control with insulin sliding scale  5.delirium/dementia: Will ask psychiatry to see continue Seroquel 12.5 mg nightly has changed to the [facility].  .  Stable problem list:  Malignant neoplasm of urinary bladder,unspecified site (HCC)  Coronary artery disease of native artery of native heart with stable angina pectoris (HCC)  Delirium due to another medical condition  Essential hypertension  Hyperlipidemia, unspecified hyperlipidemia type  Stage 3a chronic kidney disease (HCC)  Anemia, unspecified type

## 2024-09-10 NOTE — PROGRESS NOTES
[facility] [place] note transcribed by Dr. Jeff Damico  .  Date seen: 2024  .  Subjective: Patient seen and examined for renal cancer, diverting ileostomy's, Enterococcus UTI infection, delirium dementia, diabetes mellitus type 2, fungal ileostomy infection. Patient seen and examined seems stable, doing quite well, improved, fungal elements around the ileostomy bag, much improved, patient is talking, sitting up for lunch, looking very well, continues to improve.  .  EXAM:  Vital signs: See point click care charting for updated details  Gen. exam: Alert and awake, baseline mental status noted, in no acute distress, poor dentition  HEENT: Pupils equal and reactive to light and accommodation, moist mucous membranes  Neck exam: Supple. Normal thyroid trachea midline, no JVD  Heart exam: Regular rate and rhythm no murmurs no S3 no S4  Lung exam: No rales no rhonchi no wheezes  Abdominal exam: Soft, nontender, nondistended, positive bowel sounds are normoactive, postoperative incisions,clean dry and intact, ileostomy, right-sided with red tinged urine with white flakes  Extremitiesexam: no clubbing, no cyanosis, no edema  Skin exam: No obvious wounds, no rashes  Neurological exam: Cranial nerves II through XII intact, no gross deficits  Musculoskeletal exam: Moderate bilateral generalized hand arthritis appreciated, no obvious deformity  .  Labs/imaging: See chart  DVT prophylaxis: Eliquis for novel anticoagulation  Ambulatory status: Per hospital discharge recommendations, specialist involvement/recommendations and physical therapy evaluation  .  ASSESSMENT AND PLAN:  Roc Butcher is a 84 year oldmale seen at [facility] [place] with the followin. History of ileostomy  Stable, continue current monitoring management, self-care teaching, further orders pending clinical course  2. Bacteremia due to Enterococcus  Stable and antibiotics completed, now with fungal appearing elements in the bag,  continue current fluconazole for a total of 10 to 14 days  3. Parotid mass  Outpatient follow-upfor possible biopsy, complete rehab, then outpatient plan for ENT evaluation  4.Uncontrolled type 2 diabetes mellitus with hyperglycemia (HCC): Blood sugar checks continue current home medications, continue blood sugar control with insulin sliding scale  5.delirium/dementia: Will ask psychiatry to see continue Seroquel 12.5 mg nightly has changed to the [facility].  .  Stable problem list:  Malignant neoplasm of urinary bladder, unspecified site (HCC)  Coronary artery disease of native artery of native heart with stable angina pectoris (HCC)  Delirium due to another medical condition  Essential hypertension  Hyperlipidemia, unspecified hyperlipidemia type  Stage 3a chronic kidney disease (HCC)  Anemia, unspecified type

## 2024-09-12 ENCOUNTER — HOSPITAL ENCOUNTER (INPATIENT)
Age: 84
Discharge: NURSING, REHAB OR LONG-TERM CARE | DRG: 082 | End: 2024-09-12
Attending: STUDENT IN AN ORGANIZED HEALTH CARE EDUCATION/TRAINING PROGRAM | Admitting: INTERNAL MEDICINE

## 2024-09-12 ENCOUNTER — APPOINTMENT (OUTPATIENT)
Dept: CT IMAGING | Age: 84
DRG: 082 | End: 2024-09-12
Attending: STUDENT IN AN ORGANIZED HEALTH CARE EDUCATION/TRAINING PROGRAM

## 2024-09-12 DIAGNOSIS — S02.119A CLOSED FRACTURE OF OCCIPITAL BONE, UNSPECIFIED LATERALITY, UNSPECIFIED OCCIPITAL FRACTURE TYPE, INITIAL ENCOUNTER  (CMD): ICD-10-CM

## 2024-09-12 DIAGNOSIS — J90 PLEURAL EFFUSION, BILATERAL: ICD-10-CM

## 2024-09-12 DIAGNOSIS — N28.9 RENAL INSUFFICIENCY: ICD-10-CM

## 2024-09-12 DIAGNOSIS — I50.21 ACUTE SYSTOLIC CONGESTIVE HEART FAILURE  (CMD): ICD-10-CM
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
## 2024-09-12 LAB
ALBUMIN SERPL-MCNC: 2.5 G/DL (ref 3.6–5.1)
ALBUMIN/GLOB SERPL: 0.7 {RATIO} (ref 1–2.4)
ALP SERPL-CCNC: 87 UNITS/L (ref 45–117)
ALT SERPL-CCNC: 21 UNITS/L
ANION GAP SERPL CALC-SCNC: 8 MMOL/L (ref 7–19)
APPEARANCE UR: ABNORMAL
AST SERPL-CCNC: 28 UNITS/L
BACTERIA #/AREA URNS HPF: ABNORMAL /HPF
BASOPHILS # BLD: 0 K/MCL (ref 0–0.3)
BASOPHILS NFR BLD: 0 %
BILIRUB SERPL-MCNC: 0.5 MG/DL (ref 0.2–1)
BILIRUB UR QL STRIP: NEGATIVE
BUN SERPL-MCNC: 27 MG/DL (ref 6–20)
BUN/CREAT SERPL: 21 (ref 7–25)
CALCIUM SERPL-MCNC: 8.9 MG/DL (ref 8.4–10.2)
CHLORIDE SERPL-SCNC: 113 MMOL/L (ref 97–110)
CK SERPL-CCNC: 79 UNITS/L (ref 39–308)
CO2 SERPL-SCNC: 24 MMOL/L (ref 21–32)
COLOR UR: ABNORMAL
CREAT SERPL-MCNC: 1.3 MG/DL (ref 0.67–1.17)
DEPRECATED RDW RBC: 69.1 FL (ref 39–50)
EGFRCR SERPLBLD CKD-EPI 2021: 54 ML/MIN/{1.73_M2}
EOSINOPHIL # BLD: 0.2 K/MCL (ref 0–0.5)
EOSINOPHIL NFR BLD: 3 %
ERYTHROCYTE [DISTWIDTH] IN BLOOD: 21.9 % (ref 11–15)
FASTING DURATION TIME PATIENT: ABNORMAL H
GLOBULIN SER-MCNC: 3.6 G/DL (ref 2–4)
GLUCOSE BLDC GLUCOMTR-MCNC: 107 MG/DL (ref 70–99)
GLUCOSE SERPL-MCNC: 109 MG/DL (ref 70–99)
GLUCOSE UR STRIP-MCNC: NEGATIVE MG/DL
HCT VFR BLD CALC: 26.2 % (ref 39–51)
HGB BLD-MCNC: 8 G/DL (ref 13–17)
HGB UR QL STRIP: ABNORMAL
HYALINE CASTS #/AREA URNS LPF: ABNORMAL /LPF
IMM GRANULOCYTES # BLD AUTO: 0 K/MCL (ref 0–0.2)
IMM GRANULOCYTES # BLD: 0 %
KETONES UR STRIP-MCNC: NEGATIVE MG/DL
LEUKOCYTE ESTERASE UR QL STRIP: ABNORMAL
LIPASE SERPL-CCNC: 18 UNITS/L (ref 15–77)
LYMPHOCYTES # BLD: 0.6 K/MCL (ref 1–4)
LYMPHOCYTES NFR BLD: 10 %
MCH RBC QN AUTO: 25.9 PG (ref 26–34)
MCHC RBC AUTO-ENTMCNC: 30.5 G/DL (ref 32–36.5)
MCV RBC AUTO: 84.8 FL (ref 78–100)
MONOCYTES # BLD: 0.6 K/MCL (ref 0.3–0.9)
MONOCYTES NFR BLD: 11 %
NEUTROPHILS # BLD: 4.1 K/MCL (ref 1.8–7.7)
NEUTROPHILS NFR BLD: 76 %
NITRITE UR QL STRIP: NEGATIVE
NRBC BLD MANUAL-RTO: 0 /100 WBC
NT-PROBNP SERPL-MCNC: ABNORMAL PG/ML
PH UR STRIP: 6.5 [PH] (ref 5–7)
PLATELET # BLD AUTO: 207 K/MCL (ref 140–450)
POTASSIUM SERPL-SCNC: 4.3 MMOL/L (ref 3.4–5.1)
PROT SERPL-MCNC: 6.1 G/DL (ref 6.4–8.2)
PROT UR STRIP-MCNC: 30 MG/DL
RBC # BLD: 3.09 MIL/MCL (ref 4.5–5.9)
RBC #/AREA URNS HPF: >100 /HPF
SODIUM SERPL-SCNC: 141 MMOL/L (ref 135–145)
SP GR UR STRIP: 1.01 (ref 1–1.03)
SQUAMOUS #/AREA URNS HPF: ABNORMAL /HPF
TROPONIN I SERPL DL<=0.01 NG/ML-MCNC: 26 NG/L
UROBILINOGEN UR STRIP-MCNC: 0.2 MG/DL
WBC # BLD: 5.4 K/MCL (ref 4.2–11)
WBC #/AREA URNS HPF: >100 /HPF

## 2024-09-12 PROCEDURE — 10002805 HB CONTRAST AGENT: Performed by: STUDENT IN AN ORGANIZED HEALTH CARE EDUCATION/TRAINING PROGRAM

## 2024-09-12 PROCEDURE — 85025 COMPLETE CBC W/AUTO DIFF WBC: CPT | Performed by: STUDENT IN AN ORGANIZED HEALTH CARE EDUCATION/TRAINING PROGRAM

## 2024-09-12 PROCEDURE — 99221 1ST HOSP IP/OBS SF/LOW 40: CPT | Performed by: NURSE PRACTITIONER

## 2024-09-12 PROCEDURE — 82550 ASSAY OF CK (CPK): CPT | Performed by: STUDENT IN AN ORGANIZED HEALTH CARE EDUCATION/TRAINING PROGRAM

## 2024-09-12 PROCEDURE — 10003585 HB ROOM CHARGE INTERMEDIATE CARE

## 2024-09-12 PROCEDURE — 84484 ASSAY OF TROPONIN QUANT: CPT | Performed by: STUDENT IN AN ORGANIZED HEALTH CARE EDUCATION/TRAINING PROGRAM

## 2024-09-12 PROCEDURE — 74177 CT ABD & PELVIS W/CONTRAST: CPT

## 2024-09-12 PROCEDURE — 70450 CT HEAD/BRAIN W/O DYE: CPT

## 2024-09-12 PROCEDURE — 82962 GLUCOSE BLOOD TEST: CPT

## 2024-09-12 PROCEDURE — 83880 ASSAY OF NATRIURETIC PEPTIDE: CPT | Performed by: STUDENT IN AN ORGANIZED HEALTH CARE EDUCATION/TRAINING PROGRAM

## 2024-09-12 PROCEDURE — 82746 ASSAY OF FOLIC ACID SERUM: CPT | Performed by: PSYCHIATRY & NEUROLOGY

## 2024-09-12 PROCEDURE — 80053 COMPREHEN METABOLIC PANEL: CPT | Performed by: STUDENT IN AN ORGANIZED HEALTH CARE EDUCATION/TRAINING PROGRAM

## 2024-09-12 PROCEDURE — 72125 CT NECK SPINE W/O DYE: CPT

## 2024-09-12 PROCEDURE — 36415 COLL VENOUS BLD VENIPUNCTURE: CPT

## 2024-09-12 PROCEDURE — 83690 ASSAY OF LIPASE: CPT | Performed by: STUDENT IN AN ORGANIZED HEALTH CARE EDUCATION/TRAINING PROGRAM

## 2024-09-12 PROCEDURE — 87086 URINE CULTURE/COLONY COUNT: CPT | Performed by: STUDENT IN AN ORGANIZED HEALTH CARE EDUCATION/TRAINING PROGRAM

## 2024-09-12 PROCEDURE — 99291 CRITICAL CARE FIRST HOUR: CPT

## 2024-09-12 PROCEDURE — 84443 ASSAY THYROID STIM HORMONE: CPT | Performed by: PSYCHIATRY & NEUROLOGY

## 2024-09-12 PROCEDURE — 71260 CT THORAX DX C+: CPT

## 2024-09-12 PROCEDURE — G0390 TRAUMA RESPONS W/HOSP CRITI: HCPCS

## 2024-09-12 PROCEDURE — 81001 URINALYSIS AUTO W/SCOPE: CPT | Performed by: STUDENT IN AN ORGANIZED HEALTH CARE EDUCATION/TRAINING PROGRAM

## 2024-09-12 PROCEDURE — 10002800 HB RX 250 W HCPCS: Performed by: STUDENT IN AN ORGANIZED HEALTH CARE EDUCATION/TRAINING PROGRAM

## 2024-09-12 PROCEDURE — 99292 CRITICAL CARE ADDL 30 MIN: CPT | Performed by: INTERNAL MEDICINE

## 2024-09-12 PROCEDURE — 83036 HEMOGLOBIN GLYCOSYLATED A1C: CPT | Performed by: INTERNAL MEDICINE

## 2024-09-12 PROCEDURE — 93005 ELECTROCARDIOGRAM TRACING: CPT | Performed by: STUDENT IN AN ORGANIZED HEALTH CARE EDUCATION/TRAINING PROGRAM

## 2024-09-12 RX ORDER — DEXTROSE MONOHYDRATE 25 G/50ML
12.5 INJECTION, SOLUTION INTRAVENOUS PRN
Status: DISCONTINUED | OUTPATIENT
Start: 2024-09-12 | End: 2024-10-04 | Stop reason: HOSPADM

## 2024-09-12 RX ORDER — CEFAZOLIN SODIUM/WATER 1 G/10 ML
1000 SYRINGE (ML) INTRAVENOUS ONCE
Status: COMPLETED | OUTPATIENT
Start: 2024-09-12 | End: 2024-09-12

## 2024-09-12 RX ORDER — FUROSEMIDE 10 MG/ML
40 INJECTION INTRAMUSCULAR; INTRAVENOUS ONCE
Status: COMPLETED | OUTPATIENT
Start: 2024-09-12 | End: 2024-09-12

## 2024-09-12 RX ORDER — DIPHENHYDRAMINE HYDROCHLORIDE 50 MG/ML
25 INJECTION INTRAMUSCULAR; INTRAVENOUS ONCE
Status: COMPLETED | OUTPATIENT
Start: 2024-09-12 | End: 2024-09-12

## 2024-09-12 RX ORDER — HYDRALAZINE HYDROCHLORIDE 20 MG/ML
10 INJECTION INTRAMUSCULAR; INTRAVENOUS EVERY 6 HOURS PRN
Status: DISCONTINUED | OUTPATIENT
Start: 2024-09-12 | End: 2024-10-04 | Stop reason: HOSPADM

## 2024-09-12 RX ORDER — NICOTINE POLACRILEX 4 MG
30 LOZENGE BUCCAL PRN
Status: DISCONTINUED | OUTPATIENT
Start: 2024-09-12 | End: 2024-10-04 | Stop reason: HOSPADM

## 2024-09-12 RX ORDER — NICOTINE POLACRILEX 4 MG
15 LOZENGE BUCCAL PRN
Status: DISCONTINUED | OUTPATIENT
Start: 2024-09-12 | End: 2024-10-04 | Stop reason: HOSPADM

## 2024-09-12 RX ORDER — METOPROLOL TARTRATE 25 MG/1
25 TABLET, FILM COATED ORAL 2 TIMES DAILY
Status: DISCONTINUED | OUTPATIENT
Start: 2024-09-12 | End: 2024-10-04 | Stop reason: HOSPADM

## 2024-09-12 RX ORDER — ACETAMINOPHEN 325 MG/1
650 TABLET ORAL EVERY 4 HOURS PRN
Status: DISCONTINUED | OUTPATIENT
Start: 2024-09-12 | End: 2024-10-04 | Stop reason: HOSPADM

## 2024-09-12 RX ORDER — DEXTROSE MONOHYDRATE 25 G/50ML
25 INJECTION, SOLUTION INTRAVENOUS PRN
Status: DISCONTINUED | OUTPATIENT
Start: 2024-09-12 | End: 2024-10-04 | Stop reason: HOSPADM

## 2024-09-12 RX ORDER — DROPERIDOL 2.5 MG/ML
5 INJECTION, SOLUTION INTRAMUSCULAR; INTRAVENOUS ONCE
Status: COMPLETED | OUTPATIENT
Start: 2024-09-12 | End: 2024-09-12

## 2024-09-12 RX ORDER — ONDANSETRON 2 MG/ML
4 INJECTION INTRAMUSCULAR; INTRAVENOUS EVERY 8 HOURS PRN
Status: DISCONTINUED | OUTPATIENT
Start: 2024-09-12 | End: 2024-09-12

## 2024-09-12 RX ORDER — HYDRALAZINE HYDROCHLORIDE 10 MG/1
10 TABLET, FILM COATED ORAL 3 TIMES DAILY
Status: DISCONTINUED | OUTPATIENT
Start: 2024-09-12 | End: 2024-09-28

## 2024-09-12 RX ORDER — INSULIN GLARGINE 100 [IU]/ML
10 INJECTION, SOLUTION SUBCUTANEOUS EVERY 12 HOURS SCHEDULED
Status: DISCONTINUED | OUTPATIENT
Start: 2024-09-12 | End: 2024-09-15

## 2024-09-12 RX ORDER — ATORVASTATIN CALCIUM 40 MG/1
40 TABLET, FILM COATED ORAL DAILY
Status: DISCONTINUED | OUTPATIENT
Start: 2024-09-13 | End: 2024-10-04 | Stop reason: HOSPADM

## 2024-09-12 RX ADMIN — IOHEXOL 85 ML: 350 INJECTION, SOLUTION INTRAVENOUS at 17:14

## 2024-09-12 RX ADMIN — DROPERIDOL 5 MG: 2.5 INJECTION, SOLUTION INTRAMUSCULAR; INTRAVENOUS at 19:11

## 2024-09-12 RX ADMIN — DIPHENHYDRAMINE HYDROCHLORIDE 25 MG: 50 INJECTION, SOLUTION INTRAMUSCULAR; INTRAVENOUS at 19:12

## 2024-09-12 RX ADMIN — CEFTRIAXONE SODIUM 1000 MG: 10 INJECTION, POWDER, FOR SOLUTION INTRAVENOUS at 19:29

## 2024-09-12 RX ADMIN — FUROSEMIDE 40 MG: 10 INJECTION, SOLUTION INTRAMUSCULAR; INTRAVENOUS at 19:29

## 2024-09-12 SDOH — SOCIAL STABILITY: SOCIAL INSECURITY: HOW OFTEN DOES ANYONE, INCLUDING FAMILY AND FRIENDS, SCREAM OR CURSE AT YOU?: PATIENT UNABLE TO ANSWER

## 2024-09-12 SDOH — HEALTH STABILITY: PHYSICAL HEALTH: DO YOU HAVE SERIOUS DIFFICULTY WALKING OR CLIMBING STAIRS?: YES

## 2024-09-12 SDOH — ECONOMIC STABILITY: HOUSING INSECURITY: DO YOU HAVE PROBLEMS WITH ANY OF THE FOLLOWING?: NONE OF THE ABOVE

## 2024-09-12 SDOH — SOCIAL STABILITY: SOCIAL NETWORK: SUPPORT SYSTEMS: FAMILY MEMBERS

## 2024-09-12 SDOH — ECONOMIC STABILITY: FOOD INSECURITY: WITHIN THE PAST 12 MONTHS, THE FOOD YOU BOUGHT JUST DIDN'T LAST AND YOU DIDN'T HAVE MONEY TO GET MORE.: NEVER TRUE

## 2024-09-12 SDOH — ECONOMIC STABILITY: TRANSPORTATION INSECURITY
IN THE PAST 12 MONTHS, HAS LACK OF RELIABLE TRANSPORTATION KEPT YOU FROM MEDICAL APPOINTMENTS, MEETINGS, WORK OR FROM GETTING THINGS NEEDED FOR DAILY LIVING?: YES

## 2024-09-12 SDOH — ECONOMIC STABILITY: GENERAL

## 2024-09-12 SDOH — HEALTH STABILITY: GENERAL
BECAUSE OF A PHYSICAL, MENTAL, OR EMOTIONAL CONDITION, DO YOU HAVE SERIOUS DIFFICULTY CONCENTRATING, REMEMBERING OR MAKING DECISIONS?: YES

## 2024-09-12 SDOH — ECONOMIC STABILITY: INCOME INSECURITY: IN THE PAST 12 MONTHS, HAS THE ELECTRIC, GAS, OIL, OR WATER COMPANY THREATENED TO SHUT OFF SERVICE IN YOUR HOME?: NO

## 2024-09-12 SDOH — ECONOMIC STABILITY: HOUSING INSECURITY: WHAT IS YOUR LIVING SITUATION TODAY?: OTHER (COMMENT)

## 2024-09-12 SDOH — SOCIAL STABILITY: SOCIAL INSECURITY: HOW OFTEN DOES ANYONE, INCLUDING FAMILY AND FRIENDS, PHYSICALLY HURT YOU?: PATIENT UNABLE TO ANSWER

## 2024-09-12 SDOH — SOCIAL STABILITY: SOCIAL NETWORK
HOW OFTEN DO YOU SEE OR TALK TO PEOPLE THAT YOU CARE ABOUT AND FEEL CLOSE TO? (FOR EXAMPLE: TALKING TO FRIENDS ON THE PHONE, VISITING FRIENDS OR FAMILY, GOING TO CHURCH OR CLUB MEETINGS): 5 OR MORE TIMES A WEEK

## 2024-09-12 SDOH — SOCIAL STABILITY: SOCIAL INSECURITY: HOW OFTEN DOES ANYONE, INCLUDING FAMILY AND FRIENDS, THREATEN YOU WITH HARM?: PATIENT UNABLE TO ANSWER

## 2024-09-12 SDOH — ECONOMIC STABILITY: HOUSING INSECURITY: WHAT IS YOUR LIVING SITUATION TODAY?: I HAVE A STEADY PLACE TO LIVE

## 2024-09-12 SDOH — ECONOMIC STABILITY: HOUSING INSECURITY: WHAT IS YOUR LIVING SITUATION TODAY?: OTHER (COMMENTS)

## 2024-09-12 SDOH — HEALTH STABILITY: PHYSICAL HEALTH: DO YOU HAVE DIFFICULTY DRESSING OR BATHING?: YES

## 2024-09-12 SDOH — SOCIAL STABILITY: SOCIAL INSECURITY: HOW OFTEN DOES ANYONE, INCLUDING FAMILY AND FRIENDS, INSULT OR TALK DOWN TO YOU?: PATIENT UNABLE TO ANSWER

## 2024-09-12 SDOH — HEALTH STABILITY: GENERAL: BECAUSE OF A PHYSICAL, MENTAL, OR EMOTIONAL CONDITION, DO YOU HAVE DIFFICULTY DOING ERRANDS ALONE?: YES

## 2024-09-12 ASSESSMENT — ACTIVITIES OF DAILY LIVING (ADL)
ADL_SHORT_OF_BREATH: NO
FEEDING: NEEDS ASSISTANCE
RECENT_DECLINE_ADL: YES, DECLINE IN AMBULATION/TRANSFERRING, COLLABORATE WITH PROVIDER (T);YES, DECLINE IN BATHING/DRESSING/FEEDING, COLLABORATE WITH PROVIDER (T)
DRESSING: NEEDS ASSISTANCE
TOILETING: NEEDS ASSISTANCE
BATHING: NEEDS ASSISTANCE
ADL_SCORE: 6
ADL_BEFORE_ADMISSION: NEEDS/REQUIRES ASSISTANCE

## 2024-09-12 ASSESSMENT — LIFESTYLE VARIABLES
ALCOHOL_USE_STATUS: NO OR LOW RISK WITH VALIDATED TOOL
HOW OFTEN DO YOU HAVE 6 OR MORE DRINKS ON ONE OCCASION: NEVER
HOW OFTEN DO YOU HAVE A DRINK CONTAINING ALCOHOL: NEVER
HOW MANY STANDARD DRINKS CONTAINING ALCOHOL DO YOU HAVE ON A TYPICAL DAY: 0,1 OR 2
AUDIT-C TOTAL SCORE: 0

## 2024-09-12 ASSESSMENT — COLUMBIA-SUICIDE SEVERITY RATING SCALE - C-SSRS: IS THE PATIENT ABLE TO COMPLETE C-SSRS: NO, DEFER FOR ACUTE CONFUSION

## 2024-09-12 ASSESSMENT — PAIN SCALES - PAIN ASSESSMENT IN ADVANCED DEMENTIA (PAINAD)
CONSOLABILITY: NO NEED TO CONSOLE
BREATHING: NORMAL
FACIALEXPRESSION: SMILING OR INEXPRESSIVE
BODYLANGUAGE: TENSE, DISTRESSED, FIDGETING
TOTALSCORE: 1

## 2024-09-12 ASSESSMENT — PAIN SCALES - GENERAL: PAINLEVEL_OUTOF10: 0

## 2024-09-12 ASSESSMENT — PATIENT HEALTH QUESTIONNAIRE - PHQ9: IS PATIENT ABLE TO COMPLETE PHQ2 OR PHQ9: NO, DEFER TO LATER TIME

## 2024-09-13 ENCOUNTER — APPOINTMENT (OUTPATIENT)
Dept: CT IMAGING | Age: 84
DRG: 082 | End: 2024-09-13

## 2024-09-13 ENCOUNTER — APPOINTMENT (OUTPATIENT)
Dept: CT IMAGING | Age: 84
DRG: 082 | End: 2024-09-13
Attending: NURSE PRACTITIONER

## 2024-09-13 LAB
ABO + RH BLD: NORMAL
ANION GAP SERPL CALC-SCNC: 13 MMOL/L (ref 7–19)
APTT PPP: 31 SEC (ref 22–32)
ATRIAL RATE (BPM): 70
BACTERIA UR CULT: NO GROWTH
BLD GP AB SCN SERPL QL GEL: NEGATIVE
BUN SERPL-MCNC: 27 MG/DL (ref 6–20)
BUN/CREAT SERPL: 20 (ref 7–25)
CALCIUM SERPL-MCNC: 9 MG/DL (ref 8.4–10.2)
CHLORIDE SERPL-SCNC: 111 MMOL/L (ref 97–110)
CLOSURE TME BLD-IMP: NORMAL
CLOSURE TME COLL+ADP BLD: NORMAL S
CLOSURE TME COLL+EPINEP BLD: 84 SEC (ref 70–160)
CO2 SERPL-SCNC: 23 MMOL/L (ref 21–32)
CREAT SERPL-MCNC: 1.32 MG/DL (ref 0.67–1.17)
DEPRECATED RDW RBC: 70 FL (ref 39–50)
EGFRCR SERPLBLD CKD-EPI 2021: 53 ML/MIN/{1.73_M2}
ERYTHROCYTE [DISTWIDTH] IN BLOOD: 21.6 % (ref 11–15)
FASTING DURATION TIME PATIENT: ABNORMAL H
GLUCOSE BLDC GLUCOMTR-MCNC: 100 MG/DL (ref 70–99)
GLUCOSE BLDC GLUCOMTR-MCNC: 100 MG/DL (ref 70–99)
GLUCOSE BLDC GLUCOMTR-MCNC: 105 MG/DL (ref 70–99)
GLUCOSE BLDC GLUCOMTR-MCNC: 108 MG/DL (ref 70–99)
GLUCOSE BLDC GLUCOMTR-MCNC: 87 MG/DL (ref 70–99)
GLUCOSE BLDC GLUCOMTR-MCNC: 96 MG/DL (ref 70–99)
GLUCOSE SERPL-MCNC: 99 MG/DL (ref 70–99)
HBA1C MFR BLD: 6.9 % (ref 4.5–5.6)
HCT VFR BLD CALC: 26.7 % (ref 39–51)
HGB BLD-MCNC: 8.2 G/DL (ref 13–17)
INR PPP: 1.3
MCH RBC QN AUTO: 26.5 PG (ref 26–34)
MCHC RBC AUTO-ENTMCNC: 30.7 G/DL (ref 32–36.5)
MCV RBC AUTO: 86.4 FL (ref 78–100)
NRBC BLD MANUAL-RTO: 0 /100 WBC
P AXIS (DEGREES): 40
PLATELET # BLD AUTO: 201 K/MCL (ref 140–450)
POTASSIUM SERPL-SCNC: 3.8 MMOL/L (ref 3.4–5.1)
PR-INTERVAL (MSEC): 170
PROTHROMBIN TIME: 13.1 SEC (ref 9.7–11.8)
QRS-INTERVAL (MSEC): 182
QT-INTERVAL (MSEC): 474
QTC: 511
R AXIS (DEGREES): -77
RAINBOW EXTRA TUBES HOLD SPECIMEN: NORMAL
RBC # BLD: 3.09 MIL/MCL (ref 4.5–5.9)
REPORT TEXT: NORMAL
SODIUM SERPL-SCNC: 143 MMOL/L (ref 135–145)
T AXIS (DEGREES): 100
TYPE AND SCREEN EXPIRATION DATE: NORMAL
VENTRICULAR RATE EKG/MIN (BPM): 70
WBC # BLD: 7.2 K/MCL (ref 4.2–11)

## 2024-09-13 PROCEDURE — 85576 BLOOD PLATELET AGGREGATION: CPT

## 2024-09-13 PROCEDURE — 3E0G76Z INTRODUCTION OF NUTRITIONAL SUBSTANCE INTO UPPER GI, VIA NATURAL OR ARTIFICIAL OPENING: ICD-10-PCS | Performed by: HOSPITALIST

## 2024-09-13 PROCEDURE — 85027 COMPLETE CBC AUTOMATED: CPT | Performed by: INTERNAL MEDICINE

## 2024-09-13 PROCEDURE — 86901 BLOOD TYPING SEROLOGIC RH(D): CPT

## 2024-09-13 PROCEDURE — 80048 BASIC METABOLIC PNL TOTAL CA: CPT | Performed by: INTERNAL MEDICINE

## 2024-09-13 PROCEDURE — 85610 PROTHROMBIN TIME: CPT

## 2024-09-13 PROCEDURE — 99233 SBSQ HOSP IP/OBS HIGH 50: CPT | Performed by: NURSE PRACTITIONER

## 2024-09-13 PROCEDURE — 85730 THROMBOPLASTIN TIME PARTIAL: CPT

## 2024-09-13 PROCEDURE — 10002800 HB RX 250 W HCPCS

## 2024-09-13 PROCEDURE — 10002801 HB RX 250 W/O HCPCS: Performed by: NURSE PRACTITIONER

## 2024-09-13 PROCEDURE — 70450 CT HEAD/BRAIN W/O DYE: CPT

## 2024-09-13 PROCEDURE — 99291 CRITICAL CARE FIRST HOUR: CPT | Performed by: SURGERY

## 2024-09-13 PROCEDURE — 36415 COLL VENOUS BLD VENIPUNCTURE: CPT | Performed by: INTERNAL MEDICINE

## 2024-09-13 PROCEDURE — 10000008 HB ROOM CHARGE ICU OR CCU

## 2024-09-13 RX ORDER — BISACODYL 10 MG
10 SUPPOSITORY, RECTAL RECTAL DAILY PRN
Status: ON HOLD | COMMUNITY
End: 2024-10-04 | Stop reason: CLARIF

## 2024-09-13 RX ORDER — LEVETIRACETAM 500 MG/5ML
500 INJECTION, SOLUTION, CONCENTRATE INTRAVENOUS EVERY 12 HOURS SCHEDULED
Status: DISCONTINUED | OUTPATIENT
Start: 2024-09-13 | End: 2024-09-15

## 2024-09-13 RX ORDER — ACETAMINOPHEN 500 MG
1000 TABLET ORAL
Status: ON HOLD | COMMUNITY
End: 2024-10-04 | Stop reason: CLARIF

## 2024-09-13 RX ORDER — MEMANTINE HYDROCHLORIDE 5 MG/1
5 TABLET ORAL 2 TIMES DAILY
Status: ON HOLD | COMMUNITY
End: 2024-10-04 | Stop reason: CLARIF

## 2024-09-13 RX ORDER — METOPROLOL TARTRATE 1 MG/ML
5 INJECTION, SOLUTION INTRAVENOUS EVERY 6 HOURS SCHEDULED
Status: DISCONTINUED | OUTPATIENT
Start: 2024-09-13 | End: 2024-09-17

## 2024-09-13 RX ORDER — DOCUSATE SODIUM 100 MG/1
100 CAPSULE, LIQUID FILLED ORAL DAILY PRN
Status: ON HOLD | COMMUNITY
End: 2024-10-04 | Stop reason: CLARIF

## 2024-09-13 RX ORDER — INSULIN LISPRO 100 [IU]/ML
1-7 INJECTION, SOLUTION INTRAVENOUS; SUBCUTANEOUS
Status: ON HOLD | COMMUNITY
End: 2024-10-04 | Stop reason: CLARIF

## 2024-09-13 RX ORDER — IPRATROPIUM BROMIDE AND ALBUTEROL SULFATE 2.5; .5 MG/3ML; MG/3ML
3 SOLUTION RESPIRATORY (INHALATION) EVERY 6 HOURS PRN
Status: ON HOLD | COMMUNITY
End: 2024-10-04 | Stop reason: CLARIF

## 2024-09-13 RX ORDER — ISOSORBIDE DINITRATE 5 MG/1
5 TABLET ORAL 3 TIMES DAILY
Status: ON HOLD | COMMUNITY
End: 2024-10-04 | Stop reason: CLARIF

## 2024-09-13 RX ORDER — POLYETHYLENE GLYCOL 3350 17 G/17G
17 POWDER, FOR SOLUTION ORAL DAILY
Status: ON HOLD | COMMUNITY
End: 2024-10-04 | Stop reason: CLARIF

## 2024-09-13 RX ORDER — LEVOFLOXACIN 500 MG/1
500 TABLET, FILM COATED ORAL DAILY
COMMUNITY
Start: 2024-09-07 | End: 2024-09-21

## 2024-09-13 RX ORDER — SODIUM BICARBONATE 650 MG/1
650 TABLET ORAL 2 TIMES DAILY
Status: ON HOLD | COMMUNITY
End: 2024-10-04 | Stop reason: CLARIF

## 2024-09-13 RX ORDER — FLUCONAZOLE 100 MG/1
100 TABLET ORAL DAILY
Status: ON HOLD | COMMUNITY
Start: 2024-09-06 | End: 2024-10-04 | Stop reason: CLARIF

## 2024-09-13 RX ORDER — ESCITALOPRAM OXALATE 5 MG/1
5 TABLET ORAL DAILY
Status: ON HOLD | COMMUNITY
End: 2024-10-04 | Stop reason: CLARIF

## 2024-09-13 RX ORDER — FERROUS SULFATE 325(65) MG
325 TABLET, DELAYED RELEASE (ENTERIC COATED) ORAL
Status: ON HOLD | COMMUNITY
End: 2024-10-04 | Stop reason: CLARIF

## 2024-09-13 RX ORDER — AMOXICILLIN 250 MG
2 CAPSULE ORAL DAILY PRN
Status: ON HOLD | COMMUNITY
End: 2024-10-04 | Stop reason: CLARIF

## 2024-09-13 RX ORDER — QUETIAPINE FUMARATE 25 MG/1
12.5 TABLET, FILM COATED ORAL DAILY
Status: ON HOLD | COMMUNITY
End: 2024-10-04 | Stop reason: CLARIF

## 2024-09-13 RX ADMIN — LEVETIRACETAM 500 MG: 100 INJECTION, SOLUTION INTRAVENOUS at 09:04

## 2024-09-13 RX ADMIN — LEVETIRACETAM 500 MG: 100 INJECTION, SOLUTION INTRAVENOUS at 21:31

## 2024-09-13 RX ADMIN — METOROPROLOL TARTRATE 5 MG: 5 INJECTION, SOLUTION INTRAVENOUS at 14:03

## 2024-09-13 SDOH — SOCIAL STABILITY: SOCIAL INSECURITY: HOW OFTEN DOES ANYONE, INCLUDING FAMILY AND FRIENDS, SCREAM OR CURSE AT YOU?: PATIENT UNABLE TO ANSWER

## 2024-09-13 SDOH — SOCIAL STABILITY: SOCIAL INSECURITY: HOW OFTEN DOES ANYONE, INCLUDING FAMILY AND FRIENDS, THREATEN YOU WITH HARM?: PATIENT UNABLE TO ANSWER

## 2024-09-13 SDOH — SOCIAL STABILITY: SOCIAL INSECURITY: HOW OFTEN DOES ANYONE, INCLUDING FAMILY AND FRIENDS, INSULT OR TALK DOWN TO YOU?: PATIENT UNABLE TO ANSWER

## 2024-09-13 SDOH — SOCIAL STABILITY: SOCIAL INSECURITY: HOW OFTEN DOES ANYONE, INCLUDING FAMILY AND FRIENDS, PHYSICALLY HURT YOU?: PATIENT UNABLE TO ANSWER

## 2024-09-13 ASSESSMENT — PAIN SCALES - PAIN ASSESSMENT IN ADVANCED DEMENTIA (PAINAD)
CONSOLABILITY: NO NEED TO CONSOLE
BREATHING: NORMAL
BREATHING: NORMAL
TOTALSCORE: 1
FACIALEXPRESSION: SMILING OR INEXPRESSIVE
BODYLANGUAGE: TENSE, DISTRESSED, FIDGETING
FACIALEXPRESSION: SMILING OR INEXPRESSIVE
BODYLANGUAGE: TENSE, DISTRESSED, FIDGETING
CONSOLABILITY: DISTRACTED OR REASSURED BY VOICE OR TOUCH
BREATHING: NORMAL
CONSOLABILITY: NO NEED TO CONSOLE
BREATHING: NORMAL
TOTALSCORE: 1
FACIALEXPRESSION: SMILING OR INEXPRESSIVE
TOTALSCORE: 1
FACIALEXPRESSION: SMILING OR INEXPRESSIVE
FACIALEXPRESSION: SMILING OR INEXPRESSIVE
BODYLANGUAGE: TENSE, DISTRESSED, FIDGETING
FACIALEXPRESSION: SMILING OR INEXPRESSIVE
TOTALSCORE: 1
CONSOLABILITY: NO NEED TO CONSOLE
BODYLANGUAGE: TENSE, DISTRESSED, FIDGETING
TOTALSCORE: 1
TOTALSCORE: 1
BREATHING: NORMAL
TOTALSCORE: 1
BREATHING: NORMAL
TOTALSCORE: 1
CONSOLABILITY: NO NEED TO CONSOLE
BREATHING: NORMAL
BREATHING: NORMAL
BODYLANGUAGE: TENSE, DISTRESSED, FIDGETING
BODYLANGUAGE: TENSE, DISTRESSED, FIDGETING
CONSOLABILITY: NO NEED TO CONSOLE
CONSOLABILITY: DISTRACTED OR REASSURED BY VOICE OR TOUCH
TOTALSCORE: 2
TOTALSCORE: 1
BREATHING: NORMAL
CONSOLABILITY: NO NEED TO CONSOLE
FACIALEXPRESSION: SMILING OR INEXPRESSIVE
BODYLANGUAGE: TENSE, DISTRESSED, FIDGETING
CONSOLABILITY: NO NEED TO CONSOLE
BODYLANGUAGE: TENSE, DISTRESSED, FIDGETING
TOTALSCORE: 1
CONSOLABILITY: NO NEED TO CONSOLE
FACIALEXPRESSION: SMILING OR INEXPRESSIVE
BREATHING: NORMAL
BREATHING: NORMAL
FACIALEXPRESSION: SMILING OR INEXPRESSIVE
BODYLANGUAGE: TENSE, DISTRESSED, FIDGETING
BREATHING: NORMAL
CONSOLABILITY: NO NEED TO CONSOLE
CONSOLABILITY: NO NEED TO CONSOLE
BODYLANGUAGE: TENSE, DISTRESSED, FIDGETING
FACIALEXPRESSION: SMILING OR INEXPRESSIVE
TOTALSCORE: 2

## 2024-09-13 ASSESSMENT — PATIENT HEALTH QUESTIONNAIRE - PHQ9: IS PATIENT ABLE TO COMPLETE PHQ2 OR PHQ9: NO, DEFER TO LATER TIME

## 2024-09-14 ENCOUNTER — APPOINTMENT (OUTPATIENT)
Dept: CT IMAGING | Age: 84
DRG: 082 | End: 2024-09-14

## 2024-09-14 VITALS
HEART RATE: 69 BPM | HEIGHT: 69 IN | BODY MASS INDEX: 23.48 KG/M2 | TEMPERATURE: 98.1 F | DIASTOLIC BLOOD PRESSURE: 72 MMHG | SYSTOLIC BLOOD PRESSURE: 118 MMHG | RESPIRATION RATE: 18 BRPM | OXYGEN SATURATION: 94 % | WEIGHT: 158.51 LBS

## 2024-09-14 LAB
ANION GAP SERPL CALC-SCNC: 13 MMOL/L (ref 7–19)
APPEARANCE UR: ABNORMAL
BACTERIA #/AREA URNS HPF: ABNORMAL /HPF
BILIRUB UR QL STRIP: NEGATIVE
BUN SERPL-MCNC: 26 MG/DL (ref 6–20)
BUN/CREAT SERPL: 18 (ref 7–25)
CALCIUM SERPL-MCNC: 8.9 MG/DL (ref 8.4–10.2)
CHLORIDE SERPL-SCNC: 113 MMOL/L (ref 97–110)
CO2 SERPL-SCNC: 23 MMOL/L (ref 21–32)
COLOR UR: ABNORMAL
CREAT SERPL-MCNC: 1.48 MG/DL (ref 0.67–1.17)
DEPRECATED RDW RBC: 67.6 FL (ref 39–50)
EGFRCR SERPLBLD CKD-EPI 2021: 46 ML/MIN/{1.73_M2}
ERYTHROCYTE [DISTWIDTH] IN BLOOD: 21.8 % (ref 11–15)
FASTING DURATION TIME PATIENT: ABNORMAL H
GLUCOSE BLDC GLUCOMTR-MCNC: 107 MG/DL (ref 70–99)
GLUCOSE BLDC GLUCOMTR-MCNC: 108 MG/DL (ref 70–99)
GLUCOSE BLDC GLUCOMTR-MCNC: 126 MG/DL (ref 70–99)
GLUCOSE BLDC GLUCOMTR-MCNC: 176 MG/DL (ref 70–99)
GLUCOSE BLDC GLUCOMTR-MCNC: 65 MG/DL (ref 70–99)
GLUCOSE BLDC GLUCOMTR-MCNC: 69 MG/DL (ref 70–99)
GLUCOSE BLDC GLUCOMTR-MCNC: 69 MG/DL (ref 70–99)
GLUCOSE BLDC GLUCOMTR-MCNC: 90 MG/DL (ref 70–99)
GLUCOSE SERPL-MCNC: 106 MG/DL (ref 70–99)
GLUCOSE UR STRIP-MCNC: NEGATIVE MG/DL
HCT VFR BLD CALC: 26 % (ref 39–51)
HGB BLD-MCNC: 8.1 G/DL (ref 13–17)
HGB UR QL STRIP: ABNORMAL
HYALINE CASTS #/AREA URNS LPF: ABNORMAL /LPF
KETONES UR STRIP-MCNC: ABNORMAL MG/DL
LEUKOCYTE ESTERASE UR QL STRIP: ABNORMAL
MAGNESIUM SERPL-MCNC: 1.7 MG/DL (ref 1.7–2.4)
MCH RBC QN AUTO: 26.3 PG (ref 26–34)
MCHC RBC AUTO-ENTMCNC: 31.2 G/DL (ref 32–36.5)
MCV RBC AUTO: 84.4 FL (ref 78–100)
MUCOUS THREADS URNS QL MICRO: PRESENT
NITRITE UR QL STRIP: NEGATIVE
NRBC BLD MANUAL-RTO: 0 /100 WBC
PH UR STRIP: 6.5 [PH] (ref 5–7)
PHOSPHATE SERPL-MCNC: 4.1 MG/DL (ref 2.4–4.7)
PLATELET # BLD AUTO: 223 K/MCL (ref 140–450)
POTASSIUM SERPL-SCNC: 4 MMOL/L (ref 3.4–5.1)
PROT UR STRIP-MCNC: 30 MG/DL
RBC # BLD: 3.08 MIL/MCL (ref 4.5–5.9)
RBC #/AREA URNS HPF: >100 /HPF
SODIUM SERPL-SCNC: 145 MMOL/L (ref 135–145)
SP GR UR STRIP: 1.01 (ref 1–1.03)
SQUAMOUS #/AREA URNS HPF: ABNORMAL /HPF
TSH SERPL-ACNC: 2.08 MCUNITS/ML (ref 0.35–5)
UROBILINOGEN UR STRIP-MCNC: 0.2 MG/DL
WBC # BLD: 6.6 K/MCL (ref 4.2–11)
WBC #/AREA URNS HPF: >100 /HPF

## 2024-09-14 PROCEDURE — 10002801 HB RX 250 W/O HCPCS: Performed by: INTERNAL MEDICINE

## 2024-09-14 PROCEDURE — 96372 THER/PROPH/DIAG INJ SC/IM: CPT | Performed by: INTERNAL MEDICINE

## 2024-09-14 PROCEDURE — 10002807 HB RX 258: Performed by: PSYCHIATRY & NEUROLOGY

## 2024-09-14 PROCEDURE — 70450 CT HEAD/BRAIN W/O DYE: CPT

## 2024-09-14 PROCEDURE — 10006031 HB ROOM CHARGE TELEMETRY

## 2024-09-14 PROCEDURE — 10002800 HB RX 250 W HCPCS

## 2024-09-14 PROCEDURE — 10002800 HB RX 250 W HCPCS: Performed by: INTERNAL MEDICINE

## 2024-09-14 PROCEDURE — 10002800 HB RX 250 W HCPCS: Performed by: HOSPITALIST

## 2024-09-14 PROCEDURE — 87086 URINE CULTURE/COLONY COUNT: CPT

## 2024-09-14 PROCEDURE — 10002801 HB RX 250 W/O HCPCS: Performed by: NURSE PRACTITIONER

## 2024-09-14 PROCEDURE — 99291 CRITICAL CARE FIRST HOUR: CPT

## 2024-09-14 PROCEDURE — 36415 COLL VENOUS BLD VENIPUNCTURE: CPT | Performed by: INTERNAL MEDICINE

## 2024-09-14 PROCEDURE — 84100 ASSAY OF PHOSPHORUS: CPT | Performed by: NURSE PRACTITIONER

## 2024-09-14 PROCEDURE — 85027 COMPLETE CBC AUTOMATED: CPT | Performed by: INTERNAL MEDICINE

## 2024-09-14 PROCEDURE — 10002800 HB RX 250 W HCPCS: Performed by: PSYCHIATRY & NEUROLOGY

## 2024-09-14 PROCEDURE — 80048 BASIC METABOLIC PNL TOTAL CA: CPT | Performed by: INTERNAL MEDICINE

## 2024-09-14 PROCEDURE — 99233 SBSQ HOSP IP/OBS HIGH 50: CPT

## 2024-09-14 PROCEDURE — 92610 EVALUATE SWALLOWING FUNCTION: CPT

## 2024-09-14 PROCEDURE — 81001 URINALYSIS AUTO W/SCOPE: CPT

## 2024-09-14 PROCEDURE — 99223 1ST HOSP IP/OBS HIGH 75: CPT | Performed by: INTERNAL MEDICINE

## 2024-09-14 PROCEDURE — 83735 ASSAY OF MAGNESIUM: CPT | Performed by: NURSE PRACTITIONER

## 2024-09-14 RX ORDER — CEFAZOLIN SODIUM/WATER 1 G/10 ML
1000 SYRINGE (ML) INTRAVENOUS AT BEDTIME
Status: DISCONTINUED | OUTPATIENT
Start: 2024-09-14 | End: 2024-09-18

## 2024-09-14 RX ADMIN — METOROPROLOL TARTRATE 5 MG: 5 INJECTION, SOLUTION INTRAVENOUS at 06:07

## 2024-09-14 RX ADMIN — CEFTRIAXONE SODIUM 1000 MG: 10 INJECTION, POWDER, FOR SOLUTION INTRAVENOUS at 21:15

## 2024-09-14 RX ADMIN — LEVETIRACETAM 500 MG: 100 INJECTION, SOLUTION INTRAVENOUS at 20:04

## 2024-09-14 RX ADMIN — DEXTROSE MONOHYDRATE 12.5 G: 25 INJECTION, SOLUTION INTRAVENOUS at 18:41

## 2024-09-14 RX ADMIN — DEXTROSE MONOHYDRATE 12.5 G: 25 INJECTION, SOLUTION INTRAVENOUS at 23:53

## 2024-09-14 RX ADMIN — THIAMINE HYDROCHLORIDE 400 MG: 100 INJECTION, SOLUTION INTRAMUSCULAR; INTRAVENOUS at 20:12

## 2024-09-14 RX ADMIN — METOROPROLOL TARTRATE 5 MG: 5 INJECTION, SOLUTION INTRAVENOUS at 00:22

## 2024-09-14 RX ADMIN — LEVETIRACETAM 500 MG: 100 INJECTION, SOLUTION INTRAVENOUS at 09:25

## 2024-09-14 RX ADMIN — MAGNESIUM SULFATE HEPTAHYDRATE 2 G: 40 INJECTION, SOLUTION INTRAVENOUS at 09:24

## 2024-09-14 RX ADMIN — METOROPROLOL TARTRATE 5 MG: 5 INJECTION, SOLUTION INTRAVENOUS at 23:52

## 2024-09-14 RX ADMIN — METOROPROLOL TARTRATE 5 MG: 5 INJECTION, SOLUTION INTRAVENOUS at 20:04

## 2024-09-14 RX ADMIN — INSULIN GLARGINE 10 UNITS: 100 INJECTION, SOLUTION SUBCUTANEOUS at 09:21

## 2024-09-14 RX ADMIN — DEXTROSE MONOHYDRATE 12.5 G: 25 INJECTION, SOLUTION INTRAVENOUS at 13:19

## 2024-09-14 ASSESSMENT — PAIN SCALES - PAIN ASSESSMENT IN ADVANCED DEMENTIA (PAINAD)
CONSOLABILITY: NO NEED TO CONSOLE
FACIALEXPRESSION: SMILING OR INEXPRESSIVE
BREATHING: NORMAL
BREATHING: NORMAL
CONSOLABILITY: DISTRACTED OR REASSURED BY VOICE OR TOUCH
FACIALEXPRESSION: SMILING OR INEXPRESSIVE
BREATHING: NORMAL
BREATHING: NORMAL
BODYLANGUAGE: TENSE, DISTRESSED, FIDGETING
BODYLANGUAGE: TENSE, DISTRESSED, FIDGETING
BREATHING: NORMAL
CONSOLABILITY: NO NEED TO CONSOLE
TOTALSCORE: 1
TOTALSCORE: 2
TOTALSCORE: 1
TOTALSCORE: 0
BODYLANGUAGE: TENSE, DISTRESSED, FIDGETING
BREATHING: NORMAL
TOTALSCORE: 1
FACIALEXPRESSION: SMILING OR INEXPRESSIVE
TOTALSCORE: 0
CONSOLABILITY: NO NEED TO CONSOLE
BREATHING: NORMAL
BREATHING: NORMAL
BODYLANGUAGE: TENSE, DISTRESSED, FIDGETING
FACIALEXPRESSION: SMILING OR INEXPRESSIVE
CONSOLABILITY: NO NEED TO CONSOLE
FACIALEXPRESSION: SMILING OR INEXPRESSIVE
BODYLANGUAGE: TENSE, DISTRESSED, FIDGETING
FACIALEXPRESSION: SMILING OR INEXPRESSIVE
BODYLANGUAGE: TENSE, DISTRESSED, FIDGETING
BODYLANGUAGE: TENSE, DISTRESSED, FIDGETING
FACIALEXPRESSION: SMILING OR INEXPRESSIVE
BREATHING: NORMAL
TOTALSCORE: 2
BODYLANGUAGE: TENSE, DISTRESSED, FIDGETING
BODYLANGUAGE: RELAXED
BODYLANGUAGE: RELAXED
FACIALEXPRESSION: SMILING OR INEXPRESSIVE
CONSOLABILITY: DISTRACTED OR REASSURED BY VOICE OR TOUCH
TOTALSCORE: 1
CONSOLABILITY: DISTRACTED OR REASSURED BY VOICE OR TOUCH
BREATHING: NORMAL
TOTALSCORE: 2
BREATHING: NORMAL
FACIALEXPRESSION: SMILING OR INEXPRESSIVE
TOTALSCORE: 1
TOTALSCORE: 2
BODYLANGUAGE: TENSE, DISTRESSED, FIDGETING
CONSOLABILITY: NO NEED TO CONSOLE
FACIALEXPRESSION: SMILING OR INEXPRESSIVE
CONSOLABILITY: NO NEED TO CONSOLE
CONSOLABILITY: NO NEED TO CONSOLE
CONSOLABILITY: DISTRACTED OR REASSURED BY VOICE OR TOUCH
FACIALEXPRESSION: SMILING OR INEXPRESSIVE

## 2024-09-14 ASSESSMENT — COGNITIVE AND FUNCTIONAL STATUS - GENERAL
REMEMBERING WHERE THINGS ARE: UNABLE
FOLLOWS FAMILIAR CONVERSATION: A LOT
APPLIED_COGNITIVE_CONVERTED_SCORE: 15.17
TAKING CARE OF COMPLICATED TASKS: UNABLE
UNDERSTANDING 10 TO 15 MIN SPEECH: UNABLE
APPLIED_COGNITIVE_RAW_SCORE: 7
REMEMBERING TO TAKE MEDICATION: UNABLE
REMEMBERING 5 ERRANDS WITH NO LIST: UNABLE

## 2024-09-14 ASSESSMENT — PAIN SCALES - GENERAL: PAINLEVEL_OUTOF10: 0

## 2024-09-14 ASSESSMENT — PATIENT HEALTH QUESTIONNAIRE - PHQ9: IS PATIENT ABLE TO COMPLETE PHQ2 OR PHQ9: NO, DEFER TO LATER TIME

## 2024-09-15 LAB
ANION GAP SERPL CALC-SCNC: 10 MMOL/L (ref 7–19)
BACTERIA UR CULT: NO GROWTH
BASOPHILS # BLD: 0 K/MCL (ref 0–0.3)
BASOPHILS NFR BLD: 0 %
BUN SERPL-MCNC: 27 MG/DL (ref 6–20)
BUN/CREAT SERPL: 19 (ref 7–25)
CALCIUM SERPL-MCNC: 8.8 MG/DL (ref 8.4–10.2)
CHLORIDE SERPL-SCNC: 119 MMOL/L (ref 97–110)
CO2 SERPL-SCNC: 24 MMOL/L (ref 21–32)
CREAT SERPL-MCNC: 1.43 MG/DL (ref 0.67–1.17)
DEPRECATED RDW RBC: 69.9 FL (ref 39–50)
EGFRCR SERPLBLD CKD-EPI 2021: 48 ML/MIN/{1.73_M2}
EOSINOPHIL # BLD: 0.1 K/MCL (ref 0–0.5)
EOSINOPHIL NFR BLD: 2 %
ERYTHROCYTE [DISTWIDTH] IN BLOOD: 21.7 % (ref 11–15)
FASTING DURATION TIME PATIENT: ABNORMAL H
FOLATE SERPL-MCNC: 6.7 NG/ML
GLUCOSE BLDC GLUCOMTR-MCNC: 102 MG/DL (ref 70–99)
GLUCOSE BLDC GLUCOMTR-MCNC: 120 MG/DL (ref 70–99)
GLUCOSE BLDC GLUCOMTR-MCNC: 153 MG/DL (ref 70–99)
GLUCOSE BLDC GLUCOMTR-MCNC: 76 MG/DL (ref 70–99)
GLUCOSE BLDC GLUCOMTR-MCNC: 76 MG/DL (ref 70–99)
GLUCOSE BLDC GLUCOMTR-MCNC: 86 MG/DL (ref 70–99)
GLUCOSE BLDC GLUCOMTR-MCNC: 89 MG/DL (ref 70–99)
GLUCOSE SERPL-MCNC: 78 MG/DL (ref 70–99)
HCT VFR BLD CALC: 26.1 % (ref 39–51)
HGB BLD-MCNC: 8.2 G/DL (ref 13–17)
IMM GRANULOCYTES # BLD AUTO: 0 K/MCL (ref 0–0.2)
IMM GRANULOCYTES # BLD: 0 %
LYMPHOCYTES # BLD: 0.6 K/MCL (ref 1–4)
LYMPHOCYTES NFR BLD: 8 %
MAGNESIUM SERPL-MCNC: 2.2 MG/DL (ref 1.7–2.4)
MCH RBC QN AUTO: 27.2 PG (ref 26–34)
MCHC RBC AUTO-ENTMCNC: 31.4 G/DL (ref 32–36.5)
MCV RBC AUTO: 86.4 FL (ref 78–100)
MONOCYTES # BLD: 0.7 K/MCL (ref 0.3–0.9)
MONOCYTES NFR BLD: 10 %
NEUTROPHILS # BLD: 5.7 K/MCL (ref 1.8–7.7)
NEUTROPHILS NFR BLD: 80 %
NRBC BLD MANUAL-RTO: 0 /100 WBC
PLATELET # BLD AUTO: 232 K/MCL (ref 140–450)
POTASSIUM SERPL-SCNC: 3.7 MMOL/L (ref 3.4–5.1)
RBC # BLD: 3.02 MIL/MCL (ref 4.5–5.9)
SODIUM SERPL-SCNC: 149 MMOL/L (ref 135–145)
VIT B12 SERPL-MCNC: 1022 PG/ML (ref 211–911)
WBC # BLD: 7.2 K/MCL (ref 4.2–11)

## 2024-09-15 PROCEDURE — 10002800 HB RX 250 W HCPCS: Performed by: HOSPITALIST

## 2024-09-15 PROCEDURE — 10002807 HB RX 258: Performed by: PSYCHIATRY & NEUROLOGY

## 2024-09-15 PROCEDURE — 10002801 HB RX 250 W/O HCPCS: Performed by: NURSE PRACTITIONER

## 2024-09-15 PROCEDURE — 83735 ASSAY OF MAGNESIUM: CPT

## 2024-09-15 PROCEDURE — 92526 ORAL FUNCTION THERAPY: CPT | Performed by: SPEECH-LANGUAGE PATHOLOGIST

## 2024-09-15 PROCEDURE — 36415 COLL VENOUS BLD VENIPUNCTURE: CPT | Performed by: HOSPITALIST

## 2024-09-15 PROCEDURE — 97166 OT EVAL MOD COMPLEX 45 MIN: CPT

## 2024-09-15 PROCEDURE — 10002801 HB RX 250 W/O HCPCS: Performed by: HOSPITALIST

## 2024-09-15 PROCEDURE — 97162 PT EVAL MOD COMPLEX 30 MIN: CPT

## 2024-09-15 PROCEDURE — 10002800 HB RX 250 W HCPCS: Performed by: PSYCHIATRY & NEUROLOGY

## 2024-09-15 PROCEDURE — 97530 THERAPEUTIC ACTIVITIES: CPT

## 2024-09-15 PROCEDURE — 80048 BASIC METABOLIC PNL TOTAL CA: CPT | Performed by: INTERNAL MEDICINE

## 2024-09-15 PROCEDURE — 10002800 HB RX 250 W HCPCS

## 2024-09-15 PROCEDURE — 10006031 HB ROOM CHARGE TELEMETRY

## 2024-09-15 PROCEDURE — 85025 COMPLETE CBC W/AUTO DIFF WBC: CPT | Performed by: HOSPITALIST

## 2024-09-15 PROCEDURE — 99233 SBSQ HOSP IP/OBS HIGH 50: CPT | Performed by: NURSE PRACTITIONER

## 2024-09-15 RX ORDER — DEXTROSE MONOHYDRATE AND SODIUM CHLORIDE 5; .45 G/100ML; G/100ML
INJECTION, SOLUTION INTRAVENOUS CONTINUOUS
Status: DISCONTINUED | OUTPATIENT
Start: 2024-09-15 | End: 2024-09-16

## 2024-09-15 RX ADMIN — METOROPROLOL TARTRATE 5 MG: 5 INJECTION, SOLUTION INTRAVENOUS at 17:59

## 2024-09-15 RX ADMIN — METOROPROLOL TARTRATE 5 MG: 5 INJECTION, SOLUTION INTRAVENOUS at 05:16

## 2024-09-15 RX ADMIN — THIAMINE HYDROCHLORIDE 400 MG: 100 INJECTION, SOLUTION INTRAMUSCULAR; INTRAVENOUS at 08:15

## 2024-09-15 RX ADMIN — DEXTROSE AND SODIUM CHLORIDE: 5; 450 INJECTION, SOLUTION INTRAVENOUS at 08:15

## 2024-09-15 RX ADMIN — THIAMINE HYDROCHLORIDE 400 MG: 100 INJECTION, SOLUTION INTRAMUSCULAR; INTRAVENOUS at 21:14

## 2024-09-15 RX ADMIN — LEVETIRACETAM 500 MG: 100 INJECTION, SOLUTION INTRAVENOUS at 08:12

## 2024-09-15 RX ADMIN — CEFTRIAXONE SODIUM 1000 MG: 10 INJECTION, POWDER, FOR SOLUTION INTRAVENOUS at 21:12

## 2024-09-15 RX ADMIN — THIAMINE HYDROCHLORIDE 400 MG: 100 INJECTION, SOLUTION INTRAMUSCULAR; INTRAVENOUS at 13:50

## 2024-09-15 RX ADMIN — METOROPROLOL TARTRATE 5 MG: 5 INJECTION, SOLUTION INTRAVENOUS at 13:47

## 2024-09-15 ASSESSMENT — PAIN SCALES - PAIN ASSESSMENT IN ADVANCED DEMENTIA (PAINAD)
TOTALSCORE: 3
NEGVOCALIZATION: OCCASIONAL MOAN OR GROAN, LOW LEVELS OF SPEECH WITH A NEGATIVE OR DISAPPROVING QUALITY
FACIALEXPRESSION: SAD. FRIGHTENED. FROWNING
CONSOLABILITY: NO NEED TO CONSOLE
BODYLANGUAGE: RELAXED
TOTALSCORE: 0
BREATHING: NORMAL
BODYLANGUAGE: TENSE, DISTRESSED, FIDGETING
BREATHING: NORMAL
FACIALEXPRESSION: SMILING OR INEXPRESSIVE
CONSOLABILITY: NO NEED TO CONSOLE

## 2024-09-15 ASSESSMENT — COGNITIVE AND FUNCTIONAL STATUS - GENERAL
HELP NEEDED DRESSING REGULAR LOWER BODY CLOTHING: TOTAL
BASIC_MOBILITY_CONVERTED_SCORE: 19.39
BASIC_MOBILITY_RAW_SCORE: 7
HELP NEEDED FOR BATHING: TOTAL
HELP NEEDED FOR PERSONAL GROOMING: TOTAL
HELP NEEDED DRESSING REGULAR UPPER BODY CLOTHING: TOTAL
DAILY_ACTIVITY_RAW_SCORE: 6
HELP NEEDED FOR TOILETING: TOTAL
DAILY_ACTIVITY_CONVERTED_SCORE: 17.06

## 2024-09-16 ENCOUNTER — APPOINTMENT (OUTPATIENT)
Dept: GENERAL RADIOLOGY | Age: 84
DRG: 082 | End: 2024-09-16
Attending: HOSPITALIST

## 2024-09-16 LAB
ANION GAP SERPL CALC-SCNC: 11 MMOL/L (ref 7–19)
ANION GAP SERPL CALC-SCNC: 6 MMOL/L (ref 7–19)
BASOPHILS # BLD: 0 K/MCL (ref 0–0.3)
BASOPHILS NFR BLD: 1 %
BUN SERPL-MCNC: 27 MG/DL (ref 6–20)
BUN SERPL-MCNC: 27 MG/DL (ref 6–20)
BUN/CREAT SERPL: 19 (ref 7–25)
BUN/CREAT SERPL: 19 (ref 7–25)
CALCIUM SERPL-MCNC: 8.9 MG/DL (ref 8.4–10.2)
CALCIUM SERPL-MCNC: 8.9 MG/DL (ref 8.4–10.2)
CHLORIDE SERPL-SCNC: 121 MMOL/L (ref 97–110)
CHLORIDE SERPL-SCNC: 121 MMOL/L (ref 97–110)
CO2 SERPL-SCNC: 21 MMOL/L (ref 21–32)
CO2 SERPL-SCNC: 26 MMOL/L (ref 21–32)
CREAT SERPL-MCNC: 1.39 MG/DL (ref 0.67–1.17)
CREAT SERPL-MCNC: 1.44 MG/DL (ref 0.67–1.17)
DEPRECATED RDW RBC: 70.9 FL (ref 39–50)
EGFRCR SERPLBLD CKD-EPI 2021: 48 ML/MIN/{1.73_M2}
EGFRCR SERPLBLD CKD-EPI 2021: 50 ML/MIN/{1.73_M2}
EOSINOPHIL # BLD: 0.3 K/MCL (ref 0–0.5)
EOSINOPHIL NFR BLD: 4 %
ERYTHROCYTE [DISTWIDTH] IN BLOOD: 21.5 % (ref 11–15)
FASTING DURATION TIME PATIENT: ABNORMAL H
FASTING DURATION TIME PATIENT: ABNORMAL H
GLUCOSE BLDC GLUCOMTR-MCNC: 115 MG/DL (ref 70–99)
GLUCOSE BLDC GLUCOMTR-MCNC: 120 MG/DL (ref 70–99)
GLUCOSE BLDC GLUCOMTR-MCNC: 126 MG/DL (ref 70–99)
GLUCOSE SERPL-MCNC: 124 MG/DL (ref 70–99)
GLUCOSE SERPL-MCNC: 131 MG/DL (ref 70–99)
HCT VFR BLD CALC: 27.6 % (ref 39–51)
HGB BLD-MCNC: 8.4 G/DL (ref 13–17)
IMM GRANULOCYTES # BLD AUTO: 0 K/MCL (ref 0–0.2)
IMM GRANULOCYTES # BLD: 1 %
LYMPHOCYTES # BLD: 0.6 K/MCL (ref 1–4)
LYMPHOCYTES NFR BLD: 10 %
MCH RBC QN AUTO: 26.8 PG (ref 26–34)
MCHC RBC AUTO-ENTMCNC: 30.4 G/DL (ref 32–36.5)
MCV RBC AUTO: 88.2 FL (ref 78–100)
MONOCYTES # BLD: 0.6 K/MCL (ref 0.3–0.9)
MONOCYTES NFR BLD: 10 %
NEUTROPHILS # BLD: 4.8 K/MCL (ref 1.8–7.7)
NEUTROPHILS NFR BLD: 74 %
NRBC BLD MANUAL-RTO: 0 /100 WBC
PLATELET # BLD AUTO: 258 K/MCL (ref 140–450)
POTASSIUM SERPL-SCNC: 3.8 MMOL/L (ref 3.4–5.1)
POTASSIUM SERPL-SCNC: 4.1 MMOL/L (ref 3.4–5.1)
RAINBOW EXTRA TUBES HOLD SPECIMEN: NORMAL
RBC # BLD: 3.13 MIL/MCL (ref 4.5–5.9)
SODIUM SERPL-SCNC: 149 MMOL/L (ref 135–145)
SODIUM SERPL-SCNC: 149 MMOL/L (ref 135–145)
WBC # BLD: 6.3 K/MCL (ref 4.2–11)

## 2024-09-16 PROCEDURE — 85025 COMPLETE CBC W/AUTO DIFF WBC: CPT | Performed by: HOSPITALIST

## 2024-09-16 PROCEDURE — 10006031 HB ROOM CHARGE TELEMETRY

## 2024-09-16 PROCEDURE — 99232 SBSQ HOSP IP/OBS MODERATE 35: CPT | Performed by: INTERNAL MEDICINE

## 2024-09-16 PROCEDURE — 10002801 HB RX 250 W/O HCPCS: Performed by: HOSPITALIST

## 2024-09-16 PROCEDURE — 92526 ORAL FUNCTION THERAPY: CPT

## 2024-09-16 PROCEDURE — 10002807 HB RX 258: Performed by: PSYCHIATRY & NEUROLOGY

## 2024-09-16 PROCEDURE — 99223 1ST HOSP IP/OBS HIGH 75: CPT | Performed by: NURSE PRACTITIONER

## 2024-09-16 PROCEDURE — 71045 X-RAY EXAM CHEST 1 VIEW: CPT

## 2024-09-16 PROCEDURE — 80048 BASIC METABOLIC PNL TOTAL CA: CPT | Performed by: INTERNAL MEDICINE

## 2024-09-16 PROCEDURE — 10002807 HB RX 258: Performed by: HOSPITALIST

## 2024-09-16 PROCEDURE — 80048 BASIC METABOLIC PNL TOTAL CA: CPT | Performed by: HOSPITALIST

## 2024-09-16 PROCEDURE — 10002801 HB RX 250 W/O HCPCS: Performed by: NURSE PRACTITIONER

## 2024-09-16 PROCEDURE — 36415 COLL VENOUS BLD VENIPUNCTURE: CPT | Performed by: HOSPITALIST

## 2024-09-16 PROCEDURE — 10002800 HB RX 250 W HCPCS: Performed by: HOSPITALIST

## 2024-09-16 PROCEDURE — 10002800 HB RX 250 W HCPCS: Performed by: PSYCHIATRY & NEUROLOGY

## 2024-09-16 PROCEDURE — 99497 ADVNCD CARE PLAN 30 MIN: CPT | Performed by: NURSE PRACTITIONER

## 2024-09-16 RX ORDER — DEXTROSE MONOHYDRATE 50 MG/ML
INJECTION, SOLUTION INTRAVENOUS CONTINUOUS
Status: DISCONTINUED | OUTPATIENT
Start: 2024-09-16 | End: 2024-09-18

## 2024-09-16 RX ADMIN — DEXTROSE AND SODIUM CHLORIDE: 5; 450 INJECTION, SOLUTION INTRAVENOUS at 03:32

## 2024-09-16 RX ADMIN — DEXTROSE MONOHYDRATE: 50 INJECTION, SOLUTION INTRAVENOUS at 11:26

## 2024-09-16 RX ADMIN — METOROPROLOL TARTRATE 5 MG: 5 INJECTION, SOLUTION INTRAVENOUS at 17:52

## 2024-09-16 RX ADMIN — METOROPROLOL TARTRATE 5 MG: 5 INJECTION, SOLUTION INTRAVENOUS at 11:33

## 2024-09-16 RX ADMIN — METOROPROLOL TARTRATE 5 MG: 5 INJECTION, SOLUTION INTRAVENOUS at 06:37

## 2024-09-16 RX ADMIN — THIAMINE HYDROCHLORIDE 400 MG: 100 INJECTION, SOLUTION INTRAMUSCULAR; INTRAVENOUS at 08:13

## 2024-09-16 RX ADMIN — CEFTRIAXONE SODIUM 1000 MG: 10 INJECTION, POWDER, FOR SOLUTION INTRAVENOUS at 20:39

## 2024-09-16 ASSESSMENT — PAIN SCALES - PAIN ASSESSMENT IN ADVANCED DEMENTIA (PAINAD)
BODYLANGUAGE: TENSE, DISTRESSED, FIDGETING
BODYLANGUAGE: TENSE, DISTRESSED, FIDGETING
FACIALEXPRESSION: SAD. FRIGHTENED. FROWNING
FACIALEXPRESSION: SAD. FRIGHTENED. FROWNING
CONSOLABILITY: NO NEED TO CONSOLE
NEGVOCALIZATION: OCCASIONAL MOAN OR GROAN, LOW LEVELS OF SPEECH WITH A NEGATIVE OR DISAPPROVING QUALITY
BREATHING: NORMAL
CONSOLABILITY: NO NEED TO CONSOLE
TOTALSCORE: 3
BREATHING: NORMAL
CONSOLABILITY: NO NEED TO CONSOLE
BODYLANGUAGE: TENSE, DISTRESSED, FIDGETING
TOTALSCORE: 3
BREATHING: NORMAL
NEGVOCALIZATION: OCCASIONAL MOAN OR GROAN, LOW LEVELS OF SPEECH WITH A NEGATIVE OR DISAPPROVING QUALITY
TOTALSCORE: 3
CONSOLABILITY: NO NEED TO CONSOLE
NEGVOCALIZATION: OCCASIONAL MOAN OR GROAN, LOW LEVELS OF SPEECH WITH A NEGATIVE OR DISAPPROVING QUALITY
TOTALSCORE: 3
NEGVOCALIZATION: OCCASIONAL MOAN OR GROAN, LOW LEVELS OF SPEECH WITH A NEGATIVE OR DISAPPROVING QUALITY
BREATHING: NORMAL
FACIALEXPRESSION: SAD. FRIGHTENED. FROWNING
BODYLANGUAGE: TENSE, DISTRESSED, FIDGETING
FACIALEXPRESSION: SAD. FRIGHTENED. FROWNING

## 2024-09-16 ASSESSMENT — COGNITIVE AND FUNCTIONAL STATUS - GENERAL
REMEMBERING WHERE THINGS ARE: UNABLE
FOLLOWS FAMILIAR CONVERSATION: UNABLE
APPLIED_COGNITIVE_CONVERTED_SCORE: 7.69
TAKING CARE OF COMPLICATED TASKS: UNABLE
APPLIED_COGNITIVE_RAW_SCORE: 6
UNDERSTANDING 10 TO 15 MIN SPEECH: UNABLE
REMEMBERING 5 ERRANDS WITH NO LIST: UNABLE
REMEMBERING TO TAKE MEDICATION: UNABLE

## 2024-09-16 ASSESSMENT — PAIN SCALES - GENERAL: PAINLEVEL_OUTOF10: 0

## 2024-09-17 LAB
ANION GAP SERPL CALC-SCNC: 7 MMOL/L (ref 7–19)
BASOPHILS # BLD: 0 K/MCL (ref 0–0.3)
BASOPHILS NFR BLD: 1 %
BUN SERPL-MCNC: 28 MG/DL (ref 6–20)
BUN/CREAT SERPL: 20 (ref 7–25)
CALCIUM SERPL-MCNC: 8.7 MG/DL (ref 8.4–10.2)
CHLORIDE SERPL-SCNC: 120 MMOL/L (ref 97–110)
CO2 SERPL-SCNC: 24 MMOL/L (ref 21–32)
CREAT SERPL-MCNC: 1.37 MG/DL (ref 0.67–1.17)
DEPRECATED RDW RBC: 70 FL (ref 39–50)
EGFRCR SERPLBLD CKD-EPI 2021: 51 ML/MIN/{1.73_M2}
EOSINOPHIL # BLD: 0.3 K/MCL (ref 0–0.5)
EOSINOPHIL NFR BLD: 4 %
ERYTHROCYTE [DISTWIDTH] IN BLOOD: 21.2 % (ref 11–15)
FASTING DURATION TIME PATIENT: ABNORMAL H
GLUCOSE BLDC GLUCOMTR-MCNC: 123 MG/DL (ref 70–99)
GLUCOSE BLDC GLUCOMTR-MCNC: 126 MG/DL (ref 70–99)
GLUCOSE BLDC GLUCOMTR-MCNC: 127 MG/DL (ref 70–99)
GLUCOSE BLDC GLUCOMTR-MCNC: 138 MG/DL (ref 70–99)
GLUCOSE SERPL-MCNC: 128 MG/DL (ref 70–99)
HCT VFR BLD CALC: 28.8 % (ref 39–51)
HGB BLD-MCNC: 8.6 G/DL (ref 13–17)
IMM GRANULOCYTES # BLD AUTO: 0 K/MCL (ref 0–0.2)
IMM GRANULOCYTES # BLD: 1 %
LYMPHOCYTES # BLD: 0.7 K/MCL (ref 1–4)
LYMPHOCYTES NFR BLD: 11 %
MAGNESIUM SERPL-MCNC: 2.1 MG/DL (ref 1.7–2.4)
MCH RBC QN AUTO: 26.3 PG (ref 26–34)
MCHC RBC AUTO-ENTMCNC: 29.9 G/DL (ref 32–36.5)
MCV RBC AUTO: 88.1 FL (ref 78–100)
MONOCYTES # BLD: 0.7 K/MCL (ref 0.3–0.9)
MONOCYTES NFR BLD: 10 %
NEUTROPHILS # BLD: 4.6 K/MCL (ref 1.8–7.7)
NEUTROPHILS NFR BLD: 73 %
NRBC BLD MANUAL-RTO: 0 /100 WBC
PHOSPHATE SERPL-MCNC: 3.5 MG/DL (ref 2.4–4.7)
PLATELET # BLD AUTO: 265 K/MCL (ref 140–450)
POTASSIUM SERPL-SCNC: 3.7 MMOL/L (ref 3.4–5.1)
RBC # BLD: 3.27 MIL/MCL (ref 4.5–5.9)
SODIUM SERPL-SCNC: 147 MMOL/L (ref 135–145)
WBC # BLD: 6.3 K/MCL (ref 4.2–11)

## 2024-09-17 PROCEDURE — 92526 ORAL FUNCTION THERAPY: CPT | Performed by: SPEECH-LANGUAGE PATHOLOGIST

## 2024-09-17 PROCEDURE — 10002803 HB RX 637: Performed by: INTERNAL MEDICINE

## 2024-09-17 PROCEDURE — 83735 ASSAY OF MAGNESIUM: CPT

## 2024-09-17 PROCEDURE — 84100 ASSAY OF PHOSPHORUS: CPT

## 2024-09-17 PROCEDURE — 10002801 HB RX 250 W/O HCPCS: Performed by: NURSE PRACTITIONER

## 2024-09-17 PROCEDURE — 36415 COLL VENOUS BLD VENIPUNCTURE: CPT | Performed by: INTERNAL MEDICINE

## 2024-09-17 PROCEDURE — 85025 COMPLETE CBC W/AUTO DIFF WBC: CPT | Performed by: HOSPITALIST

## 2024-09-17 PROCEDURE — 10002803 HB RX 637: Performed by: NURSE PRACTITIONER

## 2024-09-17 PROCEDURE — 80048 BASIC METABOLIC PNL TOTAL CA: CPT | Performed by: INTERNAL MEDICINE

## 2024-09-17 PROCEDURE — 99233 SBSQ HOSP IP/OBS HIGH 50: CPT | Performed by: NURSE PRACTITIONER

## 2024-09-17 PROCEDURE — 10002807 HB RX 258: Performed by: HOSPITALIST

## 2024-09-17 PROCEDURE — 10006031 HB ROOM CHARGE TELEMETRY

## 2024-09-17 PROCEDURE — 10002800 HB RX 250 W HCPCS: Performed by: HOSPITALIST

## 2024-09-17 RX ADMIN — ATORVASTATIN CALCIUM 40 MG: 40 TABLET, FILM COATED ORAL at 08:20

## 2024-09-17 RX ADMIN — METOPROLOL TARTRATE 25 MG: 25 TABLET, FILM COATED ORAL at 21:35

## 2024-09-17 RX ADMIN — METOROPROLOL TARTRATE 5 MG: 5 INJECTION, SOLUTION INTRAVENOUS at 06:14

## 2024-09-17 RX ADMIN — METOROPROLOL TARTRATE 5 MG: 5 INJECTION, SOLUTION INTRAVENOUS at 12:04

## 2024-09-17 RX ADMIN — METOROPROLOL TARTRATE 5 MG: 5 INJECTION, SOLUTION INTRAVENOUS at 00:55

## 2024-09-17 RX ADMIN — DEXTROSE MONOHYDRATE: 50 INJECTION, SOLUTION INTRAVENOUS at 07:45

## 2024-09-17 RX ADMIN — CEFTRIAXONE SODIUM 1000 MG: 10 INJECTION, POWDER, FOR SOLUTION INTRAVENOUS at 21:35

## 2024-09-17 ASSESSMENT — PAIN SCALES - PAIN ASSESSMENT IN ADVANCED DEMENTIA (PAINAD)
CONSOLABILITY: NO NEED TO CONSOLE
BODYLANGUAGE: TENSE, DISTRESSED, FIDGETING
FACIALEXPRESSION: SMILING OR INEXPRESSIVE
CONSOLABILITY: NO NEED TO CONSOLE
TOTALSCORE: 3
NEGVOCALIZATION: OCCASIONAL MOAN OR GROAN, LOW LEVELS OF SPEECH WITH A NEGATIVE OR DISAPPROVING QUALITY
CONSOLABILITY: NO NEED TO CONSOLE
FACIALEXPRESSION: SAD. FRIGHTENED. FROWNING
NEGVOCALIZATION: OCCASIONAL MOAN OR GROAN, LOW LEVELS OF SPEECH WITH A NEGATIVE OR DISAPPROVING QUALITY
FACIALEXPRESSION: SAD. FRIGHTENED. FROWNING
BREATHING: NORMAL
BREATHING: NORMAL
BODYLANGUAGE: TENSE, DISTRESSED, FIDGETING
BODYLANGUAGE: RELAXED
TOTALSCORE: 3
TOTALSCORE: 0
BREATHING: NORMAL

## 2024-09-18 LAB
ANION GAP SERPL CALC-SCNC: 3 MMOL/L (ref 7–19)
BASOPHILS # BLD: 0 K/MCL (ref 0–0.3)
BASOPHILS NFR BLD: 0 %
BUN SERPL-MCNC: 28 MG/DL (ref 6–20)
BUN/CREAT SERPL: 20 (ref 7–25)
CALCIUM SERPL-MCNC: 9 MG/DL (ref 8.4–10.2)
CHLORIDE SERPL-SCNC: 116 MMOL/L (ref 97–110)
CO2 SERPL-SCNC: 28 MMOL/L (ref 21–32)
CREAT SERPL-MCNC: 1.38 MG/DL (ref 0.67–1.17)
DEPRECATED RDW RBC: 67.8 FL (ref 39–50)
EGFRCR SERPLBLD CKD-EPI 2021: 50 ML/MIN/{1.73_M2}
EOSINOPHIL # BLD: 0.3 K/MCL (ref 0–0.5)
EOSINOPHIL NFR BLD: 4 %
ERYTHROCYTE [DISTWIDTH] IN BLOOD: 20.9 % (ref 11–15)
FASTING DURATION TIME PATIENT: ABNORMAL H
GLUCOSE BLDC GLUCOMTR-MCNC: 149 MG/DL (ref 70–99)
GLUCOSE BLDC GLUCOMTR-MCNC: 152 MG/DL (ref 70–99)
GLUCOSE BLDC GLUCOMTR-MCNC: 157 MG/DL (ref 70–99)
GLUCOSE SERPL-MCNC: 157 MG/DL (ref 70–99)
HCT VFR BLD CALC: 27.8 % (ref 39–51)
HGB BLD-MCNC: 8.5 G/DL (ref 13–17)
IMM GRANULOCYTES # BLD AUTO: 0 K/MCL (ref 0–0.2)
IMM GRANULOCYTES # BLD: 0 %
LYMPHOCYTES # BLD: 0.6 K/MCL (ref 1–4)
LYMPHOCYTES NFR BLD: 9 %
MCH RBC QN AUTO: 26.9 PG (ref 26–34)
MCHC RBC AUTO-ENTMCNC: 30.6 G/DL (ref 32–36.5)
MCV RBC AUTO: 88 FL (ref 78–100)
MONOCYTES # BLD: 0.7 K/MCL (ref 0.3–0.9)
MONOCYTES NFR BLD: 10 %
NEUTROPHILS # BLD: 5.3 K/MCL (ref 1.8–7.7)
NEUTROPHILS NFR BLD: 77 %
NRBC BLD MANUAL-RTO: 0 /100 WBC
PLATELET # BLD AUTO: 262 K/MCL (ref 140–450)
POTASSIUM SERPL-SCNC: 3.7 MMOL/L (ref 3.4–5.1)
RBC # BLD: 3.16 MIL/MCL (ref 4.5–5.9)
SODIUM SERPL-SCNC: 143 MMOL/L (ref 135–145)
WBC # BLD: 7 K/MCL (ref 4.2–11)

## 2024-09-18 PROCEDURE — 10002803 HB RX 637: Performed by: NURSE PRACTITIONER

## 2024-09-18 PROCEDURE — 97530 THERAPEUTIC ACTIVITIES: CPT

## 2024-09-18 PROCEDURE — 99233 SBSQ HOSP IP/OBS HIGH 50: CPT | Performed by: NURSE PRACTITIONER

## 2024-09-18 PROCEDURE — 10002803 HB RX 637: Performed by: INTERNAL MEDICINE

## 2024-09-18 PROCEDURE — 85025 COMPLETE CBC W/AUTO DIFF WBC: CPT | Performed by: HOSPITALIST

## 2024-09-18 PROCEDURE — 97112 NEUROMUSCULAR REEDUCATION: CPT

## 2024-09-18 PROCEDURE — 36415 COLL VENOUS BLD VENIPUNCTURE: CPT | Performed by: HOSPITALIST

## 2024-09-18 PROCEDURE — 80048 BASIC METABOLIC PNL TOTAL CA: CPT | Performed by: INTERNAL MEDICINE

## 2024-09-18 PROCEDURE — 92526 ORAL FUNCTION THERAPY: CPT | Performed by: SPEECH-LANGUAGE PATHOLOGIST

## 2024-09-18 PROCEDURE — 10002807 HB RX 258: Performed by: HOSPITALIST

## 2024-09-18 PROCEDURE — 10006031 HB ROOM CHARGE TELEMETRY

## 2024-09-18 RX ADMIN — METOPROLOL TARTRATE 25 MG: 25 TABLET, FILM COATED ORAL at 08:48

## 2024-09-18 RX ADMIN — DEXTROSE MONOHYDRATE: 50 INJECTION, SOLUTION INTRAVENOUS at 04:15

## 2024-09-18 RX ADMIN — METOPROLOL TARTRATE 25 MG: 25 TABLET, FILM COATED ORAL at 20:15

## 2024-09-18 RX ADMIN — ATORVASTATIN CALCIUM 40 MG: 40 TABLET, FILM COATED ORAL at 08:48

## 2024-09-18 ASSESSMENT — PAIN SCALES - PAIN ASSESSMENT IN ADVANCED DEMENTIA (PAINAD)
BODYLANGUAGE: TENSE, DISTRESSED, FIDGETING
TOTALSCORE: 0
CONSOLABILITY: NO NEED TO CONSOLE
TOTALSCORE: 2
CONSOLABILITY: NO NEED TO CONSOLE
FACIALEXPRESSION: SMILING OR INEXPRESSIVE
TOTALSCORE: 0
NEGVOCALIZATION: OCCASIONAL MOAN OR GROAN, LOW LEVELS OF SPEECH WITH A NEGATIVE OR DISAPPROVING QUALITY
CONSOLABILITY: NO NEED TO CONSOLE
NEGVOCALIZATION: OCCASIONAL MOAN OR GROAN, LOW LEVELS OF SPEECH WITH A NEGATIVE OR DISAPPROVING QUALITY
FACIALEXPRESSION: SMILING OR INEXPRESSIVE
BREATHING: NORMAL
TOTALSCORE: 1
FACIALEXPRESSION: SMILING OR INEXPRESSIVE
FACIALEXPRESSION: SMILING OR INEXPRESSIVE
BREATHING: NORMAL
BODYLANGUAGE: RELAXED
BODYLANGUAGE: RELAXED
BREATHING: NORMAL
CONSOLABILITY: NO NEED TO CONSOLE
BODYLANGUAGE: RELAXED
BREATHING: NORMAL

## 2024-09-18 ASSESSMENT — ENCOUNTER SYMPTOMS: PAIN SEVERITY NOW: 0

## 2024-09-18 ASSESSMENT — COGNITIVE AND FUNCTIONAL STATUS - GENERAL
BASIC_MOBILITY_RAW_SCORE: 7
BASIC_MOBILITY_CONVERTED_SCORE: 19.39

## 2024-09-19 ENCOUNTER — APPOINTMENT (OUTPATIENT)
Dept: GENERAL RADIOLOGY | Age: 84
DRG: 082 | End: 2024-09-19
Attending: HOSPITALIST

## 2024-09-19 LAB
ANION GAP SERPL CALC-SCNC: 9 MMOL/L (ref 7–19)
BUN SERPL-MCNC: 30 MG/DL (ref 6–20)
BUN/CREAT SERPL: 25 (ref 7–25)
CALCIUM SERPL-MCNC: 8.7 MG/DL (ref 8.4–10.2)
CHLORIDE SERPL-SCNC: 115 MMOL/L (ref 97–110)
CO2 SERPL-SCNC: 23 MMOL/L (ref 21–32)
CREAT SERPL-MCNC: 1.18 MG/DL (ref 0.67–1.17)
DEPRECATED RDW RBC: 65.4 FL (ref 39–50)
EGFRCR SERPLBLD CKD-EPI 2021: 61 ML/MIN/{1.73_M2}
ERYTHROCYTE [DISTWIDTH] IN BLOOD: 20.5 % (ref 11–15)
FASTING DURATION TIME PATIENT: ABNORMAL H
GLUCOSE BLDC GLUCOMTR-MCNC: 152 MG/DL (ref 70–99)
GLUCOSE BLDC GLUCOMTR-MCNC: 153 MG/DL (ref 70–99)
GLUCOSE BLDC GLUCOMTR-MCNC: 169 MG/DL (ref 70–99)
GLUCOSE SERPL-MCNC: 152 MG/DL (ref 70–99)
HCT VFR BLD CALC: 28.2 % (ref 39–51)
HGB BLD-MCNC: 8.6 G/DL (ref 13–17)
MCH RBC QN AUTO: 26.5 PG (ref 26–34)
MCHC RBC AUTO-ENTMCNC: 30.5 G/DL (ref 32–36.5)
MCV RBC AUTO: 87 FL (ref 78–100)
NRBC BLD MANUAL-RTO: 0 /100 WBC
PLATELET # BLD AUTO: 269 K/MCL (ref 140–450)
POTASSIUM SERPL-SCNC: 4 MMOL/L (ref 3.4–5.1)
RBC # BLD: 3.24 MIL/MCL (ref 4.5–5.9)
SODIUM SERPL-SCNC: 143 MMOL/L (ref 135–145)
WBC # BLD: 6.3 K/MCL (ref 4.2–11)

## 2024-09-19 PROCEDURE — 92526 ORAL FUNCTION THERAPY: CPT

## 2024-09-19 PROCEDURE — 10002803 HB RX 637: Performed by: NURSE PRACTITIONER

## 2024-09-19 PROCEDURE — 99232 SBSQ HOSP IP/OBS MODERATE 35: CPT | Performed by: INTERNAL MEDICINE

## 2024-09-19 PROCEDURE — 36415 COLL VENOUS BLD VENIPUNCTURE: CPT | Performed by: INTERNAL MEDICINE

## 2024-09-19 PROCEDURE — 85027 COMPLETE CBC AUTOMATED: CPT | Performed by: INTERNAL MEDICINE

## 2024-09-19 PROCEDURE — 71045 X-RAY EXAM CHEST 1 VIEW: CPT

## 2024-09-19 PROCEDURE — 99233 SBSQ HOSP IP/OBS HIGH 50: CPT | Performed by: NURSE PRACTITIONER

## 2024-09-19 PROCEDURE — 10006031 HB ROOM CHARGE TELEMETRY

## 2024-09-19 PROCEDURE — 80048 BASIC METABOLIC PNL TOTAL CA: CPT | Performed by: INTERNAL MEDICINE

## 2024-09-19 PROCEDURE — 10002803 HB RX 637: Performed by: INTERNAL MEDICINE

## 2024-09-19 RX ADMIN — METOPROLOL TARTRATE 25 MG: 25 TABLET, FILM COATED ORAL at 08:26

## 2024-09-19 RX ADMIN — ATORVASTATIN CALCIUM 40 MG: 40 TABLET, FILM COATED ORAL at 08:26

## 2024-09-19 RX ADMIN — METOPROLOL TARTRATE 25 MG: 25 TABLET, FILM COATED ORAL at 20:33

## 2024-09-19 SDOH — ECONOMIC STABILITY: GENERAL

## 2024-09-19 SDOH — HEALTH STABILITY: MENTAL HEALTH: IS THE PATIENT ABLE TO COGNITIVELY COMPLETE THE PHQ 2/9 SCREENING?: NO

## 2024-09-19 SDOH — HEALTH STABILITY: PHYSICAL HEALTH: DO YOU HAVE SERIOUS DIFFICULTY WALKING OR CLIMBING STAIRS?: YES

## 2024-09-19 SDOH — ECONOMIC STABILITY: INCOME INSECURITY: IN THE PAST 12 MONTHS, HAS THE ELECTRIC, GAS, OIL, OR WATER COMPANY THREATENED TO SHUT OFF SERVICE IN YOUR HOME?: NO

## 2024-09-19 SDOH — HEALTH STABILITY: PHYSICAL HEALTH: DO YOU HAVE DIFFICULTY DRESSING OR BATHING?: YES

## 2024-09-19 SDOH — HEALTH STABILITY: GENERAL: BECAUSE OF A PHYSICAL, MENTAL, OR EMOTIONAL CONDITION, DO YOU HAVE DIFFICULTY DOING ERRANDS ALONE?: YES

## 2024-09-19 SDOH — ECONOMIC STABILITY: GENERAL: WOULD YOU LIKE HELP WITH ANY OF THE FOLLOWING NEEDS?: I DON'T WANT HELP WITH ANY OF THESE

## 2024-09-19 ASSESSMENT — PAIN SCALES - PAIN ASSESSMENT IN ADVANCED DEMENTIA (PAINAD)
BODYLANGUAGE: TENSE, DISTRESSED, FIDGETING
BREATHING: NORMAL
CONSOLABILITY: NO NEED TO CONSOLE
FACIALEXPRESSION: SMILING OR INEXPRESSIVE
TOTALSCORE: 1

## 2024-09-19 ASSESSMENT — PATIENT HEALTH QUESTIONNAIRE - PHQ9: IS PATIENT ABLE TO COMPLETE PHQ2 OR PHQ9: NO, PATIENT WILL NEVER BE ABLE TO COMPLETE

## 2024-09-19 NOTE — PROGRESS NOTES
Discharge summary:  .  [facility] [place] note transcribed by Dr. Jeff Damico  .  Date seen: 2024  .  Subjective: Patient seen and examined for renal cancer, diverting ileostomy's, Enterococcus UTI infection, delirium dementia, diabetes mellitus type 2, fungal ileostomy infection. Patient looks stable stable stable, doing well, ileostomy site looks much improved, planning on transitioning out of the facility today, going to [place] outside facility, family agrees with this plan of care, nurses transitioning, medications were reviewed for discharge.  .  EXAM:  Vital signs: See point click care charting for updated details  Gen. exam: Alert and awake, baseline mental status noted, in no acute distress, poor dentition  HEENT: Pupils equal andreactive to light and accommodation, moist mucous membranes  Neck exam: Supple. Normal thyroid trachea midline, no JVD  Heart exam: Regular rate and rhythm no murmurs no S3 no S4  Lung exam: No rales no rhonchi no wheezes  Abdominal exam: Soft, nontender, nondistended, positive bowel sounds are normoactive, postoperative incisions,clean dry and intact, ileostomy, right-sided with red tinged urine with white flakes around the site  Extremitiesexam: no clubbing, no cyanosis, no edema  Skin exam: No obviouswounds, no rashes  Neurological exam: Cranial nerves II through XII intact, no gross deficits  Musculoskeletal exam: Moderate bilateral generalized hand arthritis appreciated, no obvious deformity  .  Labs/imaging: See chart  DVT prophylaxis: Eliquis fornovel anticoagulation  Ambulatory status: Per hospital discharge recommendations, specialist involvement/recommendations and physical therapy evaluation  .  ASSESSMENT AND PLAN:  Roc Butcher is a 84 year oldmale seen at [facility] [place] with the followin. History of ileostomy  Stable, continue current monitoring management, self-care teaching, further orders pending clinical course discharge  planning  2. Bacteremia due to Enterococcus, fungal appearing elements in the ileostomy  Stable and antibiotics completed, restart Diflucan, unknown stop date. At least 30days to 6 weeks, to continue on discharge  3. Parotid mass  Outpatient follow-upfor possible biopsy, complete rehab, then outpatient plan for ENT evaluation  4.Uncontrolled type2 diabetes mellitus with hyperglycemia (HCC): Blood sugar checks continue current home medications, continue blood sugar control with insulin sliding scale. He will defer outpatient transition to oral medications as he is discharged, continue insulin sliding scale for now.  5.delirium/dementia: Continue doses of Seroquel, with eventual gradual dose reduction, psychiatry following  .  Stable problem list:  Malignant neoplasm of urinary bladder, unspecified site (HCC)  Coronary artery disease of native artery of native heart with stable angina pectoris (HCC)  Delirium due to another medical condition  Essential hypertension  Hyperlipidemia, unspecified hyperlipidemia type  Stage 3a chronic kidney disease (HCC)  Anemia, unspecified type  .  Discharge summary: Patient had an uneventful stay at [place] in which she improved with gait, strength, balance, endurance, had trouble with mild fungal infection of the ileostomy with fungal elements, was placed on Diflucan, then had some recurrence once he came offof Diflucan, it is recommended for him to stay on this for another 4 to 6 weeks, and see the urology team as an outpatient for further management. He has done very well from a mental status standpoint, and sleep standpoint, medication and mood seems stable.  .  Discharge medication list:  Diflucan Oral Tablet 100 MG (Fluconazole)  Give 1 tablet by mouth one time a day for fungal infection -urostomy site  Pharmacy Discontinued 9/6/2024 09:00 9/5/2024 9/5/2024  Metoprolol Tartrate Oral Tablet 25 MG (Metoprolol Tartrate)  Give 0.5 tablet by mouth two times a day for Hypertension  Hold for blood pressure below 90/60 mmHg and PUlse 60 Bpm  Pharmacy Discontinued 8/24/2024 09:00 9/5/2024 8/23/2024  Dose Check could not be performed. HumaLOG KwikPen Subcutaneous Solution Pen-injector 100 UNIT/ML (Insulin Lispro)  Inject as per sliding scale: if 150 - 200 = 1 Unit; 201 - 250 = 3 Units; 251 - 300 = 5 Units; 301 - 350 = 7 Units; 351+ Call MD for blood sugar higher than 350, subcutaneously before meals related to TYPE 2 DIABETES MELLITUS WITHOUT COMPLICATIONS (E11.9)  Pharmacy Discontinued 8/20/2024 07:30 9/5/2024 8/19/2024  There is a potential drug interaction with another medication. Please click to view details. Eliquis Oral Tablet 5 MG (Apixaban)  Give 1 tablet by mouth two times a day for Afib/Blood Clot prophylaxis  Pharmacy Discontinued 8/14/2024 09:00 9/5/2024 8/13/2024  Bisacodyl Suppository 10 MG  Insert 1 suppository rectally every 24 hours as needed for Constipation Give is no BM x 3 days  Pharmacy Discontinued 8/13/2024 18:41 9/5/2024 8/13/2024  MiraLax Oral Powder 17 GM/SCOOP (Polyethylene Glycol 3350)  Give 1 scoop by mouth every 24 hours as needed for Constipation Give is no BM x 2 days  Pharmacy Discontinued 8/13/2024 18:45 9/5/2024 8/13/2024  Dose Warning Metoprolol Tartrate Oral Tablet 25 MG (Metoprolol Tartrate)  Give 1 tablet by mouth one time a day for Hypertension Hold for blood pressure below 90/60 mmHg and PUlse 60 Bpm  Pharmacy Discontinued 8/14/2024 09:00 8/23/2024 8/13/2024  QUEtiapine Fumarate Oral Tablet 25 MG (Quetiapine Fumarate)  Give 0.5 tablet by mouth in the evening for Sleep  Pharmacy Discontinued 8/14/2024 17:00 8/14/2024 8/13/2024  Melatonin Oral Tablet 5 MG (Melatonin)  Give 1 tablet by mouth at bedtime for Sleep Aid  Pharmacy Discontinued 8/13/2024 21:00 9/5/2024 8/13/2024  Senna-Plus Oral Tablet 8.6-50 MG (Sennosides-Docusate Sodium)  Give 2 tablet by mouth every 24 hours as needed for Constipation Give is no BM x 3 days  Pharmacy Discontinued 8/13/2024  18:45 9/5/2024 8/13/2024  Colace Oral Capsule 100 MG (Docusate Sodium)  Give 1 capsule by mouth every 24 hours as needed for Constipation Give is no BM x 2 days  Pharmacy Discontinued 8/13/2024 18:45 9/5/2024 8/13/2024  HydrALAZINE HCl Tablet 10 MG  Give 1 tablet by mouth three times a day for hypertension  Pharmacy Discontinued 8/14/2024 09:00 8/14/2024 8/13/2024  Isosorbide Dinitrate Oral Tablet 5 MG (Isosorbide Dinitrate)  Give 1 tablet by mouth three times a day for Angina  Pharmacy Discontinued 8/14/2024 09:00 8/13/2024 8/13/2024  Acetaminophen Oral Tablet 500 MG (Acetaminophen)  Give 2 tablet by mouth every 8 hours as needed for for pain Acetaminophen not to exceed 3000 mg/day  Pharmacy Discontinued 8/13/2024 18:15 9/5/2024 8/13/2024  Dose Warning Sodium Bicarbonate Oral Tablet 650 MG (Sodium Bicarbonate (Antacid))  Give 1 tablet by mouth two times a day for Supplementation  Pharmacy Discontinued 8/14/2024 09:00 9/5/2024 8/13/2024  Dose Warning Sodium Bicarbonate Oral Tablet 650 MG (Sodium Bicarbonate (Antacid))  Give 1 tablet by mouth two times a day for Supplement  Pharmacy Discontinued 8/14/2024 09:00 8/14/2024 8/13/2024  Isosorbide Dinitrate Oral Tablet 5 MG (Isosorbide Dinitrate)  Give 1 tablet by mouth three times a day for Hypertension  Pharmacy Discontinued 8/14/2024 09:00 9/5/2024 8/13/2024  hydrALAZINE HCl Oral Tablet 10 MG (Hydralazine HCl)  Give 1 tablet by mouth three times a day for Hypertension Hold for Blood Pressure below 90/60 mmHg and PUlse less than 60 bpm, notify NP/MD  Pharmacy Discontinued 8/14/2024 09:00 9/5/2024 8/13/2024  Dose Warning Metoprolol Tartrate Oral Tablet 25 MG (Metoprolol Tartrate)  Give 1 tablet by mouth one time a day for HTN Hold if BP < 90/60  Pharmacy Discontinued 8/14/2024 10:00 8/14/2024 8/13/2024  QUEtiapine Fumarate Tablet 25 MG  Give 0.5 tablet by mouth in the evening for Mood  Pharmacy Discontinued 8/14/2024 17:00 9/5/2024 8/13/2024  Eliquis Oral Tablet 5 MG  (Apixaban)  Give 1 tablet by mouth two times a day for Blood thinner  Atorvastatin Calcium Tablet 40 MG  Give 1 tablet by mouth at bedtime for HLD  Pharmacy Discontinued 8/13/2024 20:00 9/5/2024 8/13/2024  Melatonin Tablet 1 MG  Give 1 tablet by mouth at bedtime for Insomnia  Escitalopram Oxalate Tablet 5 MG  Give 1 tablet by mouth at bedtime for Depression

## 2024-09-19 NOTE — PROGRESS NOTES
[facility] [place] note transcribed by Dr. Jeff Damico  .  Date seen: 9/3/2024  .  Subjective: Patient seen and examined for renal cancer, diverting ileostomy's, Enterococcus UTI infection, delirium dementia, diabetes mellitus type 2, fungal ileostomy infection. Patient looks stable, with his son, appears to have some whitish appearing discharge in the ileostomy, mild blood-tinged urine as well, they are concerned about this, he has had fungal infections in the past, Diflucan just ended approximately 4 days ago. He is planning on a discharge to an outside facility later in the week.  .  EXAM:  Vital signs: See point click care charting for updated details  Gen. exam: Alert and awake, baseline mental status noted, in no acute distress, poor dentition  HEENT: Pupils equal and reactive to light and accommodation, moist mucous membranes  Neck exam: Supple. Normal thyroid trachea midline, no JVD  Heart exam: Regular rate and rhythm no murmurs no S3 no S4  Lung exam: No rales no rhonchi no wheezes  Abdominal exam: Soft, nontender, nondistended, positive bowel sounds are normoactive, postoperative incisions,clean dry and intact, ileostomy, right-sided with red tinged urine with white flakes around the site  Extremitiesexam: no clubbing, no cyanosis, no edema  Skin exam: No obvious wounds, no rashes  Neurological exam: Cranial nerves II through XII intact, no gross deficits  Musculoskeletal exam: Moderate bilateral generalized hand arthritis appreciated, no obvious deformity  .  Labs/imaging: See chart  DVT prophylaxis: Eliquis for novel anticoagulation  Ambulatory status: Per hospital discharge recommendations, specialist involvement/recommendations and physical therapy evaluation  .  ASSESSMENT AND PLAN:  Roc Butcher is a 84 year oldmale [facility] [place] with the followin. History of ileostomy  Stable, continue current monitoring management, self-care teaching, further orders pending  clinical course  2. Bacteremia due to Enterococcus, fungal appearing elements in the ileostomy  Stable and antibiotics completed, restart Diflucan, unknown stop date. At least 30days to 6 weeks  3. Parotid mass  Outpatient follow-upfor possible biopsy, complete rehab, then outpatient plan for ENT evaluation  4.Uncontrolled type 2 diabetes mellitus with hyperglycemia (HCC): Blood sugar checks continue current home medications, continue blood sugar control with insulin sliding scale  5.delirium/dementia: Continue doses of Seroquel, with eventual gradual dose reduction, psychiatry following  .  Stable problem list:  Malignant neoplasm of urinary bladder, unspecified site (HCC)  Coronary artery disease of native artery of native heart with stable angina pectoris (HCC)  Delirium due to another medical condition  Essential hypertension  Hyperlipidemia,unspecified hyperlipidemia type  Stage 3a chronic kidney disease (HCC)  Anemia, unspecified type

## 2024-09-20 ENCOUNTER — APPOINTMENT (OUTPATIENT)
Dept: GENERAL RADIOLOGY | Age: 84
DRG: 082 | End: 2024-09-20
Attending: INTERNAL MEDICINE

## 2024-09-20 LAB
ANION GAP SERPL CALC-SCNC: 7 MMOL/L (ref 7–19)
BUN SERPL-MCNC: 30 MG/DL (ref 6–20)
BUN/CREAT SERPL: 25 (ref 7–25)
CALCIUM SERPL-MCNC: 8.9 MG/DL (ref 8.4–10.2)
CHLORIDE SERPL-SCNC: 115 MMOL/L (ref 97–110)
CO2 SERPL-SCNC: 23 MMOL/L (ref 21–32)
CREAT SERPL-MCNC: 1.2 MG/DL (ref 0.67–1.17)
DEPRECATED RDW RBC: 63.7 FL (ref 39–50)
EGFRCR SERPLBLD CKD-EPI 2021: 60 ML/MIN/{1.73_M2}
ERYTHROCYTE [DISTWIDTH] IN BLOOD: 20.3 % (ref 11–15)
FASTING DURATION TIME PATIENT: ABNORMAL H
GLUCOSE BLDC GLUCOMTR-MCNC: 141 MG/DL (ref 70–99)
GLUCOSE BLDC GLUCOMTR-MCNC: 146 MG/DL (ref 70–99)
GLUCOSE BLDC GLUCOMTR-MCNC: 151 MG/DL (ref 70–99)
GLUCOSE BLDC GLUCOMTR-MCNC: 158 MG/DL (ref 70–99)
GLUCOSE BLDC GLUCOMTR-MCNC: 170 MG/DL (ref 70–99)
GLUCOSE BLDC GLUCOMTR-MCNC: 176 MG/DL (ref 70–99)
GLUCOSE SERPL-MCNC: 175 MG/DL (ref 70–99)
HCT VFR BLD CALC: 27 % (ref 39–51)
HGB BLD-MCNC: 8.2 G/DL (ref 13–17)
MCH RBC QN AUTO: 26.4 PG (ref 26–34)
MCHC RBC AUTO-ENTMCNC: 30.4 G/DL (ref 32–36.5)
MCV RBC AUTO: 86.8 FL (ref 78–100)
NRBC BLD MANUAL-RTO: 0 /100 WBC
PLATELET # BLD AUTO: 253 K/MCL (ref 140–450)
POTASSIUM SERPL-SCNC: 4 MMOL/L (ref 3.4–5.1)
RBC # BLD: 3.11 MIL/MCL (ref 4.5–5.9)
SODIUM SERPL-SCNC: 141 MMOL/L (ref 135–145)
WBC # BLD: 6.8 K/MCL (ref 4.2–11)

## 2024-09-20 PROCEDURE — 85027 COMPLETE CBC AUTOMATED: CPT | Performed by: INTERNAL MEDICINE

## 2024-09-20 PROCEDURE — 36415 COLL VENOUS BLD VENIPUNCTURE: CPT | Performed by: INTERNAL MEDICINE

## 2024-09-20 PROCEDURE — 99223 1ST HOSP IP/OBS HIGH 75: CPT | Performed by: INTERNAL MEDICINE

## 2024-09-20 PROCEDURE — 99233 SBSQ HOSP IP/OBS HIGH 50: CPT | Performed by: NURSE PRACTITIONER

## 2024-09-20 PROCEDURE — 80048 BASIC METABOLIC PNL TOTAL CA: CPT | Performed by: INTERNAL MEDICINE

## 2024-09-20 PROCEDURE — 10006031 HB ROOM CHARGE TELEMETRY

## 2024-09-20 PROCEDURE — 97530 THERAPEUTIC ACTIVITIES: CPT

## 2024-09-20 PROCEDURE — 10002803 HB RX 637: Performed by: INTERNAL MEDICINE

## 2024-09-20 PROCEDURE — 74018 RADEX ABDOMEN 1 VIEW: CPT

## 2024-09-20 PROCEDURE — 10002803 HB RX 637: Performed by: NURSE PRACTITIONER

## 2024-09-20 PROCEDURE — 92526 ORAL FUNCTION THERAPY: CPT

## 2024-09-20 RX ADMIN — METOPROLOL TARTRATE 25 MG: 25 TABLET, FILM COATED ORAL at 20:25

## 2024-09-20 RX ADMIN — ATORVASTATIN CALCIUM 40 MG: 40 TABLET, FILM COATED ORAL at 08:07

## 2024-09-20 RX ADMIN — METOPROLOL TARTRATE 25 MG: 25 TABLET, FILM COATED ORAL at 08:07

## 2024-09-20 ASSESSMENT — PAIN SCALES - PAIN ASSESSMENT IN ADVANCED DEMENTIA (PAINAD)
FACIALEXPRESSION: SMILING OR INEXPRESSIVE
BODYLANGUAGE: RELAXED
BODYLANGUAGE: TENSE, DISTRESSED, FIDGETING
FACIALEXPRESSION: SMILING OR INEXPRESSIVE
CONSOLABILITY: NO NEED TO CONSOLE
BREATHING: NORMAL
TOTALSCORE: 1
CONSOLABILITY: NO NEED TO CONSOLE
BREATHING: NORMAL
TOTALSCORE: 0

## 2024-09-20 ASSESSMENT — COGNITIVE AND FUNCTIONAL STATUS - GENERAL
HELP NEEDED FOR TOILETING: TOTAL
HELP NEEDED DRESSING REGULAR UPPER BODY CLOTHING: TOTAL
HELP NEEDED DRESSING REGULAR LOWER BODY CLOTHING: TOTAL
HELP NEEDED FOR PERSONAL GROOMING: TOTAL
HELP NEEDED FOR BATHING: TOTAL
DAILY_ACTIVITY_CONVERTED_SCORE: 17.06
DAILY_ACTIVITY_RAW_SCORE: 6

## 2024-09-21 LAB
ANION GAP SERPL CALC-SCNC: 9 MMOL/L (ref 7–19)
BUN SERPL-MCNC: 31 MG/DL (ref 6–20)
BUN/CREAT SERPL: 27 (ref 7–25)
CALCIUM SERPL-MCNC: 9.2 MG/DL (ref 8.4–10.2)
CHLORIDE SERPL-SCNC: 113 MMOL/L (ref 97–110)
CO2 SERPL-SCNC: 24 MMOL/L (ref 21–32)
CREAT SERPL-MCNC: 1.15 MG/DL (ref 0.67–1.17)
DEPRECATED RDW RBC: 63.8 FL (ref 39–50)
EGFRCR SERPLBLD CKD-EPI 2021: 63 ML/MIN/{1.73_M2}
ERYTHROCYTE [DISTWIDTH] IN BLOOD: 20.2 % (ref 11–15)
FASTING DURATION TIME PATIENT: ABNORMAL H
GLUCOSE BLDC GLUCOMTR-MCNC: 175 MG/DL (ref 70–99)
GLUCOSE BLDC GLUCOMTR-MCNC: 179 MG/DL (ref 70–99)
GLUCOSE BLDC GLUCOMTR-MCNC: 181 MG/DL (ref 70–99)
GLUCOSE BLDC GLUCOMTR-MCNC: 184 MG/DL (ref 70–99)
GLUCOSE SERPL-MCNC: 168 MG/DL (ref 70–99)
HCT VFR BLD CALC: 29 % (ref 39–51)
HGB BLD-MCNC: 8.9 G/DL (ref 13–17)
MAGNESIUM SERPL-MCNC: 1.9 MG/DL (ref 1.7–2.4)
MCH RBC QN AUTO: 26.6 PG (ref 26–34)
MCHC RBC AUTO-ENTMCNC: 30.7 G/DL (ref 32–36.5)
MCV RBC AUTO: 86.8 FL (ref 78–100)
NRBC BLD MANUAL-RTO: 0 /100 WBC
PLATELET # BLD AUTO: 254 K/MCL (ref 140–450)
POTASSIUM SERPL-SCNC: 4.3 MMOL/L (ref 3.4–5.1)
RBC # BLD: 3.34 MIL/MCL (ref 4.5–5.9)
SODIUM SERPL-SCNC: 142 MMOL/L (ref 135–145)
WBC # BLD: 6.8 K/MCL (ref 4.2–11)

## 2024-09-21 PROCEDURE — 83735 ASSAY OF MAGNESIUM: CPT | Performed by: INTERNAL MEDICINE

## 2024-09-21 PROCEDURE — 10002803 HB RX 637: Performed by: INTERNAL MEDICINE

## 2024-09-21 PROCEDURE — 36415 COLL VENOUS BLD VENIPUNCTURE: CPT | Performed by: INTERNAL MEDICINE

## 2024-09-21 PROCEDURE — 10006031 HB ROOM CHARGE TELEMETRY

## 2024-09-21 PROCEDURE — 10004651 HB RX, NO CHARGE ITEM: Performed by: INTERNAL MEDICINE

## 2024-09-21 PROCEDURE — 10002803 HB RX 637: Performed by: NURSE PRACTITIONER

## 2024-09-21 PROCEDURE — 92526 ORAL FUNCTION THERAPY: CPT

## 2024-09-21 PROCEDURE — 85027 COMPLETE CBC AUTOMATED: CPT | Performed by: INTERNAL MEDICINE

## 2024-09-21 PROCEDURE — 80048 BASIC METABOLIC PNL TOTAL CA: CPT | Performed by: INTERNAL MEDICINE

## 2024-09-21 RX ADMIN — METOPROLOL TARTRATE 25 MG: 25 TABLET, FILM COATED ORAL at 20:09

## 2024-09-21 RX ADMIN — METOPROLOL TARTRATE 25 MG: 25 TABLET, FILM COATED ORAL at 09:44

## 2024-09-21 RX ADMIN — ACETAMINOPHEN 650 MG: 325 TABLET ORAL at 01:08

## 2024-09-21 RX ADMIN — ATORVASTATIN CALCIUM 40 MG: 40 TABLET, FILM COATED ORAL at 09:44

## 2024-09-21 ASSESSMENT — PAIN SCALES - PAIN ASSESSMENT IN ADVANCED DEMENTIA (PAINAD)
BODYLANGUAGE: RELAXED
BREATHING: NORMAL
BREATHING: NORMAL
FACIALEXPRESSION: SMILING OR INEXPRESSIVE
CONSOLABILITY: NO NEED TO CONSOLE
TOTALSCORE: 0
BODYLANGUAGE: RELAXED
TOTALSCORE: 0
CONSOLABILITY: NO NEED TO CONSOLE
FACIALEXPRESSION: SMILING OR INEXPRESSIVE

## 2024-09-22 LAB
ANION GAP SERPL CALC-SCNC: 7 MMOL/L (ref 7–19)
BUN SERPL-MCNC: 32 MG/DL (ref 6–20)
BUN/CREAT SERPL: 31 (ref 7–25)
CALCIUM SERPL-MCNC: 9.2 MG/DL (ref 8.4–10.2)
CHLORIDE SERPL-SCNC: 112 MMOL/L (ref 97–110)
CO2 SERPL-SCNC: 25 MMOL/L (ref 21–32)
CREAT SERPL-MCNC: 1.03 MG/DL (ref 0.67–1.17)
DEPRECATED RDW RBC: 64.4 FL (ref 39–50)
EGFRCR SERPLBLD CKD-EPI 2021: 72 ML/MIN/{1.73_M2}
ERYTHROCYTE [DISTWIDTH] IN BLOOD: 20.3 % (ref 11–15)
FASTING DURATION TIME PATIENT: ABNORMAL H
GLUCOSE BLDC GLUCOMTR-MCNC: 163 MG/DL (ref 70–99)
GLUCOSE BLDC GLUCOMTR-MCNC: 180 MG/DL (ref 70–99)
GLUCOSE BLDC GLUCOMTR-MCNC: 181 MG/DL (ref 70–99)
GLUCOSE SERPL-MCNC: 198 MG/DL (ref 70–99)
HCT VFR BLD CALC: 28 % (ref 39–51)
HGB BLD-MCNC: 8.6 G/DL (ref 13–17)
MAGNESIUM SERPL-MCNC: 1.8 MG/DL (ref 1.7–2.4)
MCH RBC QN AUTO: 26.5 PG (ref 26–34)
MCHC RBC AUTO-ENTMCNC: 30.7 G/DL (ref 32–36.5)
MCV RBC AUTO: 86.2 FL (ref 78–100)
NRBC BLD MANUAL-RTO: 0 /100 WBC
PLATELET # BLD AUTO: 265 K/MCL (ref 140–450)
POTASSIUM SERPL-SCNC: 4.5 MMOL/L (ref 3.4–5.1)
RBC # BLD: 3.25 MIL/MCL (ref 4.5–5.9)
SODIUM SERPL-SCNC: 139 MMOL/L (ref 135–145)
WBC # BLD: 7.1 K/MCL (ref 4.2–11)

## 2024-09-22 PROCEDURE — 10006031 HB ROOM CHARGE TELEMETRY

## 2024-09-22 PROCEDURE — 85027 COMPLETE CBC AUTOMATED: CPT | Performed by: INTERNAL MEDICINE

## 2024-09-22 PROCEDURE — 10002803 HB RX 637: Performed by: INTERNAL MEDICINE

## 2024-09-22 PROCEDURE — 80048 BASIC METABOLIC PNL TOTAL CA: CPT | Performed by: INTERNAL MEDICINE

## 2024-09-22 PROCEDURE — 97110 THERAPEUTIC EXERCISES: CPT

## 2024-09-22 PROCEDURE — 10004651 HB RX, NO CHARGE ITEM: Performed by: INTERNAL MEDICINE

## 2024-09-22 PROCEDURE — 36415 COLL VENOUS BLD VENIPUNCTURE: CPT | Performed by: INTERNAL MEDICINE

## 2024-09-22 PROCEDURE — 83735 ASSAY OF MAGNESIUM: CPT | Performed by: INTERNAL MEDICINE

## 2024-09-22 PROCEDURE — 10002803 HB RX 637: Performed by: NURSE PRACTITIONER

## 2024-09-22 RX ADMIN — METOPROLOL TARTRATE 25 MG: 25 TABLET, FILM COATED ORAL at 08:40

## 2024-09-22 RX ADMIN — ATORVASTATIN CALCIUM 40 MG: 40 TABLET, FILM COATED ORAL at 08:40

## 2024-09-22 RX ADMIN — METOPROLOL TARTRATE 25 MG: 25 TABLET, FILM COATED ORAL at 21:22

## 2024-09-22 RX ADMIN — ACETAMINOPHEN 650 MG: 325 TABLET ORAL at 09:02

## 2024-09-22 ASSESSMENT — PAIN SCALES - PAIN ASSESSMENT IN ADVANCED DEMENTIA (PAINAD)
CONSOLABILITY: NO NEED TO CONSOLE
NEGVOCALIZATION: OCCASIONAL MOAN OR GROAN, LOW LEVELS OF SPEECH WITH A NEGATIVE OR DISAPPROVING QUALITY
BODYLANGUAGE: TENSE, DISTRESSED, FIDGETING
FACIALEXPRESSION: SMILING OR INEXPRESSIVE
BODYLANGUAGE: TENSE, DISTRESSED, FIDGETING
BREATHING: NORMAL
CONSOLABILITY: NO NEED TO CONSOLE
NEGVOCALIZATION: OCCASIONAL MOAN OR GROAN, LOW LEVELS OF SPEECH WITH A NEGATIVE OR DISAPPROVING QUALITY
TOTALSCORE: 3
TOTALSCORE: 2
BREATHING: OCCASIONAL LABORED BREATHING, SHORT PERIOD OF HYPERVENTILATION
FACIALEXPRESSION: SMILING OR INEXPRESSIVE

## 2024-09-22 ASSESSMENT — COGNITIVE AND FUNCTIONAL STATUS - GENERAL
BASIC_MOBILITY_CONVERTED_SCORE: 19.39
BASIC_MOBILITY_RAW_SCORE: 7

## 2024-09-23 LAB
ANION GAP SERPL CALC-SCNC: 8 MMOL/L (ref 7–19)
BUN SERPL-MCNC: 32 MG/DL (ref 6–20)
BUN/CREAT SERPL: 28 (ref 7–25)
CALCIUM SERPL-MCNC: 8.7 MG/DL (ref 8.4–10.2)
CHLORIDE SERPL-SCNC: 109 MMOL/L (ref 97–110)
CO2 SERPL-SCNC: 28 MMOL/L (ref 21–32)
CREAT SERPL-MCNC: 1.14 MG/DL (ref 0.67–1.17)
DEPRECATED RDW RBC: 64.2 FL (ref 39–50)
EGFRCR SERPLBLD CKD-EPI 2021: 63 ML/MIN/{1.73_M2}
ERYTHROCYTE [DISTWIDTH] IN BLOOD: 20.1 % (ref 11–15)
FASTING DURATION TIME PATIENT: ABNORMAL H
GLUCOSE BLDC GLUCOMTR-MCNC: 183 MG/DL (ref 70–99)
GLUCOSE BLDC GLUCOMTR-MCNC: 188 MG/DL (ref 70–99)
GLUCOSE BLDC GLUCOMTR-MCNC: 192 MG/DL (ref 70–99)
GLUCOSE SERPL-MCNC: 195 MG/DL (ref 70–99)
HCT VFR BLD CALC: 28.5 % (ref 39–51)
HGB BLD-MCNC: 8.5 G/DL (ref 13–17)
MAGNESIUM SERPL-MCNC: 1.9 MG/DL (ref 1.7–2.4)
MCH RBC QN AUTO: 26.2 PG (ref 26–34)
MCHC RBC AUTO-ENTMCNC: 29.8 G/DL (ref 32–36.5)
MCV RBC AUTO: 88 FL (ref 78–100)
NRBC BLD MANUAL-RTO: 0 /100 WBC
PLATELET # BLD AUTO: 276 K/MCL (ref 140–450)
POTASSIUM SERPL-SCNC: 4.5 MMOL/L (ref 3.4–5.1)
RBC # BLD: 3.24 MIL/MCL (ref 4.5–5.9)
SODIUM SERPL-SCNC: 140 MMOL/L (ref 135–145)
WBC # BLD: 8.7 K/MCL (ref 4.2–11)

## 2024-09-23 PROCEDURE — 99233 SBSQ HOSP IP/OBS HIGH 50: CPT | Performed by: HOSPITALIST

## 2024-09-23 PROCEDURE — 99233 SBSQ HOSP IP/OBS HIGH 50: CPT | Performed by: NURSE PRACTITIONER

## 2024-09-23 PROCEDURE — 83735 ASSAY OF MAGNESIUM: CPT | Performed by: INTERNAL MEDICINE

## 2024-09-23 PROCEDURE — 97530 THERAPEUTIC ACTIVITIES: CPT

## 2024-09-23 PROCEDURE — 10006031 HB ROOM CHARGE TELEMETRY

## 2024-09-23 PROCEDURE — 10002803 HB RX 637: Performed by: NURSE PRACTITIONER

## 2024-09-23 PROCEDURE — 36415 COLL VENOUS BLD VENIPUNCTURE: CPT | Performed by: INTERNAL MEDICINE

## 2024-09-23 PROCEDURE — 85027 COMPLETE CBC AUTOMATED: CPT | Performed by: INTERNAL MEDICINE

## 2024-09-23 PROCEDURE — 92526 ORAL FUNCTION THERAPY: CPT

## 2024-09-23 PROCEDURE — 10002803 HB RX 637: Performed by: INTERNAL MEDICINE

## 2024-09-23 PROCEDURE — 10004651 HB RX, NO CHARGE ITEM: Performed by: INTERNAL MEDICINE

## 2024-09-23 PROCEDURE — 97110 THERAPEUTIC EXERCISES: CPT

## 2024-09-23 PROCEDURE — 80048 BASIC METABOLIC PNL TOTAL CA: CPT | Performed by: INTERNAL MEDICINE

## 2024-09-23 RX ADMIN — METOPROLOL TARTRATE 25 MG: 25 TABLET, FILM COATED ORAL at 20:06

## 2024-09-23 RX ADMIN — METOPROLOL TARTRATE 25 MG: 25 TABLET, FILM COATED ORAL at 10:19

## 2024-09-23 RX ADMIN — ATORVASTATIN CALCIUM 40 MG: 40 TABLET, FILM COATED ORAL at 10:19

## 2024-09-23 RX ADMIN — ACETAMINOPHEN 650 MG: 325 TABLET ORAL at 20:39

## 2024-09-23 ASSESSMENT — PAIN SCALES - PAIN ASSESSMENT IN ADVANCED DEMENTIA (PAINAD)
BREATHING: OCCASIONAL LABORED BREATHING, SHORT PERIOD OF HYPERVENTILATION
BODYLANGUAGE: RELAXED
TOTALSCORE: 3
NEGVOCALIZATION: REPEATED TROUBLED CALLING OUT. LOUD MOANING OR GROANING. CRYING
FACIALEXPRESSION: SMILING OR INEXPRESSIVE
FACIALEXPRESSION: SAD. FRIGHTENED. FROWNING
TOTALSCORE: 8
FACIALEXPRESSION: SMILING OR INEXPRESSIVE
CONSOLABILITY: NO NEED TO CONSOLE
BODYLANGUAGE: RIGID, FISTS CLINCHED, KNEES PULLED UP. PUSHING OR PULLING AWAY, STRIKES OUT
NEGVOCALIZATION: OCCASIONAL MOAN OR GROAN, LOW LEVELS OF SPEECH WITH A NEGATIVE OR DISAPPROVING QUALITY
BODYLANGUAGE: TENSE, DISTRESSED, FIDGETING
BREATHING: OCCASIONAL LABORED BREATHING, SHORT PERIOD OF HYPERVENTILATION
BREATHING: OCCASIONAL LABORED BREATHING, SHORT PERIOD OF HYPERVENTILATION
CONSOLABILITY: UNABLE TO CONSOLE, DISTRACT OR REASSURE
CONSOLABILITY: NO NEED TO CONSOLE
TOTALSCORE: 1

## 2024-09-23 ASSESSMENT — COGNITIVE AND FUNCTIONAL STATUS - GENERAL
HELP NEEDED DRESSING REGULAR LOWER BODY CLOTHING: TOTAL
HELP NEEDED FOR TOILETING: TOTAL
HELP NEEDED FOR BATHING: TOTAL
HELP NEEDED DRESSING REGULAR UPPER BODY CLOTHING: TOTAL
DAILY_ACTIVITY_RAW_SCORE: 6
HELP NEEDED FOR PERSONAL GROOMING: TOTAL
DAILY_ACTIVITY_CONVERTED_SCORE: 17.06

## 2024-09-24 LAB
ANION GAP SERPL CALC-SCNC: 5 MMOL/L (ref 7–19)
BUN SERPL-MCNC: 34 MG/DL (ref 6–20)
BUN/CREAT SERPL: 30 (ref 7–25)
CALCIUM SERPL-MCNC: 9.2 MG/DL (ref 8.4–10.2)
CHLORIDE SERPL-SCNC: 111 MMOL/L (ref 97–110)
CO2 SERPL-SCNC: 26 MMOL/L (ref 21–32)
CREAT SERPL-MCNC: 1.15 MG/DL (ref 0.67–1.17)
DEPRECATED RDW RBC: 65.1 FL (ref 39–50)
EGFRCR SERPLBLD CKD-EPI 2021: 63 ML/MIN/{1.73_M2}
ERYTHROCYTE [DISTWIDTH] IN BLOOD: 20.3 % (ref 11–15)
FASTING DURATION TIME PATIENT: ABNORMAL H
GLUCOSE SERPL-MCNC: 180 MG/DL (ref 70–99)
HCT VFR BLD CALC: 29.9 % (ref 39–51)
HGB BLD-MCNC: 9 G/DL (ref 13–17)
MCH RBC QN AUTO: 26.6 PG (ref 26–34)
MCHC RBC AUTO-ENTMCNC: 30.1 G/DL (ref 32–36.5)
MCV RBC AUTO: 88.5 FL (ref 78–100)
NRBC BLD MANUAL-RTO: 0 /100 WBC
PLATELET # BLD AUTO: 295 K/MCL (ref 140–450)
POTASSIUM SERPL-SCNC: 4.7 MMOL/L (ref 3.4–5.1)
RBC # BLD: 3.38 MIL/MCL (ref 4.5–5.9)
SODIUM SERPL-SCNC: 137 MMOL/L (ref 135–145)
WBC # BLD: 8.7 K/MCL (ref 4.2–11)

## 2024-09-24 PROCEDURE — 10004651 HB RX, NO CHARGE ITEM: Performed by: INTERNAL MEDICINE

## 2024-09-24 PROCEDURE — 92526 ORAL FUNCTION THERAPY: CPT

## 2024-09-24 PROCEDURE — 85027 COMPLETE CBC AUTOMATED: CPT | Performed by: INTERNAL MEDICINE

## 2024-09-24 PROCEDURE — 10002803 HB RX 637: Performed by: INTERNAL MEDICINE

## 2024-09-24 PROCEDURE — 36415 COLL VENOUS BLD VENIPUNCTURE: CPT | Performed by: INTERNAL MEDICINE

## 2024-09-24 PROCEDURE — 99233 SBSQ HOSP IP/OBS HIGH 50: CPT | Performed by: NURSE PRACTITIONER

## 2024-09-24 PROCEDURE — 10006031 HB ROOM CHARGE TELEMETRY

## 2024-09-24 PROCEDURE — 80048 BASIC METABOLIC PNL TOTAL CA: CPT | Performed by: INTERNAL MEDICINE

## 2024-09-24 PROCEDURE — 10002803 HB RX 637: Performed by: NURSE PRACTITIONER

## 2024-09-24 RX ORDER — QUETIAPINE FUMARATE 25 MG/1
25 TABLET, FILM COATED ORAL EVERY 12 HOURS SCHEDULED
Status: DISCONTINUED | OUTPATIENT
Start: 2024-09-24 | End: 2024-09-26

## 2024-09-24 RX ORDER — QUETIAPINE FUMARATE 25 MG/1
25 TABLET, FILM COATED ORAL DAILY PRN
Status: DISCONTINUED | OUTPATIENT
Start: 2024-09-24 | End: 2024-09-26

## 2024-09-24 RX ADMIN — QUETIAPINE FUMARATE 25 MG: 25 TABLET, FILM COATED ORAL at 13:45

## 2024-09-24 RX ADMIN — METOPROLOL TARTRATE 25 MG: 25 TABLET, FILM COATED ORAL at 09:34

## 2024-09-24 RX ADMIN — QUETIAPINE FUMARATE 25 MG: 25 TABLET, FILM COATED ORAL at 20:17

## 2024-09-24 RX ADMIN — ATORVASTATIN CALCIUM 40 MG: 40 TABLET, FILM COATED ORAL at 09:34

## 2024-09-24 RX ADMIN — ACETAMINOPHEN 650 MG: 325 TABLET ORAL at 00:45

## 2024-09-24 RX ADMIN — METOPROLOL TARTRATE 25 MG: 25 TABLET, FILM COATED ORAL at 20:17

## 2024-09-24 ASSESSMENT — PAIN SCALES - PAIN ASSESSMENT IN ADVANCED DEMENTIA (PAINAD)
BREATHING: NORMAL
FACIALEXPRESSION: SMILING OR INEXPRESSIVE
BODYLANGUAGE: RIGID, FISTS CLINCHED, KNEES PULLED UP. PUSHING OR PULLING AWAY, STRIKES OUT
FACIALEXPRESSION: SAD. FRIGHTENED. FROWNING
BREATHING: OCCASIONAL LABORED BREATHING, SHORT PERIOD OF HYPERVENTILATION
TOTALSCORE: 4
BREATHING: OCCASIONAL LABORED BREATHING, SHORT PERIOD OF HYPERVENTILATION
CONSOLABILITY: NO NEED TO CONSOLE
TOTALSCORE: 2
NEGVOCALIZATION: REPEATED TROUBLED CALLING OUT. LOUD MOANING OR GROANING. CRYING
FACIALEXPRESSION: SAD. FRIGHTENED. FROWNING
BODYLANGUAGE: TENSE, DISTRESSED, FIDGETING
CONSOLABILITY: UNABLE TO CONSOLE, DISTRACT OR REASSURE
BODYLANGUAGE: TENSE, DISTRESSED, FIDGETING
CONSOLABILITY: DISTRACTED OR REASSURED BY VOICE OR TOUCH
TOTALSCORE: 8
NEGVOCALIZATION: OCCASIONAL MOAN OR GROAN, LOW LEVELS OF SPEECH WITH A NEGATIVE OR DISAPPROVING QUALITY

## 2024-09-24 ASSESSMENT — PAIN SCALES - GENERAL: PAINLEVEL_OUTOF10: 0

## 2024-09-25 LAB
ANION GAP SERPL CALC-SCNC: 6 MMOL/L (ref 7–19)
BUN SERPL-MCNC: 36 MG/DL (ref 6–20)
BUN/CREAT SERPL: 34 (ref 7–25)
CALCIUM SERPL-MCNC: 9 MG/DL (ref 8.4–10.2)
CHLORIDE SERPL-SCNC: 107 MMOL/L (ref 97–110)
CO2 SERPL-SCNC: 32 MMOL/L (ref 21–32)
CREAT SERPL-MCNC: 1.07 MG/DL (ref 0.67–1.17)
DEPRECATED RDW RBC: 62.4 FL (ref 39–50)
EGFRCR SERPLBLD CKD-EPI 2021: 68 ML/MIN/{1.73_M2}
ERYTHROCYTE [DISTWIDTH] IN BLOOD: 19.6 % (ref 11–15)
FASTING DURATION TIME PATIENT: ABNORMAL H
GLUCOSE BLDC GLUCOMTR-MCNC: 151 MG/DL (ref 70–99)
GLUCOSE BLDC GLUCOMTR-MCNC: 158 MG/DL (ref 70–99)
GLUCOSE BLDC GLUCOMTR-MCNC: 168 MG/DL (ref 70–99)
GLUCOSE BLDC GLUCOMTR-MCNC: 189 MG/DL (ref 70–99)
GLUCOSE SERPL-MCNC: 182 MG/DL (ref 70–99)
HCT VFR BLD CALC: 27 % (ref 39–51)
HGB BLD-MCNC: 8.1 G/DL (ref 13–17)
MAGNESIUM SERPL-MCNC: 2 MG/DL (ref 1.7–2.4)
MCH RBC QN AUTO: 26.2 PG (ref 26–34)
MCHC RBC AUTO-ENTMCNC: 30 G/DL (ref 32–36.5)
MCV RBC AUTO: 87.4 FL (ref 78–100)
NRBC BLD MANUAL-RTO: 0 /100 WBC
PLATELET # BLD AUTO: 264 K/MCL (ref 140–450)
POTASSIUM SERPL-SCNC: 4.9 MMOL/L (ref 3.4–5.1)
POTASSIUM SERPL-SCNC: 5.2 MMOL/L (ref 3.4–5.1)
RBC # BLD: 3.09 MIL/MCL (ref 4.5–5.9)
SODIUM SERPL-SCNC: 140 MMOL/L (ref 135–145)
WBC # BLD: 8.2 K/MCL (ref 4.2–11)

## 2024-09-25 PROCEDURE — 99233 SBSQ HOSP IP/OBS HIGH 50: CPT | Performed by: HOSPITALIST

## 2024-09-25 PROCEDURE — 97116 GAIT TRAINING THERAPY: CPT

## 2024-09-25 PROCEDURE — 10004651 HB RX, NO CHARGE ITEM: Performed by: INTERNAL MEDICINE

## 2024-09-25 PROCEDURE — 97535 SELF CARE MNGMENT TRAINING: CPT

## 2024-09-25 PROCEDURE — 10002803 HB RX 637: Performed by: INTERNAL MEDICINE

## 2024-09-25 PROCEDURE — 80048 BASIC METABOLIC PNL TOTAL CA: CPT | Performed by: INTERNAL MEDICINE

## 2024-09-25 PROCEDURE — 85027 COMPLETE CBC AUTOMATED: CPT | Performed by: INTERNAL MEDICINE

## 2024-09-25 PROCEDURE — 84132 ASSAY OF SERUM POTASSIUM: CPT | Performed by: INTERNAL MEDICINE

## 2024-09-25 PROCEDURE — 10006031 HB ROOM CHARGE TELEMETRY

## 2024-09-25 PROCEDURE — 83735 ASSAY OF MAGNESIUM: CPT | Performed by: INTERNAL MEDICINE

## 2024-09-25 PROCEDURE — 36415 COLL VENOUS BLD VENIPUNCTURE: CPT | Performed by: INTERNAL MEDICINE

## 2024-09-25 PROCEDURE — 97530 THERAPEUTIC ACTIVITIES: CPT

## 2024-09-25 PROCEDURE — 10002803 HB RX 637: Performed by: NURSE PRACTITIONER

## 2024-09-25 PROCEDURE — 99233 SBSQ HOSP IP/OBS HIGH 50: CPT | Performed by: NURSE PRACTITIONER

## 2024-09-25 RX ADMIN — QUETIAPINE FUMARATE 25 MG: 25 TABLET, FILM COATED ORAL at 08:17

## 2024-09-25 RX ADMIN — ATORVASTATIN CALCIUM 40 MG: 40 TABLET, FILM COATED ORAL at 08:17

## 2024-09-25 RX ADMIN — METOPROLOL TARTRATE 25 MG: 25 TABLET, FILM COATED ORAL at 21:53

## 2024-09-25 RX ADMIN — METOPROLOL TARTRATE 25 MG: 25 TABLET, FILM COATED ORAL at 08:17

## 2024-09-25 RX ADMIN — ACETAMINOPHEN 650 MG: 325 TABLET ORAL at 22:09

## 2024-09-25 RX ADMIN — ACETAMINOPHEN 650 MG: 325 TABLET ORAL at 18:26

## 2024-09-25 RX ADMIN — QUETIAPINE FUMARATE 25 MG: 25 TABLET, FILM COATED ORAL at 21:53

## 2024-09-25 ASSESSMENT — PAIN SCALES - PAIN ASSESSMENT IN ADVANCED DEMENTIA (PAINAD)
BREATHING: NORMAL
CONSOLABILITY: NO NEED TO CONSOLE
BODYLANGUAGE: TENSE, DISTRESSED, FIDGETING
FACIALEXPRESSION: SMILING OR INEXPRESSIVE
BREATHING: NORMAL
CONSOLABILITY: DISTRACTED OR REASSURED BY VOICE OR TOUCH
FACIALEXPRESSION: SMILING OR INEXPRESSIVE
CONSOLABILITY: DISTRACTED OR REASSURED BY VOICE OR TOUCH
BREATHING: NORMAL
BREATHING: OCCASIONAL LABORED BREATHING, SHORT PERIOD OF HYPERVENTILATION
NEGVOCALIZATION: REPEATED TROUBLED CALLING OUT. LOUD MOANING OR GROANING. CRYING
TOTALSCORE: 7
BREATHING: NORMAL
FACIALEXPRESSION: FACIAL GRIMACING
BODYLANGUAGE: TENSE, DISTRESSED, FIDGETING
NEGVOCALIZATION: REPEATED TROUBLED CALLING OUT. LOUD MOANING OR GROANING. CRYING
CONSOLABILITY: DISTRACTED OR REASSURED BY VOICE OR TOUCH
BREATHING: NORMAL
FACIALEXPRESSION: SMILING OR INEXPRESSIVE
FACIALEXPRESSION: FACIAL GRIMACING
BODYLANGUAGE: TENSE, DISTRESSED, FIDGETING
TOTALSCORE: 2
FACIALEXPRESSION: SMILING OR INEXPRESSIVE
TOTALSCORE: 2
BODYLANGUAGE: RELAXED
TOTALSCORE: 0
CONSOLABILITY: DISTRACTED OR REASSURED BY VOICE OR TOUCH
CONSOLABILITY: DISTRACTED OR REASSURED BY VOICE OR TOUCH
TOTALSCORE: 2
BODYLANGUAGE: TENSE, DISTRESSED, FIDGETING
BODYLANGUAGE: TENSE, DISTRESSED, FIDGETING
TOTALSCORE: 6

## 2024-09-25 ASSESSMENT — ENCOUNTER SYMPTOMS: PAIN SEVERITY NOW: 0

## 2024-09-25 ASSESSMENT — COGNITIVE AND FUNCTIONAL STATUS - GENERAL
BASIC_MOBILITY_CONVERTED_SCORE: 30.25
HELP NEEDED FOR BATHING: TOTAL
HELP NEEDED FOR PERSONAL GROOMING: A LOT
DAILY_ACTIVITY_RAW_SCORE: 8
HELP NEEDED DRESSING REGULAR UPPER BODY CLOTHING: A LOT
DAILY_ACTIVITY_CONVERTED_SCORE: 22.86
BASIC_MOBILITY_RAW_SCORE: 11
HELP NEEDED FOR TOILETING: TOTAL
HELP NEEDED DRESSING REGULAR LOWER BODY CLOTHING: TOTAL

## 2024-09-25 ASSESSMENT — ACTIVITIES OF DAILY LIVING (ADL): HOME_MANAGEMENT_TIME_ENTRY: 30

## 2024-09-26 LAB
ANION GAP SERPL CALC-SCNC: 8 MMOL/L (ref 7–19)
BUN SERPL-MCNC: 39 MG/DL (ref 6–20)
BUN/CREAT SERPL: 36 (ref 7–25)
CALCIUM SERPL-MCNC: 9.1 MG/DL (ref 8.4–10.2)
CHLORIDE SERPL-SCNC: 106 MMOL/L (ref 97–110)
CO2 SERPL-SCNC: 30 MMOL/L (ref 21–32)
CREAT SERPL-MCNC: 1.09 MG/DL (ref 0.67–1.17)
DEPRECATED RDW RBC: 59.7 FL (ref 39–50)
EGFRCR SERPLBLD CKD-EPI 2021: 67 ML/MIN/{1.73_M2}
ERYTHROCYTE [DISTWIDTH] IN BLOOD: 19.5 % (ref 11–15)
FASTING DURATION TIME PATIENT: ABNORMAL H
GLUCOSE BLDC GLUCOMTR-MCNC: 157 MG/DL (ref 70–99)
GLUCOSE BLDC GLUCOMTR-MCNC: 172 MG/DL (ref 70–99)
GLUCOSE BLDC GLUCOMTR-MCNC: 190 MG/DL (ref 70–99)
GLUCOSE SERPL-MCNC: 167 MG/DL (ref 70–99)
HCT VFR BLD CALC: 26.9 % (ref 39–51)
HGB BLD-MCNC: 8.1 G/DL (ref 13–17)
MAGNESIUM SERPL-MCNC: 2 MG/DL (ref 1.7–2.4)
MCH RBC QN AUTO: 25.8 PG (ref 26–34)
MCHC RBC AUTO-ENTMCNC: 30.1 G/DL (ref 32–36.5)
MCV RBC AUTO: 85.7 FL (ref 78–100)
NRBC BLD MANUAL-RTO: 0 /100 WBC
PLATELET # BLD AUTO: 256 K/MCL (ref 140–450)
POTASSIUM SERPL-SCNC: 4.7 MMOL/L (ref 3.4–5.1)
RAINBOW EXTRA TUBES HOLD SPECIMEN: NORMAL
RAINBOW EXTRA TUBES HOLD SPECIMEN: NORMAL
RBC # BLD: 3.14 MIL/MCL (ref 4.5–5.9)
SODIUM SERPL-SCNC: 139 MMOL/L (ref 135–145)
WBC # BLD: 6.4 K/MCL (ref 4.2–11)

## 2024-09-26 PROCEDURE — 85027 COMPLETE CBC AUTOMATED: CPT | Performed by: INTERNAL MEDICINE

## 2024-09-26 PROCEDURE — 36415 COLL VENOUS BLD VENIPUNCTURE: CPT | Performed by: INTERNAL MEDICINE

## 2024-09-26 PROCEDURE — 10002803 HB RX 637: Performed by: INTERNAL MEDICINE

## 2024-09-26 PROCEDURE — 10002803 HB RX 637: Performed by: PSYCHIATRY & NEUROLOGY

## 2024-09-26 PROCEDURE — 10004651 HB RX, NO CHARGE ITEM: Performed by: INTERNAL MEDICINE

## 2024-09-26 PROCEDURE — 10002803 HB RX 637: Performed by: NURSE PRACTITIONER

## 2024-09-26 PROCEDURE — 10006031 HB ROOM CHARGE TELEMETRY

## 2024-09-26 PROCEDURE — 10002803 HB RX 637: Performed by: HOSPITALIST

## 2024-09-26 PROCEDURE — 80048 BASIC METABOLIC PNL TOTAL CA: CPT | Performed by: INTERNAL MEDICINE

## 2024-09-26 PROCEDURE — 99233 SBSQ HOSP IP/OBS HIGH 50: CPT | Performed by: NURSE PRACTITIONER

## 2024-09-26 PROCEDURE — 83735 ASSAY OF MAGNESIUM: CPT | Performed by: INTERNAL MEDICINE

## 2024-09-26 RX ORDER — LANOLIN ALCOHOL/MO/W.PET/CERES
9 CREAM (GRAM) TOPICAL NIGHTLY
Status: DISCONTINUED | OUTPATIENT
Start: 2024-09-26 | End: 2024-10-01

## 2024-09-26 RX ORDER — QUETIAPINE FUMARATE 25 MG/1
50 TABLET, FILM COATED ORAL NIGHTLY
Status: DISCONTINUED | OUTPATIENT
Start: 2024-09-26 | End: 2024-10-01

## 2024-09-26 RX ORDER — QUETIAPINE FUMARATE 25 MG/1
25 TABLET, FILM COATED ORAL ONCE
Status: COMPLETED | OUTPATIENT
Start: 2024-09-26 | End: 2024-09-26

## 2024-09-26 RX ORDER — QUETIAPINE FUMARATE 25 MG/1
25 TABLET, FILM COATED ORAL 2 TIMES DAILY PRN
Status: DISCONTINUED | OUTPATIENT
Start: 2024-09-27 | End: 2024-10-01

## 2024-09-26 RX ORDER — QUETIAPINE FUMARATE 25 MG/1
25 TABLET, FILM COATED ORAL
Status: DISCONTINUED | OUTPATIENT
Start: 2024-09-26 | End: 2024-10-04 | Stop reason: HOSPADM

## 2024-09-26 RX ORDER — QUETIAPINE FUMARATE 25 MG/1
25 TABLET, FILM COATED ORAL DAILY
Status: DISCONTINUED | OUTPATIENT
Start: 2024-09-27 | End: 2024-09-26

## 2024-09-26 RX ORDER — QUETIAPINE FUMARATE 25 MG/1
50 TABLET, FILM COATED ORAL
Status: DISCONTINUED | OUTPATIENT
Start: 2024-09-26 | End: 2024-09-26

## 2024-09-26 RX ADMIN — METOPROLOL TARTRATE 25 MG: 25 TABLET, FILM COATED ORAL at 08:39

## 2024-09-26 RX ADMIN — ACETAMINOPHEN 650 MG: 325 TABLET ORAL at 21:49

## 2024-09-26 RX ADMIN — METOPROLOL TARTRATE 25 MG: 25 TABLET, FILM COATED ORAL at 21:50

## 2024-09-26 RX ADMIN — ATORVASTATIN CALCIUM 40 MG: 40 TABLET, FILM COATED ORAL at 08:39

## 2024-09-26 RX ADMIN — QUETIAPINE FUMARATE 50 MG: 25 TABLET, FILM COATED ORAL at 21:50

## 2024-09-26 RX ADMIN — QUETIAPINE FUMARATE 25 MG: 25 TABLET, FILM COATED ORAL at 08:39

## 2024-09-26 RX ADMIN — QUETIAPINE FUMARATE 25 MG: 25 TABLET, FILM COATED ORAL at 17:03

## 2024-09-26 RX ADMIN — Medication 9 MG: at 21:50

## 2024-09-26 RX ADMIN — QUETIAPINE FUMARATE 25 MG: 25 TABLET, FILM COATED ORAL at 23:20

## 2024-09-26 ASSESSMENT — PAIN SCALES - PAIN ASSESSMENT IN ADVANCED DEMENTIA (PAINAD)
CONSOLABILITY: DISTRACTED OR REASSURED BY VOICE OR TOUCH
BREATHING: OCCASIONAL LABORED BREATHING, SHORT PERIOD OF HYPERVENTILATION
BODYLANGUAGE: TENSE, DISTRESSED, FIDGETING
BREATHING: OCCASIONAL LABORED BREATHING, SHORT PERIOD OF HYPERVENTILATION
FACIALEXPRESSION: SMILING OR INEXPRESSIVE
CONSOLABILITY: DISTRACTED OR REASSURED BY VOICE OR TOUCH
NEGVOCALIZATION: OCCASIONAL MOAN OR GROAN, LOW LEVELS OF SPEECH WITH A NEGATIVE OR DISAPPROVING QUALITY
NEGVOCALIZATION: OCCASIONAL MOAN OR GROAN, LOW LEVELS OF SPEECH WITH A NEGATIVE OR DISAPPROVING QUALITY
TOTALSCORE: 4
FACIALEXPRESSION: FACIAL GRIMACING
FACIALEXPRESSION: SMILING OR INEXPRESSIVE
BREATHING: NORMAL
TOTALSCORE: 4
BODYLANGUAGE: TENSE, DISTRESSED, FIDGETING

## 2024-09-27 ENCOUNTER — TRANSCRIBE ORDERS (OUTPATIENT)
Dept: NEUROLOGY | Age: 84
End: 2024-09-27

## 2024-09-27 DIAGNOSIS — R69 ILL-DEFINED CONDITION: Primary | ICD-10-CM

## 2024-09-27 LAB
GLUCOSE BLDC GLUCOMTR-MCNC: 153 MG/DL (ref 70–99)
GLUCOSE BLDC GLUCOMTR-MCNC: 176 MG/DL (ref 70–99)
GLUCOSE BLDC GLUCOMTR-MCNC: 195 MG/DL (ref 70–99)
GLUCOSE BLDC GLUCOMTR-MCNC: 196 MG/DL (ref 70–99)
GLUCOSE BLDC GLUCOMTR-MCNC: 220 MG/DL (ref 70–99)

## 2024-09-27 PROCEDURE — 10006031 HB ROOM CHARGE TELEMETRY

## 2024-09-27 PROCEDURE — 99233 SBSQ HOSP IP/OBS HIGH 50: CPT | Performed by: NURSE PRACTITIONER

## 2024-09-27 PROCEDURE — 97535 SELF CARE MNGMENT TRAINING: CPT

## 2024-09-27 PROCEDURE — 10002803 HB RX 637: Performed by: NURSE PRACTITIONER

## 2024-09-27 PROCEDURE — G0316 PROLONGED IP OR OBS CARE EM SERVICE BEYOND PRIM SERVICE EA ADD 15 MIN: HCPCS | Performed by: NURSE PRACTITIONER

## 2024-09-27 PROCEDURE — 10002803 HB RX 637: Performed by: INTERNAL MEDICINE

## 2024-09-27 PROCEDURE — 10002803 HB RX 637: Performed by: PSYCHIATRY & NEUROLOGY

## 2024-09-27 RX ADMIN — METOPROLOL TARTRATE 25 MG: 25 TABLET, FILM COATED ORAL at 20:13

## 2024-09-27 RX ADMIN — Medication 9 MG: at 20:13

## 2024-09-27 RX ADMIN — METOPROLOL TARTRATE 25 MG: 25 TABLET, FILM COATED ORAL at 09:18

## 2024-09-27 RX ADMIN — QUETIAPINE FUMARATE 25 MG: 25 TABLET, FILM COATED ORAL at 22:09

## 2024-09-27 RX ADMIN — QUETIAPINE FUMARATE 50 MG: 25 TABLET, FILM COATED ORAL at 20:13

## 2024-09-27 RX ADMIN — ATORVASTATIN CALCIUM 40 MG: 40 TABLET, FILM COATED ORAL at 09:18

## 2024-09-27 ASSESSMENT — COGNITIVE AND FUNCTIONAL STATUS - GENERAL
DAILY_ACTIVITY_RAW_SCORE: 6
HELP NEEDED FOR BATHING: TOTAL
HELP NEEDED DRESSING REGULAR UPPER BODY CLOTHING: TOTAL
HELP NEEDED FOR TOILETING: TOTAL
DAILY_ACTIVITY_CONVERTED_SCORE: 17.06
HELP NEEDED DRESSING REGULAR LOWER BODY CLOTHING: TOTAL
HELP NEEDED FOR PERSONAL GROOMING: TOTAL

## 2024-09-27 ASSESSMENT — PAIN SCALES - PAIN ASSESSMENT IN ADVANCED DEMENTIA (PAINAD)
FACIALEXPRESSION: SMILING OR INEXPRESSIVE
BREATHING: NORMAL
TOTALSCORE: 3
BODYLANGUAGE: TENSE, DISTRESSED, FIDGETING
NEGVOCALIZATION: OCCASIONAL MOAN OR GROAN, LOW LEVELS OF SPEECH WITH A NEGATIVE OR DISAPPROVING QUALITY
BREATHING: NORMAL
TOTALSCORE: 5
FACIALEXPRESSION: FACIAL GRIMACING
CONSOLABILITY: DISTRACTED OR REASSURED BY VOICE OR TOUCH
NEGVOCALIZATION: OCCASIONAL MOAN OR GROAN, LOW LEVELS OF SPEECH WITH A NEGATIVE OR DISAPPROVING QUALITY
CONSOLABILITY: DISTRACTED OR REASSURED BY VOICE OR TOUCH
BODYLANGUAGE: TENSE, DISTRESSED, FIDGETING

## 2024-09-27 ASSESSMENT — ACTIVITIES OF DAILY LIVING (ADL): HOME_MANAGEMENT_TIME_ENTRY: 23

## 2024-09-28 LAB — GLUCOSE BLDC GLUCOMTR-MCNC: 219 MG/DL (ref 70–99)

## 2024-09-28 PROCEDURE — 10002803 HB RX 637: Performed by: INTERNAL MEDICINE

## 2024-09-28 PROCEDURE — 10006031 HB ROOM CHARGE TELEMETRY

## 2024-09-28 PROCEDURE — 10002803 HB RX 637: Performed by: PSYCHIATRY & NEUROLOGY

## 2024-09-28 PROCEDURE — 10002803 HB RX 637: Performed by: HOSPITALIST

## 2024-09-28 PROCEDURE — 10002803 HB RX 637: Performed by: NURSE PRACTITIONER

## 2024-09-28 RX ADMIN — ATORVASTATIN CALCIUM 40 MG: 40 TABLET, FILM COATED ORAL at 08:50

## 2024-09-28 RX ADMIN — METOPROLOL TARTRATE 25 MG: 25 TABLET, FILM COATED ORAL at 20:12

## 2024-09-28 RX ADMIN — QUETIAPINE FUMARATE 25 MG: 25 TABLET, FILM COATED ORAL at 08:50

## 2024-09-28 RX ADMIN — Medication 9 MG: at 20:13

## 2024-09-28 RX ADMIN — METOPROLOL TARTRATE 25 MG: 25 TABLET, FILM COATED ORAL at 08:50

## 2024-09-28 RX ADMIN — QUETIAPINE FUMARATE 50 MG: 25 TABLET, FILM COATED ORAL at 20:12

## 2024-09-28 ASSESSMENT — PAIN SCALES - PAIN ASSESSMENT IN ADVANCED DEMENTIA (PAINAD)
FACIALEXPRESSION: SMILING OR INEXPRESSIVE
TOTALSCORE: 3
BODYLANGUAGE: TENSE, DISTRESSED, FIDGETING
FACIALEXPRESSION: SMILING OR INEXPRESSIVE
CONSOLABILITY: UNABLE TO CONSOLE, DISTRACT OR REASSURE
TOTALSCORE: 3
BREATHING: NORMAL
CONSOLABILITY: UNABLE TO CONSOLE, DISTRACT OR REASSURE
BODYLANGUAGE: TENSE, DISTRESSED, FIDGETING
BREATHING: NORMAL

## 2024-09-29 LAB
GLUCOSE BLDC GLUCOMTR-MCNC: 198 MG/DL (ref 70–99)
GLUCOSE BLDC GLUCOMTR-MCNC: 206 MG/DL (ref 70–99)

## 2024-09-29 PROCEDURE — 10002803 HB RX 637: Performed by: NURSE PRACTITIONER

## 2024-09-29 PROCEDURE — 10002803 HB RX 637: Performed by: PSYCHIATRY & NEUROLOGY

## 2024-09-29 PROCEDURE — 10002803 HB RX 637: Performed by: INTERNAL MEDICINE

## 2024-09-29 PROCEDURE — 10002803 HB RX 637: Performed by: HOSPITALIST

## 2024-09-29 PROCEDURE — 10006031 HB ROOM CHARGE TELEMETRY

## 2024-09-29 RX ADMIN — METOPROLOL TARTRATE 25 MG: 25 TABLET, FILM COATED ORAL at 20:18

## 2024-09-29 RX ADMIN — Medication 9 MG: at 20:18

## 2024-09-29 RX ADMIN — QUETIAPINE FUMARATE 25 MG: 25 TABLET, FILM COATED ORAL at 16:31

## 2024-09-29 RX ADMIN — METOPROLOL TARTRATE 25 MG: 25 TABLET, FILM COATED ORAL at 10:27

## 2024-09-29 RX ADMIN — ATORVASTATIN CALCIUM 40 MG: 40 TABLET, FILM COATED ORAL at 10:27

## 2024-09-29 RX ADMIN — QUETIAPINE FUMARATE 50 MG: 25 TABLET, FILM COATED ORAL at 20:18

## 2024-09-29 ASSESSMENT — PAIN SCALES - PAIN ASSESSMENT IN ADVANCED DEMENTIA (PAINAD)
BREATHING: NORMAL
TOTALSCORE: 5
FACIALEXPRESSION: FACIAL GRIMACING
BODYLANGUAGE: TENSE, DISTRESSED, FIDGETING
CONSOLABILITY: DISTRACTED OR REASSURED BY VOICE OR TOUCH
TOTALSCORE: 2
BREATHING: NORMAL
CONSOLABILITY: UNABLE TO CONSOLE, DISTRACT OR REASSURE
FACIALEXPRESSION: SMILING OR INEXPRESSIVE
BODYLANGUAGE: TENSE, DISTRESSED, FIDGETING

## 2024-09-29 ASSESSMENT — PAIN SCALES - GENERAL: PAINLEVEL_OUTOF10: 0

## 2024-09-30 LAB
GLUCOSE BLDC GLUCOMTR-MCNC: 180 MG/DL (ref 70–99)
GLUCOSE BLDC GLUCOMTR-MCNC: 202 MG/DL (ref 70–99)
GLUCOSE BLDC GLUCOMTR-MCNC: 230 MG/DL (ref 70–99)
GLUCOSE BLDC GLUCOMTR-MCNC: 230 MG/DL (ref 70–99)

## 2024-09-30 PROCEDURE — 99233 SBSQ HOSP IP/OBS HIGH 50: CPT | Performed by: NURSE PRACTITIONER

## 2024-09-30 PROCEDURE — 96372 THER/PROPH/DIAG INJ SC/IM: CPT | Performed by: HOSPITALIST

## 2024-09-30 PROCEDURE — 10002803 HB RX 637: Performed by: HOSPITALIST

## 2024-09-30 PROCEDURE — 10002803 HB RX 637: Performed by: INTERNAL MEDICINE

## 2024-09-30 PROCEDURE — 10002803 HB RX 637: Performed by: NURSE PRACTITIONER

## 2024-09-30 PROCEDURE — 10006031 HB ROOM CHARGE TELEMETRY

## 2024-09-30 PROCEDURE — 10002803 HB RX 637: Performed by: PSYCHIATRY & NEUROLOGY

## 2024-09-30 PROCEDURE — 10002800 HB RX 250 W HCPCS: Performed by: HOSPITALIST

## 2024-09-30 RX ADMIN — QUETIAPINE FUMARATE 50 MG: 25 TABLET, FILM COATED ORAL at 20:51

## 2024-09-30 RX ADMIN — Medication 9 MG: at 20:51

## 2024-09-30 RX ADMIN — QUETIAPINE FUMARATE 25 MG: 25 TABLET, FILM COATED ORAL at 17:34

## 2024-09-30 RX ADMIN — METOPROLOL TARTRATE 25 MG: 25 TABLET, FILM COATED ORAL at 09:03

## 2024-09-30 RX ADMIN — QUETIAPINE FUMARATE 25 MG: 25 TABLET, FILM COATED ORAL at 04:07

## 2024-09-30 RX ADMIN — INSULIN LISPRO 2 UNITS: 100 INJECTION, SOLUTION INTRAVENOUS; SUBCUTANEOUS at 17:20

## 2024-09-30 RX ADMIN — QUETIAPINE FUMARATE 25 MG: 25 TABLET, FILM COATED ORAL at 00:01

## 2024-09-30 RX ADMIN — ATORVASTATIN CALCIUM 40 MG: 40 TABLET, FILM COATED ORAL at 09:03

## 2024-09-30 RX ADMIN — METOPROLOL TARTRATE 25 MG: 25 TABLET, FILM COATED ORAL at 20:51

## 2024-10-01 LAB
ANION GAP SERPL CALC-SCNC: 9 MMOL/L (ref 7–19)
BUN SERPL-MCNC: 69 MG/DL (ref 6–20)
BUN/CREAT SERPL: 39 (ref 7–25)
CALCIUM SERPL-MCNC: 8.8 MG/DL (ref 8.4–10.2)
CHLORIDE SERPL-SCNC: 101 MMOL/L (ref 97–110)
CO2 SERPL-SCNC: 30 MMOL/L (ref 21–32)
CREAT SERPL-MCNC: 1.78 MG/DL (ref 0.67–1.17)
DEPRECATED RDW RBC: 59 FL (ref 39–50)
EGFRCR SERPLBLD CKD-EPI 2021: 37 ML/MIN/{1.73_M2}
ERYTHROCYTE [DISTWIDTH] IN BLOOD: 18.7 % (ref 11–15)
FASTING DURATION TIME PATIENT: ABNORMAL H
GLUCOSE BLDC GLUCOMTR-MCNC: 140 MG/DL (ref 70–99)
GLUCOSE BLDC GLUCOMTR-MCNC: 175 MG/DL (ref 70–99)
GLUCOSE BLDC GLUCOMTR-MCNC: 187 MG/DL (ref 70–99)
GLUCOSE BLDC GLUCOMTR-MCNC: 195 MG/DL (ref 70–99)
GLUCOSE BLDC GLUCOMTR-MCNC: 198 MG/DL (ref 70–99)
GLUCOSE SERPL-MCNC: 213 MG/DL (ref 70–99)
HCT VFR BLD CALC: 25.4 % (ref 39–51)
HGB BLD-MCNC: 7.8 G/DL (ref 13–17)
MCH RBC QN AUTO: 26.7 PG (ref 26–34)
MCHC RBC AUTO-ENTMCNC: 30.7 G/DL (ref 32–36.5)
MCV RBC AUTO: 87 FL (ref 78–100)
NRBC BLD MANUAL-RTO: 0 /100 WBC
PLATELET # BLD AUTO: 328 K/MCL (ref 140–450)
POTASSIUM SERPL-SCNC: 5.6 MMOL/L (ref 3.4–5.1)
RBC # BLD: 2.92 MIL/MCL (ref 4.5–5.9)
SODIUM SERPL-SCNC: 134 MMOL/L (ref 135–145)
WBC # BLD: 7.7 K/MCL (ref 4.2–11)

## 2024-10-01 PROCEDURE — 85027 COMPLETE CBC AUTOMATED: CPT | Performed by: HOSPITALIST

## 2024-10-01 PROCEDURE — 10002803 HB RX 637: Performed by: INTERNAL MEDICINE

## 2024-10-01 PROCEDURE — 36415 COLL VENOUS BLD VENIPUNCTURE: CPT | Performed by: HOSPITALIST

## 2024-10-01 PROCEDURE — 80048 BASIC METABOLIC PNL TOTAL CA: CPT | Performed by: HOSPITALIST

## 2024-10-01 PROCEDURE — 99233 SBSQ HOSP IP/OBS HIGH 50: CPT | Performed by: NURSE PRACTITIONER

## 2024-10-01 PROCEDURE — 96372 THER/PROPH/DIAG INJ SC/IM: CPT | Performed by: HOSPITALIST

## 2024-10-01 PROCEDURE — 10002803 HB RX 637: Performed by: NURSE PRACTITIONER

## 2024-10-01 PROCEDURE — 10006031 HB ROOM CHARGE TELEMETRY

## 2024-10-01 PROCEDURE — 10002803 HB RX 637: Performed by: PSYCHIATRY & NEUROLOGY

## 2024-10-01 PROCEDURE — 10002803 HB RX 637: Performed by: HOSPITALIST

## 2024-10-01 PROCEDURE — 10002800 HB RX 250 W HCPCS: Performed by: HOSPITALIST

## 2024-10-01 RX ORDER — LANOLIN ALCOHOL/MO/W.PET/CERES
3 CREAM (GRAM) TOPICAL NIGHTLY
Status: DISCONTINUED | OUTPATIENT
Start: 2024-10-01 | End: 2024-10-04 | Stop reason: HOSPADM

## 2024-10-01 RX ADMIN — INSULIN LISPRO 2 UNITS: 100 INJECTION, SOLUTION INTRAVENOUS; SUBCUTANEOUS at 08:14

## 2024-10-01 RX ADMIN — Medication 3 MG: at 20:53

## 2024-10-01 RX ADMIN — METOPROLOL TARTRATE 25 MG: 25 TABLET, FILM COATED ORAL at 20:53

## 2024-10-01 RX ADMIN — INSULIN LISPRO 2 UNITS: 100 INJECTION, SOLUTION INTRAVENOUS; SUBCUTANEOUS at 13:16

## 2024-10-01 RX ADMIN — ATORVASTATIN CALCIUM 40 MG: 40 TABLET, FILM COATED ORAL at 08:14

## 2024-10-01 RX ADMIN — METOPROLOL TARTRATE 25 MG: 25 TABLET, FILM COATED ORAL at 08:14

## 2024-10-01 RX ADMIN — QUETIAPINE FUMARATE 25 MG: 25 TABLET, FILM COATED ORAL at 02:02

## 2024-10-01 RX ADMIN — QUETIAPINE FUMARATE 25 MG: 25 TABLET, FILM COATED ORAL at 16:21

## 2024-10-01 ASSESSMENT — PAIN SCALES - PAIN ASSESSMENT IN ADVANCED DEMENTIA (PAINAD)
BREATHING: OCCASIONAL LABORED BREATHING, SHORT PERIOD OF HYPERVENTILATION
NEGVOCALIZATION: OCCASIONAL MOAN OR GROAN, LOW LEVELS OF SPEECH WITH A NEGATIVE OR DISAPPROVING QUALITY
CONSOLABILITY: UNABLE TO CONSOLE, DISTRACT OR REASSURE
FACIALEXPRESSION: SAD. FRIGHTENED. FROWNING
BODYLANGUAGE: RIGID, FISTS CLINCHED, KNEES PULLED UP. PUSHING OR PULLING AWAY, STRIKES OUT
TOTALSCORE: 7

## 2024-10-02 LAB
ANION GAP SERPL CALC-SCNC: 8 MMOL/L (ref 7–19)
BUN SERPL-MCNC: 79 MG/DL (ref 6–20)
BUN/CREAT SERPL: 50 (ref 7–25)
CALCIUM SERPL-MCNC: 8.9 MG/DL (ref 8.4–10.2)
CHLORIDE SERPL-SCNC: 102 MMOL/L (ref 97–110)
CO2 SERPL-SCNC: 30 MMOL/L (ref 21–32)
CREAT SERPL-MCNC: 1.59 MG/DL (ref 0.67–1.17)
DEPRECATED RDW RBC: 59 FL (ref 39–50)
EGFRCR SERPLBLD CKD-EPI 2021: 43 ML/MIN/{1.73_M2}
ERYTHROCYTE [DISTWIDTH] IN BLOOD: 18.6 % (ref 11–15)
FASTING DURATION TIME PATIENT: ABNORMAL H
GLUCOSE BLDC GLUCOMTR-MCNC: 164 MG/DL (ref 70–99)
GLUCOSE BLDC GLUCOMTR-MCNC: 167 MG/DL (ref 70–99)
GLUCOSE BLDC GLUCOMTR-MCNC: 174 MG/DL (ref 70–99)
GLUCOSE BLDC GLUCOMTR-MCNC: 228 MG/DL (ref 70–99)
GLUCOSE SERPL-MCNC: 196 MG/DL (ref 70–99)
HCT VFR BLD CALC: 27.4 % (ref 39–51)
HGB BLD-MCNC: 8.3 G/DL (ref 13–17)
MCH RBC QN AUTO: 26.3 PG (ref 26–34)
MCHC RBC AUTO-ENTMCNC: 30.3 G/DL (ref 32–36.5)
MCV RBC AUTO: 86.7 FL (ref 78–100)
NRBC BLD MANUAL-RTO: 0 /100 WBC
PLATELET # BLD AUTO: 409 K/MCL (ref 140–450)
POTASSIUM SERPL-SCNC: 5.4 MMOL/L (ref 3.4–5.1)
RBC # BLD: 3.16 MIL/MCL (ref 4.5–5.9)
SODIUM SERPL-SCNC: 135 MMOL/L (ref 135–145)
WBC # BLD: 9.7 K/MCL (ref 4.2–11)

## 2024-10-02 PROCEDURE — 10002803 HB RX 637: Performed by: HOSPITALIST

## 2024-10-02 PROCEDURE — 10002803 HB RX 637: Performed by: PSYCHIATRY & NEUROLOGY

## 2024-10-02 PROCEDURE — 10002800 HB RX 250 W HCPCS: Performed by: HOSPITALIST

## 2024-10-02 PROCEDURE — 85027 COMPLETE CBC AUTOMATED: CPT | Performed by: HOSPITALIST

## 2024-10-02 PROCEDURE — 10006031 HB ROOM CHARGE TELEMETRY

## 2024-10-02 PROCEDURE — 10002803 HB RX 637: Performed by: NURSE PRACTITIONER

## 2024-10-02 PROCEDURE — 96372 THER/PROPH/DIAG INJ SC/IM: CPT | Performed by: HOSPITALIST

## 2024-10-02 PROCEDURE — 10002803 HB RX 637: Performed by: INTERNAL MEDICINE

## 2024-10-02 PROCEDURE — 99233 SBSQ HOSP IP/OBS HIGH 50: CPT | Performed by: NURSE PRACTITIONER

## 2024-10-02 PROCEDURE — 36415 COLL VENOUS BLD VENIPUNCTURE: CPT | Performed by: HOSPITALIST

## 2024-10-02 PROCEDURE — 10004651 HB RX, NO CHARGE ITEM: Performed by: INTERNAL MEDICINE

## 2024-10-02 PROCEDURE — 80048 BASIC METABOLIC PNL TOTAL CA: CPT | Performed by: HOSPITALIST

## 2024-10-02 RX ORDER — HYDROXYZINE HYDROCHLORIDE 10 MG/1
10 TABLET, FILM COATED ORAL EVERY 6 HOURS PRN
Status: DISCONTINUED | OUTPATIENT
Start: 2024-10-02 | End: 2024-10-04 | Stop reason: HOSPADM

## 2024-10-02 RX ORDER — QUETIAPINE FUMARATE 25 MG/1
25 TABLET, FILM COATED ORAL DAILY PRN
Status: DISCONTINUED | OUTPATIENT
Start: 2024-10-02 | End: 2024-10-04 | Stop reason: HOSPADM

## 2024-10-02 RX ADMIN — INSULIN LISPRO 2 UNITS: 100 INJECTION, SOLUTION INTRAVENOUS; SUBCUTANEOUS at 12:54

## 2024-10-02 RX ADMIN — INSULIN LISPRO 2 UNITS: 100 INJECTION, SOLUTION INTRAVENOUS; SUBCUTANEOUS at 08:23

## 2024-10-02 RX ADMIN — HYDROXYZINE HYDROCHLORIDE 10 MG: 10 TABLET ORAL at 23:48

## 2024-10-02 RX ADMIN — ATORVASTATIN CALCIUM 40 MG: 40 TABLET, FILM COATED ORAL at 08:23

## 2024-10-02 RX ADMIN — QUETIAPINE FUMARATE 25 MG: 25 TABLET, FILM COATED ORAL at 12:54

## 2024-10-02 RX ADMIN — METOPROLOL TARTRATE 25 MG: 25 TABLET, FILM COATED ORAL at 20:19

## 2024-10-02 RX ADMIN — METOPROLOL TARTRATE 25 MG: 25 TABLET, FILM COATED ORAL at 08:23

## 2024-10-02 RX ADMIN — QUETIAPINE FUMARATE 25 MG: 25 TABLET, FILM COATED ORAL at 20:19

## 2024-10-02 RX ADMIN — ACETAMINOPHEN 650 MG: 325 TABLET ORAL at 20:18

## 2024-10-02 RX ADMIN — INSULIN LISPRO 4 UNITS: 100 INJECTION, SOLUTION INTRAVENOUS; SUBCUTANEOUS at 17:05

## 2024-10-02 RX ADMIN — Medication 3 MG: at 20:19

## 2024-10-02 ASSESSMENT — PAIN SCALES - PAIN ASSESSMENT IN ADVANCED DEMENTIA (PAINAD)
BREATHING: OCCASIONAL LABORED BREATHING, SHORT PERIOD OF HYPERVENTILATION
NEGVOCALIZATION: OCCASIONAL MOAN OR GROAN, LOW LEVELS OF SPEECH WITH A NEGATIVE OR DISAPPROVING QUALITY
BODYLANGUAGE: RIGID, FISTS CLINCHED, KNEES PULLED UP. PUSHING OR PULLING AWAY, STRIKES OUT
FACIALEXPRESSION: SAD. FRIGHTENED. FROWNING
CONSOLABILITY: UNABLE TO CONSOLE, DISTRACT OR REASSURE
TOTALSCORE: 7

## 2024-10-03 ENCOUNTER — APPOINTMENT (OUTPATIENT)
Dept: GENERAL RADIOLOGY | Age: 84
DRG: 082 | End: 2024-10-03
Attending: INTERNAL MEDICINE

## 2024-10-03 LAB
ANION GAP SERPL CALC-SCNC: 11 MMOL/L (ref 7–19)
ANION GAP SERPL CALC-SCNC: 9 MMOL/L (ref 7–19)
BUN SERPL-MCNC: 89 MG/DL (ref 6–20)
BUN SERPL-MCNC: 92 MG/DL (ref 6–20)
BUN/CREAT SERPL: 61 (ref 7–25)
BUN/CREAT SERPL: 61 (ref 7–25)
CALCIUM SERPL-MCNC: 9.4 MG/DL (ref 8.4–10.2)
CALCIUM SERPL-MCNC: 9.4 MG/DL (ref 8.4–10.2)
CHLORIDE SERPL-SCNC: 103 MMOL/L (ref 97–110)
CHLORIDE SERPL-SCNC: 106 MMOL/L (ref 97–110)
CO2 SERPL-SCNC: 26 MMOL/L (ref 21–32)
CO2 SERPL-SCNC: 33 MMOL/L (ref 21–32)
CREAT SERPL-MCNC: 1.46 MG/DL (ref 0.67–1.17)
CREAT SERPL-MCNC: 1.52 MG/DL (ref 0.67–1.17)
DEPRECATED RDW RBC: 60.4 FL (ref 39–50)
EGFRCR SERPLBLD CKD-EPI 2021: 45 ML/MIN/{1.73_M2}
EGFRCR SERPLBLD CKD-EPI 2021: 47 ML/MIN/{1.73_M2}
ERYTHROCYTE [DISTWIDTH] IN BLOOD: 18.6 % (ref 11–15)
FASTING DURATION TIME PATIENT: ABNORMAL H
FASTING DURATION TIME PATIENT: ABNORMAL H
GLUCOSE BLDC GLUCOMTR-MCNC: 120 MG/DL (ref 70–99)
GLUCOSE BLDC GLUCOMTR-MCNC: 132 MG/DL (ref 70–99)
GLUCOSE BLDC GLUCOMTR-MCNC: 194 MG/DL (ref 70–99)
GLUCOSE BLDC GLUCOMTR-MCNC: 204 MG/DL (ref 70–99)
GLUCOSE SERPL-MCNC: 142 MG/DL (ref 70–99)
GLUCOSE SERPL-MCNC: 177 MG/DL (ref 70–99)
HCT VFR BLD CALC: 28.8 % (ref 39–51)
HGB BLD-MCNC: 8.4 G/DL (ref 13–17)
MCH RBC QN AUTO: 26.3 PG (ref 26–34)
MCHC RBC AUTO-ENTMCNC: 29.2 G/DL (ref 32–36.5)
MCV RBC AUTO: 90 FL (ref 78–100)
NRBC BLD MANUAL-RTO: 1 /100 WBC
PLATELET # BLD AUTO: 373 K/MCL (ref 140–450)
POTASSIUM SERPL-SCNC: 4.8 MMOL/L (ref 3.4–5.1)
POTASSIUM SERPL-SCNC: 5.5 MMOL/L (ref 3.4–5.1)
RBC # BLD: 3.2 MIL/MCL (ref 4.5–5.9)
SODIUM SERPL-SCNC: 137 MMOL/L (ref 135–145)
SODIUM SERPL-SCNC: 140 MMOL/L (ref 135–145)
WBC # BLD: 9.8 K/MCL (ref 4.2–11)

## 2024-10-03 PROCEDURE — 10006031 HB ROOM CHARGE TELEMETRY

## 2024-10-03 PROCEDURE — 96372 THER/PROPH/DIAG INJ SC/IM: CPT | Performed by: HOSPITALIST

## 2024-10-03 PROCEDURE — 85027 COMPLETE CBC AUTOMATED: CPT | Performed by: HOSPITALIST

## 2024-10-03 PROCEDURE — 10002800 HB RX 250 W HCPCS: Performed by: INTERNAL MEDICINE

## 2024-10-03 PROCEDURE — 10002807 HB RX 258: Performed by: INTERNAL MEDICINE

## 2024-10-03 PROCEDURE — 10002803 HB RX 637: Performed by: NURSE PRACTITIONER

## 2024-10-03 PROCEDURE — 71045 X-RAY EXAM CHEST 1 VIEW: CPT

## 2024-10-03 PROCEDURE — 10002803 HB RX 637: Performed by: INTERNAL MEDICINE

## 2024-10-03 PROCEDURE — 80048 BASIC METABOLIC PNL TOTAL CA: CPT | Performed by: INTERNAL MEDICINE

## 2024-10-03 PROCEDURE — 99233 SBSQ HOSP IP/OBS HIGH 50: CPT | Performed by: NURSE PRACTITIONER

## 2024-10-03 PROCEDURE — 96372 THER/PROPH/DIAG INJ SC/IM: CPT | Performed by: INTERNAL MEDICINE

## 2024-10-03 PROCEDURE — 36415 COLL VENOUS BLD VENIPUNCTURE: CPT | Performed by: HOSPITALIST

## 2024-10-03 PROCEDURE — 10002800 HB RX 250 W HCPCS: Performed by: HOSPITALIST

## 2024-10-03 PROCEDURE — 10002803 HB RX 637: Performed by: HOSPITALIST

## 2024-10-03 PROCEDURE — 80048 BASIC METABOLIC PNL TOTAL CA: CPT | Performed by: HOSPITALIST

## 2024-10-03 RX ORDER — FUROSEMIDE 10 MG/ML
20 INJECTION INTRAMUSCULAR; INTRAVENOUS ONCE
Status: COMPLETED | OUTPATIENT
Start: 2024-10-03 | End: 2024-10-03

## 2024-10-03 RX ORDER — ENOXAPARIN SODIUM 100 MG/ML
40 INJECTION SUBCUTANEOUS DAILY
Status: DISCONTINUED | OUTPATIENT
Start: 2024-10-03 | End: 2024-10-04 | Stop reason: HOSPADM

## 2024-10-03 RX ORDER — DIPHENHYDRAMINE HCL 25 MG
25 CAPSULE ORAL EVERY 6 HOURS PRN
Status: DISCONTINUED | OUTPATIENT
Start: 2024-10-03 | End: 2024-10-04 | Stop reason: HOSPADM

## 2024-10-03 RX ADMIN — INSULIN LISPRO 4 UNITS: 100 INJECTION, SOLUTION INTRAVENOUS; SUBCUTANEOUS at 09:56

## 2024-10-03 RX ADMIN — FUROSEMIDE 20 MG: 10 INJECTION, SOLUTION INTRAMUSCULAR; INTRAVENOUS at 13:16

## 2024-10-03 RX ADMIN — METOPROLOL TARTRATE 25 MG: 25 TABLET, FILM COATED ORAL at 20:52

## 2024-10-03 RX ADMIN — ENOXAPARIN SODIUM 40 MG: 100 INJECTION SUBCUTANEOUS at 13:16

## 2024-10-03 RX ADMIN — Medication 3 MG: at 20:52

## 2024-10-03 RX ADMIN — METOPROLOL TARTRATE 25 MG: 25 TABLET, FILM COATED ORAL at 09:57

## 2024-10-03 RX ADMIN — DIPHENHYDRAMINE HYDROCHLORIDE 25 MG: 25 CAPSULE ORAL at 00:49

## 2024-10-03 RX ADMIN — PIPERACILLIN SODIUM AND TAZOBACTAM SODIUM 3.38 G: 3; .375 INJECTION, POWDER, FOR SOLUTION INTRAVENOUS at 14:56

## 2024-10-03 RX ADMIN — PIPERACILLIN SODIUM AND TAZOBACTAM SODIUM 3.38 G: 3; .375 INJECTION, POWDER, FOR SOLUTION INTRAVENOUS at 20:51

## 2024-10-03 RX ADMIN — ATORVASTATIN CALCIUM 40 MG: 40 TABLET, FILM COATED ORAL at 09:57

## 2024-10-03 ASSESSMENT — PAIN SCALES - PAIN ASSESSMENT IN ADVANCED DEMENTIA (PAINAD)
BODYLANGUAGE: TENSE, DISTRESSED, FIDGETING
TOTALSCORE: 4
FACIALEXPRESSION: SMILING OR INEXPRESSIVE
BREATHING: NORMAL
FACIALEXPRESSION: SMILING OR INEXPRESSIVE
CONSOLABILITY: UNABLE TO CONSOLE, DISTRACT OR REASSURE
NEGVOCALIZATION: OCCASIONAL MOAN OR GROAN, LOW LEVELS OF SPEECH WITH A NEGATIVE OR DISAPPROVING QUALITY
BODYLANGUAGE: TENSE, DISTRESSED, FIDGETING
TOTALSCORE: 4
NEGVOCALIZATION: OCCASIONAL MOAN OR GROAN, LOW LEVELS OF SPEECH WITH A NEGATIVE OR DISAPPROVING QUALITY
CONSOLABILITY: UNABLE TO CONSOLE, DISTRACT OR REASSURE
BREATHING: NORMAL

## 2024-10-04 VITALS
OXYGEN SATURATION: 100 % | BODY MASS INDEX: 23.48 KG/M2 | RESPIRATION RATE: 20 BRPM | WEIGHT: 158.51 LBS | TEMPERATURE: 97.2 F | HEIGHT: 69 IN | SYSTOLIC BLOOD PRESSURE: 105 MMHG | DIASTOLIC BLOOD PRESSURE: 49 MMHG | HEART RATE: 59 BPM

## 2024-10-04 PROBLEM — I50.22 SYSTOLIC HEART FAILURE, CHRONIC  (CMD): Status: ACTIVE | Noted: 2024-10-04

## 2024-10-04 LAB
ANION GAP SERPL CALC-SCNC: 8 MMOL/L (ref 7–19)
BUN SERPL-MCNC: 89 MG/DL (ref 6–20)
BUN/CREAT SERPL: 61 (ref 7–25)
CALCIUM SERPL-MCNC: 9.1 MG/DL (ref 8.4–10.2)
CHLORIDE SERPL-SCNC: 106 MMOL/L (ref 97–110)
CO2 SERPL-SCNC: 31 MMOL/L (ref 21–32)
CREAT SERPL-MCNC: 1.45 MG/DL (ref 0.67–1.17)
DEPRECATED RDW RBC: 57.3 FL (ref 39–50)
EGFRCR SERPLBLD CKD-EPI 2021: 48 ML/MIN/{1.73_M2}
ERYTHROCYTE [DISTWIDTH] IN BLOOD: 18.1 % (ref 11–15)
FASTING DURATION TIME PATIENT: ABNORMAL H
GLUCOSE BLDC GLUCOMTR-MCNC: 148 MG/DL (ref 70–99)
GLUCOSE SERPL-MCNC: 149 MG/DL (ref 70–99)
HCT VFR BLD CALC: 28.7 % (ref 39–51)
HGB BLD-MCNC: 8.5 G/DL (ref 13–17)
MAGNESIUM SERPL-MCNC: 2.6 MG/DL (ref 1.7–2.4)
MCH RBC QN AUTO: 26 PG (ref 26–34)
MCHC RBC AUTO-ENTMCNC: 29.6 G/DL (ref 32–36.5)
MCV RBC AUTO: 87.8 FL (ref 78–100)
NRBC BLD MANUAL-RTO: 0 /100 WBC
PLATELET # BLD AUTO: 391 K/MCL (ref 140–450)
POTASSIUM SERPL-SCNC: 4.2 MMOL/L (ref 3.4–5.1)
RAINBOW EXTRA TUBES HOLD SPECIMEN: NORMAL
RAINBOW EXTRA TUBES HOLD SPECIMEN: NORMAL
RBC # BLD: 3.27 MIL/MCL (ref 4.5–5.9)
SODIUM SERPL-SCNC: 141 MMOL/L (ref 135–145)
WBC # BLD: 8.7 K/MCL (ref 4.2–11)

## 2024-10-04 PROCEDURE — 10002803 HB RX 637: Performed by: HOSPITALIST

## 2024-10-04 PROCEDURE — 99233 SBSQ HOSP IP/OBS HIGH 50: CPT | Performed by: NURSE PRACTITIONER

## 2024-10-04 PROCEDURE — 36415 COLL VENOUS BLD VENIPUNCTURE: CPT | Performed by: HOSPITALIST

## 2024-10-04 PROCEDURE — 85027 COMPLETE CBC AUTOMATED: CPT | Performed by: HOSPITALIST

## 2024-10-04 PROCEDURE — 10002800 HB RX 250 W HCPCS: Performed by: INTERNAL MEDICINE

## 2024-10-04 PROCEDURE — 83735 ASSAY OF MAGNESIUM: CPT | Performed by: INTERNAL MEDICINE

## 2024-10-04 PROCEDURE — 10002803 HB RX 637: Performed by: NURSE PRACTITIONER

## 2024-10-04 PROCEDURE — 10002803 HB RX 637: Performed by: INTERNAL MEDICINE

## 2024-10-04 PROCEDURE — 80048 BASIC METABOLIC PNL TOTAL CA: CPT | Performed by: HOSPITALIST

## 2024-10-04 PROCEDURE — 10002807 HB RX 258: Performed by: INTERNAL MEDICINE

## 2024-10-04 PROCEDURE — 10002803 HB RX 637: Performed by: PSYCHIATRY & NEUROLOGY

## 2024-10-04 PROCEDURE — 96372 THER/PROPH/DIAG INJ SC/IM: CPT | Performed by: INTERNAL MEDICINE

## 2024-10-04 RX ORDER — ONDANSETRON 2 MG/ML
4 INJECTION INTRAMUSCULAR; INTRAVENOUS EVERY 6 HOURS PRN
Status: CANCELLED | OUTPATIENT
Start: 2024-10-04

## 2024-10-04 RX ORDER — LORAZEPAM 2 MG/ML
0.5 INJECTION INTRAMUSCULAR
Status: CANCELLED | OUTPATIENT
Start: 2024-10-04

## 2024-10-04 RX ORDER — BISACODYL 10 MG
10 SUPPOSITORY, RECTAL RECTAL DAILY PRN
Status: CANCELLED | OUTPATIENT
Start: 2024-10-04

## 2024-10-04 RX ORDER — CARBOXYMETHYLCELLULOSE SODIUM 5 MG/ML
1 SOLUTION/ DROPS OPHTHALMIC PRN
Status: CANCELLED | OUTPATIENT
Start: 2024-10-04

## 2024-10-04 RX ORDER — ACETAMINOPHEN 650 MG/1
650 SUPPOSITORY RECTAL EVERY 4 HOURS PRN
Status: CANCELLED | OUTPATIENT
Start: 2024-10-04

## 2024-10-04 RX ORDER — LORAZEPAM 2 MG/ML
0.5 INJECTION INTRAMUSCULAR EVERY 4 HOURS
Status: CANCELLED | OUTPATIENT
Start: 2024-10-04

## 2024-10-04 RX ORDER — HALOPERIDOL 5 MG/ML
0.5 INJECTION INTRAMUSCULAR EVERY 4 HOURS PRN
Status: CANCELLED | OUTPATIENT
Start: 2024-10-04

## 2024-10-04 RX ORDER — SCOLOPAMINE TRANSDERMAL SYSTEM 1 MG/1
1 PATCH, EXTENDED RELEASE TRANSDERMAL
Status: CANCELLED | OUTPATIENT
Start: 2024-10-04

## 2024-10-04 RX ORDER — GLYCOPYRROLATE 0.2 MG/ML
0.4 INJECTION, SOLUTION INTRAMUSCULAR; INTRAVENOUS EVERY 6 HOURS PRN
Status: CANCELLED | OUTPATIENT
Start: 2024-10-04

## 2024-10-04 RX ORDER — 0.9 % SODIUM CHLORIDE 0.9 %
2 VIAL (ML) INJECTION EVERY 12 HOURS SCHEDULED
Status: CANCELLED | OUTPATIENT
Start: 2024-10-04

## 2024-10-04 RX ADMIN — ENOXAPARIN SODIUM 40 MG: 100 INJECTION SUBCUTANEOUS at 10:43

## 2024-10-04 RX ADMIN — QUETIAPINE FUMARATE 25 MG: 25 TABLET, FILM COATED ORAL at 00:02

## 2024-10-04 RX ADMIN — METOPROLOL TARTRATE 25 MG: 25 TABLET, FILM COATED ORAL at 10:42

## 2024-10-04 RX ADMIN — ATORVASTATIN CALCIUM 40 MG: 40 TABLET, FILM COATED ORAL at 10:42

## 2024-10-04 RX ADMIN — QUETIAPINE FUMARATE 25 MG: 25 TABLET, FILM COATED ORAL at 10:42

## 2024-10-04 RX ADMIN — PIPERACILLIN SODIUM AND TAZOBACTAM SODIUM 3.38 G: 3; .375 INJECTION, POWDER, FOR SOLUTION INTRAVENOUS at 06:08

## 2024-10-04 RX ADMIN — DIPHENHYDRAMINE HYDROCHLORIDE 25 MG: 25 CAPSULE ORAL at 06:09

## 2024-10-04 ASSESSMENT — PAIN SCALES - PAIN ASSESSMENT IN ADVANCED DEMENTIA (PAINAD)
FACIALEXPRESSION: SMILING OR INEXPRESSIVE
CONSOLABILITY: UNABLE TO CONSOLE, DISTRACT OR REASSURE
BODYLANGUAGE: TENSE, DISTRESSED, FIDGETING
TOTALSCORE: 4
BREATHING: NORMAL
NEGVOCALIZATION: OCCASIONAL MOAN OR GROAN, LOW LEVELS OF SPEECH WITH A NEGATIVE OR DISAPPROVING QUALITY

## 2025-01-30 NOTE — PROGRESS NOTES
Interval history:   Creatinine improving today.  Sodium levels improving        MEDICAL HISTORY:   Past Medical History:    Arrhythmia    Cancer (HCC)    bladder    Cataract    High blood pressure    High cholesterol    History of blood transfusion    HYPERLIPIDEMIA    HYPERTENSION    Other and unspecified hyperlipidemia    STROKE    Type II or unspecified type diabetes mellitus without mention of complication, not stated as uncontrolled    Unspecified essential hypertension    Visual impairment       SURGICAL HISTORY:   Past Surgical History:   Procedure Laterality Date    Cardiac pacemaker placement      Other surgical history      bladder tumor removal    Tonsillectomy         FAMILY HISTORY:   Family History   Problem Relation Age of Onset    Heart Disorder Father     Other Father         HIP FX    Heart Disorder Mother     Other Mother         CVA AGE 80       SOCIAL HISTORY:   Social History     Socioeconomic History    Marital status:      Spouse name: Not on file    Number of children: Not on file    Years of education: Not on file    Highest education level: Not on file   Occupational History    Not on file   Tobacco Use    Smoking status: Former     Types: Cigarettes    Smokeless tobacco: Never    Tobacco comments:     2010   Vaping Use    Vaping status: Never Used   Substance and Sexual Activity    Alcohol use: Yes     Comment: HEAVY PREVIOUS NOW SOCIAL    Drug use: No    Sexual activity: Not on file   Other Topics Concern    Not on file   Social History Narrative    Not on file     Social Determinants of Health     Financial Resource Strain: Low Risk  (5/30/2024)    Received from [facility]    Financial Resource Strain     In the past year, have you or any family members you live with been unable to get any of the following when it was really needed? Check all that apply.: None   Food Insecurity: No Food Insecurity (7/25/2024)    Food Insecurity     Food Insecurity: Never true    Transportation Needs: No Transportation Needs (7/25/2024)    Transportation Needs     Lack of Transportation: No     Car Seat: Not on file   Physical Activity: High Risk (5/3/2024)    Received from [facility]    Exercise Vital Sign     On average, how many days per week do you engage in moderate to strenuous exercise (like a brisk walk)?: 0 days     On average, how many minutes do you engage in exercise at this level?: 0 min   Stress: Not on file   Social Connections: Medium Risk (5/30/2024)    Received from [facility]    Social Connections     How often do you see or talk to people that you care about and feel close to? (For example: talking to friends on the phone, visiting friends or family, going to Denominational or club meetings): 1 or 2 times a week   Housing Stability: Low Risk  (7/25/2024)    Housing Stability     Housing Instability: No     Housing Instability Emergency: Not on file     Crib or Bassinette: Not on file       MEDICATIONS:   Current Facility-Administered Medications   Medication Dose Route Frequency    famotidine (Pepcid) tab 20 mg  20 mg Oral Daily    amoxicillin clavulanate (Augmentin) 875-125 MG per tab 875 mg  875 mg Oral Q12H    sodium bicarbonate tab 650 mg  650 mg Oral BID    ALPRAZolam (Xanax) tab 0.25 mg  0.25 mg Oral Nightly PRN    QUEtiapine (SEROquel) tab 12.5 mg  12.5 mg Oral Once    QUEtiapine (SEROquel) tab 12.5 mg  12.5 mg Oral QPM    QUEtiapine (SEROquel) tab 12.5 mg  12.5 mg Oral Nightly PRN    sodium chloride 0.9% 0.9% flush injection 10 mL  10 mL Intravenous PRN    acetaminophen (Tylenol) tab 650 mg  650 mg Oral Q8H PRN    melatonin cap/tab 5 mg  5 mg Oral Nightly    isosorbide dinitrate (Isordil Titradose) tab 5 mg  5 mg Oral TID    metoprolol tartrate (Lopressor) tab 25 mg  25 mg Oral 2x Daily(Beta Blocker)    docusate sodium (Colace) cap 100 mg  100 mg Oral BID PRN    [Held by provider] apixaban (Eliquis) tab 5 mg  5 mg Oral BID    atorvastatin  [place]  Confirm you are appropriately licensed, registered, or certified to deliver care in the state where the patient is located as indicated above. If you are not or unsure, please re-schedule the visit: Yes, I confirm.        --Mary Martinez LCSW on 1/30/2025 at 12:06 PM    An electronic signature was used to authenticate this note.    (Lipitor) tab 40 mg  40 mg Oral Daily    hydrALAZINE (Apresoline) tab 10 mg  10 mg Oral TID    glucose (Dex4) 15 GM/59ML oral liquid 15 g  15 g Oral Q15 Min PRN    Or    glucose (Glutose) 40% oral gel 15 g  15 g Oral Q15 Min PRN    Or    glucose-vitamin C (Dex-4) chewable tab 4 tablet  4 tablet Oral Q15 Min PRN    Or    dextrose 50% injection 50 mL  50 mL Intravenous Q15 Min PRN    Or    glucose (Dex4) 15 GM/59ML oral liquid 30 g  30 g Oral Q15 Min PRN    Or    glucose (Glutose) 40% oral gel 30 g  30 g Oral Q15 Min PRN    Or    glucose-vitamin C (Dex-4) chewable tab 8 tablet  8 tablet Oral Q15 Min PRN    ondansetron (Zofran) 4 MG/2ML injection 4 mg  4 mg Intravenous Q6H PRN         ALLERGIES:   Allergies   Allergen Reactions    Metformin Hcl OTHER (SEE COMMENTS)      Oral,   severe dry mouth       REVIEW OF SYSTEMS:  A comprehensive review of systems was conducted and is negative except for what is mentioned in the HPI      PHYSICAL EXAM:  /50 (BP Location: Right arm)   Pulse 67   Temp 98.8 °F (37.1 °C) (Oral)   Resp 18   Ht 5' 9\" (1.753 m)   Wt 167 lb (75.8 kg)   SpO2 99%   BMI 24.66 kg/m²   GEN:  NAD  HEENT: NCAT, dry MM   CHEST: CTA b/l  CARDIAC: S1S2 normal  ABD: soft, NT/ND  : urostomy - hematuria noted   EXT: no lower ext edema  NEURO: sleepy       LABS:  Recent Labs   Lab 08/10/24  0542 08/11/24  0546 08/12/24  0538   * 144* 153*   BUN 33* 28* 21   CREATSERUM 1.42* 1.30 1.23   EGFRCR 49* 54* 58*   CA 8.0* 8.0* 7.8*   * 148* 146*   K 3.9 3.9 3.9   * 121* 122*   CO2 16.0* 16.0* 17.0*     Recent Labs   Lab 08/10/24  0542 08/11/24  0550 08/12/24  0538   RBC 3.52* 3.44* 3.37*   HGB 9.6* 9.2* 9.1*   HCT 30.6* 30.6* 29.4*   MCV 86.9 89.0 87.2   MCH 27.3 26.7 27.0   MCHC 31.4 30.1* 31.0   RDW 22.7* 23.5* 22.8*   NEPRELIM 8.42* 6.20 3.61   WBC 12.0* 9.2 6.2   .0 356.0 360.0           INTAKE/OUTPUT:    Intake/Output Summary (Last 24 hours) at 8/12/2024 0845  Last data filed at  8/12/2024 0504  Gross per 24 hour   Intake 480 ml   Output 1730 ml   Net -1250 ml             IMAGING:  No results found.       ASSESSMENT AND PLAN:   This is an 84 year old male with PMH sig for DM2, HTN, CAD, Afib, systolic heart failure.  Hx of bladder CA and is s/p laparoscopic cystoprostatectomy with ileal conduit diversion on 7/25. Post op course complicated by enterococcus bacteremia. Nephrology is consulted for ALBERT.     Non-oliguric ALBERT   - sCr 1.29 on admission  - Urine Na 43   - etiology may be pre-renal ALBERT vs ATN in the setting of infection  - sCr improving  - stopped IVFs given mod effusion and ansarca on CT chest. Push po hydration given hypernatremia.  - hold lasix, monitor volume status  - continue supportive care    - renally dose meds   - avoid nephro toxins  - follow renal fxn and I/Os  - no acute indication for RRT      Hypernatremia  -switched IVF to 0.45 NS  -now off   -now off IVF d/t effusion, anasarca  -push po hydration    Acidosis   - likely due to ALBERT and ileal conduit   -IVF as above  -cont na bicarb tabs BID   - monitor      HTN    - Metoprolol, hydralazine      Fever, leukocytosis   Enterococcus bacteremia  - management and abx per primary / ID    -sp TITA    Bladder CA s/p laparoscopic cystoprostatectomy with ileal conduit diversion 7/25  Mild hydronephrosis   - per Urology        Thank you for the consult and allowing us to participate in the care of Roc HIDALGO Guadalupe.  We will continue to follow.    Juan Carlos Brady MD  [facility]  Nephrology

## (undated) DEVICE — Device

## (undated) DEVICE — AIRSEAL TRI-LUMEN LILTERED TUBE SET: Brand: AIRSEAL

## (undated) DEVICE — DRAPE,ABDOMINAL,MAJOR,STERILE: Brand: MEDLINE

## (undated) DEVICE — ELECTRODE PT RTN C30- LB 9FT CORD NONIRRITATE NONSENSITIZE

## (undated) DEVICE — POWER SHELL: Brand: SIGNIA

## (undated) DEVICE — PROGRASP FORCEPS: Brand: ENDOWRIST

## (undated) DEVICE — TRAY CATH SURESTEP ADD-A-FOLEY CMP CR STLK FOLEY URMTR STAB

## (undated) DEVICE — GLOVE SURG 7.5 PROTEXIS LF CRM PF SMTH BEAD CUFF STRL

## (undated) DEVICE — CATHETER URET 5FR 70CM STD OP-EN LF FLXTP ACCEPTS .038IN GW

## (undated) DEVICE — CLIP INT L POLYMER LOK LIG HEM O LOK

## (undated) DEVICE — ADHESIVE SKIN TOP FOR WND CLSR DERMBND ADV

## (undated) DEVICE — SOLUTION IRRIG 1000ML ST H2O AQUALITE PLAS

## (undated) DEVICE — SUT CHRM GUT 3-0 27IN SH ABSRB UD 26MM 1/2

## (undated) DEVICE — SUT MCRYL 4-0 18IN PS-2 ABSRB UD 19MM 3/8 CIR

## (undated) DEVICE — CANNULA SEAL

## (undated) DEVICE — SUT PERMA- 3-0 30IN SH NABSRB BLK 26MM 1/2

## (undated) DEVICE — BLAKE SILICONE DRAIN, 15 FR ROUND, HUBLESS: Brand: BLAKE

## (undated) DEVICE — GAMMEX® PI HYBRID SIZE 7, STERILE POWDER-FREE SURGICAL GLOVE, POLYISOPRENE AND NEOPRENE BLEND: Brand: GAMMEX

## (undated) DEVICE — VIOLET BRAIDED (POLYGLACTIN 910), SYNTHETIC ABSORBABLE SUTURE: Brand: COATED VICRYL

## (undated) DEVICE — ANTIBACTERIAL UNDYED BRAIDED (POLYGLACTIN 910), SYNTHETIC ABSORBABLE SUTURE: Brand: COATED VICRYL

## (undated) DEVICE — GOWN SURG LG L3 NONREINFORCE SET IN SLV STRL LF DISP BLUE

## (undated) DEVICE — AGENT HEMSTAT 4X2IN OXIDIZED REGENERATED

## (undated) DEVICE — SOLUTION IV 1000ML DIL ST H2O

## (undated) DEVICE — AIRSEAL 12 MM ACCESS PORT AND PALM GRIP OBTURATOR WITH BLADELESS OPTICAL TIP, 120 MM LENGTH: Brand: AIRSEAL

## (undated) DEVICE — EVACUATOR SUR 100CC SIL BLB WND

## (undated) DEVICE — EVACUATOR URO UROVAC BLDR ADPR IRR LTX DISP

## (undated) DEVICE — MONOPOLAR CURVED SCISSORS: Brand: ENDOWRIST

## (undated) DEVICE — ROBOTIC: Brand: MEDLINE INDUSTRIES, INC.

## (undated) DEVICE — PAD POS 36IN DISP SURGYPAD

## (undated) DEVICE — GLOVE SUR 8 SENSICARE PI PIP CRM PWD F

## (undated) DEVICE — SUT COAT VCRL 4-0 27IN RB-1 ABSRB UD 17MM 1/2

## (undated) DEVICE — LAWSON - SHIELD RADPAD FEMORAL BLUE

## (undated) DEVICE — ABSORBABLE WOUND CLOSURE DEVICE: Brand: V-LOC 90

## (undated) DEVICE — SUT ETHLN 3-0 18IN PS-2 NABSRB BLK 19MM 3/8 C

## (undated) DEVICE — TISSUE RETRIEVAL SYSTEM: Brand: INZII RETRIEVAL SYSTEM

## (undated) DEVICE — TIP COVER ACCESSORY

## (undated) DEVICE — COLUMN DRAPE

## (undated) DEVICE — GLOVE SURG 7 PROTEXIS LF CRM PF BEAD CUFF STRL PLISPRN 12IN

## (undated) DEVICE — CATHETER BARDEX LBRCTH 22FR 30CC FOLEY 3W 2 STAGGER DRN EYE

## (undated) DEVICE — LARGE NEEDLE DRIVER: Brand: ENDOWRIST

## (undated) DEVICE — FENESTRATED BIPOLAR FORCEPS: Brand: ENDOWRIST

## (undated) DEVICE — ENFIT PU FEEDING TUBE PURP 6.5FR 60CM: Brand: ENFIT PU FEEDING TUBE

## (undated) DEVICE — ELECTRO LUBE IS A SINGLE PATIENT USE DEVICE THAT IS INTENDED TO BE USED ON ELECTROSURGICAL ELECTRODES TO REDUCE STICKING.: Brand: KEY SURGICAL ELECTRO LUBE

## (undated) DEVICE — BLADELESS OBTURATOR: Brand: WECK VISTA

## (undated) DEVICE — BAG DRNGE 2000ML URIN INF CTRL ANTIREFLX

## (undated) DEVICE — SOLUTION IRR 3000ML 0.9% NACL ARTHMTC LF

## (undated) DEVICE — ARTICULATING RELOAD WITH TRI-STAPLE TECHNOLOGY: Brand: ENDO GIA

## (undated) DEVICE — INTENDED TO BE USED TO OCCLUDE, RETRACT AND IDENTIFY ARTERIES, VEINS, TENDONS AND NERVES IN SURGICAL PROCEDURES: Brand: STERION®  VESSEL LOOP

## (undated) DEVICE — CATHETER URETH 18FR BLLN 5CC SIL ALLY W/ SIL

## (undated) DEVICE — SUT COAT VCRL 2-0 27IN SH ABSRB UD 26MM 1/2

## (undated) DEVICE — SUT V-LOC 90 3-0 9IN CV-23 ABSRB VLT 17MM 1/2

## (undated) DEVICE — ELECTRODE ESURG 12D 16D LOOP STD 24FR HFRQ RESCT PLASMALOOP

## (undated) DEVICE — VESSEL SEALER EXTEND: Brand: ENDOWRIST

## (undated) NOTE — LETTER
[facility]  Administration of Anesthesia  IRoc agree to be cared for by a physician anesthesiologist alone and/or with a nurse anesthetist, who is specially trained to monitor me and give me medicine to put me to sleep or keep me comfortable during my procedure    I understand that my anesthesiologist and/or anesthetist is not an employee or agent of [facility] or [employer]. He or she works for [facility]    As the patient asking for anesthesia services, I agree to:  Allow the anesthesiologist (anesthesia doctor) to give me medicine and do additional procedures as necessary. Some examples are: Starting or using an “IV” to give me medicine, fluids or blood during my procedure, and having a breathing tube placed to help me breathe when I’m asleep (intubation). In the event that my heart stops working properly, I understand that my anesthesiologist will make every effort to sustain my life, unless otherwise directed by [facility] Do Not Resuscitate documents.  Tell my anesthesia doctor before my procedure:  If I am pregnant.  The last time that I ate or drank.  iii. All of the medicines I take (including prescriptions, herbal supplements, and pills I can buy without a prescription (including street drugs/illegal medications). Failure to inform my anesthesiologist about these medicines may increase my risk of anesthetic complications.  iv.If I am allergic to anything or have had a reaction to anesthesia before.  I understand how the anesthesia medicine will help me (benefits).  I understand that with any type of anesthesia medicine there are risks:  The most common risks are: nausea, vomiting, sore throat, muscle soreness, damage to my eyes, mouth, or teeth (from breathing tube placement).  Rare risks include: remembering what happened during my procedure, allergic reactions to medications, injury to my airway, heart, lungs, vision, nerves, or  muscles and in extremely rare instances death.  My doctor has explained to me other choices available to me for my care (alternatives).  Pregnant Patients (“epidural”):  I understand that the risks of having an epidural (medicine given into my back to help control pain during labor), include itching, low blood pressure, difficulty urinating, headache or slowing of the baby’s heart. Very rare risks include infection, bleeding, seizure, irregular heart rhythms and nerve injury.  Regional Anesthesia (“spinal”, “epidural”, & “nerve blocks”):  I understand that rare but potential complications include headache, bleeding, infection, seizure, irregular heart rhythms, and nerve injury.    _____________________________________________________________________________  Patient (or Representative) Signature/Relationship to Patient  Date   Time    _____________________________________________________________________________   Name (if used)    Language/Organization   Time    _____________________________________________________________________________  Nurse Anesthetist Signature     Date   Time  _____________________________________________________________________________  Anesthesiologist Signature     Date   Time  I have discussed the procedure and information above with the patient (or patient’s representative) and answered their questions. The patient or their representative has agreed to have anesthesia services.    _____________________________________________________________________________  Witness        Date   Time  I have verified that the signature is that of the patient or patient’s representative, and that it was signed before the procedure  Patient Name: Roc Butcher     : 1940                 Printed: 2024 at 12:32 AM    Medical Record #: [id number]                                            Page 1 of 1  ----------ANESTHESIA CONSENT----------

## (undated) NOTE — LETTER
[employer] Cardiac Device Communication Tool    Preop to complete    Patient Name Roc Butcher   Patient  - AGE - SEX 1940 - A: 84 y - male   Surgical Date 2024   Surgical Procedure Xi robotic assisted laparoscopic radical cystoprostatectomy, bilateral pelvic lymph node dissection ileal conduit diversion   Surgical Location [facility]   Type of cautery anticipated: Bipolar   Surgical procedure > 2 hours      Device Clinic to Complete the Information Below, Sign and Fax to [phone]   Pacemaker or ICD    Atrial or atrial-ventricular lead?        Indication for device    Is patient pacemaker dependent?    Has pt had routine f/u and is battery life > 3 months    Is ICD programmed to inhibit therapy w/magnet?    Does device have rate response or other sensor?      Surgical Procedure above Iliac Crest Surgical Procedure @ Iliac [facility]rest and below   < 6 in. from ICD: Reprogram therapies OFF w/  asynchronous pacing if PM dependent  < 6 in. from PM: Reprogram asynchronous if PM   dependent ICD: No Change  PM: No Change   > 6 in. from ICD: Magnet*  > 6 in. from PM:  No Change*  * If PM dependent observe for pacing inhibition and minimize cautery if inhibition is seen                                              Bipolar cautery: No Change   Cardiac Device Management Plan (check one)            ___  Reprogram (PAT Dept to provide Rep w/ arrival date/time)   ___ Magnet    ___ No Change    Comments: ___________________________________________________________________        Signature: ________________________________ Date: ____________ Time: ______________     Print Name: ___________________________________

## (undated) NOTE — LETTER
[place] [address]  Authorization for Invasive Procedures  Date: ***           Time: ***    I hereby authorize Dr Rick, my physician and his/her assistants (if applicable), which may include medical students, residents, and/or fellows, to perform the following surgical operation/ procedure and administer such anesthesia as may be determined necessary by my physician: Transesophageal Echocardiogram  on Roc Butcher  2.   I recognize that during the surgical operation/procedure, unforeseen conditions may necessitate additional or different procedures than those listed above.  I, therefore, further authorize and request that the above-named surgeon, assistants, or designees perform such procedures as are, in their judgment, necessary and desirable.    3.   My surgeon/physician has discussed prior to my surgery the potential benefits, risks and side effects of this procedure; the likelihood of achieving goals; and potential problems that might occur during recuperation.  They also discussed reasonable alternatives to the procedure, including risks, benefits, and side effects related to the alternatives and risks related to not receiving this procedure.  I have had all my questions answered and I acknowledge that no guarantee has been made as to the result that may be obtained.    4.   Should the need arise during my operation/procedure, which includes change of level of care prior to discharge, I also consent to the administration of blood and/or blood products.  Further, I understand that despite careful testing and screening of blood or blood products by collecting agencies, I may still be subject to ill effects as a result of receiving a blood transfusion and/or blood products.  The following are some, but not all, of the potential risks that can occur: fever and allergic reactions, hemolytic reactions, transmission of diseases such as Hepatitis, AIDS and Cytomegalovirus (CMV)  and fluid overload.  In the event that I wish to have an autologous transfusion of my own blood, or a directed donor transfusion, I will discuss this with my physician.   Check only if Refusing Blood or Blood Products  I understand refusal of blood or blood products as deemed necessary by my physician may have serious consequences to my condition to include possible death. I hereby assume responsibility for my refusal and release the hospital, its personnel, and my physicians from any responsibility for the consequences of my refusal.         o  Refuse         5.   I authorize the use of any specimen, organs, tissues, body parts or foreign objects that may be removed from my body during the operation/procedure for diagnosis, research or teaching purposes and their subsequent disposal by hospital authorities.  I also authorize the release of specimen test results and/or written reports to my treating physician on the hospital medical staff or other referring or consulting physicians involved in my care, at the discretion of the Pathologist or my treating physician.    6.   I consent to the photographing or videotaping of the operations or procedures to be performed, including appropriate portions of my body for medical, scientific, or educational purposes, provided my identity is not revealed by the pictures or by descriptive texts accompanying them.  If the procedure has been photographed/videotaped, the surgeon will obtain the original picture, image, videotape or CD.  The hospital will not be responsible for storage, release or maintenance of the picture, image, tape or CD.    7.   I consent to the presence of a  or observers in the operating room as deemed necessary by my physician or their designees.    8.   I recognize that in the event my procedure results in extended X-Ray/fluoroscopy time, I may develop a skin reaction.    9. If I have a Do Not Attempt Resuscitation (DNAR) order in place,  that status will be suspended while in the operating room, procedural suite, and during the recovery period unless otherwise explicitly stated by me (or a person authorized to consent on my behalf). The surgeon or my attending physician will determine when the applicable recovery period ends for purposes of reinstating the DNAR order.  10. Patients having a sterilization procedure: I understand that if the procedure is successful the results will be permanent and it will therefore be impossible for me to inseminate, conceive, or bear children.  I also understand that the procedure is intended to result in sterility, although the result has not been guaranteed.   11. I acknowledge that my physician has explained sedation/analgesia administration to me including the risk and benefits I consent to the administration of sedation/analgesia as may be necessary or desirable in the judgment of my physician.    I CERTIFY THAT I HAVE READ AND FULLY UNDERSTAND THE ABOVE CONSENT TO OPERATION and/or OTHER PROCEDURE.        ____________________________________       _________________________________      ______________________________  Signature of Patient         Signature of Responsible Person        Printed Name of Responsible Person        ____________________________________      _________________________________      ______________________________       Signature of Witness          Relationship to Patient                       Date                                       Time  Patient Name: Roc Butcher  : 1940    Reviewed: 2024   Printed: 2024  Medical Record #: [id number] Page 1 of 2             STATEMENT OF PHYSICIAN My signature below affirms that prior to the time of the procedure; I have explained to the patient and/or his/her legal representative, the risks and benefits involved in the proposed treatment and any reasonable alternative to the proposed treatment. I have also explained the  risks and benefits involved in refusal of the proposed treatment and alternatives to the proposed treatment and have answered the patient's questions. If I have a significant financial interest in a co-management agreement or a significant financial interest in any product or implant, or other significant relationship used in this procedure/surgery, I have disclosed this and had a discussion with my patient.     _______________________________________________________________ _____________________________  (Signature of Physician)                                                                                         (Date)                                   (Time)  Patient Name: Roc HIDALGO Guadalupe  : 1940    Reviewed: 2024   Printed: 2024  Medical Record #: [id number] Page 2 of 2